# Patient Record
Sex: MALE | Race: BLACK OR AFRICAN AMERICAN | NOT HISPANIC OR LATINO | Employment: UNEMPLOYED | URBAN - METROPOLITAN AREA
[De-identification: names, ages, dates, MRNs, and addresses within clinical notes are randomized per-mention and may not be internally consistent; named-entity substitution may affect disease eponyms.]

---

## 2021-01-01 ENCOUNTER — HOSPITAL ENCOUNTER (EMERGENCY)
Facility: HOSPITAL | Age: 51
Discharge: HOME | End: 2021-01-01
Attending: EMERGENCY MEDICINE
Payer: MEDICAID

## 2021-01-01 VITALS
TEMPERATURE: 98 F | OXYGEN SATURATION: 98 % | BODY MASS INDEX: 31.99 KG/M2 | WEIGHT: 242.5 LBS | HEART RATE: 74 BPM | SYSTOLIC BLOOD PRESSURE: 147 MMHG | RESPIRATION RATE: 18 BRPM | DIASTOLIC BLOOD PRESSURE: 88 MMHG

## 2021-01-01 DIAGNOSIS — S61.215A LACERATION OF LEFT RING FINGER WITHOUT FOREIGN BODY WITHOUT DAMAGE TO NAIL, INITIAL ENCOUNTER: Primary | ICD-10-CM

## 2021-01-01 PROCEDURE — 90715 TDAP VACCINE 7 YRS/> IM: CPT | Performed by: EMERGENCY MEDICINE

## 2021-01-01 PROCEDURE — 25000003 PHARM REV CODE 250: Performed by: EMERGENCY MEDICINE

## 2021-01-01 PROCEDURE — 90471 IMMUNIZATION ADMIN: CPT | Performed by: EMERGENCY MEDICINE

## 2021-01-01 PROCEDURE — 12001 RPR S/N/AX/GEN/TRNK 2.5CM/<: CPT | Mod: F3

## 2021-01-01 PROCEDURE — 99284 EMERGENCY DEPT VISIT MOD MDM: CPT | Mod: 25

## 2021-01-01 PROCEDURE — 63600175 PHARM REV CODE 636 W HCPCS: Performed by: EMERGENCY MEDICINE

## 2021-01-01 RX ORDER — LIDOCAINE HYDROCHLORIDE 10 MG/ML
10 INJECTION INFILTRATION; PERINEURAL
Status: COMPLETED | OUTPATIENT
Start: 2021-01-01 | End: 2021-01-01

## 2021-01-01 RX ORDER — HYDROCODONE BITARTRATE AND ACETAMINOPHEN 5; 325 MG/1; MG/1
1 TABLET ORAL EVERY 6 HOURS PRN
Qty: 6 TABLET | Refills: 0 | Status: SHIPPED | OUTPATIENT
Start: 2021-01-01 | End: 2021-01-11

## 2021-01-01 RX ORDER — CEPHALEXIN 500 MG/1
500 CAPSULE ORAL 4 TIMES DAILY
Qty: 20 CAPSULE | Refills: 0 | Status: SHIPPED | OUTPATIENT
Start: 2021-01-01 | End: 2021-01-06

## 2021-01-01 RX ADMIN — LIDOCAINE HYDROCHLORIDE 10 ML: 10 INJECTION, SOLUTION INFILTRATION; PERINEURAL at 04:01

## 2021-01-01 RX ADMIN — CLOSTRIDIUM TETANI TOXOID ANTIGEN (FORMALDEHYDE INACTIVATED), CORYNEBACTERIUM DIPHTHERIAE TOXOID ANTIGEN (FORMALDEHYDE INACTIVATED), BORDETELLA PERTUSSIS TOXOID ANTIGEN (GLUTARALDEHYDE INACTIVATED), BORDETELLA PERTUSSIS FILAMENTOUS HEMAGGLUTININ ANTIGEN (FORMALDEHYDE INACTIVATED), BORDETELLA PERTUSSIS PERTACTIN ANTIGEN, AND BORDETELLA PERTUSSIS FIMBRIAE 2/3 ANTIGEN 0.5 ML: 5; 2; 2.5; 5; 3; 5 INJECTION, SUSPENSION INTRAMUSCULAR at 04:01

## 2021-02-22 ENCOUNTER — OCCUPATIONAL HEALTH (OUTPATIENT)
Dept: URGENT CARE | Facility: CLINIC | Age: 51
End: 2021-02-22

## 2021-02-22 PROCEDURE — 80305 PR COLLECTION ONLY DRUG SCREEN: ICD-10-PCS | Mod: S$GLB,,, | Performed by: EMERGENCY MEDICINE

## 2021-02-22 PROCEDURE — 80305 DRUG TEST PRSMV DIR OPT OBS: CPT | Mod: S$GLB,,, | Performed by: EMERGENCY MEDICINE

## 2021-12-28 ENCOUNTER — TELEPHONE (OUTPATIENT)
Dept: FAMILY MEDICINE | Facility: CLINIC | Age: 51
End: 2021-12-28

## 2021-12-28 ENCOUNTER — OFFICE VISIT (OUTPATIENT)
Dept: FAMILY MEDICINE | Facility: CLINIC | Age: 51
End: 2021-12-28
Payer: MEDICAID

## 2021-12-28 VITALS
SYSTOLIC BLOOD PRESSURE: 128 MMHG | HEART RATE: 70 BPM | DIASTOLIC BLOOD PRESSURE: 84 MMHG | WEIGHT: 246 LBS | HEIGHT: 73 IN | BODY MASS INDEX: 32.6 KG/M2 | OXYGEN SATURATION: 99 % | TEMPERATURE: 99 F

## 2021-12-28 DIAGNOSIS — Z76.89 ESTABLISHING CARE WITH NEW DOCTOR, ENCOUNTER FOR: Primary | ICD-10-CM

## 2021-12-28 DIAGNOSIS — Z12.5 PROSTATE CANCER SCREENING: ICD-10-CM

## 2021-12-28 DIAGNOSIS — Z79.899 ENCOUNTER FOR LONG-TERM (CURRENT) USE OF OTHER MEDICATIONS: ICD-10-CM

## 2021-12-28 DIAGNOSIS — Z12.11 SCREENING FOR MALIGNANT NEOPLASM OF COLON: ICD-10-CM

## 2021-12-28 DIAGNOSIS — N52.9 ERECTILE DYSFUNCTION, UNSPECIFIED ERECTILE DYSFUNCTION TYPE: ICD-10-CM

## 2021-12-28 DIAGNOSIS — R73.03 PRE-DIABETES: ICD-10-CM

## 2021-12-28 DIAGNOSIS — Z12.11 SCREENING FOR MALIGNANT NEOPLASM OF COLON: Primary | ICD-10-CM

## 2021-12-28 PROCEDURE — 99386 PR PREVENTIVE VISIT,NEW,40-64: ICD-10-PCS | Mod: S$GLB,,, | Performed by: PHYSICIAN ASSISTANT

## 2021-12-28 PROCEDURE — 3079F PR MOST RECENT DIASTOLIC BLOOD PRESSURE 80-89 MM HG: ICD-10-PCS | Mod: S$GLB,,, | Performed by: PHYSICIAN ASSISTANT

## 2021-12-28 PROCEDURE — 99386 PREV VISIT NEW AGE 40-64: CPT | Mod: S$GLB,,, | Performed by: PHYSICIAN ASSISTANT

## 2021-12-28 PROCEDURE — 1160F PR REVIEW ALL MEDS BY PRESCRIBER/CLIN PHARMACIST DOCUMENTED: ICD-10-PCS | Mod: S$GLB,,, | Performed by: PHYSICIAN ASSISTANT

## 2021-12-28 PROCEDURE — 3008F PR BODY MASS INDEX (BMI) DOCUMENTED: ICD-10-PCS | Mod: S$GLB,,, | Performed by: PHYSICIAN ASSISTANT

## 2021-12-28 PROCEDURE — 3074F PR MOST RECENT SYSTOLIC BLOOD PRESSURE < 130 MM HG: ICD-10-PCS | Mod: S$GLB,,, | Performed by: PHYSICIAN ASSISTANT

## 2021-12-28 PROCEDURE — 3074F SYST BP LT 130 MM HG: CPT | Mod: S$GLB,,, | Performed by: PHYSICIAN ASSISTANT

## 2021-12-28 PROCEDURE — 1160F RVW MEDS BY RX/DR IN RCRD: CPT | Mod: S$GLB,,, | Performed by: PHYSICIAN ASSISTANT

## 2021-12-28 PROCEDURE — 3008F BODY MASS INDEX DOCD: CPT | Mod: S$GLB,,, | Performed by: PHYSICIAN ASSISTANT

## 2021-12-28 PROCEDURE — 3079F DIAST BP 80-89 MM HG: CPT | Mod: S$GLB,,, | Performed by: PHYSICIAN ASSISTANT

## 2021-12-28 RX ORDER — TADALAFIL 5 MG/1
5 TABLET ORAL DAILY PRN
Qty: 30 TABLET | Refills: 2 | Status: SHIPPED | OUTPATIENT
Start: 2021-12-28 | End: 2022-01-01

## 2021-12-29 ENCOUNTER — TELEPHONE (OUTPATIENT)
Dept: GASTROENTEROLOGY | Facility: CLINIC | Age: 51
End: 2021-12-29
Payer: MEDICAID

## 2021-12-29 ENCOUNTER — TELEPHONE (OUTPATIENT)
Dept: FAMILY MEDICINE | Facility: CLINIC | Age: 51
End: 2021-12-29
Payer: MEDICAID

## 2021-12-29 DIAGNOSIS — Z12.11 COLON CANCER SCREENING: Primary | ICD-10-CM

## 2021-12-29 DIAGNOSIS — R73.03 PRE-DIABETES: Primary | ICD-10-CM

## 2021-12-29 LAB
ALBUMIN SERPL-MCNC: 4.3 G/DL (ref 3.6–5.1)
ALBUMIN/GLOB SERPL: 1.4 (CALC) (ref 1–2.5)
ALP SERPL-CCNC: 90 U/L (ref 35–144)
ALT SERPL-CCNC: 20 U/L (ref 9–46)
APPEARANCE UR: CLEAR
AST SERPL-CCNC: 17 U/L (ref 10–35)
BACTERIA #/AREA URNS HPF: ABNORMAL /HPF
BACTERIA UR CULT: ABNORMAL
BASOPHILS # BLD AUTO: 57 CELLS/UL (ref 0–200)
BASOPHILS NFR BLD AUTO: 0.7 %
BILIRUB SERPL-MCNC: 0.6 MG/DL (ref 0.2–1.2)
BILIRUB UR QL STRIP: NEGATIVE
BUN SERPL-MCNC: 14 MG/DL (ref 7–25)
BUN/CREAT SERPL: ABNORMAL (CALC) (ref 6–22)
CALCIUM SERPL-MCNC: 9.6 MG/DL (ref 8.6–10.3)
CHLORIDE SERPL-SCNC: 103 MMOL/L (ref 98–110)
CHOLEST SERPL-MCNC: 209 MG/DL
CHOLEST/HDLC SERPL: 4.6 (CALC)
CO2 SERPL-SCNC: 27 MMOL/L (ref 20–32)
COLOR UR: YELLOW
CREAT SERPL-MCNC: 0.91 MG/DL (ref 0.7–1.33)
EOSINOPHIL # BLD AUTO: 139 CELLS/UL (ref 15–500)
EOSINOPHIL NFR BLD AUTO: 1.7 %
ERYTHROCYTE [DISTWIDTH] IN BLOOD BY AUTOMATED COUNT: 14.5 % (ref 11–15)
GLOBULIN SER CALC-MCNC: 3.1 G/DL (CALC) (ref 1.9–3.7)
GLUCOSE SERPL-MCNC: 239 MG/DL (ref 65–99)
GLUCOSE UR QL STRIP: ABNORMAL
HCT VFR BLD AUTO: 45.3 % (ref 38.5–50)
HDLC SERPL-MCNC: 45 MG/DL
HGB BLD-MCNC: 14.6 G/DL (ref 13.2–17.1)
HGB UR QL STRIP: NEGATIVE
HYALINE CASTS #/AREA URNS LPF: ABNORMAL /LPF
KETONES UR QL STRIP: ABNORMAL
LDLC SERPL CALC-MCNC: 139 MG/DL (CALC)
LEUKOCYTE ESTERASE UR QL STRIP: NEGATIVE
LYMPHOCYTES # BLD AUTO: 3149 CELLS/UL (ref 850–3900)
LYMPHOCYTES NFR BLD AUTO: 38.4 %
MCH RBC QN AUTO: 27.5 PG (ref 27–33)
MCHC RBC AUTO-ENTMCNC: 32.2 G/DL (ref 32–36)
MCV RBC AUTO: 85.5 FL (ref 80–100)
MONOCYTES # BLD AUTO: 713 CELLS/UL (ref 200–950)
MONOCYTES NFR BLD AUTO: 8.7 %
NEUTROPHILS # BLD AUTO: 4141 CELLS/UL (ref 1500–7800)
NEUTROPHILS NFR BLD AUTO: 50.5 %
NITRITE UR QL STRIP: NEGATIVE
NONHDLC SERPL-MCNC: 164 MG/DL (CALC)
PH UR STRIP: ABNORMAL [PH] (ref 5–8)
PLATELET # BLD AUTO: 317 THOUSAND/UL (ref 140–400)
PMV BLD REES-ECKER: 9.3 FL (ref 7.5–12.5)
POTASSIUM SERPL-SCNC: 4.7 MMOL/L (ref 3.5–5.3)
PROT SERPL-MCNC: 7.4 G/DL (ref 6.1–8.1)
PROT UR QL STRIP: NEGATIVE
PSA SERPL-MCNC: 1.07 NG/ML
RBC # BLD AUTO: 5.3 MILLION/UL (ref 4.2–5.8)
RBC #/AREA URNS HPF: ABNORMAL /HPF
SODIUM SERPL-SCNC: 140 MMOL/L (ref 135–146)
SP GR UR STRIP: 1.03 (ref 1–1.03)
SQUAMOUS #/AREA URNS HPF: ABNORMAL /HPF
TRIGL SERPL-MCNC: 130 MG/DL
TSH SERPL-ACNC: 0.65 MIU/L (ref 0.4–4.5)
WBC # BLD AUTO: 8.2 THOUSAND/UL (ref 3.8–10.8)
WBC #/AREA URNS HPF: ABNORMAL /HPF

## 2021-12-30 ENCOUNTER — TELEPHONE (OUTPATIENT)
Dept: FAMILY MEDICINE | Facility: CLINIC | Age: 51
End: 2021-12-30
Payer: MEDICAID

## 2021-12-31 LAB — HBA1C MFR BLD: 11 % OF TOTAL HGB

## 2022-01-01 ENCOUNTER — TELEPHONE (OUTPATIENT)
Dept: FAMILY MEDICINE | Facility: CLINIC | Age: 52
End: 2022-01-01
Payer: MEDICAID

## 2022-01-01 ENCOUNTER — TELEPHONE (OUTPATIENT)
Dept: INFECTIOUS DISEASES | Facility: HOSPITAL | Age: 52
End: 2022-01-01
Payer: MEDICAID

## 2022-01-01 ENCOUNTER — OUTSIDE PLACE OF SERVICE (OUTPATIENT)
Dept: INFECTIOUS DISEASES | Facility: CLINIC | Age: 52
End: 2022-01-01
Payer: MEDICAID

## 2022-01-01 ENCOUNTER — INFUSION (OUTPATIENT)
Dept: INFUSION THERAPY | Facility: HOSPITAL | Age: 52
End: 2022-01-01
Attending: INTERNAL MEDICINE
Payer: MEDICAID

## 2022-01-01 ENCOUNTER — OFFICE VISIT (OUTPATIENT)
Dept: INFECTIOUS DISEASES | Facility: CLINIC | Age: 52
End: 2022-01-01
Payer: MEDICAID

## 2022-01-01 ENCOUNTER — TELEPHONE (OUTPATIENT)
Dept: HEMATOLOGY/ONCOLOGY | Facility: CLINIC | Age: 52
End: 2022-01-01
Payer: MEDICAID

## 2022-01-01 ENCOUNTER — PATIENT MESSAGE (OUTPATIENT)
Dept: INFECTIOUS DISEASES | Facility: CLINIC | Age: 52
End: 2022-01-01

## 2022-01-01 ENCOUNTER — DOCUMENT SCAN (OUTPATIENT)
Dept: HOME HEALTH SERVICES | Facility: HOSPITAL | Age: 52
End: 2022-01-01
Payer: MEDICAID

## 2022-01-01 ENCOUNTER — LAB VISIT (OUTPATIENT)
Dept: LAB | Facility: HOSPITAL | Age: 52
End: 2022-01-01
Attending: INTERNAL MEDICINE
Payer: MEDICAID

## 2022-01-01 ENCOUNTER — PATIENT OUTREACH (OUTPATIENT)
Dept: FAMILY MEDICINE | Facility: CLINIC | Age: 52
End: 2022-01-01

## 2022-01-01 ENCOUNTER — ANESTHESIA EVENT (OUTPATIENT)
Dept: SURGERY | Facility: HOSPITAL | Age: 52
DRG: 853 | End: 2022-01-01
Payer: MEDICAID

## 2022-01-01 ENCOUNTER — OFFICE VISIT (OUTPATIENT)
Dept: FAMILY MEDICINE | Facility: CLINIC | Age: 52
End: 2022-01-01
Payer: MEDICAID

## 2022-01-01 ENCOUNTER — TELEPHONE (OUTPATIENT)
Dept: FAMILY MEDICINE | Facility: CLINIC | Age: 52
End: 2022-01-01

## 2022-01-01 ENCOUNTER — OCCUPATIONAL HEALTH (OUTPATIENT)
Dept: URGENT CARE | Facility: CLINIC | Age: 52
End: 2022-01-01

## 2022-01-01 ENCOUNTER — TELEPHONE (OUTPATIENT)
Dept: HEMATOLOGY/ONCOLOGY | Facility: CLINIC | Age: 52
End: 2022-01-01

## 2022-01-01 ENCOUNTER — DOCUMENTATION ONLY (OUTPATIENT)
Dept: HEMATOLOGY/ONCOLOGY | Facility: CLINIC | Age: 52
End: 2022-01-01

## 2022-01-01 ENCOUNTER — PATIENT MESSAGE (OUTPATIENT)
Dept: HEMATOLOGY/ONCOLOGY | Facility: CLINIC | Age: 52
End: 2022-01-01

## 2022-01-01 ENCOUNTER — HOSPITAL ENCOUNTER (INPATIENT)
Facility: HOSPITAL | Age: 52
LOS: 13 days | Discharge: HOME HEALTH | DRG: 853 | End: 2022-09-26
Attending: EMERGENCY MEDICINE | Admitting: STUDENT IN AN ORGANIZED HEALTH CARE EDUCATION/TRAINING PROGRAM
Payer: MEDICAID

## 2022-01-01 ENCOUNTER — ANESTHESIA EVENT (OUTPATIENT)
Dept: ENDOSCOPY | Facility: HOSPITAL | Age: 52
End: 2022-01-01
Payer: MEDICAID

## 2022-01-01 ENCOUNTER — HOSPITAL ENCOUNTER (EMERGENCY)
Facility: HOSPITAL | Age: 52
Discharge: HOME | End: 2022-10-30
Attending: EMERGENCY MEDICINE
Payer: MEDICAID

## 2022-01-01 ENCOUNTER — PATIENT OUTREACH (OUTPATIENT)
Dept: ADMINISTRATIVE | Facility: CLINIC | Age: 52
End: 2022-01-01
Payer: MEDICAID

## 2022-01-01 ENCOUNTER — OFFICE VISIT (OUTPATIENT)
Dept: HEMATOLOGY/ONCOLOGY | Facility: CLINIC | Age: 52
End: 2022-01-01
Payer: MEDICAID

## 2022-01-01 ENCOUNTER — EXTERNAL HOME HEALTH (OUTPATIENT)
Dept: HOME HEALTH SERVICES | Facility: HOSPITAL | Age: 52
End: 2022-01-01
Payer: MEDICAID

## 2022-01-01 ENCOUNTER — ANESTHESIA (OUTPATIENT)
Dept: SURGERY | Facility: HOSPITAL | Age: 52
DRG: 853 | End: 2022-01-01
Payer: MEDICAID

## 2022-01-01 ENCOUNTER — TELEPHONE (OUTPATIENT)
Dept: INFUSION THERAPY | Facility: HOSPITAL | Age: 52
End: 2022-01-01

## 2022-01-01 ENCOUNTER — TELEPHONE (OUTPATIENT)
Dept: SURGERY | Facility: CLINIC | Age: 52
End: 2022-01-01
Payer: MEDICAID

## 2022-01-01 ENCOUNTER — TELEPHONE (OUTPATIENT)
Dept: MEDSURG UNIT | Facility: HOSPITAL | Age: 52
End: 2022-01-01
Payer: MEDICAID

## 2022-01-01 ENCOUNTER — HOSPITAL ENCOUNTER (OUTPATIENT)
Facility: HOSPITAL | Age: 52
Discharge: HOME | End: 2022-10-31
Attending: SURGERY | Admitting: SURGERY
Payer: MEDICAID

## 2022-01-01 ENCOUNTER — OFFICE VISIT (OUTPATIENT)
Dept: SURGERY | Facility: CLINIC | Age: 52
End: 2022-01-01
Payer: MEDICAID

## 2022-01-01 ENCOUNTER — PATIENT MESSAGE (OUTPATIENT)
Dept: HEMATOLOGY/ONCOLOGY | Facility: CLINIC | Age: 52
End: 2022-01-01
Payer: MEDICAID

## 2022-01-01 ENCOUNTER — PATIENT MESSAGE (OUTPATIENT)
Dept: SURGERY | Facility: CLINIC | Age: 52
End: 2022-01-01
Payer: MEDICAID

## 2022-01-01 ENCOUNTER — ANESTHESIA (OUTPATIENT)
Dept: SURGERY | Facility: HOSPITAL | Age: 52
End: 2022-01-01
Payer: MEDICAID

## 2022-01-01 ENCOUNTER — PATIENT MESSAGE (OUTPATIENT)
Dept: FAMILY MEDICINE | Facility: CLINIC | Age: 52
End: 2022-01-01
Payer: MEDICAID

## 2022-01-01 ENCOUNTER — CLINICAL SUPPORT (OUTPATIENT)
Dept: HEMATOLOGY/ONCOLOGY | Facility: CLINIC | Age: 52
End: 2022-01-01
Payer: MEDICAID

## 2022-01-01 ENCOUNTER — PATIENT MESSAGE (OUTPATIENT)
Dept: PSYCHIATRY | Facility: CLINIC | Age: 52
End: 2022-01-01
Payer: MEDICAID

## 2022-01-01 ENCOUNTER — CLINICAL SUPPORT (OUTPATIENT)
Dept: FAMILY MEDICINE | Facility: CLINIC | Age: 52
End: 2022-01-01
Payer: MEDICAID

## 2022-01-01 ENCOUNTER — HOSPITAL ENCOUNTER (OUTPATIENT)
Dept: RADIOLOGY | Facility: HOSPITAL | Age: 52
Discharge: HOME | End: 2022-12-19
Attending: NURSE PRACTITIONER
Payer: MEDICAID

## 2022-01-01 ENCOUNTER — TELEPHONE (OUTPATIENT)
Dept: PSYCHIATRY | Facility: CLINIC | Age: 52
End: 2022-01-01
Payer: MEDICAID

## 2022-01-01 ENCOUNTER — TELEPHONE (OUTPATIENT)
Dept: UROLOGY | Facility: CLINIC | Age: 52
End: 2022-01-01
Payer: MEDICAID

## 2022-01-01 ENCOUNTER — HOSPITAL ENCOUNTER (OUTPATIENT)
Dept: RADIOLOGY | Facility: HOSPITAL | Age: 52
Discharge: HOME | End: 2022-10-14
Attending: INTERNAL MEDICINE
Payer: MEDICAID

## 2022-01-01 ENCOUNTER — OFFICE VISIT (OUTPATIENT)
Dept: PSYCHIATRY | Facility: CLINIC | Age: 52
End: 2022-01-01
Payer: MEDICAID

## 2022-01-01 ENCOUNTER — ANESTHESIA EVENT (OUTPATIENT)
Dept: SURGERY | Facility: HOSPITAL | Age: 52
End: 2022-01-01
Payer: MEDICAID

## 2022-01-01 ENCOUNTER — HOSPITAL ENCOUNTER (OUTPATIENT)
Facility: HOSPITAL | Age: 52
Discharge: HOME | End: 2022-11-07
Attending: EMERGENCY MEDICINE | Admitting: STUDENT IN AN ORGANIZED HEALTH CARE EDUCATION/TRAINING PROGRAM
Payer: MEDICAID

## 2022-01-01 ENCOUNTER — ANESTHESIA (OUTPATIENT)
Dept: ENDOSCOPY | Facility: HOSPITAL | Age: 52
End: 2022-01-01
Payer: MEDICAID

## 2022-01-01 VITALS
RESPIRATION RATE: 16 BRPM | RESPIRATION RATE: 15 BRPM | WEIGHT: 203.63 LBS | SYSTOLIC BLOOD PRESSURE: 121 MMHG | HEIGHT: 73 IN | SYSTOLIC BLOOD PRESSURE: 123 MMHG | OXYGEN SATURATION: 100 % | HEIGHT: 73 IN | SYSTOLIC BLOOD PRESSURE: 123 MMHG | BODY MASS INDEX: 27.62 KG/M2 | DIASTOLIC BLOOD PRESSURE: 67 MMHG | BODY MASS INDEX: 26.86 KG/M2 | HEART RATE: 94 BPM | BODY MASS INDEX: 26.95 KG/M2 | WEIGHT: 208.38 LBS | TEMPERATURE: 98 F | DIASTOLIC BLOOD PRESSURE: 86 MMHG | SYSTOLIC BLOOD PRESSURE: 109 MMHG | HEART RATE: 82 BPM | OXYGEN SATURATION: 99 % | WEIGHT: 203.38 LBS | OXYGEN SATURATION: 100 % | TEMPERATURE: 98 F | DIASTOLIC BLOOD PRESSURE: 76 MMHG | TEMPERATURE: 98 F | DIASTOLIC BLOOD PRESSURE: 73 MMHG | HEART RATE: 100 BPM | TEMPERATURE: 97 F | RESPIRATION RATE: 16 BRPM | OXYGEN SATURATION: 97 % | BODY MASS INDEX: 27.17 KG/M2 | RESPIRATION RATE: 14 BRPM | WEIGHT: 205 LBS | HEIGHT: 73 IN | HEART RATE: 100 BPM

## 2022-01-01 VITALS
SYSTOLIC BLOOD PRESSURE: 120 MMHG | RESPIRATION RATE: 15 BRPM | HEIGHT: 73 IN | WEIGHT: 203.69 LBS | DIASTOLIC BLOOD PRESSURE: 79 MMHG | SYSTOLIC BLOOD PRESSURE: 126 MMHG | WEIGHT: 200.81 LBS | HEIGHT: 73 IN | RESPIRATION RATE: 17 BRPM | OXYGEN SATURATION: 99 % | DIASTOLIC BLOOD PRESSURE: 76 MMHG | TEMPERATURE: 97 F | BODY MASS INDEX: 26.99 KG/M2 | OXYGEN SATURATION: 98 % | HEART RATE: 109 BPM | TEMPERATURE: 97 F | HEART RATE: 80 BPM | OXYGEN SATURATION: 100 % | TEMPERATURE: 98 F | SYSTOLIC BLOOD PRESSURE: 115 MMHG | WEIGHT: 210.38 LBS | RESPIRATION RATE: 12 BRPM | DIASTOLIC BLOOD PRESSURE: 80 MMHG | BODY MASS INDEX: 26.49 KG/M2 | HEART RATE: 114 BPM | BODY MASS INDEX: 27.88 KG/M2

## 2022-01-01 VITALS
OXYGEN SATURATION: 100 % | TEMPERATURE: 97 F | HEART RATE: 109 BPM | BODY MASS INDEX: 26.12 KG/M2 | BODY MASS INDEX: 28.75 KG/M2 | OXYGEN SATURATION: 94 % | WEIGHT: 216.94 LBS | DIASTOLIC BLOOD PRESSURE: 71 MMHG | HEIGHT: 73 IN | DIASTOLIC BLOOD PRESSURE: 81 MMHG | SYSTOLIC BLOOD PRESSURE: 120 MMHG | SYSTOLIC BLOOD PRESSURE: 126 MMHG | TEMPERATURE: 97 F | TEMPERATURE: 98 F | WEIGHT: 197.06 LBS | HEART RATE: 86 BPM | HEIGHT: 73 IN | RESPIRATION RATE: 12 BRPM | BODY MASS INDEX: 28.31 KG/M2 | SYSTOLIC BLOOD PRESSURE: 126 MMHG | HEART RATE: 109 BPM | OXYGEN SATURATION: 99 % | DIASTOLIC BLOOD PRESSURE: 80 MMHG | HEIGHT: 73 IN | WEIGHT: 213.63 LBS | RESPIRATION RATE: 12 BRPM | RESPIRATION RATE: 12 BRPM

## 2022-01-01 VITALS
SYSTOLIC BLOOD PRESSURE: 122 MMHG | DIASTOLIC BLOOD PRESSURE: 88 MMHG | HEART RATE: 95 BPM | TEMPERATURE: 99 F | OXYGEN SATURATION: 97 %

## 2022-01-01 VITALS
TEMPERATURE: 98 F | DIASTOLIC BLOOD PRESSURE: 80 MMHG | OXYGEN SATURATION: 97 % | HEIGHT: 73 IN | SYSTOLIC BLOOD PRESSURE: 118 MMHG | BODY MASS INDEX: 28.49 KG/M2 | WEIGHT: 215 LBS | HEART RATE: 102 BPM

## 2022-01-01 VITALS
DIASTOLIC BLOOD PRESSURE: 71 MMHG | SYSTOLIC BLOOD PRESSURE: 112 MMHG | WEIGHT: 202.31 LBS | HEART RATE: 71 BPM | RESPIRATION RATE: 17 BRPM | TEMPERATURE: 97 F | BODY MASS INDEX: 26.69 KG/M2 | HEIGHT: 73 IN | OXYGEN SATURATION: 98 % | DIASTOLIC BLOOD PRESSURE: 76 MMHG | TEMPERATURE: 99 F | BODY MASS INDEX: 27.04 KG/M2 | SYSTOLIC BLOOD PRESSURE: 132 MMHG | RESPIRATION RATE: 16 BRPM | HEIGHT: 73 IN | OXYGEN SATURATION: 99 % | BODY MASS INDEX: 27.88 KG/M2 | HEART RATE: 101 BPM | WEIGHT: 204 LBS | TEMPERATURE: 98 F | WEIGHT: 210.38 LBS | DIASTOLIC BLOOD PRESSURE: 80 MMHG | OXYGEN SATURATION: 99 % | HEART RATE: 114 BPM | RESPIRATION RATE: 17 BRPM | SYSTOLIC BLOOD PRESSURE: 109 MMHG

## 2022-01-01 VITALS
BODY MASS INDEX: 27.53 KG/M2 | HEIGHT: 73 IN | DIASTOLIC BLOOD PRESSURE: 70 MMHG | TEMPERATURE: 97 F | WEIGHT: 207.69 LBS | OXYGEN SATURATION: 98 % | RESPIRATION RATE: 16 BRPM | DIASTOLIC BLOOD PRESSURE: 66 MMHG | SYSTOLIC BLOOD PRESSURE: 115 MMHG | HEIGHT: 73 IN | SYSTOLIC BLOOD PRESSURE: 118 MMHG | HEART RATE: 78 BPM | BODY MASS INDEX: 26.68 KG/M2 | HEART RATE: 66 BPM | DIASTOLIC BLOOD PRESSURE: 75 MMHG | BODY MASS INDEX: 27.29 KG/M2 | TEMPERATURE: 97 F | TEMPERATURE: 98 F | WEIGHT: 201.31 LBS | HEIGHT: 73 IN | SYSTOLIC BLOOD PRESSURE: 107 MMHG | OXYGEN SATURATION: 99 % | OXYGEN SATURATION: 97 % | HEART RATE: 76 BPM | RESPIRATION RATE: 15 BRPM | WEIGHT: 205.88 LBS | RESPIRATION RATE: 16 BRPM

## 2022-01-01 VITALS
DIASTOLIC BLOOD PRESSURE: 77 MMHG | BODY MASS INDEX: 28.54 KG/M2 | RESPIRATION RATE: 20 BRPM | OXYGEN SATURATION: 98 % | SYSTOLIC BLOOD PRESSURE: 123 MMHG | WEIGHT: 215.38 LBS | TEMPERATURE: 98 F | HEART RATE: 108 BPM | HEIGHT: 73 IN

## 2022-01-01 VITALS
DIASTOLIC BLOOD PRESSURE: 72 MMHG | TEMPERATURE: 97 F | HEIGHT: 73 IN | HEART RATE: 83 BPM | SYSTOLIC BLOOD PRESSURE: 112 MMHG | RESPIRATION RATE: 14 BRPM | OXYGEN SATURATION: 97 % | WEIGHT: 206.56 LBS | BODY MASS INDEX: 27.37 KG/M2

## 2022-01-01 VITALS
BODY MASS INDEX: 28.25 KG/M2 | HEART RATE: 83 BPM | DIASTOLIC BLOOD PRESSURE: 67 MMHG | SYSTOLIC BLOOD PRESSURE: 104 MMHG | RESPIRATION RATE: 17 BRPM | OXYGEN SATURATION: 100 % | TEMPERATURE: 98 F | HEIGHT: 73 IN | WEIGHT: 213.19 LBS

## 2022-01-01 VITALS
HEIGHT: 73 IN | SYSTOLIC BLOOD PRESSURE: 100 MMHG | DIASTOLIC BLOOD PRESSURE: 67 MMHG | RESPIRATION RATE: 17 BRPM | OXYGEN SATURATION: 100 % | BODY MASS INDEX: 26.99 KG/M2 | HEART RATE: 94 BPM | TEMPERATURE: 97 F | WEIGHT: 203.69 LBS

## 2022-01-01 VITALS
DIASTOLIC BLOOD PRESSURE: 66 MMHG | SYSTOLIC BLOOD PRESSURE: 120 MMHG | RESPIRATION RATE: 16 BRPM | HEIGHT: 73 IN | BODY MASS INDEX: 27.3 KG/M2 | WEIGHT: 213.63 LBS | TEMPERATURE: 98 F | WEIGHT: 206 LBS | TEMPERATURE: 98 F | OXYGEN SATURATION: 99 % | HEART RATE: 94 BPM | OXYGEN SATURATION: 97 % | HEIGHT: 73 IN | HEART RATE: 100 BPM | BODY MASS INDEX: 28.31 KG/M2 | DIASTOLIC BLOOD PRESSURE: 78 MMHG | SYSTOLIC BLOOD PRESSURE: 128 MMHG

## 2022-01-01 VITALS
HEART RATE: 89 BPM | HEIGHT: 73 IN | SYSTOLIC BLOOD PRESSURE: 128 MMHG | BODY MASS INDEX: 31.14 KG/M2 | DIASTOLIC BLOOD PRESSURE: 74 MMHG | WEIGHT: 235 LBS | OXYGEN SATURATION: 97 % | TEMPERATURE: 98 F

## 2022-01-01 VITALS
RESPIRATION RATE: 18 BRPM | SYSTOLIC BLOOD PRESSURE: 108 MMHG | WEIGHT: 213.63 LBS | OXYGEN SATURATION: 98 % | TEMPERATURE: 98 F | HEART RATE: 110 BPM | DIASTOLIC BLOOD PRESSURE: 72 MMHG | WEIGHT: 211.63 LBS | HEIGHT: 73 IN | SYSTOLIC BLOOD PRESSURE: 125 MMHG | HEART RATE: 98 BPM | DIASTOLIC BLOOD PRESSURE: 67 MMHG | OXYGEN SATURATION: 99 % | TEMPERATURE: 98 F | HEIGHT: 73 IN | BODY MASS INDEX: 28.31 KG/M2 | BODY MASS INDEX: 28.05 KG/M2 | RESPIRATION RATE: 18 BRPM

## 2022-01-01 VITALS
BODY MASS INDEX: 28.78 KG/M2 | HEART RATE: 96 BPM | WEIGHT: 212.5 LBS | OXYGEN SATURATION: 97 % | TEMPERATURE: 98 F | RESPIRATION RATE: 18 BRPM | DIASTOLIC BLOOD PRESSURE: 73 MMHG | HEIGHT: 72 IN | SYSTOLIC BLOOD PRESSURE: 121 MMHG

## 2022-01-01 VITALS
RESPIRATION RATE: 18 BRPM | HEART RATE: 108 BPM | TEMPERATURE: 99 F | BODY MASS INDEX: 28.65 KG/M2 | OXYGEN SATURATION: 100 % | SYSTOLIC BLOOD PRESSURE: 119 MMHG | DIASTOLIC BLOOD PRESSURE: 81 MMHG | DIASTOLIC BLOOD PRESSURE: 80 MMHG | WEIGHT: 211.63 LBS | HEART RATE: 102 BPM | SYSTOLIC BLOOD PRESSURE: 111 MMHG | SYSTOLIC BLOOD PRESSURE: 119 MMHG | BODY MASS INDEX: 28.05 KG/M2 | TEMPERATURE: 96 F | HEART RATE: 105 BPM | HEIGHT: 73 IN | WEIGHT: 217.13 LBS | TEMPERATURE: 98 F | OXYGEN SATURATION: 98 % | DIASTOLIC BLOOD PRESSURE: 69 MMHG | RESPIRATION RATE: 12 BRPM

## 2022-01-01 VITALS
HEIGHT: 73 IN | SYSTOLIC BLOOD PRESSURE: 108 MMHG | TEMPERATURE: 97 F | HEART RATE: 97 BPM | WEIGHT: 207.69 LBS | BODY MASS INDEX: 27.53 KG/M2 | DIASTOLIC BLOOD PRESSURE: 71 MMHG | RESPIRATION RATE: 18 BRPM

## 2022-01-01 VITALS
RESPIRATION RATE: 18 BRPM | SYSTOLIC BLOOD PRESSURE: 116 MMHG | HEART RATE: 119 BPM | BODY MASS INDEX: 26.24 KG/M2 | HEIGHT: 73 IN | OXYGEN SATURATION: 99 % | DIASTOLIC BLOOD PRESSURE: 77 MMHG | WEIGHT: 198 LBS | TEMPERATURE: 98 F

## 2022-01-01 VITALS
SYSTOLIC BLOOD PRESSURE: 136 MMHG | HEIGHT: 73 IN | HEART RATE: 82 BPM | OXYGEN SATURATION: 99 % | BODY MASS INDEX: 28.43 KG/M2 | DIASTOLIC BLOOD PRESSURE: 84 MMHG | TEMPERATURE: 98 F | WEIGHT: 214.5 LBS

## 2022-01-01 VITALS
RESPIRATION RATE: 16 BRPM | DIASTOLIC BLOOD PRESSURE: 79 MMHG | HEIGHT: 73 IN | HEART RATE: 97 BPM | WEIGHT: 212.31 LBS | SYSTOLIC BLOOD PRESSURE: 126 MMHG | BODY MASS INDEX: 28.14 KG/M2

## 2022-01-01 VITALS
HEART RATE: 72 BPM | TEMPERATURE: 97 F | DIASTOLIC BLOOD PRESSURE: 82 MMHG | SYSTOLIC BLOOD PRESSURE: 123 MMHG | OXYGEN SATURATION: 96 % | BODY MASS INDEX: 26.85 KG/M2 | HEIGHT: 73 IN | WEIGHT: 202.63 LBS | RESPIRATION RATE: 18 BRPM

## 2022-01-01 DIAGNOSIS — E44.0 MALNUTRITION OF MODERATE DEGREE: ICD-10-CM

## 2022-01-01 DIAGNOSIS — C83.33 DIFFUSE LARGE B-CELL LYMPHOMA OF INTRA-ABDOMINAL LYMPH NODES: Primary | ICD-10-CM

## 2022-01-01 DIAGNOSIS — T45.1X5A CHEMOTHERAPY INDUCED NEUTROPENIA: Primary | ICD-10-CM

## 2022-01-01 DIAGNOSIS — E11.9 DIABETES MELLITUS TYPE 2, NONINSULIN DEPENDENT: ICD-10-CM

## 2022-01-01 DIAGNOSIS — C83.30 DIFFUSE LARGE B-CELL LYMPHOMA, UNSPECIFIED BODY REGION: ICD-10-CM

## 2022-01-01 DIAGNOSIS — E78.00 PURE HYPERCHOLESTEROLEMIA: ICD-10-CM

## 2022-01-01 DIAGNOSIS — T45.1X5A CHEMOTHERAPY INDUCED NEUTROPENIA: ICD-10-CM

## 2022-01-01 DIAGNOSIS — C83.30 DIFFUSE LARGE B-CELL LYMPHOMA, UNSPECIFIED BODY REGION: Primary | ICD-10-CM

## 2022-01-01 DIAGNOSIS — Z98.890 S/P EXPLORATORY LAPAROTOMY: ICD-10-CM

## 2022-01-01 DIAGNOSIS — D70.1 CHEMOTHERAPY INDUCED NEUTROPENIA: ICD-10-CM

## 2022-01-01 DIAGNOSIS — K65.1 INTRA-ABDOMINAL ABSCESS: Primary | ICD-10-CM

## 2022-01-01 DIAGNOSIS — Z90.49 S/P SMALL BOWEL RESECTION: ICD-10-CM

## 2022-01-01 DIAGNOSIS — B35.3 TINEA PEDIS, UNSPECIFIED LATERALITY: ICD-10-CM

## 2022-01-01 DIAGNOSIS — Z76.89 ENCOUNTER FOR PREVENTION OF NEUTROPENIA DUE TO CHEMOTHERAPY: Primary | ICD-10-CM

## 2022-01-01 DIAGNOSIS — A49.8 PROTEUS INFECTION: ICD-10-CM

## 2022-01-01 DIAGNOSIS — D61.818 PANCYTOPENIA: ICD-10-CM

## 2022-01-01 DIAGNOSIS — M79.89 SOFT TISSUE MASS: ICD-10-CM

## 2022-01-01 DIAGNOSIS — Z48.815 ENCOUNTER FOR SURGICAL AFTERCARE FOLLOWING SURGERY OF DIGESTIVE SYSTEM: ICD-10-CM

## 2022-01-01 DIAGNOSIS — E44.0 MODERATE MALNUTRITION: ICD-10-CM

## 2022-01-01 DIAGNOSIS — Z51.89 AFTER CARE: ICD-10-CM

## 2022-01-01 DIAGNOSIS — D64.9 ANEMIA, UNSPECIFIED TYPE: Primary | ICD-10-CM

## 2022-01-01 DIAGNOSIS — B35.1 ONYCHOMYCOSIS OF GREAT TOE: ICD-10-CM

## 2022-01-01 DIAGNOSIS — D70.1 CHEMOTHERAPY INDUCED NEUTROPENIA: Primary | ICD-10-CM

## 2022-01-01 DIAGNOSIS — K65.9 PERITONITIS: ICD-10-CM

## 2022-01-01 DIAGNOSIS — Z90.49 STATUS POST SMALL BOWEL RESECTION: Primary | ICD-10-CM

## 2022-01-01 DIAGNOSIS — D63.8 ANEMIA, CHRONIC DISEASE: ICD-10-CM

## 2022-01-01 DIAGNOSIS — C85.90 LYMPHOMA, UNSPECIFIED BODY REGION, UNSPECIFIED LYMPHOMA TYPE: ICD-10-CM

## 2022-01-01 DIAGNOSIS — D50.9 IRON DEFICIENCY ANEMIA, UNSPECIFIED: ICD-10-CM

## 2022-01-01 DIAGNOSIS — Z76.89 ENCOUNTER FOR PREVENTION OF NEUTROPENIA DUE TO CHEMOTHERAPY: ICD-10-CM

## 2022-01-01 DIAGNOSIS — C83.33 DIFFUSE LARGE B-CELL LYMPHOMA OF INTRA-ABDOMINAL LYMPH NODES: ICD-10-CM

## 2022-01-01 DIAGNOSIS — Z09 HOSPITAL DISCHARGE FOLLOW-UP: Primary | ICD-10-CM

## 2022-01-01 DIAGNOSIS — R78.81 GRAM-POSITIVE BACTEREMIA: ICD-10-CM

## 2022-01-01 DIAGNOSIS — R11.0 NAUSEA: ICD-10-CM

## 2022-01-01 DIAGNOSIS — K63.89 SMALL BOWEL MASS: ICD-10-CM

## 2022-01-01 DIAGNOSIS — K63.1 SMALL BOWEL PERFORATION: ICD-10-CM

## 2022-01-01 DIAGNOSIS — R07.9 CHEST PAIN: ICD-10-CM

## 2022-01-01 DIAGNOSIS — Z48.815 ENCOUNTER FOR SURGICAL AFTERCARE FOLLOWING SURGERY OF DIGESTIVE SYSTEM: Primary | ICD-10-CM

## 2022-01-01 DIAGNOSIS — R50.9 FEVER: ICD-10-CM

## 2022-01-01 DIAGNOSIS — E11.9 DIABETES MELLITUS WITHOUT COMPLICATION: ICD-10-CM

## 2022-01-01 DIAGNOSIS — Z51.89 AFTER CARE: Primary | ICD-10-CM

## 2022-01-01 DIAGNOSIS — F43.22 ADJUSTMENT DISORDER WITH ANXIETY: Primary | ICD-10-CM

## 2022-01-01 DIAGNOSIS — K56.7 ILEUS: ICD-10-CM

## 2022-01-01 DIAGNOSIS — E44.0 MODERATE MALNUTRITION: Primary | ICD-10-CM

## 2022-01-01 DIAGNOSIS — E03.9 HYPOTHYROIDISM, UNSPECIFIED TYPE: ICD-10-CM

## 2022-01-01 DIAGNOSIS — Z12.11 SPECIAL SCREENING FOR MALIGNANT NEOPLASMS, COLON: ICD-10-CM

## 2022-01-01 DIAGNOSIS — K63.1 INTESTINAL PERFORATION: ICD-10-CM

## 2022-01-01 DIAGNOSIS — C83.33 RETICULOSARCOMA OF INTRA-ABDOMINAL LYMPH NODES: Primary | ICD-10-CM

## 2022-01-01 DIAGNOSIS — Z79.2 ENCOUNTER FOR LONG-TERM (CURRENT) USE OF ANTIBIOTICS: ICD-10-CM

## 2022-01-01 DIAGNOSIS — C85.90 LYMPHOMA, UNSPECIFIED BODY REGION, UNSPECIFIED LYMPHOMA TYPE: Primary | ICD-10-CM

## 2022-01-01 DIAGNOSIS — C85.90 LYMPHOMA: ICD-10-CM

## 2022-01-01 DIAGNOSIS — A41.9 SEPSIS: Primary | ICD-10-CM

## 2022-01-01 DIAGNOSIS — A41.9 SEPSIS: ICD-10-CM

## 2022-01-01 DIAGNOSIS — C83.30 DLBCL (DIFFUSE LARGE B CELL LYMPHOMA): ICD-10-CM

## 2022-01-01 DIAGNOSIS — D50.0 IRON DEFICIENCY ANEMIA DUE TO CHRONIC BLOOD LOSS: ICD-10-CM

## 2022-01-01 DIAGNOSIS — Z12.5 PROSTATE CANCER SCREENING: ICD-10-CM

## 2022-01-01 DIAGNOSIS — N52.9 ERECTILE DYSFUNCTION, UNSPECIFIED ERECTILE DYSFUNCTION TYPE: ICD-10-CM

## 2022-01-01 DIAGNOSIS — E11.9 DIABETES MELLITUS WITHOUT COMPLICATION: Primary | ICD-10-CM

## 2022-01-01 DIAGNOSIS — K63.2 SMALL BOWEL FISTULA: ICD-10-CM

## 2022-01-01 DIAGNOSIS — K62.5 RECTAL BLEEDING: Primary | ICD-10-CM

## 2022-01-01 DIAGNOSIS — C85.10 B-CELL LYMPHOMA: ICD-10-CM

## 2022-01-01 DIAGNOSIS — E53.8 B12 DEFICIENCY: ICD-10-CM

## 2022-01-01 DIAGNOSIS — K65.1 INTRA-ABDOMINAL ABSCESS: ICD-10-CM

## 2022-01-01 DIAGNOSIS — R10.84 GENERALIZED ABDOMINAL PAIN: Primary | ICD-10-CM

## 2022-01-01 DIAGNOSIS — R00.0 TACHYCARDIA: ICD-10-CM

## 2022-01-01 DIAGNOSIS — K60.2 ANAL FISSURE: ICD-10-CM

## 2022-01-01 DIAGNOSIS — K63.0: ICD-10-CM

## 2022-01-01 DIAGNOSIS — E11.9 DIABETES MELLITUS TYPE 2, NONINSULIN DEPENDENT: Primary | ICD-10-CM

## 2022-01-01 DIAGNOSIS — A41.59 SEPSIS DUE TO ENTEROBACTER: ICD-10-CM

## 2022-01-01 DIAGNOSIS — K59.00 CONSTIPATION, UNSPECIFIED CONSTIPATION TYPE: Primary | ICD-10-CM

## 2022-01-01 LAB
ABO + RH BLD: NORMAL
ABO + RH BLD: NORMAL
AFP SERPL-MCNC: <2 NG/ML (ref 0–8.4)
ALBUMIN SERPL BCP-MCNC: 2 G/DL (ref 3.5–5.2)
ALBUMIN SERPL BCP-MCNC: 2 G/DL (ref 3.5–5.2)
ALBUMIN SERPL BCP-MCNC: 2.1 G/DL (ref 3.5–5.2)
ALBUMIN SERPL BCP-MCNC: 2.2 G/DL (ref 3.5–5.2)
ALBUMIN SERPL BCP-MCNC: 2.3 G/DL (ref 3.5–5.2)
ALBUMIN SERPL BCP-MCNC: 2.3 G/DL (ref 3.5–5.2)
ALBUMIN SERPL BCP-MCNC: 2.4 G/DL (ref 3.5–5.2)
ALBUMIN SERPL BCP-MCNC: 2.4 G/DL (ref 3.5–5.2)
ALBUMIN SERPL BCP-MCNC: 2.5 G/DL (ref 3.5–5.2)
ALBUMIN SERPL BCP-MCNC: 2.6 G/DL (ref 3.5–5.2)
ALBUMIN SERPL BCP-MCNC: 2.6 G/DL (ref 3.5–5.2)
ALBUMIN SERPL BCP-MCNC: 2.7 G/DL (ref 3.5–5.2)
ALBUMIN SERPL BCP-MCNC: 2.7 G/DL (ref 3.5–5.2)
ALBUMIN SERPL BCP-MCNC: 2.9 G/DL (ref 3.5–5.2)
ALBUMIN SERPL BCP-MCNC: 3 G/DL (ref 3.5–5.2)
ALBUMIN SERPL BCP-MCNC: 3.1 G/DL (ref 3.5–5.2)
ALBUMIN SERPL BCP-MCNC: 3.5 G/DL (ref 3.5–5.2)
ALBUMIN SERPL BCP-MCNC: 3.5 G/DL (ref 3.5–5.2)
ALBUMIN SERPL-MCNC: 3.8 G/DL (ref 3.6–5.1)
ALBUMIN/CREAT UR: 3 MCG/MG CREAT
ALBUMIN/GLOB SERPL: 1.1 (CALC) (ref 1–2.5)
ALP SERPL-CCNC: 103 U/L (ref 55–135)
ALP SERPL-CCNC: 105 U/L (ref 35–144)
ALP SERPL-CCNC: 106 U/L (ref 55–135)
ALP SERPL-CCNC: 108 U/L (ref 55–135)
ALP SERPL-CCNC: 110 U/L (ref 55–135)
ALP SERPL-CCNC: 118 U/L (ref 55–135)
ALP SERPL-CCNC: 130 U/L (ref 55–135)
ALP SERPL-CCNC: 135 U/L (ref 55–135)
ALP SERPL-CCNC: 139 U/L (ref 55–135)
ALP SERPL-CCNC: 141 U/L (ref 55–135)
ALP SERPL-CCNC: 144 U/L (ref 55–135)
ALP SERPL-CCNC: 146 U/L (ref 55–135)
ALP SERPL-CCNC: 164 U/L (ref 55–135)
ALP SERPL-CCNC: 171 U/L (ref 55–135)
ALP SERPL-CCNC: 63 U/L (ref 55–135)
ALP SERPL-CCNC: 66 U/L (ref 55–135)
ALP SERPL-CCNC: 68 U/L (ref 55–135)
ALP SERPL-CCNC: 68 U/L (ref 55–135)
ALP SERPL-CCNC: 72 U/L (ref 55–135)
ALP SERPL-CCNC: 78 U/L (ref 55–135)
ALP SERPL-CCNC: 79 U/L (ref 55–135)
ALP SERPL-CCNC: 94 U/L (ref 55–135)
ALP SERPL-CCNC: 99 U/L (ref 55–135)
ALP SERPL-CCNC: 99 U/L (ref 55–135)
ALT SERPL W/O P-5'-P-CCNC: 10 U/L (ref 10–44)
ALT SERPL W/O P-5'-P-CCNC: 12 U/L (ref 10–44)
ALT SERPL W/O P-5'-P-CCNC: 13 U/L (ref 10–44)
ALT SERPL W/O P-5'-P-CCNC: 14 U/L (ref 10–44)
ALT SERPL W/O P-5'-P-CCNC: 15 U/L (ref 10–44)
ALT SERPL W/O P-5'-P-CCNC: 17 U/L (ref 10–44)
ALT SERPL W/O P-5'-P-CCNC: 17 U/L (ref 10–44)
ALT SERPL W/O P-5'-P-CCNC: 19 U/L (ref 10–44)
ALT SERPL W/O P-5'-P-CCNC: 19 U/L (ref 10–44)
ALT SERPL W/O P-5'-P-CCNC: 22 U/L (ref 10–44)
ALT SERPL W/O P-5'-P-CCNC: 23 U/L (ref 10–44)
ALT SERPL W/O P-5'-P-CCNC: 25 U/L (ref 10–44)
ALT SERPL W/O P-5'-P-CCNC: 27 U/L (ref 10–44)
ALT SERPL W/O P-5'-P-CCNC: 28 U/L (ref 10–44)
ALT SERPL W/O P-5'-P-CCNC: 35 U/L (ref 10–44)
ALT SERPL W/O P-5'-P-CCNC: 9 U/L (ref 10–44)
ALT SERPL-CCNC: 9 U/L (ref 9–46)
ANION GAP SERPL CALC-SCNC: 10 MMOL/L (ref 8–16)
ANION GAP SERPL CALC-SCNC: 11 MMOL/L (ref 8–16)
ANION GAP SERPL CALC-SCNC: 12 MMOL/L (ref 8–16)
ANION GAP SERPL CALC-SCNC: 7 MMOL/L (ref 8–16)
ANION GAP SERPL CALC-SCNC: 7 MMOL/L (ref 8–16)
ANION GAP SERPL CALC-SCNC: 8 MMOL/L (ref 8–16)
ANION GAP SERPL CALC-SCNC: 8 MMOL/L (ref 8–16)
ANION GAP SERPL CALC-SCNC: 9 MMOL/L (ref 8–16)
ANISOCYTOSIS BLD QL SMEAR: ABNORMAL
ANISOCYTOSIS BLD QL SMEAR: SLIGHT
ANNOTATION COMMENT IMP: NORMAL
AORTIC ROOT ANNULUS: 3.71 CM
AORTIC VALVE CUSP SEPERATION: 2.24 CM
APPEARANCE UR: CLEAR
APTT BLDCRRT: 26.8 SEC (ref 21–32)
ASCENDING AORTA: 2.99 CM
AST SERPL-CCNC: 10 U/L (ref 10–35)
AST SERPL-CCNC: 15 U/L (ref 10–40)
AST SERPL-CCNC: 16 U/L (ref 10–40)
AST SERPL-CCNC: 17 U/L (ref 10–40)
AST SERPL-CCNC: 18 U/L (ref 10–40)
AST SERPL-CCNC: 19 U/L (ref 10–40)
AST SERPL-CCNC: 20 U/L (ref 10–40)
AST SERPL-CCNC: 23 U/L (ref 10–40)
AST SERPL-CCNC: 24 U/L (ref 10–40)
AST SERPL-CCNC: 27 U/L (ref 10–40)
AST SERPL-CCNC: 28 U/L (ref 10–40)
AST SERPL-CCNC: 29 U/L (ref 10–40)
AST SERPL-CCNC: 29 U/L (ref 10–40)
AST SERPL-CCNC: 36 U/L (ref 10–40)
AST SERPL-CCNC: 37 U/L (ref 10–40)
AST SERPL-CCNC: 46 U/L (ref 10–40)
AV INDEX (PROSTH): 0.81
AV MEAN GRADIENT: 6 MMHG
AV PEAK GRADIENT: 9 MMHG
AV VALVE AREA: 3.44 CM2
AV VELOCITY RATIO: 0.83
BACTERIA #/AREA URNS HPF: ABNORMAL /HPF
BACTERIA #/AREA URNS HPF: ABNORMAL /HPF
BACTERIA #/AREA URNS HPF: NORMAL /HPF
BACTERIA BLD CULT: ABNORMAL
BACTERIA BLD CULT: NORMAL
BACTERIA SPEC AEROBE CULT: ABNORMAL
BACTERIA SPEC AEROBE CULT: ABNORMAL
BACTERIA SPEC AEROBE CULT: NO GROWTH
BACTERIA SPEC ANAEROBE CULT: NORMAL
BACTERIA SPEC ANAEROBE CULT: NORMAL
BACTERIA UR CULT: ABNORMAL
BASOPHILS # BLD AUTO: 0 K/UL (ref 0–0.2)
BASOPHILS # BLD AUTO: 0.01 K/UL (ref 0–0.2)
BASOPHILS # BLD AUTO: 0.02 K/UL (ref 0–0.2)
BASOPHILS # BLD AUTO: 0.02 K/UL (ref 0–0.2)
BASOPHILS # BLD AUTO: 0.04 K/UL (ref 0–0.2)
BASOPHILS # BLD AUTO: 0.05 K/UL (ref 0–0.2)
BASOPHILS # BLD AUTO: 0.06 K/UL (ref 0–0.2)
BASOPHILS # BLD AUTO: 0.1 K/UL (ref 0–0.2)
BASOPHILS # BLD AUTO: 80 CELLS/UL (ref 0–200)
BASOPHILS # BLD AUTO: ABNORMAL K/UL (ref 0–0.2)
BASOPHILS # BLD AUTO: ABNORMAL K/UL (ref 0–0.2)
BASOPHILS NFR BLD AUTO: 0.8 %
BASOPHILS NFR BLD: 0 % (ref 0–1.9)
BASOPHILS NFR BLD: 0.1 % (ref 0–1.9)
BASOPHILS NFR BLD: 0.2 % (ref 0–1.9)
BASOPHILS NFR BLD: 0.3 % (ref 0–1.9)
BASOPHILS NFR BLD: 0.3 % (ref 0–1.9)
BASOPHILS NFR BLD: 0.4 % (ref 0–1.9)
BASOPHILS NFR BLD: 0.4 % (ref 0–1.9)
BASOPHILS NFR BLD: 0.5 % (ref 0–1.9)
BASOPHILS NFR BLD: 0.6 % (ref 0–1.9)
BASOPHILS NFR BLD: 0.6 % (ref 0–1.9)
BASOPHILS NFR BLD: 0.7 % (ref 0–1.9)
BASOPHILS NFR BLD: 1 % (ref 0–1.9)
BASOPHILS NFR BLD: 1 % (ref 0–1.9)
BASOPHILS NFR BLD: 1.2 % (ref 0–1.9)
BILIRUB SERPL-MCNC: 0.6 MG/DL (ref 0.1–1)
BILIRUB SERPL-MCNC: 0.7 MG/DL (ref 0.1–1)
BILIRUB SERPL-MCNC: 0.7 MG/DL (ref 0.2–1.2)
BILIRUB SERPL-MCNC: 0.8 MG/DL (ref 0.1–1)
BILIRUB SERPL-MCNC: 0.9 MG/DL (ref 0.1–1)
BILIRUB SERPL-MCNC: 0.9 MG/DL (ref 0.1–1)
BILIRUB SERPL-MCNC: 1 MG/DL (ref 0.1–1)
BILIRUB SERPL-MCNC: 1.2 MG/DL (ref 0.1–1)
BILIRUB SERPL-MCNC: 1.3 MG/DL (ref 0.1–1)
BILIRUB SERPL-MCNC: 1.3 MG/DL (ref 0.1–1)
BILIRUB UR QL STRIP: ABNORMAL
BILIRUB UR QL STRIP: NEGATIVE
BLD GP AB SCN CELLS X3 SERPL QL: NORMAL
BLD GP AB SCN CELLS X3 SERPL QL: NORMAL
BODY SITE - BONE MARROW: NORMAL
BSA FOR ECHO PROCEDURE: 2.29 M2
BUN SERPL-MCNC: 10 MG/DL (ref 6–20)
BUN SERPL-MCNC: 11 MG/DL (ref 6–20)
BUN SERPL-MCNC: 12 MG/DL (ref 6–20)
BUN SERPL-MCNC: 13 MG/DL (ref 6–20)
BUN SERPL-MCNC: 13 MG/DL (ref 6–20)
BUN SERPL-MCNC: 14 MG/DL (ref 6–20)
BUN SERPL-MCNC: 14 MG/DL (ref 6–20)
BUN SERPL-MCNC: 14 MG/DL (ref 7–25)
BUN SERPL-MCNC: 15 MG/DL (ref 6–20)
BUN SERPL-MCNC: 15 MG/DL (ref 6–20)
BUN SERPL-MCNC: 16 MG/DL (ref 6–20)
BUN SERPL-MCNC: 7 MG/DL (ref 6–20)
BUN SERPL-MCNC: 8 MG/DL (ref 6–20)
BUN SERPL-MCNC: 9 MG/DL (ref 6–20)
BUN/CREAT SERPL: ABNORMAL (CALC) (ref 6–22)
CALCIUM SERPL-MCNC: 8.1 MG/DL (ref 8.7–10.5)
CALCIUM SERPL-MCNC: 8.3 MG/DL (ref 8.7–10.5)
CALCIUM SERPL-MCNC: 8.4 MG/DL (ref 8.7–10.5)
CALCIUM SERPL-MCNC: 8.5 MG/DL (ref 8.7–10.5)
CALCIUM SERPL-MCNC: 8.6 MG/DL (ref 8.7–10.5)
CALCIUM SERPL-MCNC: 8.7 MG/DL (ref 8.7–10.5)
CALCIUM SERPL-MCNC: 8.8 MG/DL (ref 8.6–10.3)
CALCIUM SERPL-MCNC: 8.8 MG/DL (ref 8.7–10.5)
CALCIUM SERPL-MCNC: 8.9 MG/DL (ref 8.7–10.5)
CALCIUM SERPL-MCNC: 9 MG/DL (ref 8.7–10.5)
CALCIUM SERPL-MCNC: 9.1 MG/DL (ref 8.7–10.5)
CALCIUM SERPL-MCNC: 9.4 MG/DL (ref 8.7–10.5)
CANCER AG19-9 SERPL-ACNC: <2.1 U/ML (ref 0–40)
CEA SERPL-MCNC: <1.7 NG/ML (ref 0–5)
CHLORIDE SERPL-SCNC: 100 MMOL/L (ref 95–110)
CHLORIDE SERPL-SCNC: 101 MMOL/L (ref 95–110)
CHLORIDE SERPL-SCNC: 102 MMOL/L (ref 95–110)
CHLORIDE SERPL-SCNC: 103 MMOL/L (ref 95–110)
CHLORIDE SERPL-SCNC: 104 MMOL/L (ref 95–110)
CHLORIDE SERPL-SCNC: 104 MMOL/L (ref 98–110)
CHLORIDE SERPL-SCNC: 105 MMOL/L (ref 95–110)
CHLORIDE SERPL-SCNC: 105 MMOL/L (ref 95–110)
CHLORIDE SERPL-SCNC: 106 MMOL/L (ref 95–110)
CHLORIDE SERPL-SCNC: 98 MMOL/L (ref 95–110)
CHLORIDE SERPL-SCNC: 99 MMOL/L (ref 95–110)
CHOLEST SERPL-MCNC: 137 MG/DL
CHOLEST/HDLC SERPL: 9.8 (CALC)
CHROM BANDING METHOD: NORMAL
CHROMOSOME ANALYSIS BM ADDITIONAL INFORMATION: NORMAL
CHROMOSOME ANALYSIS BM RELEASED BY: NORMAL
CHROMOSOME ANALYSIS BM RESULT SUMMARY: NORMAL
CLARITY UR: CLEAR
CLINICAL CYTOGENETICIST REVIEW: NORMAL
CLINICAL DIAGNOSIS - BONE MARROW: NORMAL
CO2 SERPL-SCNC: 21 MMOL/L (ref 23–29)
CO2 SERPL-SCNC: 22 MMOL/L (ref 23–29)
CO2 SERPL-SCNC: 23 MMOL/L (ref 23–29)
CO2 SERPL-SCNC: 24 MMOL/L (ref 23–29)
CO2 SERPL-SCNC: 25 MMOL/L (ref 23–29)
CO2 SERPL-SCNC: 26 MMOL/L (ref 20–32)
CO2 SERPL-SCNC: 26 MMOL/L (ref 23–29)
CO2 SERPL-SCNC: 27 MMOL/L (ref 23–29)
COLOR UR: ABNORMAL
COLOR UR: YELLOW
COMMENT: NORMAL
COMPLEXED PSA SERPL-MCNC: 0.9 NG/ML (ref 0–4)
CREAT SERPL-MCNC: 0.7 MG/DL (ref 0.5–1.4)
CREAT SERPL-MCNC: 0.8 MG/DL (ref 0.5–1.4)
CREAT SERPL-MCNC: 0.9 MG/DL (ref 0.5–1.4)
CREAT SERPL-MCNC: 1.01 MG/DL (ref 0.7–1.3)
CREAT UR-MCNC: 330 MG/DL (ref 20–320)
CRP SERPL-MCNC: 114.7 MG/L (ref 0–8.2)
CRP SERPL-MCNC: 181.6 MG/L (ref 0–8.2)
CRP SERPL-MCNC: 38.5 MG/L (ref 0–8.2)
CRP SERPL-MCNC: 42.5 MG/L (ref 0–8.2)
CRP SERPL-MCNC: 54.7 MG/L (ref 0–8.2)
CRP SERPL-MCNC: 54.8 MG/L (ref 0–8.2)
CRP SERPL-MCNC: 55.8 MG/L (ref 0–8.2)
CRP SERPL-MCNC: 78 MG/L (ref 0–8.2)
CV ECHO LV RWT: 0.44 CM
DIFFERENTIAL METHOD: ABNORMAL
DOP CALC AO PEAK VEL: 1.48 M/S
DOP CALC AO VTI: 24.67 CM
DOP CALC LVOT AREA: 4.2 CM2
DOP CALC LVOT DIAMETER: 2.32 CM
DOP CALC LVOT PEAK VEL: 1.23 M/S
DOP CALC LVOT STROKE VOLUME: 84.76 CM3
DOP CALC MV VTI: 23.13 CM
DOP CALCLVOT PEAK VEL VTI: 20.06 CM
E WAVE DECELERATION TIME: 257.34 MSEC
E/A RATIO: 0.57
E/E' RATIO: 7.2 M/S
ECHO LV POSTERIOR WALL: 0.98 CM (ref 0.6–1.1)
EGFR: 88 ML/MIN/1.73M2
EJECTION FRACTION: 60 %
EOSINOPHIL # BLD AUTO: 0 K/UL (ref 0–0.5)
EOSINOPHIL # BLD AUTO: 0.1 K/UL (ref 0–0.5)
EOSINOPHIL # BLD AUTO: 0.2 K/UL (ref 0–0.5)
EOSINOPHIL # BLD AUTO: 160 CELLS/UL (ref 15–500)
EOSINOPHIL # BLD AUTO: ABNORMAL K/UL (ref 0–0.5)
EOSINOPHIL # BLD AUTO: ABNORMAL K/UL (ref 0–0.5)
EOSINOPHIL NFR BLD AUTO: 1.6 %
EOSINOPHIL NFR BLD: 0 % (ref 0–8)
EOSINOPHIL NFR BLD: 0.1 % (ref 0–8)
EOSINOPHIL NFR BLD: 0.4 % (ref 0–8)
EOSINOPHIL NFR BLD: 0.6 % (ref 0–8)
EOSINOPHIL NFR BLD: 0.7 % (ref 0–8)
EOSINOPHIL NFR BLD: 0.9 % (ref 0–8)
EOSINOPHIL NFR BLD: 1 % (ref 0–8)
EOSINOPHIL NFR BLD: 1.3 % (ref 0–8)
EOSINOPHIL NFR BLD: 1.5 % (ref 0–8)
EOSINOPHIL NFR BLD: 1.6 % (ref 0–8)
EOSINOPHIL NFR BLD: 1.7 % (ref 0–8)
EOSINOPHIL NFR BLD: 1.8 % (ref 0–8)
EOSINOPHIL NFR BLD: 1.9 % (ref 0–8)
EOSINOPHIL NFR BLD: 2.1 % (ref 0–8)
EOSINOPHIL NFR BLD: 2.8 % (ref 0–8)
EOSINOPHIL NFR BLD: 2.8 % (ref 0–8)
EOSINOPHIL NFR BLD: 3 % (ref 0–8)
EOSINOPHIL NFR BLD: 3 % (ref 0–8)
ERYTHROCYTE [DISTWIDTH] IN BLOOD BY AUTOMATED COUNT: 14.8 % (ref 11–15)
ERYTHROCYTE [DISTWIDTH] IN BLOOD BY AUTOMATED COUNT: 16.4 % (ref 11.5–14.5)
ERYTHROCYTE [DISTWIDTH] IN BLOOD BY AUTOMATED COUNT: 16.5 % (ref 11.5–14.5)
ERYTHROCYTE [DISTWIDTH] IN BLOOD BY AUTOMATED COUNT: 16.5 % (ref 11.5–14.5)
ERYTHROCYTE [DISTWIDTH] IN BLOOD BY AUTOMATED COUNT: 16.6 % (ref 11.5–14.5)
ERYTHROCYTE [DISTWIDTH] IN BLOOD BY AUTOMATED COUNT: 16.8 % (ref 11.5–14.5)
ERYTHROCYTE [DISTWIDTH] IN BLOOD BY AUTOMATED COUNT: 16.9 % (ref 11.5–14.5)
ERYTHROCYTE [DISTWIDTH] IN BLOOD BY AUTOMATED COUNT: 17.2 % (ref 11.5–14.5)
ERYTHROCYTE [DISTWIDTH] IN BLOOD BY AUTOMATED COUNT: 17.2 % (ref 11.5–14.5)
ERYTHROCYTE [DISTWIDTH] IN BLOOD BY AUTOMATED COUNT: 17.3 % (ref 11.5–14.5)
ERYTHROCYTE [DISTWIDTH] IN BLOOD BY AUTOMATED COUNT: 17.3 % (ref 11.5–14.5)
ERYTHROCYTE [DISTWIDTH] IN BLOOD BY AUTOMATED COUNT: 17.4 % (ref 11.5–14.5)
ERYTHROCYTE [DISTWIDTH] IN BLOOD BY AUTOMATED COUNT: 17.7 % (ref 11.5–14.5)
ERYTHROCYTE [DISTWIDTH] IN BLOOD BY AUTOMATED COUNT: 18.9 % (ref 11.5–14.5)
ERYTHROCYTE [DISTWIDTH] IN BLOOD BY AUTOMATED COUNT: 19.7 % (ref 11.5–14.5)
ERYTHROCYTE [DISTWIDTH] IN BLOOD BY AUTOMATED COUNT: 19.8 % (ref 11.5–14.5)
ERYTHROCYTE [DISTWIDTH] IN BLOOD BY AUTOMATED COUNT: 20.4 % (ref 11.5–14.5)
ERYTHROCYTE [DISTWIDTH] IN BLOOD BY AUTOMATED COUNT: 20.6 % (ref 11.5–14.5)
ERYTHROCYTE [DISTWIDTH] IN BLOOD BY AUTOMATED COUNT: 20.7 % (ref 11.5–14.5)
ERYTHROCYTE [DISTWIDTH] IN BLOOD BY AUTOMATED COUNT: 21.1 % (ref 11.5–14.5)
ERYTHROCYTE [DISTWIDTH] IN BLOOD BY AUTOMATED COUNT: 21.4 % (ref 11.5–14.5)
ERYTHROCYTE [DISTWIDTH] IN BLOOD BY AUTOMATED COUNT: 21.4 % (ref 11.5–14.5)
ERYTHROCYTE [DISTWIDTH] IN BLOOD BY AUTOMATED COUNT: 21.6 % (ref 11.5–14.5)
ERYTHROCYTE [DISTWIDTH] IN BLOOD BY AUTOMATED COUNT: 22.3 % (ref 11.5–14.5)
ERYTHROCYTE [SEDIMENTATION RATE] IN BLOOD BY WESTERGREN METHOD: 112 MM/HR (ref 0–10)
ERYTHROCYTE [SEDIMENTATION RATE] IN BLOOD BY WESTERGREN METHOD: 26 MM/HR (ref 0–10)
ERYTHROCYTE [SEDIMENTATION RATE] IN BLOOD BY WESTERGREN METHOD: 33 MM/HR (ref 0–10)
ERYTHROCYTE [SEDIMENTATION RATE] IN BLOOD BY WESTERGREN METHOD: 36 MM/HR (ref 0–10)
ERYTHROCYTE [SEDIMENTATION RATE] IN BLOOD BY WESTERGREN METHOD: 40 MM/HR (ref 0–10)
ERYTHROCYTE [SEDIMENTATION RATE] IN BLOOD BY WESTERGREN METHOD: 40 MM/HR (ref 0–10)
EST. GFR  (NO RACE VARIABLE): >60 ML/MIN/1.73 M^2
FERRITIN SERPL-MCNC: 300 NG/ML (ref 38–380)
FERRITIN SERPL-MCNC: 557 NG/ML (ref 20–300)
FERRITIN SERPL-MCNC: 655 NG/ML (ref 20–300)
FINAL PATHOLOGIC DIAGNOSIS: NORMAL
FINAL PATHOLOGIC DIAGNOSIS: NORMAL
FLOW CYTOMETRY ANTIBODIES ANALYZED - BONE MARROW: NORMAL
FLOW CYTOMETRY COMMENT - BONE MARROW: NORMAL
FLOW CYTOMETRY INTERPRETATION - BONE MARROW: NORMAL
FRACTIONAL SHORTENING: 32 % (ref 28–44)
GLOBULIN SER CALC-MCNC: 3.5 G/DL (CALC) (ref 1.9–3.7)
GLUCOSE SERPL-MCNC: 103 MG/DL (ref 70–110)
GLUCOSE SERPL-MCNC: 105 MG/DL (ref 70–110)
GLUCOSE SERPL-MCNC: 106 MG/DL (ref 70–110)
GLUCOSE SERPL-MCNC: 111 MG/DL (ref 70–110)
GLUCOSE SERPL-MCNC: 116 MG/DL (ref 70–110)
GLUCOSE SERPL-MCNC: 116 MG/DL (ref 70–110)
GLUCOSE SERPL-MCNC: 120 MG/DL (ref 70–110)
GLUCOSE SERPL-MCNC: 121 MG/DL (ref 70–110)
GLUCOSE SERPL-MCNC: 125 MG/DL (ref 65–99)
GLUCOSE SERPL-MCNC: 125 MG/DL (ref 70–110)
GLUCOSE SERPL-MCNC: 127 MG/DL (ref 70–110)
GLUCOSE SERPL-MCNC: 130 MG/DL (ref 70–110)
GLUCOSE SERPL-MCNC: 131 MG/DL (ref 70–110)
GLUCOSE SERPL-MCNC: 132 MG/DL (ref 70–110)
GLUCOSE SERPL-MCNC: 137 MG/DL (ref 70–110)
GLUCOSE SERPL-MCNC: 138 MG/DL (ref 70–110)
GLUCOSE SERPL-MCNC: 147 MG/DL (ref 70–110)
GLUCOSE SERPL-MCNC: 150 MG/DL (ref 70–110)
GLUCOSE SERPL-MCNC: 166 MG/DL (ref 70–110)
GLUCOSE SERPL-MCNC: 175 MG/DL (ref 70–110)
GLUCOSE SERPL-MCNC: 183 MG/DL (ref 70–110)
GLUCOSE SERPL-MCNC: 199 MG/DL (ref 70–110)
GLUCOSE SERPL-MCNC: 207 MG/DL (ref 70–110)
GLUCOSE SERPL-MCNC: 207 MG/DL (ref 70–110)
GLUCOSE SERPL-MCNC: 209 MG/DL (ref 70–110)
GLUCOSE SERPL-MCNC: 224 MG/DL (ref 70–110)
GLUCOSE SERPL-MCNC: 289 MG/DL (ref 70–110)
GLUCOSE SERPL-MCNC: 92 MG/DL (ref 70–110)
GLUCOSE UR QL STRIP: NEGATIVE
GRAM STN SPEC: NORMAL
GROSS: NORMAL
GROSS: NORMAL
HBA1C MFR BLD: 5.5 %
HBV CORE AB SERPL QL IA: NORMAL
HBV SURFACE AG SERPL QL IA: NORMAL
HCT VFR BLD AUTO: 23.7 % (ref 40–54)
HCT VFR BLD AUTO: 23.8 % (ref 40–54)
HCT VFR BLD AUTO: 24.3 % (ref 40–54)
HCT VFR BLD AUTO: 24.7 % (ref 40–54)
HCT VFR BLD AUTO: 24.8 % (ref 40–54)
HCT VFR BLD AUTO: 24.9 % (ref 40–54)
HCT VFR BLD AUTO: 25.2 % (ref 40–54)
HCT VFR BLD AUTO: 25.7 % (ref 40–54)
HCT VFR BLD AUTO: 25.7 % (ref 40–54)
HCT VFR BLD AUTO: 25.9 % (ref 40–54)
HCT VFR BLD AUTO: 26.2 % (ref 40–54)
HCT VFR BLD AUTO: 26.4 % (ref 40–54)
HCT VFR BLD AUTO: 26.5 % (ref 40–54)
HCT VFR BLD AUTO: 26.6 % (ref 40–54)
HCT VFR BLD AUTO: 26.9 % (ref 40–54)
HCT VFR BLD AUTO: 26.9 % (ref 40–54)
HCT VFR BLD AUTO: 28.1 % (ref 40–54)
HCT VFR BLD AUTO: 28.5 % (ref 40–54)
HCT VFR BLD AUTO: 29.2 % (ref 40–54)
HCT VFR BLD AUTO: 29.2 % (ref 40–54)
HCT VFR BLD AUTO: 30 % (ref 40–54)
HCT VFR BLD AUTO: 30.5 % (ref 40–54)
HCT VFR BLD AUTO: 30.8 % (ref 40–54)
HCT VFR BLD AUTO: 31.2 % (ref 40–54)
HCT VFR BLD AUTO: 31.3 % (ref 40–54)
HCT VFR BLD AUTO: 32.1 % (ref 40–54)
HCT VFR BLD AUTO: 32.2 % (ref 40–54)
HCT VFR BLD AUTO: 33.4 % (ref 40–54)
HCT VFR BLD AUTO: 36.6 % (ref 38.5–50)
HCV AB SERPL QL IA: NORMAL
HDLC SERPL-MCNC: 14 MG/DL
HGB BLD-MCNC: 10 G/DL (ref 14–18)
HGB BLD-MCNC: 10.3 G/DL (ref 14–18)
HGB BLD-MCNC: 10.3 G/DL (ref 14–18)
HGB BLD-MCNC: 10.6 G/DL (ref 14–18)
HGB BLD-MCNC: 12 G/DL (ref 13.2–17.1)
HGB BLD-MCNC: 7.6 G/DL (ref 14–18)
HGB BLD-MCNC: 7.8 G/DL (ref 14–18)
HGB BLD-MCNC: 8 G/DL (ref 14–18)
HGB BLD-MCNC: 8 G/DL (ref 14–18)
HGB BLD-MCNC: 8.1 G/DL (ref 14–18)
HGB BLD-MCNC: 8.1 G/DL (ref 14–18)
HGB BLD-MCNC: 8.2 G/DL (ref 14–18)
HGB BLD-MCNC: 8.4 G/DL (ref 14–18)
HGB BLD-MCNC: 8.4 G/DL (ref 14–18)
HGB BLD-MCNC: 8.5 G/DL (ref 14–18)
HGB BLD-MCNC: 8.6 G/DL (ref 14–18)
HGB BLD-MCNC: 8.6 G/DL (ref 14–18)
HGB BLD-MCNC: 8.7 G/DL (ref 14–18)
HGB BLD-MCNC: 8.8 G/DL (ref 14–18)
HGB BLD-MCNC: 8.9 G/DL (ref 14–18)
HGB BLD-MCNC: 9.2 G/DL (ref 14–18)
HGB BLD-MCNC: 9.2 G/DL (ref 14–18)
HGB BLD-MCNC: 9.3 G/DL (ref 14–18)
HGB BLD-MCNC: 9.4 G/DL (ref 14–18)
HGB BLD-MCNC: 9.6 G/DL (ref 14–18)
HGB BLD-MCNC: 9.9 G/DL (ref 14–18)
HGB BLD-MCNC: 9.9 G/DL (ref 14–18)
HGB UR QL STRIP: NEGATIVE
HIV 1+2 AB+HIV1 P24 AG SERPL QL IA: NORMAL
HYALINE CASTS #/AREA URNS LPF: ABNORMAL /LPF
HYPOCHROMIA BLD QL SMEAR: ABNORMAL
IGA SERPL-MCNC: 295 MG/DL (ref 40–350)
IGG SERPL-MCNC: 1751 MG/DL (ref 650–1600)
IGM SERPL-MCNC: 52 MG/DL (ref 50–300)
IMM GRANULOCYTES # BLD AUTO: 0.02 K/UL (ref 0–0.04)
IMM GRANULOCYTES # BLD AUTO: 0.04 K/UL (ref 0–0.04)
IMM GRANULOCYTES # BLD AUTO: 0.04 K/UL (ref 0–0.04)
IMM GRANULOCYTES # BLD AUTO: 0.06 K/UL (ref 0–0.04)
IMM GRANULOCYTES # BLD AUTO: 0.07 K/UL (ref 0–0.04)
IMM GRANULOCYTES # BLD AUTO: 0.08 K/UL (ref 0–0.04)
IMM GRANULOCYTES # BLD AUTO: 0.08 K/UL (ref 0–0.04)
IMM GRANULOCYTES # BLD AUTO: 0.09 K/UL (ref 0–0.04)
IMM GRANULOCYTES # BLD AUTO: 0.11 K/UL (ref 0–0.04)
IMM GRANULOCYTES # BLD AUTO: 0.13 K/UL (ref 0–0.04)
IMM GRANULOCYTES # BLD AUTO: 0.14 K/UL (ref 0–0.04)
IMM GRANULOCYTES # BLD AUTO: 0.14 K/UL (ref 0–0.04)
IMM GRANULOCYTES # BLD AUTO: 0.16 K/UL (ref 0–0.04)
IMM GRANULOCYTES # BLD AUTO: 0.17 K/UL (ref 0–0.04)
IMM GRANULOCYTES # BLD AUTO: 0.18 K/UL (ref 0–0.04)
IMM GRANULOCYTES # BLD AUTO: 0.25 K/UL (ref 0–0.04)
IMM GRANULOCYTES # BLD AUTO: 0.25 K/UL (ref 0–0.04)
IMM GRANULOCYTES # BLD AUTO: ABNORMAL K/UL
IMM GRANULOCYTES # BLD AUTO: ABNORMAL K/UL (ref 0–0.04)
IMM GRANULOCYTES NFR BLD AUTO: 0.5 % (ref 0–0.5)
IMM GRANULOCYTES NFR BLD AUTO: 0.7 % (ref 0–0.5)
IMM GRANULOCYTES NFR BLD AUTO: 0.8 % (ref 0–0.5)
IMM GRANULOCYTES NFR BLD AUTO: 0.9 % (ref 0–0.5)
IMM GRANULOCYTES NFR BLD AUTO: 1 % (ref 0–0.5)
IMM GRANULOCYTES NFR BLD AUTO: 1.3 % (ref 0–0.5)
IMM GRANULOCYTES NFR BLD AUTO: 1.6 % (ref 0–0.5)
IMM GRANULOCYTES NFR BLD AUTO: 1.6 % (ref 0–0.5)
IMM GRANULOCYTES NFR BLD AUTO: 1.7 % (ref 0–0.5)
IMM GRANULOCYTES NFR BLD AUTO: 2 % (ref 0–0.5)
IMM GRANULOCYTES NFR BLD AUTO: 3.3 % (ref 0–0.5)
IMM GRANULOCYTES NFR BLD AUTO: 3.3 % (ref 0–0.5)
IMM GRANULOCYTES NFR BLD AUTO: 3.7 % (ref 0–0.5)
IMM GRANULOCYTES NFR BLD AUTO: 4.6 % (ref 0–0.5)
IMM GRANULOCYTES NFR BLD AUTO: ABNORMAL %
IMM GRANULOCYTES NFR BLD AUTO: ABNORMAL % (ref 0–0.5)
INFLUENZA A, MOLECULAR: NEGATIVE
INFLUENZA B, MOLECULAR: NEGATIVE
INR PPP: 1 (ref 0.8–1.2)
INR PPP: 1 (ref 0.8–1.2)
INTERVENTRICULAR SEPTUM: 1.01 CM (ref 0.6–1.1)
IRON SATN MFR SERPL: 9 % (CALC) (ref 20–48)
IRON SERPL-MCNC: 19 UG/DL (ref 45–160)
IRON SERPL-MCNC: 23 UG/DL (ref 45–160)
IRON SERPL-MCNC: 27 MCG/DL (ref 50–180)
IVRT: 85.63 MSEC
KARYOTYP MAR: NORMAL
KETONES UR QL STRIP: ABNORMAL
KETONES UR QL STRIP: ABNORMAL
KETONES UR QL STRIP: NEGATIVE
LA MAJOR: 5.17 CM
LA WIDTH: 3.9 CM
LACTATE SERPL-SCNC: 1 MMOL/L (ref 0.5–2.2)
LACTATE SERPL-SCNC: 2.3 MMOL/L (ref 0.5–2.2)
LACTATE SERPL-SCNC: 2.4 MMOL/L (ref 0.5–2.2)
LDH SERPL L TO P-CCNC: 275 U/L (ref 110–260)
LDH SERPL L TO P-CCNC: 356 U/L (ref 110–260)
LDLC SERPL CALC-MCNC: 96 MG/DL (CALC)
LEFT ATRIUM SIZE: 3.61 CM
LEFT ATRIUM VOLUME INDEX MOD: 20.6 ML/M2
LEFT ATRIUM VOLUME MOD: 46.56 CM3
LEFT INTERNAL DIMENSION IN SYSTOLE: 3.06 CM (ref 2.1–4)
LEFT VENTRICLE DIASTOLIC VOLUME INDEX: 40.37 ML/M2
LEFT VENTRICLE DIASTOLIC VOLUME: 91.23 ML
LEFT VENTRICLE MASS INDEX: 67 G/M2
LEFT VENTRICLE SYSTOLIC VOLUME INDEX: 16.3 ML/M2
LEFT VENTRICLE SYSTOLIC VOLUME: 36.86 ML
LEFT VENTRICULAR INTERNAL DIMENSION IN DIASTOLE: 4.47 CM (ref 3.5–6)
LEFT VENTRICULAR MASS: 150.59 G
LEUKOCYTE ESTERASE UR QL STRIP: NEGATIVE
LIPASE SERPL-CCNC: 10 U/L (ref 4–60)
LV LATERAL E/E' RATIO: 6 M/S
LV SEPTAL E/E' RATIO: 9 M/S
LYMPHOCYTES # BLD AUTO: 0.4 K/UL (ref 1–4.8)
LYMPHOCYTES # BLD AUTO: 0.7 K/UL (ref 1–4.8)
LYMPHOCYTES # BLD AUTO: 0.8 K/UL (ref 1–4.8)
LYMPHOCYTES # BLD AUTO: 0.8 K/UL (ref 1–4.8)
LYMPHOCYTES # BLD AUTO: 1 K/UL (ref 1–4.8)
LYMPHOCYTES # BLD AUTO: 1 K/UL (ref 1–4.8)
LYMPHOCYTES # BLD AUTO: 1.6 K/UL (ref 1–4.8)
LYMPHOCYTES # BLD AUTO: 1.9 K/UL (ref 1–4.8)
LYMPHOCYTES # BLD AUTO: 2.2 K/UL (ref 1–4.8)
LYMPHOCYTES # BLD AUTO: 2.3 K/UL (ref 1–4.8)
LYMPHOCYTES # BLD AUTO: 2.4 K/UL (ref 1–4.8)
LYMPHOCYTES # BLD AUTO: 2.7 K/UL (ref 1–4.8)
LYMPHOCYTES # BLD AUTO: 2.8 K/UL (ref 1–4.8)
LYMPHOCYTES # BLD AUTO: 2.8 K/UL (ref 1–4.8)
LYMPHOCYTES # BLD AUTO: 2.9 K/UL (ref 1–4.8)
LYMPHOCYTES # BLD AUTO: 2.9 K/UL (ref 1–4.8)
LYMPHOCYTES # BLD AUTO: 3 K/UL (ref 1–4.8)
LYMPHOCYTES # BLD AUTO: 3000 CELLS/UL (ref 850–3900)
LYMPHOCYTES # BLD AUTO: ABNORMAL K/UL (ref 1–4.8)
LYMPHOCYTES # BLD AUTO: ABNORMAL K/UL (ref 1–4.8)
LYMPHOCYTES NFR BLD AUTO: 30 %
LYMPHOCYTES NFR BLD: 10 % (ref 18–48)
LYMPHOCYTES NFR BLD: 14.8 % (ref 18–48)
LYMPHOCYTES NFR BLD: 15.4 % (ref 18–48)
LYMPHOCYTES NFR BLD: 17.4 % (ref 18–48)
LYMPHOCYTES NFR BLD: 17.6 % (ref 18–48)
LYMPHOCYTES NFR BLD: 18 % (ref 18–48)
LYMPHOCYTES NFR BLD: 18.4 % (ref 18–48)
LYMPHOCYTES NFR BLD: 18.6 % (ref 18–48)
LYMPHOCYTES NFR BLD: 20.1 % (ref 18–48)
LYMPHOCYTES NFR BLD: 20.3 % (ref 18–48)
LYMPHOCYTES NFR BLD: 20.8 % (ref 18–48)
LYMPHOCYTES NFR BLD: 23.2 % (ref 18–48)
LYMPHOCYTES NFR BLD: 23.2 % (ref 18–48)
LYMPHOCYTES NFR BLD: 24 % (ref 18–48)
LYMPHOCYTES NFR BLD: 24.2 % (ref 18–48)
LYMPHOCYTES NFR BLD: 24.3 % (ref 18–48)
LYMPHOCYTES NFR BLD: 24.6 % (ref 18–48)
LYMPHOCYTES NFR BLD: 25.7 % (ref 18–48)
LYMPHOCYTES NFR BLD: 26.1 % (ref 18–48)
LYMPHOCYTES NFR BLD: 26.7 % (ref 18–48)
LYMPHOCYTES NFR BLD: 28 % (ref 18–48)
LYMPHOCYTES NFR BLD: 30 % (ref 18–48)
LYMPHOCYTES NFR BLD: 33 % (ref 18–48)
LYMPHOCYTES NFR BLD: 33.5 % (ref 18–48)
LYMPHOCYTES NFR BLD: 34.6 % (ref 18–48)
LYMPHOCYTES NFR BLD: 35 % (ref 18–48)
LYMPHOCYTES NFR BLD: 35.9 % (ref 18–48)
LYMPHOCYTES NFR BLD: 68 % (ref 18–48)
Lab: NORMAL
Lab: NORMAL
MAGNESIUM SERPL-MCNC: 1.7 MG/DL (ref 1.6–2.6)
MAGNESIUM SERPL-MCNC: 1.8 MG/DL (ref 1.6–2.6)
MAGNESIUM SERPL-MCNC: 1.9 MG/DL (ref 1.6–2.6)
MAGNESIUM SERPL-MCNC: 2 MG/DL (ref 1.6–2.6)
MAGNESIUM SERPL-MCNC: 2.1 MG/DL (ref 1.6–2.6)
MAGNESIUM SERPL-MCNC: 2.1 MG/DL (ref 1.6–2.6)
MAGNESIUM SERPL-MCNC: 2.2 MG/DL (ref 1.6–2.6)
MAGNESIUM SERPL-MCNC: 2.2 MG/DL (ref 1.6–2.6)
MCH RBC QN AUTO: 24.2 PG (ref 27–31)
MCH RBC QN AUTO: 24.4 PG (ref 27–31)
MCH RBC QN AUTO: 24.5 PG (ref 27–31)
MCH RBC QN AUTO: 24.6 PG (ref 27–31)
MCH RBC QN AUTO: 24.7 PG (ref 27–31)
MCH RBC QN AUTO: 24.7 PG (ref 27–31)
MCH RBC QN AUTO: 24.8 PG (ref 27–31)
MCH RBC QN AUTO: 24.9 PG (ref 27–31)
MCH RBC QN AUTO: 24.9 PG (ref 27–31)
MCH RBC QN AUTO: 25 PG (ref 27–31)
MCH RBC QN AUTO: 25.1 PG (ref 27–31)
MCH RBC QN AUTO: 25.2 PG (ref 27–31)
MCH RBC QN AUTO: 25.4 PG (ref 27–31)
MCH RBC QN AUTO: 25.5 PG (ref 27–31)
MCH RBC QN AUTO: 25.9 PG (ref 27–31)
MCH RBC QN AUTO: 26.1 PG (ref 27–33)
MCHC RBC AUTO-ENTMCNC: 30.5 G/DL (ref 32–36)
MCHC RBC AUTO-ENTMCNC: 31 G/DL (ref 32–36)
MCHC RBC AUTO-ENTMCNC: 31 G/DL (ref 32–36)
MCHC RBC AUTO-ENTMCNC: 31.2 G/DL (ref 32–36)
MCHC RBC AUTO-ENTMCNC: 31.5 G/DL (ref 32–36)
MCHC RBC AUTO-ENTMCNC: 31.5 G/DL (ref 32–36)
MCHC RBC AUTO-ENTMCNC: 31.6 G/DL (ref 32–36)
MCHC RBC AUTO-ENTMCNC: 31.6 G/DL (ref 32–36)
MCHC RBC AUTO-ENTMCNC: 31.7 G/DL (ref 32–36)
MCHC RBC AUTO-ENTMCNC: 31.7 G/DL (ref 32–36)
MCHC RBC AUTO-ENTMCNC: 31.9 G/DL (ref 32–36)
MCHC RBC AUTO-ENTMCNC: 31.9 G/DL (ref 32–36)
MCHC RBC AUTO-ENTMCNC: 32 G/DL (ref 32–36)
MCHC RBC AUTO-ENTMCNC: 32 G/DL (ref 32–36)
MCHC RBC AUTO-ENTMCNC: 32.1 G/DL (ref 32–36)
MCHC RBC AUTO-ENTMCNC: 32.4 G/DL (ref 32–36)
MCHC RBC AUTO-ENTMCNC: 32.4 G/DL (ref 32–36)
MCHC RBC AUTO-ENTMCNC: 32.5 G/DL (ref 32–36)
MCHC RBC AUTO-ENTMCNC: 32.5 G/DL (ref 32–36)
MCHC RBC AUTO-ENTMCNC: 32.7 G/DL (ref 32–36)
MCHC RBC AUTO-ENTMCNC: 32.8 G/DL (ref 32–36)
MCHC RBC AUTO-ENTMCNC: 32.9 G/DL (ref 32–36)
MCHC RBC AUTO-ENTMCNC: 33.3 G/DL (ref 32–36)
MCHC RBC AUTO-ENTMCNC: 33.7 G/DL (ref 32–36)
MCHC RBC AUTO-ENTMCNC: 34 G/DL (ref 32–36)
MCHC RBC AUTO-ENTMCNC: 34.5 G/DL (ref 32–36)
MCV RBC AUTO: 73 FL (ref 82–98)
MCV RBC AUTO: 74 FL (ref 82–98)
MCV RBC AUTO: 75 FL (ref 82–98)
MCV RBC AUTO: 76 FL (ref 82–98)
MCV RBC AUTO: 77 FL (ref 82–98)
MCV RBC AUTO: 78 FL (ref 82–98)
MCV RBC AUTO: 79 FL (ref 82–98)
MCV RBC AUTO: 79.7 FL (ref 80–100)
MCV RBC AUTO: 80 FL (ref 82–98)
MCV RBC AUTO: 83 FL (ref 82–98)
MCV RBC AUTO: 84 FL (ref 82–98)
MICROALBUMIN UR-MCNC: 1.1 MG/DL
MICROSCOPIC COMMENT: ABNORMAL
MICROSCOPIC COMMENT: NORMAL
MICROSCOPIC EXAM: NORMAL
MICROSCOPIC EXAM: NORMAL
MONOCYTES # BLD AUTO: 0 K/UL (ref 0.3–1)
MONOCYTES # BLD AUTO: 0.1 K/UL (ref 0.3–1)
MONOCYTES # BLD AUTO: 0.6 K/UL (ref 0.3–1)
MONOCYTES # BLD AUTO: 1.1 K/UL (ref 0.3–1)
MONOCYTES # BLD AUTO: 1.1 K/UL (ref 0.3–1)
MONOCYTES # BLD AUTO: 1.2 K/UL (ref 0.3–1)
MONOCYTES # BLD AUTO: 1.3 K/UL (ref 0.3–1)
MONOCYTES # BLD AUTO: 1.4 K/UL (ref 0.3–1)
MONOCYTES # BLD AUTO: 1.5 K/UL (ref 0.3–1)
MONOCYTES # BLD AUTO: 1.5 K/UL (ref 0.3–1)
MONOCYTES # BLD AUTO: 1570 CELLS/UL (ref 200–950)
MONOCYTES # BLD AUTO: 2.3 K/UL (ref 0.3–1)
MONOCYTES # BLD AUTO: 2.4 K/UL (ref 0.3–1)
MONOCYTES # BLD AUTO: ABNORMAL K/UL (ref 0.3–1)
MONOCYTES # BLD AUTO: ABNORMAL K/UL (ref 0.3–1)
MONOCYTES NFR BLD AUTO: 15.7 %
MONOCYTES NFR BLD: 1.2 % (ref 4–15)
MONOCYTES NFR BLD: 1.3 % (ref 4–15)
MONOCYTES NFR BLD: 1.7 % (ref 4–15)
MONOCYTES NFR BLD: 10 % (ref 4–15)
MONOCYTES NFR BLD: 10.9 % (ref 4–15)
MONOCYTES NFR BLD: 11.7 % (ref 4–15)
MONOCYTES NFR BLD: 11.9 % (ref 4–15)
MONOCYTES NFR BLD: 12 % (ref 4–15)
MONOCYTES NFR BLD: 13 % (ref 4–15)
MONOCYTES NFR BLD: 14.7 % (ref 4–15)
MONOCYTES NFR BLD: 14.8 % (ref 4–15)
MONOCYTES NFR BLD: 16.7 % (ref 4–15)
MONOCYTES NFR BLD: 2.1 % (ref 4–15)
MONOCYTES NFR BLD: 2.5 % (ref 4–15)
MONOCYTES NFR BLD: 20 % (ref 4–15)
MONOCYTES NFR BLD: 20 % (ref 4–15)
MONOCYTES NFR BLD: 20.2 % (ref 4–15)
MONOCYTES NFR BLD: 29.2 % (ref 4–15)
MONOCYTES NFR BLD: 7.4 % (ref 4–15)
MONOCYTES NFR BLD: 7.6 % (ref 4–15)
MONOCYTES NFR BLD: 8 % (ref 4–15)
MONOCYTES NFR BLD: 8.9 % (ref 4–15)
MONOCYTES NFR BLD: 9 % (ref 4–15)
MONOCYTES NFR BLD: 9.4 % (ref 4–15)
MRSA ID BY PCR: NEGATIVE
MV A" WAVE DURATION": 8.28 MSEC
MV MEAN GRADIENT: 1 MMHG
MV PEAK A VEL: 0.95 M/S
MV PEAK E VEL: 0.54 M/S
MV PEAK GRADIENT: 6 MMHG
MV STENOSIS PRESSURE HALF TIME: 74.63 MS
MV VALVE AREA BY CONTINUITY EQUATION: 3.66 CM2
MV VALVE AREA P 1/2 METHOD: 2.95 CM2
NEUTROPHILS # BLD AUTO: 1.5 K/UL (ref 1.8–7.7)
NEUTROPHILS # BLD AUTO: 10.3 K/UL (ref 1.8–7.7)
NEUTROPHILS # BLD AUTO: 12.1 K/UL (ref 1.8–7.7)
NEUTROPHILS # BLD AUTO: 2 K/UL (ref 1.8–7.7)
NEUTROPHILS # BLD AUTO: 2.3 K/UL (ref 1.8–7.7)
NEUTROPHILS # BLD AUTO: 3 K/UL (ref 1.8–7.7)
NEUTROPHILS # BLD AUTO: 3 K/UL (ref 1.8–7.7)
NEUTROPHILS # BLD AUTO: 3.5 K/UL (ref 1.8–7.7)
NEUTROPHILS # BLD AUTO: 3.6 K/UL (ref 1.8–7.7)
NEUTROPHILS # BLD AUTO: 3.7 K/UL (ref 1.8–7.7)
NEUTROPHILS # BLD AUTO: 5 K/UL (ref 1.8–7.7)
NEUTROPHILS # BLD AUTO: 5.6 K/UL (ref 1.8–7.7)
NEUTROPHILS # BLD AUTO: 5.7 K/UL (ref 1.8–7.7)
NEUTROPHILS # BLD AUTO: 5.8 K/UL (ref 1.8–7.7)
NEUTROPHILS # BLD AUTO: 5190 CELLS/UL (ref 1500–7800)
NEUTROPHILS # BLD AUTO: 6.4 K/UL (ref 1.8–7.7)
NEUTROPHILS # BLD AUTO: 6.9 K/UL (ref 1.8–7.7)
NEUTROPHILS # BLD AUTO: 7 K/UL (ref 1.8–7.7)
NEUTROPHILS # BLD AUTO: 7.2 K/UL (ref 1.8–7.7)
NEUTROPHILS # BLD AUTO: 8.1 K/UL (ref 1.8–7.7)
NEUTROPHILS # BLD AUTO: 8.9 K/UL (ref 1.8–7.7)
NEUTROPHILS # BLD AUTO: 9.1 K/UL (ref 1.8–7.7)
NEUTROPHILS NFR BLD AUTO: 51.9 %
NEUTROPHILS NFR BLD: 24 % (ref 38–73)
NEUTROPHILS NFR BLD: 42.4 % (ref 38–73)
NEUTROPHILS NFR BLD: 44 % (ref 38–73)
NEUTROPHILS NFR BLD: 45.2 % (ref 38–73)
NEUTROPHILS NFR BLD: 46.8 % (ref 38–73)
NEUTROPHILS NFR BLD: 50 % (ref 38–73)
NEUTROPHILS NFR BLD: 50.1 % (ref 38–73)
NEUTROPHILS NFR BLD: 52 % (ref 38–73)
NEUTROPHILS NFR BLD: 54 % (ref 38–73)
NEUTROPHILS NFR BLD: 57 % (ref 38–73)
NEUTROPHILS NFR BLD: 59 % (ref 38–73)
NEUTROPHILS NFR BLD: 61 % (ref 38–73)
NEUTROPHILS NFR BLD: 61.1 % (ref 38–73)
NEUTROPHILS NFR BLD: 61.9 % (ref 38–73)
NEUTROPHILS NFR BLD: 63.1 % (ref 38–73)
NEUTROPHILS NFR BLD: 63.5 % (ref 38–73)
NEUTROPHILS NFR BLD: 64.1 % (ref 38–73)
NEUTROPHILS NFR BLD: 64.2 % (ref 38–73)
NEUTROPHILS NFR BLD: 64.4 % (ref 38–73)
NEUTROPHILS NFR BLD: 68.5 % (ref 38–73)
NEUTROPHILS NFR BLD: 69.7 % (ref 38–73)
NEUTROPHILS NFR BLD: 70.5 % (ref 38–73)
NEUTROPHILS NFR BLD: 70.7 % (ref 38–73)
NEUTROPHILS NFR BLD: 71.2 % (ref 38–73)
NEUTROPHILS NFR BLD: 71.4 % (ref 38–73)
NEUTROPHILS NFR BLD: 74.4 % (ref 38–73)
NEUTROPHILS NFR BLD: 81.5 % (ref 38–73)
NEUTROPHILS NFR BLD: 82.4 % (ref 38–73)
NEUTS BAND NFR BLD MANUAL: 1 %
NEUTS BAND NFR BLD MANUAL: 2 %
NEUTS BAND NFR BLD MANUAL: 2 %
NEUTS BAND NFR BLD MANUAL: 3 %
NEUTS BAND NFR BLD MANUAL: 4 %
NEUTS BAND NFR BLD MANUAL: 5 %
NGS CLINCIAL TRIALS: NORMAL
NGS INDICATION OF TEST: NORMAL
NGS INTERPRETATION: NORMAL
NGS ONCOHEME PANEL GENE LIST: NORMAL
NGS PATHOGENIC MUTATIONS DETECTED: NORMAL
NGS REVIEWED BY:: NORMAL
NGS VARIANTS OF UNKNOWN SIGNIFICANCE: NORMAL
NGSHM RESULT, BONE MARROW: NORMAL
NITRITE UR QL STRIP: NEGATIVE
NITRITE UR QL STRIP: POSITIVE
NITRITE UR QL STRIP: POSITIVE
NONHDLC SERPL-MCNC: 123 MG/DL (CALC)
NRBC BLD-RTO: 0 /100 WBC
OVALOCYTES BLD QL SMEAR: ABNORMAL
OVALOCYTES BLD QL SMEAR: ABNORMAL
PATH REV BLD -IMP: NORMAL
PH UR STRIP: 6 [PH] (ref 5–8)
PH UR STRIP: ABNORMAL [PH] (ref 5–8)
PHOSPHATE SERPL-MCNC: 2.3 MG/DL (ref 2.7–4.5)
PHOSPHATE SERPL-MCNC: 2.6 MG/DL (ref 2.7–4.5)
PHOSPHATE SERPL-MCNC: 2.6 MG/DL (ref 2.7–4.5)
PHOSPHATE SERPL-MCNC: 3 MG/DL (ref 2.7–4.5)
PHOSPHATE SERPL-MCNC: 3.2 MG/DL (ref 2.7–4.5)
PHOSPHATE SERPL-MCNC: 3.4 MG/DL (ref 2.7–4.5)
PHOSPHATE SERPL-MCNC: 3.7 MG/DL (ref 2.7–4.5)
PHOSPHATE SERPL-MCNC: 3.7 MG/DL (ref 2.7–4.5)
PHOSPHATE SERPL-MCNC: 3.9 MG/DL (ref 2.7–4.5)
PHOSPHATE SERPL-MCNC: 4.3 MG/DL (ref 2.7–4.5)
PISA MRMAX VEL: 0.04 M/S
PISA TR MAX VEL: 3.4 M/S
PLATELET # BLD AUTO: 104 K/UL (ref 150–450)
PLATELET # BLD AUTO: 107 K/UL (ref 150–450)
PLATELET # BLD AUTO: 109 K/UL (ref 150–450)
PLATELET # BLD AUTO: 110 K/UL (ref 150–450)
PLATELET # BLD AUTO: 128 K/UL (ref 150–450)
PLATELET # BLD AUTO: 155 K/UL (ref 150–450)
PLATELET # BLD AUTO: 156 K/UL (ref 150–450)
PLATELET # BLD AUTO: 159 K/UL (ref 150–450)
PLATELET # BLD AUTO: 206 K/UL (ref 150–450)
PLATELET # BLD AUTO: 207 K/UL (ref 150–450)
PLATELET # BLD AUTO: 268 K/UL (ref 150–450)
PLATELET # BLD AUTO: 312 K/UL (ref 150–450)
PLATELET # BLD AUTO: 341 K/UL (ref 150–450)
PLATELET # BLD AUTO: 351 THOUSAND/UL (ref 140–400)
PLATELET # BLD AUTO: 395 K/UL (ref 150–450)
PLATELET # BLD AUTO: 416 K/UL (ref 150–450)
PLATELET # BLD AUTO: 425 K/UL (ref 150–450)
PLATELET # BLD AUTO: 482 K/UL (ref 150–450)
PLATELET # BLD AUTO: 563 K/UL (ref 150–450)
PLATELET # BLD AUTO: 626 K/UL (ref 150–450)
PLATELET # BLD AUTO: 633 K/UL (ref 150–450)
PLATELET # BLD AUTO: 653 K/UL (ref 150–450)
PLATELET # BLD AUTO: 655 K/UL (ref 150–450)
PLATELET # BLD AUTO: 666 K/UL (ref 150–450)
PLATELET # BLD AUTO: 696 K/UL (ref 150–450)
PLATELET # BLD AUTO: 730 K/UL (ref 150–450)
PLATELET # BLD AUTO: 735 K/UL (ref 150–450)
PLATELET # BLD AUTO: 754 K/UL (ref 150–450)
PLATELET # BLD AUTO: 94 K/UL (ref 150–450)
PLATELET BLD QL SMEAR: ABNORMAL
PMV BLD AUTO: 10 FL (ref 9.2–12.9)
PMV BLD AUTO: 10.2 FL (ref 9.2–12.9)
PMV BLD AUTO: 10.2 FL (ref 9.2–12.9)
PMV BLD AUTO: 10.3 FL (ref 9.2–12.9)
PMV BLD AUTO: 10.4 FL (ref 9.2–12.9)
PMV BLD AUTO: 10.4 FL (ref 9.2–12.9)
PMV BLD AUTO: 10.5 FL (ref 9.2–12.9)
PMV BLD AUTO: 10.8 FL (ref 9.2–12.9)
PMV BLD AUTO: 10.9 FL (ref 9.2–12.9)
PMV BLD AUTO: 10.9 FL (ref 9.2–12.9)
PMV BLD AUTO: 8.3 FL (ref 9.2–12.9)
PMV BLD AUTO: 8.5 FL (ref 9.2–12.9)
PMV BLD AUTO: 8.6 FL (ref 9.2–12.9)
PMV BLD AUTO: 8.8 FL (ref 9.2–12.9)
PMV BLD AUTO: 8.9 FL (ref 9.2–12.9)
PMV BLD AUTO: 9 FL (ref 9.2–12.9)
PMV BLD AUTO: 9.1 FL (ref 9.2–12.9)
PMV BLD AUTO: 9.2 FL (ref 9.2–12.9)
PMV BLD AUTO: 9.3 FL (ref 9.2–12.9)
PMV BLD AUTO: 9.3 FL (ref 9.2–12.9)
PMV BLD AUTO: 9.7 FL (ref 9.2–12.9)
PMV BLD AUTO: 9.9 FL (ref 9.2–12.9)
PMV BLD REES-ECKER: 9.1 FL (ref 7.5–12.5)
POCT GLUCOSE: 101 MG/DL (ref 70–110)
POCT GLUCOSE: 103 MG/DL (ref 70–110)
POCT GLUCOSE: 105 MG/DL (ref 70–110)
POCT GLUCOSE: 107 MG/DL (ref 70–110)
POCT GLUCOSE: 108 MG/DL (ref 70–110)
POCT GLUCOSE: 109 MG/DL (ref 70–110)
POCT GLUCOSE: 110 MG/DL (ref 70–110)
POCT GLUCOSE: 113 MG/DL (ref 70–110)
POCT GLUCOSE: 115 MG/DL (ref 70–110)
POCT GLUCOSE: 115 MG/DL (ref 70–110)
POCT GLUCOSE: 116 MG/DL (ref 70–110)
POCT GLUCOSE: 117 MG/DL (ref 70–110)
POCT GLUCOSE: 118 MG/DL (ref 70–110)
POCT GLUCOSE: 118 MG/DL (ref 70–110)
POCT GLUCOSE: 119 MG/DL (ref 70–110)
POCT GLUCOSE: 119 MG/DL (ref 70–110)
POCT GLUCOSE: 122 MG/DL (ref 70–110)
POCT GLUCOSE: 124 MG/DL (ref 70–110)
POCT GLUCOSE: 124 MG/DL (ref 70–110)
POCT GLUCOSE: 125 MG/DL (ref 70–110)
POCT GLUCOSE: 130 MG/DL (ref 70–110)
POCT GLUCOSE: 134 MG/DL (ref 70–110)
POCT GLUCOSE: 135 MG/DL (ref 70–110)
POCT GLUCOSE: 136 MG/DL (ref 70–110)
POCT GLUCOSE: 144 MG/DL (ref 70–110)
POCT GLUCOSE: 144 MG/DL (ref 70–110)
POCT GLUCOSE: 146 MG/DL (ref 70–110)
POCT GLUCOSE: 149 MG/DL (ref 70–110)
POCT GLUCOSE: 151 MG/DL (ref 70–110)
POCT GLUCOSE: 152 MG/DL (ref 70–110)
POCT GLUCOSE: 156 MG/DL (ref 70–110)
POCT GLUCOSE: 158 MG/DL (ref 70–110)
POCT GLUCOSE: 162 MG/DL (ref 70–110)
POCT GLUCOSE: 187 MG/DL (ref 70–110)
POCT GLUCOSE: 196 MG/DL (ref 70–110)
POIKILOCYTOSIS BLD QL SMEAR: SLIGHT
POIKILOCYTOSIS BLD QL SMEAR: SLIGHT
POLYCHROMASIA BLD QL SMEAR: ABNORMAL
POTASSIUM SERPL-SCNC: 3.5 MMOL/L (ref 3.5–5.1)
POTASSIUM SERPL-SCNC: 3.7 MMOL/L (ref 3.5–5.1)
POTASSIUM SERPL-SCNC: 3.8 MMOL/L (ref 3.5–5.1)
POTASSIUM SERPL-SCNC: 3.8 MMOL/L (ref 3.5–5.1)
POTASSIUM SERPL-SCNC: 3.9 MMOL/L (ref 3.5–5.1)
POTASSIUM SERPL-SCNC: 4 MMOL/L (ref 3.5–5.1)
POTASSIUM SERPL-SCNC: 4.1 MMOL/L (ref 3.5–5.1)
POTASSIUM SERPL-SCNC: 4.2 MMOL/L (ref 3.5–5.1)
POTASSIUM SERPL-SCNC: 4.3 MMOL/L (ref 3.5–5.1)
POTASSIUM SERPL-SCNC: 4.4 MMOL/L (ref 3.5–5.1)
POTASSIUM SERPL-SCNC: 4.4 MMOL/L (ref 3.5–5.3)
POTASSIUM SERPL-SCNC: 4.6 MMOL/L (ref 3.5–5.1)
PROCALCITONIN SERPL IA-MCNC: 0.06 NG/ML
PROCALCITONIN SERPL IA-MCNC: 0.06 NG/ML
PROCALCITONIN SERPL IA-MCNC: 0.09 NG/ML
PROCALCITONIN SERPL IA-MCNC: 0.12 NG/ML
PROCALCITONIN SERPL IA-MCNC: 1.04 NG/ML
PROCALCITONIN SERPL IA-MCNC: 1.77 NG/ML
PROT SERPL-MCNC: 5.7 G/DL (ref 6–8.4)
PROT SERPL-MCNC: 5.8 G/DL (ref 6–8.4)
PROT SERPL-MCNC: 6.2 G/DL (ref 6–8.4)
PROT SERPL-MCNC: 6.3 G/DL (ref 6–8.4)
PROT SERPL-MCNC: 6.3 G/DL (ref 6–8.4)
PROT SERPL-MCNC: 6.5 G/DL (ref 6–8.4)
PROT SERPL-MCNC: 6.6 G/DL (ref 6–8.4)
PROT SERPL-MCNC: 6.7 G/DL (ref 6–8.4)
PROT SERPL-MCNC: 6.8 G/DL (ref 6–8.4)
PROT SERPL-MCNC: 6.9 G/DL (ref 6–8.4)
PROT SERPL-MCNC: 6.9 G/DL (ref 6–8.4)
PROT SERPL-MCNC: 7 G/DL (ref 6–8.4)
PROT SERPL-MCNC: 7 G/DL (ref 6–8.4)
PROT SERPL-MCNC: 7.1 G/DL (ref 6–8.4)
PROT SERPL-MCNC: 7.3 G/DL (ref 6.1–8.1)
PROT SERPL-MCNC: 7.3 G/DL (ref 6–8.4)
PROT SERPL-MCNC: 7.4 G/DL (ref 6–8.4)
PROT SERPL-MCNC: 7.6 G/DL (ref 6–8.4)
PROT SERPL-MCNC: 7.6 G/DL (ref 6–8.4)
PROT SERPL-MCNC: 7.7 G/DL (ref 6–8.4)
PROT SERPL-MCNC: 7.9 G/DL (ref 6–8.4)
PROT UR QL STRIP: ABNORMAL
PROT UR QL STRIP: NEGATIVE
PROTHROMBIN TIME: 10.7 SEC (ref 9–12.5)
PROTHROMBIN TIME: 10.9 SEC (ref 9–12.5)
PULM VEIN S/D RATIO: 1.1
PV PEAK D VEL: 0.48 M/S
PV PEAK S VEL: 0.53 M/S
PV PEAK VELOCITY: 0.8 CM/S
RA MAJOR: 4.98 CM
RA PRESSURE: 3 MMHG
RA WIDTH: 3.04 CM
RBC # BLD AUTO: 3.05 M/UL (ref 4.6–6.2)
RBC # BLD AUTO: 3.15 M/UL (ref 4.6–6.2)
RBC # BLD AUTO: 3.21 M/UL (ref 4.6–6.2)
RBC # BLD AUTO: 3.26 M/UL (ref 4.6–6.2)
RBC # BLD AUTO: 3.27 M/UL (ref 4.6–6.2)
RBC # BLD AUTO: 3.27 M/UL (ref 4.6–6.2)
RBC # BLD AUTO: 3.29 M/UL (ref 4.6–6.2)
RBC # BLD AUTO: 3.31 M/UL (ref 4.6–6.2)
RBC # BLD AUTO: 3.35 M/UL (ref 4.6–6.2)
RBC # BLD AUTO: 3.36 M/UL (ref 4.6–6.2)
RBC # BLD AUTO: 3.41 M/UL (ref 4.6–6.2)
RBC # BLD AUTO: 3.44 M/UL (ref 4.6–6.2)
RBC # BLD AUTO: 3.49 M/UL (ref 4.6–6.2)
RBC # BLD AUTO: 3.49 M/UL (ref 4.6–6.2)
RBC # BLD AUTO: 3.5 M/UL (ref 4.6–6.2)
RBC # BLD AUTO: 3.5 M/UL (ref 4.6–6.2)
RBC # BLD AUTO: 3.59 M/UL (ref 4.6–6.2)
RBC # BLD AUTO: 3.69 M/UL (ref 4.6–6.2)
RBC # BLD AUTO: 3.72 M/UL (ref 4.6–6.2)
RBC # BLD AUTO: 3.73 M/UL (ref 4.6–6.2)
RBC # BLD AUTO: 3.75 M/UL (ref 4.6–6.2)
RBC # BLD AUTO: 3.83 M/UL (ref 4.6–6.2)
RBC # BLD AUTO: 3.86 M/UL (ref 4.6–6.2)
RBC # BLD AUTO: 3.95 M/UL (ref 4.6–6.2)
RBC # BLD AUTO: 4.05 M/UL (ref 4.6–6.2)
RBC # BLD AUTO: 4.09 M/UL (ref 4.6–6.2)
RBC # BLD AUTO: 4.16 M/UL (ref 4.6–6.2)
RBC # BLD AUTO: 4.35 M/UL (ref 4.6–6.2)
RBC # BLD AUTO: 4.59 MILLION/UL (ref 4.2–5.8)
RBC #/AREA URNS HPF: 1 /HPF (ref 0–4)
RBC #/AREA URNS HPF: 2 /HPF (ref 0–4)
RBC #/AREA URNS HPF: ABNORMAL /HPF
REASON FOR REFERRAL (NARRATIVE): NORMAL
REF LAB TEST METHOD: NORMAL
REF LAB TEST METHOD: NORMAL
RIGHT VENTRICULAR END-DIASTOLIC DIMENSION: 3.46 CM
RV TISSUE DOPPLER FREE WALL SYSTOLIC VELOCITY 1 (APICAL 4 CHAMBER VIEW): 17.64 CM/S
SARS-COV-2 RDRP RESP QL NAA+PROBE: NEGATIVE
SATURATED IRON: 10 % (ref 20–50)
SATURATED IRON: 8 % (ref 20–50)
SERVICE CMNT-IMP: ABNORMAL
SINUS: 3.17 CM
SODIUM SERPL-SCNC: 134 MMOL/L (ref 136–145)
SODIUM SERPL-SCNC: 135 MMOL/L (ref 136–145)
SODIUM SERPL-SCNC: 136 MMOL/L (ref 136–145)
SODIUM SERPL-SCNC: 137 MMOL/L (ref 136–145)
SODIUM SERPL-SCNC: 138 MMOL/L (ref 136–145)
SODIUM SERPL-SCNC: 139 MMOL/L (ref 135–146)
SODIUM SERPL-SCNC: 139 MMOL/L (ref 136–145)
SODIUM SERPL-SCNC: 139 MMOL/L (ref 136–145)
SODIUM SERPL-SCNC: 140 MMOL/L (ref 136–145)
SODIUM SERPL-SCNC: 140 MMOL/L (ref 136–145)
SP GR UR STRIP: 1.01 (ref 1–1.03)
SP GR UR STRIP: 1.01 (ref 1–1.03)
SP GR UR STRIP: 1.02 (ref 1–1.03)
SP GR UR STRIP: 1.02 (ref 1–1.03)
SP GR UR STRIP: 1.03 (ref 1–1.03)
SPECIMEN SOURCE: NORMAL
SPECIMEN: NORMAL
SQUAMOUS #/AREA URNS HPF: 1 /HPF
SQUAMOUS #/AREA URNS HPF: ABNORMAL /HPF
STAPH AUREUS ID BY PCR: NEGATIVE
STJ: 2.93 CM
SUPPLEMENTAL DIAGNOSIS: NORMAL
SUPPLEMENTAL DIAGNOSIS: NORMAL
TARGETS BLD QL SMEAR: ABNORMAL
TDI LATERAL: 0.09 M/S
TDI SEPTAL: 0.06 M/S
TDI: 0.08 M/S
TEST PERFORMANCE INFO SPEC: NORMAL
TIBC SERPL-MCNC: 288 MCG/DL (CALC) (ref 250–425)
TOTAL IRON BINDING CAPACITY: 234 UG/DL (ref 250–450)
TOTAL IRON BINDING CAPACITY: 241 UG/DL (ref 250–450)
TR MAX PG: 46 MMHG
TRANSFERRIN SERPL-MCNC: 158 MG/DL (ref 200–375)
TRANSFERRIN SERPL-MCNC: 163 MG/DL (ref 200–375)
TRICUSPID ANNULAR PLANE SYSTOLIC EXCURSION: 2.25 CM
TRIGL SERPL-MCNC: 173 MG/DL
TSH SERPL-ACNC: 1.06 MIU/L (ref 0.4–4.5)
TV REST PULMONARY ARTERY PRESSURE: 49 MMHG
URATE SERPL-MCNC: 5.8 MG/DL (ref 3.4–7)
URATE SERPL-MCNC: 7.3 MG/DL (ref 3.4–7)
URN SPEC COLLECT METH UR: ABNORMAL
URN SPEC COLLECT METH UR: NORMAL
UROBILINOGEN UR STRIP-ACNC: 1 EU/DL
UROBILINOGEN UR STRIP-ACNC: ABNORMAL EU/DL
VANCOMYCIN TROUGH SERPL-MCNC: 6.3 UG/ML (ref 10–22)
WBC # BLD AUTO: 1.1 K/UL (ref 3.9–12.7)
WBC # BLD AUTO: 10 THOUSAND/UL (ref 3.8–10.8)
WBC # BLD AUTO: 10.75 K/UL (ref 3.9–12.7)
WBC # BLD AUTO: 11.05 K/UL (ref 3.9–12.7)
WBC # BLD AUTO: 11.09 K/UL (ref 3.9–12.7)
WBC # BLD AUTO: 11.26 K/UL (ref 3.9–12.7)
WBC # BLD AUTO: 11.36 K/UL (ref 3.9–12.7)
WBC # BLD AUTO: 12.48 K/UL (ref 3.9–12.7)
WBC # BLD AUTO: 12.86 K/UL (ref 3.9–12.7)
WBC # BLD AUTO: 12.87 K/UL (ref 3.9–12.7)
WBC # BLD AUTO: 15.03 K/UL (ref 3.9–12.7)
WBC # BLD AUTO: 16.25 K/UL (ref 3.9–12.7)
WBC # BLD AUTO: 2.39 K/UL (ref 3.9–12.7)
WBC # BLD AUTO: 2.83 K/UL (ref 3.9–12.7)
WBC # BLD AUTO: 2.85 K/UL (ref 3.9–12.7)
WBC # BLD AUTO: 4.19 K/UL (ref 3.9–12.7)
WBC # BLD AUTO: 4.3 K/UL (ref 3.9–12.7)
WBC # BLD AUTO: 5.81 K/UL (ref 3.9–12.7)
WBC # BLD AUTO: 6.29 K/UL (ref 3.9–12.7)
WBC # BLD AUTO: 6.6 K/UL (ref 3.9–12.7)
WBC # BLD AUTO: 6.8 K/UL (ref 3.9–12.7)
WBC # BLD AUTO: 6.86 K/UL (ref 3.9–12.7)
WBC # BLD AUTO: 7.77 K/UL (ref 3.9–12.7)
WBC # BLD AUTO: 7.88 K/UL (ref 3.9–12.7)
WBC # BLD AUTO: 7.88 K/UL (ref 3.9–12.7)
WBC # BLD AUTO: 8.36 K/UL (ref 3.9–12.7)
WBC # BLD AUTO: 8.69 K/UL (ref 3.9–12.7)
WBC # BLD AUTO: 8.91 K/UL (ref 3.9–12.7)
WBC # BLD AUTO: 9.36 K/UL (ref 3.9–12.7)
WBC #/AREA URNS HPF: 2 /HPF (ref 0–5)
WBC #/AREA URNS HPF: 6 /HPF (ref 0–5)
WBC #/AREA URNS HPF: ABNORMAL /HPF
WBC OTHER NFR BLD MANUAL: 2 %

## 2022-01-01 PROCEDURE — 3061F NEG MICROALBUMINURIA REV: CPT | Mod: CPTII,S$GLB,, | Performed by: PHYSICIAN ASSISTANT

## 2022-01-01 PROCEDURE — 86901 BLOOD TYPING SEROLOGIC RH(D): CPT | Performed by: EMERGENCY MEDICINE

## 2022-01-01 PROCEDURE — 00532 ANES ACCESS CTR VENOUS CRCJ: CPT | Performed by: SURGERY

## 2022-01-01 PROCEDURE — 71000039 HC RECOVERY, EACH ADD'L HOUR: Performed by: SURGERY

## 2022-01-01 PROCEDURE — 99232 PR SUBSEQUENT HOSPITAL CARE,LEVL II: ICD-10-PCS | Mod: S$GLB,,, | Performed by: INTERNAL MEDICINE

## 2022-01-01 PROCEDURE — 36000709 HC OR TIME LEV III EA ADD 15 MIN: Performed by: SURGERY

## 2022-01-01 PROCEDURE — 82784 ASSAY IGA/IGD/IGG/IGM EACH: CPT | Performed by: INTERNAL MEDICINE

## 2022-01-01 PROCEDURE — 3044F PR MOST RECENT HEMOGLOBIN A1C LEVEL <7.0%: ICD-10-PCS | Mod: CPTII,,, | Performed by: PSYCHOLOGIST

## 2022-01-01 PROCEDURE — 12000002 HC ACUTE/MED SURGE SEMI-PRIVATE ROOM

## 2022-01-01 PROCEDURE — 1159F MED LIST DOCD IN RCRD: CPT | Mod: CPTII,,, | Performed by: INTERNAL MEDICINE

## 2022-01-01 PROCEDURE — 3044F HG A1C LEVEL LT 7.0%: CPT | Mod: CPTII,S$GLB,, | Performed by: INTERNAL MEDICINE

## 2022-01-01 PROCEDURE — 71000033 HC RECOVERY, INTIAL HOUR: Performed by: SURGERY

## 2022-01-01 PROCEDURE — A4216 STERILE WATER/SALINE, 10 ML: HCPCS | Performed by: INTERNAL MEDICINE

## 2022-01-01 PROCEDURE — 83605 ASSAY OF LACTIC ACID: CPT | Performed by: EMERGENCY MEDICINE

## 2022-01-01 PROCEDURE — 44120 REMOVAL OF SMALL INTESTINE: CPT | Mod: ,,, | Performed by: SURGERY

## 2022-01-01 PROCEDURE — S0030 INJECTION, METRONIDAZOLE: HCPCS | Performed by: STUDENT IN AN ORGANIZED HEALTH CARE EDUCATION/TRAINING PROGRAM

## 2022-01-01 PROCEDURE — 88342 CHG IMMUNOCYTOCHEMISTRY: ICD-10-PCS | Mod: 26,,, | Performed by: PATHOLOGY

## 2022-01-01 PROCEDURE — 96375 TX/PRO/DX INJ NEW DRUG ADDON: CPT

## 2022-01-01 PROCEDURE — C1788 PORT, INDWELLING, IMP: HCPCS | Performed by: SURGERY

## 2022-01-01 PROCEDURE — 63600175 PHARM REV CODE 636 W HCPCS: Performed by: NURSE ANESTHETIST, CERTIFIED REGISTERED

## 2022-01-01 PROCEDURE — D9220A PRA ANESTHESIA: Mod: CRNA,,, | Performed by: NURSE ANESTHETIST, CERTIFIED REGISTERED

## 2022-01-01 PROCEDURE — 3044F PR MOST RECENT HEMOGLOBIN A1C LEVEL <7.0%: ICD-10-PCS | Mod: CPTII,,, | Performed by: INTERNAL MEDICINE

## 2022-01-01 PROCEDURE — 83735 ASSAY OF MAGNESIUM: CPT | Performed by: SURGERY

## 2022-01-01 PROCEDURE — 71000015 HC POSTOP RECOV 1ST HR: Performed by: SURGERY

## 2022-01-01 PROCEDURE — 63600175 PHARM REV CODE 636 W HCPCS: Performed by: SURGERY

## 2022-01-01 PROCEDURE — 84100 ASSAY OF PHOSPHORUS: CPT | Performed by: STUDENT IN AN ORGANIZED HEALTH CARE EDUCATION/TRAINING PROGRAM

## 2022-01-01 PROCEDURE — 63600175 PHARM REV CODE 636 W HCPCS: Performed by: INTERNAL MEDICINE

## 2022-01-01 PROCEDURE — 25000003 PHARM REV CODE 250: Performed by: INTERNAL MEDICINE

## 2022-01-01 PROCEDURE — A4216 STERILE WATER/SALINE, 10 ML: HCPCS | Performed by: SURGERY

## 2022-01-01 PROCEDURE — 84100 ASSAY OF PHOSPHORUS: CPT | Performed by: SURGERY

## 2022-01-01 PROCEDURE — 4010F ACE/ARB THERAPY RXD/TAKEN: CPT | Mod: CPTII,,, | Performed by: INTERNAL MEDICINE

## 2022-01-01 PROCEDURE — 86140 C-REACTIVE PROTEIN: CPT | Performed by: INTERNAL MEDICINE

## 2022-01-01 PROCEDURE — 99215 PR OFFICE/OUTPT VISIT, EST, LEVL V, 40-54 MIN: ICD-10-PCS | Mod: S$PBB,,, | Performed by: NURSE PRACTITIONER

## 2022-01-01 PROCEDURE — 97116 GAIT TRAINING THERAPY: CPT

## 2022-01-01 PROCEDURE — 97116 GAIT TRAINING THERAPY: CPT | Mod: CQ

## 2022-01-01 PROCEDURE — 3079F PR MOST RECENT DIASTOLIC BLOOD PRESSURE 80-89 MM HG: ICD-10-PCS | Mod: CPTII,,, | Performed by: INTERNAL MEDICINE

## 2022-01-01 PROCEDURE — 96361 HYDRATE IV INFUSION ADD-ON: CPT

## 2022-01-01 PROCEDURE — 99233 SBSQ HOSP IP/OBS HIGH 50: CPT | Mod: ,,, | Performed by: INTERNAL MEDICINE

## 2022-01-01 PROCEDURE — 25000003 PHARM REV CODE 250: Performed by: SURGERY

## 2022-01-01 PROCEDURE — 88313 SPECIAL STAINS GROUP 2: CPT | Mod: 59 | Performed by: PATHOLOGY

## 2022-01-01 PROCEDURE — 96372 THER/PROPH/DIAG INJ SC/IM: CPT

## 2022-01-01 PROCEDURE — 36561 PR INSERT TUNNELED CV CATH WITH PORT: ICD-10-PCS | Mod: RT,,, | Performed by: SURGERY

## 2022-01-01 PROCEDURE — 88341 PR IHC OR ICC EACH ADD'L SINGLE ANTIBODY  STAINPR: ICD-10-PCS | Mod: 26,,, | Performed by: PATHOLOGY

## 2022-01-01 PROCEDURE — 99999 PR PBB SHADOW E&M-EST. PATIENT-LVL V: CPT | Mod: PBBFAC,,, | Performed by: NURSE PRACTITIONER

## 2022-01-01 PROCEDURE — 3061F NEG MICROALBUMINURIA REV: CPT | Mod: CPTII,,, | Performed by: PSYCHOLOGIST

## 2022-01-01 PROCEDURE — 3078F PR MOST RECENT DIASTOLIC BLOOD PRESSURE < 80 MM HG: ICD-10-PCS | Mod: CPTII,,, | Performed by: SURGERY

## 2022-01-01 PROCEDURE — 36430 TRANSFUSION BLD/BLD COMPNT: CPT

## 2022-01-01 PROCEDURE — 3044F HG A1C LEVEL LT 7.0%: CPT | Mod: CPTII,,, | Performed by: NURSE PRACTITIONER

## 2022-01-01 PROCEDURE — 37000009 HC ANESTHESIA EA ADD 15 MINS: Performed by: SURGERY

## 2022-01-01 PROCEDURE — 1160F RVW MEDS BY RX/DR IN RCRD: CPT | Mod: CPTII,,, | Performed by: NURSE PRACTITIONER

## 2022-01-01 PROCEDURE — 85651 RBC SED RATE NONAUTOMATED: CPT | Performed by: INTERNAL MEDICINE

## 2022-01-01 PROCEDURE — 85025 COMPLETE CBC W/AUTO DIFF WBC: CPT | Mod: 91 | Performed by: NURSE PRACTITIONER

## 2022-01-01 PROCEDURE — 88342 IMHCHEM/IMCYTCHM 1ST ANTB: CPT | Mod: 26,59,, | Performed by: PATHOLOGY

## 2022-01-01 PROCEDURE — 85610 PROTHROMBIN TIME: CPT | Performed by: NURSE PRACTITIONER

## 2022-01-01 PROCEDURE — 85025 COMPLETE CBC W/AUTO DIFF WBC: CPT | Performed by: SURGERY

## 2022-01-01 PROCEDURE — 99223 1ST HOSP IP/OBS HIGH 75: CPT | Mod: ,,, | Performed by: INTERNAL MEDICINE

## 2022-01-01 PROCEDURE — 99212 PR OFFICE/OUTPT VISIT, EST, LEVL II, 10-19 MIN: ICD-10-PCS | Mod: S$PBB,24,, | Performed by: SURGERY

## 2022-01-01 PROCEDURE — 63600175 PHARM REV CODE 636 W HCPCS: Performed by: ANESTHESIOLOGY

## 2022-01-01 PROCEDURE — 80202 ASSAY OF VANCOMYCIN: CPT | Performed by: STUDENT IN AN ORGANIZED HEALTH CARE EDUCATION/TRAINING PROGRAM

## 2022-01-01 PROCEDURE — 3044F PR MOST RECENT HEMOGLOBIN A1C LEVEL <7.0%: ICD-10-PCS | Mod: CPTII,,, | Performed by: NURSE PRACTITIONER

## 2022-01-01 PROCEDURE — 36561 INSERT TUNNELED CV CATH: CPT | Mod: RT,,, | Performed by: SURGERY

## 2022-01-01 PROCEDURE — 82728 ASSAY OF FERRITIN: CPT | Performed by: NURSE PRACTITIONER

## 2022-01-01 PROCEDURE — 94761 N-INVAS EAR/PLS OXIMETRY MLT: CPT

## 2022-01-01 PROCEDURE — 99214 OFFICE O/P EST MOD 30 MIN: CPT | Mod: PBBFAC,PO | Performed by: NURSE PRACTITIONER

## 2022-01-01 PROCEDURE — 3008F BODY MASS INDEX DOCD: CPT | Mod: CPTII,,, | Performed by: INTERNAL MEDICINE

## 2022-01-01 PROCEDURE — 36000706: Performed by: SURGERY

## 2022-01-01 PROCEDURE — A9698 NON-RAD CONTRAST MATERIALNOC: HCPCS

## 2022-01-01 PROCEDURE — 96413 CHEMO IV INFUSION 1 HR: CPT

## 2022-01-01 PROCEDURE — 81000 URINALYSIS NONAUTO W/SCOPE: CPT | Performed by: HOSPITALIST

## 2022-01-01 PROCEDURE — 99900035 HC TECH TIME PER 15 MIN (STAT)

## 2022-01-01 PROCEDURE — 36415 COLL VENOUS BLD VENIPUNCTURE: CPT | Performed by: SURGERY

## 2022-01-01 PROCEDURE — 3079F DIAST BP 80-89 MM HG: CPT | Mod: CPTII,,, | Performed by: NURSE PRACTITIONER

## 2022-01-01 PROCEDURE — 99214 OFFICE O/P EST MOD 30 MIN: CPT | Mod: PBBFAC,PO | Performed by: INTERNAL MEDICINE

## 2022-01-01 PROCEDURE — 3074F PR MOST RECENT SYSTOLIC BLOOD PRESSURE < 130 MM HG: ICD-10-PCS | Mod: CPTII,S$GLB,, | Performed by: INTERNAL MEDICINE

## 2022-01-01 PROCEDURE — 85027 COMPLETE CBC AUTOMATED: CPT | Performed by: INTERNAL MEDICINE

## 2022-01-01 PROCEDURE — 99223 1ST HOSP IP/OBS HIGH 75: CPT | Mod: 57,,, | Performed by: SURGERY

## 2022-01-01 PROCEDURE — 85007 BL SMEAR W/DIFF WBC COUNT: CPT | Performed by: INTERNAL MEDICINE

## 2022-01-01 PROCEDURE — 80305 DRUG TEST PRSMV DIR OPT OBS: CPT | Mod: S$GLB,,, | Performed by: EMERGENCY MEDICINE

## 2022-01-01 PROCEDURE — 3066F NEPHROPATHY DOC TX: CPT | Mod: CPTII,,, | Performed by: PSYCHOLOGIST

## 2022-01-01 PROCEDURE — 93005 ELECTROCARDIOGRAM TRACING: CPT

## 2022-01-01 PROCEDURE — 99233 PR SUBSEQUENT HOSPITAL CARE,LEVL III: ICD-10-PCS | Mod: ,,, | Performed by: INTERNAL MEDICINE

## 2022-01-01 PROCEDURE — 63600175 PHARM REV CODE 636 W HCPCS: Performed by: STUDENT IN AN ORGANIZED HEALTH CARE EDUCATION/TRAINING PROGRAM

## 2022-01-01 PROCEDURE — 1111F DSCHRG MED/CURRENT MED MERGE: CPT | Mod: CPTII,S$GLB,, | Performed by: INTERNAL MEDICINE

## 2022-01-01 PROCEDURE — 3066F NEPHROPATHY DOC TX: CPT | Mod: CPTII,,, | Performed by: INTERNAL MEDICINE

## 2022-01-01 PROCEDURE — 80053 COMPREHEN METABOLIC PANEL: CPT | Performed by: INTERNAL MEDICINE

## 2022-01-01 PROCEDURE — 86140 C-REACTIVE PROTEIN: CPT | Performed by: SURGERY

## 2022-01-01 PROCEDURE — 85097 PR  BONE MARROW,SMEAR INTERPRETATION: ICD-10-PCS | Mod: ,,, | Performed by: PATHOLOGY

## 2022-01-01 PROCEDURE — 4010F PR ACE/ARB THEARPY RXD/TAKEN: ICD-10-PCS | Mod: CPTII,,, | Performed by: INTERNAL MEDICINE

## 2022-01-01 PROCEDURE — 87040 BLOOD CULTURE FOR BACTERIA: CPT | Performed by: NURSE PRACTITIONER

## 2022-01-01 PROCEDURE — 25500020 PHARM REV CODE 255

## 2022-01-01 PROCEDURE — 3008F BODY MASS INDEX DOCD: CPT | Mod: CPTII,,, | Performed by: NURSE PRACTITIONER

## 2022-01-01 PROCEDURE — 77001 CHG FLUOROGUIDE CNTRL VEN ACCESS,PLACE,REPLACE,REMOVE: ICD-10-PCS | Mod: 26,,, | Performed by: SURGERY

## 2022-01-01 PROCEDURE — 3061F NEG MICROALBUMINURIA REV: CPT | Mod: CPTII,,, | Performed by: INTERNAL MEDICINE

## 2022-01-01 PROCEDURE — 96367 TX/PROPH/DG ADDL SEQ IV INF: CPT

## 2022-01-01 PROCEDURE — 63600175 PHARM REV CODE 636 W HCPCS: Mod: JG | Performed by: NURSE PRACTITIONER

## 2022-01-01 PROCEDURE — 3074F SYST BP LT 130 MM HG: CPT | Mod: CPTII,S$GLB,, | Performed by: PHYSICIAN ASSISTANT

## 2022-01-01 PROCEDURE — 96376 TX/PRO/DX INJ SAME DRUG ADON: CPT

## 2022-01-01 PROCEDURE — 63600175 PHARM REV CODE 636 W HCPCS: Mod: JG | Performed by: INTERNAL MEDICINE

## 2022-01-01 PROCEDURE — 96411 CHEMO IV PUSH ADDL DRUG: CPT

## 2022-01-01 PROCEDURE — 99999 PR PBB SHADOW E&M-EST. PATIENT-LVL IV: CPT | Mod: PBBFAC,,, | Performed by: INTERNAL MEDICINE

## 2022-01-01 PROCEDURE — 3074F PR MOST RECENT SYSTOLIC BLOOD PRESSURE < 130 MM HG: ICD-10-PCS | Mod: CPTII,,, | Performed by: INTERNAL MEDICINE

## 2022-01-01 PROCEDURE — 1111F PR DISCHARGE MEDS RECONCILED W/ CURRENT OUTPATIENT MED LIST: ICD-10-PCS | Mod: CPTII,,, | Performed by: INTERNAL MEDICINE

## 2022-01-01 PROCEDURE — 4010F ACE/ARB THERAPY RXD/TAKEN: CPT | Mod: CPTII,,, | Performed by: NURSE PRACTITIONER

## 2022-01-01 PROCEDURE — B4185 PARENTERAL SOL 10 GM LIPIDS: HCPCS | Performed by: HOSPITALIST

## 2022-01-01 PROCEDURE — 86301 IMMUNOASSAY TUMOR CA 19-9: CPT | Performed by: STUDENT IN AN ORGANIZED HEALTH CARE EDUCATION/TRAINING PROGRAM

## 2022-01-01 PROCEDURE — 84466 ASSAY OF TRANSFERRIN: CPT | Performed by: NURSE PRACTITIONER

## 2022-01-01 PROCEDURE — 3066F PR DOCUMENTATION OF TREATMENT FOR NEPHROPATHY: ICD-10-PCS | Mod: CPTII,,, | Performed by: INTERNAL MEDICINE

## 2022-01-01 PROCEDURE — 3061F NEG MICROALBUMINURIA REV: CPT | Mod: CPTII,,, | Performed by: SURGERY

## 2022-01-01 PROCEDURE — 85027 COMPLETE CBC AUTOMATED: CPT | Performed by: EMERGENCY MEDICINE

## 2022-01-01 PROCEDURE — 3066F NEPHROPATHY DOC TX: CPT | Mod: CPTII,S$GLB,, | Performed by: INTERNAL MEDICINE

## 2022-01-01 PROCEDURE — 96415 CHEMO IV INFUSION ADDL HR: CPT

## 2022-01-01 PROCEDURE — 99495 TRANSJ CARE MGMT MOD F2F 14D: CPT | Mod: S$GLB,,, | Performed by: PHYSICIAN ASSISTANT

## 2022-01-01 PROCEDURE — 99233 SBSQ HOSP IP/OBS HIGH 50: CPT | Mod: S$GLB,,, | Performed by: INTERNAL MEDICINE

## 2022-01-01 PROCEDURE — 4010F ACE/ARB THERAPY RXD/TAKEN: CPT | Mod: CPTII,,, | Performed by: PSYCHOLOGIST

## 2022-01-01 PROCEDURE — 80053 COMPREHEN METABOLIC PANEL: CPT | Performed by: SURGERY

## 2022-01-01 PROCEDURE — 88307 TISSUE EXAM BY PATHOLOGIST: CPT | Mod: 26,,, | Performed by: PATHOLOGY

## 2022-01-01 PROCEDURE — 3078F PR MOST RECENT DIASTOLIC BLOOD PRESSURE < 80 MM HG: ICD-10-PCS | Mod: CPTII,,, | Performed by: INTERNAL MEDICINE

## 2022-01-01 PROCEDURE — 96365 THER/PROPH/DIAG IV INF INIT: CPT

## 2022-01-01 PROCEDURE — 99215 PR OFFICE/OUTPT VISIT, EST, LEVL V, 40-54 MIN: ICD-10-PCS | Mod: S$PBB,,, | Performed by: INTERNAL MEDICINE

## 2022-01-01 PROCEDURE — 99495 TCM SERVICES (MODERATE COMPLEXITY): ICD-10-PCS | Mod: S$GLB,,, | Performed by: PHYSICIAN ASSISTANT

## 2022-01-01 PROCEDURE — 88341 IMHCHEM/IMCYTCHM EA ADD ANTB: CPT | Performed by: PATHOLOGY

## 2022-01-01 PROCEDURE — 25000003 PHARM REV CODE 250: Performed by: NURSE PRACTITIONER

## 2022-01-01 PROCEDURE — 3079F PR MOST RECENT DIASTOLIC BLOOD PRESSURE 80-89 MM HG: ICD-10-PCS | Mod: CPTII,,, | Performed by: SURGERY

## 2022-01-01 PROCEDURE — 88184 FLOWCYTOMETRY/ TC 1 MARKER: CPT | Performed by: PATHOLOGY

## 2022-01-01 PROCEDURE — 88341 IMHCHEM/IMCYTCHM EA ADD ANTB: CPT | Mod: 59 | Performed by: PATHOLOGY

## 2022-01-01 PROCEDURE — 36573 INSJ PICC RS&I 5 YR+: CPT

## 2022-01-01 PROCEDURE — 25000003 PHARM REV CODE 250: Performed by: NURSE ANESTHETIST, CERTIFIED REGISTERED

## 2022-01-01 PROCEDURE — 82962 GLUCOSE BLOOD TEST: CPT | Performed by: SURGERY

## 2022-01-01 PROCEDURE — 1159F PR MEDICATION LIST DOCUMENTED IN MEDICAL RECORD: ICD-10-PCS | Mod: CPTII,,, | Performed by: INTERNAL MEDICINE

## 2022-01-01 PROCEDURE — 3074F SYST BP LT 130 MM HG: CPT | Mod: CPTII,,, | Performed by: NURSE PRACTITIONER

## 2022-01-01 PROCEDURE — 3008F BODY MASS INDEX DOCD: CPT | Mod: CPTII,S$GLB,, | Performed by: PHYSICIAN ASSISTANT

## 2022-01-01 PROCEDURE — 81450 HL NEO GSAP 5-50DNA/DNA&RNA: CPT | Performed by: RADIOLOGY

## 2022-01-01 PROCEDURE — 88311 DECALCIFY TISSUE: CPT | Mod: 26,,, | Performed by: PATHOLOGY

## 2022-01-01 PROCEDURE — 99285 EMERGENCY DEPT VISIT HI MDM: CPT | Mod: 25

## 2022-01-01 PROCEDURE — 3066F PR DOCUMENTATION OF TREATMENT FOR NEPHROPATHY: ICD-10-PCS | Mod: CPTII,S$GLB,, | Performed by: INTERNAL MEDICINE

## 2022-01-01 PROCEDURE — 3044F HG A1C LEVEL LT 7.0%: CPT | Mod: CPTII,,, | Performed by: INTERNAL MEDICINE

## 2022-01-01 PROCEDURE — 63600175 PHARM REV CODE 636 W HCPCS: Mod: TB | Performed by: INTERNAL MEDICINE

## 2022-01-01 PROCEDURE — 1111F PR DISCHARGE MEDS RECONCILED W/ CURRENT OUTPATIENT MED LIST: ICD-10-PCS | Mod: CPTII,,, | Performed by: SURGERY

## 2022-01-01 PROCEDURE — 99214 PR OFFICE/OUTPT VISIT, EST, LEVL IV, 30-39 MIN: ICD-10-PCS | Mod: S$PBB,,, | Performed by: INTERNAL MEDICINE

## 2022-01-01 PROCEDURE — 27000675 HC TUBING MICRODRIP: Performed by: ANESTHESIOLOGY

## 2022-01-01 PROCEDURE — 37000008 HC ANESTHESIA 1ST 15 MINUTES: Performed by: SURGERY

## 2022-01-01 PROCEDURE — A4216 STERILE WATER/SALINE, 10 ML: HCPCS | Performed by: HOSPITALIST

## 2022-01-01 PROCEDURE — 3079F PR MOST RECENT DIASTOLIC BLOOD PRESSURE 80-89 MM HG: ICD-10-PCS | Mod: CPTII,,, | Performed by: NURSE PRACTITIONER

## 2022-01-01 PROCEDURE — 3061F NEG MICROALBUMINURIA REV: CPT | Mod: CPTII,S$GLB,, | Performed by: INTERNAL MEDICINE

## 2022-01-01 PROCEDURE — 99232 SBSQ HOSP IP/OBS MODERATE 35: CPT | Mod: ,,, | Performed by: INTERNAL MEDICINE

## 2022-01-01 PROCEDURE — 86704 HEP B CORE ANTIBODY TOTAL: CPT | Performed by: INTERNAL MEDICINE

## 2022-01-01 PROCEDURE — C9113 INJ PANTOPRAZOLE SODIUM, VIA: HCPCS | Performed by: NURSE PRACTITIONER

## 2022-01-01 PROCEDURE — 97165 OT EVAL LOW COMPLEX 30 MIN: CPT

## 2022-01-01 PROCEDURE — 63600175 PHARM REV CODE 636 W HCPCS: Performed by: RADIOLOGY

## 2022-01-01 PROCEDURE — 88311 PR  DECALCIFY TISSUE: ICD-10-PCS | Mod: 26,,, | Performed by: PATHOLOGY

## 2022-01-01 PROCEDURE — 45330 DIAGNOSTIC SIGMOIDOSCOPY: CPT | Mod: ,,, | Performed by: INTERNAL MEDICINE

## 2022-01-01 PROCEDURE — 4010F PR ACE/ARB THEARPY RXD/TAKEN: ICD-10-PCS | Mod: CPTII,,, | Performed by: PSYCHOLOGIST

## 2022-01-01 PROCEDURE — 3079F DIAST BP 80-89 MM HG: CPT | Mod: CPTII,,, | Performed by: INTERNAL MEDICINE

## 2022-01-01 PROCEDURE — 86901 BLOOD TYPING SEROLOGIC RH(D): CPT | Performed by: NURSE PRACTITIONER

## 2022-01-01 PROCEDURE — 99214 OFFICE O/P EST MOD 30 MIN: CPT | Mod: S$PBB,,, | Performed by: INTERNAL MEDICINE

## 2022-01-01 PROCEDURE — 36415 COLL VENOUS BLD VENIPUNCTURE: CPT | Performed by: EMERGENCY MEDICINE

## 2022-01-01 PROCEDURE — 99999 PR PBB SHADOW E&M-EST. PATIENT-LVL IV: ICD-10-PCS | Mod: PBBFAC,,, | Performed by: INTERNAL MEDICINE

## 2022-01-01 PROCEDURE — 36415 COLL VENOUS BLD VENIPUNCTURE: CPT | Performed by: RADIOLOGY

## 2022-01-01 PROCEDURE — 27201423 OPTIME MED/SURG SUP & DEVICES STERILE SUPPLY: Performed by: SURGERY

## 2022-01-01 PROCEDURE — 86803 HEPATITIS C AB TEST: CPT | Performed by: INTERNAL MEDICINE

## 2022-01-01 PROCEDURE — D9220A PRA ANESTHESIA: ICD-10-PCS | Mod: ANES,,, | Performed by: ANESTHESIOLOGY

## 2022-01-01 PROCEDURE — 83615 LACTATE (LD) (LDH) ENZYME: CPT | Performed by: INTERNAL MEDICINE

## 2022-01-01 PROCEDURE — 27000673 HC TUBING BLOOD Y: Performed by: ANESTHESIOLOGY

## 2022-01-01 PROCEDURE — 99999 PR PBB SHADOW E&M-EST. PATIENT-LVL I: CPT | Mod: PBBFAC,,, | Performed by: PSYCHOLOGIST

## 2022-01-01 PROCEDURE — 3061F PR NEG MICROALBUMINURIA RESULT DOCUMENTED/REVIEW: ICD-10-PCS | Mod: CPTII,,, | Performed by: PSYCHOLOGIST

## 2022-01-01 PROCEDURE — 99231 PR SUBSEQUENT HOSPITAL CARE,LEVL I: ICD-10-PCS | Mod: S$GLB,,, | Performed by: INTERNAL MEDICINE

## 2022-01-01 PROCEDURE — 1159F PR MEDICATION LIST DOCUMENTED IN MEDICAL RECORD: ICD-10-PCS | Mod: CPTII,,, | Performed by: SURGERY

## 2022-01-01 PROCEDURE — G0378 HOSPITAL OBSERVATION PER HR: HCPCS

## 2022-01-01 PROCEDURE — 83735 ASSAY OF MAGNESIUM: CPT | Performed by: NURSE PRACTITIONER

## 2022-01-01 PROCEDURE — 4010F PR ACE/ARB THEARPY RXD/TAKEN: ICD-10-PCS | Mod: CPTII,S$GLB,, | Performed by: INTERNAL MEDICINE

## 2022-01-01 PROCEDURE — 3044F PR MOST RECENT HEMOGLOBIN A1C LEVEL <7.0%: ICD-10-PCS | Mod: CPTII,S$GLB,, | Performed by: INTERNAL MEDICINE

## 2022-01-01 PROCEDURE — 99211 OFF/OP EST MAY X REQ PHY/QHP: CPT | Mod: PBBFAC,PN | Performed by: PSYCHOLOGIST

## 2022-01-01 PROCEDURE — 3066F PR DOCUMENTATION OF TREATMENT FOR NEPHROPATHY: ICD-10-PCS | Mod: CPTII,,, | Performed by: PSYCHOLOGIST

## 2022-01-01 PROCEDURE — 25000003 PHARM REV CODE 250: Performed by: STUDENT IN AN ORGANIZED HEALTH CARE EDUCATION/TRAINING PROGRAM

## 2022-01-01 PROCEDURE — 3079F PR MOST RECENT DIASTOLIC BLOOD PRESSURE 80-89 MM HG: ICD-10-PCS | Mod: CPTII,S$GLB,, | Performed by: INTERNAL MEDICINE

## 2022-01-01 PROCEDURE — 36415 COLL VENOUS BLD VENIPUNCTURE: CPT | Performed by: NURSE PRACTITIONER

## 2022-01-01 PROCEDURE — 3074F SYST BP LT 130 MM HG: CPT | Mod: CPTII,,, | Performed by: SURGERY

## 2022-01-01 PROCEDURE — 3074F SYST BP LT 130 MM HG: CPT | Mod: CPTII,S$GLB,, | Performed by: INTERNAL MEDICINE

## 2022-01-01 PROCEDURE — 99213 OFFICE O/P EST LOW 20 MIN: CPT | Mod: PBBFAC,PN | Performed by: SURGERY

## 2022-01-01 PROCEDURE — 4010F ACE/ARB THERAPY RXD/TAKEN: CPT | Mod: CPTII,,, | Performed by: SURGERY

## 2022-01-01 PROCEDURE — 3044F HG A1C LEVEL LT 7.0%: CPT | Mod: CPTII,,, | Performed by: PSYCHOLOGIST

## 2022-01-01 PROCEDURE — 85060 BLOOD SMEAR INTERPRETATION: CPT | Mod: ,,, | Performed by: STUDENT IN AN ORGANIZED HEALTH CARE EDUCATION/TRAINING PROGRAM

## 2022-01-01 PROCEDURE — 78815 PET IMAGE W/CT SKULL-THIGH: CPT | Mod: TC,PO

## 2022-01-01 PROCEDURE — 99215 OFFICE O/P EST HI 40 MIN: CPT | Mod: S$PBB,,, | Performed by: INTERNAL MEDICINE

## 2022-01-01 PROCEDURE — 87340 HEPATITIS B SURFACE AG IA: CPT | Performed by: INTERNAL MEDICINE

## 2022-01-01 PROCEDURE — 3074F SYST BP LT 130 MM HG: CPT | Mod: CPTII,,, | Performed by: INTERNAL MEDICINE

## 2022-01-01 PROCEDURE — 99214 PR OFFICE/OUTPT VISIT, EST, LEVL IV, 30-39 MIN: ICD-10-PCS | Mod: ,,, | Performed by: INTERNAL MEDICINE

## 2022-01-01 PROCEDURE — 63600175 PHARM REV CODE 636 W HCPCS: Performed by: NURSE PRACTITIONER

## 2022-01-01 PROCEDURE — 87150 DNA/RNA AMPLIFIED PROBE: CPT | Performed by: NURSE PRACTITIONER

## 2022-01-01 PROCEDURE — 1160F RVW MEDS BY RX/DR IN RCRD: CPT | Mod: CPTII,S$GLB,, | Performed by: PHYSICIAN ASSISTANT

## 2022-01-01 PROCEDURE — 3066F PR DOCUMENTATION OF TREATMENT FOR NEPHROPATHY: ICD-10-PCS | Mod: CPTII,,, | Performed by: SURGERY

## 2022-01-01 PROCEDURE — 87502 INFLUENZA DNA AMP PROBE: CPT | Performed by: EMERGENCY MEDICINE

## 2022-01-01 PROCEDURE — 3078F DIAST BP <80 MM HG: CPT | Mod: CPTII,,, | Performed by: INTERNAL MEDICINE

## 2022-01-01 PROCEDURE — 93010 ELECTROCARDIOGRAM REPORT: CPT | Mod: ,,, | Performed by: INTERNAL MEDICINE

## 2022-01-01 PROCEDURE — 3074F PR MOST RECENT SYSTOLIC BLOOD PRESSURE < 130 MM HG: ICD-10-PCS | Mod: CPTII,,, | Performed by: NURSE PRACTITIONER

## 2022-01-01 PROCEDURE — 3008F PR BODY MASS INDEX (BMI) DOCUMENTED: ICD-10-PCS | Mod: CPTII,,, | Performed by: SURGERY

## 2022-01-01 PROCEDURE — 99215 OFFICE O/P EST HI 40 MIN: CPT | Mod: S$GLB,,, | Performed by: INTERNAL MEDICINE

## 2022-01-01 PROCEDURE — 36415 COLL VENOUS BLD VENIPUNCTURE: CPT | Performed by: STUDENT IN AN ORGANIZED HEALTH CARE EDUCATION/TRAINING PROGRAM

## 2022-01-01 PROCEDURE — 44120 PR RESECT SMALL INTEST,SINGL RESEC/ANAS: ICD-10-PCS | Mod: ,,, | Performed by: SURGERY

## 2022-01-01 PROCEDURE — 77001 FLUOROGUIDE FOR VEIN DEVICE: CPT | Mod: 26,,, | Performed by: SURGERY

## 2022-01-01 PROCEDURE — 3008F PR BODY MASS INDEX (BMI) DOCUMENTED: ICD-10-PCS | Mod: CPTII,,, | Performed by: INTERNAL MEDICINE

## 2022-01-01 PROCEDURE — 25000003 PHARM REV CODE 250: Performed by: EMERGENCY MEDICINE

## 2022-01-01 PROCEDURE — 99999 PR PBB SHADOW E&M-EST. PATIENT-LVL IV: ICD-10-PCS | Mod: PBBFAC,,, | Performed by: NURSE PRACTITIONER

## 2022-01-01 PROCEDURE — 4010F ACE/ARB THERAPY RXD/TAKEN: CPT | Mod: CPTII,S$GLB,, | Performed by: INTERNAL MEDICINE

## 2022-01-01 PROCEDURE — 3061F PR NEG MICROALBUMINURIA RESULT DOCUMENTED/REVIEW: ICD-10-PCS | Mod: CPTII,,, | Performed by: SURGERY

## 2022-01-01 PROCEDURE — 85025 COMPLETE CBC W/AUTO DIFF WBC: CPT | Performed by: INTERNAL MEDICINE

## 2022-01-01 PROCEDURE — 87070 CULTURE OTHR SPECIMN AEROBIC: CPT | Performed by: SURGERY

## 2022-01-01 PROCEDURE — 88311 DECALCIFY TISSUE: CPT | Performed by: PATHOLOGY

## 2022-01-01 PROCEDURE — 94799 UNLISTED PULMONARY SVC/PX: CPT

## 2022-01-01 PROCEDURE — 76604 US EXAM CHEST: CPT | Mod: TC

## 2022-01-01 PROCEDURE — 4010F PR ACE/ARB THEARPY RXD/TAKEN: ICD-10-PCS | Mod: CPTII,,, | Performed by: SURGERY

## 2022-01-01 PROCEDURE — 83690 ASSAY OF LIPASE: CPT | Performed by: EMERGENCY MEDICINE

## 2022-01-01 PROCEDURE — 83036 POCT HEMOGLOBIN A1C: ICD-10-PCS | Mod: QW,,, | Performed by: PHYSICIAN ASSISTANT

## 2022-01-01 PROCEDURE — 99232 SBSQ HOSP IP/OBS MODERATE 35: CPT | Mod: S$GLB,,, | Performed by: INTERNAL MEDICINE

## 2022-01-01 PROCEDURE — 88365 INSITU HYBRIDIZATION (FISH): CPT | Mod: 26,,, | Performed by: PATHOLOGY

## 2022-01-01 PROCEDURE — 25000003 PHARM REV CODE 250: Performed by: HOSPITALIST

## 2022-01-01 PROCEDURE — 99900103 DSU ONLY-NO CHARGE-INITIAL HR (STAT): Performed by: SURGERY

## 2022-01-01 PROCEDURE — 3078F DIAST BP <80 MM HG: CPT | Mod: CPTII,S$GLB,, | Performed by: PHYSICIAN ASSISTANT

## 2022-01-01 PROCEDURE — D9220A PRA ANESTHESIA: Mod: CRNA,,, | Performed by: STUDENT IN AN ORGANIZED HEALTH CARE EDUCATION/TRAINING PROGRAM

## 2022-01-01 PROCEDURE — U0002 COVID-19 LAB TEST NON-CDC: HCPCS | Performed by: EMERGENCY MEDICINE

## 2022-01-01 PROCEDURE — 3008F PR BODY MASS INDEX (BMI) DOCUMENTED: ICD-10-PCS | Mod: CPTII,S$GLB,, | Performed by: PHYSICIAN ASSISTANT

## 2022-01-01 PROCEDURE — 3008F PR BODY MASS INDEX (BMI) DOCUMENTED: ICD-10-PCS | Mod: CPTII,,, | Performed by: NURSE PRACTITIONER

## 2022-01-01 PROCEDURE — 4010F ACE/ARB THERAPY RXD/TAKEN: CPT | Mod: CPTII,S$GLB,, | Performed by: PHYSICIAN ASSISTANT

## 2022-01-01 PROCEDURE — 87389 HIV-1 AG W/HIV-1&-2 AB AG IA: CPT | Performed by: INTERNAL MEDICINE

## 2022-01-01 PROCEDURE — 99223 PR INITIAL HOSPITAL CARE,LEVL III: ICD-10-PCS | Mod: ,,, | Performed by: INTERNAL MEDICINE

## 2022-01-01 PROCEDURE — 3075F SYST BP GE 130 - 139MM HG: CPT | Mod: CPTII,S$GLB,, | Performed by: INTERNAL MEDICINE

## 2022-01-01 PROCEDURE — 27000221 HC OXYGEN, UP TO 24 HOURS

## 2022-01-01 PROCEDURE — 94760 N-INVAS EAR/PLS OXIMETRY 1: CPT

## 2022-01-01 PROCEDURE — 3061F PR NEG MICROALBUMINURIA RESULT DOCUMENTED/REVIEW: ICD-10-PCS | Mod: CPTII,,, | Performed by: NURSE PRACTITIONER

## 2022-01-01 PROCEDURE — 25000003 PHARM REV CODE 250: Performed by: ANESTHESIOLOGY

## 2022-01-01 PROCEDURE — 1111F PR DISCHARGE MEDS RECONCILED W/ CURRENT OUTPATIENT MED LIST: ICD-10-PCS | Mod: CPTII,S$GLB,, | Performed by: INTERNAL MEDICINE

## 2022-01-01 PROCEDURE — 88305 TISSUE EXAM BY PATHOLOGIST: CPT | Mod: 26,,, | Performed by: PATHOLOGY

## 2022-01-01 PROCEDURE — 83036 HEMOGLOBIN GLYCOSYLATED A1C: CPT | Mod: QW,,, | Performed by: PHYSICIAN ASSISTANT

## 2022-01-01 PROCEDURE — G0180 MD CERTIFICATION HHA PATIENT: HCPCS | Mod: ,,, | Performed by: SURGERY

## 2022-01-01 PROCEDURE — 3075F PR MOST RECENT SYSTOLIC BLOOD PRESS GE 130-139MM HG: ICD-10-PCS | Mod: CPTII,S$GLB,, | Performed by: INTERNAL MEDICINE

## 2022-01-01 PROCEDURE — 3008F PR BODY MASS INDEX (BMI) DOCUMENTED: ICD-10-PCS | Mod: CPTII,S$GLB,, | Performed by: INTERNAL MEDICINE

## 2022-01-01 PROCEDURE — 87075 CULTR BACTERIA EXCEPT BLOOD: CPT | Performed by: SURGERY

## 2022-01-01 PROCEDURE — 83735 ASSAY OF MAGNESIUM: CPT | Performed by: INTERNAL MEDICINE

## 2022-01-01 PROCEDURE — 90791 PR PSYCHIATRIC DIAGNOSTIC EVALUATION: ICD-10-PCS | Mod: ,,, | Performed by: PSYCHOLOGIST

## 2022-01-01 PROCEDURE — 3044F PR MOST RECENT HEMOGLOBIN A1C LEVEL <7.0%: ICD-10-PCS | Mod: CPTII,,, | Performed by: SURGERY

## 2022-01-01 PROCEDURE — 99999 PR PBB SHADOW E&M-EST. PATIENT-LVL IV: CPT | Mod: PBBFAC,,, | Performed by: NURSE PRACTITIONER

## 2022-01-01 PROCEDURE — 99233 PR SUBSEQUENT HOSPITAL CARE,LEVL III: ICD-10-PCS | Mod: S$GLB,,, | Performed by: INTERNAL MEDICINE

## 2022-01-01 PROCEDURE — 90837 PR PSYCHOTHERAPY W/PATIENT, 60 MIN: ICD-10-PCS | Mod: ,,, | Performed by: PSYCHOLOGIST

## 2022-01-01 PROCEDURE — 85007 BL SMEAR W/DIFF WBC COUNT: CPT | Performed by: EMERGENCY MEDICINE

## 2022-01-01 PROCEDURE — G0180 PR HOME HEALTH MD CERTIFICATION: ICD-10-PCS | Mod: ,,, | Performed by: SURGERY

## 2022-01-01 PROCEDURE — 1159F MED LIST DOCD IN RCRD: CPT | Mod: CPTII,S$GLB,, | Performed by: INTERNAL MEDICINE

## 2022-01-01 PROCEDURE — 3066F PR DOCUMENTATION OF TREATMENT FOR NEPHROPATHY: ICD-10-PCS | Mod: CPTII,S$GLB,, | Performed by: PHYSICIAN ASSISTANT

## 2022-01-01 PROCEDURE — 84550 ASSAY OF BLOOD/URIC ACID: CPT | Performed by: INTERNAL MEDICINE

## 2022-01-01 PROCEDURE — 99024 POSTOP FOLLOW-UP VISIT: CPT | Mod: ,,, | Performed by: SURGERY

## 2022-01-01 PROCEDURE — G0179 PR HOME HEALTH MD RECERTIFICATION: ICD-10-PCS | Mod: ,,, | Performed by: SURGERY

## 2022-01-01 PROCEDURE — 1159F PR MEDICATION LIST DOCUMENTED IN MEDICAL RECORD: ICD-10-PCS | Mod: CPTII,S$GLB,, | Performed by: PHYSICIAN ASSISTANT

## 2022-01-01 PROCEDURE — 3066F NEPHROPATHY DOC TX: CPT | Mod: CPTII,,, | Performed by: NURSE PRACTITIONER

## 2022-01-01 PROCEDURE — 3061F PR NEG MICROALBUMINURIA RESULT DOCUMENTED/REVIEW: ICD-10-PCS | Mod: CPTII,S$GLB,, | Performed by: INTERNAL MEDICINE

## 2022-01-01 PROCEDURE — 99999 PR PBB SHADOW E&M-EST. PATIENT-LVL III: ICD-10-PCS | Mod: PBBFAC,,, | Performed by: SURGERY

## 2022-01-01 PROCEDURE — 37000008 HC ANESTHESIA 1ST 15 MINUTES: Performed by: INTERNAL MEDICINE

## 2022-01-01 PROCEDURE — 49320 PR LAP,DIAGNOSTIC ABDOMEN: ICD-10-PCS | Mod: 78,,, | Performed by: SURGERY

## 2022-01-01 PROCEDURE — G0179 MD RECERTIFICATION HHA PT: HCPCS | Mod: ,,, | Performed by: SURGERY

## 2022-01-01 PROCEDURE — 97110 THERAPEUTIC EXERCISES: CPT | Mod: CO

## 2022-01-01 PROCEDURE — 1160F PR REVIEW ALL MEDS BY PRESCRIBER/CLIN PHARMACIST DOCUMENTED: ICD-10-PCS | Mod: CPTII,,, | Performed by: NURSE PRACTITIONER

## 2022-01-01 PROCEDURE — 36415 COLL VENOUS BLD VENIPUNCTURE: CPT | Performed by: INTERNAL MEDICINE

## 2022-01-01 PROCEDURE — D9220A PRA ANESTHESIA: Mod: ANES,,, | Performed by: ANESTHESIOLOGY

## 2022-01-01 PROCEDURE — 3066F PR DOCUMENTATION OF TREATMENT FOR NEPHROPATHY: ICD-10-PCS | Mod: CPTII,,, | Performed by: NURSE PRACTITIONER

## 2022-01-01 PROCEDURE — 88342 IMHCHEM/IMCYTCHM 1ST ANTB: CPT | Performed by: PATHOLOGY

## 2022-01-01 PROCEDURE — 99215 OFFICE O/P EST HI 40 MIN: CPT | Mod: S$PBB,,, | Performed by: NURSE PRACTITIONER

## 2022-01-01 PROCEDURE — 88305 TISSUE EXAM BY PATHOLOGIST: ICD-10-PCS | Mod: 26,,, | Performed by: PATHOLOGY

## 2022-01-01 PROCEDURE — 99999 PR PBB SHADOW E&M-EST. PATIENT-LVL I: ICD-10-PCS | Mod: PBBFAC,,, | Performed by: PSYCHOLOGIST

## 2022-01-01 PROCEDURE — 88365 INSITU HYBRIDIZATION (FISH): CPT | Performed by: PATHOLOGY

## 2022-01-01 PROCEDURE — 00811 ANES LWR INTST NDSC NOS: CPT | Performed by: INTERNAL MEDICINE

## 2022-01-01 PROCEDURE — 88313 PR  SPECIAL STAINS,GROUP II: ICD-10-PCS | Mod: 26,,, | Performed by: PATHOLOGY

## 2022-01-01 PROCEDURE — 3008F BODY MASS INDEX DOCD: CPT | Mod: CPTII,S$GLB,, | Performed by: INTERNAL MEDICINE

## 2022-01-01 PROCEDURE — 88185 FLOWCYTOMETRY/TC ADD-ON: CPT | Mod: 59 | Performed by: PATHOLOGY

## 2022-01-01 PROCEDURE — 99215 PR OFFICE/OUTPT VISIT, EST, LEVL V, 40-54 MIN: ICD-10-PCS | Mod: S$GLB,,, | Performed by: INTERNAL MEDICINE

## 2022-01-01 PROCEDURE — 3061F PR NEG MICROALBUMINURIA RESULT DOCUMENTED/REVIEW: ICD-10-PCS | Mod: CPTII,,, | Performed by: INTERNAL MEDICINE

## 2022-01-01 PROCEDURE — 3061F PR NEG MICROALBUMINURIA RESULT DOCUMENTED/REVIEW: ICD-10-PCS | Mod: CPTII,S$GLB,, | Performed by: PHYSICIAN ASSISTANT

## 2022-01-01 PROCEDURE — 93010 EKG 12-LEAD: ICD-10-PCS | Mod: ,,, | Performed by: SPECIALIST

## 2022-01-01 PROCEDURE — 3078F PR MOST RECENT DIASTOLIC BLOOD PRESSURE < 80 MM HG: ICD-10-PCS | Mod: CPTII,S$GLB,, | Performed by: INTERNAL MEDICINE

## 2022-01-01 PROCEDURE — 84145 PROCALCITONIN (PCT): CPT | Performed by: INTERNAL MEDICINE

## 2022-01-01 PROCEDURE — 88313 SPECIAL STAINS GROUP 2: CPT | Mod: 26,,, | Performed by: PATHOLOGY

## 2022-01-01 PROCEDURE — 1160F PR REVIEW ALL MEDS BY PRESCRIBER/CLIN PHARMACIST DOCUMENTED: ICD-10-PCS | Mod: CPTII,S$GLB,, | Performed by: PHYSICIAN ASSISTANT

## 2022-01-01 PROCEDURE — 3061F NEG MICROALBUMINURIA REV: CPT | Mod: CPTII,,, | Performed by: NURSE PRACTITIONER

## 2022-01-01 PROCEDURE — 82378 CARCINOEMBRYONIC ANTIGEN: CPT | Performed by: STUDENT IN AN ORGANIZED HEALTH CARE EDUCATION/TRAINING PROGRAM

## 2022-01-01 PROCEDURE — 88237 TISSUE CULTURE BONE MARROW: CPT | Performed by: RADIOLOGY

## 2022-01-01 PROCEDURE — 93010 EKG 12-LEAD: ICD-10-PCS | Mod: ,,, | Performed by: INTERNAL MEDICINE

## 2022-01-01 PROCEDURE — 88307 PR  SURG PATH,LEVEL V: ICD-10-PCS | Mod: 26,,, | Performed by: PATHOLOGY

## 2022-01-01 PROCEDURE — 84145 PROCALCITONIN (PCT): CPT | Performed by: NURSE PRACTITIONER

## 2022-01-01 PROCEDURE — 88189 FLOWCYTOMETRY/READ 16 & >: CPT | Mod: ,,, | Performed by: PATHOLOGY

## 2022-01-01 PROCEDURE — 1159F PR MEDICATION LIST DOCUMENTED IN MEDICAL RECORD: ICD-10-PCS | Mod: CPTII,S$GLB,, | Performed by: INTERNAL MEDICINE

## 2022-01-01 PROCEDURE — D9220A PRA ANESTHESIA: ICD-10-PCS | Mod: CRNA,,, | Performed by: STUDENT IN AN ORGANIZED HEALTH CARE EDUCATION/TRAINING PROGRAM

## 2022-01-01 PROCEDURE — 88342 IMHCHEM/IMCYTCHM 1ST ANTB: CPT | Mod: 26,,, | Performed by: PATHOLOGY

## 2022-01-01 PROCEDURE — 97161 PT EVAL LOW COMPLEX 20 MIN: CPT

## 2022-01-01 PROCEDURE — 49320 DIAG LAPARO SEPARATE PROC: CPT | Mod: 78,,, | Performed by: SURGERY

## 2022-01-01 PROCEDURE — 88341 IMHCHEM/IMCYTCHM EA ADD ANTB: CPT | Mod: 26,,, | Performed by: PATHOLOGY

## 2022-01-01 PROCEDURE — 99213 OFFICE O/P EST LOW 20 MIN: CPT | Mod: PBBFAC,PO | Performed by: INTERNAL MEDICINE

## 2022-01-01 PROCEDURE — 80053 COMPREHEN METABOLIC PANEL: CPT | Performed by: EMERGENCY MEDICINE

## 2022-01-01 PROCEDURE — 80305 PR COLLECTION ONLY DRUG SCREEN: ICD-10-PCS | Mod: S$GLB,,, | Performed by: EMERGENCY MEDICINE

## 2022-01-01 PROCEDURE — 85730 THROMBOPLASTIN TIME PARTIAL: CPT | Performed by: NURSE PRACTITIONER

## 2022-01-01 PROCEDURE — 80048 BASIC METABOLIC PNL TOTAL CA: CPT | Performed by: NURSE PRACTITIONER

## 2022-01-01 PROCEDURE — 1159F PR MEDICATION LIST DOCUMENTED IN MEDICAL RECORD: ICD-10-PCS | Mod: CPTII,,, | Performed by: NURSE PRACTITIONER

## 2022-01-01 PROCEDURE — 3074F PR MOST RECENT SYSTOLIC BLOOD PRESSURE < 130 MM HG: ICD-10-PCS | Mod: CPTII,S$GLB,, | Performed by: PHYSICIAN ASSISTANT

## 2022-01-01 PROCEDURE — 36000708 HC OR TIME LEV III 1ST 15 MIN: Performed by: SURGERY

## 2022-01-01 PROCEDURE — 3079F DIAST BP 80-89 MM HG: CPT | Mod: CPTII,,, | Performed by: SURGERY

## 2022-01-01 PROCEDURE — 84100 ASSAY OF PHOSPHORUS: CPT | Performed by: INTERNAL MEDICINE

## 2022-01-01 PROCEDURE — 4010F PR ACE/ARB THEARPY RXD/TAKEN: ICD-10-PCS | Mod: CPTII,S$GLB,, | Performed by: PHYSICIAN ASSISTANT

## 2022-01-01 PROCEDURE — 99999 PR PBB SHADOW E&M-EST. PATIENT-LVL III: CPT | Mod: PBBFAC,,, | Performed by: INTERNAL MEDICINE

## 2022-01-01 PROCEDURE — 27000284 HC CANNULA NASAL: Performed by: ANESTHESIOLOGY

## 2022-01-01 PROCEDURE — 27000653 HC CATH, IV CATHLN: Performed by: ANESTHESIOLOGY

## 2022-01-01 PROCEDURE — D9220A PRA ANESTHESIA: ICD-10-PCS | Mod: CRNA,,, | Performed by: NURSE ANESTHETIST, CERTIFIED REGISTERED

## 2022-01-01 PROCEDURE — 88264 CHROMOSOME ANALYSIS 20-25: CPT | Performed by: RADIOLOGY

## 2022-01-01 PROCEDURE — 45330 DIAGNOSTIC SIGMOIDOSCOPY: CPT | Performed by: INTERNAL MEDICINE

## 2022-01-01 PROCEDURE — 3066F NEPHROPATHY DOC TX: CPT | Mod: CPTII,,, | Performed by: SURGERY

## 2022-01-01 PROCEDURE — 99215 OFFICE O/P EST HI 40 MIN: CPT | Mod: PBBFAC,PO | Performed by: NURSE PRACTITIONER

## 2022-01-01 PROCEDURE — 3078F PR MOST RECENT DIASTOLIC BLOOD PRESSURE < 80 MM HG: ICD-10-PCS | Mod: CPTII,S$GLB,, | Performed by: PHYSICIAN ASSISTANT

## 2022-01-01 PROCEDURE — 1111F DSCHRG MED/CURRENT MED MERGE: CPT | Mod: CPTII,,, | Performed by: INTERNAL MEDICINE

## 2022-01-01 PROCEDURE — 85025 COMPLETE CBC W/AUTO DIFF WBC: CPT | Performed by: NURSE PRACTITIONER

## 2022-01-01 PROCEDURE — 99024 PR POST-OP FOLLOW-UP VISIT: ICD-10-PCS | Mod: ,,, | Performed by: SURGERY

## 2022-01-01 PROCEDURE — 4010F PR ACE/ARB THEARPY RXD/TAKEN: ICD-10-PCS | Mod: CPTII,,, | Performed by: NURSE PRACTITIONER

## 2022-01-01 PROCEDURE — 1159F MED LIST DOCD IN RCRD: CPT | Mod: CPTII,,, | Performed by: NURSE PRACTITIONER

## 2022-01-01 PROCEDURE — 25000003 PHARM REV CODE 250: Performed by: RADIOLOGY

## 2022-01-01 PROCEDURE — 96360 HYDRATION IV INFUSION INIT: CPT

## 2022-01-01 PROCEDURE — C9290 INJ, BUPIVACAINE LIPOSOME: HCPCS | Performed by: SURGERY

## 2022-01-01 PROCEDURE — 81003 URINALYSIS AUTO W/O SCOPE: CPT | Performed by: STUDENT IN AN ORGANIZED HEALTH CARE EDUCATION/TRAINING PROGRAM

## 2022-01-01 PROCEDURE — 87040 BLOOD CULTURE FOR BACTERIA: CPT | Performed by: STUDENT IN AN ORGANIZED HEALTH CARE EDUCATION/TRAINING PROGRAM

## 2022-01-01 PROCEDURE — 99232 PR SUBSEQUENT HOSPITAL CARE,LEVL II: ICD-10-PCS | Mod: ,,, | Performed by: INTERNAL MEDICINE

## 2022-01-01 PROCEDURE — 83615 LACTATE (LD) (LDH) ENZYME: CPT | Performed by: NURSE PRACTITIONER

## 2022-01-01 PROCEDURE — 1111F PR DISCHARGE MEDS RECONCILED W/ CURRENT OUTPATIENT MED LIST: ICD-10-PCS | Mod: CPTII,,, | Performed by: NURSE PRACTITIONER

## 2022-01-01 PROCEDURE — 3008F BODY MASS INDEX DOCD: CPT | Mod: CPTII,,, | Performed by: SURGERY

## 2022-01-01 PROCEDURE — 99999 PR PBB SHADOW E&M-EST. PATIENT-LVL V: ICD-10-PCS | Mod: PBBFAC,,, | Performed by: NURSE PRACTITIONER

## 2022-01-01 PROCEDURE — 99999 PR PBB SHADOW E&M-EST. PATIENT-LVL III: CPT | Mod: PBBFAC,,, | Performed by: SURGERY

## 2022-01-01 PROCEDURE — 85097 BONE MARROW INTERPRETATION: CPT | Mod: ,,, | Performed by: PATHOLOGY

## 2022-01-01 PROCEDURE — 84153 ASSAY OF PSA TOTAL: CPT | Performed by: STUDENT IN AN ORGANIZED HEALTH CARE EDUCATION/TRAINING PROGRAM

## 2022-01-01 PROCEDURE — 27202103: Performed by: ANESTHESIOLOGY

## 2022-01-01 PROCEDURE — 99214 OFFICE O/P EST MOD 30 MIN: CPT | Mod: S$PBB,,, | Performed by: NURSE PRACTITIONER

## 2022-01-01 PROCEDURE — 85025 COMPLETE CBC W/AUTO DIFF WBC: CPT | Performed by: EMERGENCY MEDICINE

## 2022-01-01 PROCEDURE — 88365 PR  TISSUE HYBRIDIZATION: ICD-10-PCS | Mod: 26,,, | Performed by: PATHOLOGY

## 2022-01-01 PROCEDURE — 3079F DIAST BP 80-89 MM HG: CPT | Mod: CPTII,S$GLB,, | Performed by: INTERNAL MEDICINE

## 2022-01-01 PROCEDURE — 90837 PSYTX W PT 60 MINUTES: CPT | Mod: ,,, | Performed by: PSYCHOLOGIST

## 2022-01-01 PROCEDURE — 87040 BLOOD CULTURE FOR BACTERIA: CPT | Performed by: EMERGENCY MEDICINE

## 2022-01-01 PROCEDURE — C1729 CATH, DRAINAGE: HCPCS | Performed by: SURGERY

## 2022-01-01 PROCEDURE — 3078F DIAST BP <80 MM HG: CPT | Mod: CPTII,S$GLB,, | Performed by: INTERNAL MEDICINE

## 2022-01-01 PROCEDURE — 3078F PR MOST RECENT DIASTOLIC BLOOD PRESSURE < 80 MM HG: ICD-10-PCS | Mod: CPTII,,, | Performed by: NURSE PRACTITIONER

## 2022-01-01 PROCEDURE — 99215 OFFICE O/P EST HI 40 MIN: CPT | Mod: S$GLB,,, | Performed by: PHYSICIAN ASSISTANT

## 2022-01-01 PROCEDURE — 3074F PR MOST RECENT SYSTOLIC BLOOD PRESSURE < 130 MM HG: ICD-10-PCS | Mod: CPTII,,, | Performed by: SURGERY

## 2022-01-01 PROCEDURE — C1751 CATH, INF, PER/CENT/MIDLINE: HCPCS

## 2022-01-01 PROCEDURE — 97530 THERAPEUTIC ACTIVITIES: CPT | Mod: CQ

## 2022-01-01 PROCEDURE — 1159F MED LIST DOCD IN RCRD: CPT | Mod: CPTII,,, | Performed by: SURGERY

## 2022-01-01 PROCEDURE — 88305 TISSUE EXAM BY PATHOLOGIST: CPT | Mod: 59 | Performed by: PATHOLOGY

## 2022-01-01 PROCEDURE — 99231 SBSQ HOSP IP/OBS SF/LOW 25: CPT | Mod: S$GLB,,, | Performed by: INTERNAL MEDICINE

## 2022-01-01 PROCEDURE — 1159F MED LIST DOCD IN RCRD: CPT | Mod: CPTII,S$GLB,, | Performed by: PHYSICIAN ASSISTANT

## 2022-01-01 PROCEDURE — 99999 PR PBB SHADOW E&M-EST. PATIENT-LVL III: ICD-10-PCS | Mod: PBBFAC,,, | Performed by: INTERNAL MEDICINE

## 2022-01-01 PROCEDURE — 87186 SC STD MICRODIL/AGAR DIL: CPT | Performed by: SURGERY

## 2022-01-01 PROCEDURE — 88342 CHG IMMUNOCYTOCHEMISTRY: ICD-10-PCS | Mod: 26,59,, | Performed by: PATHOLOGY

## 2022-01-01 PROCEDURE — 85610 PROTHROMBIN TIME: CPT | Performed by: RADIOLOGY

## 2022-01-01 PROCEDURE — 3078F DIAST BP <80 MM HG: CPT | Mod: CPTII,,, | Performed by: NURSE PRACTITIONER

## 2022-01-01 PROCEDURE — 99215 PR OFFICE/OUTPT VISIT, EST, LEVL V, 40-54 MIN: ICD-10-PCS | Mod: S$GLB,,, | Performed by: PHYSICIAN ASSISTANT

## 2022-01-01 PROCEDURE — 96374 THER/PROPH/DIAG INJ IV PUSH: CPT

## 2022-01-01 PROCEDURE — 45330 PR SIGMOIDOSCOPY,DIAG2STIC: ICD-10-PCS | Mod: ,,, | Performed by: INTERNAL MEDICINE

## 2022-01-01 PROCEDURE — 90791 PSYCH DIAGNOSTIC EVALUATION: CPT | Mod: ,,, | Performed by: PSYCHOLOGIST

## 2022-01-01 PROCEDURE — 36000707: Performed by: SURGERY

## 2022-01-01 PROCEDURE — 81000 URINALYSIS NONAUTO W/SCOPE: CPT | Performed by: EMERGENCY MEDICINE

## 2022-01-01 PROCEDURE — 88189 PR  FLOWCYTOMETRY/READ, 16 & > MARKERS: ICD-10-PCS | Mod: ,,, | Performed by: PATHOLOGY

## 2022-01-01 PROCEDURE — 63600175 PHARM REV CODE 636 W HCPCS: Performed by: EMERGENCY MEDICINE

## 2022-01-01 PROCEDURE — 1111F DSCHRG MED/CURRENT MED MERGE: CPT | Mod: CPTII,,, | Performed by: SURGERY

## 2022-01-01 PROCEDURE — 3044F HG A1C LEVEL LT 7.0%: CPT | Mod: CPTII,,, | Performed by: SURGERY

## 2022-01-01 PROCEDURE — 99214 PR OFFICE/OUTPT VISIT, EST, LEVL IV, 30-39 MIN: ICD-10-PCS | Mod: S$PBB,,, | Performed by: NURSE PRACTITIONER

## 2022-01-01 PROCEDURE — 87205 SMEAR GRAM STAIN: CPT | Performed by: SURGERY

## 2022-01-01 PROCEDURE — 88377 M/PHMTRC ALYS ISHQUANT/SEMIQ: CPT | Mod: 91 | Performed by: PATHOLOGY

## 2022-01-01 PROCEDURE — 99900104 DSU ONLY-NO CHARGE-EA ADD'L HR (STAT): Performed by: SURGERY

## 2022-01-01 PROCEDURE — 99212 OFFICE O/P EST SF 10 MIN: CPT | Mod: S$PBB,24,, | Performed by: SURGERY

## 2022-01-01 PROCEDURE — 88307 TISSUE EXAM BY PATHOLOGIST: CPT | Performed by: PATHOLOGY

## 2022-01-01 PROCEDURE — 99223 PR INITIAL HOSPITAL CARE,LEVL III: ICD-10-PCS | Mod: 57,,, | Performed by: SURGERY

## 2022-01-01 PROCEDURE — 85060 PATHOLOGIST REVIEW: ICD-10-PCS | Mod: ,,, | Performed by: STUDENT IN AN ORGANIZED HEALTH CARE EDUCATION/TRAINING PROGRAM

## 2022-01-01 PROCEDURE — 82105 ALPHA-FETOPROTEIN SERUM: CPT | Performed by: STUDENT IN AN ORGANIZED HEALTH CARE EDUCATION/TRAINING PROGRAM

## 2022-01-01 PROCEDURE — 83605 ASSAY OF LACTIC ACID: CPT | Performed by: NURSE PRACTITIONER

## 2022-01-01 PROCEDURE — 81003 URINALYSIS AUTO W/O SCOPE: CPT | Performed by: EMERGENCY MEDICINE

## 2022-01-01 PROCEDURE — 84145 PROCALCITONIN (PCT): CPT | Performed by: STUDENT IN AN ORGANIZED HEALTH CARE EDUCATION/TRAINING PROGRAM

## 2022-01-01 PROCEDURE — 93010 ELECTROCARDIOGRAM REPORT: CPT | Mod: ,,, | Performed by: SPECIALIST

## 2022-01-01 PROCEDURE — 88342 IMHCHEM/IMCYTCHM 1ST ANTB: CPT | Mod: 91 | Performed by: PATHOLOGY

## 2022-01-01 PROCEDURE — 27000671 HC TUBING MICROBORE EXT: Performed by: ANESTHESIOLOGY

## 2022-01-01 PROCEDURE — 3078F DIAST BP <80 MM HG: CPT | Mod: CPTII,,, | Performed by: SURGERY

## 2022-01-01 PROCEDURE — 1111F DSCHRG MED/CURRENT MED MERGE: CPT | Mod: CPTII,,, | Performed by: NURSE PRACTITIONER

## 2022-01-01 PROCEDURE — 82962 GLUCOSE BLOOD TEST: CPT | Mod: 59

## 2022-01-01 PROCEDURE — 3066F NEPHROPATHY DOC TX: CPT | Mod: CPTII,S$GLB,, | Performed by: PHYSICIAN ASSISTANT

## 2022-01-01 PROCEDURE — 87077 CULTURE AEROBIC IDENTIFY: CPT | Mod: 59 | Performed by: SURGERY

## 2022-01-01 PROCEDURE — 99214 OFFICE O/P EST MOD 30 MIN: CPT | Mod: ,,, | Performed by: INTERNAL MEDICINE

## 2022-01-01 DEVICE — PORT POWER MRI 8FR 1808000: Type: IMPLANTABLE DEVICE | Site: CHEST  WALL | Status: FUNCTIONAL

## 2022-01-01 RX ORDER — HEPARIN 100 UNIT/ML
300 SYRINGE INTRAVENOUS
Status: DISCONTINUED | OUTPATIENT
Start: 2022-01-01 | End: 2022-01-01 | Stop reason: HOSPADM

## 2022-01-01 RX ORDER — MORPHINE SULFATE 4 MG/ML
4 INJECTION, SOLUTION INTRAMUSCULAR; INTRAVENOUS EVERY 4 HOURS PRN
Status: DISCONTINUED | OUTPATIENT
Start: 2022-01-01 | End: 2022-01-01

## 2022-01-01 RX ORDER — MIDAZOLAM HYDROCHLORIDE 1 MG/ML
INJECTION INTRAMUSCULAR; INTRAVENOUS
Status: DISCONTINUED | OUTPATIENT
Start: 2022-01-01 | End: 2022-01-01

## 2022-01-01 RX ORDER — PREDNISONE 20 MG/1
100 TABLET ORAL DAILY
Qty: 60 TABLET | Refills: 3 | Status: SHIPPED | OUTPATIENT
Start: 2022-01-01 | End: 2022-01-01

## 2022-01-01 RX ORDER — ACETAMINOPHEN 10 MG/ML
INJECTION, SOLUTION INTRAVENOUS
Status: DISCONTINUED | OUTPATIENT
Start: 2022-01-01 | End: 2022-01-01

## 2022-01-01 RX ORDER — PANTOPRAZOLE SODIUM 40 MG/1
40 TABLET, DELAYED RELEASE ORAL DAILY
Status: DISCONTINUED | OUTPATIENT
Start: 2022-01-01 | End: 2022-01-01

## 2022-01-01 RX ORDER — OXYCODONE HYDROCHLORIDE 5 MG/1
5 TABLET ORAL EVERY 4 HOURS PRN
Status: DISCONTINUED | OUTPATIENT
Start: 2022-01-01 | End: 2022-01-01 | Stop reason: HOSPADM

## 2022-01-01 RX ORDER — LISINOPRIL 2.5 MG/1
2.5 TABLET ORAL DAILY
Status: DISCONTINUED | OUTPATIENT
Start: 2022-01-01 | End: 2022-01-01

## 2022-01-01 RX ORDER — DOXORUBICIN HYDROCHLORIDE 2 MG/ML
50 INJECTION, SOLUTION INTRAVENOUS
Status: CANCELLED | OUTPATIENT
Start: 2022-01-01

## 2022-01-01 RX ORDER — MUPIROCIN 20 MG/G
OINTMENT TOPICAL 2 TIMES DAILY
Status: DISCONTINUED | OUTPATIENT
Start: 2022-01-01 | End: 2022-01-01 | Stop reason: HOSPADM

## 2022-01-01 RX ORDER — ONDANSETRON 2 MG/ML
4 INJECTION INTRAMUSCULAR; INTRAVENOUS EVERY 6 HOURS PRN
Status: DISCONTINUED | OUTPATIENT
Start: 2022-01-01 | End: 2022-01-01 | Stop reason: HOSPADM

## 2022-01-01 RX ORDER — SODIUM CHLORIDE 0.9 % (FLUSH) 0.9 %
10 SYRINGE (ML) INJECTION
Status: DISCONTINUED | OUTPATIENT
Start: 2022-01-01 | End: 2022-01-01 | Stop reason: HOSPADM

## 2022-01-01 RX ORDER — SODIUM CHLORIDE, SODIUM LACTATE, POTASSIUM CHLORIDE, CALCIUM CHLORIDE 600; 310; 30; 20 MG/100ML; MG/100ML; MG/100ML; MG/100ML
INJECTION, SOLUTION INTRAVENOUS CONTINUOUS PRN
Status: DISCONTINUED | OUTPATIENT
Start: 2022-01-01 | End: 2022-01-01

## 2022-01-01 RX ORDER — ONDANSETRON 2 MG/ML
8 INJECTION INTRAMUSCULAR; INTRAVENOUS
Status: CANCELLED | OUTPATIENT
Start: 2022-01-01

## 2022-01-01 RX ORDER — HYDROCODONE BITARTRATE AND ACETAMINOPHEN 5; 325 MG/1; MG/1
1 TABLET ORAL EVERY 6 HOURS PRN
Status: DISCONTINUED | OUTPATIENT
Start: 2022-01-01 | End: 2022-01-01

## 2022-01-01 RX ORDER — PREDNISONE 20 MG/1
100 TABLET ORAL DAILY
COMMUNITY
Start: 2022-01-01 | End: 2022-01-01

## 2022-01-01 RX ORDER — SIMETHICONE 80 MG
1 TABLET,CHEWABLE ORAL 3 TIMES DAILY PRN
Status: DISCONTINUED | OUTPATIENT
Start: 2022-01-01 | End: 2022-01-01 | Stop reason: HOSPADM

## 2022-01-01 RX ORDER — ONDANSETRON 4 MG/1
8 TABLET, ORALLY DISINTEGRATING ORAL EVERY 8 HOURS PRN
Status: CANCELLED | OUTPATIENT
Start: 2022-01-01

## 2022-01-01 RX ORDER — MORPHINE SULFATE 4 MG/ML
4 INJECTION, SOLUTION INTRAMUSCULAR; INTRAVENOUS EVERY 6 HOURS PRN
Status: DISCONTINUED | OUTPATIENT
Start: 2022-01-01 | End: 2022-01-01

## 2022-01-01 RX ORDER — SODIUM,POTASSIUM PHOSPHATES 280-250MG
2 POWDER IN PACKET (EA) ORAL
Status: DISCONTINUED | OUTPATIENT
Start: 2022-01-01 | End: 2022-01-01 | Stop reason: HOSPADM

## 2022-01-01 RX ORDER — ATORVASTATIN CALCIUM 10 MG/1
10 TABLET, FILM COATED ORAL DAILY
Qty: 90 TABLET | Refills: 1 | Status: SHIPPED | OUTPATIENT
Start: 2022-01-01 | End: 2022-01-01

## 2022-01-01 RX ORDER — NEOSTIGMINE METHYLSULFATE 1 MG/ML
INJECTION, SOLUTION INTRAVENOUS
Status: DISCONTINUED | OUTPATIENT
Start: 2022-01-01 | End: 2022-01-01

## 2022-01-01 RX ORDER — METRONIDAZOLE 500 MG/100ML
500 INJECTION, SOLUTION INTRAVENOUS
Status: DISCONTINUED | OUTPATIENT
Start: 2022-01-01 | End: 2022-01-01

## 2022-01-01 RX ORDER — OXYCODONE HYDROCHLORIDE 5 MG/1
5 TABLET ORAL
Status: DISCONTINUED | OUTPATIENT
Start: 2022-01-01 | End: 2022-01-01 | Stop reason: HOSPADM

## 2022-01-01 RX ORDER — HYDROMORPHONE HYDROCHLORIDE 2 MG/ML
0.2 INJECTION, SOLUTION INTRAMUSCULAR; INTRAVENOUS; SUBCUTANEOUS EVERY 5 MIN PRN
Status: DISCONTINUED | OUTPATIENT
Start: 2022-01-01 | End: 2022-01-01 | Stop reason: HOSPADM

## 2022-01-01 RX ORDER — TALC
6 POWDER (GRAM) TOPICAL NIGHTLY PRN
Status: DISCONTINUED | OUTPATIENT
Start: 2022-01-01 | End: 2022-01-01

## 2022-01-01 RX ORDER — IBUPROFEN 200 MG
16 TABLET ORAL
Status: DISCONTINUED | OUTPATIENT
Start: 2022-01-01 | End: 2022-01-01 | Stop reason: HOSPADM

## 2022-01-01 RX ORDER — ONDANSETRON 4 MG/1
8 TABLET, ORALLY DISINTEGRATING ORAL EVERY 8 HOURS PRN
Status: DISCONTINUED | OUTPATIENT
Start: 2022-01-01 | End: 2022-01-01

## 2022-01-01 RX ORDER — HYDROCORTISONE 25 MG/G
CREAM TOPICAL 2 TIMES DAILY
Qty: 28 G | Refills: 0 | Status: SHIPPED | OUTPATIENT
Start: 2022-01-01 | End: 2022-01-01

## 2022-01-01 RX ORDER — HEPARIN 100 UNIT/ML
500 SYRINGE INTRAVENOUS
Status: DISCONTINUED | OUTPATIENT
Start: 2022-01-01 | End: 2022-01-01 | Stop reason: HOSPADM

## 2022-01-01 RX ORDER — PREDNISONE 1 MG/1
100 TABLET ORAL
Status: DISCONTINUED | OUTPATIENT
Start: 2022-01-01 | End: 2022-01-01

## 2022-01-01 RX ORDER — PROMETHAZINE HYDROCHLORIDE 12.5 MG/1
1 TABLET ORAL EVERY 4 HOURS PRN
Status: ON HOLD | COMMUNITY
Start: 2022-01-01 | End: 2023-01-01

## 2022-01-01 RX ORDER — HYDROMORPHONE HYDROCHLORIDE 1 MG/ML
0.2 INJECTION, SOLUTION INTRAMUSCULAR; INTRAVENOUS; SUBCUTANEOUS EVERY 5 MIN PRN
Status: DISCONTINUED | OUTPATIENT
Start: 2022-01-01 | End: 2022-01-01 | Stop reason: HOSPADM

## 2022-01-01 RX ORDER — ENOXAPARIN SODIUM 100 MG/ML
40 INJECTION SUBCUTANEOUS EVERY 24 HOURS
Status: DISCONTINUED | OUTPATIENT
Start: 2022-01-01 | End: 2022-01-01 | Stop reason: HOSPADM

## 2022-01-01 RX ORDER — SODIUM CHLORIDE 0.9 % (FLUSH) 0.9 %
10 SYRINGE (ML) INJECTION
Status: CANCELLED | OUTPATIENT
Start: 2022-01-01

## 2022-01-01 RX ORDER — HEPARIN SODIUM 5000 [USP'U]/ML
INJECTION, SOLUTION INTRAVENOUS; SUBCUTANEOUS
Status: DISCONTINUED | OUTPATIENT
Start: 2022-01-01 | End: 2022-01-01 | Stop reason: HOSPADM

## 2022-01-01 RX ORDER — ALLOPURINOL 100 MG/1
100 TABLET ORAL DAILY
Qty: 90 TABLET | Refills: 3 | Status: SHIPPED | OUTPATIENT
Start: 2022-01-01 | End: 2022-01-01 | Stop reason: SDUPTHER

## 2022-01-01 RX ORDER — DIPHENHYDRAMINE HYDROCHLORIDE 50 MG/ML
12.5 INJECTION INTRAMUSCULAR; INTRAVENOUS ONCE AS NEEDED
Status: DISCONTINUED | OUTPATIENT
Start: 2022-01-01 | End: 2022-01-01 | Stop reason: HOSPADM

## 2022-01-01 RX ORDER — HYDROCODONE BITARTRATE AND ACETAMINOPHEN 5; 325 MG/1; MG/1
TABLET ORAL
COMMUNITY
Start: 2022-01-01 | End: 2022-01-01 | Stop reason: SDUPTHER

## 2022-01-01 RX ORDER — PROPOFOL 10 MG/ML
VIAL (ML) INTRAVENOUS
Status: DISCONTINUED | OUTPATIENT
Start: 2022-01-01 | End: 2022-01-01

## 2022-01-01 RX ORDER — ACETAMINOPHEN 325 MG/1
650 TABLET ORAL
Status: COMPLETED | OUTPATIENT
Start: 2022-01-01 | End: 2022-01-01

## 2022-01-01 RX ORDER — LIDOCAINE HCL/PF 100 MG/5ML
SYRINGE (ML) INTRAVENOUS
Status: DISCONTINUED | OUTPATIENT
Start: 2022-01-01 | End: 2022-01-01

## 2022-01-01 RX ORDER — MEPERIDINE HYDROCHLORIDE 50 MG/ML
25 INJECTION INTRAMUSCULAR; INTRAVENOUS; SUBCUTANEOUS EVERY 30 MIN PRN
Status: CANCELLED | OUTPATIENT
Start: 2022-01-01

## 2022-01-01 RX ORDER — LIDOCAINE HYDROCHLORIDE 10 MG/ML
1 INJECTION, SOLUTION EPIDURAL; INFILTRATION; INTRACAUDAL; PERINEURAL ONCE
Status: CANCELLED | OUTPATIENT
Start: 2022-01-01 | End: 2022-01-01

## 2022-01-01 RX ORDER — HEPARIN 100 UNIT/ML
500 SYRINGE INTRAVENOUS
Status: CANCELLED | OUTPATIENT
Start: 2022-01-01

## 2022-01-01 RX ORDER — ACETAMINOPHEN 325 MG/1
650 TABLET ORAL
Status: CANCELLED | OUTPATIENT
Start: 2022-01-01

## 2022-01-01 RX ORDER — MEPERIDINE HYDROCHLORIDE 25 MG/ML
25 INJECTION INTRAMUSCULAR; INTRAVENOUS; SUBCUTANEOUS EVERY 30 MIN PRN
Status: DISCONTINUED | OUTPATIENT
Start: 2022-01-01 | End: 2022-01-01 | Stop reason: HOSPADM

## 2022-01-01 RX ORDER — BUPIVACAINE HYDROCHLORIDE AND EPINEPHRINE 2.5; 5 MG/ML; UG/ML
INJECTION, SOLUTION EPIDURAL; INFILTRATION; INTRACAUDAL; PERINEURAL
Status: DISCONTINUED | OUTPATIENT
Start: 2022-01-01 | End: 2022-01-01 | Stop reason: HOSPADM

## 2022-01-01 RX ORDER — SODIUM CHLORIDE 0.9 % (FLUSH) 0.9 %
10 SYRINGE (ML) INJECTION EVERY 6 HOURS
Status: DISCONTINUED | OUTPATIENT
Start: 2022-01-01 | End: 2022-01-01 | Stop reason: HOSPADM

## 2022-01-01 RX ORDER — HEPARIN 100 UNIT/ML
300 SYRINGE INTRAVENOUS
Status: CANCELLED | OUTPATIENT
Start: 2022-01-01

## 2022-01-01 RX ORDER — ONDANSETRON 4 MG/1
4 TABLET, ORALLY DISINTEGRATING ORAL ONCE AS NEEDED
Status: DISCONTINUED | OUTPATIENT
Start: 2022-01-01 | End: 2022-01-01 | Stop reason: HOSPADM

## 2022-01-01 RX ORDER — DOXORUBICIN HYDROCHLORIDE 2 MG/ML
50 INJECTION, SOLUTION INTRAVENOUS
Status: COMPLETED | OUTPATIENT
Start: 2022-01-01 | End: 2022-01-01

## 2022-01-01 RX ORDER — SODIUM CHLORIDE, SODIUM LACTATE, POTASSIUM CHLORIDE, CALCIUM CHLORIDE 600; 310; 30; 20 MG/100ML; MG/100ML; MG/100ML; MG/100ML
INJECTION, SOLUTION INTRAVENOUS CONTINUOUS
Status: DISCONTINUED | OUTPATIENT
Start: 2022-01-01 | End: 2022-01-01 | Stop reason: HOSPADM

## 2022-01-01 RX ORDER — KETOROLAC TROMETHAMINE 30 MG/ML
INJECTION, SOLUTION INTRAMUSCULAR; INTRAVENOUS
Status: DISCONTINUED | OUTPATIENT
Start: 2022-01-01 | End: 2022-01-01

## 2022-01-01 RX ORDER — BUPRENORPHINE HYDROCHLORIDE AND NALOXONE HYDROCHLORIDE DIHYDRATE 2; .5 MG/1; MG/1
4 TABLET SUBLINGUAL
Status: CANCELLED | OUTPATIENT
Start: 2022-01-01

## 2022-01-01 RX ORDER — SODIUM CHLORIDE 9 MG/ML
INJECTION, SOLUTION INTRAVENOUS CONTINUOUS
Status: DISCONTINUED | OUTPATIENT
Start: 2022-01-01 | End: 2022-01-01 | Stop reason: HOSPADM

## 2022-01-01 RX ORDER — POLYETHYLENE GLYCOL 3350 17 G/17G
17 POWDER, FOR SOLUTION ORAL DAILY
Status: DISCONTINUED | OUTPATIENT
Start: 2022-01-01 | End: 2022-01-01 | Stop reason: HOSPADM

## 2022-01-01 RX ORDER — ONDANSETRON HCL IN 0.9 % NACL 8 MG/50 ML
8 INTRAVENOUS SOLUTION, PIGGYBACK (ML) INTRAVENOUS
Status: COMPLETED | OUTPATIENT
Start: 2022-01-01 | End: 2022-01-01

## 2022-01-01 RX ORDER — HYDROCODONE BITARTRATE AND ACETAMINOPHEN 5; 325 MG/1; MG/1
1 TABLET ORAL EVERY 6 HOURS PRN
Status: DISCONTINUED | OUTPATIENT
Start: 2022-01-01 | End: 2022-01-01 | Stop reason: HOSPADM

## 2022-01-01 RX ORDER — FENTANYL CITRATE 50 UG/ML
INJECTION, SOLUTION INTRAMUSCULAR; INTRAVENOUS
Status: DISCONTINUED | OUTPATIENT
Start: 2022-01-01 | End: 2022-01-01

## 2022-01-01 RX ORDER — LANOLIN ALCOHOL/MO/W.PET/CERES
800 CREAM (GRAM) TOPICAL
Status: DISCONTINUED | OUTPATIENT
Start: 2022-01-01 | End: 2022-01-01

## 2022-01-01 RX ORDER — PREDNISONE 1 MG/1
100 TABLET ORAL
Status: CANCELLED | OUTPATIENT
Start: 2022-01-01

## 2022-01-01 RX ORDER — HYDROCODONE BITARTRATE AND ACETAMINOPHEN 5; 325 MG/1; MG/1
1 TABLET ORAL EVERY 6 HOURS PRN
Qty: 90 TABLET | Refills: 0 | Status: SHIPPED | OUTPATIENT
Start: 2022-01-01 | End: 2022-01-01 | Stop reason: CLARIF

## 2022-01-01 RX ORDER — ACETAMINOPHEN 325 MG/1
650 TABLET ORAL EVERY 8 HOURS PRN
Status: DISCONTINUED | OUTPATIENT
Start: 2022-01-01 | End: 2022-01-01

## 2022-01-01 RX ORDER — MEROPENEM AND SODIUM CHLORIDE 1 G/50ML
1 INJECTION, SOLUTION INTRAVENOUS
Status: DISCONTINUED | OUTPATIENT
Start: 2022-01-01 | End: 2022-01-01 | Stop reason: HOSPADM

## 2022-01-01 RX ORDER — ESMOLOL HYDROCHLORIDE 10 MG/ML
INJECTION INTRAVENOUS
Status: DISCONTINUED | OUTPATIENT
Start: 2022-01-01 | End: 2022-01-01

## 2022-01-01 RX ORDER — SODIUM CHLORIDE 9 MG/ML
INJECTION, SOLUTION INTRAVENOUS CONTINUOUS
Status: CANCELLED | OUTPATIENT
Start: 2022-01-01

## 2022-01-01 RX ORDER — SODIUM CHLORIDE 0.9 % (FLUSH) 0.9 %
10 SYRINGE (ML) INJECTION EVERY 12 HOURS PRN
Status: DISCONTINUED | OUTPATIENT
Start: 2022-01-01 | End: 2022-01-01 | Stop reason: HOSPADM

## 2022-01-01 RX ORDER — HYDROMORPHONE HYDROCHLORIDE 2 MG/ML
1.5 INJECTION, SOLUTION INTRAMUSCULAR; INTRAVENOUS; SUBCUTANEOUS
Status: DISPENSED | OUTPATIENT
Start: 2022-01-01 | End: 2022-01-01

## 2022-01-01 RX ORDER — FAMOTIDINE 10 MG/ML
20 INJECTION INTRAVENOUS
Status: CANCELLED | OUTPATIENT
Start: 2022-01-01

## 2022-01-01 RX ORDER — SUCCINYLCHOLINE CHLORIDE 20 MG/ML
INJECTION INTRAMUSCULAR; INTRAVENOUS
Status: DISCONTINUED | OUTPATIENT
Start: 2022-01-01 | End: 2022-01-01

## 2022-01-01 RX ORDER — DEXAMETHASONE SODIUM PHOSPHATE 4 MG/ML
INJECTION, SOLUTION INTRA-ARTICULAR; INTRALESIONAL; INTRAMUSCULAR; INTRAVENOUS; SOFT TISSUE
Status: DISCONTINUED | OUTPATIENT
Start: 2022-01-01 | End: 2022-01-01

## 2022-01-01 RX ORDER — CHLORPROMAZINE HYDROCHLORIDE 25 MG/1
25 TABLET, FILM COATED ORAL 3 TIMES DAILY
Qty: 90 TABLET | Refills: 11 | Status: ON HOLD | OUTPATIENT
Start: 2022-01-01 | End: 2023-01-01 | Stop reason: HOSPADM

## 2022-01-01 RX ORDER — ONDANSETRON 4 MG/1
1 TABLET, ORALLY DISINTEGRATING ORAL EVERY 6 HOURS PRN
Status: ON HOLD | COMMUNITY
Start: 2022-01-01 | End: 2023-01-01

## 2022-01-01 RX ORDER — ALLOPURINOL 100 MG/1
100 TABLET ORAL DAILY
Qty: 90 TABLET | Refills: 3 | Status: ON HOLD | OUTPATIENT
Start: 2022-01-01 | End: 2023-01-01

## 2022-01-01 RX ORDER — SODIUM CHLORIDE, SODIUM LACTATE, POTASSIUM CHLORIDE, CALCIUM CHLORIDE 600; 310; 30; 20 MG/100ML; MG/100ML; MG/100ML; MG/100ML
INJECTION, SOLUTION INTRAVENOUS CONTINUOUS
Status: CANCELLED | OUTPATIENT
Start: 2022-01-01

## 2022-01-01 RX ORDER — ONDANSETRON 2 MG/ML
4 INJECTION INTRAMUSCULAR; INTRAVENOUS DAILY PRN
Status: DISCONTINUED | OUTPATIENT
Start: 2022-01-01 | End: 2022-01-01 | Stop reason: HOSPADM

## 2022-01-01 RX ORDER — INSULIN ASPART 100 [IU]/ML
0-5 INJECTION, SOLUTION INTRAVENOUS; SUBCUTANEOUS
Status: DISCONTINUED | OUTPATIENT
Start: 2022-01-01 | End: 2022-01-01 | Stop reason: HOSPADM

## 2022-01-01 RX ORDER — MORPHINE SULFATE 2 MG/ML
6 INJECTION, SOLUTION INTRAMUSCULAR; INTRAVENOUS
Status: DISCONTINUED | OUTPATIENT
Start: 2022-01-01 | End: 2022-01-01 | Stop reason: HOSPADM

## 2022-01-01 RX ORDER — ONDANSETRON 2 MG/ML
INJECTION INTRAMUSCULAR; INTRAVENOUS
Status: DISCONTINUED | OUTPATIENT
Start: 2022-01-01 | End: 2022-01-01

## 2022-01-01 RX ORDER — FAMOTIDINE 10 MG/ML
20 INJECTION INTRAVENOUS
Status: COMPLETED | OUTPATIENT
Start: 2022-01-01 | End: 2022-01-01

## 2022-01-01 RX ORDER — ATORVASTATIN CALCIUM 10 MG/1
10 TABLET, FILM COATED ORAL DAILY
Status: DISCONTINUED | OUTPATIENT
Start: 2022-01-01 | End: 2022-01-01

## 2022-01-01 RX ORDER — CHLORPROMAZINE HYDROCHLORIDE 25 MG/1
25 TABLET, FILM COATED ORAL 3 TIMES DAILY PRN
Status: DISCONTINUED | OUTPATIENT
Start: 2022-01-01 | End: 2022-01-01 | Stop reason: HOSPADM

## 2022-01-01 RX ORDER — ONDANSETRON 4 MG/1
4 TABLET, ORALLY DISINTEGRATING ORAL EVERY 6 HOURS PRN
Qty: 90 TABLET | Refills: 2 | Status: SHIPPED | OUTPATIENT
Start: 2022-01-01 | End: 2023-01-01

## 2022-01-01 RX ORDER — METFORMIN HYDROCHLORIDE 1000 MG/1
1000 TABLET ORAL 2 TIMES DAILY WITH MEALS
Qty: 180 TABLET | Refills: 3 | Status: SHIPPED | OUTPATIENT
Start: 2022-01-01 | End: 2022-01-01

## 2022-01-01 RX ORDER — FAMOTIDINE 10 MG/ML
INJECTION INTRAVENOUS
Status: DISCONTINUED | OUTPATIENT
Start: 2022-01-01 | End: 2022-01-01

## 2022-01-01 RX ORDER — ONDANSETRON 2 MG/ML
4 INJECTION INTRAMUSCULAR; INTRAVENOUS
Status: DISCONTINUED | OUTPATIENT
Start: 2022-01-01 | End: 2022-01-01 | Stop reason: HOSPADM

## 2022-01-01 RX ORDER — MAG HYDROX/ALUMINUM HYD/SIMETH 200-200-20
30 SUSPENSION, ORAL (FINAL DOSE FORM) ORAL 4 TIMES DAILY PRN
Status: DISCONTINUED | OUTPATIENT
Start: 2022-01-01 | End: 2022-01-01

## 2022-01-01 RX ORDER — METHYLPREDNISOLONE SOD SUCC 125 MG
125 VIAL (EA) INJECTION ONCE
Status: COMPLETED | OUTPATIENT
Start: 2022-01-01 | End: 2022-01-01

## 2022-01-01 RX ORDER — HYDROCODONE BITARTRATE AND ACETAMINOPHEN 5; 325 MG/1; MG/1
TABLET ORAL
Qty: 90 TABLET | Refills: 0 | Status: SHIPPED | OUTPATIENT
Start: 2022-01-01 | End: 2023-01-01 | Stop reason: SDUPTHER

## 2022-01-01 RX ORDER — LANOLIN ALCOHOL/MO/W.PET/CERES
800 CREAM (GRAM) TOPICAL
Status: DISCONTINUED | OUTPATIENT
Start: 2022-01-01 | End: 2022-01-01 | Stop reason: HOSPADM

## 2022-01-01 RX ORDER — HYDROCODONE BITARTRATE AND ACETAMINOPHEN 10; 325 MG/1; MG/1
1 TABLET ORAL EVERY 6 HOURS PRN
Status: DISCONTINUED | OUTPATIENT
Start: 2022-01-01 | End: 2022-01-01 | Stop reason: HOSPADM

## 2022-01-01 RX ORDER — PANTOPRAZOLE SODIUM 40 MG/10ML
40 INJECTION, POWDER, LYOPHILIZED, FOR SOLUTION INTRAVENOUS 2 TIMES DAILY
Status: DISCONTINUED | OUTPATIENT
Start: 2022-01-01 | End: 2022-01-01 | Stop reason: HOSPADM

## 2022-01-01 RX ORDER — PHENYLEPHRINE HYDROCHLORIDE 10 MG/ML
INJECTION INTRAVENOUS
Status: DISCONTINUED | OUTPATIENT
Start: 2022-01-01 | End: 2022-01-01

## 2022-01-01 RX ORDER — HYDROCODONE BITARTRATE AND ACETAMINOPHEN 5; 325 MG/1; MG/1
1 TABLET ORAL EVERY 8 HOURS PRN
Qty: 12 TABLET | Refills: 0 | Status: SHIPPED | OUTPATIENT
Start: 2022-01-01 | End: 2022-01-01

## 2022-01-01 RX ORDER — ACETAMINOPHEN 325 MG/1
650 TABLET ORAL EVERY 6 HOURS PRN
Status: DISCONTINUED | OUTPATIENT
Start: 2022-01-01 | End: 2022-01-01 | Stop reason: HOSPADM

## 2022-01-01 RX ORDER — DIPHENHYDRAMINE HCL 25 MG
25 CAPSULE ORAL
Status: CANCELLED | OUTPATIENT
Start: 2022-01-01

## 2022-01-01 RX ORDER — FENTANYL CITRATE 50 UG/ML
25 INJECTION, SOLUTION INTRAMUSCULAR; INTRAVENOUS EVERY 5 MIN PRN
Status: COMPLETED | OUTPATIENT
Start: 2022-01-01 | End: 2022-01-01

## 2022-01-01 RX ORDER — HYDROCODONE BITARTRATE AND ACETAMINOPHEN 500; 5 MG/1; MG/1
TABLET ORAL ONCE
Status: CANCELLED | OUTPATIENT
Start: 2022-01-01 | End: 2022-01-01

## 2022-01-01 RX ORDER — ROCURONIUM BROMIDE 10 MG/ML
INJECTION, SOLUTION INTRAVENOUS
Status: DISCONTINUED | OUTPATIENT
Start: 2022-01-01 | End: 2022-01-01

## 2022-01-01 RX ORDER — CIPROFLOXACIN 500 MG/1
500 TABLET ORAL DAILY
Status: DISCONTINUED | OUTPATIENT
Start: 2022-01-01 | End: 2022-01-01 | Stop reason: HOSPADM

## 2022-01-01 RX ORDER — BUTENAFINE HYDROCHLORIDE 10 MG/G
1 CREAM TOPICAL 2 TIMES DAILY
Status: ON HOLD | COMMUNITY
Start: 2022-01-01 | End: 2023-01-01 | Stop reason: ALTCHOICE

## 2022-01-01 RX ORDER — GLUCAGON 1 MG
1 KIT INJECTION
Status: DISCONTINUED | OUTPATIENT
Start: 2022-01-01 | End: 2022-01-01 | Stop reason: HOSPADM

## 2022-01-01 RX ORDER — PROMETHAZINE HYDROCHLORIDE 12.5 MG/1
12.5 TABLET ORAL EVERY 4 HOURS PRN
Qty: 30 TABLET | Refills: 3 | Status: SHIPPED | OUTPATIENT
Start: 2022-01-01 | End: 2022-01-01

## 2022-01-01 RX ORDER — INSULIN ASPART 100 [IU]/ML
1-10 INJECTION, SOLUTION INTRAVENOUS; SUBCUTANEOUS EVERY 6 HOURS PRN
Status: DISCONTINUED | OUTPATIENT
Start: 2022-01-01 | End: 2022-01-01 | Stop reason: HOSPADM

## 2022-01-01 RX ORDER — CIPROFLOXACIN 500 MG/1
1 TABLET ORAL DAILY
COMMUNITY
Start: 2022-01-01 | End: 2022-01-01 | Stop reason: CLARIF

## 2022-01-01 RX ORDER — OXYCODONE HYDROCHLORIDE 5 MG/1
10 TABLET ORAL ONCE AS NEEDED
Status: CANCELLED | OUTPATIENT
Start: 2022-01-01

## 2022-01-01 RX ORDER — ONDANSETRON 4 MG/1
8 TABLET, ORALLY DISINTEGRATING ORAL EVERY 8 HOURS PRN
Status: DISCONTINUED | OUTPATIENT
Start: 2022-01-01 | End: 2022-01-01 | Stop reason: SDUPTHER

## 2022-01-01 RX ORDER — ALPRAZOLAM 0.25 MG/1
0.25 TABLET ORAL 2 TIMES DAILY PRN
Qty: 60 TABLET | Refills: 0 | Status: SHIPPED | OUTPATIENT
Start: 2022-01-01 | End: 2022-01-01

## 2022-01-01 RX ORDER — ONDANSETRON HYDROCHLORIDE 2 MG/ML
INJECTION, SOLUTION INTRAMUSCULAR; INTRAVENOUS
Status: DISCONTINUED | OUTPATIENT
Start: 2022-01-01 | End: 2022-01-01

## 2022-01-01 RX ORDER — MORPHINE SULFATE 2 MG/ML
2 INJECTION, SOLUTION INTRAMUSCULAR; INTRAVENOUS EVERY 6 HOURS PRN
Status: DISCONTINUED | OUTPATIENT
Start: 2022-01-01 | End: 2022-01-01

## 2022-01-01 RX ORDER — PANTOPRAZOLE SODIUM 40 MG/1
40 TABLET, DELAYED RELEASE ORAL DAILY
Qty: 30 TABLET | Refills: 11 | Status: ON HOLD | OUTPATIENT
Start: 2022-01-01 | End: 2023-01-01 | Stop reason: SDUPTHER

## 2022-01-01 RX ORDER — ALLOPURINOL 100 MG/1
1 TABLET ORAL DAILY
COMMUNITY
Start: 2022-01-01 | End: 2022-01-01 | Stop reason: CLARIF

## 2022-01-01 RX ORDER — LIDOCAINE HYDROCHLORIDE 10 MG/ML
INJECTION INFILTRATION; PERINEURAL
Status: DISCONTINUED | OUTPATIENT
Start: 2022-01-01 | End: 2022-01-01

## 2022-01-01 RX ORDER — ALLOPURINOL 100 MG/1
100 TABLET ORAL DAILY
Status: DISCONTINUED | OUTPATIENT
Start: 2022-01-01 | End: 2022-01-01 | Stop reason: HOSPADM

## 2022-01-01 RX ORDER — LEVOFLOXACIN 750 MG/1
750 TABLET ORAL DAILY
Qty: 14 TABLET | Refills: 0 | Status: CANCELLED | OUTPATIENT
Start: 2022-01-01 | End: 2022-01-01

## 2022-01-01 RX ORDER — SODIUM CHLORIDE, SODIUM LACTATE, POTASSIUM CHLORIDE, CALCIUM CHLORIDE 600; 310; 30; 20 MG/100ML; MG/100ML; MG/100ML; MG/100ML
INJECTION, SOLUTION INTRAVENOUS CONTINUOUS
Status: DISCONTINUED | OUTPATIENT
Start: 2022-01-01 | End: 2022-01-01

## 2022-01-01 RX ORDER — CLOTRIMAZOLE 1 %
CREAM (GRAM) TOPICAL 2 TIMES DAILY
Qty: 28 G | Refills: 0 | Status: SHIPPED | OUTPATIENT
Start: 2022-01-01 | End: 2022-01-01

## 2022-01-01 RX ORDER — LISINOPRIL 2.5 MG/1
2.5 TABLET ORAL DAILY
Qty: 90 TABLET | Refills: 1 | Status: SHIPPED | OUTPATIENT
Start: 2022-01-01 | End: 2022-01-01

## 2022-01-01 RX ORDER — CLOTRIMAZOLE 1 %
CREAM (GRAM) TOPICAL 2 TIMES DAILY
Qty: 28 G | Refills: 0 | Status: ON HOLD | OUTPATIENT
Start: 2022-01-01 | End: 2023-01-01 | Stop reason: ALTCHOICE

## 2022-01-01 RX ORDER — CEFAZOLIN SODIUM 2 G/50ML
2 SOLUTION INTRAVENOUS
Status: COMPLETED | OUTPATIENT
Start: 2022-01-01 | End: 2022-01-01

## 2022-01-01 RX ORDER — MORPHINE SULFATE 2 MG/ML
2 INJECTION, SOLUTION INTRAMUSCULAR; INTRAVENOUS EVERY 4 HOURS PRN
Status: DISCONTINUED | OUTPATIENT
Start: 2022-01-01 | End: 2022-01-01 | Stop reason: SDUPTHER

## 2022-01-01 RX ORDER — IBUPROFEN 200 MG
24 TABLET ORAL
Status: DISCONTINUED | OUTPATIENT
Start: 2022-01-01 | End: 2022-01-01 | Stop reason: HOSPADM

## 2022-01-01 RX ORDER — CEFEPIME HYDROCHLORIDE 1 G/50ML
2 INJECTION, SOLUTION INTRAVENOUS
Status: DISCONTINUED | OUTPATIENT
Start: 2022-01-01 | End: 2022-01-01

## 2022-01-01 RX ORDER — CIPROFLOXACIN 500 MG/1
500 TABLET ORAL DAILY
Qty: 30 TABLET | Refills: 0 | Status: SHIPPED | OUTPATIENT
Start: 2022-01-01 | End: 2023-01-01

## 2022-01-01 RX ORDER — NALOXONE HCL 0.4 MG/ML
0.02 VIAL (ML) INJECTION
Status: DISCONTINUED | OUTPATIENT
Start: 2022-01-01 | End: 2022-01-01 | Stop reason: HOSPADM

## 2022-01-01 RX ADMIN — SODIUM CHLORIDE, PRESERVATIVE FREE 10 ML: 5 INJECTION INTRAVENOUS at 03:09

## 2022-01-01 RX ADMIN — ONDANSETRON 8 MG: 2 INJECTION INTRAMUSCULAR; INTRAVENOUS at 11:11

## 2022-01-01 RX ADMIN — MIDAZOLAM HYDROCHLORIDE 2 MG: 1 INJECTION, SOLUTION INTRAMUSCULAR; INTRAVENOUS at 09:09

## 2022-01-01 RX ADMIN — HEPARIN 300 UNITS: 100 SYRINGE at 04:12

## 2022-01-01 RX ADMIN — PIPERACILLIN SODIUM AND TAZOBACTAM SODIUM 4.5 G: 4; .5 INJECTION, POWDER, LYOPHILIZED, FOR SOLUTION INTRAVENOUS at 02:09

## 2022-01-01 RX ADMIN — CYCLOPHOSPHAMIDE 1660 MG: 200 INJECTION, SOLUTION INTRAVENOUS at 02:12

## 2022-01-01 RX ADMIN — ERTAPENEM 1 G: 1 INJECTION, POWDER, LYOPHILIZED, FOR SOLUTION INTRAMUSCULAR; INTRAVENOUS at 12:09

## 2022-01-01 RX ADMIN — SODIUM CHLORIDE, SODIUM GLUCONATE, SODIUM ACETATE, POTASSIUM CHLORIDE, MAGNESIUM CHLORIDE, SODIUM PHOSPHATE, DIBASIC, AND POTASSIUM PHOSPHATE: .53; .5; .37; .037; .03; .012; .00082 INJECTION, SOLUTION INTRAVENOUS at 12:09

## 2022-01-01 RX ADMIN — SODIUM CHLORIDE, SODIUM LACTATE, POTASSIUM CHLORIDE, AND CALCIUM CHLORIDE 1000 ML: .6; .31; .03; .02 INJECTION, SOLUTION INTRAVENOUS at 04:10

## 2022-01-01 RX ADMIN — PROPOFOL 30 MG: 10 INJECTION, EMULSION INTRAVENOUS at 01:11

## 2022-01-01 RX ADMIN — MUPIROCIN: 20 OINTMENT TOPICAL at 08:09

## 2022-01-01 RX ADMIN — IOHEXOL 700 ML: 12 SOLUTION ORAL at 11:09

## 2022-01-01 RX ADMIN — ROCURONIUM BROMIDE 20 MG: 10 INJECTION, SOLUTION INTRAVENOUS at 12:09

## 2022-01-01 RX ADMIN — MEROPENEM AND SODIUM CHLORIDE 1 G: 1 INJECTION, SOLUTION INTRAVENOUS at 01:09

## 2022-01-01 RX ADMIN — MORPHINE SULFATE 4 MG: 4 INJECTION INTRAVENOUS at 11:09

## 2022-01-01 RX ADMIN — PROPOFOL 30 MG: 10 INJECTION, EMULSION INTRAVENOUS at 08:10

## 2022-01-01 RX ADMIN — MUPIROCIN: 20 OINTMENT TOPICAL at 10:09

## 2022-01-01 RX ADMIN — IOHEXOL 100 ML: 350 INJECTION, SOLUTION INTRAVENOUS at 12:09

## 2022-01-01 RX ADMIN — FAMOTIDINE 20 MG: 10 INJECTION INTRAVENOUS at 08:12

## 2022-01-01 RX ADMIN — FILGRASTIM 480 MCG: 480 INJECTION, SOLUTION INTRAVENOUS; SUBCUTANEOUS at 02:12

## 2022-01-01 RX ADMIN — CEFEPIME HYDROCHLORIDE 2 G: 2 INJECTION, SOLUTION INTRAVENOUS at 01:09

## 2022-01-01 RX ADMIN — ACETAMINOPHEN 650 MG: 325 TABLET ORAL at 07:11

## 2022-01-01 RX ADMIN — FAMOTIDINE 20 MG: 10 INJECTION INTRAVENOUS at 07:11

## 2022-01-01 RX ADMIN — SODIUM CHLORIDE 1000 ML: 0.9 INJECTION, SOLUTION INTRAVENOUS at 07:11

## 2022-01-01 RX ADMIN — MEROPENEM AND SODIUM CHLORIDE 1 G: 1 INJECTION, SOLUTION INTRAVENOUS at 11:09

## 2022-01-01 RX ADMIN — IRON SUCROSE 200 MG: 20 INJECTION, SOLUTION INTRAVENOUS at 01:09

## 2022-01-01 RX ADMIN — SODIUM CHLORIDE, SODIUM LACTATE, POTASSIUM CHLORIDE, AND CALCIUM CHLORIDE: .6; .31; .03; .02 INJECTION, SOLUTION INTRAVENOUS at 01:09

## 2022-01-01 RX ADMIN — MEROPENEM AND SODIUM CHLORIDE 1 G: 1 INJECTION, SOLUTION INTRAVENOUS at 06:09

## 2022-01-01 RX ADMIN — ONDANSETRON 16 MG: 2 INJECTION INTRAMUSCULAR; INTRAVENOUS at 08:12

## 2022-01-01 RX ADMIN — ASCORBIC ACID, VITAMIN A PALMITATE, CHOLECALCIFEROL, THIAMINE HYDROCHLORIDE, RIBOFLAVIN-5 PHOSPHATE SODIUM, PYRIDOXINE HYDROCHLORIDE, NIACINAMIDE, DEXPANTHENOL, ALPHA-TOCOPHEROL ACETATE, VITAMIN K1, FOLIC ACID, BIOTIN, CYANOCOBALAMIN: 200; 3300; 200; 6; 3.6; 6; 40; 15; 10; 150; 600; 60; 5 INJECTION, SOLUTION INTRAVENOUS at 03:09

## 2022-01-01 RX ADMIN — ONDANSETRON 4 MG: 2 INJECTION INTRAMUSCULAR; INTRAVENOUS at 10:09

## 2022-01-01 RX ADMIN — ONDANSETRON 4 MG: 2 INJECTION INTRAMUSCULAR; INTRAVENOUS at 05:09

## 2022-01-01 RX ADMIN — MEROPENEM AND SODIUM CHLORIDE 1 G: 1 INJECTION, SOLUTION INTRAVENOUS at 03:09

## 2022-01-01 RX ADMIN — CEFEPIME HYDROCHLORIDE 2 G: 2 INJECTION, SOLUTION INTRAVENOUS at 05:09

## 2022-01-01 RX ADMIN — GLYCOPYRROLATE 0.1 MG: 0.2 INJECTION, SOLUTION INTRAMUSCULAR; INTRAVITREAL at 02:09

## 2022-01-01 RX ADMIN — ENOXAPARIN SODIUM 40 MG: 40 INJECTION SUBCUTANEOUS at 04:09

## 2022-01-01 RX ADMIN — MORPHINE SULFATE 2 MG: 2 INJECTION, SOLUTION INTRAMUSCULAR; INTRAVENOUS at 08:09

## 2022-01-01 RX ADMIN — SODIUM CHLORIDE, PRESERVATIVE FREE 10 ML: 5 INJECTION INTRAVENOUS at 11:11

## 2022-01-01 RX ADMIN — ONDANSETRON 4 MG: 2 INJECTION INTRAMUSCULAR; INTRAVENOUS at 08:09

## 2022-01-01 RX ADMIN — FENTANYL CITRATE 25 MCG: 50 INJECTION, SOLUTION INTRAMUSCULAR; INTRAVENOUS at 04:09

## 2022-01-01 RX ADMIN — ONDANSETRON 8 MG: 2 INJECTION INTRAMUSCULAR; INTRAVENOUS at 01:12

## 2022-01-01 RX ADMIN — HYDROCODONE BITARTRATE AND ACETAMINOPHEN 1 TABLET: 10; 325 TABLET ORAL at 01:09

## 2022-01-01 RX ADMIN — MIDAZOLAM HYDROCHLORIDE 2 MG: 1 INJECTION, SOLUTION INTRAMUSCULAR; INTRAVENOUS at 12:09

## 2022-01-01 RX ADMIN — MORPHINE SULFATE 4 MG: 4 INJECTION INTRAVENOUS at 07:09

## 2022-01-01 RX ADMIN — SODIUM CHLORIDE: 0.9 INJECTION, SOLUTION INTRAVENOUS at 07:12

## 2022-01-01 RX ADMIN — HEPARIN 300 UNITS: 100 SYRINGE at 02:11

## 2022-01-01 RX ADMIN — MORPHINE SULFATE 4 MG: 4 INJECTION INTRAVENOUS at 05:09

## 2022-01-01 RX ADMIN — ONDANSETRON 4 MG: 2 INJECTION INTRAMUSCULAR; INTRAVENOUS at 11:09

## 2022-01-01 RX ADMIN — METRONIDAZOLE 500 MG: 5 INJECTION, SOLUTION INTRAVENOUS at 06:09

## 2022-01-01 RX ADMIN — FILGRASTIM 480 MCG: 480 INJECTION, SOLUTION INTRAVENOUS; SUBCUTANEOUS at 11:11

## 2022-01-01 RX ADMIN — LIDOCAINE HYDROCHLORIDE 75 MG: 20 INJECTION INTRAVENOUS at 02:09

## 2022-01-01 RX ADMIN — HYDROCODONE BITARTRATE AND ACETAMINOPHEN 1 TABLET: 10; 325 TABLET ORAL at 05:09

## 2022-01-01 RX ADMIN — SODIUM CHLORIDE, PRESERVATIVE FREE 10 ML: 5 INJECTION INTRAVENOUS at 06:09

## 2022-01-01 RX ADMIN — ENOXAPARIN SODIUM 40 MG: 40 INJECTION SUBCUTANEOUS at 05:09

## 2022-01-01 RX ADMIN — SODIUM CHLORIDE, PRESERVATIVE FREE 10 ML: 5 INJECTION INTRAVENOUS at 04:12

## 2022-01-01 RX ADMIN — I.V. FAT EMULSION 250 ML: 20 EMULSION INTRAVENOUS at 09:09

## 2022-01-01 RX ADMIN — DEXAMETHASONE SODIUM PHOSPHATE 4 MG: 4 INJECTION, SOLUTION INTRA-ARTICULAR; INTRALESIONAL; INTRAMUSCULAR; INTRAVENOUS; SOFT TISSUE at 12:09

## 2022-01-01 RX ADMIN — CEFEPIME HYDROCHLORIDE 2 G: 2 INJECTION, SOLUTION INTRAVENOUS at 04:09

## 2022-01-01 RX ADMIN — SODIUM CHLORIDE, SODIUM LACTATE, POTASSIUM CHLORIDE, AND CALCIUM CHLORIDE: .6; .31; .03; .02 INJECTION, SOLUTION INTRAVENOUS at 07:10

## 2022-01-01 RX ADMIN — ACETAMINOPHEN 650 MG: 325 TABLET ORAL at 07:12

## 2022-01-01 RX ADMIN — VINCRISTINE SULFATE 2 MG: 1 INJECTION, SOLUTION INTRAVENOUS at 09:11

## 2022-01-01 RX ADMIN — SODIUM CHLORIDE, PRESERVATIVE FREE 10 ML: 5 INJECTION INTRAVENOUS at 12:12

## 2022-01-01 RX ADMIN — SODIUM CHLORIDE, PRESERVATIVE FREE 10 ML: 5 INJECTION INTRAVENOUS at 02:11

## 2022-01-01 RX ADMIN — MORPHINE SULFATE 4 MG: 4 INJECTION INTRAVENOUS at 02:09

## 2022-01-01 RX ADMIN — SODIUM CHLORIDE: 0.9 INJECTION, SOLUTION INTRAVENOUS at 08:12

## 2022-01-01 RX ADMIN — FLUCONAZOLE 100 MG: 2 INJECTION, SOLUTION INTRAVENOUS at 06:09

## 2022-01-01 RX ADMIN — ESMOLOL HYDROCHLORIDE 10 MG: 10 INJECTION, SOLUTION INTRAVENOUS at 02:09

## 2022-01-01 RX ADMIN — MEROPENEM AND SODIUM CHLORIDE 1 G: 1 INJECTION, SOLUTION INTRAVENOUS at 07:09

## 2022-01-01 RX ADMIN — HYDROMORPHONE HYDROCHLORIDE 0.2 MG: 2 INJECTION INTRAMUSCULAR; INTRAVENOUS; SUBCUTANEOUS at 04:09

## 2022-01-01 RX ADMIN — SODIUM CHLORIDE 1000 ML: 0.9 INJECTION, SOLUTION INTRAVENOUS at 12:11

## 2022-01-01 RX ADMIN — METRONIDAZOLE 500 MG: 5 INJECTION, SOLUTION INTRAVENOUS at 09:09

## 2022-01-01 RX ADMIN — MEROPENEM AND SODIUM CHLORIDE 1 G: 1 INJECTION, SOLUTION INTRAVENOUS at 05:09

## 2022-01-01 RX ADMIN — PIPERACILLIN SODIUM AND TAZOBACTAM SODIUM 4.5 G: 4; .5 INJECTION, POWDER, LYOPHILIZED, FOR SOLUTION INTRAVENOUS at 06:09

## 2022-01-01 RX ADMIN — SODIUM CHLORIDE: 9 INJECTION, SOLUTION INTRAVENOUS at 10:11

## 2022-01-01 RX ADMIN — MORPHINE SULFATE 4 MG: 4 INJECTION INTRAVENOUS at 09:09

## 2022-01-01 RX ADMIN — HYDROMORPHONE HYDROCHLORIDE 0.2 MG: 2 INJECTION INTRAMUSCULAR; INTRAVENOUS; SUBCUTANEOUS at 05:09

## 2022-01-01 RX ADMIN — CYCLOPHOSPHAMIDE 1660 MG: 200 INJECTION, SOLUTION INTRAVENOUS at 09:11

## 2022-01-01 RX ADMIN — SUCCINYLCHOLINE CHLORIDE 120 MG: 20 INJECTION, SOLUTION INTRAMUSCULAR; INTRAVENOUS; PARENTERAL at 02:09

## 2022-01-01 RX ADMIN — FENTANYL CITRATE 50 MCG: 50 INJECTION, SOLUTION INTRAMUSCULAR; INTRAVENOUS at 03:09

## 2022-01-01 RX ADMIN — SODIUM CHLORIDE, SODIUM LACTATE, POTASSIUM CHLORIDE, AND CALCIUM CHLORIDE: .6; .31; .03; .02 INJECTION, SOLUTION INTRAVENOUS at 05:09

## 2022-01-01 RX ADMIN — SODIUM CHLORIDE: 0.9 INJECTION, SOLUTION INTRAVENOUS at 12:11

## 2022-01-01 RX ADMIN — SODIUM CHLORIDE, SODIUM LACTATE, POTASSIUM CHLORIDE, AND CALCIUM CHLORIDE: .6; .31; .03; .02 INJECTION, SOLUTION INTRAVENOUS at 04:09

## 2022-01-01 RX ADMIN — SODIUM CHLORIDE, SODIUM LACTATE, POTASSIUM CHLORIDE, AND CALCIUM CHLORIDE: .6; .31; .03; .02 INJECTION, SOLUTION INTRAVENOUS at 09:09

## 2022-01-01 RX ADMIN — IOHEXOL 100 ML: 350 INJECTION, SOLUTION INTRAVENOUS at 02:11

## 2022-01-01 RX ADMIN — SODIUM CHLORIDE, PRESERVATIVE FREE 10 ML: 5 INJECTION INTRAVENOUS at 12:09

## 2022-01-01 RX ADMIN — MEROPENEM AND SODIUM CHLORIDE 1 G: 1 INJECTION, SOLUTION INTRAVENOUS at 02:09

## 2022-01-01 RX ADMIN — SIMETHICONE 80 MG: 80 TABLET, CHEWABLE ORAL at 09:09

## 2022-01-01 RX ADMIN — FILGRASTIM 480 MCG: 480 INJECTION, SOLUTION INTRAVENOUS; SUBCUTANEOUS at 03:12

## 2022-01-01 RX ADMIN — ALLOPURINOL 100 MG: 100 TABLET ORAL at 08:11

## 2022-01-01 RX ADMIN — CEFEPIME HYDROCHLORIDE 2 G: 2 INJECTION, SOLUTION INTRAVENOUS at 09:09

## 2022-01-01 RX ADMIN — PANTOPRAZOLE SODIUM 40 MG: 40 INJECTION, POWDER, LYOPHILIZED, FOR SOLUTION INTRAVENOUS at 08:11

## 2022-01-01 RX ADMIN — HYDROCODONE BITARTRATE AND ACETAMINOPHEN 1 TABLET: 10; 325 TABLET ORAL at 11:09

## 2022-01-01 RX ADMIN — PROPOFOL 100 MG: 10 INJECTION, EMULSION INTRAVENOUS at 01:11

## 2022-01-01 RX ADMIN — DOXORUBICIN HYDROCHLORIDE 112 MG: 2 INJECTION, SOLUTION INTRAVENOUS at 02:12

## 2022-01-01 RX ADMIN — PHENYLEPHRINE HYDROCHLORIDE 100 MCG: 10 INJECTION INTRAVENOUS at 12:09

## 2022-01-01 RX ADMIN — ONDANSETRON 16 MG: 2 INJECTION INTRAMUSCULAR; INTRAVENOUS at 07:11

## 2022-01-01 RX ADMIN — I.V. FAT EMULSION 250 ML: 20 EMULSION INTRAVENOUS at 10:09

## 2022-01-01 RX ADMIN — HEPARIN 300 UNITS: 100 SYRINGE at 11:11

## 2022-01-01 RX ADMIN — SODIUM CHLORIDE, PRESERVATIVE FREE 10 ML: 5 INJECTION INTRAVENOUS at 11:09

## 2022-01-01 RX ADMIN — SODIUM CHLORIDE 810 MG: 0.9 INJECTION, SOLUTION INTRAVENOUS at 08:12

## 2022-01-01 RX ADMIN — ONDANSETRON 8 MG: 2 INJECTION INTRAMUSCULAR; INTRAVENOUS at 08:11

## 2022-01-01 RX ADMIN — FENTANYL CITRATE 50 MCG: 50 INJECTION INTRAMUSCULAR; INTRAVENOUS at 09:09

## 2022-01-01 RX ADMIN — FAMOTIDINE 20 MG: 10 INJECTION, SOLUTION INTRAVENOUS at 07:10

## 2022-01-01 RX ADMIN — DOXORUBICIN HYDROCHLORIDE 112 MG: 2 INJECTION, SOLUTION INTRAVENOUS at 09:11

## 2022-01-01 RX ADMIN — FILGRASTIM 480 MCG: 480 INJECTION, SOLUTION INTRAVENOUS; SUBCUTANEOUS at 04:11

## 2022-01-01 RX ADMIN — DIPHENHYDRAMINE HYDROCHLORIDE 50 MG: 50 INJECTION INTRAMUSCULAR; INTRAVENOUS at 07:12

## 2022-01-01 RX ADMIN — ROCURONIUM BROMIDE 10 MG: 10 INJECTION, SOLUTION INTRAVENOUS at 02:09

## 2022-01-01 RX ADMIN — ONDANSETRON 4 MG: 2 INJECTION INTRAMUSCULAR; INTRAVENOUS at 07:10

## 2022-01-01 RX ADMIN — MORPHINE SULFATE 4 MG: 4 INJECTION INTRAVENOUS at 08:09

## 2022-01-01 RX ADMIN — DIPHENHYDRAMINE HYDROCHLORIDE 50 MG: 50 INJECTION INTRAMUSCULAR; INTRAVENOUS at 08:11

## 2022-01-01 RX ADMIN — ONDANSETRON 16 MG: 2 INJECTION INTRAMUSCULAR; INTRAVENOUS at 09:12

## 2022-01-01 RX ADMIN — SIMETHICONE 80 MG: 80 TABLET, CHEWABLE ORAL at 06:09

## 2022-01-01 RX ADMIN — SODIUM CHLORIDE, SODIUM LACTATE, POTASSIUM CHLORIDE, AND CALCIUM CHLORIDE: .6; .31; .03; .02 INJECTION, SOLUTION INTRAVENOUS at 07:11

## 2022-01-01 RX ADMIN — METRONIDAZOLE 500 MG: 5 INJECTION, SOLUTION INTRAVENOUS at 02:09

## 2022-01-01 RX ADMIN — MIDAZOLAM HYDROCHLORIDE 1 MG: 1 INJECTION, SOLUTION INTRAMUSCULAR; INTRAVENOUS at 09:09

## 2022-01-01 RX ADMIN — MORPHINE SULFATE 4 MG: 4 INJECTION INTRAVENOUS at 12:09

## 2022-01-01 RX ADMIN — CYCLOPHOSPHAMIDE 1660 MG: 200 INJECTION, SOLUTION INTRAVENOUS at 12:11

## 2022-01-01 RX ADMIN — FENTANYL CITRATE 100 MCG: 50 INJECTION, SOLUTION INTRAMUSCULAR; INTRAVENOUS at 02:09

## 2022-01-01 RX ADMIN — HYDROCODONE BITARTRATE AND ACETAMINOPHEN 1 TABLET: 10; 325 TABLET ORAL at 07:09

## 2022-01-01 RX ADMIN — ONDANSETRON 4 MG: 2 INJECTION INTRAMUSCULAR; INTRAVENOUS at 06:09

## 2022-01-01 RX ADMIN — CIPROFLOXACIN 500 MG: 500 TABLET, FILM COATED ORAL at 08:11

## 2022-01-01 RX ADMIN — VINCRISTINE SULFATE 2 MG: 1 INJECTION, SOLUTION INTRAVENOUS at 02:12

## 2022-01-01 RX ADMIN — HEPARIN 300 UNITS: 100 SYRINGE at 12:12

## 2022-01-01 RX ADMIN — CEFEPIME HYDROCHLORIDE 2 G: 2 INJECTION, SOLUTION INTRAVENOUS at 08:09

## 2022-01-01 RX ADMIN — HEPARIN 500 UNITS: 100 SYRINGE at 09:11

## 2022-01-01 RX ADMIN — POLYETHYLENE GLYCOL 3350 17 G: 17 POWDER, FOR SOLUTION ORAL at 01:09

## 2022-01-01 RX ADMIN — HYDROCODONE BITARTRATE AND ACETAMINOPHEN 1 TABLET: 10; 325 TABLET ORAL at 03:09

## 2022-01-01 RX ADMIN — DEXAMETHASONE SODIUM PHOSPHATE 8 MG: 4 INJECTION, SOLUTION INTRA-ARTICULAR; INTRALESIONAL; INTRAMUSCULAR; INTRAVENOUS; SOFT TISSUE at 02:09

## 2022-01-01 RX ADMIN — LISINOPRIL 2.5 MG: 2.5 TABLET ORAL at 09:09

## 2022-01-01 RX ADMIN — SODIUM CHLORIDE 1000 ML: 0.9 INJECTION, SOLUTION INTRAVENOUS at 12:09

## 2022-01-01 RX ADMIN — DOXORUBICIN HYDROCHLORIDE 112 MG: 2 INJECTION, SOLUTION INTRAVENOUS at 12:11

## 2022-01-01 RX ADMIN — SODIUM CHLORIDE 800 MG: 0.9 INJECTION, SOLUTION INTRAVENOUS at 08:11

## 2022-01-01 RX ADMIN — FILGRASTIM 480 MCG: 480 INJECTION, SOLUTION INTRAVENOUS; SUBCUTANEOUS at 10:12

## 2022-01-01 RX ADMIN — FENTANYL CITRATE 100 MCG: 50 INJECTION INTRAMUSCULAR; INTRAVENOUS at 08:10

## 2022-01-01 RX ADMIN — MEROPENEM AND SODIUM CHLORIDE 1 G: 1 INJECTION, SOLUTION INTRAVENOUS at 09:09

## 2022-01-01 RX ADMIN — ONDANSETRON 4 MG: 2 INJECTION INTRAMUSCULAR; INTRAVENOUS at 09:09

## 2022-01-01 RX ADMIN — ROCURONIUM BROMIDE 40 MG: 10 INJECTION, SOLUTION INTRAVENOUS at 02:09

## 2022-01-01 RX ADMIN — DIPHENHYDRAMINE HYDROCHLORIDE 50 MG: 50 INJECTION INTRAMUSCULAR; INTRAVENOUS at 08:12

## 2022-01-01 RX ADMIN — KETOROLAC TROMETHAMINE 30 MG: 30 INJECTION, SOLUTION INTRAMUSCULAR; INTRAVENOUS at 12:09

## 2022-01-01 RX ADMIN — SODIUM CHLORIDE 1000 ML: 0.9 INJECTION, SOLUTION INTRAVENOUS at 07:10

## 2022-01-01 RX ADMIN — CEFAZOLIN SODIUM 2 G: 2 SOLUTION INTRAVENOUS at 07:10

## 2022-01-01 RX ADMIN — MEPERIDINE HYDROCHLORIDE 25 MG: 25 INJECTION INTRAMUSCULAR; INTRAVENOUS; SUBCUTANEOUS at 10:11

## 2022-01-01 RX ADMIN — MEROPENEM AND SODIUM CHLORIDE 1 G: 1 INJECTION, SOLUTION INTRAVENOUS at 12:09

## 2022-01-01 RX ADMIN — SODIUM CHLORIDE, PRESERVATIVE FREE 10 ML: 5 INJECTION INTRAVENOUS at 05:09

## 2022-01-01 RX ADMIN — MIDAZOLAM HYDROCHLORIDE 2 MG: 1 INJECTION, SOLUTION INTRAMUSCULAR; INTRAVENOUS at 07:10

## 2022-01-01 RX ADMIN — FILGRASTIM 480 MCG: 480 INJECTION, SOLUTION INTRAVENOUS; SUBCUTANEOUS at 01:12

## 2022-01-01 RX ADMIN — ENOXAPARIN SODIUM 40 MG: 40 INJECTION SUBCUTANEOUS at 06:09

## 2022-01-01 RX ADMIN — HYDROMORPHONE HYDROCHLORIDE 0.2 MG: 2 INJECTION INTRAMUSCULAR; INTRAVENOUS; SUBCUTANEOUS at 02:09

## 2022-01-01 RX ADMIN — FENTANYL CITRATE 25 MCG: 50 INJECTION INTRAMUSCULAR; INTRAVENOUS at 09:09

## 2022-01-01 RX ADMIN — SODIUM CHLORIDE, SODIUM LACTATE, POTASSIUM CHLORIDE, AND CALCIUM CHLORIDE: .6; .31; .03; .02 INJECTION, SOLUTION INTRAVENOUS at 08:10

## 2022-01-01 RX ADMIN — SIMETHICONE 80 MG: 80 TABLET, CHEWABLE ORAL at 11:09

## 2022-01-01 RX ADMIN — FENTANYL CITRATE 50 MCG: 50 INJECTION, SOLUTION INTRAMUSCULAR; INTRAVENOUS at 02:09

## 2022-01-01 RX ADMIN — LIDOCAINE HYDROCHLORIDE 50 MG: 20 INJECTION INTRAVENOUS at 01:11

## 2022-01-01 RX ADMIN — SODIUM CHLORIDE 810 MG: 0.9 INJECTION, SOLUTION INTRAVENOUS at 09:12

## 2022-01-01 RX ADMIN — SUCCINYLCHOLINE CHLORIDE 120 MG: 20 INJECTION, SOLUTION INTRAMUSCULAR; INTRAVENOUS; PARENTERAL at 12:09

## 2022-01-01 RX ADMIN — SODIUM CHLORIDE, SODIUM LACTATE, POTASSIUM CHLORIDE, AND CALCIUM CHLORIDE: .6; .31; .03; .02 INJECTION, SOLUTION INTRAVENOUS at 11:09

## 2022-01-01 RX ADMIN — FENTANYL CITRATE 25 MCG: 50 INJECTION, SOLUTION INTRAMUSCULAR; INTRAVENOUS at 01:09

## 2022-01-01 RX ADMIN — VANCOMYCIN HYDROCHLORIDE 2000 MG: 1 INJECTION, POWDER, LYOPHILIZED, FOR SOLUTION INTRAVENOUS at 05:09

## 2022-01-01 RX ADMIN — MORPHINE SULFATE 2 MG: 2 INJECTION, SOLUTION INTRAMUSCULAR; INTRAVENOUS at 06:09

## 2022-01-01 RX ADMIN — PROPOFOL 200 MG: 10 INJECTION, EMULSION INTRAVENOUS at 02:09

## 2022-01-01 RX ADMIN — GLYCOPYRROLATE 0.1 MG: 0.2 INJECTION, SOLUTION INTRAMUSCULAR; INTRAVITREAL at 01:11

## 2022-01-01 RX ADMIN — ACETAMINOPHEN 650 MG: 325 TABLET ORAL at 08:12

## 2022-01-01 RX ADMIN — HYDROCODONE BITARTRATE AND ACETAMINOPHEN 1 TABLET: 5; 325 TABLET ORAL at 01:09

## 2022-01-01 RX ADMIN — MORPHINE SULFATE 4 MG: 4 INJECTION INTRAVENOUS at 01:09

## 2022-01-01 RX ADMIN — PIPERACILLIN SODIUM AND TAZOBACTAM SODIUM 4.5 G: 4; .5 INJECTION, POWDER, LYOPHILIZED, FOR SOLUTION INTRAVENOUS at 10:09

## 2022-01-01 RX ADMIN — ACETAMINOPHEN 1000 MG: 10 INJECTION, SOLUTION INTRAVENOUS at 02:09

## 2022-01-01 RX ADMIN — IOHEXOL 75 ML: 350 INJECTION, SOLUTION INTRAVENOUS at 04:09

## 2022-01-01 RX ADMIN — FENTANYL CITRATE 100 MCG: 50 INJECTION, SOLUTION INTRAMUSCULAR; INTRAVENOUS at 12:09

## 2022-01-01 RX ADMIN — ONDANSETRON 4 MG: 2 INJECTION, SOLUTION INTRAMUSCULAR; INTRAVENOUS at 02:09

## 2022-01-01 RX ADMIN — SODIUM CHLORIDE, SODIUM LACTATE, POTASSIUM CHLORIDE, AND CALCIUM CHLORIDE: .6; .31; .03; .02 INJECTION, SOLUTION INTRAVENOUS at 02:09

## 2022-01-01 RX ADMIN — IOHEXOL 100 ML: 350 INJECTION, SOLUTION INTRAVENOUS at 05:10

## 2022-01-01 RX ADMIN — IRON SUCROSE 200 MG: 20 INJECTION, SOLUTION INTRAVENOUS at 03:09

## 2022-01-01 RX ADMIN — LIDOCAINE HYDROCHLORIDE 100 MG: 20 INJECTION INTRAVENOUS at 12:09

## 2022-01-01 RX ADMIN — NEOSTIGMINE METHYLSULFATE 3 MG: 1 INJECTION INTRAVENOUS at 03:09

## 2022-01-01 RX ADMIN — SODIUM CHLORIDE: 0.9 INJECTION, SOLUTION INTRAVENOUS at 06:11

## 2022-01-01 RX ADMIN — MEROPENEM AND SODIUM CHLORIDE 1 G: 1 INJECTION, SOLUTION INTRAVENOUS at 10:09

## 2022-01-01 RX ADMIN — ACETAMINOPHEN 1000 MG: 10 INJECTION, SOLUTION INTRAVENOUS at 07:10

## 2022-01-01 RX ADMIN — CEFEPIME HYDROCHLORIDE 2 G: 2 INJECTION, SOLUTION INTRAVENOUS at 11:09

## 2022-01-01 RX ADMIN — SODIUM CHLORIDE, PRESERVATIVE FREE 10 ML: 5 INJECTION INTRAVENOUS at 09:11

## 2022-01-01 RX ADMIN — LIDOCAINE HYDROCHLORIDE 10 ML: 10 INJECTION, SOLUTION INFILTRATION; PERINEURAL at 09:09

## 2022-01-01 RX ADMIN — GLYCOPYRROLATE 0.8 MG: 0.2 INJECTION, SOLUTION INTRAMUSCULAR; INTRAVENOUS at 01:09

## 2022-01-01 RX ADMIN — GLYCOPYRROLATE 0.4 MG: 0.2 INJECTION, SOLUTION INTRAMUSCULAR; INTRAVITREAL at 03:09

## 2022-01-01 RX ADMIN — MIDAZOLAM HYDROCHLORIDE 2 MG: 1 INJECTION, SOLUTION INTRAMUSCULAR; INTRAVENOUS at 02:09

## 2022-01-01 RX ADMIN — METHYLPREDNISOLONE SODIUM SUCCINATE 125 MG: 125 INJECTION, POWDER, FOR SOLUTION INTRAMUSCULAR; INTRAVENOUS at 10:11

## 2022-01-01 RX ADMIN — PANTOPRAZOLE SODIUM 40 MG: 40 INJECTION, POWDER, LYOPHILIZED, FOR SOLUTION INTRAVENOUS at 09:11

## 2022-01-01 RX ADMIN — ROCURONIUM BROMIDE 10 MG: 10 INJECTION, SOLUTION INTRAVENOUS at 12:09

## 2022-01-01 RX ADMIN — HYDROCODONE BITARTRATE AND ACETAMINOPHEN 1 TABLET: 5; 325 TABLET ORAL at 09:09

## 2022-01-01 RX ADMIN — IRON SUCROSE 200 MG: 20 INJECTION, SOLUTION INTRAVENOUS at 04:09

## 2022-01-01 RX ADMIN — FLUCONAZOLE 100 MG: 2 INJECTION, SOLUTION INTRAVENOUS at 05:09

## 2022-01-01 RX ADMIN — FAMOTIDINE 20 MG: 10 INJECTION INTRAVENOUS at 07:12

## 2022-01-01 RX ADMIN — SODIUM CHLORIDE 1000 ML: 0.9 INJECTION, SOLUTION INTRAVENOUS at 08:12

## 2022-01-01 RX ADMIN — METRONIDAZOLE 500 MG: 5 INJECTION, SOLUTION INTRAVENOUS at 05:09

## 2022-01-01 RX ADMIN — SODIUM CHLORIDE, SODIUM GLUCONATE, SODIUM ACETATE, POTASSIUM CHLORIDE, MAGNESIUM CHLORIDE, SODIUM PHOSPHATE, DIBASIC, AND POTASSIUM PHOSPHATE: .53; .5; .37; .037; .03; .012; .00082 INJECTION, SOLUTION INTRAVENOUS at 04:09

## 2022-01-01 RX ADMIN — VINCRISTINE SULFATE 2 MG: 1 INJECTION, SOLUTION INTRAVENOUS at 12:11

## 2022-01-01 RX ADMIN — ONDANSETRON 4 MG: 2 INJECTION, SOLUTION INTRAMUSCULAR; INTRAVENOUS at 12:09

## 2022-01-01 RX ADMIN — NEOSTIGMINE METHYLSULFATE 5 MG: 1 INJECTION INTRAVENOUS at 01:09

## 2022-01-01 RX ADMIN — PROPOFOL 200 MG: 10 INJECTION, EMULSION INTRAVENOUS at 12:09

## 2022-01-01 RX ADMIN — MEROPENEM AND SODIUM CHLORIDE 1 G: 1 INJECTION, SOLUTION INTRAVENOUS at 08:09

## 2022-01-03 ENCOUNTER — TELEPHONE (OUTPATIENT)
Dept: FAMILY MEDICINE | Facility: CLINIC | Age: 52
End: 2022-01-03
Payer: MEDICAID

## 2022-01-03 DIAGNOSIS — R73.03 PRE-DIABETES: Primary | ICD-10-CM

## 2022-01-03 RX ORDER — METFORMIN HYDROCHLORIDE 1000 MG/1
1000 TABLET ORAL 2 TIMES DAILY WITH MEALS
Qty: 180 TABLET | Refills: 3 | Status: SHIPPED | OUTPATIENT
Start: 2022-01-03 | End: 2022-01-01 | Stop reason: SDUPTHER

## 2022-01-03 NOTE — TELEPHONE ENCOUNTER
Patient should be scheduled for a sooner followup to discuss starting medication for diabetes. Next week when seb is back would be just fine.

## 2022-01-03 NOTE — TELEPHONE ENCOUNTER
"----- Message from BOSTON Oswald sent at 1/3/2022 10:35 AM CST -----  Please contact patient and inform him that his A1c is significantly elevated at 11.0%. This is indicative of Diabetes.   This elevation in blood sugar placed him at greater risk for Cardiopulmonary disease/events, and strict sugar control is vital.   Start a DASH diet, avoid fast food, and increase daily exercise. Also, I want to start Metformin 1,000mg b.i.d and to recheck this A1c in 3 months.   If patient is amendable, please verify pharmacy of choice and set up Rx to be signed. Also, please set a "remind me" to repeat his A1c in 3 months. Thanks.     "

## 2022-01-03 NOTE — TELEPHONE ENCOUNTER
Patient notified of information below and verbalized understanding.Pt  Would like Rx sent to his pharmacy.

## 2022-01-04 ENCOUNTER — TELEPHONE (OUTPATIENT)
Dept: FAMILY MEDICINE | Facility: CLINIC | Age: 52
End: 2022-01-04
Payer: MEDICAID

## 2022-01-04 NOTE — TELEPHONE ENCOUNTER
----- Message from Tania Londono sent at 1/4/2022 12:29 PM CST -----  Regarding: rash  Pt has rash on right thigh please advise [phone]

## 2022-01-10 ENCOUNTER — OFFICE VISIT (OUTPATIENT)
Dept: FAMILY MEDICINE | Facility: CLINIC | Age: 52
End: 2022-01-10
Payer: MEDICAID

## 2022-01-10 ENCOUNTER — TELEPHONE (OUTPATIENT)
Dept: FAMILY MEDICINE | Facility: CLINIC | Age: 52
End: 2022-01-10

## 2022-01-10 VITALS
WEIGHT: 244.19 LBS | BODY MASS INDEX: 32.36 KG/M2 | HEART RATE: 80 BPM | DIASTOLIC BLOOD PRESSURE: 78 MMHG | TEMPERATURE: 98 F | SYSTOLIC BLOOD PRESSURE: 134 MMHG | HEIGHT: 73 IN

## 2022-01-10 DIAGNOSIS — B35.1 ONYCHOMYCOSIS OF GREAT TOE: Primary | ICD-10-CM

## 2022-01-10 DIAGNOSIS — E11.65 UNCONTROLLED TYPE 2 DIABETES MELLITUS WITH HYPERGLYCEMIA: ICD-10-CM

## 2022-01-10 DIAGNOSIS — B35.3 TINEA PEDIS OF BOTH FEET: ICD-10-CM

## 2022-01-10 PROCEDURE — 3078F DIAST BP <80 MM HG: CPT | Mod: S$GLB,,, | Performed by: PHYSICIAN ASSISTANT

## 2022-01-10 PROCEDURE — 3075F PR MOST RECENT SYSTOLIC BLOOD PRESS GE 130-139MM HG: ICD-10-PCS | Mod: S$GLB,,, | Performed by: PHYSICIAN ASSISTANT

## 2022-01-10 PROCEDURE — 3075F SYST BP GE 130 - 139MM HG: CPT | Mod: S$GLB,,, | Performed by: PHYSICIAN ASSISTANT

## 2022-01-10 PROCEDURE — 3008F PR BODY MASS INDEX (BMI) DOCUMENTED: ICD-10-PCS | Mod: S$GLB,,, | Performed by: PHYSICIAN ASSISTANT

## 2022-01-10 PROCEDURE — 3078F PR MOST RECENT DIASTOLIC BLOOD PRESSURE < 80 MM HG: ICD-10-PCS | Mod: S$GLB,,, | Performed by: PHYSICIAN ASSISTANT

## 2022-01-10 PROCEDURE — 3008F BODY MASS INDEX DOCD: CPT | Mod: S$GLB,,, | Performed by: PHYSICIAN ASSISTANT

## 2022-01-10 PROCEDURE — 1160F RVW MEDS BY RX/DR IN RCRD: CPT | Mod: S$GLB,,, | Performed by: PHYSICIAN ASSISTANT

## 2022-01-10 PROCEDURE — 99214 PR OFFICE/OUTPT VISIT, EST, LEVL IV, 30-39 MIN: ICD-10-PCS | Mod: S$GLB,,, | Performed by: PHYSICIAN ASSISTANT

## 2022-01-10 PROCEDURE — 99214 OFFICE O/P EST MOD 30 MIN: CPT | Mod: S$GLB,,, | Performed by: PHYSICIAN ASSISTANT

## 2022-01-10 PROCEDURE — 1160F PR REVIEW ALL MEDS BY PRESCRIBER/CLIN PHARMACIST DOCUMENTED: ICD-10-PCS | Mod: S$GLB,,, | Performed by: PHYSICIAN ASSISTANT

## 2022-01-10 RX ORDER — LANCETS
EACH MISCELLANEOUS
Qty: 200 EACH | Refills: 1 | Status: ON HOLD | OUTPATIENT
Start: 2022-01-10 | End: 2023-01-01 | Stop reason: HOSPADM

## 2022-01-10 RX ORDER — TERBINAFINE HYDROCHLORIDE 250 MG/1
250 TABLET ORAL DAILY
Qty: 30 TABLET | Refills: 2 | Status: SHIPPED | OUTPATIENT
Start: 2022-01-10 | End: 2022-02-09

## 2022-01-10 RX ORDER — INSULIN PUMP SYRINGE, 3 ML
EACH MISCELLANEOUS
Qty: 1 EACH | Refills: 0 | Status: ON HOLD | OUTPATIENT
Start: 2022-01-10 | End: 2023-01-01 | Stop reason: ALTCHOICE

## 2022-01-10 NOTE — PATIENT INSTRUCTIONS
"Patient Education       Fungal Nail Infections   The Basics   Written by the doctors and editors at [facility]   What is a fungal nail infection? -- A fungal nail infection is an infection of the nail that makes it get thick or turn white, yellow, or brown. It is caused by a type of germ called a "fungus."  Fungal infections happen in the toenails more often than the fingernails. They usually start on the big toe, and can affect 1 or more nails. People who have a toenail infection might also have a condition known as "athlete's foot" (a fungal infection of the skin on the foot). That's because certain types of fungi (plural of fungus) can cause both of these problems.  Fungi like to grow in warm and moist places. People who swim or whose feet sweat a lot might have a higher chance of getting a fungal nail infection.  What are the symptoms of a fungal nail infection? -- A fungal infection can cause a nail to:  · Turn white, yellow, or brown  · Get thick, change shape, or lift up  · Break off easily  · Hurt  Fungal nail infections don't usually lead to serious long-term problems. But in some people they can. In people who have diabetes or whose bodies have trouble fighting infections, the nail infection can make them more likely to get other infections.  Is there a test for a fungal nail infection? -- Yes. Usually, your doctor or nurse can tell if you have a fungal nail infection by talking with you and doing an exam. But to make sure, they might take a small sample of the nail. They she might look at it under a microscope, or send it to a lab for another doctor to look at. Your doctor might also send the sample to a lab for tests that can show which type of fungus is causing the infection.  Can I treat my fungal nail infection on my own? -- You can buy over-the-counter creams or products, but they usually don't work.  How are fungal nail infections treated? -- Treatment depends, in part, on how severe the infection is, " "and how much it bothers you. If your infection is mild or doesn't bother you very much, you might choose not to treat it. An untreated nail infection probably won't go away, but it probably won't cause any long-term problems either.  When people need or choose to have treatment, it usually involves "antifungal" medicines that you get with a prescription from your doctor. These medicines are taken by mouth or put on the nail.  Treatment with pills usually lasts a few months. Some people who take these medicines need to have blood tests. That's because these medicines can affect the liver.  If you don't want to or can't take antifungal pills, your doctor will talk with you about other treatment options. These might include using an antifungal medicine on the nail or having surgery to remove your nail.  Before starting any of these treatments, you should know that:  · It can take many months for your nail to look normal again.  · There is a chance that the treatment won't work. The infection might not get better, or it might come back. If either of these things happen, your doctor can try another treatment or send you to a specialist.  Can fungal nail infections be prevented? -- Sometimes. To reduce your chance of getting one, you can:  · Keep your feet clean and dry.  · Avoid sharing nail tools, such as clippers and scissors.  · Wear flip-flops or other footwear in a gym shower or locker room.  What if I want to get pregnant? -- If you want to get pregnant, let your doctor or nurse know. They might recommend that you not take certain antifungal medicines during pregnancy.  All topics are updated as new evidence becomes available and our peer review process is complete.  This topic retrieved from [employer] on: Sep 21, 2021.  Topic 03475 Version 8.0  Release: 29.4.2 - C29.263  © 2021 UpToDate, Inc. and/or its affiliates. All rights reserved.  picture 1: Fungal nail infections     These are examples of fungal nail " infections.  Graphic 39969 Version 4.0    Consumer Information Use and Disclaimer   This information is not specific medical advice and does not replace information you receive from your health care provider. This is only a brief summary of general information. It does NOT include all information about conditions, illnesses, injuries, tests, procedures, treatments, therapies, discharge instructions or life-style choices that may apply to you. You must talk with your health care provider for complete information about your health and treatment options. This information should not be used to decide whether or not to accept your health care provider's advice, instructions or recommendations. Only your health care provider has the knowledge and training to provide advice that is right for you. The use of this information is governed by the [employer] End User License Agreement, available at https://www.mobiManage.Stratopy/en/solutions/Heatmaps/about/huma.The use of P[employer] content is governed by the Pzoom Terms of Use. ©2021 UpToDate, Inc. All rights reserved.  Copyright   © 2021 UpToDate, Inc. and/or its affiliates. All rights reserved.

## 2022-01-10 NOTE — PROGRESS NOTES
"  SUBJECTIVE:    Patient ID: Dwight Hoffmann is a 51 y.o. male.    Chief Complaint: Rash (No bottles, reviewed from list, vaccine denied, rash on right leg x 3 days//dp)    Pt is a 51-year-old male who presents today for sick visit with a CC of "a rash on right leg that has since went away." Last Tuesday, he experienced a red, bumpy rash on the anterior right thigh.  It was itchy in nature and nothing tended to exacerbate the sensation.  The rash was intermittent, subsided on Thursday, and has not returned.     Today, he also wanted to discuss a toenail infection of the bilateral great toes which has been present for months now.  No pain is associated in the great toes, but it is cosmetically bothersome to him. In conjunction to the nail infection, he is also experiencing "athletes feet" bilaterally.       Last, pt requested a glucose meter script today to monitor his blood sugar daily.  Last A1c was 11% and he was started on Metformin 1,000mg b.i.d    Telephone on 12/29/2021   Component Date Value Ref Range Status    Hemoglobin A1C 12/30/2021 11.0* <5.7 % of total Hgb Final   Office Visit on 12/28/2021   Component Date Value Ref Range Status    WBC 12/28/2021 8.2  3.8 - 10.8 Thousand/uL Final    RBC 12/28/2021 5.30  4.20 - 5.80 Million/uL Final    Hemoglobin 12/28/2021 14.6  13.2 - 17.1 g/dL Final    Hematocrit 12/28/2021 45.3  38.5 - 50.0 % Final    MCV 12/28/2021 85.5  80.0 - 100.0 fL Final    MCH 12/28/2021 27.5  27.0 - 33.0 pg Final    MCHC 12/28/2021 32.2  32.0 - 36.0 g/dL Final    RDW 12/28/2021 14.5  11.0 - 15.0 % Final    Platelets 12/28/2021 317  140 - 400 Thousand/uL Final    MPV 12/28/2021 9.3  7.5 - 12.5 fL Final    Neutrophils, Abs 12/28/2021 4,141  1,500 - 7,800 cells/uL Final    Lymph # 12/28/2021 3,149  850 - 3,900 cells/uL Final    Mono # 12/28/2021 713  200 - 950 cells/uL Final    Eos # 12/28/2021 139  15 - 500 cells/uL Final    Baso # 12/28/2021 57  0 - 200 cells/uL Final    " Neutrophils Relative 12/28/2021 50.5  % Final    Lymph % 12/28/2021 38.4  % Final    Mono % 12/28/2021 8.7  % Final    Eosinophil % 12/28/2021 1.7  % Final    Basophil % 12/28/2021 0.7  % Final    Glucose 12/28/2021 239* 65 - 99 mg/dL Final    BUN 12/28/2021 14  7 - 25 mg/dL Final    Creatinine 12/28/2021 0.91  0.70 - 1.33 mg/dL Final    eGFR if non  12/28/2021 96  > OR = 60 mL/min/1.73m2 Final    eGFR if  12/28/2021 111  > OR = 60 mL/min/1.73m2 Final    BUN/Creatinine Ratio 12/28/2021 NOT APPLICABLE  6 - 22 (calc) Final    Sodium 12/28/2021 140  135 - 146 mmol/L Final    Potassium 12/28/2021 4.7  3.5 - 5.3 mmol/L Final    Chloride 12/28/2021 103  98 - 110 mmol/L Final    CO2 12/28/2021 27  20 - 32 mmol/L Final    Calcium 12/28/2021 9.6  8.6 - 10.3 mg/dL Final    Total Protein 12/28/2021 7.4  6.1 - 8.1 g/dL Final    Albumin 12/28/2021 4.3  3.6 - 5.1 g/dL Final    Globulin, Total 12/28/2021 3.1  1.9 - 3.7 g/dL (calc) Final    Albumin/Globulin Ratio 12/28/2021 1.4  1.0 - 2.5 (calc) Final    Total Bilirubin 12/28/2021 0.6  0.2 - 1.2 mg/dL Final    Alkaline Phosphatase 12/28/2021 90  35 - 144 U/L Final    AST 12/28/2021 17  10 - 35 U/L Final    ALT 12/28/2021 20  9 - 46 U/L Final    Cholesterol 12/28/2021 209* <200 mg/dL Final    HDL 12/28/2021 45  > OR = 40 mg/dL Final    Triglycerides 12/28/2021 130  <150 mg/dL Final    LDL Cholesterol 12/28/2021 139* mg/dL (calc) Final    HDL/Cholesterol Ratio 12/28/2021 4.6  <5.0 (calc) Final    Non HDL Chol. (LDL+VLDL) 12/28/2021 164* <130 mg/dL (calc) Final    TSH w/reflex to FT4 12/28/2021 0.65  0.40 - 4.50 mIU/L Final    PROSTATE SPECIFIC ANTIGEN, SCR - Q* 12/28/2021 1.07  < OR = 4.00 ng/mL Final    Color, UA 12/28/2021 YELLOW  YELLOW Final    Appearance, UA 12/28/2021 CLEAR  CLEAR Final    Specific [place], UA 12/28/2021 1.033  1.001 - 1.035 Final    pH, UA 12/28/2021 < OR = 5.0  5.0 - 8.0 Final    Glucose, UA  12/28/2021 3+* NEGATIVE Final    Bilirubin, UA 12/28/2021 NEGATIVE  NEGATIVE Final    Ketones, UA 12/28/2021 TRACE* NEGATIVE Final    Occult Blood UA 12/28/2021 NEGATIVE  NEGATIVE Final    Protein, UA 12/28/2021 NEGATIVE  NEGATIVE Final    Nitrite, UA 12/28/2021 NEGATIVE  NEGATIVE Final    Leukocytes, UA 12/28/2021 NEGATIVE  NEGATIVE Final    WBC Casts, UA 12/28/2021 NONE SEEN  < OR = 5 /HPF Final    RBC Casts, UA 12/28/2021 NONE SEEN  < OR = 2 /HPF Final    Squam Epithel, UA 12/28/2021 NONE SEEN  < OR = 5 /HPF Final    Bacteria, UA 12/28/2021 NONE SEEN  NONE SEEN /HPF Final    Hyaline Casts, UA 12/28/2021 NONE SEEN  NONE SEEN /LPF Final    Reflexive Urine Culture 12/28/2021    Final       Past Medical History:   Diagnosis Date    Prediabetes      Past Surgical History:   Procedure Laterality Date    APPENDECTOMY  07/15/2014    COLONOSCOPY       Family History   Problem Relation Age of Onset    Cancer Mother         Colon    Diabetes Mother     Hypertension Mother     Seizures Father     Heart murmur Sister     Seizures Sister        Marital Status:   Alcohol History:  reports previous alcohol use.  Tobacco History:  reports that he has never smoked. He has never used smokeless tobacco.  Drug History:  reports no history of drug use.    Health Maintenance Topics with due status: Not Due       Topic Last Completion Date    TETANUS VACCINE 01/01/2021    Lipid Panel 12/28/2021     Immunization History   Administered Date(s) Administered    Tdap 01/01/2021       Review of patient's allergies indicates:  No Known Allergies    Current Outpatient Medications:     metFORMIN (GLUCOPHAGE) 1000 MG tablet, Take 1 tablet (1,000 mg total) by mouth 2 (two) times daily with meals., Disp: 180 tablet, Rfl: 3    tadalafiL (CIALIS) 5 MG tablet, Take 1 tablet (5 mg total) by mouth daily as needed for Erectile Dysfunction., Disp: 30 tablet, Rfl: 2    blood sugar diagnostic Strp, To check BG 2 times daily,  "to use with insurance preferred meter, Disp: 200 each, Rfl: 1    blood-glucose meter kit, To check BG 2 times daily, to use with insurance preferred meter, Disp: 1 each, Rfl: 0    lancets Misc, To check BG 2 times daily, to use with insurance preferred meter, Disp: 200 each, Rfl: 1    terbinafine HCL (LAMISIL) 250 mg tablet, Take 1 tablet (250 mg total) by mouth once daily., Disp: 30 tablet, Rfl: 2    Review of Systems   Constitutional: Negative for activity change, chills, fatigue and fever.   HENT: Negative for congestion, ear pain, postnasal drip, rhinorrhea and sore throat.    Respiratory: Negative for cough, shortness of breath and wheezing.    Cardiovascular: Negative for chest pain, palpitations and leg swelling.   Gastrointestinal: Negative for abdominal pain, constipation, diarrhea, nausea and vomiting.   Genitourinary: Negative for dysuria, frequency, hematuria and urgency.   Musculoskeletal: Negative for arthralgias and myalgias.   Skin: Positive for rash ("Atheletes feet and toe nail infection.').   Neurological: Negative for dizziness, syncope, light-headedness and headaches.   Psychiatric/Behavioral: Negative for behavioral problems.          Objective:      Vitals:    01/10/22 1031   BP: 134/78   Pulse: 80   Temp: 98 °F (36.7 °C)   Weight: 110.8 kg (244 lb 3.2 oz)   Height: 6' 1" (1.854 m)     Physical Exam  Vitals and nursing note reviewed.   Constitutional:       General: He is not in acute distress.     Appearance: Normal appearance. He is obese. He is not ill-appearing, toxic-appearing or diaphoretic.   HENT:      Head: Normocephalic and atraumatic.      Right Ear: External ear normal.      Left Ear: External ear normal.   Eyes:      General: No scleral icterus.        Right eye: No discharge.         Left eye: No discharge.      Extraocular Movements: Extraocular movements intact.      Conjunctiva/sclera: Conjunctivae normal.   Cardiovascular:      Rate and Rhythm: Normal rate and regular " rhythm.      Pulses: Normal pulses.           Posterior tibial pulses are 2+ on the right side and 2+ on the left side.      Heart sounds: Normal heart sounds. No murmur heard.  No friction rub. No gallop.    Pulmonary:      Effort: Pulmonary effort is normal. No respiratory distress.      Breath sounds: Normal breath sounds. No wheezing, rhonchi or rales.   Abdominal:      General: There is no distension.      Palpations: Abdomen is soft.      Tenderness: There is no abdominal tenderness. There is no guarding or rebound.   Musculoskeletal:         General: No swelling or tenderness.      Cervical back: Normal range of motion and neck supple.      Right lower leg: No edema.      Left lower leg: No edema.   Feet:      Right foot:      Skin integrity: Erythema present. No ulcer, blister, skin breakdown, warmth, callus, dry skin or fissure.      Toenail Condition: Fungal disease present.     Left foot:      Skin integrity: Erythema present. No ulcer, blister, skin breakdown, warmth, callus, dry skin or fissure.      Toenail Condition: Fungal disease present.     Comments: Erythema and white curd like substance appreciated between the digits bilaterally.   No cellulitis changes, signs of infections, or abscess noted bilaterally.  Think, dark, discolored nails on the bilateral great toes indicative of onychomycosis appreciated.    Skin:     General: Skin is warm and dry.      Coloration: Skin is not jaundiced.      Findings: No erythema or rash.   Neurological:      General: No focal deficit present.      Mental Status: He is alert. Mental status is at baseline.      Cranial Nerves: No cranial nerve deficit.      Gait: Gait normal.   Psychiatric:         Mood and Affect: Mood normal.         Behavior: Behavior normal. Behavior is cooperative.           Assessment:       1. Onychomycosis of great toe    2. Tinea pedis of both feet    3. Uncontrolled type 2 diabetes mellitus with hyperglycemia           Plan:        Onychomycosis of great toe  Comments:  Start Terbinafine 250mg qd x 12 wks. Most recent LFTs are w/in normal limits. Will repeat hepatic panel q4 wks to monitor LFTs.   Orders:  -     terbinafine HCL (LAMISIL) 250 mg tablet; Take 1 tablet (250 mg total) by mouth once daily.  Dispense: 30 tablet; Refill: 2  -     Hepatic Function Panel; Future; Expected date: 02/10/2022  -     Hepatic Function Panel; Future; Expected date: 03/10/2022  -     Hepatic Function Panel; Future; Expected date: 04/10/2022    Tinea pedis of both feet  Comments:  Terbinafine will provide coverage. If no improvement in 2 weeks after starting Terbinafine, start OTC Lotrim cream b.i.d.     Uncontrolled type 2 diabetes mellitus with hyperglycemia  Comments:  Continue Metformin 1,000mg b.i.d. Repeat A1c in 4 weeks. Consider adding Tresiba or a GLP-1 if A1c remains elevated.  Monitor and log BS twice daily.  Orders:  -     blood-glucose meter kit; To check BG 2 times daily, to use with insurance preferred meter  Dispense: 1 each; Refill: 0  -     lancets Misc; To check BG 2 times daily, to use with insurance preferred meter  Dispense: 200 each; Refill: 1  -     blood sugar diagnostic Strp; To check BG 2 times daily, to use with insurance preferred meter  Dispense: 200 each; Refill: 1      Follow up if symptoms worsen or fail to improve, for Tinea pedis and Onychomycosis .        1/10/2022 Tim Wood PA-C

## 2022-02-05 ENCOUNTER — CLINICAL SUPPORT (OUTPATIENT)
Dept: URGENT CARE | Facility: CLINIC | Age: 52
End: 2022-02-05

## 2022-02-05 PROCEDURE — 99499 UNLISTED E&M SERVICE: CPT | Mod: S$GLB,,, | Performed by: EMERGENCY MEDICINE

## 2022-02-05 PROCEDURE — 99499 PR PHYSICAL - DOT/CDL: ICD-10-PCS | Mod: S$GLB,,, | Performed by: EMERGENCY MEDICINE

## 2022-02-08 ENCOUNTER — TELEPHONE (OUTPATIENT)
Dept: FAMILY MEDICINE | Facility: CLINIC | Age: 52
End: 2022-02-08
Payer: MEDICAID

## 2022-02-08 NOTE — TELEPHONE ENCOUNTER
Left message that lab is due and he does not need to fast. Also, to be sure that 1/3/22 A1C is drawn along with 1/10 Hepatic Function. Asked patient to be sure to let  know to draw both sets of orders. Updated remind me.

## 2022-02-17 ENCOUNTER — TELEPHONE (OUTPATIENT)
Dept: FAMILY MEDICINE | Facility: CLINIC | Age: 52
End: 2022-02-17
Payer: MEDICAID

## 2022-02-17 NOTE — TELEPHONE ENCOUNTER
----- Message from [facility] RT sent at 1/10/2022  4:48 PM CST -----  Regarding: Lab due  Due around 2/10  ----- Message -----  From: BOSTON Oswald  Sent: 1/10/2022   1:53 PM CST  To: Israel Dumont Staff    Also, please ask patient to complete his A1c lab draw in 4 weeks as well. This lab order has already been placed. Thanks.     ----- Message -----  From: BOSTON Oswald  Sent: 1/10/2022  11:06 AM CST  To: BOSTON Oswald    Please note that patient completes his a hepatic function tests in 4 week intervals.  These hepatic function test were ordered today.

## 2022-02-17 NOTE — TELEPHONE ENCOUNTER
Let message that lab is due to check liver function and he does not need to fast. Asked him to have this drawn soon. Updated remind me.

## 2022-03-03 ENCOUNTER — TELEPHONE (OUTPATIENT)
Dept: FAMILY MEDICINE | Facility: CLINIC | Age: 52
End: 2022-03-03
Payer: MEDICAID

## 2022-03-03 NOTE — TELEPHONE ENCOUNTER
Left message that lab is overdue and Tim wanted Hepatic function labs done at 4 week intervals. Asked him to call if he had any issues getting this done. This makes 3 attempts. Can I leo reminder complete?

## 2022-03-03 NOTE — LETTER
[address]  [place] [address]  Phone: [phone]   [phone]                        MD Alex Alegre MD Chequita Williams, MD Matthew Bassett, AALIYAH Mcdaniel, SHAINA Morgan, SHAINA Navarro, SHAINA Wood PA-C      Date: 03/04/2022        Patient: Dwight Hoffmann  YOB: 1970      Dear Mr Quintanilla,    We have tried several times to reach you by telephone. This is a reminder that you are now overdue for blood work to check your liver functions and glucose. Please let the lab know that you have 2 sets of orders to be drawn, A1C and Hepatic Function. Please have these drawn soon. Feel free to call with any questions.        Sincerely,   BOSTON Oswald/bhavana

## 2022-03-03 NOTE — TELEPHONE ENCOUNTER
----- Message from [facility] RT sent at 1/10/2022  4:48 PM CST -----  Regarding: Lab due  Due around 2/10  ----- Message -----  From: BOSTON Oswald  Sent: 1/10/2022   1:53 PM CST  To: Israel Dumont Staff    Also, please ask patient to complete his A1c lab draw in 4 weeks as well. This lab order has already been placed. Thanks.     ----- Message -----  From: BOSTON Oswald  Sent: 1/10/2022  11:06 AM CST  To: BOSTON Oswald    Please note that patient completes his a hepatic function tests in 4 week intervals.  These hepatic function test were ordered today.

## 2022-03-04 NOTE — TELEPHONE ENCOUNTER
Please send a certified letter asking him to complete his A1c and hepatic function panel.  After letter is sent, it is okay to leo as complete.  Thanks for your help.

## 2022-03-31 NOTE — TELEPHONE ENCOUNTER
BRENDA to Tim... The certified letter that we sent him regarding lab working being due was returned undeliverable. I scanned under media.

## 2022-08-10 NOTE — PROGRESS NOTES
"  SUBJECTIVE:    Patient ID: Dwight Hoffmann is a 52 y.o. male.    Chief Complaint: Follow-up (No bottles/ stomach cramping/ irregular bowel movements/ blood starts after straining for so long/ hx of diverticulitis//dp)    Pt is a 52-year-old male who presents today for a 6-month follow-up.  He is a poor medical historian and presents today with his wife, Bree, who contributed to the history.  Today, he reports irregular BMs and brown/red blood noted on the toilet paper after BMs.  He experiences frequent  Constipation. He does not have BMs for approximately 4 days.  On day 4, he reports having around 3-4, firm, hard BMs/day that are narrow in shape.  After these firm stools, he experiences rectal pain and brown/red blood on the toilet paper when wiping.  He has been experiencing a decreased appetite and is averaging 2 meals/day.  He also reports weight loss.  A 9 lb weight loss is noted since last visit.  He does not have a UTD colonoscopy.  He was scheduled to have a colonoscopy completed on 02/09/2022 w/ Dr. Sanders (GI), but missed this appointment and never rescheduled.  He reports having a positive family history of "cancers," but is unable to recall what type.  He believes his mother had colon cancer, but is unsure.    Regarding his history of DM II, last A1c (12/30/21): 11.0%.  He was started on Metformin 1,000 mg b.i.d., and was instructed to repeat A1c 2 months later, but failed to do so.  A1c today has significantly improved and is 6.8%.  He remains compliant with his Metformin regimen, but does not follow any particular diet and does not participate in any routine exercise. He is due for diabetic eye exam and foot exam.    She also reports "athlete feet."    Due for colon screening.  Last PSA (12/28/2021):  1.07      Due for updated lab work.    No visits with results within 6 Month(s) from this visit.   Latest known visit with results is:   Telephone on 12/29/2021   Component Date Value Ref Range Status "    Hemoglobin A1C 12/30/2021 11.0 (A) <5.7 % of total Hgb Final       Past Medical History:   Diagnosis Date    Prediabetes      Past Surgical History:   Procedure Laterality Date    APPENDECTOMY  07/15/2014    COLONOSCOPY      COLONOSCOPY N/A 2/9/2022    Procedure: COLONOSCOPY;  Surgeon: Manuel Sanders MD;  Location: [facility];  Service: Endoscopy;  Laterality: N/A;     Family History   Problem Relation Age of Onset    Cancer Mother         Colon    Diabetes Mother     Hypertension Mother     Seizures Father     Heart murmur Sister     Seizures Sister        Marital Status:   Alcohol History:  reports previous alcohol use.  Tobacco History:  reports that he has never smoked. He has never used smokeless tobacco.  Drug History:  reports no history of drug use.    Health Maintenance Topics with due status: Not Due       Topic Last Completion Date    TETANUS VACCINE 01/01/2021    Lipid Panel 12/28/2021    Low Dose Statin 08/10/2022    Foot Exam 08/10/2022    Influenza Vaccine Not Due     Immunization History   Administered Date(s) Administered    COVID-19, MRNA, LN-S, PF (Pfizer) (Purple Cap) 10/08/2021, 01/10/2022    Tdap 01/01/2021       Review of patient's allergies indicates:  No Known Allergies    Current Outpatient Medications:     blood sugar diagnostic Strp, To check BG 2 times daily, to use with insurance preferred meter, Disp: 200 each, Rfl: 1    blood-glucose meter kit, To check BG 2 times daily, to use with insurance preferred meter, Disp: 1 each, Rfl: 0    lancets Misc, To check BG 2 times daily, to use with insurance preferred meter, Disp: 200 each, Rfl: 1    atorvastatin (LIPITOR) 10 MG tablet, Take 1 tablet (10 mg total) by mouth once daily., Disp: 90 tablet, Rfl: 1    clotrimazole (LOTRIMIN) 1 % cream, Apply topically 2 (two) times daily. for 7 days, Disp: 28 g, Rfl: 0    hydrocortisone 2.5 % cream, Apply topically 2 (two) times daily. for 7 days, Disp: 28 g, Rfl: 0     "lisinopriL (PRINIVIL,ZESTRIL) 2.5 MG tablet, Take 1 tablet (2.5 mg total) by mouth once daily., Disp: 90 tablet, Rfl: 1    metFORMIN (GLUCOPHAGE) 1000 MG tablet, Take 1 tablet (1,000 mg total) by mouth 2 (two) times daily with meals., Disp: 180 tablet, Rfl: 3    tadalafiL (CIALIS) 5 MG tablet, Take 1 tablet (5 mg total) by mouth daily as needed for Erectile Dysfunction., Disp: 30 tablet, Rfl: 2    Review of Systems   Constitutional: Positive for appetite change and unexpected weight change. Negative for activity change, chills, fatigue and fever.   Respiratory: Negative for cough, shortness of breath and wheezing.    Cardiovascular: Negative for chest pain and palpitations.   Gastrointestinal: Positive for blood in stool and constipation. Negative for abdominal distention, abdominal pain, diarrhea, nausea and vomiting.   Endocrine: Positive for polyuria. Negative for polydipsia.   Genitourinary: Negative for difficulty urinating, dysuria, hematuria and urgency.   Musculoskeletal: Negative for arthralgias and myalgias.   Skin: Negative for rash.   Neurological: Negative for dizziness, syncope, weakness, light-headedness and headaches.          Objective:      Vitals:    08/10/22 0815   BP: 128/74   Pulse: 89   Temp: 98 °F (36.7 °C)   SpO2: 97%   Weight: 106.6 kg (235 lb)   Height: 6' 1" (1.854 m)     Physical Exam  Vitals and nursing note reviewed.   Constitutional:       General: He is not in acute distress.     Appearance: Normal appearance. He is obese. He is not ill-appearing, toxic-appearing or diaphoretic.      Comments: Obese AAM sitting erect in an office chair in no acute distress.   HENT:      Head: Normocephalic and atraumatic.      Right Ear: External ear normal.      Left Ear: External ear normal.      Nose: No rhinorrhea.      Mouth/Throat:      Mouth: Mucous membranes are moist.      Pharynx: Oropharynx is clear.   Eyes:      General: No scleral icterus.        Right eye: No discharge.         Left " eye: No discharge.      Extraocular Movements: Extraocular movements intact.      Conjunctiva/sclera: Conjunctivae normal.   Cardiovascular:      Rate and Rhythm: Normal rate and regular rhythm.      Pulses: Normal pulses.           Dorsalis pedis pulses are 2+ on the right side and 2+ on the left side.        Posterior tibial pulses are 2+ on the right side and 2+ on the left side.      Heart sounds: Normal heart sounds. No murmur heard.    No friction rub.   Pulmonary:      Effort: Pulmonary effort is normal. No respiratory distress.      Breath sounds: Normal breath sounds. No wheezing, rhonchi or rales.   Abdominal:      General: There is no distension.      Palpations: Abdomen is soft. There is no mass.      Tenderness: There is no abdominal tenderness. There is no guarding or rebound.   Genitourinary:     Rectum: Anal fissure present. No mass, tenderness, external hemorrhoid or internal hemorrhoid.       Musculoskeletal:         General: Normal range of motion.      Cervical back: Normal range of motion.      Right lower leg: No edema.      Left lower leg: No edema.      Right foot: Normal range of motion. No deformity, bunion, Charcot foot, foot drop or prominent metatarsal heads.      Left foot: Normal range of motion. No deformity, bunion, Charcot foot, foot drop or prominent metatarsal heads.   Feet:      Right foot:      Protective Sensation: 6 sites tested. 6 sites sensed.      Skin integrity: No ulcer, blister, skin breakdown, erythema, warmth, callus, dry skin or fissure.      Toenail Condition: Right toenails are abnormally thick. Fungal disease present.     Left foot:      Protective Sensation: 6 sites tested. 6 sites sensed.      Skin integrity: No ulcer, blister, skin breakdown, erythema, warmth, callus, dry skin or fissure.      Toenail Condition: Left toenails are abnormally thick. Fungal disease present.     Comments: Tinea pedis noted in between the toes of the bilateral feet.  Skin:      General: Skin is warm and dry.      Coloration: Skin is not jaundiced or pale.      Findings: No rash.   Neurological:      General: No focal deficit present.      Mental Status: He is alert. Mental status is at baseline.      Cranial Nerves: No cranial nerve deficit.      Gait: Gait normal.   Psychiatric:         Mood and Affect: Mood normal.         Behavior: Behavior is cooperative.           Assessment:       1. Diabetes mellitus without complication    2. Special screening for malignant neoplasms, colon    3. Anal fissure    4. Tinea pedis, unspecified laterality    5. Prostate cancer screening    6. Onychomycosis of great toe    7. Erectile dysfunction, unspecified erectile dysfunction type           Plan:       Diabetes mellitus without complication  POCT A1c today:  6.8%, significantly improved from 11.0%.  Begin following a diabetic diet and increase daily exercise.  Continue Metformin 1,000 mg b.i.d. - refills sent today.  Start Lipitor 10 mg q.d.  Start Lisinopril 2.5 mg q.d..  Patient is now optimized for diabetic control after starting statin therapy and ACEi.   Foot exam performed in office today.  Ambulatory referral sent to Dr. Howie Chaudhary (Ophthalmology) today for diabetic eye exam.  Patient was provided with his office address and phone number today.  -     Ambulatory referral/consult to Ophthalmology; Future; Expected date: 08/10/2022  -     Foot Exam Performed  -     atorvastatin (LIPITOR) 10 MG tablet; Take 1 tablet (10 mg total) by mouth once daily.  Dispense: 90 tablet; Refill: 1  -     lisinopriL (PRINIVIL,ZESTRIL) 2.5 MG tablet; Take 1 tablet (2.5 mg total) by mouth once daily.  Dispense: 90 tablet; Refill: 1  -     metFORMIN (GLUCOPHAGE) 1000 MG tablet; Take 1 tablet (1,000 mg total) by mouth 2 (two) times daily with meals.  Dispense: 180 tablet; Refill: 3  -     Comprehensive Metabolic Panel; Future; Expected date: 08/10/2022  -     Lipid Panel; Future; Expected date: 08/10/2022  -      Microalbumin/Creatinine Ratio, Urine; Future; Expected date: 08/10/2022  -     TSH w/reflex to FT4; Future; Expected date: 08/10/2022  -     Urinalysis, Reflex to Urine Culture Urine, Clean Catch; Future; Expected date: 08/10/2022  -     CBC Auto Differential; Future; Expected date: 08/10/2022    Special screening for malignant neoplasms, colon  Patient is unsure if he has a family history of colon cancer.  Patient was referred for colon screening on last visit, scheduled an appointment/surgery day, but missed his appointment.  Amb ref sent to Dr. Clemens (GI) today for colon screening.  -     Ambulatory referral/consult to Gastroenterology; Future; Expected date: 08/10/2022    Anal fissure  Begin applying Hydrocortisone 2.5% cream b.i.d. x7 days.  Increase daily fiber and water intake.  -     hydrocortisone 2.5 % cream; Apply topically 2 (two) times daily. for 7 days  Dispense: 28 g; Refill: 0    Tinea pedis, unspecified laterality  Begin applying Lotrimin 1% cream b.i.d. to the affected regions x7 days  -     clotrimazole (LOTRIMIN) 1 % cream; Apply topically 2 (two) times daily. for 7 days  Dispense: 28 g; Refill: 0    Prostate cancer screening  -     PSA, Screening; Future; Expected date: 08/10/2022    Onychomycosis of great toe  Will discuss on follow-up in 4 weeks.    Erectile dysfunction, unspecified erectile dysfunction type  Will discuss on follow-up in 4 weeks.    Follow up in about 4 weeks (around 9/7/2022) for ED and Foot Pain.        8/10/2022 Tim Wood PA-C

## 2022-08-15 NOTE — TELEPHONE ENCOUNTER
Spoke with pt in regards to his recent lab results. Verbalized per Tim that his lab work was reviewed.  UA is negative for any signs of infection.  Liver, kidney, and thyroid function are within normal limits.  Cholesterol levels are well controlled. Blood sugar was slightly elevated at 125. Also, a mild anemia his appreciated that is suggestive of a possible iron deficiency.  Tim recommends completing a Iron study and a FOBT test. Also, continue to follow up with GI as discussed during his office visit. Pt acknowledge understanding.

## 2022-08-15 NOTE — TELEPHONE ENCOUNTER
----- Message from BOSTON Oswald sent at 8/12/2022  2:33 PM CDT -----  Please contact patient and informed him that his lab work was reviewed.  UA is negative for any signs of infection.  Liver, kidney, and thyroid function are within normal limits.  Cholesterol levels are well controlled.  Of note, blood sugar was slightly elevated at 125. Also, a mild anemia his appreciated that is suggestive of a possible iron deficiency.  I recommend completing a Iron study and a FOBT test. Also, continue to follow up with GI as discussed during his office visit. Iron Study and FOBT test has been ordered.

## 2022-08-22 NOTE — PROGRESS NOTES
Pt walked into the office today c/o ongoing rectal bleeding and night sweats/ fatigue.   Vitals stable. Pt states the blood is only when he wipes. Pt states pain or blood amount has not changed from OV on 8/10/22 with Tim. Pt states the only thing new is a feeling of being drained at all times and last night he had night sweats.     Per Tim, labs show he may have iron definecy anemia. Since there is not an increased of bleeding and 10 days ago RBC normal.  Lets do blood work and FOBT. Orders were placed on 8/12/22. Advise pt that symptoms could be from anemia. Once we get the labs we can appropriately treat pt. Keep appt scheduled with Jozef perez, GI and follow up appt with me in 2 weeks.     Informed pt of above info per Tim. Pt voiced understanding. Gave pt stool card and advised pt to do labs before he leaves today. Pt voiced understanding. Pt states he has an appt with Dr. Clemens in September. Pt denies any complaints.

## 2022-08-23 NOTE — TELEPHONE ENCOUNTER
Iron supplements can lead to constipation, but untreated iron deficiency will continue to cause his chronic fatigue. I recommend starting the iron supplements as discuss and regarding his constipation, I recommend starting Linzess 72mcg q.d.

## 2022-08-23 NOTE — TELEPHONE ENCOUNTER
Spoke to pt verbatim per Tim. Pt voiced  understanding.   Pt is agreeable to starting medications. [facility].

## 2022-08-23 NOTE — TELEPHONE ENCOUNTER
Spoke with pt in regards to recent lab results. Verbalized per Tim that his iron study was reviewed. His serum iron level is low. Start OTC Iron Sulfate 325mg and Vit. C 1,000mg b.i.d. Complete the FOBT as recommended. Pt acknowledge understanding.     Pt is worried that if he starts taken the OTC iron supplement that he is going to have trouble with his BM. Pt states that he has problems already  with constipation.

## 2022-08-23 NOTE — TELEPHONE ENCOUNTER
----- Message from BOSTON Oswald sent at 8/23/2022  8:56 AM CDT -----  Please contact patient and inform him that his iron study was reviewed. His serum iron level is low. Start OTC Iron Sulfate 325mg and Vit. C 1,000mg b.i.d  Complete the FOBT as recommended.

## 2022-09-06 NOTE — TELEPHONE ENCOUNTER
----- Message from [facility], RT sent at 8/23/2022 11:30 AM CDT -----  Regarding: Did pt complete FOBT?  Spoke with pt in regards to recent lab results. Verbalized per Tim that his iron study was reviewed. His serum iron level is low. Start OTC Iron Sulfate 325mg and Vit. C 1,000mg b.i.d. Complete the FOBT as recommended. Pt acknowledge understanding.      Pt is worried that if he starts taken the OTC iron supplement that he is going to have trouble with his BM. Pt states that he has problems already  with constipation.

## 2022-09-06 NOTE — TELEPHONE ENCOUNTER
Spoke to patient to see status on stool kit. Said that he had colonoscopy done last week. He had some polyps, but still awaiting on results. FYI to Tim. I marked remind me complete.

## 2022-09-13 NOTE — Clinical Note
A pre-sedation assessment was completed by the physician immediately prior to sedation start.   A verbal ASA of 3 and Mallampati of 2 was given to this nurse by Dr. Call.

## 2022-09-14 PROBLEM — D50.9 MICROCYTIC ANEMIA: Status: ACTIVE | Noted: 2022-01-01

## 2022-09-14 PROBLEM — M25.569 ARTHRALGIA OF KNEE: Status: ACTIVE | Noted: 2022-01-01

## 2022-09-14 PROBLEM — E11.9 DIABETES MELLITUS TYPE 2, NONINSULIN DEPENDENT: Status: ACTIVE | Noted: 2022-01-01

## 2022-09-14 PROBLEM — K63.89 SMALL BOWEL MASS: Status: ACTIVE | Noted: 2022-01-01

## 2022-09-14 NOTE — ANESTHESIA PROCEDURE NOTES
Intubation    Date/Time: 9/14/2022 2:24 PM  Performed by: Zack Swann CRNA  Authorized by: Matthias العراقي MD     Intubation:     Attempted By:  TANIYA    Blade:  Russell 3    Laryngeal View Grade: Grade I - full view of cords      Difficult Airway Encountered?: No      Complications:  None    Airway Device:  Oral endotracheal tube    Airway Device Size:  8.0    Style/Cuff Inflation:  Cuffed    Inflation Amount (mL):  4    Tube secured:  23    Placement Verified By:  Capnometry    Complicating Factors:  None    Findings Post-Intubation:  BS equal bilateral

## 2022-09-14 NOTE — ANESTHESIA PREPROCEDURE EVALUATION
09/14/2022  Dwight Hoffmann is a 52 y.o., male.      Pre-op Assessment    I have reviewed the Patient Summary Reports.     I have reviewed the Nursing Notes. I have reviewed the NPO Status.   I have reviewed the Medications.     Review of Systems  Hematology/Oncology:         -- Anemia: Hematology Comments: H/H 9/26   Cardiovascular:   Hypertension    Pulmonary:  Pulmonary Normal    Renal/:  Renal/ Normal     Hepatic/GI:   Abdominal abscess   Musculoskeletal:  Musculoskeletal Normal    Neurological:  Neurology Normal    Endocrine:   Diabetes                                                                                                                 09/14/2022  Dwight Hoffmann is a 52 y.o., male.      Pre-op Assessment    I have reviewed the Patient Summary Reports.    I have reviewed the NPO Status.   I have reviewed the Medications.     Review of Systems  Anesthesia Hx:  No problems with previous Anesthesia  Denies Family Hx of Anesthesia complications.    Social:  Non-Smoker    Cardiovascular:   Hypertension hyperlipidemia    Hepatic/GI:   Bowel Prep. Rectal bleeding   Lower abdominal pain    Endocrine:   Diabetes, type 2  Obesity / BMI > 30      Physical Exam  General: Well nourished, Cooperative, Alert and Oriented    Chest/Lungs:  Clear to auscultation, Normal Respiratory Rate    Heart:  Rate: Normal  Rhythm: Regular Rhythm  Sounds: Normal        Anesthesia Plan  Type of Anesthesia, risks & benefits discussed:    Anesthesia Type: MAC  Intra-op Monitoring Plan: Standard ASA Monitors  Post Op Pain Control Plan: multimodal analgesia  Induction:  IV  Informed Consent: Informed consent signed with the Patient and all parties understand the risks and agree with anesthesia plan.  All questions answered.   ASA Score: 2  Day of Surgery Review of History & Physical: H&P Update referred to the  surgeon/provider.    Ready For Surgery From Anesthesia Perspective.     .        Physical Exam  General: Well nourished    Airway:  Mallampati: III / II  Neck ROM: Normal ROM    Dental:  Intact    Chest/Lungs:  Clear to auscultation, Normal Respiratory Rate    Heart:  Rate: Normal  Rhythm: Regular Rhythm        Anesthesia Plan  Type of Anesthesia, risks & benefits discussed:    Anesthesia Type: Gen ETT  Intra-op Monitoring Plan: Standard ASA Monitors  Post Op Pain Control Plan: multimodal analgesia and IV/PO Opioids PRN  Induction:  Inhalation, IV and rapid sequence  Informed Consent: Informed consent signed with the Patient and all parties understand the risks and agree with anesthesia plan.  All questions answered.   ASA Score: 2 Emergent    Ready For Surgery From Anesthesia Perspective.     .

## 2022-09-14 NOTE — OP NOTE
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 Exploratory laparotomy  2. Drainage of intra-abdominal abscess  3. Small-bowel resection with anastomosis     SURGEON: Jan Oliveira M.D    ASSISTANT: Abdirahman Beasley MD PGY III    ANESTHESIA: General    ESTIMATED BLOOD LOSS: 100 cc    SPECIMEN:   Small bowel  Omentum  Cultures    CONDITION: Stable    COMPLICATIONS: None    FINDINGS:   Large necrotic section of small bowel with perforation partially contained with omentum and mesentery        INDICATIONS: The patient is a 52-year-old male who presented with month long history of weight loss, decreased appetite, malaise and night sweats.  CT imaging showed a small-bowel abscess and fistula versus necrotic mass.  Counseled on options agreed proceed with surgical intervention.    PROCEDURE IN DETAIL: Patient taken operating room placed supine position where general endotracheal intubation was achieved. He was on scheduled Zosyn.  A Weir catheter was placed to decompress the bladder and removed at the end of the case.  His abdomen was prepped and draped typical sterile fashion.  Time-out performed by members of the operative team. We made a generous midline laparotomy incision.  We dissected through the subcutaneous tissue with electrocautery.  The linea alba was identified and incised. We then entered sharply into the peritoneal cavity.  There was no obvious gross spillage or succus present we entered the abdomen.  The omentum was densely adherent to the small bowel in the mid abdomen.  As we were mobilizing the omentum we entered into a large abscess cavity which was suctioned clean.  Cultures were taken. There was numerous loops of small bowel matted down to the abscess cavity.  These were free with blunt dissection.  Were finally able to mobilize the small bowel and ran it from the  terminal ileum proximally. There was a large necrotic portion of the small bowel which was perforated. Suspect underlying mass.  This was  from the surrounding structures. We then ran more proximally beyond the mass. This was only area of obvious perforation. We elected perform resection with 5 cm margins.  Windows were created through the mesentery on either side and blue load MELLO stapler was used to transect the mass. We then used a LigaSure to transect the mesentery.  There were multiple enlarged lymph nodes associated which were taken with the specimen.  Hemostasis was adequate along the mesentery dissection line. Further examination revealed this occurred in the proximal jejunum. We then further evaluate the abdominal cavity. There were areas of sigmoid colon and small bowel which were associated with abscess cavity but then themselves not perforated or threatened. The overlying omentum which was containing part of the mass and abscess was transected and sent as a separate specimen. We copiously irrigated out the abdomen with sterile saline. We then performed a side-to-side functional end-to-end small bowel anastomosis.  Two enterotomies were made and a common channel was created with a blue load MELLO stapler.  We then closed the common enterotomy with an additional blue load MELLO stapler. The lumen was patent.  Lembert sutures were placed over the staple line.  The mesenteric defect was closed.  Returned the small bowel into the abdominal cavity.  We assured we had adequate counts.  Local anesthetic including Exparel were injected in the bilateral Vonnie fashion.  The fascia was closed with a running PDS suture.  The skin was stapled closed. Patient was aroused from sedation, extubated taken to recovery room in stable condition having suffered no complications.  All counts were correct x2 at the end the case.  I was present scrubbed throughout all operative portions of the case except for final skin  closure.    DISPO: PACU then returned to floor

## 2022-09-14 NOTE — TRANSFER OF CARE
"Anesthesia Transfer of Care Note    Patient: Dwight Hoffmann    Procedure(s) Performed: Procedure(s) (LRB):  LAPAROTOMY, EXPLORATORY (N/A)  EXCISION, SMALL INTESTINE  INCISION AND DRAINAGE, ABDOMEN (N/A)    Patient location: PACU    Anesthesia Type: general    Transport from OR: Transported from OR on 2-3 L/min O2 by NC with adequate spontaneous ventilation    Post pain: adequate analgesia    Post assessment: no apparent anesthetic complications and tolerated procedure well    Post vital signs: stable    Level of consciousness: awake, alert and oriented    Nausea/Vomiting: no nausea/vomiting    Complications: none    Transfer of care protocol was followed      Last vitals:   Visit Vitals  /64 (BP Location: Right arm, Patient Position: Lying)   Pulse 91   Temp 36.9 °C (98.4 °F) (Temporal)   Resp 18   Ht 6' 1" (1.854 m)   Wt 102.4 kg (225 lb 12 oz)   SpO2 98%   BMI 29.78 kg/m²     "

## 2022-09-14 NOTE — CONSULTS
GENERAL SURGERY  INPATIENT CONSULT    REASON FOR CONSULT:  small bowel mass versus abscess    HPI: Dwight Hoffmann is a 52 y.o. male with history of type 2 diabetes presented to the emergency room with a progressive history of weight loss, abdominal / pelvic discomfort, night sweats.  Reports least 25 lb weight loss over the past few months. He is fatigued and has abdominal pain, left groin pain and knee pain. He is tolerating diet but has decreased appetite. He is having bowel movements but did have some blood-streaked stools. He underwent colonoscopy on 09/01/2022 with polypectomies showing tubular adenomas. When he presented the emergency room he was found to be tachycardic but had normal labs other than a microcytic anemia.  He underwent CT scan which showed a thick-walled collection containing possible feculent material layer with a communication with the small bowel in the central pelvis.  This is concerning for an abscess or necrotic mass.  He was admitted to Hospital Medicine and started on antibiotics.  Surgery has been consulted for evaluation. Patient denies severe pain. He has discomfort in the pelvis.  Again endorses night sweats and weight loss. Has had a previous appendectomy years ago.    I have reviewed the patient's chart including prior progress notes, procedures and testing.     ROS:   Review of Systems   Constitutional:  Positive for activity change, appetite change and unexpected weight change. Negative for fever.        Night sweats   Respiratory:  Negative for chest tightness and shortness of breath.    Gastrointestinal:  Positive for abdominal pain, blood in stool and constipation.   Musculoskeletal:  Positive for back pain and neck pain.   Skin:  Negative for color change and wound.   Hematological:  Negative for adenopathy.     PROBLEM LIST:  Patient Active Problem List   Diagnosis    Small bowel mass    Microcytic anemia    Diabetes mellitus type 2, noninsulin dependent    Arthralgia of  bilat knees and neck         HISTORY  Past Medical History:   Diagnosis Date    Diabetes mellitus     Prediabetes        Past Surgical History:   Procedure Laterality Date    APPENDECTOMY  07/15/2014    COLONOSCOPY      COLONOSCOPY N/A 2/9/2022    Procedure: COLONOSCOPY;  Surgeon: Manuel Sanders MD;  Location: [facility] ENDO;  Service: Endoscopy;  Laterality: N/A;    COLONOSCOPY N/A 9/1/2022    Procedure: COLONOSCOPY;  Surgeon: Andrea Clemens MD;  Location: [facility] ENDO;  Service: Endoscopy;  Laterality: N/A;       Social History     Tobacco Use    Smoking status: Never    Smokeless tobacco: Never   Substance Use Topics    Alcohol use: Not Currently     Comment: occasional    Drug use: No       Family History   Problem Relation Age of Onset    Cancer Mother         Colon    Diabetes Mother     Hypertension Mother     Seizures Father     Heart murmur Sister     Seizures Sister          MEDS:  No current facility-administered medications on file prior to encounter.     Current Outpatient Medications on File Prior to Encounter   Medication Sig Dispense Refill    atorvastatin (LIPITOR) 10 MG tablet Take 1 tablet (10 mg total) by mouth once daily. 90 tablet 1    blood sugar diagnostic Strp To check BG 2 times daily, to use with insurance preferred meter 200 each 1    blood-glucose meter kit To check BG 2 times daily, to use with insurance preferred meter 1 each 0    clotrimazole (LOTRIMIN) 1 % cream Apply topically 2 (two) times daily. for 7 days 28 g 0    hydrocortisone 2.5 % cream Apply topically 2 (two) times daily. for 7 days 28 g 0    lancets Misc To check BG 2 times daily, to use with insurance preferred meter 200 each 1    linaCLOtide (LINZESS) 72 mcg Cap capsule Take 1 capsule (72 mcg total) by mouth before breakfast. (Patient not taking: Reported on 8/31/2022) 90 capsule 0    lisinopriL (PRINIVIL,ZESTRIL) 2.5 MG tablet Take 1 tablet (2.5 mg total) by mouth once daily. 90 tablet 1    metFORMIN (GLUCOPHAGE) 1000  MG tablet Take 1 tablet (1,000 mg total) by mouth 2 (two) times daily with meals. 180 tablet 3    tadalafiL (CIALIS) 5 MG tablet Take 1 tablet (5 mg total) by mouth daily as needed for Erectile Dysfunction. 30 tablet 2       ALLERGIES:  Review of patient's allergies indicates:  No Known Allergies      VITALS:  Temp:  [97.4 °F (36.3 °C)-98.7 °F (37.1 °C)] 97.4 °F (36.3 °C)  Pulse:  [] 83  Resp:  [18-20] 18  SpO2:  [97 %-100 %] 99 %  BP: (106-124)/(58-71) 124/70    I/O last 3 completed shifts:  In: 1227.9 [I.V.:127.9; IV Piggyback:1100]  Out: -       PHYSICAL EXAM:  Physical Exam  Vitals reviewed.   Constitutional:       General: He is not in acute distress.     Appearance: Normal appearance. He is well-developed.   HENT:      Head: Normocephalic and atraumatic.   Eyes:      General: No scleral icterus.  Neck:      Trachea: No tracheal deviation.   Cardiovascular:      Rate and Rhythm: Normal rate and regular rhythm.      Pulses: Normal pulses.   Pulmonary:      Effort: Pulmonary effort is normal. No respiratory distress.      Breath sounds: Normal breath sounds.   Abdominal:      General: There is no distension.      Palpations: Abdomen is soft.      Tenderness: There is abdominal tenderness (Mild pelvic abdominal pain).   Musculoskeletal:         General: No swelling or tenderness. Normal range of motion.      Cervical back: Normal range of motion and neck supple. No rigidity.   Lymphadenopathy:      Cervical:      Right cervical: No superficial or deep cervical adenopathy.     Left cervical: No superficial or deep cervical adenopathy.      Upper Body:      Right upper body: No supraclavicular or axillary adenopathy.      Left upper body: No supraclavicular or axillary adenopathy.      Lower Body: No right inguinal adenopathy. No left inguinal adenopathy.   Skin:     General: Skin is warm and dry.      Coloration: Skin is not jaundiced.      Findings: No erythema.   Neurological:      General: No focal deficit  present.      Mental Status: He is alert and oriented to person, place, and time. He is not disoriented.      Motor: No weakness or abnormal muscle tone.   Psychiatric:         Mood and Affect: Mood normal.         Behavior: Behavior normal.         Thought Content: Thought content normal.         Judgment: Judgment normal.         LABS:  Lab Results   Component Value Date    WBC 11.26 09/14/2022    RBC 3.50 (L) 09/14/2022    HGB 8.9 (L) 09/14/2022    HCT 26.2 (L) 09/14/2022     09/14/2022     Lab Results   Component Value Date     (H) 09/14/2022     09/14/2022    K 3.7 09/14/2022     09/14/2022    CO2 22 (L) 09/14/2022    BUN 12 09/14/2022    CREATININE 0.8 09/14/2022    CALCIUM 8.4 (L) 09/14/2022     Lab Results   Component Value Date    ALT 15 09/13/2022    AST 20 09/13/2022    ALKPHOS 94 09/13/2022    BILITOT 0.7 09/13/2022     Lab Results   Component Value Date    MG 1.9 09/14/2022       STUDIES:  Images and reports were personally reviewed.    FINDINGS:  Lung bases clear.  Normal size heart.  Decompressed gallbladder.     Simple cyst right renal midpole.  Remaining solid abdominal organs are unremarkable.     There is no enteric contrast which limits bowel assessment.  No dilated bowel loops.  Appendectomy.  Moderate degree of stool in the right colon with a few scattered colonic diverticula.     Centered in the mid abdomen mesentery just to the right of midline near the level of the iliac bifurcation there is a thick walled mass containing bowel contents centrally.  This demonstrates open communication with at least 2 adjacent bowel loops as can be seen on axial series 2, image 118 and series 2, image 137.  This measures 8 x 10 x 12 cm AP by TV by cc dimension.  There is mild surrounding fat stranding.  Small fat containing umbilical defect.  Prostate 5 cm.  Unremarkable urinary bladder.     No acute osseous abnormality.     Impression:     1. 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  2. Prostatomegaly necessitating correlation with PSA.         ASSESSMENT & PLAN:  52 y.o. male with  weight loss, night sweats, necrotic mass in the pelvis associated with small-bowel  - etiology mass uncertain, possible abscess versus necrotic mass versus necrotic lymph nodes  - needs operative exploration for either resection or biopsying  - risks of surgery including pain, bleeding, scarring, infection, recurrence, inadequate tissue for diagnosis, abscess recurrence, injury to other abdominal structures, etc. Reviewed  - continue NPO status with IV fluids and IV antibiotics  - surgery this afternoon

## 2022-09-14 NOTE — PLAN OF CARE
Problem: Adult Inpatient Plan of Care  Goal: Plan of Care Review  Outcome: Ongoing, Progressing  Goal: Optimal Comfort and Wellbeing  Outcome: Ongoing, Progressing     Problem: Diabetes Comorbidity  Goal: Blood Glucose Level Within Targeted Range  Outcome: Ongoing, Progressing     Problem: Infection  Goal: Absence of Infection Signs and Symptoms  Outcome: Ongoing, Progressing   Pt's POC discussed with pt and spouse.  Pt to have exploratory lap surgery on the abd and pelvis today.  Pain has been well managed.  Blood sugars have remained within range.  Will continue to monitor.

## 2022-09-14 NOTE — ASSESSMENT & PLAN NOTE
H/H decreased from 1 month ago   Likely GI losses given mass vs. Abscess  Check iron studies  PPI  T&S

## 2022-09-14 NOTE — ASSESSMENT & PLAN NOTE
Admit to med/tele  Abscess vs. Necrotic mass with small bowel fistula  Consult Dr. Oliveira  Check procal/lactate  Get blood cultures  Start zosyn   NPO until evaluated by surgery  Pain control

## 2022-09-14 NOTE — HPI
"Mr. Hoffmann is a 52 year old male with a history of NIDDM2 and HLD who presents today with complaints of night sweats for weeks. It is moderate. It is associated with 25 lb wt loss over the last 2 months, urinary hesitancy, urinary frequency, occasional blood streaked stools, weakness, fatigue, neck pain, knee pain, and pelvic pain. He denies measured fever, chills, N/V/D, chest pain, or SOB. He was seen by his primary last month about the blood streaked stools and referred to Dr. Clemens for colonoscopy on 9/1 with multiple polypectomies with path report of tubular adenomas. In the ED, labs revealed microcytic anemia, he was mildly tachycardic on arrival  which improved spontaneously, /60,  and CT abd/pelvis revealing: "Thick-walled collection containing feculent material, widely communicating with small bowel, seen centrally in the pelvis. Findings are suspicious for necrotic mass or abscess arising from the small bowel" Findings were discussed with Dr. Oliveira by ED MD who agreed to see patient in the morning.   "

## 2022-09-14 NOTE — PLAN OF CARE
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 Demographic is current. PCP is BOSTON Oswald. Insurance verified as Medicaid. Pt is a diabetic, denies dialysis and coumadin. Denies dme/hh. Disposition:  Pt will discharge to home. Sister will provide transportation on discharge. No needs at this time. Cm will continue following.      Discharge Barriers Identified: None    Payor: MEDICAID / Plan: [email or web address] / Product Type: Managed Medicaid /     Extended Emergency Contact Information  Primary Emergency Contact: WaleSundar   [place]  Mobile Phone: [phone]  Relation: Sister    Discharge Plan A: Home with family  Discharge Plan B: Home with family      [facility] [place] [place] - [address]  [address] [address]  Phone: [phone] Fax: [phone]      Initial Assessment (most recent)       Adult Discharge Assessment - 09/14/22 0935          Discharge Assessment    Assessment Type Discharge Planning Assessment     Confirmed/corrected address, phone number and insurance Yes     Confirmed Demographics Correct on Facesheet     Source of Information patient;family     Does patient/caregiver understand observation status Yes     Communicated CATRACHO with patient/caregiver Yes     Lives With sibling(s)     Facility Arrived From: home     Do you expect to return to your current living situation? Yes     Do you have help at home or someone to help you manage your care at home? Yes     Who are your caregiver(s) and their phone number(s)? - Sundar - [phone]     Prior to hospitilization cognitive status: Alert/Oriented     Current  cognitive status: Alert/Oriented     Walking or Climbing Stairs Difficulty none     Dressing/Bathing Difficulty none     Equipment Currently Used at Home none     Readmission within 30 days? No     Patient currently being followed by outpatient case management? No     Do you currently have service(s) that help you manage your care at home? No     Do you take prescription medications? Yes     Do you have prescription coverage? Yes     Do you have any problems affording any of your prescribed medications? No     Is the patient taking medications as prescribed? yes     Who is going to help you get home at discharge? sister     How do you get to doctors appointments? car, drives self     Are you on dialysis? No     Do you take coumadin? No     Discharge Plan A Home with family     Discharge Plan B Home with family     DME Needed Upon Discharge  none     Discharge Plan discussed with: Patient;Sibling     Name(s) and Number(s) same as above     Discharge Barriers Identified None        Physical Activity    On average, how many days per week do you engage in moderate to strenuous exercise (like a brisk walk)? 0 days     On average, how many minutes do you engage in exercise at this level? 0 min        Financial Resource Strain    How hard is it for you to pay for the very basics like food, housing, medical care, and heating? Not hard at all        Housing Stability    In the last 12 months, was there a time when you were not able to pay the mortgage or rent on time? No     In the last 12 months, how many places have you lived? 1     In the last 12 months, was there a time when you did not have a steady place to sleep or slept in a shelter (including now)? No        Transportation Needs    In the past 12 months, has lack of transportation kept you from medical appointments or from getting medications? No     In the past 12 months, has lack of transportation kept you from meetings, work, or from getting things needed for  daily living? No        Food Insecurity    Within the past 12 months, you worried that your food would run out before you got the money to buy more. Never true     Within the past 12 months, the food you bought just didn't last and you didn't have money to get more. Never true        Stress    Do you feel stress - tense, restless, nervous, or anxious, or unable to sleep at night because your mind is troubled all the time - these days? Only a little        Social Connections    In a typical week, how many times do you talk on the phone with family, friends, or neighbors? Twice a week     How often do you get together with friends or relatives? Twice a week     How often do you attend Sabianist or Synagogue services? Never     Do you belong to any clubs or organizations such as Sabianist groups, unions, fraternal or athletic groups, or school groups? No     How often do you attend meetings of the clubs or organizations you belong to? Never     Are you , , , , never , or living with a partner?         Alcohol Use    Q2: How many drinks containing alcohol do you have on a typical day when you are drinking? Patient does not drink

## 2022-09-14 NOTE — TRANSFER OF CARE
"Anesthesia Transfer of Care Note    Patient: Dwight Hoffmann    Procedure(s) Performed: Procedure(s) (LRB):  LAPAROTOMY, EXPLORATORY (N/A)  EXCISION, SMALL INTESTINE (N/A)  INCISION AND DRAINAGE, ABDOMEN (N/A)    Patient location: PACU    Anesthesia Type: general    Transport from OR: Transported from OR on 2-3 L/min O2 by NC with adequate spontaneous ventilation    Post pain: adequate analgesia    Post assessment: no apparent anesthetic complications and tolerated procedure well    Post vital signs: stable    Level of consciousness: sedated    Nausea/Vomiting: no nausea/vomiting    Complications: none    Transfer of care protocol was followed      Last vitals:   Visit Vitals  /64 (BP Location: Right arm, Patient Position: Lying)   Pulse 91   Temp 36.9 °C (98.4 °F) (Temporal)   Resp 18   Ht 6' 1" (1.854 m)   Wt 102.4 kg (225 lb 12 oz)   SpO2 98%   BMI 29.78 kg/m²     " Rotation Flap Text: The defect edges were debeveled with a #15 scalpel blade.  Given the location of the defect, shape of the defect and the proximity to free margins a rotation flap was deemed most appropriate.  Using a sterile surgical marker, an appropriate rotation flap was drawn incorporating the defect and placing the expected incisions within the relaxed skin tension lines where possible.    The area thus outlined was incised deep to adipose tissue with a #15 scalpel blade.  The skin margins were undermined to an appropriate distance in all directions utilizing iris scissors.

## 2022-09-14 NOTE — H&P
"[facility] Medicine  History & Physical    Patient Name: Dwight Hoffmann  MRN: [id number]  Patient Class: OP- Observation  Admission Date: 9/13/2022  Attending Physician: Dr. Garrett  Primary Care Provider: BOSTON Oswald         Patient information was obtained from patient, spouse/SO, past medical records and ER records.     Subjective:     Principal Problem:Small bowel mass    Chief Complaint:   Chief Complaint   Patient presents with    Chills     Pt. Reports having "Night sweats" which family believes is associated with hypoglycemia; Also reports hesitancy to void during urination         HPI: Mr. Hoffmann is a 52 year old male with a history of NIDDM2 and HLD who presents today with complaints of night sweats for weeks. It is moderate. It is associated with 25 lb wt loss over the last 2 months, urinary hesitancy, urinary frequency, occasional blood streaked stools, weakness, fatigue, neck pain, knee pain, and pelvic pain. He denies measured fever, chills, N/V/D, chest pain, or SOB. He was seen by his primary last month about the blood streaked stools and referred to Dr. Clemens for colonoscopy on 9/1 with multiple polypectomies with path report of tubular adenomas. In the ED, labs revealed microcytic anemia, he was mildly tachycardic on arrival  which improved spontaneously, /60,  and CT abd/pelvis revealing: "Thick-walled collection containing feculent material, widely communicating with small bowel, seen centrally in the pelvis. Findings are suspicious for necrotic mass or abscess arising from the small bowel" Findings were discussed with Dr. Oliveira by ED MD who agreed to see patient in the morning.         Past Medical History:   Diagnosis Date    Diabetes mellitus     Prediabetes        Past Surgical History:   Procedure Laterality Date    APPENDECTOMY  07/15/2014    COLONOSCOPY      COLONOSCOPY N/A 2/9/2022    Procedure: COLONOSCOPY;  Surgeon: Manuel Sanders MD;  Location: " [facility];  Service: Endoscopy;  Laterality: N/A;    COLONOSCOPY N/A 9/1/2022    Procedure: COLONOSCOPY;  Surgeon: Andrea Clemens MD;  Location: [facility];  Service: Endoscopy;  Laterality: N/A;       Review of patient's allergies indicates:  No Known Allergies    No current facility-administered medications on file prior to encounter.     Current Outpatient Medications on File Prior to Encounter   Medication Sig    atorvastatin (LIPITOR) 10 MG tablet Take 1 tablet (10 mg total) by mouth once daily.    blood sugar diagnostic Strp To check BG 2 times daily, to use with insurance preferred meter    blood-glucose meter kit To check BG 2 times daily, to use with insurance preferred meter    clotrimazole (LOTRIMIN) 1 % cream Apply topically 2 (two) times daily. for 7 days    hydrocortisone 2.5 % cream Apply topically 2 (two) times daily. for 7 days    lancets Misc To check BG 2 times daily, to use with insurance preferred meter    linaCLOtide (LINZESS) 72 mcg Cap capsule Take 1 capsule (72 mcg total) by mouth before breakfast. (Patient not taking: Reported on 8/31/2022)    lisinopriL (PRINIVIL,ZESTRIL) 2.5 MG tablet Take 1 tablet (2.5 mg total) by mouth once daily.    metFORMIN (GLUCOPHAGE) 1000 MG tablet Take 1 tablet (1,000 mg total) by mouth 2 (two) times daily with meals.    tadalafiL (CIALIS) 5 MG tablet Take 1 tablet (5 mg total) by mouth daily as needed for Erectile Dysfunction.     Family History       Problem Relation (Age of Onset)    Cancer Mother    Diabetes Mother    Heart murmur Sister    Hypertension Mother    Seizures Father, Sister          Tobacco Use    Smoking status: Never    Smokeless tobacco: Never   Substance and Sexual Activity    Alcohol use: Not Currently     Comment: occasional    Drug use: No    Sexual activity: Yes     Partners: Female     Review of Systems   Constitutional:  Positive for fatigue. Negative for chills, diaphoresis and fever.   HENT:  Negative for  congestion, ear pain, sore throat and trouble swallowing.    Eyes:  Negative for pain, discharge and visual disturbance.   Respiratory:  Negative for cough, chest tightness, shortness of breath and wheezing.    Cardiovascular:  Negative for chest pain, palpitations and leg swelling.   Gastrointestinal:  Positive for abdominal distention and abdominal pain. Negative for blood in stool, constipation, diarrhea, nausea and vomiting.   Endocrine: Negative for polydipsia, polyphagia and polyuria.   Genitourinary:  Positive for difficulty urinating and frequency. Negative for dysuria, flank pain and urgency.   Musculoskeletal:  Positive for arthralgias, myalgias and neck pain. Negative for back pain, joint swelling and neck stiffness.   Skin:  Negative for rash and wound.   Allergic/Immunologic: Negative for immunocompromised state.   Neurological:  Positive for weakness. Negative for dizziness, syncope, speech difficulty, light-headedness, numbness and headaches.   Hematological:  Negative for adenopathy.   Psychiatric/Behavioral:  Negative for confusion and suicidal ideas. The patient is not nervous/anxious.    All other systems reviewed and are negative.  Objective:     Vital Signs (Most Recent):  Temp: 98.7 °F (37.1 °C) (09/13/22 2149)  Pulse: 83 (09/14/22 0324)  Resp: 20 (09/13/22 2149)  BP: 113/69 (09/14/22 0325)  SpO2: 99 % (09/14/22 0324) Vital Signs (24h Range):  Temp:  [98.7 °F (37.1 °C)] 98.7 °F (37.1 °C)  Pulse:  [] 83  Resp:  [20] 20  SpO2:  [97 %-100 %] 99 %  BP: (106-116)/(58-71) 113/69     Weight: 102.1 kg (225 lb)  Body mass index is 29.69 kg/m².    Physical Exam  Vitals and nursing note reviewed.   Constitutional:       Appearance: He is well-developed.   HENT:      Head: Normocephalic and atraumatic.   Eyes:      Conjunctiva/sclera: Conjunctivae normal.      Pupils: Pupils are equal, round, and reactive to light.      Comments: pale   Cardiovascular:      Rate and Rhythm: Normal rate and regular  rhythm.      Heart sounds: Normal heart sounds.   Pulmonary:      Effort: Pulmonary effort is normal.      Breath sounds: Normal breath sounds.   Abdominal:      General: Bowel sounds are normal.      Palpations: Abdomen is soft.      Tenderness: There is abdominal tenderness.      Comments: Tenderness in bilat lower quadrants with palpation   Musculoskeletal:         General: Normal range of motion.      Cervical back: Normal range of motion and neck supple.   Skin:     General: Skin is warm and dry.      Capillary Refill: Capillary refill takes less than 2 seconds.   Neurological:      Mental Status: He is alert and oriented to person, place, and time.   Psychiatric:         Behavior: Behavior normal.         Thought Content: Thought content normal.         Judgment: Judgment normal.         CRANIAL NERVES     CN III, IV, VI   Pupils are equal, round, and reactive to light.     Significant Labs: All pertinent labs within the past 24 hours have been reviewed.  CBC:   Recent Labs   Lab 09/13/22  2345 09/14/22  0320   WBC 11.36 11.26   HGB 9.6* 8.9*   HCT 28.5* 26.2*    341     CMP:   Recent Labs   Lab 09/13/22  2345   *   K 4.1      CO2 21*   *   BUN 13   CREATININE 0.9   CALCIUM 8.8   PROT 7.1   ALBUMIN 2.6*   BILITOT 0.7   ALKPHOS 94   AST 20   ALT 15   ANIONGAP 12     Urine Studies:   Recent Labs   Lab 09/13/22  2342   COLORU Yellow   APPEARANCEUA Clear   PHUR 6.0   SPECGRAV 1.025   PROTEINUA Negative   GLUCUA Negative   KETONESU Negative   BILIRUBINUA Negative   OCCULTUA Negative   NITRITE Negative   UROBILINOGEN 1.0   LEUKOCYTESUR Negative       Significant Imaging: I have reviewed all pertinent imaging results/findings within the past 24 hours.  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 Oliveira  Check procal/lactate  Get blood cultures  Start zosyn   NPO until evaluated by surgery  Pain control      Arthralgia of bilat knees and neck  Pain control   Further work up as per clinical course      Diabetes mellitus type 2, noninsulin dependent  Patient's FSGs are controlled on current medication regimen.  Last A1c reviewed-   Lab Results   Component Value Date    HGBA1C 11.0 (H) 12/30/2021     Most recent fingerstick glucose reviewed-   Recent Labs   Lab 09/13/22  2149   POCTGLUCOSE 187*     Current correctional scale  Low  Maintain anti-hyperglycemic dose as follows-   Antihyperglycemics (From admission, onward)    Start     Stop Route Frequency Ordered    09/14/22 0344  insulin aspart U-100 pen 0-5 Units         -- SubQ Before meals & nightly PRN 09/14/22 0246        Hold Oral hypoglycemics while patient is in the hospital.    Microcytic anemia  H/H decreased from 1 month ago   Likely GI losses given mass vs. Abscess  Check iron studies  PPI  T&S        VTE Risk Mitigation (From admission, onward)         Ordered     IP VTE HIGH RISK PATIENT  Once         09/14/22 0246     Place sequential compression device  Until discontinued         09/14/22 0246                   Sana Salguero, NP  Department of Hospital Medicine   [facility]

## 2022-09-14 NOTE — PLAN OF CARE
Patient prepared for surgery. Performed incentive spirometer teaching. Belongings sent with wife. Text message alerts set up with wife Sundar.

## 2022-09-14 NOTE — SUBJECTIVE & OBJECTIVE
Past Medical History:   Diagnosis Date    Diabetes mellitus     Prediabetes        Past Surgical History:   Procedure Laterality Date    APPENDECTOMY  07/15/2014    COLONOSCOPY      COLONOSCOPY N/A 2/9/2022    Procedure: COLONOSCOPY;  Surgeon: Manuel Sanders MD;  Location: [facility];  Service: Endoscopy;  Laterality: N/A;    COLONOSCOPY N/A 9/1/2022    Procedure: COLONOSCOPY;  Surgeon: Andrea Clemens MD;  Location: [facility];  Service: Endoscopy;  Laterality: N/A;       Review of patient's allergies indicates:  No Known Allergies    No current facility-administered medications on file prior to encounter.     Current Outpatient Medications on File Prior to Encounter   Medication Sig    atorvastatin (LIPITOR) 10 MG tablet Take 1 tablet (10 mg total) by mouth once daily.    blood sugar diagnostic Strp To check BG 2 times daily, to use with insurance preferred meter    blood-glucose meter kit To check BG 2 times daily, to use with insurance preferred meter    clotrimazole (LOTRIMIN) 1 % cream Apply topically 2 (two) times daily. for 7 days    hydrocortisone 2.5 % cream Apply topically 2 (two) times daily. for 7 days    lancets Misc To check BG 2 times daily, to use with insurance preferred meter    linaCLOtide (LINZESS) 72 mcg Cap capsule Take 1 capsule (72 mcg total) by mouth before breakfast. (Patient not taking: Reported on 8/31/2022)    lisinopriL (PRINIVIL,ZESTRIL) 2.5 MG tablet Take 1 tablet (2.5 mg total) by mouth once daily.    metFORMIN (GLUCOPHAGE) 1000 MG tablet Take 1 tablet (1,000 mg total) by mouth 2 (two) times daily with meals.    tadalafiL (CIALIS) 5 MG tablet Take 1 tablet (5 mg total) by mouth daily as needed for Erectile Dysfunction.     Family History       Problem Relation (Age of Onset)    Cancer Mother    Diabetes Mother    Heart murmur Sister    Hypertension Mother    Seizures Father, Sister          Tobacco Use    Smoking status: Never    Smokeless tobacco: Never   Substance and Sexual  Activity    Alcohol use: Not Currently     Comment: occasional    Drug use: No    Sexual activity: Yes     Partners: Female     Review of Systems   Constitutional:  Positive for fatigue. Negative for chills, diaphoresis and fever.   HENT:  Negative for congestion, ear pain, sore throat and trouble swallowing.    Eyes:  Negative for pain, discharge and visual disturbance.   Respiratory:  Negative for cough, chest tightness, shortness of breath and wheezing.    Cardiovascular:  Negative for chest pain, palpitations and leg swelling.   Gastrointestinal:  Positive for abdominal distention and abdominal pain. Negative for blood in stool, constipation, diarrhea, nausea and vomiting.   Endocrine: Negative for polydipsia, polyphagia and polyuria.   Genitourinary:  Positive for difficulty urinating and frequency. Negative for dysuria, flank pain and urgency.   Musculoskeletal:  Positive for arthralgias, myalgias and neck pain. Negative for back pain, joint swelling and neck stiffness.   Skin:  Negative for rash and wound.   Allergic/Immunologic: Negative for immunocompromised state.   Neurological:  Positive for weakness. Negative for dizziness, syncope, speech difficulty, light-headedness, numbness and headaches.   Hematological:  Negative for adenopathy.   Psychiatric/Behavioral:  Negative for confusion and suicidal ideas. The patient is not nervous/anxious.    All other systems reviewed and are negative.  Objective:     Vital Signs (Most Recent):  Temp: 98.7 °F (37.1 °C) (09/13/22 2149)  Pulse: 83 (09/14/22 0324)  Resp: 20 (09/13/22 2149)  BP: 113/69 (09/14/22 0325)  SpO2: 99 % (09/14/22 0324) Vital Signs (24h Range):  Temp:  [98.7 °F (37.1 °C)] 98.7 °F (37.1 °C)  Pulse:  [] 83  Resp:  [20] 20  SpO2:  [97 %-100 %] 99 %  BP: (106-116)/(58-71) 113/69     Weight: 102.1 kg (225 lb)  Body mass index is 29.69 kg/m².    Physical Exam  Vitals and nursing note reviewed.   Constitutional:       Appearance: He is  well-developed.   HENT:      Head: Normocephalic and atraumatic.   Eyes:      Conjunctiva/sclera: Conjunctivae normal.      Pupils: Pupils are equal, round, and reactive to light.      Comments: pale   Cardiovascular:      Rate and Rhythm: Normal rate and regular rhythm.      Heart sounds: Normal heart sounds.   Pulmonary:      Effort: Pulmonary effort is normal.      Breath sounds: Normal breath sounds.   Abdominal:      General: Bowel sounds are normal.      Palpations: Abdomen is soft.      Tenderness: There is abdominal tenderness.      Comments: Tenderness in bilat lower quadrants with palpation   Musculoskeletal:         General: Normal range of motion.      Cervical back: Normal range of motion and neck supple.   Skin:     General: Skin is warm and dry.      Capillary Refill: Capillary refill takes less than 2 seconds.   Neurological:      Mental Status: He is alert and oriented to person, place, and time.   Psychiatric:         Behavior: Behavior normal.         Thought Content: Thought content normal.         Judgment: Judgment normal.         CRANIAL NERVES     CN III, IV, VI   Pupils are equal, round, and reactive to light.     Significant Labs: All pertinent labs within the past 24 hours have been reviewed.  CBC:   Recent Labs   Lab 09/13/22  2345 09/14/22  0320   WBC 11.36 11.26   HGB 9.6* 8.9*   HCT 28.5* 26.2*    341     CMP:   Recent Labs   Lab 09/13/22  2345   *   K 4.1      CO2 21*   *   BUN 13   CREATININE 0.9   CALCIUM 8.8   PROT 7.1   ALBUMIN 2.6*   BILITOT 0.7   ALKPHOS 94   AST 20   ALT 15   ANIONGAP 12     Urine Studies:   Recent Labs   Lab 09/13/22  2342   COLORU Yellow   APPEARANCEUA Clear   PHUR 6.0   SPECGRAV 1.025   PROTEINUA Negative   GLUCUA Negative   KETONESU Negative   BILIRUBINUA Negative   OCCULTUA Negative   NITRITE Negative   UROBILINOGEN 1.0   LEUKOCYTESUR Negative       Significant Imaging: I have reviewed all pertinent imaging results/findings within  the past 24 hours.  EKG: I have reviewed all pertinent results/findings within the past 24 hours and my personal findings are: NSR with short NH, no acute ischemic changes

## 2022-09-14 NOTE — ASSESSMENT & PLAN NOTE
Patient's FSGs are controlled on current medication regimen.  Last A1c reviewed-   Lab Results   Component Value Date    HGBA1C 11.0 (H) 12/30/2021     Most recent fingerstick glucose reviewed-   Recent Labs   Lab 09/13/22  2149   POCTGLUCOSE 187*     Current correctional scale  Low  Maintain anti-hyperglycemic dose as follows-   Antihyperglycemics (From admission, onward)    Start     Stop Route Frequency Ordered    09/14/22 0344  insulin aspart U-100 pen 0-5 Units         -- SubQ Before meals & nightly PRN 09/14/22 0246        Hold Oral hypoglycemics while patient is in the hospital.

## 2022-09-14 NOTE — PROGRESS NOTES
"Patient is on Zosyn 4.5 g IV Q6h over 30 minutes (intermittent infusion). Due to the patient's current diagnosis, zosyn via extended infusion provides better clinical outcomes regarding decreased mortality and decreased length of stay in comparison to intermittent infusion. Zosyn dose will be adjusted to Zosyn 4.5 g IV Q8h over 4 hours. Per pharmacy protocol, Zosyn can be adjusted automatically. Providers can override adjustment by re-ordering intermittent infusion with "dispense as written" in the administration instructions.     Wellington Chen  Pharm.D    [facility]    [phone]    "

## 2022-09-14 NOTE — NURSING
Pt returned back to floor via bed.  Pt still NPO.  Pt has NG tube to R nare to low intermittent suction.  Pt to continue LR @ 125 mls to 20 g L AC.  Pt complains of no pain at present.  Spouse at bedside.  Continues on Accu -cks.  Midline incision to Abd.  Pt has no schroeder. Awaiting him to void at this time.  Last B/P 116/56.  No other needs at this time.  Will continue to monitor.

## 2022-09-14 NOTE — ANESTHESIA POSTPROCEDURE EVALUATION
Anesthesia Post Evaluation    Patient: Dwight Hoffmann    Procedure(s) Performed: Procedure(s) (LRB):  LAPAROTOMY, EXPLORATORY (N/A)  EXCISION, SMALL INTESTINE (N/A)  INCISION AND DRAINAGE, ABDOMEN (N/A)    Final Anesthesia Type: general      Patient location during evaluation: PACU  Patient participation: Yes- Able to Participate  Level of consciousness: sedated and awake  Post-procedure vital signs: reviewed and stable  Pain management: adequate  Airway patency: patent    PONV status at discharge: No PONV  Anesthetic complications: no      Cardiovascular status: blood pressure returned to baseline  Respiratory status: spontaneous ventilation  Hydration status: euvolemic  Follow-up not needed.          Vitals Value Taken Time   /65 09/14/22 1622   Temp  09/14/22 1624   Pulse 92 09/14/22 1623   Resp 21 09/14/22 1623   SpO2 99 % 09/14/22 1623   Vitals shown include unvalidated device data.      No case tracking events are documented in the log.      Pain/Jt Score: No data recorded

## 2022-09-14 NOTE — ED PROVIDER NOTES
"Encounter Date: 9/13/2022       History     Chief Complaint   Patient presents with    Chills     Pt. Reports having "Night sweats" which family believes is associated with hypoglycemia; Also reports hesitancy to void during urination      52-year-old male presents today with blood sugar issues, abdominal pain, back pain as well as some urinary complaints.  Patient reports feeling like he may have prostate issues because he does not feel like he is emptying his bladder completely.  He also feels like he has increased frequency of urination but denies any dysuria or hematuria.  He feels some suprapubic pressure intermittently with past few weeks that radiates to his groin.  He reports some night sweats but denies any fevers.  Denies nausea vomiting chest pain, shortness of breath.  Denies any other abdominal pain.  He reports some intermittent bilateral testicular pain but denies any any testicular swelling erythema.  Denies any penile discharge, penile pain or swelling.  Denies any history of STDs.  Patient was also recently diagnosed with diabetes a few months ago and started on metformin is not sure he is keeping it under control as sometimes at this high and sometimes it is low.  He admits that he has not been having the best diet and had [facility] just prior to coming to the ER.    Colonoscopy 9/1/22 by Dr. Clemens  - One 5 mm polyp in the ascending colon, removed with a hot biopsy forceps. Resected and retrieved.   - Two 4 to 5 mm polyps in the cecum, removed with a hot biopsy forceps. Resected and retrieved.   - Sigmoid diverticulosis   - The examination was otherwise normal on direct and retroflexion views.     The history is provided by the patient and the spouse. No  was used.   Review of patient's allergies indicates:  No Known Allergies  Past Medical History:   Diagnosis Date    Prediabetes      Past Surgical History:   Procedure Laterality Date    APPENDECTOMY  07/15/2014    " COLONOSCOPY      COLONOSCOPY N/A 2/9/2022    Procedure: COLONOSCOPY;  Surgeon: Manuel Sanders MD;  Location: [facility] ENDO;  Service: Endoscopy;  Laterality: N/A;    COLONOSCOPY N/A 9/1/2022    Procedure: COLONOSCOPY;  Surgeon: Andrea Clemens MD;  Location: [facility] ENDO;  Service: Endoscopy;  Laterality: N/A;     Family History   Problem Relation Age of Onset    Cancer Mother         Colon    Diabetes Mother     Hypertension Mother     Seizures Father     Heart murmur Sister     Seizures Sister      Social History     Tobacco Use    Smoking status: Never    Smokeless tobacco: Never   Substance Use Topics    Alcohol use: Not Currently     Comment: occasional    Drug use: No     Review of Systems   Constitutional:  Negative for activity change, diaphoresis and fever.   HENT:  Negative for congestion, drooling, rhinorrhea, sore throat and trouble swallowing.    Eyes:  Negative for pain and visual disturbance.   Respiratory:  Negative for cough, shortness of breath and stridor.    Cardiovascular:  Negative for chest pain and leg swelling.   Gastrointestinal:  Positive for abdominal pain. Negative for abdominal distention, constipation, diarrhea, nausea and vomiting.   Genitourinary:  Positive for frequency, testicular pain and urgency. Negative for decreased urine volume, dysuria, flank pain, hematuria, penile discharge, penile pain, penile swelling and scrotal swelling.   Musculoskeletal:  Negative for gait problem.   Skin:  Negative for rash.   Neurological:  Negative for seizures, facial asymmetry and headaches.   Psychiatric/Behavioral:  Negative for hallucinations and suicidal ideas.      Physical Exam     Initial Vitals   BP Pulse Resp Temp SpO2   09/13/22 2149 09/13/22 2149 09/13/22 2149 09/13/22 2149 09/13/22 2152   116/71 107 20 98.7 °F (37.1 °C) 99 %      MAP       --                Physical Exam    Nursing note and vitals reviewed.  Constitutional: He appears well-developed. No distress.   HENT:   Head:  Normocephalic and atraumatic.   Nose: Nose normal.   Eyes: EOM are normal. Pupils are equal, round, and reactive to light.   Neck: Neck supple. No tracheal deviation present. No JVD present.   Cardiovascular:  Normal rate, regular rhythm, normal heart sounds and intact distal pulses.     Exam reveals no gallop and no friction rub.       No murmur heard.  Pulmonary/Chest: Breath sounds normal. No respiratory distress. He has no wheezes. He has no rhonchi. He has no rales.   Abdominal: Abdomen is soft. Bowel sounds are normal. He exhibits no distension. There is abdominal tenderness (minimal suprapubic ttp). Hernia confirmed negative in the right inguinal area and confirmed negative in the left inguinal area. There is no rebound and no guarding.   Genitourinary:    Testes and penis normal.   Cremasteric reflex is present. Right testis shows no mass, no swelling and no tenderness. Right testis is descended. Cremasteric reflex is not absent on the right side. Left testis shows no mass, no swelling and no tenderness. Left testis is descended. Cremasteric reflex is not absent on the left side. No phimosis, paraphimosis, hypospadias, penile erythema or penile tenderness. No discharge found.   Musculoskeletal:         General: Normal range of motion.      Cervical back: Neck supple.     Lymphadenopathy: No inguinal adenopathy noted on the right or left side.   Neurological: He is alert and oriented to person, place, and time. He has normal strength. No cranial nerve deficit or sensory deficit.   Skin: Skin is warm and dry. Capillary refill takes less than 2 seconds. No rash noted.   Psychiatric: He has a normal mood and affect.       ED Course   Procedures  Labs Reviewed   CBC W/ AUTO DIFFERENTIAL - Abnormal; Notable for the following components:       Result Value    RBC 3.83 (*)     Hemoglobin 9.6 (*)     Hematocrit 28.5 (*)     MCV 74 (*)     MCH 25.1 (*)     RDW 16.5 (*)     Mono % 20.0 (*)     All other components  within normal limits   COMPREHENSIVE METABOLIC PANEL - Abnormal; Notable for the following components:    Sodium 134 (*)     CO2 21 (*)     Glucose 131 (*)     Albumin 2.6 (*)     All other components within normal limits   POCT GLUCOSE - Abnormal; Notable for the following components:    POCT Glucose 187 (*)     All other components within normal limits   URINALYSIS, REFLEX TO URINE CULTURE    Narrative:     Specimen Source->Urine          Imaging Results              CT Abdomen Pelvis With Contrast (In process)                      Medications   piperacillin-tazobactam 4.5 g in dextrose 5 % 100 mL IVPB (ready to mix system) (has no administration in time range)   sodium chloride 0.9% bolus 1,000 mL (0 mLs Intravenous Stopped 9/14/22 0228)   iohexoL (OMNIPAQUE 350) 350 mg iodine/mL injection (100 mLs  Given 9/14/22 0049)     Medical Decision Making:   ED Management:  Patient's presentation most consistent with hyperglycemic state without evidence of DKA.  Given Exam, History, and Workup I have low suspicion for an emergent precipitating factor of this hyperglycemic state such as atypical MI, acute abdomen, or other serious bacterial illness.  Patient is Type 2 Diabetic with changes in medication regimen/adherence.    Workup: CBC, CMP, UA, Bladder scan, CT abd/pel  Findings: Patient without AGAP or significant ketones in urine to suggest DKA  Interventions: IVF bolus 0.9%NS    Re-evaluation: Patient's serum glucose downtrended significantly with stable electrolytes and no anion gap at this time.    Disposition: Discharge home with appropriate insulin regimen and prompt PCP follow up instructions.                   ED Course as of 09/14/22 0237   Wed Sep 14, 2022   0235 IMPRESSION:  Thick-walled collection containing feculent material, widely communicating with small bowel, seen  centrally in the pelvis. Findings are suspicious for necrotic mass or abscess arising from the small  bowel.  Thank you for allowing us to  participate in the care of your patient.  Dictated and Authenticated by: Carlos Manjarrez MD  09/14/2022 2:04 AM Central Time ([place] & [place]) [BD]   0235 Afebrile.  Mild tachycardia on arrival now improved otherwise reassuring vital signs.  Labs show borderline leukocytosis and some mild anemia otherwise reassuring.  Do not suspect sepsis at this time.  Discussed with General surgery on-call, Dr. Annamarie lobo, who agrees with plan for admission and IV antibiotics.  We will initiate Zosyn.  Patient accepted by Hospital Medicine for further management. 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

## 2022-09-15 NOTE — CONSULTS
Consult Note  Infectious Disease    Reason for Consult:  GPC bacteremia    HPI: Dwight Hoffmann is a 52 y.o. male very pleasant, with past medical history of diabetes, HLD who presented 9/14 complaining of night sweats, unintentional 30 lb weight loss for the last couple of months.  Symptoms worsened by severe abdominal pain, urinary hesitancy, dysuria, increased urinary frequency, occasional bloody stool, with associated weakness, fatigue, and generalized pain.  He denies fever or chills, no nausea or vomiting, no chest pain or shortness of breath.  Patient was seen by primary care last month due to melena, refer to GI, colonoscopy done on 09/01 were 3 polyps were resected, pathology report consistent with tubular adenomas.      In the ER, blood pressure 113/69, T-max a 100.9°   Labs on admission white count 11.3, monocytic predominance 20%, bands 2%, H&H 9.6/28.5, MCV 74, microcytic anemia, platelet count 395   Normal kidney and liver function.    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 Prostatomegaly necessitating correlation with PSA.    Seen by [facility] surgery, status post ex lap for small bowel obstruction in the setting of necrotic large colonic mass with fistula formation, washout, and colon anastomosis.    Empirically started on Zosyn.  Switched to vancomycin, cefepime, Flagyl on 9/15.    ID consult for Gram-positive cocci in 1/2 bottles.      Review of patient's allergies indicates:  No Known Allergies  Past Medical History:   Diagnosis Date    Diabetes mellitus     Prediabetes      Past Surgical History:   Procedure Laterality Date    APPENDECTOMY  07/15/2014    COLONOSCOPY      COLONOSCOPY N/A 2/9/2022    Procedure: COLONOSCOPY;  Surgeon: Manuel Sanders MD;  Location: [facility];  Service: Endoscopy;  Laterality: N/A;     COLONOSCOPY N/A 9/1/2022    Procedure: COLONOSCOPY;  Surgeon: Andrea Clemens MD;  Location: [facility];  Service: Endoscopy;  Laterality: N/A;    INCISION AND DRAINAGE OF ABDOMEN N/A 9/14/2022    Procedure: INCISION AND DRAINAGE, ABDOMEN;  Surgeon: Jan Oliveira Jr., MD;  Location: [facility] OR;  Service: General;  Laterality: N/A;     Social History     Tobacco Use    Smoking status: Never    Smokeless tobacco: Never   Substance Use Topics    Alcohol use: Not Currently     Comment: occasional     Social History     Occupational History    Occupation: [employer]     Family History   Problem Relation Age of Onset    Cancer Mother         Colon    Diabetes Mother     Hypertension Mother     Seizures Father     Heart murmur Sister     Seizures Sister        Pertinent medications noted:     Review of Systems:   Night sweats, weight loss 30 lb in the last 6 months  No change in vision, loss of vision or diplopia  No sinus congestion, purulent nasal discharge, post nasal drip or facial pain  No pain in mouth or throat. No problems with teeth, gums.  No chest pain, palpitations, syncope  No cough, sputum production, shortness of breath, dyspnea on exertion, pleurisy, hemoptysis  No dysphagia, odynophagia  No nausea, vomiting, diarrhea, constipation  + blood in stool, or focal abd pain   + dysuria, hesitancy, increased frequency, no hematuria, retention, incontinence  No swelling of joints, redness of joints, injuries, +generalized muscle aches, weakness   No unusual headaches, dizziness, vertigo, numbness, paresthesias, neuropathy, falls  No anxiety, depression, substance abuse, sleep disturbance  No bleeding, lymphadenopathy  No new rashes, lesions, or wounds    Outdoor activities:  Lives at home with wife.  Family history colon cancer mother.  Travel:  None  Implants:  None   Antibiotic History:  Zosyn 9/13 -14.  Switched to vancomycin/cefepime/Flagyl 9/15.    EXAM & DIAGNOSTICS REVIEWED:   Vitals:     Temp:   [96.2 °F (35.7 °C)-100.9 °F (38.3 °C)]   Temp: 97.7 °F (36.5 °C) (09/15/22 1158)  Pulse: 85 (09/15/22 1300)  Resp: 16 (09/15/22 1300)  BP: 126/76 (09/15/22 1300)  SpO2: 95 % (09/15/22 1300)    Intake/Output Summary (Last 24 hours) at 9/15/2022 1600  Last data filed at 9/15/2022 1242  Gross per 24 hour   Intake 3160.27 ml   Output 1300 ml   Net 1860.27 ml       General:  In NAD. Alert and attentive, cooperative, comfortable  Eyes:  Anicteric, PERRL  ENT:  NG tube in place, no ulcers, exudates, thrush, nares patent, dentition is good  Neck:  Supple, no adenopathy appreciated  Lungs: Clear to auscultation b/l  Heart:  S1/S2+, regular rhythm, no murmurs  Abd:  S/p Ex-lap,. Dressing in place not disturbed, +BS, soft, non tender, non distended, no rebound  :  Voids, urine clear, no flank tenderness  Musc:  Joints without effusion, swelling,  erythema, synovitis, ambulatory  Skin:  Warm, no rash  Wound:   Neuro:  Following commands, no acute focal deficit   Psych:  Calm, cooperative  Lymphatic:     No cervical, supraclavicular nodes  Extrem: No LE edema b/l  VAD:         Isolation:     9/14:        General Labs reviewed:  Recent Labs   Lab 09/13/22  2345 09/14/22  0320 09/15/22  0450   WBC 11.36 11.26 16.25*   HGB 9.6* 8.9* 8.2*   HCT 28.5* 26.2* 23.8*    341 425       Recent Labs   Lab 09/13/22  2345 09/14/22  0320 09/15/22  0450   * 136 135*   K 4.1 3.7 4.3    103 101   CO2 21* 22* 25   BUN 13 12 9   CREATININE 0.9 0.8 0.9   CALCIUM 8.8 8.4* 8.4*   PROT 7.1  --  6.3   BILITOT 0.7  --  0.9   ALKPHOS 94  --  72   ALT 15  --  14   AST 20  --  17     No results for input(s): CRP in the last 168 hours.  No results for input(s): SEDRATE in the last 168 hours.    Estimated Creatinine Clearance: 117.9 mL/min (based on SCr of 0.9 mg/dL).     Micro:  Microbiology Results (last 7 days)       Procedure Component Value Units Date/Time    MRSA/SA Rapid ID by PCR from Blood culture [[id number]] Collected: 09/14/22  0320    Order Status: Completed Updated: 09/15/22 1335     Staph aureus ID by PCR Negative     MRSA ID by PCR Negative    Narrative:      Site # 1, aerobic and anaerobic    Blood culture (site 1) [[id number]] Collected: 09/14/22 0320    Order Status: Completed Specimen: Blood Updated: 09/15/22 1231     Blood Culture, Routine Gram stain aer bottle: Gram positive cocci in clusters resembling Staph      Results called to and read back by: Poncho Brunson RN  09/15/2022      12:31    Narrative:      Site # 1, aerobic and anaerobic    Blood culture (site 2) [[id number]] Collected: 09/14/22 0315    Order Status: Completed Specimen: Blood Updated: 09/15/22 1212     Blood Culture, Routine No Growth to date      No Growth to date    Narrative:      Site # 2, aerobic only    Gram stain [[id number]] Collected: 09/14/22 1525    Order Status: Completed Specimen: Abscess from Abdomen Updated: 09/15/22 0310     Gram Stain Result Rare WBC's      Rare Gram positive cocci      Rare Gram positive rods    Aerobic culture [[id number]] Collected: 09/14/22 1525    Order Status: Sent Specimen: Abscess from Abdomen Updated: 09/14/22 2348    Culture, Anaerobe [[id number]] Collected: 09/14/22 1525    Order Status: Sent Specimen: Abscess from Abdomen Updated: 09/14/22 2348            Pathology:  9/1 colonoscopy:  1. ASCENDING COLON, POLYPECTOMY:   - TUBULAR ADENOMA.     2. CECUM, POLYPECTOMY:   - TUBULAR ADENOMA.     3. CECUM, POLYPECTOMY:   - TUBULAR ADENOMA.     Imaging Reviewed:  CXR clear  CT abdomen/pelvis 9/14:  1. 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  2. Prostatomegaly necessitating correlation with PSA.    Cardiology:       IMPRESSION & PLAN     Severe sepsis secondary to perforated necrotic small bowel with abscess status post ex lap/drainage/small-bowel resection with anastomosis 9/14 in the setting of possible colon neoplasia       Blood cultures 1/4 bottles grew GPC  resembling staph, ID and sensitivities pending    PMHx: diabetes and HLD    Recommendations:  Repeat blood cultures x2  Follow OR cultures from 09/14   Follow TTE  Tumor markers ordered (CEA, , PSA, AFP)  Continue vancomycin IV, keep level 15-20   Cefepime 2 g IV q.8, dose as per crcl  Flagyl 500 mg IV q.8   Aspiration precautions  Incentive spirometry    Will follow     D/w patient, Dr Garrett    Medical Decision Making during this encounter was  [_] Low Complexity  [_] Moderate Complexity  [xx] High Complexity

## 2022-09-15 NOTE — CARE UPDATE
09/14/22 1917   Patient Assessment/Suction   Level of Consciousness (AVPU) alert   Respiratory Effort Unlabored   PRE-TX-O2   O2 Device (Oxygen Therapy) room air   SpO2 95 %   Pulse Oximetry Type Intermittent   $ Pulse Oximetry - Multiple Charge Pulse Oximetry - Multiple   Pulse 91   Resp 18   Incentive Spirometer   Administration (IS) refused

## 2022-09-15 NOTE — PROGRESS NOTES
Pharmacokinetic Initial Assessment: IV Vancomycin    Assessment/Plan:    Initiate intravenous vancomycin with loading dose of 2000 mg once with subsequent doses when random concentrations are less than 20 mcg/mL  Desired empiric serum trough concentration is 15 to 20 mcg/mL  Draw vancomycin random level on 9-16 at 0830.  Pharmacy will continue to follow and monitor vancomycin.      Please contact pharmacy at extension [phone] with any questions regarding this assessment.     Thank you for the consult,   Julius Hart       Patient brief summary:  Dwight Hoffmann is a 52 y.o. male initiated on antimicrobial therapy with IV Vancomycin for treatment of suspected bacteremia    Drug Allergies:   Review of patient's allergies indicates:  No Known Allergies    Actual Body Weight:   102.4 kg    Renal Function:   Estimated Creatinine Clearance: 118 mL/min (based on SCr of 0.9 mg/dL).,     Dialysis Method (if applicable):  N/A    CBC (last 72 hours):  Recent Labs   Lab Result Units 09/13/22  2345 09/14/22  0320 09/15/22  0450   WBC K/uL 11.36 11.26 16.25*   Hemoglobin g/dL 9.6* 8.9* 8.2*   Hematocrit % 28.5* 26.2* 23.8*   Platelets K/uL 395 341 425   Gran % % 59.0 50.1 74.4*   Lymph % % 18.0 26.1 17.6*   Mono % % 20.0* 20.2* 7.4   Eosinophil % % 1.0 2.1 0.0   Basophil % % 0.0 0.5 0.1   Differential Method  Manual Automated Automated       Metabolic Panel (last 72 hours):  Recent Labs   Lab Result Units 09/13/22 2342 09/13/22 2345 09/14/22  0320 09/15/22  0450 09/15/22  1215   Sodium mmol/L  --  134* 136 135*  --    Potassium mmol/L  --  4.1 3.7 4.3  --    Chloride mmol/L  --  101 103 101  --    CO2 mmol/L  --  21* 22* 25  --    Glucose mg/dL  --  131* 116* 175*  --    Glucose, UA  Negative  --   --   --  Negative   BUN mg/dL  --  13 12 9  --    Creatinine mg/dL  --  0.9 0.8 0.9  --    Albumin g/dL  --  2.6*  --  2.3*  --    Total Bilirubin mg/dL  --  0.7  --  0.9  --    Alkaline Phosphatase U/L  --  94  --  72  --    AST U/L  --   20  --  17  --    ALT U/L  --  15  --  14  --    Magnesium mg/dL  --   --  1.9 1.9  --        Drug levels (last 3 results):  No results for input(s): VANCOMYCINRA, VANCORANDOM, VANCOMYCINPE, VANCOPEAK, VANCOMYCINTR, VANCOTROUGH in the last 72 hours.    Microbiologic Results:  Microbiology Results (last 7 days)       Procedure Component Value Units Date/Time    Blood culture (site 1) [[id number]] Collected: 09/14/22 0320    Order Status: Completed Specimen: Blood Updated: 09/15/22 1231     Blood Culture, Routine Gram stain aer bottle: Gram positive cocci in clusters resembling Staph      Results called to and read back by: Poncho Brunson RN  09/15/2022      12:31    Narrative:      Site # 1, aerobic and anaerobic    MRSA/SA Rapid ID by PCR from Blood culture [[id number]] Collected: 09/15/22 1222    Order Status: No result Updated: 09/15/22 1223    Blood culture (site 2) [[id number]] Collected: 09/14/22 0315    Order Status: Completed Specimen: Blood Updated: 09/15/22 1212     Blood Culture, Routine No Growth to date      No Growth to date    Narrative:      Site # 2, aerobic only    Gram stain [[id number]] Collected: 09/14/22 1525    Order Status: Completed Specimen: Abscess from Abdomen Updated: 09/15/22 0310     Gram Stain Result Rare WBC's      Rare Gram positive cocci      Rare Gram positive rods    Aerobic culture [[id number]] Collected: 09/14/22 1525    Order Status: Sent Specimen: Abscess from Abdomen Updated: 09/14/22 2348    Culture, Anaerobe [[id number]] Collected: 09/14/22 1525    Order Status: Sent Specimen: Abscess from Abdomen Updated: 09/14/22 2348

## 2022-09-15 NOTE — HOSPITAL COURSE
Dwight Hoffmann is a 52 year old male with a past medical history of DM, HLD and anemia who presented with lower abdominal pain, lower back pain, and difficulty urinating secondary to an abdominal mass discovered on CT scan. Surgery was consulted and performed exploratory laparotomy 9/14/22 with resection of the mass and anastomosis of small bowel. The mass appeared to be an abscess with surrounding necrosis.  Patient with signs of sepsis including tachycardic and leukocytosis which could be related to surgery as well which complicated his picture however he was covered for this with with IV antibiotics. An NG tube to LIWS suction was started and the patient was put on IV fluids while he was NPO. Briefly on PPN. He did have a positive blood culture which showed coagulase negative Staph suspected to be a contaminant. He was briefly on vancomycin which was discontinued. ID consulted.  Patient was put on meropenem and fluconazole.  Cultures from surgery with Enterobacter cloacae and Proteus mirabilis.  His bowel function slowly returned, and he no longer needed NGT to suction.  The pathologic diagnosis on the mass was diffuse large B cell lymphoma.  Oncology service was asked to consult with us.  Patient underwent bone marrow biopsy 9/22/22 for staging.  CT of chest showed no enlarged lymph nodes. Did have another laparoscopy 9/23/22 due to abdominal fluid collection with cleanout thought to be more necrotic than infective. ID planned for outpatient ertapenem infusions to complete 2 week course. Home health was arranged. To draw CBC, CMP, CRP, and ESR weekly and send to ID. CT abd/pelv ordered to be done prior to ID clinic visit. He will have follow up with PCP, ID, general surgery, and heme/onc. To have abdominal staples removed at surgery clinic 3-4 days after discharge. By time of discharge the patient was tolerating a regular diet with improving admission symptoms.    Physical Exam on day of  discharge:  Constitutional:       General: He is not in acute distress.     Appearance: He is not diaphoretic.   Neck:      Vascular: No JVD.   Cardiovascular:      Rate and Rhythm: Normal rate and regular rhythm.   Pulmonary:      Effort: Pulmonary effort is normal.      Breath sounds: Normal breath sounds.   Abdominal:      General: Bowel sounds are normal. There is distension (mild to moderate).      Palpations: Abdomen is soft.      Tenderness: There is no abdominal tenderness.      Comments: Incisions clean. Dressing c/d/I  JAVAN drain minimal serosanguinous output   Skin:     General: Skin is warm.      Findings: No rash.   Neurological:      General: No focal deficit present.      Mental Status: He is alert and oriented to person, place, and time. Mental status is at baseline.

## 2022-09-15 NOTE — SUBJECTIVE & OBJECTIVE
"Interval History: see "Hospital Course"    Review of Systems   Constitutional:  Positive for fatigue. Negative for chills, diaphoresis and fever.   HENT:  Negative for congestion, ear pain, sore throat and trouble swallowing.    Eyes:  Negative for pain, discharge and visual disturbance.   Respiratory:  Negative for cough, chest tightness, shortness of breath and wheezing.    Cardiovascular:  Negative for chest pain, palpitations and leg swelling.   Gastrointestinal:  Positive for abdominal pain. Negative for abdominal distention, blood in stool, constipation, diarrhea, nausea and vomiting.   Endocrine: Negative for polydipsia, polyphagia and polyuria.   Genitourinary:  Positive for frequency. Negative for difficulty urinating, dysuria, flank pain and urgency.   Musculoskeletal:  Positive for arthralgias, myalgias and neck pain. Negative for back pain, joint swelling and neck stiffness.   Skin:  Negative for rash and wound.   Allergic/Immunologic: Negative for immunocompromised state.   Neurological:  Positive for weakness. Negative for dizziness, syncope, speech difficulty, light-headedness, numbness and headaches.   Hematological:  Negative for adenopathy.   Psychiatric/Behavioral:  Negative for confusion and suicidal ideas. The patient is not nervous/anxious.    All other systems reviewed and are negative.  Objective:     Vital Signs (Most Recent):  Temp: 98.4 °F (36.9 °C) (09/15/22 0756)  Pulse: 82 (09/15/22 0901)  Resp: 16 (09/15/22 0901)  BP: 123/75 (09/15/22 0756)  SpO2: (!) 93 % (09/15/22 0901)   Vital Signs (24h Range):  Temp:  [96.2 °F (35.7 °C)-100.9 °F (38.3 °C)] 98.4 °F (36.9 °C)  Pulse:  [] 82  Resp:  [9-39] 16  SpO2:  [92 %-99 %] 93 %  BP: (118-145)/(57-76) 123/75     Weight: 102.4 kg (225 lb 12 oz)  Body mass index is 29.78 kg/m².    Intake/Output Summary (Last 24 hours) at 9/15/2022 0952  Last data filed at 9/15/2022 0700  Gross per 24 hour   Intake 4400.27 ml   Output 1700 ml   Net 2700.27 ml "      Physical Exam  Vitals and nursing note reviewed.   Constitutional:       General: He is not in acute distress.     Appearance: He is well-developed. He is ill-appearing. He is not toxic-appearing.   HENT:      Head: Normocephalic and atraumatic.      Right Ear: External ear normal.      Left Ear: External ear normal.      Nose: Nose normal.      Comments: NG tube to LIWS.     Mouth/Throat:      Mouth: Mucous membranes are moist.      Pharynx: Oropharynx is clear.   Eyes:      Extraocular Movements: Extraocular movements intact.      Conjunctiva/sclera: Conjunctivae normal.      Comments: pale   Cardiovascular:      Rate and Rhythm: Normal rate and regular rhythm.      Pulses: Normal pulses.      Heart sounds: Normal heart sounds.   Pulmonary:      Effort: Pulmonary effort is normal.      Breath sounds: Normal breath sounds.   Abdominal:      Palpations: Abdomen is soft.      Tenderness: There is abdominal tenderness.      Comments: Dressind c/d/I. Drain in place.   Musculoskeletal:         General: Normal range of motion.      Cervical back: Normal range of motion and neck supple.      Right lower leg: No edema.      Left lower leg: No edema.   Skin:     General: Skin is warm and dry.   Neurological:      General: No focal deficit present.      Mental Status: He is alert and oriented to person, place, and time. Mental status is at baseline.   Psychiatric:         Mood and Affect: Mood normal.         Behavior: Behavior normal.         Thought Content: Thought content normal.         Judgment: Judgment normal.       Significant Labs: All pertinent labs within the past 24 hours have been reviewed.    Significant Imaging: I have reviewed all pertinent imaging results/findings within the past 24 hours.

## 2022-09-15 NOTE — PROGRESS NOTES
"[facility] Medicine  Progress Note    Patient Name: Dwight Hoffmann  MRN: [id number]  Patient Class: IP- Inpatient   Admission Date: 9/13/2022  Length of Stay: 1 days  Attending Physician: Dayron Garrett MD  Primary Care Provider: BOSTON Oswald        Subjective:     Principal Problem:Small bowel mass        HPI:  Mr. Hoffmann is a 52 year old male with a history of NIDDM2 and HLD who presents today with complaints of night sweats for weeks. It is moderate. It is associated with 25 lb wt loss over the last 2 months, urinary hesitancy, urinary frequency, occasional blood streaked stools, weakness, fatigue, neck pain, knee pain, and pelvic pain. He denies measured fever, chills, N/V/D, chest pain, or SOB. He was seen by his primary last month about the blood streaked stools and referred to Dr. Clemens for colonoscopy on 9/1 with multiple polypectomies with path report of tubular adenomas. In the ED, labs revealed microcytic anemia, he was mildly tachycardic on arrival  which improved spontaneously, /60,  and CT abd/pelvis revealing: "Thick-walled collection containing feculent material, widely communicating with small bowel, seen centrally in the pelvis. Findings are suspicious for necrotic mass or abscess arising from the small bowel" Findings were discussed with Dr. Oliveira by ED MD who agreed to see patient in the morning.       Overview/Hospital Course:  Dwight Hoffmann is a 52 year old male with a past medical history of DM, HLD and anemia who presented with lower abdominal pain, lower back pain, and difficulty urinating secondary to an abdominal mass discovered on CT scan. Surgery was consulted and performed exploratory laparotomy with resection of the mass and anastomosis of small bowel. The mass appears to be an abscess with surrounding necrosis. The patient is currently on Zosyn while surgical cultures are pending. An NG tube to LIWS suction is in place and the " "patient is on IV fluids while he is NPO.      Interval History: see "Hospital Course"    Review of Systems   Constitutional:  Positive for fatigue. Negative for chills, diaphoresis and fever.   HENT:  Negative for congestion, ear pain, sore throat and trouble swallowing.    Eyes:  Negative for pain, discharge and visual disturbance.   Respiratory:  Negative for cough, chest tightness, shortness of breath and wheezing.    Cardiovascular:  Negative for chest pain, palpitations and leg swelling.   Gastrointestinal:  Positive for abdominal pain. Negative for abdominal distention, blood in stool, constipation, diarrhea, nausea and vomiting.   Endocrine: Negative for polydipsia, polyphagia and polyuria.   Genitourinary:  Positive for frequency. Negative for difficulty urinating, dysuria, flank pain and urgency.   Musculoskeletal:  Positive for arthralgias, myalgias and neck pain. Negative for back pain, joint swelling and neck stiffness.   Skin:  Negative for rash and wound.   Allergic/Immunologic: Negative for immunocompromised state.   Neurological:  Positive for weakness. Negative for dizziness, syncope, speech difficulty, light-headedness, numbness and headaches.   Hematological:  Negative for adenopathy.   Psychiatric/Behavioral:  Negative for confusion and suicidal ideas. The patient is not nervous/anxious.    All other systems reviewed and are negative.  Objective:     Vital Signs (Most Recent):  Temp: 98.4 °F (36.9 °C) (09/15/22 0756)  Pulse: 82 (09/15/22 0901)  Resp: 16 (09/15/22 0901)  BP: 123/75 (09/15/22 0756)  SpO2: (!) 93 % (09/15/22 0901)   Vital Signs (24h Range):  Temp:  [96.2 °F (35.7 °C)-100.9 °F (38.3 °C)] 98.4 °F (36.9 °C)  Pulse:  [] 82  Resp:  [9-39] 16  SpO2:  [92 %-99 %] 93 %  BP: (118-145)/(57-76) 123/75     Weight: 102.4 kg (225 lb 12 oz)  Body mass index is 29.78 kg/m².    Intake/Output Summary (Last 24 hours) at 9/15/2022 0913  Last data filed at 9/15/2022 0700  Gross per 24 hour "   Intake 4400.27 ml   Output 1700 ml   Net 2700.27 ml      Physical Exam  Vitals and nursing note reviewed.   Constitutional:       General: He is not in acute distress.     Appearance: He is well-developed. He is ill-appearing. He is not toxic-appearing.   HENT:      Head: Normocephalic and atraumatic.      Right Ear: External ear normal.      Left Ear: External ear normal.      Nose: Nose normal.      Comments: NG tube to LI.     Mouth/Throat:      Mouth: Mucous membranes are moist.      Pharynx: Oropharynx is clear.   Eyes:      Extraocular Movements: Extraocular movements intact.      Conjunctiva/sclera: Conjunctivae normal.      Comments: pale   Cardiovascular:      Rate and Rhythm: Normal rate and regular rhythm.      Pulses: Normal pulses.      Heart sounds: Normal heart sounds.   Pulmonary:      Effort: Pulmonary effort is normal.      Breath sounds: Normal breath sounds.   Abdominal:      Palpations: Abdomen is soft.      Tenderness: There is abdominal tenderness.      Comments: Dressind c/d/I. Drain in place.   Musculoskeletal:         General: Normal range of motion.      Cervical back: Normal range of motion and neck supple.      Right lower leg: No edema.      Left lower leg: No edema.   Skin:     General: Skin is warm and dry.   Neurological:      General: No focal deficit present.      Mental Status: He is alert and oriented to person, place, and time. Mental status is at baseline.   Psychiatric:         Mood and Affect: Mood normal.         Behavior: Behavior normal.         Thought Content: Thought content normal.         Judgment: Judgment normal.       Significant Labs: All pertinent labs within the past 24 hours have been reviewed.    Significant Imaging: I have reviewed all pertinent imaging results/findings within the past 24 hours.      Assessment/Plan:      * Small bowel mass  S/p resection 9/14 per Dr. Oliveira.  -Follow up cultures and pathology  -NG tube to [facility]  -NPO  -LR IV  fluids  -Zosyn  -Surgery following  -Incentive spirometry  -PRN IV analgesics and antiemetics    Arthralgia of bilat knees and neck  Chronic.      Diabetes mellitus type 2, noninsulin dependent  -SSI  -NPO  -Hypoglycemic precautions  -Q6H glucose checks    Microcytic anemia  Appears inflammatory.  -Trend Hgb with CBC        VTE Risk Mitigation (From admission, onward)         Ordered     enoxaparin injection 40 mg  Daily         09/14/22 1009     IP VTE HIGH RISK PATIENT  Once         09/14/22 0246     Place sequential compression device  Until discontinued         09/14/22 0246                Discharge Planning   CATRACHO: 9/16/2022     Code Status: Full Code   Is the patient medically ready for discharge?:     Reason for patient still in hospital (select all that apply): Patient trending condition, Treatment and Consult recommendations  Discharge Plan A: Home with family                  Dayron Garrett MD  Department of Hospital Medicine   [facility]

## 2022-09-15 NOTE — ASSESSMENT & PLAN NOTE
S/p resection 9/14 per Dr. Oliveira.  -Follow up cultures and pathology  -NG tube to LIWS  -NPO  -LR IV fluids  -Zosyn  -Surgery following  -Incentive spirometry  -PRN IV analgesics and antiemetics

## 2022-09-15 NOTE — PLAN OF CARE
The patient has an NG to low intermittent suction. Active bowel sounds all quadrants. No nausea or vomiting. Tolerating ice chips and sips of water with medications.

## 2022-09-15 NOTE — PLAN OF CARE
Cm in morning medical rounding. Pt has NGT-placed to low suction.IVAB Piperacillin-Azobactam in progress. Pt not medically cleared. Cm will continue following.

## 2022-09-16 PROBLEM — E44.0 MODERATE MALNUTRITION: Status: ACTIVE | Noted: 2022-01-01

## 2022-09-16 NOTE — PROGRESS NOTES
General Surgery Progress Note    Admit Date: 9/13/2022  S/P: Procedure(s) (LRB):  LAPAROTOMY, EXPLORATORY (N/A)  EXCISION, SMALL INTESTINE (N/A)  INCISION AND DRAINAGE, ABDOMEN (N/A)    Post-operative Day: 2 Days Post-Op    Hospital Day: 4    SUBJECTIVE:   Has abdominal distention and pain.  NG tube is in place but not a significant amount of output.  No passage of flatus or stool.    OBJECTIVE:     Vital Signs (Most Recent)  Temp:  [96.9 °F (36.1 °C)-98.2 °F (36.8 °C)] 97 °F (36.1 °C)  Pulse:  [] 91  Resp:  [15-20] 16  SpO2:  [92 %-99 %] 98 %  BP: (124-131)/(71-82) 131/82    I&Os:  I/O last 3 completed shifts:  In: 4227 [P.O.:60; I.V.:3354.2; IV Piggyback:812.8]  Out: 4355 [Urine:4150; Drains:205]    Physical Exam:  Gen: NAD, AAOx3  HEENT: Anicteric sclera, NG tube in place  Pulm: unlabored, symmetrical   Abd: Distended and tender to palpation, midline incision intact with bandage in place with minimal dry bloody soilage    Laboratory:  CBC:   Recent Labs   Lab 09/16/22  0351   WBC 12.86*   RBC 3.27*   HGB 8.1*   HCT 24.8*   *   MCV 76*   MCH 24.8*   MCHC 32.7     BMP:   Recent Labs   Lab 09/16/22  0351   *      K 4.2      CO2 27   BUN 8   CREATININE 0.8   CALCIUM 8.7     Labs within the past 24 hours have been reviewed.    ASSESSMENT/PLAN:     Patient Active Problem List    Diagnosis Date Noted    Small bowel mass 09/14/2022    Microcytic anemia 09/14/2022    Diabetes mellitus type 2, noninsulin dependent 09/14/2022    Arthralgia of bilat knees and neck 09/14/2022         52 y.o. male status post resection of small bowel mass/perforation  -expected ileus secondary to significant intra-abdominal inflammation  -continue NG tube until patient begins passing flatus at which time it may be clamped and he may be advanced to clear liquids as tolerated  -encourage out of bed to chair and ambulation  -optimize electrolytes  -continue current pain regimen  -antibiotics per Infectious Disease  recommendations  -pathology pending

## 2022-09-16 NOTE — RESPIRATORY THERAPY
02 saturation 99% on nc at 2lpm.  Decreased to 1lpm and will try to wean to standby.  Patient receiving IS now and tid.  Patient olnly 1000ml on IS.  Encourared pt. To use as much as possible.

## 2022-09-16 NOTE — PROGRESS NOTES
Dwight Hoffmann [id number] is a 52 y.o. male who had been consulted for vancomycin dosing.    Vancomycin has been discontinued.  Pharmacy consult for vancomycin dosing in no longer required.      Thank you for allowing us to participate in this patient's care.     Brian Roa

## 2022-09-16 NOTE — PLAN OF CARE
Recommendations  1) advance PO diet to clears when pt passing flatus -> advance as tolerated to goal of Low fiber, DM 2000 kcal   2) Add Boost breeze BID on clear liquids   3) weigh weekly      4) If unable to advance PO diet to full liquids in 72 hr start nutrition support PPN D 5 AA 4.25 @ 90 ml/hr + standard lipids ( 91 g protein, 1234 kcal)     Goals: 1) PO diet advanced to full liquids in < 72 hr  Nutrition Goal Status: new  Communication of RD Recs:  (POC, sticky note)

## 2022-09-16 NOTE — ASSESSMENT & PLAN NOTE
Contributing Nutrition Diagnosis  Moderate acute condition related malnutrition    Related to (etiology):   Altered GI function    Signs and Symptoms (as evidenced by):   1) PO intakes < 75% of needs x > 1 week  2) 8% wt loss x 2 months ( per pt)    Interventions:  Collaboration with care providers    Nutrition Diagnosis Status:   new

## 2022-09-16 NOTE — CARE UPDATE
09/15/22 2216   Patient Assessment/Suction   Level of Consciousness (AVPU) alert   Respiratory Effort Shallow;Unlabored   Expansion/Accessory Muscles/Retractions no retractions;no use of accessory muscles   PRE-TX-O2   O2 Device (Oxygen Therapy) room air   SpO2 (!) 94 %   Pulse Oximetry Type Intermittent   $ Pulse Oximetry - Multiple Charge Pulse Oximetry - Multiple   Pulse 85   Resp 15   Incentive Spirometer   $ Incentive Spirometer Charges done with encouragement;proper technique demonstrated   Administration (IS) proper technique demonstrated   Number of Repetitions (IS) 6   Level Incentive Spirometer (mL) 1000   Patient Tolerance (IS) good;no adverse signs/symptoms present

## 2022-09-16 NOTE — PROGRESS NOTES
[facility]  Adult Nutrition  Progress Note    SUMMARY       Recommendations  1) advance PO diet to clears when pt passing flatus -> advance as tolerated to goal of Low fiber, DM 2000 kcal   2) Add Boost breeze BID on clear liquids   3) weigh weekly     4) If unable to advance PO diet to full liquids in 72 hr start nutrition support PPN D 5 AA 4.25 @ 90 ml/hr + standard lipids ( 91 g protein, 1234 kcal)    Goals: 1) PO diet advanced to full liquids in < 72 hr  Nutrition Goal Status: new  Communication of RD Recs:  (POC, sticky note)    Assessment and Plan    Moderate malnutrition  Contributing Nutrition Diagnosis  Moderate acute condition related malnutrition    Related to (etiology):   Altered GI function    Signs and Symptoms (as evidenced by):   1) PO intakes < 75% of needs x > 1 week  2) 8% wt loss x 2 months ( per pt)    Interventions:  Collaboration with care providers    Nutrition Diagnosis Status:   new           Malnutrition Assessment     Skin (Micronutrient):  (Oj = 19, abd. incision, NG ( 5 ml output))  Teeth (Micronutrient):  (missing some)   Micronutrient Evaluation: suspected deficiency  Micronutrient Evaluation Comments: iron   Weight Loss (Malnutrition): greater than 7.5% in <3 months  Energy Intake (Malnutrition): less than 75% for greater than or equal to 1 week          Edema (Fluid Accumulation): 0-->no edema present             Reason for Assessment    Reason For Assessment: NPO/clear liquids x 5 days  Diagnosis:  (small bowl mass)  Relevant Medical History: DM2 on metformin, HLD  Interdisciplinary Rounds: did not attend    General Information Comments: 53 y/o male admitted with small bowel perforation POD 2 abd. resection. Now with ileus, + NG, no significant output. Not yet passing BM or flatus, per surgery can advance diet to clears at that point. NFPE to be done at f/u. Endorses 25 lb wt loss x 2 months.    Nutrition Discharge Planning: To be determined- DM 2000 kcal,  "cardiac diet    Nutrition Risk Screen    Nutrition Risk Screen: no indicators present    Nutrition/Diet History    Spiritual, Cultural Beliefs, Confucianist Practices, Values that Affect Care: no  Food Allergies: NKFA  Factors Affecting Nutritional Intake: altered gastrointestinal function, NPO    Anthropometrics    Temp: 97 °F (36.1 °C)  Height Method: Measured (21)  Height: 6' 1"  Height (inches): 73 in  Weight Method: Bed Scale  Weight: 102.1 kg (225 lb 1.4 oz)  Weight (lb): 225.09 lb  Ideal Body Weight (IBW), Male: 184 lb  % Ideal Body Weight, Male (lb): 122.33 %  BMI (Calculated): 29.7  BMI Grade: 25 - 29.9 - overweight  Weight Loss: unintentional  Usual Body Weight (UBW), k.6 kg (21- per pt wt loss was in the last 2 months)  % Usual Body Weight: 91.68  % Weight Change From Usual Weight: -8.51 %       Lab/Procedures/Meds    Pertinent Labs Reviewed: reviewed  BMP  Lab Results   Component Value Date     2022    K 4.2 2022     2022    CO2 27 2022    BUN 8 2022    CREATININE 0.8 2022    CALCIUM 8.7 2022    ANIONGAP 10 2022    ESTGFRAFRICA 111 2021    EGFRNONAA 96 2021     Lab Results   Component Value Date    ALBUMIN 2.2 (L) 2022     Lab Results   Component Value Date    IRON 23 (L) 2022    TRANSFERRIN 158 (L) 2022    TIBC 234 (L) 2022    LABIRON 9 (L) 2022    FESATURATED 10 (L) 2022      POCT Glucose   Date Value Ref Range Status   09/15/2022 156 (H) 70 - 110 mg/dL Final   2022 196 (H) 70 - 110 mg/dL Final   2022 105 70 - 110 mg/dL Final   2022 130 (H) 70 - 110 mg/dL Final   2022 187 (H) 70 - 110 mg/dL Final     Lab Results   Component Value Date    HGBA1C 11.0 (H) 2021         Pertinent Medications Reviewed: reviewed  Pertinent Medications Comments: lactated ringers @ 125 ml/hr, zofran, insulin, phenergan    Estimated/Assessed Needs    Weight Used For Calorie " Calculations: 102.1 kg (225 lb 1.4 oz)  Energy Calorie Requirements (kcal): MSJ ( x 1.2) = 2300 kcal  Energy Need Method: [place]  Protein Requirements: 1-1.2 g protein/kg ( 102-122 g)  Weight Used For Protein Calculations: 102.1 kg (225 lb 1.4 oz)  Fluid Requirements (mL): 2300 ml or per MD  Estimated Fluid Requirement Method: RDA Method  CHO Requirement: 258-316 g      Nutrition Prescription Ordered    Current Diet Order: NPO x 3 days    Evaluation of Received Nutrient/Fluid Intake    Energy Calories Required: not meeting needs  Protein Required: not meeting needs  Fluid Required: exceeds needs  Total Fluid Intake (mL/kg): IVF @ 125 ml/hr  Tolerance: other (see comments) (NPO)     Intake/Output Summary (Last 24 hours) at 9/16/2022 1631  Last data filed at 9/16/2022 1400  Gross per 24 hour   Intake 1826.71 ml   Output 3085 ml   Net -1258.29 ml       % Intake of Estimated Energy Needs: 0%  % Meal Intake: NPO    Nutrition Risk    Level of Risk/Frequency of Follow-up: moderate - high 2-3 x weekly    Monitor and Evaluation    Food and Nutrient Intake: energy intake  Food and Nutrient Adminstration: diet order, enteral and parenteral nutrition administration  Anthropometric Measurements: weight  Biochemical Data, Medical Tests and Procedures: electrolyte and renal panel, gastrointestinal profile, glucose/endocrine profile  Nutrition-Focused Physical Findings: overall appearance, skin     Nutrition Follow-Up    RD Follow-up?: Yes

## 2022-09-16 NOTE — SUBJECTIVE & OBJECTIVE
"Interval History: see "Hospital Course"    Review of Systems   Constitutional:  Positive for fatigue. Negative for chills, diaphoresis and fever.   HENT:  Negative for congestion, ear pain, sore throat and trouble swallowing.    Eyes:  Negative for pain, discharge and visual disturbance.   Respiratory:  Negative for cough, chest tightness, shortness of breath and wheezing.    Cardiovascular:  Negative for chest pain, palpitations and leg swelling.   Gastrointestinal:  Positive for abdominal pain. Negative for abdominal distention, blood in stool, constipation, diarrhea, nausea and vomiting.   Endocrine: Negative for polydipsia, polyphagia and polyuria.   Genitourinary:  Positive for frequency. Negative for difficulty urinating, dysuria, flank pain and urgency.   Musculoskeletal:  Positive for arthralgias, myalgias and neck pain. Negative for back pain, joint swelling and neck stiffness.   Skin:  Positive for wound. Negative for rash.   Allergic/Immunologic: Negative for immunocompromised state.   Neurological:  Positive for weakness. Negative for dizziness, syncope, speech difficulty, light-headedness, numbness and headaches.   Hematological:  Negative for adenopathy.   Psychiatric/Behavioral:  Negative for confusion and suicidal ideas. The patient is not nervous/anxious.    All other systems reviewed and are negative.  Objective:     Vital Signs (Most Recent):  Temp: 97 °F (36.1 °C) (09/16/22 0804)  Pulse: 91 (09/16/22 0804)  Resp: 16 (09/16/22 0804)  BP: 131/82 (09/16/22 0804)  SpO2: 98 % (09/16/22 0804) Vital Signs (24h Range):  Temp:  [96.9 °F (36.1 °C)-98.2 °F (36.8 °C)] 97 °F (36.1 °C)  Pulse:  [] 91  Resp:  [15-20] 16  SpO2:  [92 %-99 %] 98 %  BP: (124-131)/(71-82) 131/82     Weight: 102.1 kg (225 lb)  Body mass index is 29.69 kg/m².    Intake/Output Summary (Last 24 hours) at 9/16/2022 1017  Last data filed at 9/16/2022 0544  Gross per 24 hour   Intake 1766.71 ml   Output 3355 ml   Net -1588.29 ml    "   Physical Exam  Vitals and nursing note reviewed.   Constitutional:       General: He is not in acute distress.     Appearance: He is well-developed. He is ill-appearing. He is not toxic-appearing.   HENT:      Head: Normocephalic and atraumatic.      Right Ear: External ear normal.      Left Ear: External ear normal.      Nose: Nose normal.      Comments: NG tube to LIWS.     Mouth/Throat:      Mouth: Mucous membranes are moist.      Pharynx: Oropharynx is clear.   Eyes:      Extraocular Movements: Extraocular movements intact.      Conjunctiva/sclera: Conjunctivae normal.      Comments: pale   Cardiovascular:      Rate and Rhythm: Normal rate and regular rhythm.      Pulses: Normal pulses.      Heart sounds: Normal heart sounds.   Pulmonary:      Effort: Pulmonary effort is normal.      Breath sounds: Normal breath sounds.   Abdominal:      Palpations: Abdomen is soft.      Tenderness: There is abdominal tenderness.      Comments: Dressing c/d/I.   Musculoskeletal:         General: Normal range of motion.      Cervical back: Normal range of motion and neck supple.      Right lower leg: No edema.      Left lower leg: No edema.   Skin:     General: Skin is warm and dry.   Neurological:      General: No focal deficit present.      Mental Status: He is alert and oriented to person, place, and time. Mental status is at baseline.   Psychiatric:         Mood and Affect: Mood normal.         Behavior: Behavior normal.         Thought Content: Thought content normal.         Judgment: Judgment normal.       Significant Labs: All pertinent labs within the past 24 hours have been reviewed.    Significant Imaging: I have reviewed all pertinent imaging results/findings within the past 24 hours.

## 2022-09-16 NOTE — ASSESSMENT & PLAN NOTE
S/p resection 9/14 per Dr. Oliveira.  -Follow up cultures and pathology  -NG tube to LIWS  -NPO  -LR IV fluids  -Cefepime and Flagyl  -Surgery following  -Incentive spirometry  -PRN IV analgesics and antiemetics

## 2022-09-16 NOTE — PROGRESS NOTES
Pharmacokinetic Initial Assessment: IV Vancomycin    Assessment/Plan:    Initiate intravenous vancomycin 1750 mg IV every 12 hours  Desired empiric serum trough concentration is 15 to 20 mcg/mL  Draw vancomycin trough level 60 min prior to third dose on 9/17/22 at approximately 1230  Pharmacy will continue to follow and monitor vancomycin.      Please contact pharmacy at extension [phone] with any questions regarding this assessment.     Thank you for the consult,   Luiza Crowell       Patient brief summary:  Dwight Hoffmann is a 52 y.o. male initiated on antimicrobial therapy with IV Vancomycin for treatment of suspected intra-abdominal infection    Drug Allergies:   Review of patient's allergies indicates:  No Known Allergies    Actual Body Weight:   102.1 kg    Renal Function:   Estimated Creatinine Clearance: 132.6 mL/min (based on SCr of 0.8 mg/dL).,     Dialysis Method (if applicable):  N/A    CBC (last 72 hours):  Recent Labs   Lab Result Units 09/13/22  2345 09/14/22  0320 09/15/22  0450 09/16/22  0351   WBC K/uL 11.36 11.26 16.25* 12.86*   Hemoglobin g/dL 9.6* 8.9* 8.2* 8.1*   Hematocrit % 28.5* 26.2* 23.8* 24.8*   Platelets K/uL 395 341 425 482*   Gran % % 59.0 50.1 74.4* 71.2   Lymph % % 18.0 26.1 17.6* 18.4   Mono % % 20.0* 20.2* 7.4 8.9   Eosinophil % % 1.0 2.1 0.0 0.7   Basophil % % 0.0 0.5 0.1 0.3   Differential Method  Manual Automated Automated Automated       Metabolic Panel (last 72 hours):  Recent Labs   Lab Result Units 09/13/22  2342 09/13/22  2345 09/14/22  0320 09/15/22  0450 09/15/22  1215 09/16/22  0351   Sodium mmol/L  --  134* 136 135*  --  138   Potassium mmol/L  --  4.1 3.7 4.3  --  4.2   Chloride mmol/L  --  101 103 101  --  101   CO2 mmol/L  --  21* 22* 25  --  27   Glucose mg/dL  --  131* 116* 175*  --  127*   Glucose, UA  Negative  --   --   --  Negative  --    BUN mg/dL  --  13 12 9  --  8   Creatinine mg/dL  --  0.9 0.8 0.9  --  0.8   Albumin g/dL  --  2.6*  --  2.3*  --  2.2*   Total  Bilirubin mg/dL  --  0.7  --  0.9  --  0.8   Alkaline Phosphatase U/L  --  94  --  72  --  68   AST U/L  --  20  --  17  --  19   ALT U/L  --  15  --  14  --  9*   Magnesium mg/dL  --   --  1.9 1.9  --  2.0   Phosphorus mg/dL  --   --   --   --   --  3.7       Drug levels (last 3 results):  Recent Labs   Lab Result Units 09/16/22  0847   Vancomycin-Trough ug/mL 6.3*       Microbiologic Results:  Microbiology Results (last 7 days)       Procedure Component Value Units Date/Time    Blood culture (site 2) [[id number]] Collected: 09/14/22 0315    Order Status: Completed Specimen: Blood Updated: 09/16/22 1212     Blood Culture, Routine No Growth to date      No Growth to date      No Growth to date    Narrative:      Site # 2, aerobic only    Aerobic culture [[id number]]  (Abnormal) Collected: 09/14/22 1525    Order Status: Completed Specimen: Abscess from Abdomen Updated: 09/16/22 1131     Aerobic Bacterial Culture GRAM NEGATIVE LUL  Few  Identification and susceptibility pending      Blood culture [[id number]] Collected: 09/15/22 1819    Order Status: Completed Specimen: Blood from Antecubital, Right Updated: 09/16/22 1115     Blood Culture, Routine No Growth to date    Blood culture (site 1) [[id number]]  (Abnormal) Collected: 09/14/22 0320    Order Status: Completed Specimen: Blood Updated: 09/16/22 0733     Blood Culture, Routine Gram stain aer bottle: Gram positive cocci in clusters resembling Staph      Results called to and read back by: Poncho Brunson RN  09/15/2022      12:31      COAGULASE-NEGATIVE STAPHYLOCOCCUS SPECIES  Organism is a probable contaminant      Narrative:      Site # 1, aerobic and anaerobic    Blood culture [[id number]] Collected: 09/15/22 1722    Order Status: Completed Specimen: Blood from Antecubital, Right Updated: 09/16/22 0715     Blood Culture, Routine No Growth to date    MRSA/SA Rapid ID by PCR from Blood culture [[id number]] Collected: 09/14/22 0320    Order Status: Completed Updated:  09/15/22 1335     Staph aureus ID by PCR Negative     MRSA ID by PCR Negative    Narrative:      Site # 1, aerobic and anaerobic    Gram stain [[id number]] Collected: 09/14/22 1525    Order Status: Completed Specimen: Abscess from Abdomen Updated: 09/15/22 0310     Gram Stain Result Rare WBC's      Rare Gram positive cocci      Rare Gram positive rods    Culture, Anaerobe [[id number]] Collected: 09/14/22 1527    Order Status: Sent Specimen: Abscess from Abdomen Updated: 09/14/22 1109

## 2022-09-16 NOTE — PROGRESS NOTES
"[facility]  Progress Note    Patient Name: Dwight Hoffmann  MRN: [id number]  Patient Class: IP- Inpatient   Admission Date: 9/13/2022  Length of Stay: 2 days  Attending Physician: Dayron Garrett MD  Primary Care Provider: BOSTON Oswald        Subjective:     Principal Problem:Small bowel mass        HPI:  Mr. Hoffmann is a 52 year old male with a history of NIDDM2 and HLD who presents today with complaints of night sweats for weeks. It is moderate. It is associated with 25 lb wt loss over the last 2 months, urinary hesitancy, urinary frequency, occasional blood streaked stools, weakness, fatigue, neck pain, knee pain, and pelvic pain. He denies measured fever, chills, N/V/D, chest pain, or SOB. He was seen by his primary last month about the blood streaked stools and referred to Dr. Clemens for colonoscopy on 9/1 with multiple polypectomies with path report of tubular adenomas. In the ED, labs revealed microcytic anemia, he was mildly tachycardic on arrival  which improved spontaneously, /60,  and CT abd/pelvis revealing: "Thick-walled collection containing feculent material, widely communicating with small bowel, seen centrally in the pelvis. Findings are suspicious for necrotic mass or abscess arising from the small bowel" Findings were discussed with Dr. Oliveira by ED MD who agreed to see patient in the morning.       Overview/Hospital Course:  Dwight Hoffmann is a 52 year old male with a past medical history of DM, HLD and anemia who presented with lower abdominal pain, lower back pain, and difficulty urinating secondary to an abdominal mass discovered on CT scan. Surgery was consulted and performed exploratory laparotomy with resection of the mass and anastomosis of small bowel. The mass appears to be an abscess with surrounding necrosis. Pathology is currently pending. The patient is currently on cefepime and Flagyl while surgical cultures are pending. An " "NG tube to LIWS suction is in place and the patient is on IV fluids while he is NPO. He did have a positive blood culture which showed coagulase negative Staph. He was briefly on vancomycin which was discontinued. ID is following along.      Interval History: see "Hospital Course"    Review of Systems   Constitutional:  Positive for fatigue. Negative for chills, diaphoresis and fever.   HENT:  Negative for congestion, ear pain, sore throat and trouble swallowing.    Eyes:  Negative for pain, discharge and visual disturbance.   Respiratory:  Negative for cough, chest tightness, shortness of breath and wheezing.    Cardiovascular:  Negative for chest pain, palpitations and leg swelling.   Gastrointestinal:  Positive for abdominal pain. Negative for abdominal distention, blood in stool, constipation, diarrhea, nausea and vomiting.   Endocrine: Negative for polydipsia, polyphagia and polyuria.   Genitourinary:  Positive for frequency. Negative for difficulty urinating, dysuria, flank pain and urgency.   Musculoskeletal:  Positive for arthralgias, myalgias and neck pain. Negative for back pain, joint swelling and neck stiffness.   Skin:  Positive for wound. Negative for rash.   Allergic/Immunologic: Negative for immunocompromised state.   Neurological:  Positive for weakness. Negative for dizziness, syncope, speech difficulty, light-headedness, numbness and headaches.   Hematological:  Negative for adenopathy.   Psychiatric/Behavioral:  Negative for confusion and suicidal ideas. The patient is not nervous/anxious.    All other systems reviewed and are negative.  Objective:     Vital Signs (Most Recent):  Temp: 97 °F (36.1 °C) (09/16/22 0804)  Pulse: 91 (09/16/22 0804)  Resp: 16 (09/16/22 0804)  BP: 131/82 (09/16/22 0804)  SpO2: 98 % (09/16/22 0804) Vital Signs (24h Range):  Temp:  [96.9 °F (36.1 °C)-98.2 °F (36.8 °C)] 97 °F (36.1 °C)  Pulse:  [] 91  Resp:  [15-20] 16  SpO2:  [92 %-99 %] 98 %  BP: (124-131)/(71-82) " 131/82     Weight: 102.1 kg (225 lb)  Body mass index is 29.69 kg/m².    Intake/Output Summary (Last 24 hours) at 9/16/2022 1017  Last data filed at 9/16/2022 0544  Gross per 24 hour   Intake 1766.71 ml   Output 3355 ml   Net -1588.29 ml      Physical Exam  Vitals and nursing note reviewed.   Constitutional:       General: He is not in acute distress.     Appearance: He is well-developed. He is ill-appearing. He is not toxic-appearing.   HENT:      Head: Normocephalic and atraumatic.      Right Ear: External ear normal.      Left Ear: External ear normal.      Nose: Nose normal.      Comments: NG tube to LIWS.     Mouth/Throat:      Mouth: Mucous membranes are moist.      Pharynx: Oropharynx is clear.   Eyes:      Extraocular Movements: Extraocular movements intact.      Conjunctiva/sclera: Conjunctivae normal.      Comments: pale   Cardiovascular:      Rate and Rhythm: Normal rate and regular rhythm.      Pulses: Normal pulses.      Heart sounds: Normal heart sounds.   Pulmonary:      Effort: Pulmonary effort is normal.      Breath sounds: Normal breath sounds.   Abdominal:      Palpations: Abdomen is soft.      Tenderness: There is abdominal tenderness.      Comments: Dressing c/d/I.   Musculoskeletal:         General: Normal range of motion.      Cervical back: Normal range of motion and neck supple.      Right lower leg: No edema.      Left lower leg: No edema.   Skin:     General: Skin is warm and dry.   Neurological:      General: No focal deficit present.      Mental Status: He is alert and oriented to person, place, and time. Mental status is at baseline.   Psychiatric:         Mood and Affect: Mood normal.         Behavior: Behavior normal.         Thought Content: Thought content normal.         Judgment: Judgment normal.       Significant Labs: All pertinent labs within the past 24 hours have been reviewed.    Significant Imaging: I have reviewed all pertinent imaging results/findings within the past 24  hours.      Assessment/Plan:      * Small bowel mass  S/p resection 9/14 per Dr. Oliveira.  -Follow up cultures and pathology  -NG tube to LIWS  -NPO  -LR IV fluids  -Cefepime and Flagyl  -Surgery following  -Incentive spirometry  -PRN IV analgesics and antiemetics    Arthralgia of bilat knees and neck  Chronic.      Diabetes mellitus type 2, noninsulin dependent  -SSI  -NPO  -Hypoglycemic precautions  -Q6H glucose checks    Microcytic anemia  Appears inflammatory.  -Trend Hgb with CBC        VTE Risk Mitigation (From admission, onward)         Ordered     enoxaparin injection 40 mg  Daily         09/14/22 1009     IP VTE HIGH RISK PATIENT  Once         09/14/22 0246     Place sequential compression device  Until discontinued         09/14/22 0246                Discharge Planning   CATRACHO: 9/19/2022     Code Status: Full Code   Is the patient medically ready for discharge?:     Reason for patient still in hospital (select all that apply): Patient trending condition, Laboratory test, Treatment and Consult recommendations  Discharge Plan A: Home with family                  Dayron Garrett MD  Department of Hospital Medicine   [facility]

## 2022-09-16 NOTE — PROGRESS NOTES
Consult Note  Infectious Disease    Reason for Consult:  GPC bacteremia    HPI: Dwight Hoffmann is a 52 y.o. male very pleasant, with past medical history of diabetes, HLD who presented 9/14 complaining of night sweats, unintentional 30 lb weight loss for the last couple of months.  Symptoms worsened by severe abdominal pain, urinary hesitancy, dysuria, increased urinary frequency, occasional bloody stool, with associated weakness, fatigue, and generalized pain.  He denies fever or chills, no nausea or vomiting, no chest pain or shortness of breath.  Patient was seen by primary care last month due to melena, refer to GI, colonoscopy done on 09/01 were 3 polyps were resected, pathology report consistent with tubular adenomas.      In the ER, blood pressure 113/69, T-max a 100.9°   Labs on admission white count 11.3, monocytic predominance 20%, bands 2%, H&H 9.6/28.5, MCV 74, microcytic anemia, platelet count 395   Normal kidney and liver function.    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 Prostatomegaly necessitating correlation with PSA.    Seen by [facility] surgery, status post ex lap for small bowel obstruction in the setting of necrotic large colonic mass with fistula formation, washout, and colon anastomosis.    Empirically started on Zosyn.  Switched to vancomycin, cefepime, Flagyl on 9/15.    ID consult for Gram-positive cocci in 1/2 bottles.    INTERVAL HISTORY:  9/16: Interim reviewed, patient seen and examined at bedside, anxious regarding clinical condition. Hemodynamically stable, afebrile in the last 24h. States he is burping, not passing gas since yesterday. Dysuria and hesitancy are improved. Labs reviewed, leukocytosis down to 12.8, no left shift, normal monocyte count. H/H 8.1/24.8, MCV: 76, plt: 482. Normal  electrolytes, liver and kidney function. AFP and PSA negative. Micro reviewed, OR cultures GNR, ID and sensitivities pending, blood cultures 1/4 bottles CoNS likely a contaminant. Repeat blood culture x 2 no growth to date, pending final.     Review of patient's allergies indicates:  No Known Allergies  Past Medical History:   Diagnosis Date    Diabetes mellitus     Prediabetes      Past Surgical History:   Procedure Laterality Date    APPENDECTOMY  07/15/2014    COLONOSCOPY      COLONOSCOPY N/A 2/9/2022    Procedure: COLONOSCOPY;  Surgeon: Manuel Sanders MD;  Location: [facility] ENDO;  Service: Endoscopy;  Laterality: N/A;    COLONOSCOPY N/A 9/1/2022    Procedure: COLONOSCOPY;  Surgeon: Andrea Clemens MD;  Location: [facility] ENDO;  Service: Endoscopy;  Laterality: N/A;    INCISION AND DRAINAGE OF ABDOMEN N/A 9/14/2022    Procedure: INCISION AND DRAINAGE, ABDOMEN;  Surgeon: Jan Oliveira Jr., MD;  Location: [facility] OR;  Service: General;  Laterality: N/A;     Social History     Tobacco Use    Smoking status: Never    Smokeless tobacco: Never   Substance Use Topics    Alcohol use: Not Currently     Comment: occasional     Social History     Occupational History    Occupation: [employer]     Family History   Problem Relation Age of Onset    Cancer Mother         Colon    Diabetes Mother     Hypertension Mother     Seizures Father     Heart murmur Sister     Seizures Sister        Pertinent medications noted:     Review of Systems:   Night sweats, weight loss 30 lb in the last 6 months  No change in vision, loss of vision or diplopia  No sinus congestion, purulent nasal discharge, post nasal drip or facial pain  No pain in mouth or throat. No problems with teeth, gums.  No chest pain, palpitations, syncope  No cough, sputum production, shortness of breath, dyspnea on exertion, pleurisy, hemoptysis  No dysphagia, odynophagia  No nausea, vomiting, diarrhea, constipation  + blood in stool, or focal abd pain   +  dysuria, hesitancy, increased frequency improved, no hematuria, retention, incontinence  No swelling of joints, redness of joints, injuries, +generalized muscle aches, weakness   No unusual headaches, dizziness, vertigo, numbness, paresthesias, neuropathy, falls  No anxiety, depression, substance abuse, sleep disturbance  No bleeding, lymphadenopathy  No new rashes, lesions, or wounds    Outdoor activities:  Lives at home with wife, works as a  of [employer]. Family history colon cancer mother.  Travel:  None  Implants:  None   Antibiotic History:  Zosyn 9/13 -14.  Switched to vancomycin/cefepime/Flagyl 9/15.    EXAM & DIAGNOSTICS REVIEWED:   Vitals:     Temp:  [96.9 °F (36.1 °C)-98.2 °F (36.8 °C)]   Temp: 97 °F (36.1 °C) (09/16/22 0804)  Pulse: 91 (09/16/22 0804)  Resp: 16 (09/16/22 0804)  BP: 131/82 (09/16/22 0804)  SpO2: 98 % (09/16/22 0804)    Intake/Output Summary (Last 24 hours) at 9/16/2022 1038  Last data filed at 9/16/2022 0544  Gross per 24 hour   Intake 1766.71 ml   Output 3355 ml   Net -1588.29 ml       General:  In NAD. Alert and attentive, cooperative, comfortable  Eyes:  Anicteric, PERRL  ENT:  NG tube in place, no ulcers, exudates, thrush, nares patent, dentition is good  Neck:  Supple  Lungs: Clear to auscultation b/l  Heart:  S1/S2+, regular rhythm, no murmurs  Abd:  S/p Ex-lap,. Dressing in place, staples in place, no redness noted, slightly distended, +BS, mildly tender to palpation, no rebound  :  Voids, urine clear, no flank tenderness  Musc:  Joints without effusion, swelling,  erythema, synovitis, ambulatory  Skin:  Warm, no rash  Wound: Ex-lap  Neuro:  Following commands, no acute focal deficit   Psych:  Calm, cooperative  Lymphatic:       Extrem: No LE edema b/l  VAD:         Isolation:     9/14:        General Labs reviewed:  Recent Labs   Lab 09/14/22  0320 09/15/22  0450 09/16/22  0351   WBC 11.26 16.25* 12.86*   HGB 8.9* 8.2* 8.1*   HCT 26.2* 23.8* 24.8*    425 482*        Recent Labs   Lab 09/13/22  2345 09/14/22  0320 09/15/22  0450 09/16/22  0351   * 136 135* 138   K 4.1 3.7 4.3 4.2    103 101 101   CO2 21* 22* 25 27   BUN 13 12 9 8   CREATININE 0.9 0.8 0.9 0.8   CALCIUM 8.8 8.4* 8.4* 8.7   PROT 7.1  --  6.3 6.3   BILITOT 0.7  --  0.9 0.8   ALKPHOS 94  --  72 68   ALT 15  --  14 9*   AST 20  --  17 19     No results for input(s): CRP in the last 168 hours.  No results for input(s): SEDRATE in the last 168 hours.    Estimated Creatinine Clearance: 132.6 mL/min (based on SCr of 0.8 mg/dL).     Micro:  Microbiology Results (last 7 days)       Procedure Component Value Units Date/Time    Blood culture (site 1) [[id number]]  (Abnormal) Collected: 09/14/22 0320    Order Status: Completed Specimen: Blood Updated: 09/16/22 0733     Blood Culture, Routine Gram stain aer bottle: Gram positive cocci in clusters resembling Staph      Results called to and read back by: Poncho Brunson RN  09/15/2022      12:31      COAGULASE-NEGATIVE STAPHYLOCOCCUS SPECIES  Organism is a probable contaminant      Narrative:      Site # 1, aerobic and anaerobic    Blood culture [[id number]] Collected: 09/15/22 1722    Order Status: Completed Specimen: Blood from Antecubital, Right Updated: 09/16/22 0715     Blood Culture, Routine No Growth to date    Blood culture [[id number]] Collected: 09/15/22 1819    Order Status: Sent Specimen: Blood from Antecubital, Right Updated: 09/16/22 0038    MRSA/SA Rapid ID by PCR from Blood culture [[id number]] Collected: 09/14/22 0320    Order Status: Completed Updated: 09/15/22 1335     Staph aureus ID by PCR Negative     MRSA ID by PCR Negative    Narrative:      Site # 1, aerobic and anaerobic    Blood culture (site 2) [[id number]] Collected: 09/14/22 0315    Order Status: Completed Specimen: Blood Updated: 09/15/22 1212     Blood Culture, Routine No Growth to date      No Growth to date    Narrative:      Site # 2, aerobic only    Gram stain [[id number]]  Collected: 09/14/22 1525    Order Status: Completed Specimen: Abscess from Abdomen Updated: 09/15/22 0310     Gram Stain Result Rare WBC's      Rare Gram positive cocci      Rare Gram positive rods    Aerobic culture [[id number]] Collected: 09/14/22 1525    Order Status: Sent Specimen: Abscess from Abdomen Updated: 09/14/22 2348    Culture, Anaerobe [[id number]] Collected: 09/14/22 1525    Order Status: Sent Specimen: Abscess from Abdomen Updated: 09/14/22 2348            Pathology:  9/1 colonoscopy:  1. ASCENDING COLON, POLYPECTOMY:   - TUBULAR ADENOMA.     2. CECUM, POLYPECTOMY:   - TUBULAR ADENOMA.     3. CECUM, POLYPECTOMY:   - TUBULAR ADENOMA.     Imaging Reviewed:  CXR clear  CT abdomen/pelvis 9/14:  1. 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  2. Prostatomegaly necessitating correlation with PSA.    Cardiology: ECHO 9/15/22:  The left ventricle is normal in size with concentric remodeling and normal systolic function.  The estimated ejection fraction is 60%.  Grade I left ventricular diastolic dysfunction.  Normal right ventricular size with normal right ventricular systolic function.  Mild left atrial enlargement.  Mild mitral regurgitation.  Mild to moderate tricuspid regurgitation.  Normal central venous pressure (3 mmHg).  The estimated PA systolic pressure is 49 mmHg.  There is pulmonary hypertension.  Mild right atrial enlargement.  No vegetations were seen       IMPRESSION & PLAN     Severe sepsis, resolved secondary to perforated necrotic small bowel with abscess status post ex lap/drainage/small-bowel resection with anastomosis 9/14 in the setting of possible colon neoplasia vs prostatitis       Blood cultures 1/4 bottles grew CoNs, likely a contaminant            Repeat blood cultures x 2 no growth to date            OR cultures GNR, ID and sensitivities pending             AFP negative and PSA 0.9 normal    PMHx: diabetes and  HLD    Recommendations:  Follow OR cultures  Follow CEA and   KUB ordered, expecting post-op ileus   Stop Vancomycin IV  Cefepime 2 g IV q.8, dose as per crcl  Flagyl 500 mg IV q.8 for anaerbes  Aspiration precautions  Incentive spirometry  PT/OT as tolerated  Follow pathology report    Will follow     D/w patient, Dr Garrett    Medical Decision Making during this encounter was  [_] Low Complexity  [_] Moderate Complexity  [xx] High Complexity

## 2022-09-16 NOTE — PROGRESS NOTES
Pharmacokinetic Assessment Follow Up: IV Vancomycin    Vancomycin serum concentration assessment(s):    The random level was drawn correctly and can be used to guide therapy at this time. The measurement is below the desired definitive target range of 15 to 20 mcg/mL.    Vancomycin Regimen Plan:    Change regimen to Vancomycin 1750 mg IV every 12 hours with next serum trough concentration measured at 1000 prior to third dose on 9/17    Drug levels (last 3 results):  Recent Labs   Lab Result Units 09/16/22  0847   Vancomycin-Trough ug/mL 6.3*       Pharmacy will continue to follow and monitor vancomycin.    Please contact pharmacy at extension [phone] for questions regarding this assessment.    Thank you for the consult,   Luiza Crowell       Patient brief summary:  Dwight Hoffmann is a 52 y.o. male initiated on antimicrobial therapy with IV Vancomycin for treatment of bacteremia      Drug Allergies:   Review of patient's allergies indicates:  No Known Allergies    Actual Body Weight:   102.1 kg    Renal Function:   Estimated Creatinine Clearance: 132.6 mL/min (based on SCr of 0.8 mg/dL).,     Dialysis Method (if applicable):  N/A    CBC (last 72 hours):  Recent Labs   Lab Result Units 09/13/22 2345 09/14/22 0320 09/15/22  0450 09/16/22  0351   WBC K/uL 11.36 11.26 16.25* 12.86*   Hemoglobin g/dL 9.6* 8.9* 8.2* 8.1*   Hematocrit % 28.5* 26.2* 23.8* 24.8*   Platelets K/uL 395 341 425 482*   Gran % % 59.0 50.1 74.4* 71.2   Lymph % % 18.0 26.1 17.6* 18.4   Mono % % 20.0* 20.2* 7.4 8.9   Eosinophil % % 1.0 2.1 0.0 0.7   Basophil % % 0.0 0.5 0.1 0.3   Differential Method  Manual Automated Automated Automated       Metabolic Panel (last 72 hours):  Recent Labs   Lab Result Units 09/13/22 2342 09/13/22  2345 09/14/22  0320 09/15/22  0450 09/15/22  1215 09/16/22  0351   Sodium mmol/L  --  134* 136 135*  --  138   Potassium mmol/L  --  4.1 3.7 4.3  --  4.2   Chloride mmol/L  --  101 103 101  --  101   CO2 mmol/L  --  21* 22* 25   --  27   Glucose mg/dL  --  131* 116* 175*  --  127*   Glucose, UA  Negative  --   --   --  Negative  --    BUN mg/dL  --  13 12 9  --  8   Creatinine mg/dL  --  0.9 0.8 0.9  --  0.8   Albumin g/dL  --  2.6*  --  2.3*  --  2.2*   Total Bilirubin mg/dL  --  0.7  --  0.9  --  0.8   Alkaline Phosphatase U/L  --  94  --  72  --  68   AST U/L  --  20  --  17  --  19   ALT U/L  --  15  --  14  --  9*   Magnesium mg/dL  --   --  1.9 1.9  --  2.0   Phosphorus mg/dL  --   --   --   --   --  3.7       Vancomycin Administrations:  vancomycin given in the last 96 hours                     vancomycin (VANCOCIN) 2,000 mg in dextrose 5 % 500 mL IVPB ()  Restarted 09/15/22 1852     2,000 mg New Bag  1738                    Microbiologic Results:  Microbiology Results (last 7 days)       Procedure Component Value Units Date/Time    Blood culture (site 1) [[id number]]  (Abnormal) Collected: 09/14/22 0320    Order Status: Completed Specimen: Blood Updated: 09/16/22 0733     Blood Culture, Routine Gram stain aer bottle: Gram positive cocci in clusters resembling Staph      Results called to and read back by: Poncho Brunson RN  09/15/2022      12:31      COAGULASE-NEGATIVE STAPHYLOCOCCUS SPECIES  Organism is a probable contaminant      Narrative:      Site # 1, aerobic and anaerobic    Blood culture [[id number]] Collected: 09/15/22 1722    Order Status: Completed Specimen: Blood from Antecubital, Right Updated: 09/16/22 0715     Blood Culture, Routine No Growth to date    Blood culture [[id number]] Collected: 09/15/22 1819    Order Status: Sent Specimen: Blood from Antecubital, Right Updated: 09/16/22 0038    MRSA/SA Rapid ID by PCR from Blood culture [[id number]] Collected: 09/14/22 0320    Order Status: Completed Updated: 09/15/22 1335     Staph aureus ID by PCR Negative     MRSA ID by PCR Negative    Narrative:      Site # 1, aerobic and anaerobic    Blood culture (site 2) [[id number]] Collected: 09/14/22 0315    Order Status: Completed  Specimen: Blood Updated: 09/15/22 1212     Blood Culture, Routine No Growth to date      No Growth to date    Narrative:      Site # 2, aerobic only    Gram stain [[id number]] Collected: 09/14/22 1525    Order Status: Completed Specimen: Abscess from Abdomen Updated: 09/15/22 0310     Gram Stain Result Rare WBC's      Rare Gram positive cocci      Rare Gram positive rods    Aerobic culture [[id number]] Collected: 09/14/22 1525    Order Status: Sent Specimen: Abscess from Abdomen Updated: 09/14/22 2348    Culture, Anaerobe [[id number]] Collected: 09/14/22 1525    Order Status: Sent Specimen: Abscess from Abdomen Updated: 09/14/22 2348

## 2022-09-17 NOTE — PLAN OF CARE
Problem: Physical Therapy  Goal: Physical Therapy Goal  Description: Goals to be met by: 2022     Patient will increase functional independence with mobility by performin). Supine to sit with Modified [place]  2). Sit to supine with Modified [place]  3). Sit to stand transfer with Modified [place]  4). Gait  x > 250 feet with Modified [place]     Outcome: Ongoing, Progressing

## 2022-09-17 NOTE — NURSING
Called to pt room d/t a complaint of feeling like NG tube was in his mouth. Upon entering room and assessing, NG tube found to be coaled in pt mouth. Primary nurse, Charge nurse, and NP notified and orders to remove NG tube was given.

## 2022-09-17 NOTE — PROGRESS NOTES
Pt seen and examined.  POD 3 status post ex lap with resection of small bowel mass.  Patient continues to deny any flatus or bowel movement.  He notes his abdominal distention seems to be less.  NG tube has been replaced.  He continues to put out little to nothing.    Wt Readings from Last 3 Encounters:   09/16/22 102.1 kg (225 lb 1.4 oz)   09/01/22 102.1 kg (225 lb 1.4 oz)   08/10/22 106.6 kg (235 lb)     Temp Readings from Last 3 Encounters:   09/17/22 97.6 °F (36.4 °C) (Oral)   09/01/22 98.7 °F (37.1 °C)   08/22/22 98.9 °F (37.2 °C)     BP Readings from Last 3 Encounters:   09/17/22 138/80   09/01/22 128/88   08/22/22 122/88     Pulse Readings from Last 3 Encounters:   09/17/22 104   09/01/22 80   08/22/22 95     AAOx3  Soft/dist/ faint BS noted    Lab Results   Component Value Date    WBC 12.87 (H) 09/17/2022    HGB 8.8 (L) 09/17/2022    HCT 26.4 (L) 09/17/2022    MCV 76 (L) 09/17/2022     (H) 09/17/2022       .l  BMP  Lab Results   Component Value Date     09/17/2022    K 4.1 09/17/2022     09/17/2022    CO2 27 09/17/2022    BUN 10 09/17/2022    CREATININE 0.8 09/17/2022    CALCIUM 8.9 09/17/2022    ANIONGAP 10 09/17/2022    ESTGFRAFRICA 111 12/28/2021    EGFRNONAA 96 12/28/2021     A/P: s/p ex lap with SBR  Continue NG tube decompression.  Await bowel function.  Discussion with patient encouraging and strongly recommending ambulation and aggressive incentive spirometry.  Antibiotics per Infectious Disease

## 2022-09-17 NOTE — SUBJECTIVE & OBJECTIVE
Interval History:  Denies any flat us or bowel movements.  Reports pain is a 6/10.  Abscess culture with Gram-negative rods.    Review of Systems   Constitutional:  Positive for fatigue. Negative for chills, diaphoresis and fever.   Respiratory:  Negative for cough, chest tightness, shortness of breath and wheezing.    Cardiovascular:  Negative for chest pain, palpitations and leg swelling.   Gastrointestinal:  Positive for abdominal pain. Negative for abdominal distention, nausea and vomiting.   Genitourinary:  Positive for frequency. Negative for difficulty urinating, dysuria, flank pain and urgency.   Musculoskeletal:  Positive for arthralgias, myalgias and neck pain. Negative for back pain, joint swelling and neck stiffness.   Skin:  Positive for wound. Negative for rash.   Neurological:  Positive for weakness. Negative for dizziness, light-headedness and headaches.   Psychiatric/Behavioral:  Negative for confusion and suicidal ideas. The patient is not nervous/anxious.    All other systems reviewed and are negative.  Objective:     Vital Signs (Most Recent):  Temp: 97.6 °F (36.4 °C) (09/17/22 0712)  Pulse: 98 (09/17/22 0750)  Resp: 16 (09/17/22 0750)  BP: 138/80 (09/17/22 0712)  SpO2: (!) 92 % (09/17/22 0750) Vital Signs (24h Range):  Temp:  [96.5 °F (35.8 °C)-97.6 °F (36.4 °C)] 97.6 °F (36.4 °C)  Pulse:  [] 98  Resp:  [16-20] 16  SpO2:  [92 %-98 %] 92 %  BP: (124-145)/(75-84) 138/80     Weight: 102.1 kg (225 lb 1.4 oz)  Body mass index is 29.7 kg/m².    Intake/Output Summary (Last 24 hours) at 9/17/2022 1106  Last data filed at 9/17/2022 0610  Gross per 24 hour   Intake 2463.49 ml   Output 1695 ml   Net 768.49 ml        Physical Exam  Vitals and nursing note reviewed.   Constitutional:       General: He is not in acute distress.     Appearance: He is well-developed. He is ill-appearing. He is not toxic-appearing.   HENT:      Head: Normocephalic and atraumatic.      Right Ear: External ear normal.       Left Ear: External ear normal.      Nose: Nose normal.      Comments: NG tube to LIWS.     Mouth/Throat:      Mouth: Mucous membranes are moist.      Pharynx: Oropharynx is clear.   Eyes:      Extraocular Movements: Extraocular movements intact.      Conjunctiva/sclera: Conjunctivae normal.      Comments: pale   Cardiovascular:      Rate and Rhythm: Normal rate and regular rhythm.      Pulses: Normal pulses.      Heart sounds: Normal heart sounds.   Pulmonary:      Effort: Pulmonary effort is normal.      Breath sounds: Normal breath sounds.   Abdominal:      Palpations: Abdomen is soft.      Tenderness: There is abdominal tenderness.      Comments: Dressing c/d/I.  Decreased bowel sounds   Musculoskeletal:         General: Normal range of motion.      Cervical back: Normal range of motion and neck supple.      Right lower leg: No edema.      Left lower leg: No edema.   Skin:     General: Skin is warm and dry.   Neurological:      General: No focal deficit present.      Mental Status: He is alert and oriented to person, place, and time. Mental status is at baseline.   Psychiatric:         Mood and Affect: Mood normal.         Behavior: Behavior normal.         Thought Content: Thought content normal.         Judgment: Judgment normal.       Significant Labs: All pertinent labs within the past 24 hours have been reviewed.    Significant Imaging: I have reviewed all pertinent imaging results/findings within the past 24 hours.

## 2022-09-17 NOTE — PROGRESS NOTES
Consult Note  Infectious Disease    Reason for Consult:  GPC bacteremia    HPI: Dwight Hoffmann is a 52 y.o. male very pleasant, with past medical history of diabetes, HLD who presented 9/14 complaining of night sweats, unintentional 30 lb weight loss for the last couple of months.  Symptoms worsened by severe abdominal pain, urinary hesitancy, dysuria, increased urinary frequency, occasional bloody stool, with associated weakness, fatigue, and generalized pain.  He denies fever or chills, no nausea or vomiting, no chest pain or shortness of breath.  Patient was seen by primary care last month due to melena, refer to GI, colonoscopy done on 09/01 were 3 polyps were resected, pathology report consistent with tubular adenomas.      In the ER, blood pressure 113/69, T-max a 100.9°   Labs on admission white count 11.3, monocytic predominance 20%, bands 2%, H&H 9.6/28.5, MCV 74, microcytic anemia, platelet count 395   Normal kidney and liver function.    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 Prostatomegaly necessitating correlation with PSA.    Seen by [facility] surgery, status post ex lap for small bowel obstruction in the setting of necrotic large colonic mass with fistula formation, washout, and colon anastomosis.    Empirically started on Zosyn.  Switched to vancomycin, cefepime, Flagyl on 9/15.    ID consult for Gram-positive cocci in 1/2 bottles.    INTERVAL HISTORY:  9/16: Interim reviewed, patient seen and examined at bedside, anxious regarding clinical condition. Hemodynamically stable, afebrile in the last 24h. States he is burping, not passing gas since yesterday. Dysuria and hesitancy are improved. Labs reviewed, leukocytosis down to 12.8, no left shift, normal monocyte count. H/H 8.1/24.8, MCV: 76, plt: 482. Normal  electrolytes, liver and kidney function. AFP and PSA negative. Micro reviewed, OR cultures GNR, ID and sensitivities pending, blood cultures 1/4 bottles CoNS likely a contaminant. Repeat blood culture x 2 no growth to date, pending final.     9/17 (Nanette):  Case discussed with Dr. Mooney.  NG tube was found to be coiled in his mouth last night and was removed not replaced secondary to patient refusal.  AMA was signed by patient but subsequently the tube was replaced and is draining bilious fluid.  KUB reviewed. No flatus yet.  He is afebrile, white blood cells 12,800.  Abscess cultures reviewed with Enterobacter, not very sensitive, and a pansensitive Proteus.  CEA, CA 19 9, alpha fetoprotein, PSA all negative.    EXAM & DIAGNOSTICS REVIEWED:   Vitals:     Temp:  [96.5 °F (35.8 °C)-98.1 °F (36.7 °C)]   Temp: 98.1 °F (36.7 °C) (09/17/22 1127)  Pulse: 102 (09/17/22 1352)  Resp: 16 (09/17/22 1352)  BP: 132/77 (09/17/22 1127)  SpO2: (!) 93 % (09/17/22 1352)    Intake/Output Summary (Last 24 hours) at 9/17/2022 1409  Last data filed at 9/17/2022 0610  Gross per 24 hour   Intake 2403.49 ml   Output 1665 ml   Net 738.49 ml       General:  In NAD. Alert and attentive, cooperative, comfortable  Eyes:  Anicteric, EOMI  ENT:  NG tube in place, no ulcers, exudates, thrush, nares patent, dentition is good  Neck:  Supple  Lungs: Clear to auscultation b/l  Heart:  S1/S2+, regular rhythm, no murmurs  Abd:  S/p Ex-lap,. Dressing in place, staples in place, no redness noted, slightly distended,  no BS, mildly tender to palpation, no drains  :  Voids, urine clear, no flank tenderness  Musc:  Joints without effusion, swelling,  erythema, synovitis, ambulatory  Skin:  Warm, no rash  Wound: Ex-lap, minimal strike through  Neuro:  Following commands, no acute focal deficit   Psych:  Calm, cooperative  Lymphatic:       Extrem: No LE edema b/l  VAD:  peripheral       Isolation: none    9/14:        General Labs reviewed:  Recent Labs    Lab 09/15/22  0450 09/16/22  0351 09/17/22  0452   WBC 16.25* 12.86* 12.87*   HGB 8.2* 8.1* 8.8*   HCT 23.8* 24.8* 26.4*    482* 563*       Recent Labs   Lab 09/15/22  0450 09/16/22  0351 09/17/22  0452   * 138 137   K 4.3 4.2 4.1    101 100   CO2 25 27 27   BUN 9 8 10   CREATININE 0.9 0.8 0.8   CALCIUM 8.4* 8.7 8.9   PROT 6.3 6.3 6.2   BILITOT 0.9 0.8 0.8   ALKPHOS 72 68 78   ALT 14 9* 14   AST 17 19 29     No results for input(s): CRP in the last 168 hours.  No results for input(s): SEDRATE in the last 168 hours.    Estimated Creatinine Clearance: 132.6 mL/min (based on SCr of 0.8 mg/dL).     Micro:  Microbiology Results (last 7 days)       Procedure Component Value Units Date/Time    Aerobic culture [[id number]]  (Abnormal)  (Susceptibility) Collected: 09/14/22 1525    Order Status: Completed Specimen: Abscess from Abdomen Updated: 09/17/22 1320     Aerobic Bacterial Culture ENTEROBACTER CLOACAE  Few        PROTEUS MIRABILIS  Few  No other significant isolate      Blood culture (site 2) [[id number]] Collected: 09/14/22 0315    Order Status: Completed Specimen: Blood Updated: 09/17/22 1212     Blood Culture, Routine No Growth to date      No Growth to date      No Growth to date      No Growth to date    Narrative:      Site # 2, aerobic only    Blood culture (site 1) [[id number]]  (Abnormal) Collected: 09/14/22 0320    Order Status: Completed Specimen: Blood Updated: 09/17/22 0946     Blood Culture, Routine Gram stain aer bottle: Gram positive cocci in clusters resembling Staph      Results called to and read back by: Poncho Brunson RN  09/15/2022      12:31      COAGULASE-NEGATIVE STAPHYLOCOCCUS SPECIES  Organism is a probable contaminant      Narrative:      Site # 1, aerobic and anaerobic    Blood culture [[id number]] Collected: 09/15/22 1722    Order Status: Completed Specimen: Blood from Antecubital, Right Updated: 09/17/22 0613     Blood Culture, Routine No Growth to date      No Growth to  date    Blood culture [[id number]] Collected: 09/15/22 1819    Order Status: Completed Specimen: Blood from Antecubital, Right Updated: 09/17/22 0613     Blood Culture, Routine No Growth to date      No Growth to date    MRSA/SA Rapid ID by PCR from Blood culture [[id number]] Collected: 09/14/22 0320    Order Status: Completed Updated: 09/15/22 1335     Staph aureus ID by PCR Negative     MRSA ID by PCR Negative    Narrative:      Site # 1, aerobic and anaerobic    Gram stain [[id number]] Collected: 09/14/22 1525    Order Status: Completed Specimen: Abscess from Abdomen Updated: 09/15/22 0310     Gram Stain Result Rare WBC's      Rare Gram positive cocci      Rare Gram positive rods    Culture, Anaerobe [[id number]] Collected: 09/14/22 1525    Order Status: Sent Specimen: Abscess from Abdomen Updated: 09/14/22 2343            Pathology:  9/1 colonoscopy:  1. ASCENDING COLON, POLYPECTOMY:   - TUBULAR ADENOMA.     2. CECUM, POLYPECTOMY:   - TUBULAR ADENOMA.     3. CECUM, POLYPECTOMY:   - TUBULAR ADENOMA.     Imaging Reviewed:  CXR clear  CT abdomen/pelvis 9/14:  1. 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  2. Prostatomegaly necessitating correlation with PSA.    Cardiology: ECHO 9/15/22:  The left ventricle is normal in size with concentric remodeling and normal systolic function.  The estimated ejection fraction is 60%.  Grade I left ventricular diastolic dysfunction.  Normal right ventricular size with normal right ventricular systolic function.  Mild left atrial enlargement.  Mild mitral regurgitation.  Mild to moderate tricuspid regurgitation.  Normal central venous pressure (3 mmHg).  The estimated PA systolic pressure is 49 mmHg.  There is pulmonary hypertension.  Mild right atrial enlargement.  No vegetations were seen       IMPRESSION & PLAN     Severe sepsis, resolved secondary to perforated necrotic small bowel with abscess status post ex  lap/drainage/small-bowel resection with anastomosis 9/14 in the setting of possible colon neoplasia vs prostatitis       Blood cultures 1/4 bottles grew CoNs, likely a contaminant            Repeat blood cultures x 2 no growth to date            OR cultures Enterobacter (intermediate to cefepime and to Zosyn) and Proteus,             AFP negative and PSA 0.9 normal    PMHx: diabetes and HLD    Recommendations:  Follow OR cultures     Changing cefepime/Flagyl to meropenem  Aspiration precautions  Incentive spirometry  PT/OT as tolerated  Follow pathology report    Will follow     D/w patient, wife    Medical Decision Making during this encounter was  [_] Low Complexity  [_] Moderate Complexity  [xx] High Complexity

## 2022-09-17 NOTE — PLAN OF CARE
Goals to be met by: 10/15/22     Patient will increase functional independence with ADLs by performing:    UE Dressing with [place].  LE Dressing with Modified [place].  Grooming while standing at sink with Modified [place].  Toileting from toilet with Modified [place] for hygiene and clothing management.   Bathing from  shower chair/bench with Modified [place].  Toilet transfer to toilet with Modified [place].  Increased functional strength to WFL for ADL's/IADL's.

## 2022-09-17 NOTE — PROGRESS NOTES
"[facility] [place] [facility]  Progress Note    Patient Name: Dwight Hoffmann  MRN: [id number]  Patient Class: IP- Inpatient   Admission Date: 9/13/2022  Length of Stay: 3 days  Attending Physician: Beth Gatica MD  Primary Care Provider: BOSTON Oswald        Subjective:     Principal Problem:Small bowel mass        HPI:  Mr. Hoffmann is a 52 year old male with a history of NIDDM2 and HLD who presents today with complaints of night sweats for weeks. It is moderate. It is associated with 25 lb wt loss over the last 2 months, urinary hesitancy, urinary frequency, occasional blood streaked stools, weakness, fatigue, neck pain, knee pain, and pelvic pain. He denies measured fever, chills, N/V/D, chest pain, or SOB. He was seen by his primary last month about the blood streaked stools and referred to Dr. Clemens for colonoscopy on 9/1 with multiple polypectomies with path report of tubular adenomas. In the ED, labs revealed microcytic anemia, he was mildly tachycardic on arrival  which improved spontaneously, /60,  and CT abd/pelvis revealing: "Thick-walled collection containing feculent material, widely communicating with small bowel, seen centrally in the pelvis. Findings are suspicious for necrotic mass or abscess arising from the small bowel" Findings were discussed with Dr. Oliveira by ED MD who agreed to see patient in the morning.       Overview/Hospital Course:  Dwight Hoffmann is a 52 year old male with a past medical history of DM, HLD and anemia who presented with lower abdominal pain, lower back pain, and difficulty urinating secondary to an abdominal mass discovered on CT scan. Surgery was consulted and performed exploratory laparotomy with resection of the mass and anastomosis of small bowel. The mass appears to be an abscess with surrounding necrosis. Pathology is currently pending. The patient is currently on cefepime and Flagyl while surgical cultures are pending. An " NG tube to LIWS suction is in place and the patient is on IV fluids while he is NPO. He did have a positive blood culture which showed coagulase negative Staph. He was briefly on vancomycin which was discontinued. ID is following along.      Interval History:  Denies any flat us or bowel movements.  Reports pain is a 6/10.  Abscess culture with Gram-negative rods.    Review of Systems   Constitutional:  Positive for fatigue. Negative for chills, diaphoresis and fever.   Respiratory:  Negative for cough, chest tightness, shortness of breath and wheezing.    Cardiovascular:  Negative for chest pain, palpitations and leg swelling.   Gastrointestinal:  Positive for abdominal pain. Negative for abdominal distention, nausea and vomiting.   Genitourinary:  Positive for frequency. Negative for difficulty urinating, dysuria, flank pain and urgency.   Musculoskeletal:  Positive for arthralgias, myalgias and neck pain. Negative for back pain, joint swelling and neck stiffness.   Skin:  Positive for wound. Negative for rash.   Neurological:  Positive for weakness. Negative for dizziness, light-headedness and headaches.   Psychiatric/Behavioral:  Negative for confusion and suicidal ideas. The patient is not nervous/anxious.    All other systems reviewed and are negative.  Objective:     Vital Signs (Most Recent):  Temp: 97.6 °F (36.4 °C) (09/17/22 0712)  Pulse: 98 (09/17/22 0750)  Resp: 16 (09/17/22 0750)  BP: 138/80 (09/17/22 0712)  SpO2: (!) 92 % (09/17/22 0750) Vital Signs (24h Range):  Temp:  [96.5 °F (35.8 °C)-97.6 °F (36.4 °C)] 97.6 °F (36.4 °C)  Pulse:  [] 98  Resp:  [16-20] 16  SpO2:  [92 %-98 %] 92 %  BP: (124-145)/(75-84) 138/80     Weight: 102.1 kg (225 lb 1.4 oz)  Body mass index is 29.7 kg/m².    Intake/Output Summary (Last 24 hours) at 9/17/2022 1106  Last data filed at 9/17/2022 0610  Gross per 24 hour   Intake 2463.49 ml   Output 1695 ml   Net 768.49 ml        Physical Exam  Vitals and nursing note  reviewed.   Constitutional:       General: He is not in acute distress.     Appearance: He is well-developed. He is ill-appearing. He is not toxic-appearing.   HENT:      Head: Normocephalic and atraumatic.      Right Ear: External ear normal.      Left Ear: External ear normal.      Nose: Nose normal.      Comments: NG tube to LIWS.     Mouth/Throat:      Mouth: Mucous membranes are moist.      Pharynx: Oropharynx is clear.   Eyes:      Extraocular Movements: Extraocular movements intact.      Conjunctiva/sclera: Conjunctivae normal.      Comments: pale   Cardiovascular:      Rate and Rhythm: Normal rate and regular rhythm.      Pulses: Normal pulses.      Heart sounds: Normal heart sounds.   Pulmonary:      Effort: Pulmonary effort is normal.      Breath sounds: Normal breath sounds.   Abdominal:      Palpations: Abdomen is soft.      Tenderness: There is abdominal tenderness.      Comments: Dressing c/d/I.  Decreased bowel sounds   Musculoskeletal:         General: Normal range of motion.      Cervical back: Normal range of motion and neck supple.      Right lower leg: No edema.      Left lower leg: No edema.   Skin:     General: Skin is warm and dry.   Neurological:      General: No focal deficit present.      Mental Status: He is alert and oriented to person, place, and time. Mental status is at baseline.   Psychiatric:         Mood and Affect: Mood normal.         Behavior: Behavior normal.         Thought Content: Thought content normal.         Judgment: Judgment normal.       Significant Labs: All pertinent labs within the past 24 hours have been reviewed.    Significant Imaging: I have reviewed all pertinent imaging results/findings within the past 24 hours.      Assessment/Plan:      * Small bowel mass  S/p resection 9/14 per Dr. Oliveira.  -Follow up cultures and pathology  -NG tube to LIWS  -NPO  -LR IV fluids  -Cefepime and Flagyl  -Surgery following  -Incentive spirometry  -PRN IV analgesics and  antiemetics    Moderate malnutrition  Nutrition consulted. Most recent weight and BMI monitored-          Malnutrition (Moderate to Severe)  Weight Loss (Malnutrition): greater than 7.5% in 3 months  Energy Intake (Malnutrition): less than 75% for greater than or equal to 1 month              Measurements:  Wt Readings from Last 1 Encounters:   09/16/22 102.1 kg (225 lb 1.4 oz)   Body mass index is 29.7 kg/m².    Recommendations: Recommendation/Intervention: 1) advance PO diet to clears when pt passing flatus -> advance as tolerated to goal of Low fiber, DM 2000 kcal 2) Add Boost breeze BID on clear liquids 3) weigh weekly 4) If unable to advance PO diet to full liquids in 72 hr start nutrition support PPN D 5 AA 4.25 @ 90 ml/hr + standard lipids ( 91 g protein, 1234 kcal)  Goals: 1) PO diet advanced to full liquids in < 72 hr    Patient has been screened and assessed by RD. RD will follow patient.      Arthralgia of bilat knees and neck  Chronic.      Diabetes mellitus type 2, noninsulin dependent  -SSI  -NPO  -Hypoglycemic precautions  -Q6H glucose checks    Microcytic anemia  Appears inflammatory.  -Trend Hgb with CBC        VTE Risk Mitigation (From admission, onward)         Ordered     enoxaparin injection 40 mg  Daily         09/14/22 1009     IP VTE HIGH RISK PATIENT  Once         09/14/22 0246     Place sequential compression device  Until discontinued         09/14/22 0246                Discharge Planning   CATRACHO: 9/19/2022     Code Status: Full Code   Is the patient medically ready for discharge?:     Reason for patient still in hospital (select all that apply): Patient trending condition and Treatment  Discharge Plan A: Home with family                  Beth Gatica MD  Department of Hospital Medicine   [facility]

## 2022-09-17 NOTE — PT/OT/SLP EVAL
Occupational Therapy   Evaluation    Name: Dwight Hoffmann  MRN: [id number]  Admitting Diagnosis:  Small bowel mass  Recent Surgery: Procedure(s) (LRB):  LAPAROTOMY, EXPLORATORY (N/A)  EXCISION, SMALL INTESTINE (N/A)  INCISION AND DRAINAGE, ABDOMEN (N/A) 3 Days Post-Op    Recommendations:     Discharge Recommendations: home health OT  Discharge Equipment Recommendations:  other (see comments) (TBD)  Barriers to discharge:       Assessment:     Dwight Hoffmann is a 52 y.o. male with a medical diagnosis of Small bowel mass.  He presents with the following performance deficits affecting function: weakness, impaired endurance, impaired self care skills, impaired functional mobility, pain.  Pt was up in chair and agreeable to OT. Pt demonstrates BUE AROM/strength WFL's. OT provided instruction in home safety with ADL's/IADL's including review of home set up and DME/AE. Pt verbalized understanding. Pt donned/doffed socks by crossing legs with SBA. OT provided instruction in calling for assist. Pt verbalized understanding.     Rehab Prognosis: Good; patient would benefit from acute skilled OT services to address these deficits and reach maximum level of function.       Plan:     Patient to be seen 4 x/week to address the above listed problems via self-care/home management, therapeutic activities, therapeutic exercises  Plan of Care Expires: 10/15/22  Plan of Care Reviewed with: patient    Subjective     Chief Complaint: Pain  Patient/Family Comments/goals: To get better and go home    Occupational Profile:  Living Environment: Pt lives with spouse in 2 story home with bed/bath on first floor. Pt has walk in shower with room for seat. Pt has standard toilet.  Previous level of function: Independent  Equipment Used at Home:  none  Assistance upon Discharge: Spouse    Pain/Comfort:  Pain Rating 1: 6/10 (Nurse Isaac advised)  Location 1: abdomen  Pain Addressed 1: Nurse notified  Pain Rating Post-Intervention 1:  6/10    Patients cultural, spiritual, Voodoo conflicts given the current situation:      Objective:     Communicated with: Nurse Isaac prior to session.  Patient found up in chair with chair check, NG tube, peripheral IV upon OT entry to room.    General Precautions: Standard, fall   Orthopedic Precautions:N/A   Braces: N/A  Respiratory Status: Room air    Occupational Performance:      Activities of Daily Living:  Feeding:  independence /able to self feed  Grooming: stand by assistance set up in sitting  Bathing: minimum assistance    Upper Body Dressing: minimum assistance    Lower Body Dressing: minimum assistance    Toileting: minimum assistance      Cognitive/Visual Perceptual:  Pt alert and oriented    Physical Exam:  Upper Extremity Strength:    -       Right Upper Extremity: WFL  -       Left Upper Extremity: WFL    AMPAC 6 Click ADL:  AMPAC Total Score: 19    Treatment & Education:  OT provided education in role of OT. Pt verbalized understanding and was agreeable to OT.  OT provided instruction in home safety with ADL's/IADL's including review of home set up and DME/AE. Pt verbalized understanding.  OT provided education in calling for assist. Pt verbalized understanding.    Patient left up in chair with all lines intact, call button in reach, chair alarm on, and Nurse Isaac notified    GOALS:   Multidisciplinary Problems       Occupational Therapy Goals          Problem: Occupational Therapy    Goal Priority Disciplines Outcome Interventions   Occupational Therapy Goal     OT, PT/OT                         History:     Past Medical History:   Diagnosis Date    Diabetes mellitus     Prediabetes          Past Surgical History:   Procedure Laterality Date    APPENDECTOMY  07/15/2014    COLONOSCOPY      COLONOSCOPY N/A 2/9/2022    Procedure: COLONOSCOPY;  Surgeon: Manuel Sanders MD;  Location: [facility];  Service: Endoscopy;  Laterality: N/A;    COLONOSCOPY N/A 9/1/2022    Procedure: COLONOSCOPY;   Surgeon: Andrea Clemens MD;  Location: [facility];  Service: Endoscopy;  Laterality: N/A;    INCISION AND DRAINAGE OF ABDOMEN N/A 9/14/2022    Procedure: INCISION AND DRAINAGE, ABDOMEN;  Surgeon: Jan Oliveira Jr., MD;  Location: [facility];  Service: General;  Laterality: N/A;       Time Tracking:     OT Date of Treatment: 09/17/22  OT Start Time: 1026  OT Stop Time: 1035  OT Total Time (min): 9 min    Billable Minutes:Evaluation 9 9/17/2022

## 2022-09-17 NOTE — ASSESSMENT & PLAN NOTE
Nutrition consulted. Most recent weight and BMI monitored-          Malnutrition (Moderate to Severe)  Weight Loss (Malnutrition): greater than 7.5% in 3 months  Energy Intake (Malnutrition): less than 75% for greater than or equal to 1 month              Measurements:  Wt Readings from Last 1 Encounters:   09/16/22 102.1 kg (225 lb 1.4 oz)   Body mass index is 29.7 kg/m².    Recommendations: Recommendation/Intervention: 1) advance PO diet to clears when pt passing flatus -> advance as tolerated to goal of Low fiber, DM 2000 kcal 2) Add Boost breeze BID on clear liquids 3) weigh weekly 4) If unable to advance PO diet to full liquids in 72 hr start nutrition support PPN D 5 AA 4.25 @ 90 ml/hr + standard lipids ( 91 g protein, 1234 kcal)  Goals: 1) PO diet advanced to full liquids in < 72 hr    Patient has been screened and assessed by RD. RD will follow patient.

## 2022-09-17 NOTE — NURSING
Pt refusing NG tube. MD notified. Went over risks of not having NG tube replaced with patient. Pt verbalizes understanding. AMA paper provided and signed by patient, and his nurse Betzy Macias RN. Pt instructed to call nurse's station if symptoms worsen.

## 2022-09-17 NOTE — RESPIRATORY THERAPY
02 saturation 92% on room air.  Patient receiving IS and averaging 1250ml.  Encouraged patient to perform IS every 1-2 hrs.  Spoke with wife and asked her to push  to perform IS.

## 2022-09-17 NOTE — PT/OT/SLP EVAL
Physical Therapy Evaluation    Patient Name:  Dwight Hoffmann   MRN:  [id number]    Recommendations:     Discharge Recommendations:  home   Discharge Equipment Recommendations:  (TBD (may benefit from walker))   Barriers to discharge: None    Assessment:     Dwight Hoffmann is a 52 y.o. male admitted with a medical diagnosis of Small bowel mass.  He presents with the following impairments/functional limitations:  weakness, impaired endurance, pain, impaired balance, gait instability, impaired functional mobility, decreased lower extremity function, impaired skin  .    Rehab Prognosis: Good; patient would benefit from acute skilled PT services to address these deficits and reach maximum level of function.    Recent Surgery: Procedure(s) (LRB):  LAPAROTOMY, EXPLORATORY (N/A)  EXCISION, SMALL INTESTINE (N/A)  INCISION AND DRAINAGE, ABDOMEN (N/A) 3 Days Post-Op    Plan:     During this hospitalization, patient to be seen daily to address the identified rehab impairments via gait training, therapeutic activities, therapeutic exercises and progress toward the following goals:    Plan of Care Expires:  09/30/22    Subjective     Patient/Family Comments/goals: agrees to work with PT to walk in galvez, then sit in chair  Pain/Comfort:  Pain Rating 1: 7/10  Location 1: abdomen  Pain Addressed 1: Reposition, Cessation of Activity  Pain Rating Post-Intervention 1: 7/10    Patients cultural, spiritual, Buddhism conflicts given the current situation:      Living Environment:  House with spouse, 0 steps to enter.  Prior to admission, patients level of function was fully independent without AD.  Equipment used at home: none.  DME owned (not currently used): none.  Upon discharge, patient will have assistance from wife.    Objective:     Communicated with RN (Poncho) prior to session.  Patient found supine with peripheral IV, NG tube  upon PT entry to room.    General Precautions: Standard, fall   Orthopedic Precautions:N/A   Braces:  N/A  Respiratory Status: Room air    Functional Mobility training:  Bed Mobility:     Rolling Left:  minimum assistance  Supine to Sit: minimum assistance  Transfers:     Sit to Stand:  minimum assistance with rolling walker and x 2 reps  Gait: 175' x 1, 325' x 1, with RW with CGA    Therapeutic Activities and Exercises:   Mobility training as above with cues for technique    AM-PAC 6 CLICK MOBILITY  Total Score:16     Patient left up in chair with call button in reach, chair alarm on, and RN (Poncho) notified.    GOALS:   Multidisciplinary Problems       Physical Therapy Goals          Problem: Physical Therapy    Goal Priority Disciplines Outcome Goal Variances Interventions   Physical Therapy Goal     PT, PT/OT Ongoing, Progressing     Description: Goals to be met by: 2022     Patient will increase functional independence with mobility by performin). Supine to sit with Modified Murray  2). Sit to supine with Modified Murray  3). Sit to stand transfer with Modified Murray  4). Gait  x > 250 feet with Modified Murray                          History:     Past Medical History:   Diagnosis Date    Diabetes mellitus     Prediabetes        Past Surgical History:   Procedure Laterality Date    APPENDECTOMY  07/15/2014    COLONOSCOPY      COLONOSCOPY N/A 2022    Procedure: COLONOSCOPY;  Surgeon: Manuel Sanders MD;  Location: [facility] ENDO;  Service: Endoscopy;  Laterality: N/A;    COLONOSCOPY N/A 2022    Procedure: COLONOSCOPY;  Surgeon: Andrea Clemens MD;  [facility] ENDO;  Service: Endoscopy;  Laterality: N/A;    INCISION AND DRAINAGE OF ABDOMEN N/A 2022    Procedure: INCISION AND DRAINAGE, ABDOMEN;  Surgeon: Jan Oliveira Jr., MD;  Location: [facility];  Service: General;  Laterality: N/A;       Time Tracking:     PT Received On: 22  PT Start Time: 959     PT Stop Time: 1020  PT Total Time (min): 21 min     Billable Minutes: Evaluation 11 and Gait  Training 10      09/17/2022

## 2022-09-17 NOTE — CARE UPDATE
09/16/22 1927   Patient Assessment/Suction   Level of Consciousness (AVPU) alert   Respiratory Effort Shallow;Unlabored   PRE-TX-O2   O2 Device (Oxygen Therapy) room air   SpO2 96 %   Pulse Oximetry Type Intermittent   $ Pulse Oximetry - Multiple Charge Pulse Oximetry - Multiple   Pulse 89   Resp 17   Incentive Spirometer   $ Incentive Spirometer Charges done with encouragement;proper technique demonstrated   Administration (IS) proper technique demonstrated   Number of Repetitions (IS) 10   Level Incentive Spirometer (mL) 1500   Patient Tolerance (IS) fair

## 2022-09-18 NOTE — ASSESSMENT & PLAN NOTE
Nutrition consulted. Most recent weight and BMI monitored-          Malnutrition (Moderate to Severe)  Weight Loss (Malnutrition): greater than 7.5% in 3 months  Energy Intake (Malnutrition): less than 75% for greater than or equal to 1 month              Measurements:  Wt Readings from Last 1 Encounters:   09/18/22 104.6 kg (230 lb 9.6 oz)   Body mass index is 30.42 kg/m².    Recommendations: Recommendation/Intervention: 1) advance PO diet to clears when pt passing flatus -> advance as tolerated to goal of Low fiber, DM 2000 kcal 2) Add Boost breeze BID on clear liquids 3) weigh weekly 4) If unable to advance PO diet to full liquids in 72 hr start nutrition support PPN D 5 AA 4.25 @ 90 ml/hr + standard lipids ( 91 g protein, 1234 kcal)  Goals: 1) PO diet advanced to full liquids in < 72 hr    Patient has been screened and assessed by RD. RD will follow patient.

## 2022-09-18 NOTE — ASSESSMENT & PLAN NOTE
S/p resection 9/14 per Dr. Oliveira.  -Follow up cultures and pathology  -NG tube to LIWS  -NPO  -LR IV fluids  -Merrem  -Surgery following  -Incentive spirometry  -PRN IV analgesics and antiemetics

## 2022-09-18 NOTE — SUBJECTIVE & OBJECTIVE
Interval History:  Denies any flatus or bowel movements.  Reports pain is a 5/10.  On merrem per ID. Urine dark, UA ordered. Patient is tearful and discouraged today.    Review of Systems   Constitutional:  Positive for fatigue. Negative for chills, diaphoresis and fever.   Respiratory:  Negative for cough, chest tightness, shortness of breath and wheezing.    Cardiovascular:  Negative for chest pain, palpitations and leg swelling.   Gastrointestinal:  Positive for abdominal pain. Negative for abdominal distention, nausea and vomiting.   Genitourinary:  Positive for frequency. Negative for difficulty urinating, dysuria, flank pain and urgency.   Musculoskeletal:  Positive for arthralgias, myalgias and neck pain. Negative for back pain, joint swelling and neck stiffness.   Skin:  Positive for wound. Negative for rash.   Neurological:  Positive for weakness. Negative for dizziness, light-headedness and headaches.   Psychiatric/Behavioral:  Negative for confusion and suicidal ideas. The patient is not nervous/anxious.    All other systems reviewed and are negative.  Objective:     Vital Signs (Most Recent):  Temp: 97.5 °F (36.4 °C) (09/18/22 0724)  Pulse: 70 (09/18/22 0724)  Resp: 19 (09/18/22 0724)  BP: (!) 156/80 (09/18/22 0724)  SpO2: 98 % (09/18/22 0724) Vital Signs (24h Range):  Temp:  [96.6 °F (35.9 °C)-98.1 °F (36.7 °C)] 97.5 °F (36.4 °C)  Pulse:  [] 70  Resp:  [15-20] 19  SpO2:  [93 %-98 %] 98 %  BP: (129-156)/(74-82) 156/80     Weight: 104.6 kg (230 lb 9.6 oz)  Body mass index is 30.42 kg/m².    Intake/Output Summary (Last 24 hours) at 9/18/2022 1052  Last data filed at 9/18/2022 0924  Gross per 24 hour   Intake 4393.56 ml   Output 1900 ml   Net 2493.56 ml        Physical Exam  Vitals and nursing note reviewed.   Constitutional:       General: He is not in acute distress.     Appearance: He is well-developed. He is ill-appearing. He is not toxic-appearing.   HENT:      Head: Normocephalic and atraumatic.       Right Ear: External ear normal.      Left Ear: External ear normal.      Nose: Nose normal.      Comments: NG tube to LIWS.     Mouth/Throat:      Mouth: Mucous membranes are moist.      Pharynx: Oropharynx is clear.   Eyes:      Extraocular Movements: Extraocular movements intact.      Conjunctiva/sclera: Conjunctivae normal.      Comments: pale   Cardiovascular:      Rate and Rhythm: Normal rate and regular rhythm.      Pulses: Normal pulses.      Heart sounds: Normal heart sounds.   Pulmonary:      Effort: Pulmonary effort is normal.      Breath sounds: Normal breath sounds.   Abdominal:      Palpations: Abdomen is soft.      Tenderness: There is abdominal tenderness.      Comments: Dressing c/d/I.  Decreased bowel sounds   Musculoskeletal:         General: Normal range of motion.      Cervical back: Normal range of motion and neck supple.      Right lower leg: No edema.      Left lower leg: No edema.   Skin:     General: Skin is warm and dry.   Neurological:      General: No focal deficit present.      Mental Status: He is alert and oriented to person, place, and time. Mental status is at baseline.   Psychiatric:         Mood and Affect: Mood normal.         Behavior: Behavior normal.         Thought Content: Thought content normal.         Judgment: Judgment normal.       Significant Labs: All pertinent labs within the past 24 hours have been reviewed.    Significant Imaging: I have reviewed all pertinent imaging results/findings within the past 24 hours.

## 2022-09-18 NOTE — PROGRESS NOTES
"[facility] Medicine  Progress Note    Patient Name: Dwight Hoffmann  MRN: [id number]  Patient Class: IP- Inpatient   Admission Date: 9/13/2022  Length of Stay: 4 days  Attending Physician: Beth Gatica MD  Primary Care Provider: BOSTON Oswald        Subjective:     Principal Problem:Small bowel mass        HPI:  Mr. Hoffmann is a 52 year old male with a history of NIDDM2 and HLD who presents today with complaints of night sweats for weeks. It is moderate. It is associated with 25 lb wt loss over the last 2 months, urinary hesitancy, urinary frequency, occasional blood streaked stools, weakness, fatigue, neck pain, knee pain, and pelvic pain. He denies measured fever, chills, N/V/D, chest pain, or SOB. He was seen by his primary last month about the blood streaked stools and referred to Dr. Clemens for colonoscopy on 9/1 with multiple polypectomies with path report of tubular adenomas. In the ED, labs revealed microcytic anemia, he was mildly tachycardic on arrival  which improved spontaneously, /60,  and CT abd/pelvis revealing: "Thick-walled collection containing feculent material, widely communicating with small bowel, seen centrally in the pelvis. Findings are suspicious for necrotic mass or abscess arising from the small bowel" Findings were discussed with Dr. Oliveira by ED MD who agreed to see patient in the morning.       Overview/Hospital Course:  Dwight Hoffmann is a 52 year old male with a past medical history of DM, HLD and anemia who presented with lower abdominal pain, lower back pain, and difficulty urinating secondary to an abdominal mass discovered on CT scan. Surgery was consulted and performed exploratory laparotomy with resection of the mass and anastomosis of small bowel. The mass appears to be an abscess with surrounding necrosis. Pathology is currently pending. The patient is currently on cefepime and Flagyl while surgical cultures are pending. An " · Continue her home dose of PO levothyroxine  Outpatient follow-up with PCP in regards to this matter NG tube to LIWS suction is in place and the patient is on IV fluids while he is NPO. He did have a positive blood culture which showed coagulase negative Staph. He was briefly on vancomycin which was discontinued. ID is following along.      Interval History:  Denies any flatus or bowel movements.  Reports pain is a 5/10.  On merrem per ID. Urine dark, UA ordered. Patient is tearful and discouraged today.    Review of Systems   Constitutional:  Positive for fatigue. Negative for chills, diaphoresis and fever.   Respiratory:  Negative for cough, chest tightness, shortness of breath and wheezing.    Cardiovascular:  Negative for chest pain, palpitations and leg swelling.   Gastrointestinal:  Positive for abdominal pain. Negative for abdominal distention, nausea and vomiting.   Genitourinary:  Positive for frequency. Negative for difficulty urinating, dysuria, flank pain and urgency.   Musculoskeletal:  Positive for arthralgias, myalgias and neck pain. Negative for back pain, joint swelling and neck stiffness.   Skin:  Positive for wound. Negative for rash.   Neurological:  Positive for weakness. Negative for dizziness, light-headedness and headaches.   Psychiatric/Behavioral:  Negative for confusion and suicidal ideas. The patient is not nervous/anxious.    All other systems reviewed and are negative.  Objective:     Vital Signs (Most Recent):  Temp: 97.5 °F (36.4 °C) (09/18/22 0724)  Pulse: 70 (09/18/22 0724)  Resp: 19 (09/18/22 0724)  BP: (!) 156/80 (09/18/22 0724)  SpO2: 98 % (09/18/22 0724) Vital Signs (24h Range):  Temp:  [96.6 °F (35.9 °C)-98.1 °F (36.7 °C)] 97.5 °F (36.4 °C)  Pulse:  [] 70  Resp:  [15-20] 19  SpO2:  [93 %-98 %] 98 %  BP: (129-156)/(74-82) 156/80     Weight: 104.6 kg (230 lb 9.6 oz)  Body mass index is 30.42 kg/m².    Intake/Output Summary (Last 24 hours) at 9/18/2022 1052  Last data filed at 9/18/2022 0924  Gross per 24 hour   Intake 4393.56 ml   Output 1900 ml   Net 2493.56 ml         Physical Exam  Vitals and nursing note reviewed.   Constitutional:       General: He is not in acute distress.     Appearance: He is well-developed. He is ill-appearing. He is not toxic-appearing.   HENT:      Head: Normocephalic and atraumatic.      Right Ear: External ear normal.      Left Ear: External ear normal.      Nose: Nose normal.      Comments: NG tube to LIWS.     Mouth/Throat:      Mouth: Mucous membranes are moist.      Pharynx: Oropharynx is clear.   Eyes:      Extraocular Movements: Extraocular movements intact.      Conjunctiva/sclera: Conjunctivae normal.      Comments: pale   Cardiovascular:      Rate and Rhythm: Normal rate and regular rhythm.      Pulses: Normal pulses.      Heart sounds: Normal heart sounds.   Pulmonary:      Effort: Pulmonary effort is normal.      Breath sounds: Normal breath sounds.   Abdominal:      Palpations: Abdomen is soft.      Tenderness: There is abdominal tenderness.      Comments: Dressing c/d/I.  Decreased bowel sounds   Musculoskeletal:         General: Normal range of motion.      Cervical back: Normal range of motion and neck supple.      Right lower leg: No edema.      Left lower leg: No edema.   Skin:     General: Skin is warm and dry.   Neurological:      General: No focal deficit present.      Mental Status: He is alert and oriented to person, place, and time. Mental status is at baseline.   Psychiatric:         Mood and Affect: Mood normal.         Behavior: Behavior normal.         Thought Content: Thought content normal.         Judgment: Judgment normal.       Significant Labs: All pertinent labs within the past 24 hours have been reviewed.    Significant Imaging: I have reviewed all pertinent imaging results/findings within the past 24 hours.      Assessment/Plan:      * Small bowel mass  S/p resection 9/14 per Dr. Oliveira.  -Follow up cultures and pathology  -NG tube to LIWS  -NPO  -LR IV fluids  -Merrem  -Surgery following  -Incentive  spirometry  -PRN IV analgesics and antiemetics    Moderate malnutrition  Nutrition consulted. Most recent weight and BMI monitored-          Malnutrition (Moderate to Severe)  Weight Loss (Malnutrition): greater than 7.5% in 3 months  Energy Intake (Malnutrition): less than 75% for greater than or equal to 1 month              Measurements:  Wt Readings from Last 1 Encounters:   09/18/22 104.6 kg (230 lb 9.6 oz)   Body mass index is 30.42 kg/m².    Recommendations: Recommendation/Intervention: 1) advance PO diet to clears when pt passing flatus -> advance as tolerated to goal of Low fiber, DM 2000 kcal 2) Add Boost breeze BID on clear liquids 3) weigh weekly 4) If unable to advance PO diet to full liquids in 72 hr start nutrition support PPN D 5 AA 4.25 @ 90 ml/hr + standard lipids ( 91 g protein, 1234 kcal)  Goals: 1) PO diet advanced to full liquids in < 72 hr    Patient has been screened and assessed by RD. RD will follow patient.      Arthralgia of bilat knees and neck  Chronic.      Diabetes mellitus type 2, noninsulin dependent  -SSI  -NPO  -Hypoglycemic precautions  -Q6H glucose checks    Microcytic anemia  Appears inflammatory.  -Trend Hgb with CBC        VTE Risk Mitigation (From admission, onward)         Ordered     enoxaparin injection 40 mg  Daily         09/14/22 1009     IP VTE HIGH RISK PATIENT  Once         09/14/22 0246     Place sequential compression device  Until discontinued         09/14/22 0246                Discharge Planning   CATRACHO: 9/19/2022     Code Status: Full Code   Is the patient medically ready for discharge?:     Reason for patient still in hospital (select all that apply): Patient trending condition and Treatment  Discharge Plan A: Home with family                  Beth Gatica MD  Department of Hospital Medicine   [facility]

## 2022-09-18 NOTE — PROGRESS NOTES
Patient seen and examined.  He is now 4 days status post resection was small-bowel not spared reports no bowel movement or flatus.  Does feel the urge to have a bowel movement and notes he feels some rumblings in his abdomen.  He was abdomen does feel softer.    Wt Readings from Last 3 Encounters:   09/18/22 104.6 kg (230 lb 9.6 oz)   09/01/22 102.1 kg (225 lb 1.4 oz)   08/10/22 106.6 kg (235 lb)     Temp Readings from Last 3 Encounters:   09/18/22 98.3 °F (36.8 °C) (Oral)   09/01/22 98.7 °F (37.1 °C)   08/22/22 98.9 °F (37.2 °C)     BP Readings from Last 3 Encounters:   09/18/22 (!) 140/80   09/01/22 128/88   08/22/22 122/88     Pulse Readings from Last 3 Encounters:   09/18/22 74   09/01/22 80   08/22/22 95     AAOx3  Soft/nt/dist    Lab Results   Component Value Date    WBC 11.09 09/18/2022    HGB 8.2 (L) 09/18/2022    HCT 25.7 (L) 09/18/2022    MCV 77 (L) 09/18/2022     (H) 09/18/2022       BMP  Lab Results   Component Value Date     09/18/2022    K 3.9 09/18/2022     09/18/2022    CO2 24 09/18/2022    BUN 10 09/18/2022    CREATININE 0.7 09/18/2022    CALCIUM 8.6 (L) 09/18/2022    ANIONGAP 12 09/18/2022    ESTGFRAFRICA 111 12/28/2021    EGFRNONAA 96 12/28/2021     A/P:  Status post ex lap with small-bowel resection    Ambulate  Continue NG tube to low intermittent suction  Await bowel function   Aggressive IS

## 2022-09-18 NOTE — CARE UPDATE
09/17/22 1950   Patient Assessment/Suction   Level of Consciousness (AVPU) alert   Respiratory Effort Unlabored   PRE-TX-O2   O2 Device (Oxygen Therapy) room air   SpO2 97 %   Pulse Oximetry Type Intermittent   $ Pulse Oximetry - Multiple Charge Pulse Oximetry - Multiple   Pulse 86   Resp 16   Incentive Spirometer   $ Incentive Spirometer Charges done with encouragement   Administration (IS) proper technique demonstrated   Number of Repetitions (IS) 10   Level Incentive Spirometer (mL) 1500   Patient Tolerance (IS) good

## 2022-09-18 NOTE — PROGRESS NOTES
Consult Note  Infectious Disease    Reason for Consult:  GPC bacteremia    HPI: Dwight Hoffmann is a 52 y.o. male very pleasant, with past medical history of diabetes, HLD who presented 9/14 complaining of night sweats, unintentional 30 lb weight loss for the last couple of months.  Symptoms worsened by severe abdominal pain, urinary hesitancy, dysuria, increased urinary frequency, occasional bloody stool, with associated weakness, fatigue, and generalized pain.  He denies fever or chills, no nausea or vomiting, no chest pain or shortness of breath.  Patient was seen by primary care last month due to melena, refer to GI, colonoscopy done on 09/01 were 3 polyps were resected, pathology report consistent with tubular adenomas.      In the ER, blood pressure 113/69, T-max a 100.9°   Labs on admission white count 11.3, monocytic predominance 20%, bands 2%, H&H 9.6/28.5, MCV 74, microcytic anemia, platelet count 395   Normal kidney and liver function.    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 Prostatomegaly necessitating correlation with PSA.    Seen by [facility] surgery, status post ex lap for small bowel obstruction in the setting of necrotic large colonic mass with fistula formation, washout, and colon anastomosis.    Empirically started on Zosyn.  Switched to vancomycin, cefepime, Flagyl on 9/15.    ID consult for Gram-positive cocci in 1/2 bottles.    INTERVAL HISTORY:  9/16: Interim reviewed, patient seen and examined at bedside, anxious regarding clinical condition. Hemodynamically stable, afebrile in the last 24h. States he is burping, not passing gas since yesterday. Dysuria and hesitancy are improved. Labs reviewed, leukocytosis down to 12.8, no left shift, normal monocyte count. H/H 8.1/24.8, MCV: 76, plt: 482. Normal  electrolytes, liver and kidney function. AFP and PSA negative. Micro reviewed, OR cultures GNR, ID and sensitivities pending, blood cultures 1/4 bottles CoNS likely a contaminant. Repeat blood culture x 2 no growth to date, pending final.     9/17 (Nanette):  Case discussed with Dr. Mooney.  NG tube was found to be coiled in his mouth last night and was removed not replaced secondary to patient refusal.  AMA was signed by patient but subsequently the tube was replaced and is draining bilious fluid.  KUB reviewed. No flatus yet.  He is afebrile, white blood cells 12,800.  Abscess cultures reviewed with Enterobacter, not very sensitive, and a pansensitive Proteus.  CEA, CA 19 9, alpha fetoprotein, PSA all negative.  9/18: interim reviewed. Afebrile. Continues to require the NGT for slow return of bowel function. No new micro data. He has not had any pain medication since yesterday. Walking in the [place].     EXAM & DIAGNOSTICS REVIEWED:   Vitals:     Temp:  [96.6 °F (35.9 °C)-98.3 °F (36.8 °C)]   Temp: 98.3 °F (36.8 °C) (09/18/22 1133)  Pulse: 74 (09/18/22 1351)  Resp: 18 (09/18/22 1351)  BP: (!) 140/80 (09/18/22 1133)  SpO2: 96 % (09/18/22 1351)    Intake/Output Summary (Last 24 hours) at 9/18/2022 1513  Last data filed at 9/18/2022 0924  Gross per 24 hour   Intake 4393.56 ml   Output 1000 ml   Net 3393.56 ml       General:  In NAD. Alert and attentive, cooperative, comfortable  Eyes:  Anicteric, EOMI  ENT:  NG tube in place, no ulcers, exudates, thrush, nares patent, dentition is good  Neck:  Supple  Lungs: Clear to auscultation b/l  Heart:  S1/S2+, regular rhythm, no murmurs  Abd:  S/p Ex-lap,. Dressing in place, staples in place, no redness noted, slightly distended,  hypoactive BS, mildly tender to palpation, no drains  :  Voids, urine clear, no flank tenderness  Musc:  Joints without effusion, swelling,  erythema, synovitis, ambulatory  Skin:  Warm, no rash  Wound: Ex-lap, minimal strike  through  Neuro:  Following commands, no acute focal deficit   Psych:  Calm, cooperative  Lymphatic:       Extrem: Left arm edema due to IV infiltration  VAD:  peripheral       Isolation: none    9/14:        General Labs reviewed:  Recent Labs   Lab 09/16/22 0351 09/17/22 0452 09/18/22 0325   WBC 12.86* 12.87* 11.09   HGB 8.1* 8.8* 8.2*   HCT 24.8* 26.4* 25.7*   * 563* 626*       Recent Labs   Lab 09/16/22 0351 09/17/22 0452 09/18/22  0325    137 137   K 4.2 4.1 3.9    100 101   CO2 27 27 24   BUN 8 10 10   CREATININE 0.8 0.8 0.7   CALCIUM 8.7 8.9 8.6*   PROT 6.3 6.2 5.8*   BILITOT 0.8 0.8 0.6   ALKPHOS 68 78 68   ALT 9* 14 12   AST 19 29 23     No results for input(s): CRP in the last 168 hours.  No results for input(s): SEDRATE in the last 168 hours.    Estimated Creatinine Clearance: 153.2 mL/min (based on SCr of 0.7 mg/dL).     Micro:  Microbiology Results (last 7 days)       Procedure Component Value Units Date/Time    Blood culture (site 2) [[id number]] Collected: 09/14/22 0315    Order Status: Completed Specimen: Blood Updated: 09/18/22 1212     Blood Culture, Routine No Growth to date      No Growth to date      No Growth to date      No Growth to date      No Growth to date    Narrative:      Site # 2, aerobic only    Blood culture [[id number]] Collected: 09/15/22 1722    Order Status: Completed Specimen: Blood from Antecubital, Right Updated: 09/18/22 0613     Blood Culture, Routine No Growth to date      No Growth to date      No Growth to date    Blood culture [[id number]] Collected: 09/15/22 1819    Order Status: Completed Specimen: Blood from Antecubital, Right Updated: 09/18/22 0613     Blood Culture, Routine No Growth to date      No Growth to date      No Growth to date    Aerobic culture [[id number]]  (Abnormal)  (Susceptibility) Collected: 09/14/22 1525    Order Status: Completed Specimen: Abscess from Abdomen Updated: 09/17/22 1320     Aerobic Bacterial Culture ENTEROBACTER  CLOACAE  Few        PROTEUS MIRABILIS  Few  No other significant isolate      Blood culture (site 1) [[id number]]  (Abnormal) Collected: 09/14/22 0320    Order Status: Completed Specimen: Blood Updated: 09/17/22 0946     Blood Culture, Routine Gram stain aer bottle: Gram positive cocci in clusters resembling Staph      Results called to and read back by: Poncho Brunson RN  09/15/2022      12:31      COAGULASE-NEGATIVE STAPHYLOCOCCUS SPECIES  Organism is a probable contaminant      Narrative:      Site # 1, aerobic and anaerobic    MRSA/SA Rapid ID by PCR from Blood culture [[id number]] Collected: 09/14/22 0320    Order Status: Completed Updated: 09/15/22 1335     Staph aureus ID by PCR Negative     MRSA ID by PCR Negative    Narrative:      Site # 1, aerobic and anaerobic    Gram stain [[id number]] Collected: 09/14/22 1525    Order Status: Completed Specimen: Abscess from Abdomen Updated: 09/15/22 0310     Gram Stain Result Rare WBC's      Rare Gram positive cocci      Rare Gram positive rods    Culture, Anaerobe [[id number]] Collected: 09/14/22 1525    Order Status: Sent Specimen: Abscess from Abdomen Updated: 09/14/22 2348            Pathology:  9/1 colonoscopy:  1. ASCENDING COLON, POLYPECTOMY:   - TUBULAR ADENOMA.     2. CECUM, POLYPECTOMY:   - TUBULAR ADENOMA.     3. CECUM, POLYPECTOMY:   - TUBULAR ADENOMA.     Imaging Reviewed:  CXR clear  CT abdomen/pelvis 9/14:  1. 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  2. Prostatomegaly necessitating correlation with PSA.    Cardiology: ECHO 9/15/22:  The left ventricle is normal in size with concentric remodeling and normal systolic function.  The estimated ejection fraction is 60%.  Grade I left ventricular diastolic dysfunction.  Normal right ventricular size with normal right ventricular systolic function.  Mild left atrial enlargement.  Mild mitral regurgitation.  Mild to moderate tricuspid  regurgitation.  Normal central venous pressure (3 mmHg).  The estimated PA systolic pressure is 49 mmHg.  There is pulmonary hypertension.  Mild right atrial enlargement.  No vegetations were seen       IMPRESSION & PLAN     Severe sepsis, resolved secondary to perforated necrotic small bowel with abscess status post ex lap/drainage/small-bowel resection with anastomosis 9/14 in the setting of possible colon neoplasia vs prostatitis       Blood cultures 1/4 bottles grew CoNs, likely a contaminant            Repeat blood cultures x 2 no growth to date            OR cultures Enterobacter (intermediate to cefepime and to Zosyn) and Proteus,             AFP negative and PSA 0.9 normal    PMHx: diabetes and HLD    Recommendations:  Follow OR cultures  continue meropenem  Aspiration precautions  Incentive spirometry  PT/OT as tolerated  Follow pathology report    Will follow     D/w patient,  nursing    Medical Decision Making during this encounter was  [_] Low Complexity  [_] Moderate Complexity  [xx] High Complexity

## 2022-09-18 NOTE — PT/OT/SLP PROGRESS
Physical Therapy Treatment    Patient Name:  Dwight Hoffmann   MRN:  [id number]    Recommendations:     Discharge Recommendations:  home   Discharge Equipment Recommendations:  (currently requires rolling walker)     Assessment:     Dwight Hoffmann is a 52 y.o. male admitted with a medical diagnosis of Small bowel mass.  He presents with the following impairments/functional limitations:  weakness, impaired endurance, impaired balance, gait instability, impaired functional mobility, decreased lower extremity function  .    Rehab Prognosis: Good; patient would benefit from acute skilled PT services to address these deficits and reach maximum level of function.    Recent Surgery: Procedure(s) (LRB):  LAPAROTOMY, EXPLORATORY (N/A)  EXCISION, SMALL INTESTINE (N/A)  INCISION AND DRAINAGE, ABDOMEN (N/A) 4 Days Post-Op    Plan:     During this hospitalization, patient to be seen daily to address the identified rehab impairments via gait training, therapeutic activities, therapeutic exercises and progress toward the following goals:    Plan of Care Expires:  09/30/22    Subjective     Patient/Family Comments/goals: agrees to work with PT    Objective:     Communicated with RN (Poncho) prior to session.  Patient found supine with peripheral IV, NG tube, bed alarm upon PT entry to room.     General Precautions: Standard, fall   Orthopedic Precautions:N/A   Braces: N/A  Respiratory Status: Room air     Functional Mobility training:  Bed Mobility:     Rolling Left:  stand by assistance  Supine to Sit: stand by assistance  Transfers:     Sit to Stand:  stand by assistance with rolling walker and x 2 reps  Gait: 175' x 1, 250' x 1 with RW with SBA (required assist to regain LOB when took few steps without AD)      AM-PAC 6 CLICK MOBILITY  Turning over in bed (including adjusting bedclothes, sheets and blankets)?: 4  Sitting down on and standing up from a chair with arms (e.g., wheelchair, bedside commode, etc.): 4  Moving from lying  on back to sitting on the side of the bed?: 4  Moving to and from a bed to a chair (including a wheelchair)?: 3  Need to walk in hospital room?: 3  Climbing 3-5 steps with a railing?: 3 (est.)  Basic Mobility Total Score: 21       Therapeutic Activities and Exercises:   Mobility training as above with cues for technique  SEATED: LAQ, ankle DF/PF (rec'd to perform frequently)    Patient left up in chair with  wife and RN present.    GOALS:   Multidisciplinary Problems       Physical Therapy Goals          Problem: Physical Therapy    Goal Priority Disciplines Outcome Goal Variances Interventions   Physical Therapy Goal     PT, PT/OT Ongoing, Progressing     Description: Goals to be met by: 2022     Patient will increase functional independence with mobility by performin). Supine to sit with Modified [place]  2). Sit to supine with Modified [place]  3). Sit to stand transfer with Modified [place]  4). Gait  x > 250 feet with Modified [place]                          Time Tracking:     PT Received On: 22  PT Start Time: 935     PT Stop Time: 957  PT Total Time (min): 22 min     Billable Minutes: Gait Training 15    Treatment Type: Treatment  PT/PTA: PT     PTA Visit Number: 0     2022

## 2022-09-19 NOTE — PLAN OF CARE
Pt is  AAOX4. POC reviewed with pt. Pt verbalized understanding. VSS. Remains afebrile. IVF infusing per order. IV abx infused per order. NGT to LIS and secured to left nare. Pt tolerating well. Pt states he does not want to take any more pain medications and wants his bowel to wake up so he can go home. Remains free of injury. Bed low, breaks locked, call light in reach. Will monitor.

## 2022-09-19 NOTE — NURSING
NG tube pulled back 10 cm per Dr Oliveira Orders tolerated well secured to middle of nose with NG securement device

## 2022-09-19 NOTE — TELEPHONE ENCOUNTER
----- Message from Katerin Gutiérrez sent at 9/19/2022  3:42 PM CDT -----   Ena with [facility] the patient was in the hospital and need to schedule a hospital follow up he will be discharging tomorrow he need to follow up within one week please call the patient and and schedule [phone]

## 2022-09-19 NOTE — PLAN OF CARE
Problem: Physical Therapy  Goal: Physical Therapy Goal  Description: Goals to be met by: 2022     Patient will increase functional independence with mobility by performin). Supine to sit with Modified [place]  2). Sit to supine with Modified [place]  3). Sit to stand transfer with Modified [place]  4). Gait  x > 250 feet with Modified [place]     Outcome: Ongoing, Progressing   Ambulate with rw and assistance for safety.

## 2022-09-19 NOTE — PLAN OF CARE
Recommendations  1) advance PO diet to clears when pt passing flatus -> advance as tolerated to goal of Low fiber, DM 2000 kcal   2) For now stat PPN D5 AA4.25 @ 90 ml/hr + standard lipids ( 91 g protein, 1234 kcal)  3) Add Boost breeze BID on clear liquids   4) weigh weekly      Goals: 1) PO diet advanced to full liquids in < 72 hr 2) PPN meeting 50% of needs at f/u or diet advanced  Nutrition Goal Status: not met/ new  Communication of RD Recs:  (POC, sticky note, reviewed with MD, second sign)

## 2022-09-19 NOTE — SUBJECTIVE & OBJECTIVE
Interval History:  Still now flatus or bowel movements.  Reports pain is a 5/10.  On merrem per ID.  Patient states he feels discouraged.  Will start PPN today.  Review of Systems   Constitutional:  Positive for fatigue. Negative for chills, diaphoresis and fever.   Respiratory:  Negative for cough, chest tightness, shortness of breath and wheezing.    Cardiovascular:  Negative for chest pain, palpitations and leg swelling.   Gastrointestinal:  Positive for abdominal pain. Negative for abdominal distention, nausea and vomiting.   Genitourinary:  Positive for frequency. Negative for difficulty urinating, dysuria, flank pain and urgency.   Musculoskeletal:  Positive for arthralgias, myalgias and neck pain. Negative for back pain, joint swelling and neck stiffness.   Skin:  Positive for wound. Negative for rash.   Neurological:  Positive for weakness. Negative for dizziness, light-headedness and headaches.   Psychiatric/Behavioral:  Negative for confusion and suicidal ideas. The patient is not nervous/anxious.    All other systems reviewed and are negative.  Objective:     Vital Signs (Most Recent):  Temp: 98.1 °F (36.7 °C) (09/19/22 0735)  Pulse: 74 (09/19/22 0735)  Resp: 17 (09/19/22 0735)  BP: 138/73 (09/19/22 0735)  SpO2: 97 % (09/19/22 0735) Vital Signs (24h Range):  Temp:  [97.3 °F (36.3 °C)-98.3 °F (36.8 °C)] 98.1 °F (36.7 °C)  Pulse:  [74-84] 74  Resp:  [17-20] 17  SpO2:  [96 %-99 %] 97 %  BP: (138-146)/(73-80) 138/73     Weight: 104.6 kg (230 lb 9.6 oz)  Body mass index is 30.42 kg/m².    Intake/Output Summary (Last 24 hours) at 9/19/2022 0950  Last data filed at 9/19/2022 0800  Gross per 24 hour   Intake 993.67 ml   Output 2425 ml   Net -1431.33 ml        Physical Exam  Vitals and nursing note reviewed.   Constitutional:       General: He is not in acute distress.     Appearance: He is well-developed. He is ill-appearing. He is not toxic-appearing.   HENT:      Head: Normocephalic and atraumatic.      Right  Ear: External ear normal.      Left Ear: External ear normal.      Nose: Nose normal.      Comments: NG tube to LIWS.     Mouth/Throat:      Mouth: Mucous membranes are moist.      Pharynx: Oropharynx is clear.   Eyes:      Extraocular Movements: Extraocular movements intact.      Conjunctiva/sclera: Conjunctivae normal.      Comments: pale   Cardiovascular:      Rate and Rhythm: Normal rate and regular rhythm.      Pulses: Normal pulses.      Heart sounds: Normal heart sounds.   Pulmonary:      Effort: Pulmonary effort is normal.      Breath sounds: Normal breath sounds.   Abdominal:      Palpations: Abdomen is soft.      Tenderness: There is abdominal tenderness.      Comments: Dressing c/d/I.  Absent bowel sounds   Musculoskeletal:         General: Normal range of motion.      Cervical back: Normal range of motion and neck supple.      Right lower leg: No edema.      Left lower leg: No edema.   Skin:     General: Skin is warm and dry.   Neurological:      General: No focal deficit present.      Mental Status: He is alert and oriented to person, place, and time. Mental status is at baseline.   Psychiatric:         Mood and Affect: Mood normal.         Behavior: Behavior normal.         Thought Content: Thought content normal.         Judgment: Judgment normal.       Significant Labs: All pertinent labs within the past 24 hours have been reviewed.    Significant Imaging: I have reviewed all pertinent imaging results/findings within the past 24 hours.

## 2022-09-19 NOTE — PT/OT/SLP PROGRESS
Physical Therapy Treatment    Patient Name:  Dwight Hoffmann   MRN:  [id number]    Recommendations:     Discharge Recommendations:  home   Discharge Equipment Recommendations:  (currently requires rolling walker)   Barriers to discharge: None    Assessment:     Dwight Hoffmann is a 52 y.o. male admitted with a medical diagnosis of Small bowel mass.  He presents with the following impairments/functional limitations:  weakness, impaired endurance, impaired functional mobility, gait instability, pain . Supine in bed. Agreed to mobilize. Reports feeling aggravated by being in hospital, inability to pass gas as well as wanting to eat. Ambulated 250' x 2 with rw and CGA with extended seated rest between each. Appeared in better spirit following , Dr. Mooney present.     Rehab Prognosis: Good; patient would benefit from acute skilled PT services to address these deficits and reach maximum level of function.    Recent Surgery: Procedure(s) (LRB):  LAPAROTOMY, EXPLORATORY (N/A)  EXCISION, SMALL INTESTINE (N/A)  INCISION AND DRAINAGE, ABDOMEN (N/A) 5 Days Post-Op    Plan:     During this hospitalization, patient to be seen daily to address the identified rehab impairments via gait training, therapeutic activities and progress toward the following goals:    Plan of Care Expires:  09/30/22    Subjective     Chief Complaint: being in hospital ( however he recognizes the importance ).   Patient/Family Comments/goals: to return home soon.   Pain/Comfort:  Pain Rating 1: other (see comments) (did not rate)  Location - Orientation 1: generalized  Location 1: abdomen  Pain Addressed 1: Reposition, Nurse notified      Objective:     Communicated with nurse Andujar prior to session.  Patient found supine with bed alarm, NG tube, peripheral IV, telemetry upon PT entry to room.     General Precautions: Standard, fall   Orthopedic Precautions:N/A   Braces: N/A  Respiratory Status: Room air     Functional Mobility:  Bed Mobility:     Rolling  Left:  contact guard assistance  Supine to Sit: contact guard assistance  Transfers:     Sit to Stand:  minimum assistance with rolling walker  Gait: 250' x2 with rw and CGA , seated rest between each .       AM-PAC 6 CLICK MOBILITY          Therapeutic Activities and Exercises:   Suction suspended temporarily. Gown changed.   Ambulated with rw and assistance with seated rest between each.   Returned to room to chair at bedside.       Patient left up in chair with all lines intact, call button in reach, chair alarm on, nurse Con notified, and Dr. Mooney present..    GOALS:   Multidisciplinary Problems       Physical Therapy Goals          Problem: Physical Therapy    Goal Priority Disciplines Outcome Goal Variances Interventions   Physical Therapy Goal     PT, PT/OT Ongoing, Progressing     Description: Goals to be met by: 2022     Patient will increase functional independence with mobility by performin). Supine to sit with Modified [place]  2). Sit to supine with Modified [place]  3). Sit to stand transfer with Modified [place]  4). Gait  x > 250 feet with Modified [place]                          Time Tracking:     PT Received On: 22  PT Start Time: 920     PT Stop Time: 943  PT Total Time (min): 23 min     Billable Minutes: Gait Training 15min and Therapeutic Activity 8min    Treatment Type: Treatment  PT/PTA: PTA     PTA Visit Number: 1     2022

## 2022-09-19 NOTE — PROGRESS NOTES
ANGEL called PCP, BOSTON Oswald's office, [phone].  Katerin stated she needs to send message to nurse and nurse will call pt with appt.

## 2022-09-19 NOTE — CARE UPDATE
09/18/22 1930   Patient Assessment/Suction   Level of Consciousness (AVPU) alert   Respiratory Effort Unlabored   PRE-TX-O2   O2 Device (Oxygen Therapy) room air   SpO2 97 %   Pulse Oximetry Type Intermittent   $ Pulse Oximetry - Multiple Charge Pulse Oximetry - Multiple   Pulse 81   Resp 20   Incentive Spirometer   $ Incentive Spirometer Charges done with encouragement;proper technique demonstrated   Administration (IS) proper technique demonstrated   Number of Repetitions (IS) 10   Level Incentive Spirometer (mL) 2000   Patient Tolerance (IS) good

## 2022-09-19 NOTE — PROGRESS NOTES
"[facility] Medicine  Progress Note    Patient Name: Dwight Hoffmann  MRN: [id number]  Patient Class: IP- Inpatient   Admission Date: 9/13/2022  Length of Stay: 5 days  Attending Physician: Beth Gatica MD  Primary Care Provider: BOSTON Oswald        Subjective:     Principal Problem:Small bowel mass        HPI:  Mr. Hoffmann is a 52 year old male with a history of NIDDM2 and HLD who presents today with complaints of night sweats for weeks. It is moderate. It is associated with 25 lb wt loss over the last 2 months, urinary hesitancy, urinary frequency, occasional blood streaked stools, weakness, fatigue, neck pain, knee pain, and pelvic pain. He denies measured fever, chills, N/V/D, chest pain, or SOB. He was seen by his primary last month about the blood streaked stools and referred to Dr. Clemens for colonoscopy on 9/1 with multiple polypectomies with path report of tubular adenomas. In the ED, labs revealed microcytic anemia, he was mildly tachycardic on arrival  which improved spontaneously, /60,  and CT abd/pelvis revealing: "Thick-walled collection containing feculent material, widely communicating with small bowel, seen centrally in the pelvis. Findings are suspicious for necrotic mass or abscess arising from the small bowel" Findings were discussed with Dr. Oliveira by ED MD who agreed to see patient in the morning.       Overview/Hospital Course:  Dwight Hoffmann is a 52 year old male with a past medical history of DM, HLD and anemia who presented with lower abdominal pain, lower back pain, and difficulty urinating secondary to an abdominal mass discovered on CT scan. Surgery was consulted and performed exploratory laparotomy with resection of the mass and anastomosis of small bowel. The mass appears to be an abscess with surrounding necrosis. Pathology is currently pending. The patient is currently on cefepime and Flagyl while surgical cultures are pending. An " NG tube to LIWS suction is in place and the patient is on IV fluids while he is NPO. He did have a positive blood culture which showed coagulase negative Staph. He was briefly on vancomycin which was discontinued. ID is following along.      Interval History:  Still now flatus or bowel movements.  Reports pain is a 5/10.  On merrem per ID.  Patient states he feels discouraged.  Will start PPN today.  Review of Systems   Constitutional:  Positive for fatigue. Negative for chills, diaphoresis and fever.   Respiratory:  Negative for cough, chest tightness, shortness of breath and wheezing.    Cardiovascular:  Negative for chest pain, palpitations and leg swelling.   Gastrointestinal:  Positive for abdominal pain. Negative for abdominal distention, nausea and vomiting.   Genitourinary:  Positive for frequency. Negative for difficulty urinating, dysuria, flank pain and urgency.   Musculoskeletal:  Positive for arthralgias, myalgias and neck pain. Negative for back pain, joint swelling and neck stiffness.   Skin:  Positive for wound. Negative for rash.   Neurological:  Positive for weakness. Negative for dizziness, light-headedness and headaches.   Psychiatric/Behavioral:  Negative for confusion and suicidal ideas. The patient is not nervous/anxious.    All other systems reviewed and are negative.  Objective:     Vital Signs (Most Recent):  Temp: 98.1 °F (36.7 °C) (09/19/22 0735)  Pulse: 74 (09/19/22 0735)  Resp: 17 (09/19/22 0735)  BP: 138/73 (09/19/22 0735)  SpO2: 97 % (09/19/22 0735) Vital Signs (24h Range):  Temp:  [97.3 °F (36.3 °C)-98.3 °F (36.8 °C)] 98.1 °F (36.7 °C)  Pulse:  [74-84] 74  Resp:  [17-20] 17  SpO2:  [96 %-99 %] 97 %  BP: (138-146)/(73-80) 138/73     Weight: 104.6 kg (230 lb 9.6 oz)  Body mass index is 30.42 kg/m².    Intake/Output Summary (Last 24 hours) at 9/19/2022 0950  Last data filed at 9/19/2022 0800  Gross per 24 hour   Intake 993.67 ml   Output 2425 ml   Net -1431.33 ml        Physical  Exam  Vitals and nursing note reviewed.   Constitutional:       General: He is not in acute distress.     Appearance: He is well-developed. He is ill-appearing. He is not toxic-appearing.   HENT:      Head: Normocephalic and atraumatic.      Right Ear: External ear normal.      Left Ear: External ear normal.      Nose: Nose normal.      Comments: NG tube to LIWS.     Mouth/Throat:      Mouth: Mucous membranes are moist.      Pharynx: Oropharynx is clear.   Eyes:      Extraocular Movements: Extraocular movements intact.      Conjunctiva/sclera: Conjunctivae normal.      Comments: pale   Cardiovascular:      Rate and Rhythm: Normal rate and regular rhythm.      Pulses: Normal pulses.      Heart sounds: Normal heart sounds.   Pulmonary:      Effort: Pulmonary effort is normal.      Breath sounds: Normal breath sounds.   Abdominal:      Palpations: Abdomen is soft.      Tenderness: There is abdominal tenderness.      Comments: Dressing c/d/I.  Absent bowel sounds   Musculoskeletal:         General: Normal range of motion.      Cervical back: Normal range of motion and neck supple.      Right lower leg: No edema.      Left lower leg: No edema.   Skin:     General: Skin is warm and dry.   Neurological:      General: No focal deficit present.      Mental Status: He is alert and oriented to person, place, and time. Mental status is at baseline.   Psychiatric:         Mood and Affect: Mood normal.         Behavior: Behavior normal.         Thought Content: Thought content normal.         Judgment: Judgment normal.       Significant Labs: All pertinent labs within the past 24 hours have been reviewed.    Significant Imaging: I have reviewed all pertinent imaging results/findings within the past 24 hours.      Assessment/Plan:      * Small bowel mass  S/p resection 9/14 per Dr. Oliveira.  -Follow up cultures and pathology  -NG tube to LIWS  -NPO  -LR IV fluids  -Merrem  -Surgery following  -Incentive spirometry  -PRN IV  analgesics and antiemetics    Moderate malnutrition  Nutrition consulted. Most recent weight and BMI monitored-          Malnutrition (Moderate to Severe)  Weight Loss (Malnutrition): greater than 7.5% in 3 months  Energy Intake (Malnutrition): less than 75% for greater than or equal to 1 month              Measurements:  Wt Readings from Last 1 Encounters:   09/18/22 104.6 kg (230 lb 9.6 oz)   Body mass index is 30.42 kg/m².    Recommendations: Recommendation/Intervention: 1) advance PO diet to clears when pt passing flatus -> advance as tolerated to goal of Low fiber, DM 2000 kcal 2) Add Boost breeze BID on clear liquids 3) weigh weekly 4) If unable to advance PO diet to full liquids in 72 hr start nutrition support PPN D 5 AA 4.25 @ 90 ml/hr + standard lipids ( 91 g protein, 1234 kcal)  Goals: 1) PO diet advanced to full liquids in < 72 hr    Patient has been screened and assessed by RD. RD will follow patient.      Arthralgia of bilat knees and neck  Chronic.      Diabetes mellitus type 2, noninsulin dependent  -SSI  -NPO  -Hypoglycemic precautions  -Q6H glucose checks    Microcytic anemia  Appears inflammatory.  -Trend Hgb with CBC        VTE Risk Mitigation (From admission, onward)         Ordered     enoxaparin injection 40 mg  Daily         09/14/22 1009     IP VTE HIGH RISK PATIENT  Once         09/14/22 0246     Place sequential compression device  Until discontinued         09/14/22 0246                Discharge Planning   CATRACHO: 9/19/2022     Code Status: Full Code   Is the patient medically ready for discharge?:     Reason for patient still in hospital (select all that apply): Patient trending condition and Treatment  Discharge Plan A: Home with family                  Beth Gatica MD  Department of Hospital Medicine   [facility]

## 2022-09-19 NOTE — CHAPLAIN
"Visited pt during general rounding; pt nervous at first that "" was visiting, but I explained my role and that  visits are not just for death and dying; pt relieved. Pt became emotional once I told him I'm here for him for emotional and spiritual support; provided compassionate presence and listening ear as he lamented that he "rides motorcycles, has been strong, was just in [place] last week..." This sudden shock of being in this condition the patient is grieving the loss of independence. He's tired of being in the hospital. He is  and has two children, one son called while I was in the room.    Pt confirmed he's [employer]ist and welcomed me to pray over/with/for him. After the prayer pt thanked me a couple times; reminded him that I'm here for him and can come back any time. He appreciated it. Lord, in your mercy.  "

## 2022-09-19 NOTE — PROGRESS NOTES
[facility]  Adult Nutrition  Progress Note    SUMMARY       Recommendations  1) advance PO diet to clears when pt passing flatus -> advance as tolerated to goal of Low fiber, DM 2000 kcal   2) For now stat PPN D5 AA4.25 @ 90 ml/hr + standard lipids ( 91 g protein, 1234 kcal)  3) Add Boost breeze BID on clear liquids   4) weigh weekly     Goals: 1) PO diet advanced to full liquids in < 72 hr 2) PPN meeting 50% of needs at f/u or diet advanced  Nutrition Goal Status: not met/ new  Communication of RD Recs:  (POC, sticky note, reviewed with MD, second sign)    Assessment and Plan     Moderate malnutrition  Contributing Nutrition Diagnosis  Moderate acute condition related malnutrition     Related to (etiology):   Altered GI function     Signs and Symptoms (as evidenced by):   1) PO intakes < 75% of needs x > 1 week  2) 8% wt loss x 2 months ( per pt)     Interventions:  Collaboration with care providers     Nutrition Diagnosis Status:   new    Malnutrition Assessment     Skin (Micronutrient):  (Oj = 18, abd. incision, NG ( 800 ml output)  Teeth (Micronutrient):  (missing some)   Micronutrient Evaluation: suspected deficiency  Micronutrient Evaluation Comments: iron   Weight Loss (Malnutrition): greater than 7.5% in <3 months  Energy Intake (Malnutrition): less than 75% for greater than or equal to 1 week          Edema (Fluid Accumulation): 0-->no edema present             Reason for Assessment    Reason For Assessment: follow up  Diagnosis:  (small bowl mass)  Relevant Medical History: DM2 on metformin, HLD  Interdisciplinary Rounds: did not attend    General Information Comments: 51 y/o male admitted with small bowel perforation POD 2 abd. resection. Now with ileus, + NG, no significant output. Not yet passing BM or flatus, per surgery can advance diet to clears at that point. NFPE to be done at f/u. Endorses 25 lb wt loss x 2 months.  9/19/22 NPO x 6 days, no flatus, pt having high NG output.  "PPN started today.    Nutrition Discharge Planning: To be determined- DM 2000 kcal, cardiac diet    Nutrition Risk Screen    Nutrition Risk Screen: no indicators present    Nutrition/Diet History    Spiritual, Cultural Beliefs, Worship Practices, Values that Affect Care: no  Food Allergies: NKFA  Factors Affecting Nutritional Intake: altered gastrointestinal function, NPO    Anthropometrics    Temp: 98.3 °F (36.8 °C)  Height Method: Measured (21)  Height: 6' 1"  Height (inches): 73 in  Weight Method: Bed Scale  Weight: 104.6 kg (230 lb 9.6 oz)  Weight (lb): 230.6 lb  Ideal Body Weight (IBW), Male: 184 lb  % Ideal Body Weight, Male (lb): 122.33 %  BMI (Calculated): 30.4  BMI Grade: 25 - 29.9 - overweight  Weight Loss: unintentional  Usual Body Weight (UBW), k.6 kg (21- per pt wt loss was in the last 2 months)  % Usual Body Weight: 91.68  % Weight Change From Usual Weight: -8.51 %       Lab/Procedures/Meds    Pertinent Labs Reviewed: reviewed  BMP  Lab Results   Component Value Date     2022    K 3.7 2022     2022    CO2 23 2022    BUN 10 2022    CREATININE 0.7 2022    CALCIUM 8.3 (L) 2022    ANIONGAP 12 2022    ESTGFRAFRICA 111 2021    EGFRNONAA 96 2021     Lab Results   Component Value Date    ALBUMIN 2.0 (L) 2022     POCT Glucose   Date Value Ref Range Status   2022 103 70 - 110 mg/dL Final   2022 118 (H) 70 - 110 mg/dL Final   2022 116 (H) 70 - 110 mg/dL Final   2022 124 (H) 70 - 110 mg/dL Final   2022 144 (H) 70 - 110 mg/dL Final   2022 158 (H) 70 - 110 mg/dL Final           Pertinent Medications Reviewed: reviewed  Lactated ringers@125 ml/hr, insulin,zofran, phenergan    Estimated/Assessed Needs    Weight Used For Calorie Calculations: 102.1 kg (225 lb 1.4 oz)  Energy Calorie Requirements (kcal): MSJ ( x 1.2) = 2300 kcal  Energy Need Method: Uriel De Oliveira  Protein Requirements: " 1-1.2 g protein/kg ( 102-122 g)  Weight Used For Protein Calculations: 102.1 kg (225 lb 1.4 oz)  Fluid Requirements (mL): 2300 ml or per MD  Estimated Fluid Requirement Method: RDA Method  CHO Requirement: 258-316 g      Nutrition Prescription Ordered    Current Diet Order: NPO x 6 days    Evaluation of Received Nutrient/Fluid Intake    Energy Calories Required: not meeting needs  Protein Required: not meeting needs  Fluid Required: exceeds needs  Total Fluid Intake (mL/kg): IVF @ 125 ml/hr  Tolerance: other (see comments) (NPO)     Intake/Output Summary (Last 24 hours) at 9/19/2022 1705  Last data filed at 9/19/2022 0800  Gross per 24 hour   Intake --   Output 2425 ml   Net -2425 ml     % Intake of Estimated Energy Needs: 0%  % Meal Intake: NPO    Nutrition Risk    Level of Risk/Frequency of Follow-up: moderate - high 2-3 x weekly    Monitor and Evaluation    Food and Nutrient Intake: energy intake  Food and Nutrient Adminstration: diet order, enteral and parenteral nutrition administration  Anthropometric Measurements: weight  Biochemical Data, Medical Tests and Procedures: electrolyte and renal panel, gastrointestinal profile, glucose/endocrine profile  Nutrition-Focused Physical Findings: overall appearance, skin     Nutrition Follow-Up    RD Follow-up?: Yes

## 2022-09-19 NOTE — PROGRESS NOTES
General Surgery Progress Note    Admit Date: 9/13/2022  S/P: Procedure(s) (LRB):  LAPAROTOMY, EXPLORATORY (N/A)  EXCISION, SMALL INTESTINE (N/A)  INCISION AND DRAINAGE, ABDOMEN (N/A)    Post-operative Day: 5 Days Post-Op    Hospital Day: 7    SUBJECTIVE:   No acute events.  NG tube output still high. No passage of flatus or stools.  Patient has been ambulating and sitting upright in chair.    OBJECTIVE:     Vital Signs (Most Recent)  Temp:  [97.3 °F (36.3 °C)-98.2 °F (36.8 °C)] 98 °F (36.7 °C)  Pulse:  [73-81] 73  Resp:  [16-20] 16  SpO2:  [96 %-99 %] 98 %  BP: (136-146)/(71-79) 136/71    I&Os:  I/O last 3 completed shifts:  In: 5147.2 [I.V.:4723.1; IV Piggyback:424.1]  Out: 2900 [Urine:1900; Drains:1000]    Physical Exam:  Gen: NAD, AAOx3  HEENT: Anicteric sclera  Pulm: unlabored, symmetrical   Abd: Soft, mild distention, midline incision intact with staples in place, no signs of infection    Laboratory:  CBC:   Recent Labs   Lab 09/19/22 0318   WBC 11.05   RBC 3.29*   HGB 8.1*   HCT 25.2*   *   MCV 77*   MCH 24.6*   MCHC 32.1     CMP:   Recent Labs   Lab 09/19/22 0318   GLU 92   CALCIUM 8.3*   ALBUMIN 2.0*   PROT 5.7*      K 3.7   CO2 23      BUN 10   CREATININE 0.7   ALKPHOS 63   ALT 15   AST 29   BILITOT 0.7     Labs within the past 24 hours have been reviewed.    ASSESSMENT/PLAN:     Patient Active Problem List    Diagnosis Date Noted    Moderate malnutrition 09/16/2022    Small bowel mass 09/14/2022    Microcytic anemia 09/14/2022    Diabetes mellitus type 2, noninsulin dependent 09/14/2022    Arthralgia of bilat knees and neck 09/14/2022         52 y.o. male status post small-bowel resection for contained perforation with suspected underlying mass  -ileus persists  -NG tube deep, will pull back and reimage  -plan for CT scan tomorrow if no passage of flatus or stool  -continued optimize electrolytes  -patient reassured that he is doing all appropriate actions such as ambulating, sitting  upright, minimizing narcotics, chewing gum, etc.  -awaiting pathology  -antibiotics per Infectious Disease

## 2022-09-19 NOTE — PROGRESS NOTES
Consult Note  Infectious Disease    Reason for Consult:  GPC bacteremia    HPI: Dwight Hoffmann is a 52 y.o. male very pleasant, with past medical history of diabetes, HLD who presented 9/14 complaining of night sweats, unintentional 30 lb weight loss for the last couple of months.  Symptoms worsened by severe abdominal pain, urinary hesitancy, dysuria, increased urinary frequency, occasional bloody stool, with associated weakness, fatigue, and generalized pain.  He denies fever or chills, no nausea or vomiting, no chest pain or shortness of breath.  Patient was seen by primary care last month due to melena, refer to GI, colonoscopy done on 09/01 were 3 polyps were resected, pathology report consistent with tubular adenomas.      In the ER, blood pressure 113/69, T-max a 100.9°   Labs on admission white count 11.3, monocytic predominance 20%, bands 2%, H&H 9.6/28.5, MCV 74, microcytic anemia, platelet count 395   Normal kidney and liver function.    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 Prostatomegaly necessitating correlation with PSA.    Seen by [facility] surgery, status post ex lap for small bowel obstruction in the setting of necrotic large colonic mass with fistula formation, washout, and colon anastomosis.    Empirically started on Zosyn.  Switched to vancomycin, cefepime, Flagyl on 9/15.    ID consult for Gram-positive cocci in 1/2 bottles.    INTERVAL HISTORY:  9/16: Interim reviewed, patient seen and examined at bedside, anxious regarding clinical condition. Hemodynamically stable, afebrile in the last 24h. States he is burping, not passing gas since yesterday. Dysuria and hesitancy are improved. Labs reviewed, leukocytosis down to 12.8, no left shift, normal monocyte count. H/H 8.1/24.8, MCV: 76, plt: 482. Normal  electrolytes, liver and kidney function. AFP and PSA negative. Micro reviewed, OR cultures GNR, ID and sensitivities pending, blood cultures 1/4 bottles CoNS likely a contaminant. Repeat blood culture x 2 no growth to date, pending final.     9/17 (Nanette):  Case discussed with Dr. Mooney.  NG tube was found to be coiled in his mouth last night and was removed not replaced secondary to patient refusal.  AMA was signed by patient but subsequently the tube was replaced and is draining bilious fluid.  KUB reviewed. No flatus yet.  He is afebrile, white blood cells 12,800.  Abscess cultures reviewed with Enterobacter, not very sensitive, and a pansensitive Proteus.  CEA, CA 19 9, alpha fetoprotein, PSA all negative.  9/18: interim reviewed. Afebrile. Continues to require the NGT for slow return of bowel function. No new micro data. He has not had any pain medication since yesterday. Walking in the [place].     9/19 (Vinicio): Interim reviewed, discussed with Dr Fitzpatrick. Patient seen and examined at bedside, after session with PT. States his hungry, and is anxious regarding NG tube and not being able to pass gas. Hemodynamically stable, afebrile. Labs reviewed, stable WBC, no left shift, H/H 8.1/25.5, plt: 633. Stable kidney and liver function tests. No new micro data. Awaiting pathology report.     EXAM & DIAGNOSTICS REVIEWED:   Vitals:     Temp:  [97.3 °F (36.3 °C)-98.3 °F (36.8 °C)]   Temp: 98.1 °F (36.7 °C) (09/19/22 0735)  Pulse: 74 (09/19/22 0735)  Resp: 17 (09/19/22 0735)  BP: 138/73 (09/19/22 0735)  SpO2: 97 % (09/19/22 0735)    Intake/Output Summary (Last 24 hours) at 9/19/2022 0840  Last data filed at 9/19/2022 0800  Gross per 24 hour   Intake 993.67 ml   Output 2750 ml   Net -1756.33 ml       General:  In NAD. Alert and attentive, cooperative, comfortable  Eyes:  Anicteric, EOMI  ENT:  NG tube in place, about 300cc of bilious fluid, no ulcers, exudates, thrush, nares patent, dentition is  good  Neck:  Supple  Lungs: Clear to auscultation b/l  Heart:  S1/S2+, regular rhythm, no murmurs  Abd:  S/p Ex-lap,. Dressing in place, staples in place, no redness noted, slightly distended,  hypoactive BS, mildly tender to palpation, no drains  :  Voids, urine clear, no flank tenderness  Musc:  Joints without effusion, swelling,  erythema, synovitis, ambulatory  Skin:  Warm, no rash  Wound: Ex-lap, minimal strike through  Neuro:  Following commands, no acute focal deficit   Psych:  Calm, cooperative  Lymphatic:       Extrem: Left arm edema due to IV infiltration- improved  VAD:  peripheral       Isolation: none    9/14:        General Labs reviewed:  Recent Labs   Lab 09/17/22 0452 09/18/22 0325 09/19/22 0318   WBC 12.87* 11.09 11.05   HGB 8.8* 8.2* 8.1*   HCT 26.4* 25.7* 25.2*   * 626* 633*       Recent Labs   Lab 09/17/22 0452 09/18/22 0325 09/19/22 0318    137 137   K 4.1 3.9 3.7    101 102   CO2 27 24 23   BUN 10 10 10   CREATININE 0.8 0.7 0.7   CALCIUM 8.9 8.6* 8.3*   PROT 6.2 5.8* 5.7*   BILITOT 0.8 0.6 0.7   ALKPHOS 78 68 63   ALT 14 12 15   AST 29 23 29     No results for input(s): CRP in the last 168 hours.  No results for input(s): SEDRATE in the last 168 hours.    Estimated Creatinine Clearance: 153.2 mL/min (based on SCr of 0.7 mg/dL).     Micro:  Microbiology Results (last 7 days)       Procedure Component Value Units Date/Time    Culture, Anaerobe [[id number]] Collected: 09/14/22 1525    Order Status: Completed Specimen: Abscess from Abdomen Updated: 09/19/22 0717     Anaerobic Culture No anaerobes isolated    Blood culture [[id number]] Collected: 09/15/22 1722    Order Status: Completed Specimen: Blood from Antecubital, Right Updated: 09/19/22 0612     Blood Culture, Routine No Growth to date      No Growth to date      No Growth to date      No Growth to date    Blood culture [[id number]] Collected: 09/15/22 1819    Order Status: Completed Specimen: Blood from Antecubital,  Right Updated: 09/19/22 0612     Blood Culture, Routine No Growth to date      No Growth to date      No Growth to date      No Growth to date    Blood culture (site 2) [[id number]] Collected: 09/14/22 0315    Order Status: Completed Specimen: Blood Updated: 09/18/22 1212     Blood Culture, Routine No Growth to date      No Growth to date      No Growth to date      No Growth to date      No Growth to date    Narrative:      Site # 2, aerobic only    Aerobic culture [[id number]]  (Abnormal)  (Susceptibility) Collected: 09/14/22 1525    Order Status: Completed Specimen: Abscess from Abdomen Updated: 09/17/22 1320     Aerobic Bacterial Culture ENTEROBACTER CLOACAE  Few        PROTEUS MIRABILIS  Few  No other significant isolate      Blood culture (site 1) [[id number]]  (Abnormal) Collected: 09/14/22 0320    Order Status: Completed Specimen: Blood Updated: 09/17/22 0946     Blood Culture, Routine Gram stain aer bottle: Gram positive cocci in clusters resembling Staph      Results called to and read back by: Poncho Brunson RN  09/15/2022      12:31      COAGULASE-NEGATIVE STAPHYLOCOCCUS SPECIES  Organism is a probable contaminant      Narrative:      Site # 1, aerobic and anaerobic    MRSA/SA Rapid ID by PCR from Blood culture [[id number]] Collected: 09/14/22 0320    Order Status: Completed Updated: 09/15/22 1335     Staph aureus ID by PCR Negative     MRSA ID by PCR Negative    Narrative:      Site # 1, aerobic and anaerobic    Gram stain [[id number]] Collected: 09/14/22 1525    Order Status: Completed Specimen: Abscess from Abdomen Updated: 09/15/22 0310     Gram Stain Result Rare WBC's      Rare Gram positive cocci      Rare Gram positive rods            Pathology:  9/1 colonoscopy:  1. ASCENDING COLON, POLYPECTOMY:   - TUBULAR ADENOMA.     2. CECUM, POLYPECTOMY:   - TUBULAR ADENOMA.     3. CECUM, POLYPECTOMY:   - TUBULAR ADENOMA.     Imaging Reviewed:  CXR clear  CT abdomen/pelvis 9/14:  1. 12 cm necrotic mass or  abscess in the abdomen, with fistula formation to several adjacent small bowel loops.  2. Prostatomegaly necessitating correlation with PSA.    Cardiology: ECHO 9/15/22:  The left ventricle is normal in size with concentric remodeling and normal systolic function.  The estimated ejection fraction is 60%.  Grade I left ventricular diastolic dysfunction.  Normal right ventricular size with normal right ventricular systolic function.  Mild left atrial enlargement.  Mild mitral regurgitation.  Mild to moderate tricuspid regurgitation.  Normal central venous pressure (3 mmHg).  The estimated PA systolic pressure is 49 mmHg.  There is pulmonary hypertension.  Mild right atrial enlargement.  No vegetations were seen       IMPRESSION & PLAN     Severe sepsis, resolved secondary to perforated necrotic small bowel with abscess status post ex lap/drainage/small-bowel resection with anastomosis 9/14 in the setting of possible colon neoplasia vs prostatitis       Blood cultures 1/4 bottles grew CoNs, likely a contaminant            Repeat blood cultures x 2 no growth            OR cultures Enterobacter (intermediate to cefepime and to Zosyn) and Proteus mirabilis sensitive to Merrem             AFP, Ca-19, CEA negative and PSA 0.9 normal    PMHx: diabetes and HLD    Recommendations:  Continue Meropenem 1g IV q8h  Follow pathology report  Advance diet as tolerated  Aspiration precautions  Incentive spirometry  PT/OT as tolerated    Will follow     D/w patient, nursing, Dr Gatica    Medical Decision Making during this encounter was  [_] Low Complexity  [_] Moderate Complexity  [xx] High Complexity

## 2022-09-20 PROBLEM — K65.1 INTRA-ABDOMINAL ABSCESS: Status: ACTIVE | Noted: 2022-01-01

## 2022-09-20 PROBLEM — D63.8 ANEMIA, CHRONIC DISEASE: Status: ACTIVE | Noted: 2022-01-01

## 2022-09-20 PROBLEM — K63.1 SMALL BOWEL PERFORATION: Status: ACTIVE | Noted: 2022-01-01

## 2022-09-20 NOTE — PT/OT/SLP PROGRESS
Occupational Therapy      Patient Name:  Dwight Hoffmann   MRN:  [id number]    Attempted OT tx. Patient declined due to fatigue from PT tx prior to arrival. Will follow up when appropriate.    9/20/2022

## 2022-09-20 NOTE — PLAN OF CARE
Patient received medication per order.  Patient denies any distress or discomfort. None observed.

## 2022-09-20 NOTE — PLAN OF CARE
Pt is  AAOX4. POC reviewed with pt. Pt verbalized understanding. VSS. Remains afebrile. IVF infusing per order. IV abx infused per order. NGT to LIS and secured to left nare. Pt tolerating well.  Remains free of injury. Bed low, breaks locked, call light in reach. Will monitor. Ambulated in hallway with PT tolerated well 750 ml green bile colored draiange emptied from Left Nare NG tube.

## 2022-09-20 NOTE — PLAN OF CARE
Problem: Physical Therapy  Goal: Physical Therapy Goal  Description: Goals to be met by: 2022     Patient will increase functional independence with mobility by performin). Supine to sit with Modified [place]  2). Sit to supine with Modified [place]  3). Sit to stand transfer with Modified [place]  4). Gait  x > 250 feet with Modified [place]     Outcome: Ongoing, Progressing   Ambulate with rw and assistance for safety.

## 2022-09-20 NOTE — NURSING
Mr Hoffmann Spouse at bed side voiced concerns about clinimix Infusing peripherally,[facility] charge Nurse Spoke with her and informed her that Certain concentrations can run peripherally, I wnet in to room to restart PPN since it was on hold for meropenum to infuse and the spouse informed me that she would like to speak to a Pharmacist, [facility] pharmacist came to floor and went to room and spoke with Spouse and informed her that certain concentrations were ok to infuse Peripherally,I went back to room and the spouse Informed me that she wanted to speak To the night shift before resuming PPN

## 2022-09-20 NOTE — PROGRESS NOTES
General Surgery Progress Note    Admit Date: 9/13/2022  S/P: Procedure(s) (LRB):  LAPAROTOMY, EXPLORATORY (N/A)  EXCISION, SMALL INTESTINE (N/A)  INCISION AND DRAINAGE, ABDOMEN (N/A)    Post-operative Day: 6 Days Post-Op    Hospital Day: 8    SUBJECTIVE:   CT completed with abscess at area of previous mass/perforation. He is afebrile. He did begin passing flatus.    OBJECTIVE:     Vital Signs (Most Recent)  Temp:  [96.6 °F (35.9 °C)-99 °F (37.2 °C)] 99 °F (37.2 °C)  Pulse:  [69-81] 81  Resp:  [16-20] 16  SpO2:  [95 %-98 %] 98 %  BP: (137-144)/(75-84) 137/84    I&Os:  I/O last 3 completed shifts:  In: 3745.4 [I.V.:2450.7; IV Piggyback:240.8]  Out: 4775 [Urine:3625; Drains:1150]    Physical Exam:  Gen: NAD, AAOx3  HEENT: Anicteric sclera, NG tube in place  Pulm: unlabored, symmetrical   Abd: Soft, mild distention, midline incision intact with staples in place, no signs of infection    Laboratory:  CBC:   Recent Labs   Lab 09/20/22 0433   WBC 12.48   RBC 3.35*   HGB 8.4*   HCT 25.9*   *   MCV 77*   MCH 25.1*   MCHC 32.4       CMP:   Recent Labs   Lab 09/20/22 0433   *   CALCIUM 8.5*   ALBUMIN 2.3*   PROT 6.5      K 3.8   CO2 25      BUN 10   CREATININE 0.7   ALKPHOS 66   ALT 17   AST 27   BILITOT 0.7       Labs within the past 24 hours have been reviewed.    PATHOLOGY -prelim B-cell lymphoma      ASSESSMENT/PLAN:     Patient Active Problem List    Diagnosis Date Noted    Moderate malnutrition 09/16/2022    Small bowel mass 09/14/2022    Microcytic anemia 09/14/2022    Diabetes mellitus type 2, noninsulin dependent 09/14/2022    Arthralgia of bilat knees and neck 09/14/2022         52 y.o. male status post small-bowel resection for contained perforation with suspected underlying mass  -passing flatus, will clamp NG tube and allow for clear liquids  -CT shows residual abscess though no window for IR, surgical drainage may be unnecessary, recommend prolonged course of antibiotics and  reimaging

## 2022-09-20 NOTE — PLAN OF CARE
09/20/22 0723   Patient Assessment/Suction   Level of Consciousness (AVPU) alert   Respiratory Effort Normal;Unlabored   Expansion/Accessory Muscles/Retractions no retractions;no use of accessory muscles   All Lung Fields Breath Sounds clear;Anterior:;Lateral:   Rhythm/Pattern, Respiratory depth regular;pattern regular;unlabored   Cough Frequency no cough   PRE-TX-O2   O2 Device (Oxygen Therapy) room air   SpO2 95 %   Pulse Oximetry Type Intermittent   $ Pulse Oximetry - Multiple Charge Pulse Oximetry - Multiple   Pulse 77   Resp 20   Incentive Spirometer   $ Incentive Spirometer Charges done with encouragement   Incentive Spirometer Predicted Level (mL) 2850   Administration (IS) instruction provided, follow-up   Number of Repetitions (IS) 10   Level Incentive Spirometer (mL) 1500   Patient Tolerance (IS) good

## 2022-09-20 NOTE — PROGRESS NOTES
Consult Note  Infectious Disease    Reason for Consult:  GPC bacteremia    HPI: Dwight Hoffmann is a 52 y.o. male very pleasant, with past medical history of diabetes, HLD who presented 9/14 complaining of night sweats, unintentional 30 lb weight loss for the last couple of months.  Symptoms worsened by severe abdominal pain, urinary hesitancy, dysuria, increased urinary frequency, occasional bloody stool, with associated weakness, fatigue, and generalized pain.  He denies fever or chills, no nausea or vomiting, no chest pain or shortness of breath.  Patient was seen by primary care last month due to melena, refer to GI, colonoscopy done on 09/01 were 3 polyps were resected, pathology report consistent with tubular adenomas.      In the ER, blood pressure 113/69, T-max a 100.9°   Labs on admission white count 11.3, monocytic predominance 20%, bands 2%, H&H 9.6/28.5, MCV 74, microcytic anemia, platelet count 395   Normal kidney and liver function.    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 Prostatomegaly necessitating correlation with PSA.    Seen by [facility] surgery, status post ex lap for small bowel obstruction in the setting of necrotic large colonic mass with fistula formation, washout, and colon anastomosis.    Empirically started on Zosyn.  Switched to vancomycin, cefepime, Flagyl on 9/15.    ID consult for Gram-positive cocci in 1/2 bottles.    INTERVAL HISTORY:  9/16: Interim reviewed, patient seen and examined at bedside, anxious regarding clinical condition. Hemodynamically stable, afebrile in the last 24h. States he is burping, not passing gas since yesterday. Dysuria and hesitancy are improved. Labs reviewed, leukocytosis down to 12.8, no left shift, normal monocyte count. H/H 8.1/24.8, MCV: 76, plt: 482. Normal  electrolytes, liver and kidney function. AFP and PSA negative. Micro reviewed, OR cultures GNR, ID and sensitivities pending, blood cultures 1/4 bottles CoNS likely a contaminant. Repeat blood culture x 2 no growth to date, pending final.     9/17 (Nanette):  Case discussed with Dr. Mooney.  NG tube was found to be coiled in his mouth last night and was removed not replaced secondary to patient refusal.  AMA was signed by patient but subsequently the tube was replaced and is draining bilious fluid.  KUB reviewed. No flatus yet.  He is afebrile, white blood cells 12,800.  Abscess cultures reviewed with Enterobacter, not very sensitive, and a pansensitive Proteus.  CEA, CA 19 9, alpha fetoprotein, PSA all negative.  9/18: interim reviewed. Afebrile. Continues to require the NGT for slow return of bowel function. No new micro data. He has not had any pain medication since yesterday. Walking in the [place].     9/19 (Vinicio): Interim reviewed, discussed with Dr Fitzpatrick. Patient seen and examined at bedside, after session with PT. States his hungry, and is anxious regarding NG tube and not being able to pass gas. Hemodynamically stable, afebrile. Labs reviewed, stable WBC, no left shift, H/H 8.1/25.5, plt: 633. Stable kidney and liver function tests. No new micro data. Awaiting pathology report.     9/20: Interim reviewed, patient seen and examined at bedside. Discussed with Surgery yesterday, NG tube repositioned, patient is finally passing gas, output from NG tube decreased. Hemodynamically stable, afebrile. Labs reviewed, stable WBC, no left shift. H/H 8.4/25.9. plt: 735, likely reactive thrombocytosis. Stable kidney and liver function. Micro reviewed, repeat blood cultures  no growth. Path lab called for report, Pathologist to call with prelim read.     EXAM & DIAGNOSTICS REVIEWED:   Vitals:     Temp:  [96.6 °F (35.9 °C)-98.3 °F (36.8 °C)]   Temp: 96.6 °F (35.9 °C) (09/20/22 0825)  Pulse: 81 (09/20/22 0825)  Resp: 20  (09/20/22 0825)  BP: (!) 140/76 (09/20/22 0825)  SpO2: 96 % (09/20/22 0825)    Intake/Output Summary (Last 24 hours) at 9/20/2022 1030  Last data filed at 9/20/2022 0639  Gross per 24 hour   Intake 3745.36 ml   Output 3700 ml   Net 45.36 ml       General:  In NAD. Alert and attentive, cooperative, comfortable  Eyes:  Anicteric, EOMI  ENT:  NG tube in place, about 200cc of bilious fluid, no ulcers, exudates, thrush, nares patent, dentition is good  Neck:  Supple  Lungs: Clear to auscultation b/l  Heart:  S1/S2+, regular rhythm, no murmurs  Abd:  S/p Ex-lap,. Dressing in place, staples in place, no redness noted, slightly distended but soft, + BS, non tender to palpation, no drains  :  Voids, urine clear  Musc:  Joints without effusion, swelling,  erythema, synovitis, ambulatory  Skin:  Warm, no rash  Wound: Ex-lap, minimal strike through  Neuro:  Following commands, no acute focal deficit   Psych:  Calm, cooperative  Lymphatic:       Extrem: Left arm edema due to IV infiltration- improved  VAD:  peripheral       Isolation: none    9/14:        General Labs reviewed:  Recent Labs   Lab 09/18/22 0325 09/19/22 0318 09/20/22  0433   WBC 11.09 11.05 12.48   HGB 8.2* 8.1* 8.4*   HCT 25.7* 25.2* 25.9*   * 633* 735*       Recent Labs   Lab 09/18/22 0325 09/19/22 0318 09/20/22  0433    137 138   K 3.9 3.7 3.8    102 102   CO2 24 23 25   BUN 10 10 10   CREATININE 0.7 0.7 0.7   CALCIUM 8.6* 8.3* 8.5*   PROT 5.8* 5.7* 6.5   BILITOT 0.6 0.7 0.7   ALKPHOS 68 63 66   ALT 12 15 17   AST 23 29 27     No results for input(s): CRP in the last 168 hours.  No results for input(s): SEDRATE in the last 168 hours.    Estimated Creatinine Clearance: 153.2 mL/min (based on SCr of 0.7 mg/dL).     Micro:  Microbiology Results (last 7 days)       Procedure Component Value Units Date/Time    Blood culture [[id number]] Collected: 09/15/22 9904    Order Status: Completed Specimen: Blood from Antecubital, Right Updated:  09/20/22 0612     Blood Culture, Routine No Growth to date      No Growth to date      No Growth to date      No Growth to date      No Growth to date    Blood culture [[id number]] Collected: 09/15/22 1819    Order Status: Completed Specimen: Blood from Antecubital, Right Updated: 09/20/22 0612     Blood Culture, Routine No Growth to date      No Growth to date      No Growth to date      No Growth to date      No Growth to date    Blood culture (site 2) [[id number]] Collected: 09/14/22 0315    Order Status: Completed Specimen: Blood Updated: 09/19/22 1212     Blood Culture, Routine No growth after 5 days.    Narrative:      Site # 2, aerobic only    Culture, Anaerobe [[id number]] Collected: 09/14/22 1525    Order Status: Completed Specimen: Abscess from Abdomen Updated: 09/19/22 0717     Anaerobic Culture No anaerobes isolated    Aerobic culture [[id number]]  (Abnormal)  (Susceptibility) Collected: 09/14/22 1525    Order Status: Completed Specimen: Abscess from Abdomen Updated: 09/17/22 1320     Aerobic Bacterial Culture ENTEROBACTER CLOACAE  Few        PROTEUS MIRABILIS  Few  No other significant isolate      Blood culture (site 1) [[id number]]  (Abnormal) Collected: 09/14/22 0320    Order Status: Completed Specimen: Blood Updated: 09/17/22 0946     Blood Culture, Routine Gram stain aer bottle: Gram positive cocci in clusters resembling Staph      Results called to and read back by: Poncho Brunson RN  09/15/2022      12:31      COAGULASE-NEGATIVE STAPHYLOCOCCUS SPECIES  Organism is a probable contaminant      Narrative:      Site # 1, aerobic and anaerobic    MRSA/SA Rapid ID by PCR from Blood culture [[id number]] Collected: 09/14/22 0320    Order Status: Completed Updated: 09/15/22 1335     Staph aureus ID by PCR Negative     MRSA ID by PCR Negative    Narrative:      Site # 1, aerobic and anaerobic    Gram stain [[id number]] Collected: 09/14/22 1525    Order Status: Completed Specimen: Abscess from Abdomen Updated:  09/15/22 0310     Gram Stain Result Rare WBC's      Rare Gram positive cocci      Rare Gram positive rods            Pathology:  9/1 colonoscopy:  1. ASCENDING COLON, POLYPECTOMY:   - TUBULAR ADENOMA.     2. CECUM, POLYPECTOMY:   - TUBULAR ADENOMA.     3. CECUM, POLYPECTOMY:   - TUBULAR ADENOMA.     Imaging Reviewed:  CXR clear  CT abdomen/pelvis 9/20: Postoperative abdomen.  Deep abdominal fluid collection, compatible with abscess.  The collection is not excessive Espinoza percutaneously. Mildly dilated small bowel compatible with ileus.  Nasogastric tube in the distal stomach.     CT abdomen/pelvis 9/14:  1. 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  2. Prostatomegaly necessitating correlation with PSA.    Cardiology: ECHO 9/15/22:  The left ventricle is normal in size with concentric remodeling and normal systolic function.  The estimated ejection fraction is 60%.  Grade I left ventricular diastolic dysfunction.  Normal right ventricular size with normal right ventricular systolic function.  Mild left atrial enlargement.  Mild mitral regurgitation.  Mild to moderate tricuspid regurgitation.  Normal central venous pressure (3 mmHg).  The estimated PA systolic pressure is 49 mmHg.  There is pulmonary hypertension.  Mild right atrial enlargement.  No vegetations were seen       IMPRESSION & PLAN     Severe sepsis resolved, secondary to perforated necrotic small bowel mass with abscess status post ex lap/drainage/small-bowel resection with anastomosis 9/14 in the setting of possible neoplasia       Blood cultures 1/4 bottles grew CoNs, likely a contaminant            Repeat blood cultures x 2 no growth            OR cultures Enterobacter (intermediate to cefepime and to Zosyn) and Proteus mirabilis sensitive to Merrem             AFP, Ca-19, CEA negative and PSA 0.9 normal          Ileus - post-op; NG tube repositioned, passing gas since 9/19  PMHx:  diabetes and HLD    Recommendations:  Adequate source control; IR or Surgery to drain intra-abdominal fluid collection   Addendum: Prelim path report Diffuse large B cell lymphoma   Heme/Onc consult  Continue Meropenem 1g IV q8h for now, will likely need IV abx until abscess is resolved  Will need repeat CT scan abdomen/pelvis at the end of therapy to assess status of intra-abdominal fluid collections  Advance diet as tolerated  Aspiration precautions  Incentive spirometry  PT/OT as tolerated    D/w patient, nursing, Dr Lewis, Dr Oliveira    Medical Decision Making during this encounter was  [_] Low Complexity  [_] Moderate Complexity  [xx] High Complexity

## 2022-09-20 NOTE — PLAN OF CARE
Plan of care reviewed with pt. Pt verbalized understanding. Patient is alert and oriented x 4, able to make needs known. Continuous cardiac monitoring in place as ordered. A-febrile throughout the shift. ABX administered as scheduled. Meds given per MAR. IVF infusing as ordered. BG monitored closely, no coverage needed. Ambulated to bathroom with x1 assist. Repositions self independently. No complaints of pain or discomfort. Purposeful hourly/q2hr rounding done during shift to promote patient safety. NAD noted. Safety maintained with side rails up x3, bed wheels locked, bed in lowest positioned, call light in reach. Patient educated to call for assistance with ambulation if needed, verbalized understanding. Pt remains free of falls. Will continue to monitor.

## 2022-09-20 NOTE — PT/OT/SLP PROGRESS
Physical Therapy Treatment    Patient Name:  Dwight Hoffmann   MRN:  [id number]    Recommendations:     Discharge Recommendations:  home   Discharge Equipment Recommendations:  (currently requires rolling walker)   Barriers to discharge: None    Assessment:     Dwight Hoffmann is a 52 y.o. male admitted with a medical diagnosis of Small bowel mass.  He presents with the following impairments/functional limitations:  weakness, impaired endurance, impaired self care skills, impaired functional mobility, gait instability, pain . Supine in bed. Reports lack of sleep due to frequent interruptions as well as feeling uncomfortable since unable to shower. Seems pleased to report having passed gas a few times.  Agreed to mobilize.  Ambulated 250' x 2 with rw and CGA with IV in tow. Returned to bed.     Rehab Prognosis: Good; patient would benefit from acute skilled PT services to address these deficits and reach maximum level of function.    Recent Surgery: Procedure(s) (LRB):  LAPAROTOMY, EXPLORATORY (N/A)  EXCISION, SMALL INTESTINE (N/A)  INCISION AND DRAINAGE, ABDOMEN (N/A) 6 Days Post-Op    Plan:     During this hospitalization, patient to be seen daily to address the identified rehab impairments via gait training, therapeutic activities, therapeutic exercises and progress toward the following goals:    Plan of Care Expires:  09/30/22    Subjective     Chief Complaint: lack of sleep/ no shower   Patient/Family Comments/goals: to return home soon.  Pain/Comfort:  Pain Rating 1: other (see comments) (did not rate)  Location - Orientation 1: generalized  Location 1: abdomen  Pain Addressed 1: Reposition, Nurse notified      Objective:     Communicated with nurse Morales prior to session.  Patient found supine with bed alarm, NG tube, telemetry, peripheral IV upon PT entry to room.     General Precautions: Standard, fall   Orthopedic Precautions:N/A   Braces: N/A  Respiratory Status: Room air     Functional Mobility:  Bed  Mobility:     Rolling Left:  contact guard assistance  Rolling Right: contact guard assistance  Supine to Sit: contact guard assistance  Sit to Supine: contact guard assistance  Transfers:     Sit to Stand:  contact guard assistance with rolling walker  Gait: 250' x 2 with rw and CGA with IV in tow.       AM-PAC 6 CLICK MOBILITY          Therapeutic Activities and Exercises:   Transferred EOB, sat briefly.   Ambulated 250' x 2 with rw and CGA.    Patient left supine with all lines intact, call button in reach, and nurse Miracle notified..    GOALS:   Multidisciplinary Problems       Physical Therapy Goals          Problem: Physical Therapy    Goal Priority Disciplines Outcome Goal Variances Interventions   Physical Therapy Goal     PT, PT/OT Ongoing, Progressing     Description: Goals to be met by: 2022     Patient will increase functional independence with mobility by performin). Supine to sit with Modified [place]  2). Sit to supine with Modified [place]  3). Sit to stand transfer with Modified [place]  4). Gait  x > 250 feet with Modified [place]                          Time Tracking:     PT Received On: 22  PT Start Time: 0900     PT Stop Time: 0915  PT Total Time (min): 15 min     Billable Minutes: Gait Training 15min    Treatment Type: Treatment  PT/PTA: PTA     PTA Visit Number: 2     2022

## 2022-09-21 NOTE — PROGRESS NOTES
General Surgery Progress Note    Admit Date: 9/13/2022  S/P: Procedure(s) (LRB):  LAPAROTOMY, EXPLORATORY (N/A)  EXCISION, SMALL INTESTINE (N/A)  INCISION AND DRAINAGE, ABDOMEN (N/A)    Post-operative Day: 7 Days Post-Op    Hospital Day: 9    SUBJECTIVE:   Had a bowel movement.  NG tube clamped overnight without nausea or vomiting.  Tolerated clears.    OBJECTIVE:     Vital Signs (Most Recent)  Temp:  [97.4 °F (36.3 °C)-99 °F (37.2 °C)] 98.1 °F (36.7 °C)  Pulse:  [77-93] 78  Resp:  [16-20] 18  SpO2:  [96 %-98 %] 98 %  BP: (126-158)/(70-85) 131/83    I&Os:  I/O last 3 completed shifts:  In: 7654.2 [P.O.:1320; I.V.:2450.7; IV Piggyback:374.7]  Out: 7650 [Urine:6900; Drains:750]    Physical Exam:  Gen: NAD, AAOx3  HEENT: Anicteric sclera, NG tube in place  Pulm: unlabored, symmetrical   Abd: Soft, mild distention, midline incision intact with staples in place, no signs of infection    Laboratory:  CBC:   Recent Labs   Lab 09/21/22  0516   WBC 15.03*   RBC 3.44*   HGB 8.6*   HCT 26.5*   *   MCV 77*   MCH 25.0*   MCHC 32.5       CMP:   Recent Labs   Lab 09/21/22  0516   *   CALCIUM 8.8   ALBUMIN 2.4*   PROT 7.0      K 4.0   CO2 24      BUN 12   CREATININE 0.7   ALKPHOS 79   ALT 19   AST 36   BILITOT 0.7       Labs within the past 24 hours have been reviewed.    PATHOLOGY -prelim B-cell lymphoma      ASSESSMENT/PLAN:     Patient Active Problem List    Diagnosis Date Noted    Small bowel perforation 09/20/2022    Intra-abdominal abscess 09/20/2022    Moderate malnutrition 09/16/2022    Small bowel mass 09/14/2022    Anemia, chronic disease 09/14/2022    Diabetes mellitus type 2, noninsulin dependent 09/14/2022    Arthralgia of bilat knees and neck 09/14/2022         52 y.o. male status post small-bowel resection for contained perforation with suspected underlying mass  -NG tube removed, allow for diabetic diet  -follow-up official pathology report  -will likely need prolonged antibiotics due to  residual abscess that is not easily amenable to drainage at this time

## 2022-09-21 NOTE — CONSULTS
"[facility]  Hematology/Oncology  Consult Note    Patient Name: Dwight Hoffmann  MRN: [id number]  Admission Date: 9/13/2022  Hospital Length of Stay: 7 days  Code Status: Full Code   Attending Provider: Palomo Lewis MD  Consulting Provider: Elba Chua NP  Primary Care Physician: BOSTON Oswald  Principal Problem:Small bowel mass    Consults  Subjective:     HPI: Mr. Hoffmann is a 52 year old male with a history of NIDDM2 and HLD who presents today with complaints of night sweats for weeks. It is moderate. It is associated with 25 lb wt loss over the last 2 months, urinary hesitancy, urinary frequency, occasional blood streaked stools, weakness, fatigue, neck pain, knee pain, and pelvic pain. He denies measured fever, chills, N/V/D, chest pain, or SOB. He was seen by his primary last month about the blood streaked stools and referred to Dr. Clemens for colonoscopy on 9/1 with multiple polypectomies with path report of tubular adenomas. In the ED, labs revealed microcytic anemia, he was mildly tachycardic on arrival  which improved spontaneously, /60,  and CT abd/pelvis revealing: "Thick-walled collection containing feculent material, widely communicating with small bowel, seen centrally in the pelvis. Findings are suspicious for necrotic mass or abscess arising from the small bowel.  He is now status post exploratory lap with small intestine incision and drainage/biopsies obtained.  According to the consult preliminary path is concerning for diffuse large B-cell lymphoma    Oncology Consult:   We have been consulted for diffuse large B-cell lymphoma.  He continues to complain of intermittent surgical site pain.  Medications:  Continuous Infusions:   Amino acid 4.25% - dextrose 5% (CLINIMIX-E) solution with additives (1L provides 42.5 gm AA, 50 gm CHO (170 kcal/L dextrose), Na 35, K 30, Mg 5, Ca 4.5, Acetate 70, Cl 39, Phos 15) 90 mL/hr at 09/21/22 0640     Scheduled Meds:   " enoxaparin  40 mg Subcutaneous Daily    meropenem (MERREM) IVPB  1 g Intravenous Q8H     PRN Meds:dextrose 50%, dextrose 50%, glucagon (human recombinant), glucose, glucose, insulin aspart U-100, morphine, naloxone, ondansetron, promethazine (PHENERGAN) IVPB, sodium chloride 0.9%     Review of patient's allergies indicates:  No Known Allergies     Past Medical History:   Diagnosis Date    Diabetes mellitus     Prediabetes      Past Surgical History:   Procedure Laterality Date    APPENDECTOMY  07/15/2014    COLONOSCOPY      COLONOSCOPY N/A 2/9/2022    Procedure: COLONOSCOPY;  Surgeon: Manuel Sanders MD;  Location: [facility] ENDO;  Service: Endoscopy;  Laterality: N/A;    COLONOSCOPY N/A 9/1/2022    Procedure: COLONOSCOPY;  Surgeon: Andrea Clemens MD;  Location: [facility] ENDO;  Service: Endoscopy;  Laterality: N/A;    INCISION AND DRAINAGE OF ABDOMEN N/A 9/14/2022    Procedure: INCISION AND DRAINAGE, ABDOMEN;  Surgeon: Jan Oliveira Jr., MD;  Location: [facility] OR;  Service: General;  Laterality: N/A;     Family History       Problem Relation (Age of Onset)    Cancer Mother    Diabetes Mother    Heart murmur Sister    Hypertension Mother    Seizures Father, Sister          Tobacco Use    Smoking status: Never    Smokeless tobacco: Never   Substance and Sexual Activity    Alcohol use: Not Currently     Comment: occasional    Drug use: No    Sexual activity: Yes     Partners: Female       Review of Systems  As per mentioned in HPI; all other systems reviewed and negative    Objective:     Vital Signs (Most Recent):  Temp: 98.1 °F (36.7 °C) (09/21/22 0723)  Pulse: 85 (09/21/22 1249)  Resp: 18 (09/21/22 1249)  BP: 139/82 (09/21/22 1128)  SpO2: 96 % (09/21/22 1249) Vital Signs (24h Range):  Temp:  [97.4 °F (36.3 °C)-99 °F (37.2 °C)] 98.1 °F (36.7 °C)  Pulse:  [78-93] 85  Resp:  [16-19] 18  SpO2:  [96 %-98 %] 96 %  BP: (126-158)/(70-85) 139/82     Weight: 104.6 kg (230 lb 9.6 oz)  Body mass index is 30.42 kg/m².  Body  surface area is 2.32 meters squared.      Intake/Output Summary (Last 24 hours) at 9/21/2022 1409  Last data filed at 9/21/2022 0640  Gross per 24 hour   Intake 3908.8 ml   Output 4400 ml   Net -491.2 ml       Physical Exam  PHYSICAL EXAM:     Vitals:    09/21/22 1249   BP:    Pulse: 85   Resp: 18   Temp:        GENERAL: Comfortable looking patient. Patient is in no distress.  Awake, alert and oriented to time, person and place.  No anxiety, or agitation.      HEENT: Normal conjunctivae and eyelids. WNL.  PERRLA 3 to 4 mm. No icterus, no pallor, no congestion, and no discharge noted.     NECK:  Supple. Trachea is central.  No crepitus.  No JVD or masses.    RESPIRATORY:  No intercostal retractions.  No dullness to percussion.  Chest is clear to auscultation.  No rales, rhonchi or wheezes.  No crepitus.  Good air entry bilaterally.    CARDIOVASCULAR:  S1 and S2 are normally heard without murmurs or gallops.  All peripheral pulses are present.    ABDOMEN:  Hypoactive bowel sounds.    LYMPHATICS:  No axillary, cervical, supraclavicular, submental, or inguinal lymphadenopathy.    SKIN/MUSCULOSKELETAL:  There is no evidence of excoriation marks or ecchmosis.  No rashes.  No cyanosis.  No clubbing.  No joint or skeletal deformities noted.  Normal range of motion.    NEUROLOGIC:  Higher functions are appropriate.  No cranial nerve deficits.  Normal lucien.  Normal strength.  Motor and sensory functions are normal.  Deep tendon reflexes are normal.    GENITAL/RECTAL:  Exams are deferred.   Significant Labs:   CBC:   Recent Labs   Lab 09/20/22  0433 09/21/22  0516   WBC 12.48 15.03*   HGB 8.4* 8.6*   HCT 25.9* 26.5*   * 754*    and CMP:   Recent Labs   Lab 09/20/22  0433 09/21/22  0516    139   K 3.8 4.0    105   CO2 25 24   * 137*   BUN 10 12   CREATININE 0.7 0.7   CALCIUM 8.5* 8.8   PROT 6.5 7.0   ALBUMIN 2.3* 2.4*   BILITOT 0.7 0.7   ALKPHOS 66 79   AST 27 36   ALT 17 19   ANIONGAP 11 10        Diagnostic Results:  I have reviewed all pertinent imaging results/findings within the past 24 hours.    Assessment/Plan:     Active Diagnoses:    Diagnosis Date Noted POA    PRINCIPAL PROBLEM:  Small bowel mass [K63.89] 09/14/2022 Yes    Small bowel perforation [K63.1] 09/20/2022 Yes    Intra-abdominal abscess [K65.1] 09/20/2022 Yes    Moderate malnutrition [E44.0] 09/16/2022 Yes    Anemia, chronic disease [D63.8] 09/14/2022 Yes    Diabetes mellitus type 2, noninsulin dependent [E11.9] 09/14/2022 Yes    Arthralgia of bilat knees and neck [M25.569] 09/14/2022 Yes      Problems Resolved During this Admission:       Diffuse large B-cell lymphoma:   -check LDH  -follow-up on final pathology  -patient will need bone marrow biopsy, will obtain inpatient if possible  -he would need a sap follow-up as an outpatient  -ct chest     Anemia:  Will check iron stores and B12/folate    Status post small bowel resection:   -managed by General surgery    Thank you for your consult. I will follow-up with patient. Please contact us if you have any additional questions.    Elba Chua NP  Hematology/Oncology  [facility]   I have discussed this pt with NP, and made plans as above

## 2022-09-21 NOTE — PLAN OF CARE
Plan of care reviewed with pt. Pt verbalized understanding. Patient is alert and oriented x 4, able to make needs known. Continuous cardiac monitoring in place as ordered. A-febrile throughout the shift. ABX administered as scheduled. Meds given per MAR. IVF infusing as ordered. NG tube to L nare, clamped. BG monitored closely, no coverage needed. Ambulated to bathroom with x2 assist, shower taken. Repositions self independently. No complaints of pain or discomfort. Purposeful hourly/q2hr rounding done during shift to promote patient safety. NAD noted. Safety maintained with side rails up x3, bed wheels locked, bed in lowest positioned, call light in reach. Patient educated to call for assistance with ambulation if needed, verbalized understanding. Pt remains free of falls. Will continue to monitor.

## 2022-09-21 NOTE — PROGRESS NOTES
"[facility] Hospital Medicine  Progress Note    Patient Name: Dwight Hoffmann  MRN: [id number]  Patient Class: IP- Inpatient   Admission Date: 9/13/2022  Length of Stay: 6 days  Attending Physician: Palomo Lewis MD  Primary Care Provider: BOSTON Oswald        Subjective:     Principal Problem:Small bowel mass        HPI:  Mr. Hoffmann is a 52 year old male with a history of NIDDM2 and HLD who presents today with complaints of night sweats for weeks. It is moderate. It is associated with 25 lb wt loss over the last 2 months, urinary hesitancy, urinary frequency, occasional blood streaked stools, weakness, fatigue, neck pain, knee pain, and pelvic pain. He denies measured fever, chills, N/V/D, chest pain, or SOB. He was seen by his primary last month about the blood streaked stools and referred to Dr. Clemens for colonoscopy on 9/1 with multiple polypectomies with path report of tubular adenomas. In the ED, labs revealed microcytic anemia, he was mildly tachycardic on arrival  which improved spontaneously, /60,  and CT abd/pelvis revealing: "Thick-walled collection containing feculent material, widely communicating with small bowel, seen centrally in the pelvis. Findings are suspicious for necrotic mass or abscess arising from the small bowel" Findings were discussed with Dr. Oliveira by ED MD who agreed to see patient in the morning.       Overview/Hospital Course:  Dwight Hoffmann is a 52 year old male with a past medical history of DM, HLD and anemia who presented with lower abdominal pain, lower back pain, and difficulty urinating secondary to an abdominal mass discovered on CT scan. Surgery was consulted and performed exploratory laparotomy with resection of the mass and anastomosis of small bowel. The mass appears to be an abscess with surrounding necrosis. Pathology is currently pending. The patient was treated with IV antibiotics. An NG tube to LIWS suction is in place " and the patient is on IV fluids while he is NPO. He did have a positive blood culture which showed coagulase negative Staph. He was briefly on vancomycin which was discontinued. ID is following along.  Patient is currently on meropenem.  Cultures from surgery with Enterobacter cloacae and Proteus mirabilis.  Still awaiting return of bowel function.  PPN started 9/19.      Interval History:  no new complaints.  Less output through NGT.  Passing flatus.    Review of Systems   Constitutional:  Negative for chills and fever.   Respiratory:  Negative for cough, shortness of breath and wheezing.    Cardiovascular:  Negative for chest pain and leg swelling.   Gastrointestinal:  Positive for abdominal distention and abdominal pain. Negative for nausea.   Objective:     Vital Signs (Most Recent):  Temp: 97.4 °F (36.3 °C) (09/20/22 1938)  Pulse: 81 (09/20/22 1938)  Resp: 18 (09/20/22 1938)  BP: (!) 158/85 (09/20/22 1938)  SpO2: 97 % (09/20/22 1938)   Vital Signs (24h Range):  Temp:  [96.6 °F (35.9 °C)-99 °F (37.2 °C)] 97.4 °F (36.3 °C)  Pulse:  [69-86] 81  Resp:  [16-20] 18  SpO2:  [95 %-98 %] 97 %  BP: (137-158)/(76-85) 158/85     Weight: 104.6 kg (230 lb 9.6 oz)  Body mass index is 30.42 kg/m².    Intake/Output Summary (Last 24 hours) at 9/20/2022 2107  Last data filed at 9/20/2022 1933  Gross per 24 hour   Intake 5126.05 ml   Output 4500 ml   Net 626.05 ml      Physical Exam  Vitals reviewed.   Constitutional:       General: He is not in acute distress.     Appearance: He is not diaphoretic.   HENT:      Mouth/Throat:      Mouth: Mucous membranes are moist.   Eyes:      General: No scleral icterus.        Right eye: No discharge.         Left eye: No discharge.   Neck:      Vascular: No JVD.   Cardiovascular:      Rate and Rhythm: Normal rate and regular rhythm.   Pulmonary:      Effort: Pulmonary effort is normal.      Breath sounds: Normal breath sounds.   Abdominal:      General: Bowel sounds are normal. There is  distension.      Palpations: Abdomen is soft.      Tenderness: There is no abdominal tenderness.      Comments: Incision looks clean.  Staples in place.   Skin:     General: Skin is warm.      Findings: No rash.   Neurological:      Mental Status: He is alert.       Significant Labs: All pertinent labs within the past 24 hours have been reviewed.    Significant Imaging: I have reviewed all pertinent imaging results/findings within the past 24 hours.      Assessment/Plan:      * Small bowel mass  S/p resection 9/14 per Dr. Oliveira.  Will follow up on pathology.    Intra-abdominal abscess  Due to small bowel perforation, which was present on day 1 of admission.  CT abdomen reviewed.  Await surgeon's assessment and plan.      Small bowel perforation  Was present on hospital day 1.  Due to mass, which was resected shortly after admission.  Imaging shows residual abscess in peritoneal cavity.  Await surgeon's assessment and plan regarding this.      Moderate malnutrition  Nutrition consulted. Most recent weight and BMI monitored-        Malnutrition (Moderate to Severe)  Weight Loss (Malnutrition): greater than 7.5% in 3 months  Energy Intake (Malnutrition): less than 75% for greater than or equal to 1 month              Measurements:  Wt Readings from Last 1 Encounters:   09/18/22 104.6 kg (230 lb 9.6 oz)   Body mass index is 30.42 kg/m².    Recommendations: Recommendation/Intervention: 1) advance PO diet to clears when pt passing flatus -> advance as tolerated to goal of Low fiber, DM 2000 kcal 2) Add Boost breeze BID on clear liquids 3) weigh weekly 4) If unable to advance PO diet to full liquids in 72 hr start nutrition support PPN D 5 AA 4.25 @ 90 ml/hr + standard lipids ( 91 g protein, 1234 kcal)  Goals: 1) PO diet advanced to full liquids in < 72 hr    Patient has been screened and assessed by RD. RD will follow patient.      Arthralgia of bilat knees and neck  Chronic.  Continue analgesics as  needed.    Diabetes mellitus type 2, noninsulin dependent  Patient's FSGs are controlled on current medication regimen.  Last A1c reviewed-   Lab Results   Component Value Date    HGBA1C 11.0 (H) 12/30/2021     Most recent fingerstick glucose reviewed-   Recent Labs   Lab 09/19/22  2339 09/20/22  1203   POCTGLUCOSE 118* 149*     Current correctional scale  Low  Maintain anti-hyperglycemic dose as follows-   Antihyperglycemics (From admission, onward)    Start     Stop Route Frequency Ordered    09/14/22 0344  insulin aspart U-100 pen 0-5 Units         -- SubQ Before meals & nightly PRN 09/14/22 0246        Hold Oral hypoglycemics while patient is in the hospital.      Anemia, chronic disease  Monitor HGB.    Lab Results   Component Value Date    HGB 8.4 (L) 09/20/2022           VTE Risk Mitigation (From admission, onward)         Ordered     enoxaparin injection 40 mg  Daily         09/14/22 1009     IP VTE HIGH RISK PATIENT  Once         09/14/22 0246     Place sequential compression device  Until discontinued         09/14/22 0246                Discharge Planning   CATRACHO: 9/23/2022     Code Status: Full Code   Is the patient medically ready for discharge?:     Reason for patient still in hospital (select all that apply): Patient trending condition, Treatment and Consult recommendations  Discharge Plan A: Home with family                  Palomo Lewis MD  Department of Hospital Medicine   [facility]

## 2022-09-21 NOTE — ASSESSMENT & PLAN NOTE
Patient's FSGs are controlled on current medication regimen.  Last A1c reviewed-   Lab Results   Component Value Date    HGBA1C 11.0 (H) 12/30/2021     Most recent fingerstick glucose reviewed-   Recent Labs   Lab 09/19/22  2339 09/20/22  1203   POCTGLUCOSE 118* 149*     Current correctional scale  Low  Maintain anti-hyperglycemic dose as follows-   Antihyperglycemics (From admission, onward)    Start     Stop Route Frequency Ordered    09/14/22 0344  insulin aspart U-100 pen 0-5 Units         -- SubQ Before meals & nightly PRN 09/14/22 0246        Hold Oral hypoglycemics while patient is in the hospital.

## 2022-09-21 NOTE — PT/OT/SLP PROGRESS
Physical Therapy Treatment    Patient Name:  Dwight Hoffmann   MRN:  [id number]    Recommendations:     Discharge Recommendations:  home   Discharge Equipment Recommendations:  (currently requires rolling walker)   Barriers to discharge: None    Assessment:     Dwight Hoffmann is a 52 y.o. male admitted with a medical diagnosis of Small bowel mass.  He presents with the following impairments/functional limitations:  weakness, impaired endurance, impaired self care skills, impaired functional mobility, gait instability, impaired balance.  Eager to ambulate. Endorses balance deficits with gait, and ambulated with RW support 250' x 2 laps with CGA, appropriately slow pace, and able to maintain conversation.   Performed LE exercises at edge of bed and transferred to chair with RW and CGA. Chair alarm active; pt expresses good safety awareness.    Rehab Prognosis: Good; patient would benefit from acute skilled PT services to address these deficits and reach maximum level of function.    Recent Surgery: Procedure(s) (LRB):  LAPAROTOMY, EXPLORATORY (N/A)  EXCISION, SMALL INTESTINE (N/A)  INCISION AND DRAINAGE, ABDOMEN (N/A) 7 Days Post-Op    Plan:     During this hospitalization, patient to be seen daily to address the identified rehab impairments via gait training, therapeutic activities, therapeutic exercises and progress toward the following goals:    Plan of Care Expires:  09/30/22    Subjective     Chief Complaint: eager to walk  Patient/Family Comments/goals: none expressed  Pain/Comfort:  Pain Rating 1: 0/10      Objective:     Communicated with nurse Monson prior to session.  Patient found up in chair with bed alarm, telemetry, peripheral IV upon PT entry to room.     General Precautions: Standard, fall   Orthopedic Precautions:N/A   Braces: N/A  Respiratory Status: Room air     Functional Mobility:  Bed Mobility:     Supine to Sit: stand by assistance  Transfers:     Sit to Stand:  SBA with rolling walker  Bed to  Chair: contact guard assistance with  rolling walker  using  Stand Pivot  Gait: 250' x 2, RW, CGA, appropriately slow pace, no LOB or SOB      AM-PAC 6 CLICK MOBILITY          Therapeutic Activities and Exercises:   -Seated EOB: LAQs with dorsiflexion x 20    Patient left up in chair with all lines intact, call button in reach, chair alarm on, and Rosser notified..    GOALS:   Multidisciplinary Problems       Physical Therapy Goals          Problem: Physical Therapy    Goal Priority Disciplines Outcome Goal Variances Interventions   Physical Therapy Goal     PT, PT/OT Ongoing, Progressing     Description: Goals to be met by: 2022     Patient will increase functional independence with mobility by performin). Supine to sit with Modified [place]  2). Sit to supine with Modified [place]  3). Sit to stand transfer with Modified [place]  4). Gait  x > 250 feet with Modified [place]                          Time Tracking:     PT Received On: 22  PT Start Time: 1037     PT Stop Time: 1055  PT Total Time (min): 18 min     Billable Minutes: Gait Training 18    Treatment Type: Treatment  PT/PTA: PTA     PTA Visit Number: 3     2022

## 2022-09-21 NOTE — NURSING
Spoke with Dr. Lewis and he said to hold off on restarting pt's PPN at this time.  Pt is tolerating food at the present even though it isn't very much.  No other needs at this time as long as pt continues eating.  Will continue to monitor.

## 2022-09-21 NOTE — NURSING
Spoke with pt's RN, Nella. Informed that we cannot do BMBX this late in the afternoon so we will attempt tomorrow. NPO after midnight & PT/INR with AM labs orders placed. Hold placed on 1700 dose of lovenox on MAR. RN informed of new orders and MAR hold to pass on to night shift RN in report.

## 2022-09-21 NOTE — PLAN OF CARE
Problem: Physical Therapy  Goal: Physical Therapy Goal  Description: Goals to be met by: 2022     Patient will increase functional independence with mobility by performin). Supine to sit with Modified [place]  2). Sit to supine with Modified [place]  3). Sit to stand transfer with Modified [place]  4). Gait  x > 250 feet with Modified [place]     Outcome: Ongoing, Progressing     Pt ambulated 250' x2 laps with RW, CGA, and no c/o SOB or LOB. LE exercises at EOB. Remained up in chair. Will continue to progress patient toward physical therapy goals.

## 2022-09-21 NOTE — ASSESSMENT & PLAN NOTE
Due to small bowel perforation, which was present on day 1 of admission.  CT abdomen reviewed.  Await surgeon's assessment and plan.

## 2022-09-21 NOTE — PROGRESS NOTES
Consult Note  Infectious Disease    Reason for Consult:  GPC bacteremia    HPI: Dwight Hoffmann is a 52 y.o. male very pleasant, with past medical history of diabetes, HLD who presented 9/14 complaining of night sweats, unintentional 30 lb weight loss for the last couple of months.  Symptoms worsened by severe abdominal pain, urinary hesitancy, dysuria, increased urinary frequency, occasional bloody stool, with associated weakness, fatigue, and generalized pain.  He denies fever or chills, no nausea or vomiting, no chest pain or shortness of breath.  Patient was seen by primary care last month due to melena, refer to GI, colonoscopy done on 09/01 were 3 polyps were resected, pathology report consistent with tubular adenomas.      In the ER, blood pressure 113/69, T-max a 100.9°   Labs on admission white count 11.3, monocytic predominance 20%, bands 2%, H&H 9.6/28.5, MCV 74, microcytic anemia, platelet count 395   Normal kidney and liver function.    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 Prostatomegaly necessitating correlation with PSA.    Seen by [facility] surgery, status post ex lap for small bowel obstruction in the setting of necrotic large colonic mass with fistula formation, washout, and colon anastomosis.    Empirically started on Zosyn.  Switched to vancomycin, cefepime, Flagyl on 9/15.    ID consult for Gram-positive cocci in 1/2 bottles.    INTERVAL HISTORY:  9/16: Interim reviewed, patient seen and examined at bedside, anxious regarding clinical condition. Hemodynamically stable, afebrile in the last 24h. States he is burping, not passing gas since yesterday. Dysuria and hesitancy are improved. Labs reviewed, leukocytosis down to 12.8, no left shift, normal monocyte count. H/H 8.1/24.8, MCV: 76, plt: 482. Normal  electrolytes, liver and kidney function. AFP and PSA negative. Micro reviewed, OR cultures GNR, ID and sensitivities pending, blood cultures 1/4 bottles CoNS likely a contaminant. Repeat blood culture x 2 no growth to date, pending final.     9/17 (Nanette):  Case discussed with Dr. Mooney.  NG tube was found to be coiled in his mouth last night and was removed not replaced secondary to patient refusal.  AMA was signed by patient but subsequently the tube was replaced and is draining bilious fluid.  KUB reviewed. No flatus yet.  He is afebrile, white blood cells 12,800.  Abscess cultures reviewed with Enterobacter, not very sensitive, and a pansensitive Proteus.  CEA, CA 19 9, alpha fetoprotein, PSA all negative.  9/18: interim reviewed. Afebrile. Continues to require the NGT for slow return of bowel function. No new micro data. He has not had any pain medication since yesterday. Walking in the [place].     9/19 (Vinicio): Interim reviewed, discussed with Dr Fitzpatrick. Patient seen and examined at bedside, after session with PT. States his hungry, and is anxious regarding NG tube and not being able to pass gas. Hemodynamically stable, afebrile. Labs reviewed, stable WBC, no left shift, H/H 8.1/25.5, plt: 633. Stable kidney and liver function tests. No new micro data. Awaiting pathology report.     9/20: Interim reviewed, patient seen and examined at bedside. Discussed with Surgery yesterday, NG tube repositioned, patient is finally passing gas, output from NG tube decreased. Hemodynamically stable, afebrile. Labs reviewed, stable WBC, no left shift. H/H 8.4/25.9. plt: 735, likely reactive thrombocytosis. Stable kidney and liver function. Micro reviewed, repeat blood cultures  no growth. Path lab called for report, Pathologist to call with prelim read.    10 x 7.5 by 3.5 cm,     09/21/2022 No new complains, abd dyscomfort expected, but no pain  Afebrile. Going for   Bone marrow Bx tomorrow  Night sweats have resolved      EXAM & DIAGNOSTICS REVIEWED:   Vitals:     Temp:  [97.4 °F (36.3 °C)-99 °F (37.2 °C)]   Temp: 98.1 °F (36.7 °C) (09/21/22 0723)  Pulse: 88 (09/21/22 1128)  Resp: 18 (09/21/22 0844)  BP: 139/82 (09/21/22 1128)  SpO2: 96 % (09/21/22 1128)    Intake/Output Summary (Last 24 hours) at 9/21/2022 1205  Last data filed at 9/21/2022 0640  Gross per 24 hour   Intake 3908.8 ml   Output 4400 ml   Net -491.2 ml       General:  In NAD. Alert and attentive, cooperative, comfortable  Eyes:  Anicteric, EOMI  ENT:  NG tube in place, no ulcers, exudates, thrush, nares patent, dentition is good  Neck:  Supple  Lungs: Clear to auscultation b/l  Heart:  S1/S2+, regular rhythm, no murmurs  Abd:  S/p Ex-lap,. Dressing in place, staples in place, no redness noted, slightly distended but soft, + BS, non tender to palpation, no drains  :  Voids, urine clear  Musc:  Joints without effusion, swelling,  erythema, synovitis, ambulatory  Skin:  Warm, no rash  Wound: Ex-lap,  wound covered   Neuro:  Following commands, no acute focal deficit   Psych:  Calm, cooperative  Lymphatic:       Extrem: Left arm edema due to IV infiltration- improved  VAD:  peripheral       Isolation:  none    9/14:        General Labs reviewed:  Recent Labs   Lab 09/19/22 0318 09/20/22  0433 09/21/22  0516   WBC 11.05 12.48 15.03*   HGB 8.1* 8.4* 8.6*   HCT 25.2* 25.9* 26.5*   * 735* 754*       Recent Labs   Lab 09/19/22 0318 09/20/22  0433 09/21/22  0516    138 139   K 3.7 3.8 4.0    102 105   CO2 23 25 24   BUN 10 10 12   CREATININE 0.7 0.7 0.7   CALCIUM 8.3* 8.5* 8.8   PROT 5.7* 6.5 7.0   BILITOT 0.7 0.7 0.7   ALKPHOS 63 66 79   ALT 15 17 19   AST 29 27 36     No results for input(s): CRP in the last 168 hours.  No results for input(s): SEDRATE in the last 168 hours.    Estimated Creatinine Clearance: 153.2 mL/min (based on SCr of 0.7 mg/dL).     Micro:  Microbiology Results (last 7 days)       Procedure Component Value Units Date/Time     Blood culture [[id number]] Collected: 09/15/22 1722    Order Status: Completed Specimen: Blood from Antecubital, Right Updated: 09/21/22 0612     Blood Culture, Routine No growth after 5 days.    Blood culture [[id number]] Collected: 09/15/22 1819    Order Status: Completed Specimen: Blood from Antecubital, Right Updated: 09/21/22 0612     Blood Culture, Routine No growth after 5 days.    Blood culture (site 2) [[id number]] Collected: 09/14/22 0315    Order Status: Completed Specimen: Blood Updated: 09/19/22 1212     Blood Culture, Routine No growth after 5 days.    Narrative:      Site # 2, aerobic only    Culture, Anaerobe [[id number]] Collected: 09/14/22 1525    Order Status: Completed Specimen: Abscess from Abdomen Updated: 09/19/22 0717     Anaerobic Culture No anaerobes isolated    Aerobic culture [[id number]]  (Abnormal)  (Susceptibility) Collected: 09/14/22 1525    Order Status: Completed Specimen: Abscess from Abdomen Updated: 09/17/22 1320     Aerobic Bacterial Culture ENTEROBACTER CLOACAE  Few        PROTEUS MIRABILIS  Few  No other significant isolate      Blood culture (site 1) [[id number]]  (Abnormal) Collected: 09/14/22 0320    Order Status: Completed Specimen: Blood Updated: 09/17/22 0946     Blood Culture, Routine Gram stain aer bottle: Gram positive cocci in clusters resembling Staph      Results called to and read back by: Poncho Brunson RN  09/15/2022      12:31      COAGULASE-NEGATIVE STAPHYLOCOCCUS SPECIES  Organism is a probable contaminant      Narrative:      Site # 1, aerobic and anaerobic    MRSA/SA Rapid ID by PCR from Blood culture [[id number]] Collected: 09/14/22 0320    Order Status: Completed Updated: 09/15/22 1335     Staph aureus ID by PCR Negative     MRSA ID by PCR Negative    Narrative:      Site # 1, aerobic and anaerobic    Gram stain [[id number]] Collected: 09/14/22 1525    Order Status: Completed Specimen: Abscess from Abdomen Updated: 09/15/22 0310     Gram Stain Result Rare WBC's       Rare Gram positive cocci      Rare Gram positive rods            Pathology:  9/1 colonoscopy:  1. ASCENDING COLON, POLYPECTOMY:   - TUBULAR ADENOMA.   2. CECUM, POLYPECTOMY:   - TUBULAR ADENOMA.   3. CECUM, POLYPECTOMY:   - TUBULAR ADENOMA.     Imaging Reviewed:  CXR clear  CT abdomen/pelvis 9/20: Postoperative abdomen.  Deep abdominal fluid collection, compatible with abscess.  The collection is not excessive Espinoza percutaneously. Mildly dilated small bowel compatible with ileus.  Nasogastric tube in the distal stomach.     CT abdomen/pelvis 9/14:  1. 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  2. Prostatomegaly necessitating correlation with PSA.    Cardiology: ECHO 9/15/22:  The left ventricle is normal in size with concentric remodeling and normal systolic function.  The estimated ejection fraction is 60%.  Grade I left ventricular diastolic dysfunction.  Normal right ventricular size with normal right ventricular systolic function.  Mild left atrial enlargement.  Mild mitral regurgitation.  Mild to moderate tricuspid regurgitation.  Normal central venous pressure (3 mmHg).  The estimated PA systolic pressure is 49 mmHg.  There is pulmonary hypertension.  Mild right atrial enlargement.  No vegetations were seen       IMPRESSION & PLAN     Severe sepsis resolved, secondary to perforated necrotic small bowel mass with abscess status post ex lap/drainage/small-bowel resection with anastomosis 9/14 in the setting of DLBCL       Blood cultures 1/4 bottles grew CoNs, likely a contaminant            Repeat blood cultures x 2 no growth            OR cultures Enterobacter (intermediate to cefepime and to Zosyn) and Proteus mirabilis sensitive to Merrem    Remaining 10 x 7.5 by 3.5 cm abscess            AFP, Ca-19, CEA negative and PSA 0.9 bessie  Ileus - post-op; NG tube repositioned, passing gas since 9/19  DLBCL- diagnosed by path 09/14  PMHx:  "diabetes and HLD    Recommendations:  -- Can not offer adequate source control;   As per  Surgery  " residual abscess that is not easily amenable to drainage at this time"   As per IR  "The collection is not excessive  via percutaneously"  I will ask surgeon again  --Continue Meropenem 1g IV q8h for now, will likely need IV abx until abscess is resolved  -- Follow Ct  and CRP trend  Advance diet as tolerated  Aspiration precautions,  Incentive spirometry  PT/OT as tolerated  D/w patient, nursing     Medical Decision Making during this encounter was  [_] Low Complexity  [_] Moderate Complexity  [xx] High Complexity    "

## 2022-09-21 NOTE — SUBJECTIVE & OBJECTIVE
Interval History:  no new complaints.  Less output through NGT.  Passing flatus.    Review of Systems   Constitutional:  Negative for chills and fever.   Respiratory:  Negative for cough, shortness of breath and wheezing.    Cardiovascular:  Negative for chest pain and leg swelling.   Gastrointestinal:  Positive for abdominal distention and abdominal pain. Negative for nausea.   Objective:     Vital Signs (Most Recent):  Temp: 97.4 °F (36.3 °C) (09/20/22 1938)  Pulse: 81 (09/20/22 1938)  Resp: 18 (09/20/22 1938)  BP: (!) 158/85 (09/20/22 1938)  SpO2: 97 % (09/20/22 1938)   Vital Signs (24h Range):  Temp:  [96.6 °F (35.9 °C)-99 °F (37.2 °C)] 97.4 °F (36.3 °C)  Pulse:  [69-86] 81  Resp:  [16-20] 18  SpO2:  [95 %-98 %] 97 %  BP: (137-158)/(76-85) 158/85     Weight: 104.6 kg (230 lb 9.6 oz)  Body mass index is 30.42 kg/m².    Intake/Output Summary (Last 24 hours) at 9/20/2022 2107  Last data filed at 9/20/2022 1933  Gross per 24 hour   Intake 5126.05 ml   Output 4500 ml   Net 626.05 ml      Physical Exam  Vitals reviewed.   Constitutional:       General: He is not in acute distress.     Appearance: He is not diaphoretic.   HENT:      Mouth/Throat:      Mouth: Mucous membranes are moist.   Eyes:      General: No scleral icterus.        Right eye: No discharge.         Left eye: No discharge.   Neck:      Vascular: No JVD.   Cardiovascular:      Rate and Rhythm: Normal rate and regular rhythm.   Pulmonary:      Effort: Pulmonary effort is normal.      Breath sounds: Normal breath sounds.   Abdominal:      General: Bowel sounds are normal. There is distension.      Palpations: Abdomen is soft.      Tenderness: There is no abdominal tenderness.      Comments: Incision looks clean.  Staples in place.   Skin:     General: Skin is warm.      Findings: No rash.   Neurological:      Mental Status: He is alert.       Significant Labs: All pertinent labs within the past 24 hours have been reviewed.    Significant Imaging: I have  reviewed all pertinent imaging results/findings within the past 24 hours.

## 2022-09-21 NOTE — NURSING
Spoke with radiology and pt needs to remain NPO tonight.   D/t have bone marrow biopsy in am.  Pt also needs any and all blood thinners held as well.  No other needs at this time.  Will continue to monitor.

## 2022-09-21 NOTE — ASSESSMENT & PLAN NOTE
Was present on hospital day 1.  Due to mass, which was resected shortly after admission.  Imaging shows residual abscess in peritoneal cavity.  Await surgeon's assessment and plan regarding this.

## 2022-09-21 NOTE — CARE UPDATE
09/20/22 1914   Patient Assessment/Suction   Level of Consciousness (AVPU) alert   Respiratory Effort Unlabored   Expansion/Accessory Muscles/Retractions no use of accessory muscles;no retractions   PRE-TX-O2   O2 Device (Oxygen Therapy) room air   SpO2 97 %   Pulse Oximetry Type Intermittent   $ Pulse Oximetry - Multiple Charge Pulse Oximetry - Multiple   Pulse 86   Resp 19   Incentive Spirometer   $ Incentive Spirometer Charges done with encouragement;proper technique demonstrated   Administration (IS) proper technique demonstrated   Number of Repetitions (IS) 10   Level Incentive Spirometer (mL) 1500   Patient Tolerance (IS) good

## 2022-09-21 NOTE — CHAPLAIN
Attempted to visit pt again re: new dx; referred to me by CM; pt was occupied with doctor in the room, but spoke briefly to pt's uncle, Justice, in the hallway.     I visited pt a couple days ago and he was in pain, a little down, welcomed me to pray with him and was teary.  will continue to follow. Lord, in your mercy.

## 2022-09-21 NOTE — PLAN OF CARE
Problem: Adult Inpatient Plan of Care  Goal: Plan of Care Review  Outcome: Ongoing, Progressing  Goal: Optimal Comfort and Wellbeing  Outcome: Ongoing, Progressing   Pt's POC has been reviewed with pt.  Pt hasn't c/o any pain today,  Pt's diet was advanced today to a regular diabetic diet and he has been tolerating it well with no nausea and vomiting.  Pt needs a new order for PPN.

## 2022-09-21 NOTE — CARE UPDATE
09/21/22 0844   PRE-TX-O2   O2 Device (Oxygen Therapy) room air   SpO2 98 %   Pulse Oximetry Type Intermittent   $ Pulse Oximetry - Multiple Charge Pulse Oximetry - Multiple   Pulse 78   Resp 18   Incentive Spirometer   $ Incentive Spirometer Charges done with encouragement   Incentive Spirometer Predicted Level (mL) 2000   Administration (IS) self-administered   Number of Repetitions (IS) 10   Level Incentive Spirometer (mL) 1500   Patient Tolerance (IS) good

## 2022-09-22 NOTE — PROCEDURES
"Dwight Hoffmann is a 52 y.o. male patient.    Temp: 97.5 °F (36.4 °C) (09/22/22 0739)  Pulse: 97 (09/22/22 0950)  Resp: (!) 21 (09/22/22 0950)  BP: 124/80 (09/22/22 0950)  SpO2: 98 % (09/22/22 0950)  Weight: 104.6 kg (230 lb 9.6 oz) (09/18/22 0600)  Height: 6' 1" (185.4 cm) (09/16/22 1623)    PICC  Date/Time: 9/22/2022 12:31 PM  Location procedure was performed: [facility] MEDICAL SURGICAL UNIT 3RD  Performed by: Penny Blancas RN  Assisting provider: Sanam Oro RN  Consent Done: Yes  Time out: Immediately prior to procedure a time out was called to verify the correct patient, procedure, equipment, support staff and site/side marked as required  Indications: med administration and vascular access  Anesthesia: local infiltration  Local anesthetic: lidocaine 1% without epinephrine  Anesthetic Total (mL): 5  Preparation: skin prepped with ChloraPrep  Skin prep agent dried: skin prep agent completely dried prior to procedure  Sterile barriers: all five maximum sterile barriers used - cap, mask, sterile gown, sterile gloves, and large sterile sheet  Hand hygiene: hand hygiene performed prior to central venous catheter insertion  Location details: right brachial  Catheter type: double lumen  Catheter size: 5 Fr  Catheter Length: 38cm    Ultrasound guidance: yes  Vessel Caliber: medium and patent, compressibility normal  Vascular Doppler: not done  Needle advanced into vessel with real time Ultrasound guidance.  Guidewire confirmed in vessel.  Image recorded and saved.  Sterile sheath used.  no esophageal manometryNumber of attempts: 1  Post-procedure: blood return through all ports, chlorhexidine patch and sterile dressing applied  Technical procedures used: ADOLFO;Magno 3CG  Significant surgical tasks conducted by the assistant(s): n/a  Estimated blood loss (mL): 0  Specimens: No  Implants: No  Assessment: placement verified by x-ray, free fluid flow, no pneumothorax on x-ray, tip termination and successful " placement  Complications: none        Name   9/22/2022

## 2022-09-22 NOTE — ASSESSMENT & PLAN NOTE
Patient's FSGs are controlled on current medication regimen.  Last A1c reviewed-   Lab Results   Component Value Date    HGBA1C 11.0 (H) 12/30/2021     Most recent fingerstick glucose reviewed-   Recent Labs   Lab 09/21/22  0057 09/21/22  0751 09/21/22  1146 09/21/22  1644   POCTGLUCOSE 152* 136* 119* 107     Current correctional scale  Low  Maintain anti-hyperglycemic dose as follows-   Antihyperglycemics (From admission, onward)    Start     Stop Route Frequency Ordered    09/14/22 0344  insulin aspart U-100 pen 0-5 Units         -- SubQ Before meals & nightly PRN 09/14/22 0246        Hold Oral hypoglycemics while patient is in the hospital.

## 2022-09-22 NOTE — SUBJECTIVE & OBJECTIVE
Interval History:   preliminary pathologic diagnosis is diffuse large B cell lymphoma.  Patient has no new complains today.  He actually feels well.    Review of Systems   Constitutional:  Negative for chills and fever.   Respiratory:  Negative for cough, shortness of breath and wheezing.    Cardiovascular:  Negative for chest pain and leg swelling.   Gastrointestinal:  Positive for abdominal pain. Negative for abdominal distention and nausea.   Objective:     Vital Signs (Most Recent):  Temp: 98.2 °F (36.8 °C) (09/21/22 1630)  Pulse: 97 (09/21/22 1630)  Resp: (!) 21 (09/21/22 1630)  BP: 139/77 (09/21/22 1630)  SpO2: 99 % (09/21/22 1630)   Vital Signs (24h Range):  Temp:  [97.4 °F (36.3 °C)-98.3 °F (36.8 °C)] 98.2 °F (36.8 °C)  Pulse:  [78-97] 97  Resp:  [17-21] 21  SpO2:  [96 %-99 %] 99 %  BP: (126-141)/(70-83) 139/77     Weight: 104.6 kg (230 lb 9.6 oz)  Body mass index is 30.42 kg/m².    Intake/Output Summary (Last 24 hours) at 9/21/2022 1939  Last data filed at 9/21/2022 1752  Gross per 24 hour   Intake 2628.11 ml   Output 4100 ml   Net -1471.89 ml      Physical Exam  Vitals reviewed.   Constitutional:       General: He is not in acute distress.     Appearance: He is not diaphoretic.   HENT:      Mouth/Throat:      Mouth: Mucous membranes are moist.   Eyes:      General: No scleral icterus.        Right eye: No discharge.         Left eye: No discharge.   Neck:      Vascular: No JVD.   Cardiovascular:      Rate and Rhythm: Normal rate and regular rhythm.   Pulmonary:      Effort: Pulmonary effort is normal.      Breath sounds: Normal breath sounds.   Abdominal:      General: Bowel sounds are normal. There is distension (mild).      Palpations: Abdomen is soft.      Tenderness: There is no abdominal tenderness.      Comments: Incision looks clean.  Staples in place.   Skin:     General: Skin is warm.      Findings: No rash.   Neurological:      Mental Status: He is alert.       Significant Labs: All pertinent  labs within the past 24 hours have been reviewed.    Significant Imaging: I have reviewed all pertinent imaging results/findings within the past 24 hours.

## 2022-09-22 NOTE — PT/OT/SLP PROGRESS
Physical Therapy      Patient Name:  Dwight Hoffmann   MRN:  [id number]    Patient not seen today secondary to Other (Comment) (2 attempts, out for biopsy/ nurse held following.). Will follow-up 9/23/22.

## 2022-09-22 NOTE — ASSESSMENT & PLAN NOTE
Pt consuming roughly 30% of meals.  Will interrupt PPN today and see how much he consumes orally.  Dietician following.

## 2022-09-22 NOTE — CONSULTS
PICC line in good position; okay for use. Please hang new IV tubing prior to connecting.    PLEASE SEE PROCEDURE NOTE FOR FULL CONSULT.

## 2022-09-22 NOTE — PROGRESS NOTES
"[facility]  Hematology/Oncology  Consult Note    Patient Name: Dwight Hoffmann  MRN: [id number]  Admission Date: 9/13/2022  Hospital Length of Stay: 8 days  Code Status: Full Code   Attending Provider: Palomo Lewis MD  Consulting Provider: Elba Chua NP  Primary Care Physician: BOSTON Oswald  Principal Problem:Small bowel mass    Consults  Subjective:     HPI: Mr. Hoffmann is a 52 year old male with a history of NIDDM2 and HLD who presents today with complaints of night sweats for weeks. It is moderate. It is associated with 25 lb wt loss over the last 2 months, urinary hesitancy, urinary frequency, occasional blood streaked stools, weakness, fatigue, neck pain, knee pain, and pelvic pain. He denies measured fever, chills, N/V/D, chest pain, or SOB. He was seen by his primary last month about the blood streaked stools and referred to Dr. Clemens for colonoscopy on 9/1 with multiple polypectomies with path report of tubular adenomas. In the ED, labs revealed microcytic anemia, he was mildly tachycardic on arrival  which improved spontaneously, /60,  and CT abd/pelvis revealing: "Thick-walled collection containing feculent material, widely communicating with small bowel, seen centrally in the pelvis. Findings are suspicious for necrotic mass or abscess arising from the small bowel.  He is now status post exploratory lap with small intestine incision and drainage/biopsies obtained.  According to the consult preliminary path is concerning for diffuse large B-cell lymphoma    Oncology Consult:   We have been consulted for diffuse large B-cell lymphoma.  He continues to complain of intermittent surgical site pain.    9/22/2022:  At the time of my rounds patient was unavailable due to sterile procedure being performed (PICC line placement)  Medications:  Continuous Infusions:      Scheduled Meds:   enoxaparin  40 mg Subcutaneous Daily    meropenem (MERREM) IVPB  1 g Intravenous Q8H "     PRN Meds:dextrose 50%, dextrose 50%, fentaNYL, glucagon (human recombinant), glucose, glucose, insulin aspart U-100, LIDOcaine HCL 10 mg/ml (1%), midazolam, morphine, naloxone, ondansetron, promethazine (PHENERGAN) IVPB, sodium chloride 0.9%     Review of patient's allergies indicates:  Not on File     Past Medical History:   Diagnosis Date    Diabetes mellitus     Prediabetes      Past Surgical History:   Procedure Laterality Date    APPENDECTOMY  07/15/2014    COLONOSCOPY      COLONOSCOPY N/A 2/9/2022    Procedure: COLONOSCOPY;  Surgeon: Manuel Sanders MD;  Location: [facility] ENDO;  Service: Endoscopy;  Laterality: N/A;    COLONOSCOPY N/A 9/1/2022    Procedure: COLONOSCOPY;  Surgeon: Andrea Clemens MD;  Location: [facility] ENDO;  Service: Endoscopy;  Laterality: N/A;    INCISION AND DRAINAGE OF ABDOMEN N/A 9/14/2022    Procedure: INCISION AND DRAINAGE, ABDOMEN;  Surgeon: Jan Oliveira Jr., MD;  Location: [facility] OR;  Service: General;  Laterality: N/A;     Family History       Problem Relation (Age of Onset)    Cancer Mother    Diabetes Mother    Heart murmur Sister    Hypertension Mother    Seizures Father, Sister          Tobacco Use    Smoking status: Never    Smokeless tobacco: Never   Substance and Sexual Activity    Alcohol use: Not Currently     Comment: occasional    Drug use: No    Sexual activity: Yes     Partners: Female       Review of Systems  Unable to obtain    Objective:     Vital Signs (Most Recent):  Temp: 97.5 °F (36.4 °C) (09/22/22 0739)  Pulse: 97 (09/22/22 0950)  Resp: (!) 21 (09/22/22 0950)  BP: 124/80 (09/22/22 0950)  SpO2: 98 % (09/22/22 0950) Vital Signs (24h Range):  Temp:  [97.2 °F (36.2 °C)-98.6 °F (37 °C)] 97.5 °F (36.4 °C)  Pulse:  [] 97  Resp:  [17-25] 21  SpO2:  [97 %-100 %] 98 %  BP: (120-155)/(60-87) 124/80     Weight: 104.6 kg (230 lb 9.6 oz)  Body mass index is 30.42 kg/m².  Body surface area is 2.32 meters squared.      Intake/Output Summary (Last 24 hours) at  9/22/2022 1253  Last data filed at 9/22/2022 0735  Gross per 24 hour   Intake 1641.81 ml   Output 1625 ml   Net 16.81 ml         Physical Exam  PHYSICAL EXAM:     Vitals:    09/22/22 0950   BP: 124/80   Pulse: 97   Resp: (!) 21   Temp:      Today's labs and vital signs reviewed  Nursing notes reviewed  CT chest reviewed    Significant Labs:   CBC:   Recent Labs   Lab 09/21/22  0516 09/22/22  0452   WBC 15.03* 10.75   HGB 8.6* 9.2*   HCT 26.5* 29.2*   * 730*      and CMP:   Recent Labs   Lab 09/21/22  0516 09/22/22  0452    140   K 4.0 3.7    106   CO2 24 25   * 138*   BUN 12 12   CREATININE 0.7 0.8   CALCIUM 8.8 8.4*   PROT 7.0 6.6   ALBUMIN 2.4* 2.4*   BILITOT 0.7 0.8   ALKPHOS 79 103   AST 36 46*   ALT 19 28   ANIONGAP 10 9         Diagnostic Results:  I have reviewed all pertinent imaging results/findings within the past 24 hours.    Assessment/Plan:     Active Diagnoses:    Diagnosis Date Noted POA    PRINCIPAL PROBLEM:  Small bowel mass [K63.89] 09/14/2022 Yes    Small bowel perforation [K63.1] 09/20/2022 Yes    Intra-abdominal abscess [K65.1] 09/20/2022 Yes    Moderate malnutrition [E44.0] 09/16/2022 Yes    Anemia, chronic disease [D63.8] 09/14/2022 Yes    Diabetes mellitus type 2, noninsulin dependent [E11.9] 09/14/2022 Yes    Arthralgia of bilat knees and neck [M25.569] 09/14/2022 Yes      Problems Resolved During this Admission:       Diffuse large B-cell lymphoma:   -LDH is slightly elevated  -follow-up on final pathology  -status post bone marrow biopsy  -CT of chest showed a band of atelectasis in the left lower lobe, otherwise negative  -will arrange follow-up with Dr. Reid  -we will await results of final pathology.  Patient will need to recover from surgery before starting chemotherapy.  We will complete staging with outpatient PET scan    Iron deficiency Anemia:  -hemoglobin is 9.2  -if hemoglobin trends down we can give Venofer 400 mg IV x1  -otherwise, we can replete  outpatient    Status post small bowel resection:   -managed by General surgery    Thank you for your consult. I will follow-up with patient. Please contact us if you have any additional questions.    Elba Chua NP  Hematology/Oncology  [facility]   I have discussed this pt with NP, and made plans as above

## 2022-09-22 NOTE — ASSESSMENT & PLAN NOTE
Was present on hospital day 1.  Due to mass, which was resected shortly after admission.  Imaging shows residual abscess in peritoneal cavity.  It's not approachable by interventional radiology.  The plan for now is long course of antibiotic and re-imaging.

## 2022-09-22 NOTE — CHAPLAIN
"Visited pt again during general rounding; pt was awake, alert and welcomed me in; his wife Bree was bedside; without prompting pt said he feels a lot better than he did when I visited on Monday-- "your prayer must have worked!" Together, we agreed God gets all the glory for that. Pt was smiling and said his pain is better controlled as well.    Pt and wife thanked me for the visit and prayer. Lord, in your mercy.  "

## 2022-09-22 NOTE — PROGRESS NOTES
"[facility] Medicine  Progress Note    Patient Name: Dwight Hoffmann  MRN: [id number]  Patient Class: IP- Inpatient   Admission Date: 9/13/2022  Length of Stay: 7 days  Attending Physician: Palomo Lewis MD  Primary Care Provider: BOSTON Oswald        Subjective:     Principal Problem:Small bowel mass        HPI:  Mr. Hoffmann is a 52 year old male with a history of NIDDM2 and HLD who presents today with complaints of night sweats for weeks. It is moderate. It is associated with 25 lb wt loss over the last 2 months, urinary hesitancy, urinary frequency, occasional blood streaked stools, weakness, fatigue, neck pain, knee pain, and pelvic pain. He denies measured fever, chills, N/V/D, chest pain, or SOB. He was seen by his primary last month about the blood streaked stools and referred to Dr. Clemens for colonoscopy on 9/1 with multiple polypectomies with path report of tubular adenomas. In the ED, labs revealed microcytic anemia, he was mildly tachycardic on arrival  which improved spontaneously, /60,  and CT abd/pelvis revealing: "Thick-walled collection containing feculent material, widely communicating with small bowel, seen centrally in the pelvis. Findings are suspicious for necrotic mass or abscess arising from the small bowel" Findings were discussed with Dr. Oliveira by ED MD who agreed to see patient in the morning.       Overview/Hospital Course:  Dwight Hoffmann is a 52 year old male with a past medical history of DM, HLD and anemia who presented with lower abdominal pain, lower back pain, and difficulty urinating secondary to an abdominal mass discovered on CT scan. Surgery was consulted and performed exploratory laparotomy with resection of the mass and anastomosis of small bowel. The mass appears to be an abscess with surrounding necrosis. Pathology is currently pending. The patient was treated with IV antibiotics. An NG tube to LIWS suction is in place " and the patient is on IV fluids while he is NPO. He did have a positive blood culture which showed coagulase negative Staph. He was briefly on vancomycin which was discontinued. ID is following along.  Patient is currently on meropenem.  Cultures from surgery with Enterobacter cloacae and Proteus mirabilis.  Still awaiting return of bowel function.  PPN started 9/19.      Interval History:   preliminary pathologic diagnosis is diffuse large B cell lymphoma.  Patient has no new complains today.  He actually feels well.    Review of Systems   Constitutional:  Negative for chills and fever.   Respiratory:  Negative for cough, shortness of breath and wheezing.    Cardiovascular:  Negative for chest pain and leg swelling.   Gastrointestinal:  Positive for abdominal pain. Negative for abdominal distention and nausea.   Objective:     Vital Signs (Most Recent):  Temp: 98.2 °F (36.8 °C) (09/21/22 1630)  Pulse: 97 (09/21/22 1630)  Resp: (!) 21 (09/21/22 1630)  BP: 139/77 (09/21/22 1630)  SpO2: 99 % (09/21/22 1630)   Vital Signs (24h Range):  Temp:  [97.4 °F (36.3 °C)-98.3 °F (36.8 °C)] 98.2 °F (36.8 °C)  Pulse:  [78-97] 97  Resp:  [17-21] 21  SpO2:  [96 %-99 %] 99 %  BP: (126-141)/(70-83) 139/77     Weight: 104.6 kg (230 lb 9.6 oz)  Body mass index is 30.42 kg/m².    Intake/Output Summary (Last 24 hours) at 9/21/2022 1939  Last data filed at 9/21/2022 1752  Gross per 24 hour   Intake 2628.11 ml   Output 4100 ml   Net -1471.89 ml      Physical Exam  Vitals reviewed.   Constitutional:       General: He is not in acute distress.     Appearance: He is not diaphoretic.   HENT:      Mouth/Throat:      Mouth: Mucous membranes are moist.   Eyes:      General: No scleral icterus.        Right eye: No discharge.         Left eye: No discharge.   Neck:      Vascular: No JVD.   Cardiovascular:      Rate and Rhythm: Normal rate and regular rhythm.   Pulmonary:      Effort: Pulmonary effort is normal.      Breath sounds: Normal breath  sounds.   Abdominal:      General: Bowel sounds are normal. There is distension (mild).      Palpations: Abdomen is soft.      Tenderness: There is no abdominal tenderness.      Comments: Incision looks clean.  Staples in place.   Skin:     General: Skin is warm.      Findings: No rash.   Neurological:      Mental Status: He is alert.       Significant Labs: All pertinent labs within the past 24 hours have been reviewed.    Significant Imaging: I have reviewed all pertinent imaging results/findings within the past 24 hours.      Assessment/Plan:      * Small bowel mass  S/p resection 9/14 per Dr. Oliveira.  Preliminary pathological diagnosis is diffuse B cell lymphoma.  Patient has been informed by me and by the surgeon.  Will consult with oncology for treatment recommendations.    Intra-abdominal abscess  Due to small bowel perforation, which was present on day 1 of admission.  CT abdomen reviewed.  It's not approachable by interventional radiology.  The plan for now is long course of antibiotic and re-imaging.      Small bowel perforation  Was present on hospital day 1.  Due to mass, which was resected shortly after admission.  Imaging shows residual abscess in peritoneal cavity.  It's not approachable by interventional radiology.  The plan for now is long course of antibiotic and re-imaging.    Moderate malnutrition  Pt consuming roughly 30% of meals.  Will interrupt PPN today and see how much he consumes orally.  Dietician following.      Arthralgia of bilat knees and neck  Chronic.  Continue analgesics as needed.    Diabetes mellitus type 2, noninsulin dependent  Patient's FSGs are controlled on current medication regimen.  Last A1c reviewed-   Lab Results   Component Value Date    HGBA1C 11.0 (H) 12/30/2021     Most recent fingerstick glucose reviewed-   Recent Labs   Lab 09/21/22  0057 09/21/22  0751 09/21/22  1146 09/21/22  1644   POCTGLUCOSE 152* 136* 119* 107     Current correctional scale   Low  Maintain anti-hyperglycemic dose as follows-   Antihyperglycemics (From admission, onward)    Start     Stop Route Frequency Ordered    09/14/22 0344  insulin aspart U-100 pen 0-5 Units         -- SubQ Before meals & nightly PRN 09/14/22 0246        Hold Oral hypoglycemics while patient is in the hospital.      Anemia, chronic disease  Monitor HGB.    Lab Results   Component Value Date    HGB 8.6 (L) 09/21/2022             VTE Risk Mitigation (From admission, onward)         Ordered     enoxaparin injection 40 mg  Daily         09/14/22 1009     IP VTE HIGH RISK PATIENT  Once         09/14/22 0246     Place sequential compression device  Until discontinued         09/14/22 0246                Discharge Planning   CATRACHO: 9/26/2022     Code Status: Full Code   Is the patient medically ready for discharge?:     Reason for patient still in hospital (select all that apply): Patient trending condition, Treatment and Consult recommendations  Discharge Plan A: Home with family                  Palomo Lewis MD  Department of Hospital Medicine   [facility]

## 2022-09-22 NOTE — PROGRESS NOTES
General Surgery Progress Note    Admit Date: 9/13/2022  S/P: Procedure(s) (LRB):  LAPAROTOMY, EXPLORATORY (N/A)  EXCISION, SMALL INTESTINE (N/A)  INCISION AND DRAINAGE, ABDOMEN (N/A)    Post-operative Day: 8 Days Post-Op    Hospital Day: 10    SUBJECTIVE:   Passing flatus but no further bowel movement. Tolerated some diet.  Overall feeling better.  Is complaining of back pain from the bed. Underwent bone biopsy and CT of the chest.    OBJECTIVE:     Vital Signs (Most Recent)  Temp:  [97.2 °F (36.2 °C)-98.6 °F (37 °C)] 97.5 °F (36.4 °C)  Pulse:  [] 97  Resp:  [17-25] 21  SpO2:  [97 %-100 %] 98 %  BP: (120-155)/(60-87) 124/80    I&Os:  I/O last 3 completed shifts:  In: 5770.6 [P.O.:2140; IV Piggyback:282.8]  Out: 4875 [Urine:4875]    Physical Exam:  Gen: NAD, AAOx3  HEENT: Anicteric sclera, NG tube in place  Pulm: unlabored, symmetrical   Abd: Soft, mild distention, midline incision intact with staples in place, no signs of infection    Laboratory:  CBC:   Recent Labs   Lab 09/22/22 0452   WBC 10.75   RBC 3.73*   HGB 9.2*   HCT 29.2*   *   MCV 78*   MCH 24.7*   MCHC 31.5*       CMP:   Recent Labs   Lab 09/22/22 0452   *   CALCIUM 8.4*   ALBUMIN 2.4*   PROT 6.6      K 3.7   CO2 25      BUN 12   CREATININE 0.8   ALKPHOS 103   ALT 28   AST 46*   BILITOT 0.8       Labs within the past 24 hours have been reviewed.    PATHOLOGY -prelim B-cell lymphoma      ASSESSMENT/PLAN:     Patient Active Problem List    Diagnosis Date Noted    Small bowel perforation 09/20/2022    Intra-abdominal abscess 09/20/2022    Moderate malnutrition 09/16/2022    Small bowel mass 09/14/2022    Anemia, chronic disease 09/14/2022    Diabetes mellitus type 2, noninsulin dependent 09/14/2022    Arthralgia of bilat knees and neck 09/14/2022         52 y.o. male status post small-bowel resection for contained perforation with suspected underlying mass  -concern that abscess and prolonged antibiotics may delay potential  treatment with chemotherapy for lymphoma   -after discussion with treatment team I have tentatively scheduled the patient for laparoscopic washout of abscess tomorrow  -will make NPO at midnight

## 2022-09-22 NOTE — PROGRESS NOTES
[facility]  Adult Nutrition  Progress Note    SUMMARY       Recommendations  1) re-advance as tolerated to goal of Low fiber, DM 2000 kcal + boost glucose control 1 x daily  2) If pt does not tolerate diet advancement restart PPN D5 AA4.25 @ 90 ml/hr + standard lipids ( 91 g protein, 1234 kcal)  3) weigh weekly     Goals: 1) PO diet advanced to full liquids in < 72 hr 2) PPN meeting 50% of needs at f/u or diet advanced  Nutrition Goal Status: not met/ met-continues  Communication of RD Recs:  (POC, sticky note, reviewed with MD)    Assessment and Plan     Moderate malnutrition  Contributing Nutrition Diagnosis  Moderate acute condition related malnutrition     Related to (etiology):   Altered GI function     Signs and Symptoms (as evidenced by):   1) PO intakes < 75% of needs x > 1 week  2) 8% wt loss x 2 months ( per pt)     Interventions:  Collaboration with care providers     Nutrition Diagnosis Status:   continues    Malnutrition Assessment     Skin (Micronutrient):  (Oj = 19, abd. incision, NG ( 800 ml output)  Teeth (Micronutrient):  (missing some)   Micronutrient Evaluation: suspected deficiency  Micronutrient Evaluation Comments: iron   Weight Loss (Malnutrition): greater than 7.5% in <3 months  Energy Intake (Malnutrition): less than 75% for greater than or equal to 1 week          Edema (Fluid Accumulation): 0-->no edema present             Reason for Assessment    Reason For Assessment: follow up  Diagnosis:  (small bowl mass)  Relevant Medical History: DM2 on metformin, HLD  Interdisciplinary Rounds: did not attend-remote    General Information Comments: 53 y/o male admitted with small bowel perforation POD 2 abd. resection. Now with ileus, + NG, no significant output. Not yet passing BM or flatus, per surgery can advance diet to clears at that point. NFPE to be done at f/u. Endorses 25 lb wt loss x 2 months.  9/19/22 NPO x 6 days, no flatus, pt having high NG output. PPN started  "today.  22 Pt was started on PPN, received , . Yesterday diet was advanced to DM 2000 kcal, pt tolerated 25% of meals without N/V and so PPN d/c'd. NPO today for BMBX.    Nutrition Discharge Planning: To be determined- low fiber, DM 2000 kcal, cardiac diet    Nutrition Risk Screen    Nutrition Risk Screen: no indicators present    Nutrition/Diet History    Spiritual, Cultural Beliefs, Anglican Practices, Values that Affect Care: no  Food Allergies: NKFA  Factors Affecting Nutritional Intake: altered gastrointestinal function, NPO    Anthropometrics    Temp: 97.5 °F (36.4 °C)  Height Method: Measured (21)  Height: 6' 1"  Height (inches): 73 in  Weight Method: Bed Scale  Weight: 104.6 kg (230 lb 9.6 oz)  Weight (lb): 230.6 lb  Ideal Body Weight (IBW), Male: 184 lb  % Ideal Body Weight, Male (lb): 122.33 %  BMI (Calculated): 30.4  BMI Grade: 25 - 29.9 - overweight  Weight Loss: unintentional  Usual Body Weight (UBW), k.6 kg (21- per pt wt loss was in the last 2 months)  % Usual Body Weight: 91.68  % Weight Change From Usual Weight: -8.51 %       Lab/Procedures/Meds    Pertinent Labs Reviewed: reviewed  BMP  Lab Results   Component Value Date     2022    K 3.7 2022     2022    CO2 25 2022    BUN 12 2022    CREATININE 0.8 2022    CALCIUM 8.4 (L) 2022    ANIONGAP 9 2022    ESTGFRAFRICA 111 2021    EGFRNONAA 96 2021     Lab Results   Component Value Date    ALBUMIN 2.4 (L) 2022     POCT Glucose   Date Value Ref Range Status   2022 115 (H) 70 - 110 mg/dL Final   2022 107 70 - 110 mg/dL Final   2022 119 (H) 70 - 110 mg/dL Final   2022 136 (H) 70 - 110 mg/dL Final   2022 152 (H) 70 - 110 mg/dL Final   2022 149 (H) 70 - 110 mg/dL Final   2022 118 (H) 70 - 110 mg/dL Final           Pertinent Medications Reviewed: reviewed  insulin,zofran, phenergan    Estimated/Assessed " Needs    Weight Used For Calorie Calculations: 102.1 kg (225 lb 1.4 oz)  Energy Calorie Requirements (kcal): MSJ ( x 1.2) = 2300 kcal  Energy Need Method: [place]-St Jeor  Protein Requirements: 1-1.2 g protein/kg ( 102-122 g)  Weight Used For Protein Calculations: 102.1 kg (225 lb 1.4 oz)  Fluid Requirements (mL): 2300 ml or per MD  Estimated Fluid Requirement Method: RDA Method  CHO Requirement: 258-316 g      Nutrition Prescription Ordered    Current Diet Order: NPO    Evaluation of Received Nutrient/Fluid Intake    Energy Calories Required: not meeting needs  Protein Required: not meeting needs  Fluid Required: exceeds needs  Tolerance: other (see comments) (NPO)     Intake/Output Summary (Last 24 hours) at 9/22/2022 1043  Last data filed at 9/22/2022 0640  Gross per 24 hour   Intake 1881.81 ml   Output 1725 ml   Net 156.81 ml         % Intake of Estimated Energy Needs: 0%  % Meal Intake: NPO  0-25% 9/21 and 9/22/ 53% with PPN 9/19 and 9/20 ( 50% or less of needs x 9 days)    Nutrition Risk    Level of Risk/Frequency of Follow-up: moderate - high 2-3 x weekly    Monitor and Evaluation    Food and Nutrient Intake: energy intake  Food and Nutrient Adminstration: diet order, enteral and parenteral nutrition administration  Anthropometric Measurements: weight  Biochemical Data, Medical Tests and Procedures: electrolyte and renal panel, gastrointestinal profile, glucose/endocrine profile  Nutrition-Focused Physical Findings: overall appearance, skin     Nutrition Follow-Up    RD Follow-up?: Yes

## 2022-09-22 NOTE — NURSING
Patient transferred via stretcher from [facility] to CT 2. Consents obtained by Radiologist in pre op. Bone Marrow biopsy performed by Dr. Call. Versed 3 mg and Fentanyl 75 mcg were administered IV- Pathology Neli boyle present, received specimens and verified acceptable sample Vital signs were monitored and remained stable for duration of procedure- pt tolerated procedure well- Bandaid applied to lower back CDI- Report given to primary nurse. pt transported via bed to room in stable condition.

## 2022-09-22 NOTE — ASSESSMENT & PLAN NOTE
S/p resection 9/14 per Dr. Oliveira.  Preliminary pathological diagnosis is diffuse B cell lymphoma.  Patient has been informed by me and by the surgeon.  Will consult with oncology for treatment recommendations.

## 2022-09-22 NOTE — PT/OT/SLP PROGRESS
Occupational Therapy      Patient Name:  Dwight Hoffmann   MRN:  [id number]    Attempted OT tx twice (@1000 and @11:37). Patient was unavailable on both attempts. Will re-attempt tomorrow.    9/22/2022

## 2022-09-22 NOTE — ASSESSMENT & PLAN NOTE
Due to small bowel perforation, which was present on day 1 of admission.  CT abdomen reviewed.  It's not approachable by interventional radiology.  The plan for now is long course of antibiotic and re-imaging.

## 2022-09-22 NOTE — CARE UPDATE
09/21/22 2004   Patient Assessment/Suction   Level of Consciousness (AVPU) alert   Respiratory Effort Unlabored   PRE-TX-O2   O2 Device (Oxygen Therapy) room air   SpO2 99 %   Pulse Oximetry Type Intermittent   $ Pulse Oximetry - Multiple Charge Pulse Oximetry - Multiple   Pulse 90   Resp 19   Incentive Spirometer   $ Incentive Spirometer Charges done with encouragement   Administration (IS) proper technique demonstrated   Number of Repetitions (IS) 10   Level Incentive Spirometer (mL) 1500   Patient Tolerance (IS) good

## 2022-09-22 NOTE — CARE UPDATE
09/22/22 0740   Patient Assessment/Suction   Level of Consciousness (AVPU) alert   Respiratory Effort Normal;Unlabored   PRE-TX-O2   O2 Device (Oxygen Therapy) room air   SpO2 97 %   Pulse Oximetry Type Intermittent   $ Pulse Oximetry - Multiple Charge Pulse Oximetry - Multiple   Pulse 88   Resp 18   Incentive Spirometer   $ Incentive Spirometer Charges done with encouragement   Incentive Spirometer Predicted Level (mL) 2000   Administration (IS) proper technique demonstrated   Number of Repetitions (IS) 10   Level Incentive Spirometer (mL) 2000   Patient Tolerance (IS) good

## 2022-09-22 NOTE — PLAN OF CARE
Recommendations  1) re-advance as tolerated to goal of Low fiber, DM 2000 kcal + boost glucose control 1 x daily  2) If pt does not tolerate diet advancement restart PPN D5 AA4.25 @ 90 ml/hr + standard lipids ( 91 g protein, 1234 kcal)  3) weigh weekly      Goals: 1) PO diet advanced to full liquids in < 72 hr 2) PPN meeting 50% of needs at f/u or diet advanced  Nutrition Goal Status: not met/ met-continues  Communication of RD Recs:  (POC, sticky note, reviewed with MD)

## 2022-09-23 PROBLEM — K63.1 SMALL BOWEL PERFORATION: Status: RESOLVED | Noted: 2022-01-01 | Resolved: 2022-01-01

## 2022-09-23 NOTE — PLAN OF CARE
Report to [place]. Patient denies pain, no nausea, dressing to abdomen dry intact no drainage, eamon to wound, vs stable, resting comfortably, wife in attendance

## 2022-09-23 NOTE — ANESTHESIA PROCEDURE NOTES
Intubation    Date/Time: 9/23/2022 12:21 PM  Performed by: Jp Mace Jr., CRNA  Authorized by: Luke Starr MD     Intubation:     Induction:  Intravenous    Intubated:  Postinduction    Mask Ventilation:  Easy mask    Attempts:  1    Attempted By:  Staff anesthesiologist    Blade:  Corey 2    Laryngeal View Grade: Grade I - full view of cords      Difficult Airway Encountered?: No      Complications:  None    Airway Device:  Oral endotracheal tube    Airway Device Size:  7.5    Style/Cuff Inflation:  Cuffed    Secured at:  The lips    Placement Verified By:  Capnometry    Findings Post-Intubation:  BS equal bilateral and atraumatic/condition of teeth unchanged

## 2022-09-23 NOTE — ANESTHESIA PREPROCEDURE EVALUATION
09/23/2022  Dwight Hoffmann is a 52 y.o., male.      Pre-op Assessment    I have reviewed the Patient Summary Reports.     I have reviewed the Nursing Notes. I have reviewed the NPO Status.   I have reviewed the Medications.     Review of Systems  Anesthesia Hx:  No problems with previous Anesthesia    Hematology/Oncology:         -- Anemia: Hematology Comments: H/H 9/26   Cardiovascular:   Exercise tolerance: good Hypertension ECG has been reviewed.    Pulmonary:  Pulmonary Normal    Renal/:  Renal/ Normal     Hepatic/GI:   Abdominal abscess   Musculoskeletal:  Musculoskeletal Normal    Neurological:  Neurology Normal    Endocrine:   Diabetes, type 2                                                                                                                 09/23/2022  Dwight Hoffmann is a 52 y.o., male.      Pre-op Assessment    I have reviewed the Patient Summary Reports.    I have reviewed the NPO Status.   I have reviewed the Medications.     Review of Systems  Anesthesia Hx:  No problems with previous Anesthesia  Denies Family Hx of Anesthesia complications.    Social:  Non-Smoker    Cardiovascular:   Hypertension hyperlipidemia    Hepatic/GI:   Bowel Prep. Rectal bleeding   Lower abdominal pain    Endocrine:   Diabetes, type 2  Obesity / BMI > 30      Physical Exam  General: Well nourished, Cooperative, Alert and Oriented    Chest/Lungs:  Clear to auscultation, Normal Respiratory Rate    Heart:  Rate: Normal  Rhythm: Regular Rhythm  Sounds: Normal        Anesthesia Plan  Type of Anesthesia, risks & benefits discussed:    Anesthesia Type: MAC  Intra-op Monitoring Plan: Standard ASA Monitors  Post Op Pain Control Plan: multimodal analgesia  Induction:  IV  Informed Consent: Informed consent signed with the Patient and all parties understand the risks and agree with anesthesia plan.  All questions  answered.   ASA Score: 2  Day of Surgery Review of History & Physical: H&P Update referred to the surgeon/provider.    Ready For Surgery From Anesthesia Perspective.     .        Physical Exam  General: Well nourished    Airway:  Mallampati: III / II  Neck ROM: Normal ROM    Dental:  Intact    Chest/Lungs:  Clear to auscultation, Normal Respiratory Rate    Heart:  Rate: Normal  Rhythm: Regular Rhythm        Anesthesia Plan  Type of Anesthesia, risks & benefits discussed:    Anesthesia Type: Gen ETT  Intra-op Monitoring Plan: Standard ASA Monitors  Post Op Pain Control Plan: multimodal analgesia and IV/PO Opioids PRN  Induction:  Inhalation, IV and rapid sequence  Airway Plan: Direct, Post-Induction  Informed Consent: Informed consent signed with the Patient and all parties understand the risks and agree with anesthesia plan.  All questions answered. Patient consented to blood products? Yes  ASA Score: 2    Ready For Surgery From Anesthesia Perspective.     .

## 2022-09-23 NOTE — SUBJECTIVE & OBJECTIVE
Interval History:   he underwent bone marrow biopsy this morning.  He has no new complaints.  Currently undergoing PICC insertion.    Review of Systems   Constitutional:  Negative for chills and fever.   Respiratory:  Negative for cough, shortness of breath and wheezing.    Cardiovascular:  Negative for chest pain and leg swelling.   Gastrointestinal:  Positive for abdominal pain. Negative for abdominal distention and nausea.   Objective:     Vital Signs (Most Recent):  Temp: 98.5 °F (36.9 °C) (09/22/22 1959)  Pulse: 81 (09/22/22 1959)  Resp: 17 (09/22/22 1959)  BP: 133/71 (09/22/22 1959)  SpO2: 99 % (09/22/22 1959)   Vital Signs (24h Range):  Temp:  [97.5 °F (36.4 °C)-98.6 °F (37 °C)] 98.5 °F (36.9 °C)  Pulse:  [] 81  Resp:  [17-25] 17  SpO2:  [97 %-100 %] 99 %  BP: (123-155)/(71-87) 133/71     Weight: 104.6 kg (230 lb 9.6 oz)  Body mass index is 30.42 kg/m².    Intake/Output Summary (Last 24 hours) at 9/22/2022 2055  Last data filed at 9/22/2022 1815  Gross per 24 hour   Intake 2291.51 ml   Output 1100 ml   Net 1191.51 ml        Physical Exam  Vitals reviewed.   Constitutional:       General: He is not in acute distress.     Appearance: He is not diaphoretic.   HENT:      Mouth/Throat:      Mouth: Mucous membranes are moist.   Eyes:      General: No scleral icterus.        Right eye: No discharge.         Left eye: No discharge.   Neck:      Vascular: No JVD.   Cardiovascular:      Rate and Rhythm: Normal rate and regular rhythm.   Pulmonary:      Effort: Pulmonary effort is normal.      Breath sounds: Normal breath sounds.   Abdominal:      General: Bowel sounds are normal. There is distension (mild).      Palpations: Abdomen is soft.      Tenderness: There is no abdominal tenderness.      Comments: Incision looks clean.  Staples in place.   Skin:     General: Skin is warm.      Findings: No rash.   Neurological:      Mental Status: He is alert.       Significant Labs: All pertinent labs within the past 24  hours have been reviewed.    Significant Imaging: I have reviewed all pertinent imaging results/findings within the past 24 hours.

## 2022-09-23 NOTE — ASSESSMENT & PLAN NOTE
S/p resection 9/14 per Dr. Oliveira.  Preliminary pathological diagnosis is diffuse B cell lymphoma.  Patient has been informed by me and by the surgeon.  We're consulting with oncology for treatment recommendations.  Bone marrow biopsy done today for staging purposes.   RUQ abd pain with N/V Parent

## 2022-09-23 NOTE — ASSESSMENT & PLAN NOTE
Patient's FSGs are controlled on current medication regimen.  Last A1c reviewed-   Lab Results   Component Value Date    HGBA1C 11.0 (H) 12/30/2021     Most recent fingerstick glucose reviewed-   Recent Labs   Lab 09/22/22  0027 09/22/22  1136   POCTGLUCOSE 115* 162*     Current correctional scale  Low  Maintain anti-hyperglycemic dose as follows-   Antihyperglycemics (From admission, onward)    Start     Stop Route Frequency Ordered    09/14/22 0344  insulin aspart U-100 pen 0-5 Units         -- SubQ Before meals & nightly PRN 09/14/22 0246        Hold Oral hypoglycemics while patient is in the hospital.

## 2022-09-23 NOTE — TRANSFER OF CARE
Anesthesia Transfer of Care Note    Patient: Dwight Hoffmann    Procedure(s) Performed: Procedure(s) (LRB):  DRAINAGE, ABDOMEN, LAPAROSCOPIC (N/A)    Patient location: PACU    Anesthesia Type: general    Transport from OR: Transported from OR on 6-10 L/min O2 by face mask with adequate spontaneous ventilation    Post pain: adequate analgesia    Post assessment: no apparent anesthetic complications and tolerated procedure well    Post vital signs: stable    Level of consciousness: awake    Nausea/Vomiting: no nausea/vomiting    Complications: none    Transfer of care protocol was followed      Last vitals:   Visit Vitals  /73 (BP Location: Left arm, Patient Position: Lying)   Pulse 83   Temp 36.9 °C (98.4 °F) (Skin)   Resp 18   Ht 6' (1.829 m)   Wt 96.4 kg (212 lb 8.4 oz)   SpO2 99%   BMI 28.82 kg/m²

## 2022-09-23 NOTE — PROGRESS NOTES
Progress Note  Infectious Disease    Reason for Consult:  GPC bacteremia    HPI: Dwight Hoffmann is a 52 y.o. male very pleasant, with past medical history of diabetes, HLD who presented 9/14 complaining of night sweats, unintentional 30 lb weight loss for the last couple of months.  Symptoms worsened by severe abdominal pain, urinary hesitancy, dysuria, increased urinary frequency, occasional bloody stool, with associated weakness, fatigue, and generalized pain.  He denies fever or chills, no nausea or vomiting, no chest pain or shortness of breath.  Patient was seen by primary care last month due to melena, refer to GI, colonoscopy done on 09/01 were 3 polyps were resected, pathology report consistent with tubular adenomas.      In the ER, blood pressure 113/69, T-max a 100.9°   Labs on admission white count 11.3, monocytic predominance 20%, bands 2%, H&H 9.6/28.5, MCV 74, microcytic anemia, platelet count 395   Normal kidney and liver function.    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 Prostatomegaly necessitating correlation with PSA.    Seen by [facility] surgery, status post ex lap for small bowel obstruction in the setting of necrotic large colonic mass with fistula formation, washout, and colon anastomosis.    Empirically started on Zosyn.  Switched to vancomycin, cefepime, Flagyl on 9/15.    ID consult for Gram-positive cocci in 1/2 bottles.    INTERVAL HISTORY:  9/16: Interim reviewed, patient seen and examined at bedside, anxious regarding clinical condition. Hemodynamically stable, afebrile in the last 24h. States he is burping, not passing gas since yesterday. Dysuria and hesitancy are improved. Labs reviewed, leukocytosis down to 12.8, no left shift, normal monocyte count. H/H 8.1/24.8, MCV: 76, plt: 482. Normal  electrolytes, liver and kidney function. AFP and PSA negative. Micro reviewed, OR cultures GNR, ID and sensitivities pending, blood cultures 1/4 bottles CoNS likely a contaminant. Repeat blood culture x 2 no growth to date, pending final.     9/17 (Nanette):  Case discussed with Dr. Mooney.  NG tube was found to be coiled in his mouth last night and was removed not replaced secondary to patient refusal.  AMA was signed by patient but subsequently the tube was replaced and is draining bilious fluid.  KUB reviewed. No flatus yet.  He is afebrile, white blood cells 12,800.  Abscess cultures reviewed with Enterobacter, not very sensitive, and a pansensitive Proteus.  CEA, CA 19 9, alpha fetoprotein, PSA all negative.  9/18: interim reviewed. Afebrile. Continues to require the NGT for slow return of bowel function. No new micro data. He has not had any pain medication since yesterday. Walking in the [place]vez.     9/19 (Vinicio): Interim reviewed, discussed with Dr Fitzpatrick. Patient seen and examined at bedside, after session with PT. States his hungry, and is anxious regarding NG tube and not being able to pass gas. Hemodynamically stable, afebrile. Labs reviewed, stable WBC, no left shift, H/H 8.1/25.5, plt: 633. Stable kidney and liver function tests. No new micro data. Awaiting pathology report.     9/20: Interim reviewed, patient seen and examined at bedside. Discussed with Surgery yesterday, NG tube repositioned, patient is finally passing gas, output from NG tube decreased. Hemodynamically stable, afebrile. Labs reviewed, stable WBC, no left shift. H/H 8.4/25.9. plt: 735, likely reactive thrombocytosis. Stable kidney and liver function. Micro reviewed, repeat blood cultures  no growth. Path lab called for report, Pathologist to call with prelim read.    10 x 7.5 by 3.5 cm,     09/21/2022 No new complains, abd dyscomfort expected, but no pain  Afebrile. Going for   Bone marrow Bx tomorrow  Night sweats have resolved    09/22/2022  Ct chest done for staging- neg  Bone marrow Bx  done today  Patietn if feeling better in general. Multiple consultants are discussing best option to tx  09/23/2022.  Patient is in OR.  Later on general surgery communicated with myself and other providers that there was no obvious abscess but is still send cultures, which I am grateful.        EXAM & DIAGNOSTICS REVIEWED:   Vitals:     Temp:  [97.4 °F (36.3 °C)-99 °F (37.2 °C)]   Temp: 99 °F (37.2 °C) (09/23/22 1500)  Pulse: 69 (09/23/22 1500)  Resp: 15 (09/23/22 1500)  BP: (!) 113/55 (09/23/22 1500)  SpO2: 95 % (09/23/22 1500)    Intake/Output Summary (Last 24 hours) at 9/23/2022 1531  Last data filed at 9/23/2022 1310  Gross per 24 hour   Intake 1366.79 ml   Output 1750 ml   Net -383.21 ml         09/22/2022   Sx wound healing    9/14:        General Labs reviewed:  Recent Labs   Lab 09/21/22  0516 09/22/22  0452 09/23/22  0438   WBC 15.03* 10.75 8.69   HGB 8.6* 9.2* 8.5*   HCT 26.5* 29.2* 26.9*   * 730* 696*       Recent Labs   Lab 09/21/22  0516 09/22/22  0452 09/23/22  0438    140 137   K 4.0 3.7 4.0    106 106   CO2 24 25 24   BUN 12 12 14   CREATININE 0.7 0.8 0.8   CALCIUM 8.8 8.4* 8.5*   PROT 7.0 6.6 6.9   BILITOT 0.7 0.8 0.7   ALKPHOS 79 103 110   ALT 19 28 35   AST 36 46* 37     No results for input(s): CRP in the last 168 hours.  No results for input(s): SEDRATE in the last 168 hours.    Estimated Creatinine Clearance: 127.1 mL/min (based on SCr of 0.8 mg/dL).     Micro:  Microbiology Results (last 7 days)       Procedure Component Value Units Date/Time    Aerobic culture [[id number]] Collected: 09/23/22 1312    Order Status: Sent Specimen: Abdominal from Abdomen Updated: 09/23/22 1519    Culture, Anaerobe [[id number]] Collected: 09/23/22 1312    Order Status: Sent Specimen: Body Fluid from Abdomen Updated: 09/23/22 1519    Blood culture [[id number]] Collected: 09/15/22 1722    Order Status: Completed Specimen: Blood from  Antecubital, Right Updated: 09/21/22 0612     Blood Culture, Routine No growth after 5 days.    Blood culture [[id number]] Collected: 09/15/22 1819    Order Status: Completed Specimen: Blood from Antecubital, Right Updated: 09/21/22 0612     Blood Culture, Routine No growth after 5 days.    Blood culture (site 2) [[id number]] Collected: 09/14/22 0315    Order Status: Completed Specimen: Blood Updated: 09/19/22 1212     Blood Culture, Routine No growth after 5 days.    Narrative:      Site # 2, aerobic only    Culture, Anaerobe [[id number]] Collected: 09/14/22 1525    Order Status: Completed Specimen: Abscess from Abdomen Updated: 09/19/22 0717     Anaerobic Culture No anaerobes isolated    Aerobic culture [[id number]]  (Abnormal)  (Susceptibility) Collected: 09/14/22 1525    Order Status: Completed Specimen: Abscess from Abdomen Updated: 09/17/22 1320     Aerobic Bacterial Culture ENTEROBACTER CLOACAE  Few        PROTEUS MIRABILIS  Few  No other significant isolate      Blood culture (site 1) [[id number]]  (Abnormal) Collected: 09/14/22 0320    Order Status: Completed Specimen: Blood Updated: 09/17/22 0946     Blood Culture, Routine Gram stain aer bottle: Gram positive cocci in clusters resembling Staph      Results called to and read back by: Poncho Brunson RN  09/15/2022      12:31      COAGULASE-NEGATIVE STAPHYLOCOCCUS SPECIES  Organism is a probable contaminant      Narrative:      Site # 1, aerobic and anaerobic            Pathology:  9/1 colonoscopy:  1. ASCENDING COLON, POLYPECTOMY:   - TUBULAR ADENOMA.   2. CECUM, POLYPECTOMY:   - TUBULAR ADENOMA.   3. CECUM, POLYPECTOMY:   - TUBULAR ADENOMA.     Imaging Reviewed:  CXR clear  CT abdomen/pelvis 9/20: Postoperative abdomen.  Deep abdominal fluid collection, compatible with abscess.  The collection is not excessive Espinoza percutaneously. Mildly dilated small bowel compatible with ileus.  Nasogastric tube in the distal stomach.     CT abdomen/pelvis 9/14:  1. 12 cm  necrotic mass or abscess in the abdomen, with fistula formation to several adjacent small bowel loops.  2. Prostatomegaly necessitating correlation with PSA.    Cardiology: ECHO 9/15/22:  The left ventricle is normal in size with concentric remodeling and normal systolic function.  The estimated ejection fraction is 60%.  Grade I left ventricular diastolic dysfunction.  Normal right ventricular size with normal right ventricular systolic function.  Mild left atrial enlargement.  Mild mitral regurgitation.  Mild to moderate tricuspid regurgitation.  Normal central venous pressure (3 mmHg).  The estimated PA systolic pressure is 49 mmHg.  There is pulmonary hypertension.  Mild right atrial enlargement.  No vegetations were seen       IMPRESSION & PLAN     Severe sepsis resolved, secondary to perforated necrotic small bowel mass with abscess status post ex lap/drainage/small-bowel resection with anastomosis 9/14 in the setting of DLBCL       Blood cultures 1/4 bottles grew CoNs, likely a contaminant            Repeat blood cultures x 2 no growth            OR cultures Enterobacter (intermediate to cefepime and to Zosyn) and Proteus mirabilis sensitive to Merrem    Remaining 10 x 7.5 by 3.5 cm abscess--status post IND  09/23/2022.  It did not look infected, it looked like an abscess to surgeon.  It looked more like friable lymph node/material.            AFP, Ca-19, CEA negative and PSA 0.9 normal  Ileus - post-op; NG tube repositioned, passing gas since 9/19  DLBCL- diagnosed by path 09/14  PMHx: diabetes and HLD    Recommendations:  -- Continue Meropenem 1g IV q8h for now,   -- s/p BMBx on 09/22, oncologist following  --- PICC placed on 09/22/2022  -- follow cultures obtained intraop.  --please improve nutrition    D/w surgeon through secure message    Medical Decision Making during this encounter was  [_] Low Complexity  [_] Moderate Complexity  [xx] High Complexity

## 2022-09-23 NOTE — PLAN OF CARE
Problem: Adult Inpatient Plan of Care  Goal: Plan of Care Review  Outcome: Ongoing, Progressing  Goal: Patient-Specific Goal (Individualized)  Outcome: Ongoing, Progressing  Goal: Absence of Hospital-Acquired Illness or Injury  Outcome: Ongoing, Progressing  Goal: Optimal Comfort and Wellbeing  Outcome: Ongoing, Progressing  Goal: Readiness for Transition of Care  Outcome: Ongoing, Progressing     Problem: Oral Intake Inadequate  Goal: Improved Oral Intake  Outcome: Ongoing, Progressing   Patient alert and oriented. Patient has undergone procedure and returned from surgery with a JAVAN drain. Patient is to have clear liquids today and advance as tolerated tomorrow. Patient denies any pain.  Dressing on abdomen clean, dry, and intact.  Dermabond in place to lap sites.

## 2022-09-23 NOTE — ASSESSMENT & PLAN NOTE
Was present on hospital day 1.  Due to mass, which was resected shortly after admission.  Perforation is now resolved.

## 2022-09-23 NOTE — PT/OT/SLP PROGRESS
Occupational Therapy      Patient Name:  Dwight Hoffmann   MRN:  [id number]    Patient not seen today secondary to Off the floor for procedure/surgery. Will follow-up.    9/23/2022

## 2022-09-23 NOTE — NURSING
Pt tolerated all meals today.  No c/o pain/nausea/vomiting all shift.  Pt received a PICC line today to [facility].  Pt did not work with PT today d/t biopsy site to back.  No other needs at this time.  Will continue to monitor.

## 2022-09-23 NOTE — OP NOTE
DATE OF PROCEDURE: 09/23/2022    PREOPERATIVE DIAGNOSIS: B-cell lymphoma with small bowel perforation status post resection, intra-abdominal abscess    POSTOPERATIVE DIAGNOSIS: Same    PROCEDURE: Diagnostic laparoscopy with washout intra-abdominal fluid collection    SURGEON: Jan Oliveira M.D    ASSISTANT: None    ANESTHESIA: General    ESTIMATED BLOOD LOSS: Minimal    SPECIMEN: Peritoneal fluid for culture    CONDITION: Stable    COMPLICATIONS: None    FINDINGS:   1. No large abscess or fluid collection encountered, instead area of necrotic lymph node at the mesentery was present with some fluid, cultures taken and drain left     INDICATIONS: The patient is a 52-year-old male presented with a small-bowel mass versus perforation.  Underwent open resection which time small bowel was found to be perforated with a contained abscess. The area was resected and reanastomosed. Had prolonged ileus which subsequently resolved.  Pathology came back as B-cell lymphoma.  Suspect that lymphoma cause ischemia to the small bowel which subsequently perforated. Repeat imaging of the abdomen showed concerns for abscess. Drainage was indicated for source control and to prevent delay of chemotherapy.    PROCEDURE IN DETAIL: Patient taken operating room placed in supine position where general endotracheal intubation was achieved.  He was on scheduled antibiotics.  His abdomen was prepped and draped typical sterile fashion.  Time-out performed by members of the operative team. We inserted needle at romero's point attached insufflation. We had inappropriate high pressures in the needle was removed and reinserted.  We again had high pressures therefore was inserted in the right upper quadrant. We had appropriate pressures and pneumoperitoneum was achieved. We inserted a 5 mm Optiview trocar in the right upper lateral abdomen.  Our Veress site was inspected it was entering into omentum but did not cause obvious injury.  There were  dense adhesions under the left upper abdomen were previous Veress was inserted. There were adhesions of small bowel and omentum to the midline from previous laparotomy. We inserted 2 additional 5 mm trocars in the right mid lateral and right lower lateral abdomen after injecting local anesthetic. We then began lysis of adhesions taken down the small bowel and omental adhesions.  This required only blunt dissection.  In the left upper quadrant, where previous Veress was inserted, omentum was present and no bowel. No obvious injury was identified. We then ran the small bowel as best we could.  There were some adhesions.  The location of the previous small-bowel resection and area of concern for abscess there was large necrotic mass at the mesentery consistent with his known diagnosis of B-cell lymphoma.  There was some turbid fluid but no gross abscess or large fluid collection.  We did take cultures. Examined the remainder of the pelvis and lower abdomen for fluid collection or abscess, none which were identified. At this point I irrigated the area of necrotic lymph nodes in the mesentery and placed in 19 [place] round drain.  This was brought out through our left lower lateral port site incision and secured to the skin. We then desufflated the abdomen pneumoperitoneum and removed our trocars.  Skin incisions were closed with 4-0 Monocryl subcuticular stitches and Dermabond.  Patient was aroused from sedation, extubated taken to the recovery room in stable condition having suffered no complications.  All counts were correct x2 at the end of the case.  I was present scrubbed throughout all operative portions of the case.    DISPO: PACU

## 2022-09-23 NOTE — PLAN OF CARE
Patient progressing towards discharge.    Patient had no acute events noted. IV antibiotics continued per MD order.  Patient currently NPO pending surgery later today.  Patient voiding per urinal.  Discussed POC with patient and family with verbalization of understanding.    Will continue to monitor.

## 2022-09-23 NOTE — ASSESSMENT & PLAN NOTE
Due to small bowel perforation, which was present on day 1 of admission.  CT abdomen reviewed.  It's not approachable by interventional radiology.  The plan is for the general surgeon to perform laparoscopy tomorrow to drain the abscess.  Continue IVAB.

## 2022-09-23 NOTE — PT/OT/SLP PROGRESS
"Physical Therapy Treatment    Patient Name:  Dwight Hoffmann   MRN:  [id number]    Recommendations:     Discharge Recommendations:  home   Discharge Equipment Recommendations:  (currently requires rolling walker)   Barriers to discharge: None    Assessment:     Dwight Hoffmann is a 52 y.o. male admitted with a medical diagnosis of Small bowel mass.  He presents with the following impairments/functional limitations:  weakness, impaired endurance, impaired self care skills, impaired functional mobility, gait instability, pain.  Pt reports 3-4/10 pain in back at incision site from biopsy previous day and declines sitting in chair. Ambulated 250' x 2 laps with RW, SBA, and appropriate pace. No LOB or c/o significant fatigue but declined additional lap. Returned to supine with SBA. Anticipates OR today. Wife present for session.     Rehab Prognosis: Good; patient would benefit from acute skilled PT services to address these deficits and reach maximum level of function.    Recent Surgery: Procedure(s) (LRB):  DRAINAGE, ABDOMEN, LAPAROSCOPIC (N/A) Day of Surgery    Plan:     During this hospitalization, patient to be seen daily to address the identified rehab impairments via gait training, therapeutic activities, therapeutic exercises and progress toward the following goals:    Plan of Care Expires:  09/30/22    Subjective     Chief Complaint: "This room is freezing cold"  Patient/Family Comments/goals: eat after surgery  Pain/Comfort:  Pain Rating 1: 4/10  Location - Side 1: Bilateral  Location - Orientation 1: lower  Location 1: back (at incision site)  Pain Addressed 1: Reposition, Distraction, Cessation of Activity      Objective:     Communicated with nurse Morales prior to session.  Patient found HOB elevated with bed alarm, telemetry, peripheral IV upon PT entry to room.     General Precautions: Standard, fall   Orthopedic Precautions:N/A   Braces: N/A  Respiratory Status: Room air     Functional Mobility:  Bed " Mobility:     Supine to Sit: stand by assistance  Sit to Supine: stand by assistance  Transfers:     Sit to Stand:  stand by assistance with rolling walker  Gait: 250' x 2 laps, RW, SBA; no LOB or c/o SOB or fatigue       AM-PAC 6 CLICK MOBILITY          Therapeutic Activities and Exercises:      Patient left HOB elevated with all lines intact, call button in reach, nurse notified, and wife present..    GOALS:   Multidisciplinary Problems       Physical Therapy Goals          Problem: Physical Therapy    Goal Priority Disciplines Outcome Goal Variances Interventions   Physical Therapy Goal     PT, PT/OT Ongoing, Progressing     Description: Goals to be met by: 2022     Patient will increase functional independence with mobility by performin). Supine to sit with Modified [place]  2). Sit to supine with Modified [place]  3). Sit to stand transfer with Modified [place]  4). Gait  x > 250 feet with Modified [place]                          Time Tracking:     PT Received On: 22  PT Start Time: 1004     PT Stop Time: 1018  PT Total Time (min): 14 min     Billable Minutes: Therapeutic Exercise 14    Treatment Type: Treatment  PT/PTA: PTA     PTA Visit Number: 4     2022

## 2022-09-23 NOTE — CARE UPDATE
09/22/22 2005   Patient Assessment/Suction   Level of Consciousness (AVPU) alert   Respiratory Effort Normal;Unlabored   Expansion/Accessory Muscles/Retractions no retractions;no use of accessory muscles   All Lung Fields Breath Sounds clear   Rhythm/Pattern, Respiratory depth regular;pattern regular   Cough Frequency no cough   PRE-TX-O2   O2 Device (Oxygen Therapy) room air   SpO2 100 %   Pulse Oximetry Type Intermittent   Incentive Spirometer   $ Incentive Spirometer Charges done with encouragement   Administration (IS) proper technique demonstrated   Number of Repetitions (IS) 10   Level Incentive Spirometer (mL) 2000   Patient Tolerance (IS) good;no adverse signs/symptoms present

## 2022-09-23 NOTE — PLAN OF CARE
Problem: Adult Inpatient Plan of Care  Goal: Plan of Care Review  Outcome: Ongoing, Progressing  Goal: Absence of Hospital-Acquired Illness or Injury  Outcome: Ongoing, Progressing  Goal: Optimal Comfort and Wellbeing  Outcome: Ongoing, Progressing     Problem: Oral Intake Inadequate  Goal: Improved Oral Intake  Outcome: Ongoing, Progressing    Pt's POC has been reviewed with pt.  Pt had biopsy of bone marrow today at 10am.  Pt had picc to RUE placed today for long term IV ABTs.  Pt to discharge soon, possibly tomorrow.

## 2022-09-23 NOTE — PROGRESS NOTES
"[facility] Medicine  Progress Note    Patient Name: Dwight Hoffmann  MRN: [id number]  Patient Class: IP- Inpatient   Admission Date: 9/13/2022  Length of Stay: 8 days  Attending Physician: Palomo Lewis MD  Primary Care Provider: BOSTON Owsald        Subjective:     Principal Problem:Small bowel mass        HPI:  Mr. Hoffmann is a 52 year old male with a history of NIDDM2 and HLD who presents today with complaints of night sweats for weeks. It is moderate. It is associated with 25 lb wt loss over the last 2 months, urinary hesitancy, urinary frequency, occasional blood streaked stools, weakness, fatigue, neck pain, knee pain, and pelvic pain. He denies measured fever, chills, N/V/D, chest pain, or SOB. He was seen by his primary last month about the blood streaked stools and referred to Dr. Clemens for colonoscopy on 9/1 with multiple polypectomies with path report of tubular adenomas. In the ED, labs revealed microcytic anemia, he was mildly tachycardic on arrival  which improved spontaneously, /60,  and CT abd/pelvis revealing: "Thick-walled collection containing feculent material, widely communicating with small bowel, seen centrally in the pelvis. Findings are suspicious for necrotic mass or abscess arising from the small bowel" Findings were discussed with Dr. Oliveira by ED MD who agreed to see patient in the morning.       Overview/Hospital Course:  Dwight Hoffmann is a 52 year old male with a past medical history of DM, HLD and anemia who presented with lower abdominal pain, lower back pain, and difficulty urinating secondary to an abdominal mass discovered on CT scan. Surgery was consulted and performed exploratory laparotomy with resection of the mass and anastomosis of small bowel. The mass appears to be an abscess with surrounding necrosis. Pathology is currently pending. The patient was treated with IV antibiotics. An NG tube to LIWS suction is in place " and the patient is on IV fluids while he is NPO. He did have a positive blood culture which showed coagulase negative Staph. He was briefly on vancomycin which was discontinued. ID is following along.  Patient is currently on meropenem.  Cultures from surgery with Enterobacter cloacae and Proteus mirabilis.  Still awaiting return of bowel function.  PPN started 9/19.      Interval History:   he underwent bone marrow biopsy this morning.  He has no new complaints.  Currently undergoing PICC insertion.    Review of Systems   Constitutional:  Negative for chills and fever.   Respiratory:  Negative for cough, shortness of breath and wheezing.    Cardiovascular:  Negative for chest pain and leg swelling.   Gastrointestinal:  Positive for abdominal pain. Negative for abdominal distention and nausea.   Objective:     Vital Signs (Most Recent):  Temp: 98.5 °F (36.9 °C) (09/22/22 1959)  Pulse: 81 (09/22/22 1959)  Resp: 17 (09/22/22 1959)  BP: 133/71 (09/22/22 1959)  SpO2: 99 % (09/22/22 1959)   Vital Signs (24h Range):  Temp:  [97.5 °F (36.4 °C)-98.6 °F (37 °C)] 98.5 °F (36.9 °C)  Pulse:  [] 81  Resp:  [17-25] 17  SpO2:  [97 %-100 %] 99 %  BP: (123-155)/(71-87) 133/71     Weight: 104.6 kg (230 lb 9.6 oz)  Body mass index is 30.42 kg/m².    Intake/Output Summary (Last 24 hours) at 9/22/2022 2055  Last data filed at 9/22/2022 1815  Gross per 24 hour   Intake 2291.51 ml   Output 1100 ml   Net 1191.51 ml        Physical Exam  Vitals reviewed.   Constitutional:       General: He is not in acute distress.     Appearance: He is not diaphoretic.   HENT:      Mouth/Throat:      Mouth: Mucous membranes are moist.   Eyes:      General: No scleral icterus.        Right eye: No discharge.         Left eye: No discharge.   Neck:      Vascular: No JVD.   Cardiovascular:      Rate and Rhythm: Normal rate and regular rhythm.   Pulmonary:      Effort: Pulmonary effort is normal.      Breath sounds: Normal breath sounds.   Abdominal:       General: Bowel sounds are normal. There is distension (mild).      Palpations: Abdomen is soft.      Tenderness: There is no abdominal tenderness.      Comments: Incision looks clean.  Staples in place.   Skin:     General: Skin is warm.      Findings: No rash.   Neurological:      Mental Status: He is alert.       Significant Labs: All pertinent labs within the past 24 hours have been reviewed.    Significant Imaging: I have reviewed all pertinent imaging results/findings within the past 24 hours.      Assessment/Plan:      * Small bowel mass  S/p resection 9/14 per Dr. Oliveira.  Preliminary pathological diagnosis is diffuse B cell lymphoma.  Patient has been informed by me and by the surgeon.  We're consulting with oncology for treatment recommendations.  Bone marrow biopsy done today for staging purposes.    Intra-abdominal abscess  Due to small bowel perforation, which was present on day 1 of admission.  CT abdomen reviewed.  It's not approachable by interventional radiology.  The plan is for the general surgeon to perform laparoscopy tomorrow to drain the abscess.  Continue IVAB.    Small bowel perforation  Was present on hospital day 1.  Due to mass, which was resected shortly after admission.  Perforation is now resolved.    Moderate malnutrition  Pt consuming roughly 30% of meals.  Will interrupt PPN today and see how much he consumes orally.  Dietician following.      Arthralgia of bilat knees and neck  Chronic.  Continue analgesics as needed.    Diabetes mellitus type 2, noninsulin dependent  Patient's FSGs are controlled on current medication regimen.  Last A1c reviewed-   Lab Results   Component Value Date    HGBA1C 11.0 (H) 12/30/2021     Most recent fingerstick glucose reviewed-   Recent Labs   Lab 09/22/22  0027 09/22/22  1136   POCTGLUCOSE 115* 162*     Current correctional scale  Low  Maintain anti-hyperglycemic dose as follows-   Antihyperglycemics (From admission, onward)    Start     Stop  Route Frequency Ordered    09/14/22 0344  insulin aspart U-100 pen 0-5 Units         -- SubQ Before meals & nightly PRN 09/14/22 0246        Hold Oral hypoglycemics while patient is in the hospital.      Anemia, chronic disease  Monitor HGB.    Lab Results   Component Value Date    HGB 9.2 (L) 09/22/2022             VTE Risk Mitigation (From admission, onward)         Ordered     enoxaparin injection 40 mg  Daily         09/14/22 1009     IP VTE HIGH RISK PATIENT  Once         09/14/22 0246     Place sequential compression device  Until discontinued         09/14/22 0246                Discharge Planning   CATRACHO: 9/26/2022     Code Status: Full Code   Is the patient medically ready for discharge?:     Reason for patient still in hospital (select all that apply): Patient trending condition and Treatment  Discharge Plan A: Home with family                  Palomo Lewis MD  Department of Hospital Medicine   [facility]

## 2022-09-23 NOTE — PLAN OF CARE
Problem: Physical Therapy  Goal: Physical Therapy Goal  Description: Goals to be met by: 2022     Patient will increase functional independence with mobility by performin). Supine to sit with Modified [place]  2). Sit to supine with Modified [place]  3). Sit to stand transfer with Modified [place]  4). Gait  x > 250 feet with Modified [place]     Outcome: Ongoing, Progressing     Ambulated 250' x 2 laps with RW and SBA. Will continue to progress patient toward physical therapy goals.

## 2022-09-23 NOTE — ANESTHESIA POSTPROCEDURE EVALUATION
Anesthesia Post Evaluation    Patient: Dwight Hoffmann    Procedure(s) Performed: Procedure(s) (LRB):  DRAINAGE, ABDOMEN, LAPAROSCOPIC (N/A)    Final Anesthesia Type: general      Patient location during evaluation: PACU  Patient participation: Yes- Able to Participate  Level of consciousness: awake and alert and oriented  Post-procedure vital signs: reviewed and stable  Pain management: adequate  Airway patency: patent  SAE mitigation strategies: Multimodal analgesia, Extubation while patient is awake, Verification of full reversal of neuromuscular block and Extubation and recovery carried out in lateral, semiupright, or other nonsupine position  PONV status at discharge: No PONV  Anesthetic complications: no      Cardiovascular status: blood pressure returned to baseline  Respiratory status: unassisted, spontaneous ventilation and room air  Hydration status: euvolemic  Follow-up not needed.          Vitals Value Taken Time   /79 09/23/22 1430   Temp 37.2 °C (99 °F) 09/23/22 1345   Pulse 68 09/23/22 1430   Resp 24 09/23/22 1430   SpO2 97 % 09/23/22 1430   Vitals shown include unvalidated device data.      No case tracking events are documented in the log.      Pain/Jt Score: Pain Rating Prior to Med Admin: 0 (9/23/2022  2:00 PM)  Jt Score: 10 (9/23/2022  2:00 PM)

## 2022-09-24 NOTE — ASSESSMENT & PLAN NOTE
Patient's FSGs are controlled on current medication regimen.  Last A1c reviewed-   Lab Results   Component Value Date    HGBA1C 11.0 (H) 12/30/2021     Most recent fingerstick glucose reviewed-   Recent Labs   Lab 09/22/22  2217 09/23/22  1115 09/23/22  1716   POCTGLUCOSE 110 119* 144*     Current correctional scale  Low  Maintain anti-hyperglycemic dose as follows-   Antihyperglycemics (From admission, onward)    Start     Stop Route Frequency Ordered    09/14/22 0344  insulin aspart U-100 pen 0-5 Units         -- SubQ Before meals & nightly PRN 09/14/22 0246        Hold Oral hypoglycemics while patient is in the hospital.

## 2022-09-24 NOTE — PROGRESS NOTES
Progress Note  Infectious Disease    Reason for Consult:  GPC bacteremia    HPI: Dwight Hoffmann is a 52 y.o. male very pleasant, with past medical history of diabetes, HLD who presented 9/14 complaining of night sweats, unintentional 30 lb weight loss for the last couple of months.  Symptoms worsened by severe abdominal pain, urinary hesitancy, dysuria, increased urinary frequency, occasional bloody stool, with associated weakness, fatigue, and generalized pain.  He denies fever or chills, no nausea or vomiting, no chest pain or shortness of breath.  Patient was seen by primary care last month due to melena, refer to GI, colonoscopy done on 09/01 were 3 polyps were resected, pathology report consistent with tubular adenomas.      In the ER, blood pressure 113/69, T-max a 100.9°   Labs on admission white count 11.3, monocytic predominance 20%, bands 2%, H&H 9.6/28.5, MCV 74, microcytic anemia, platelet count 395   Normal kidney and liver function.    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 Prostatomegaly necessitating correlation with PSA.    Seen by [facility] surgery, status post ex lap for small bowel obstruction in the setting of necrotic large colonic mass with fistula formation, washout, and colon anastomosis.    Empirically started on Zosyn.  Switched to vancomycin, cefepime, Flagyl on 9/15.    ID consult for Gram-positive cocci in 1/2 bottles.    INTERVAL HISTORY:  9/16: Interim reviewed, patient seen and examined at bedside, anxious regarding clinical condition. Hemodynamically stable, afebrile in the last 24h. States he is burping, not passing gas since yesterday. Dysuria and hesitancy are improved. Labs reviewed, leukocytosis down to 12.8, no left shift, normal monocyte count. H/H 8.1/24.8, MCV: 76, plt: 482. Normal  electrolytes, liver and kidney function. AFP and PSA negative. Micro reviewed, OR cultures GNR, ID and sensitivities pending, blood cultures 1/4 bottles CoNS likely a contaminant. Repeat blood culture x 2 no growth to date, pending final.     9/17 (Nanette):  Case discussed with Dr. Mooney.  NG tube was found to be coiled in his mouth last night and was removed not replaced secondary to patient refusal.  AMA was signed by patient but subsequently the tube was replaced and is draining bilious fluid.  KUB reviewed. No flatus yet.  He is afebrile, white blood cells 12,800.  Abscess cultures reviewed with Enterobacter, not very sensitive, and a pansensitive Proteus.  CEA, CA 19 9, alpha fetoprotein, PSA all negative.  9/18: interim reviewed. Afebrile. Continues to require the NGT for slow return of bowel function. No new micro data. He has not had any pain medication since yesterday. Walking in the [place].     9/19 (Vinicio): Interim reviewed, discussed with Dr Fitzpatrick. Patient seen and examined at bedside, after session with PT. States his hungry, and is anxious regarding NG tube and not being able to pass gas. Hemodynamically stable, afebrile. Labs reviewed, stable WBC, no left shift, H/H 8.1/25.5, plt: 633. Stable kidney and liver function tests. No new micro data. Awaiting pathology report.     9/20: Interim reviewed, patient seen and examined at bedside. Discussed with Surgery yesterday, NG tube repositioned, patient is finally passing gas, output from NG tube decreased. Hemodynamically stable, afebrile. Labs reviewed, stable WBC, no left shift. H/H 8.4/25.9. plt: 735, likely reactive thrombocytosis. Stable kidney and liver function. Micro reviewed, repeat blood cultures  no growth. Path lab called for report, Pathologist to call with prelim read.    10 x 7.5 by 3.5 cm,     09/21/2022 No new complains, abd dyscomfort expected, but no pain  Afebrile. Going for   Bone marrow Bx tomorrow  Night sweats have resolved    09/22/2022  Ct chest done for staging- neg  Bone marrow Bx  done today  Patietn if feeling better in general. Multiple consultants are discussing best option to tx  09/23/2022.  Patient is in OR.  Later on general surgery communicated with myself and other providers that there was no obvious abscess but is still send cultures, which I am grateful.    09/24/2022.  Intraop findings of yesterday are reassuring.  T-max 99°.  Afebrile now.  He still did not have a bowel movement, he has good bowel sounds.  No nausea vomiting burping.  He has a good appetite today.  The drain has just thin bloody liquid, no pus.  Surgical wound is covered and sealed.      EXAM & DIAGNOSTICS REVIEWED:   Vitals:     Temp:  [97.5 °F (36.4 °C)-99 °F (37.2 °C)]   Temp: 98.1 °F (36.7 °C) (09/24/22 0400)  Pulse: 62 (09/24/22 0400)  Resp: 16 (09/24/22 0400)  BP: 121/70 (09/24/22 0400)  SpO2: 97 % (09/24/22 0400)    Intake/Output Summary (Last 24 hours) at 9/24/2022 0649  Last data filed at 9/24/2022 0600  Gross per 24 hour   Intake 510.3 ml   Output 1085 ml   Net -574.7 ml     General:         In NAD. Alert and attentive, cooperative, comfortable  Eyes:               Anicteric, EOMI  ENT:                no ulcers, exudates, thrush, nares patent, dentition is good  Neck:              Supple  Lungs:            Clear to auscultation b/l  Heart:              S1/S2+, regular rhythm, no murmurs  Abd:                S/p Ex-lap,. Dressing in place, excellent bowel sounds   :                  Voids, urine clear  Musc:              Joints without effusion, swelling,  erythema, synovitis, ambulatory  Skin:               Warm, no rash  Wound:          See pictures  Neuro:             Following commands, no acute focal deficit   Psych:             Calm, cooperative  Lymphatic:       Extrem:           Left arm edema due to IV infiltration- improved  VAD:               Right PICC 09/22/2022 09/24/2022   Ex-lap,  wound covered;   drain in place  09/22/2022    Sx wound healing    9/14:        General Labs reviewed:  Recent Labs   Lab 09/22/22  0452 09/23/22  0438 09/24/22  0507   WBC 10.75 8.69 9.36   HGB 9.2* 8.5* 8.2*   HCT 29.2* 26.9* 25.7*   * 696* 653*       Recent Labs   Lab 09/22/22  0452 09/23/22  0438 09/24/22  0507    137 137   K 3.7 4.0 4.4    106 104   CO2 25 24 25   BUN 12 14 14   CREATININE 0.8 0.8 0.8   CALCIUM 8.4* 8.5* 8.7   PROT 6.6 6.9 6.9   BILITOT 0.8 0.7 0.7   ALKPHOS 103 110 99   ALT 28 35 25   AST 46* 37 19     Recent Labs   Lab 09/24/22  0507   CRP 55.8*     No results for input(s): SEDRATE in the last 168 hours.    Estimated Creatinine Clearance: 127.1 mL/min (based on SCr of 0.8 mg/dL).     Micro:  Microbiology Results (last 7 days)       Procedure Component Value Units Date/Time    Aerobic culture [[id number]] Collected: 09/23/22 1312    Order Status: Sent Specimen: Abdominal from Abdomen Updated: 09/24/22 0007    Culture, Anaerobe [[id number]] Collected: 09/23/22 1312    Order Status: Sent Specimen: Body Fluid from Abdomen Updated: 09/24/22 0007    Blood culture [[id number]] Collected: 09/15/22 1722    Order Status: Completed Specimen: Blood from Antecubital, Right Updated: 09/21/22 0612     Blood Culture, Routine No growth after 5 days.    Blood culture [[id number]] Collected: 09/15/22 1819    Order Status: Completed Specimen: Blood from Antecubital, Right Updated: 09/21/22 0612     Blood Culture, Routine No growth after 5 days.    Blood culture (site 2) [[id number]] Collected: 09/14/22 0315    Order Status: Completed Specimen: Blood Updated: 09/19/22 1212     Blood Culture, Routine No growth after 5 days.    Narrative:      Site # 2, aerobic only    Culture, Anaerobe [[id number]] Collected: 09/14/22 1525    Order Status: Completed Specimen: Abscess from Abdomen Updated: 09/19/22 0717     Anaerobic Culture No anaerobes isolated    Aerobic culture [[id number]]  (Abnormal)  (Susceptibility) Collected: 09/14/22 1525    Order  Status: Completed Specimen: Abscess from Abdomen Updated: 09/17/22 1320     Aerobic Bacterial Culture ENTEROBACTER CLOACAE  Few        PROTEUS MIRABILIS  Few  No other significant isolate      Blood culture (site 1) [[id number]]  (Abnormal) Collected: 09/14/22 0320    Order Status: Completed Specimen: Blood Updated: 09/17/22 0946     Blood Culture, Routine Gram stain aer bottle: Gram positive cocci in clusters resembling Staph      Results called to and read back by: Poncho Brunson RN  09/15/2022      12:31      COAGULASE-NEGATIVE STAPHYLOCOCCUS SPECIES  Organism is a probable contaminant      Narrative:      Site # 1, aerobic and anaerobic           Enterobacter cloacae Proteus mirabilis    CULTURE, AEROBIC  (SPECIFY SOURCE) CULTURE, AEROBIC  (SPECIFY SOURCE)   Amox/K Clav'ate   <=8/4 mcg/mL Sensitive   Amp/Sulbactam   <=8/4 mcg/mL Sensitive   Ampicillin   <=8 mcg/mL Sensitive   Cefazolin   <=2 mcg/mL Sensitive   Cefepime 16 mcg/mL Intermediate <=2 mcg/mL Sensitive   Ceftriaxone >32 mcg/mL Resistant <=1 mcg/mL Sensitive   Ciprofloxacin <=1 mcg/mL Sensitive <=1 mcg/mL Sensitive   Ertapenem <=0.5 mcg/mL Sensitive <=0.5 mcg/mL Sensitive   Gentamicin <=4 mcg/mL Sensitive <=4 mcg/mL Sensitive   Levofloxacin <=2 mcg/mL Sensitive <=2 mcg/mL Sensitive   Meropenem <=1 mcg/mL Sensitive <=1 mcg/mL Sensitive   Piperacillin/Tazo 64 mcg/mL Intermediate <=16 mcg/mL Sensitive   Tetracycline <=4 mcg/mL Sensitive     Tobramycin <=4 mcg/mL Sensitive <=4 mcg/mL Sensitive   Trimeth/Sulfa <=2/38 mcg/mL Sensitive <=2/38 mcg/mL Sensitive     Pathology:  9/1 colonoscopy:  1. ASCENDING COLON, POLYPECTOMY:   - TUBULAR ADENOMA.   2. CECUM, POLYPECTOMY:   - TUBULAR ADENOMA.   3. CECUM, POLYPECTOMY:   - TUBULAR ADENOMA.     Imaging Reviewed:  CXR clear  CT abdomen/pelvis 9/20: Postoperative abdomen.  Deep abdominal fluid collection, compatible with abscess.  The collection is not excessive Espinoza percutaneously. Mildly dilated small bowel  compatible with ileus.  Nasogastric tube in the distal stomach.     CT abdomen/pelvis 9/14:  1. 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  2. Prostatomegaly necessitating correlation with PSA.    Cardiology: ECHO 9/15/22:  The left ventricle is normal in size with concentric remodeling and normal systolic function.  The estimated ejection fraction is 60%.  Grade I left ventricular diastolic dysfunction.  Normal right ventricular size with normal right ventricular systolic function.  Mild left atrial enlargement.  Mild mitral regurgitation.  Mild to moderate tricuspid regurgitation.  Normal central venous pressure (3 mmHg).  The estimated PA systolic pressure is 49 mmHg.  There is pulmonary hypertension.  Mild right atrial enlargement.  No vegetations were seen       IMPRESSION & PLAN     Severe sepsis resolved, secondary to perforated necrotic small bowel mass with abscess status post ex lap/drainage/small-bowel resection with anastomosis 9/14 in the setting of DLBCL (poss)      Blood cultures 1/4 bottles grew CoNs, likely a contaminant            Repeat blood cultures x 2 no growth            OR cultures Enterobacter (intermediate to cefepime and to Zosyn) and Proteus mirabilis sensitive to Merrem    Remaining 10 x 7.5 by 3.5 cm abscess--status post IND  09/23/2022.  It did not look infected, it did not look like an abscess to surgeon.  It looked more like friable lymph node/material.            AFP, Ca-19, CEA negative and PSA 0.9 normal  Ileus - post-op; NG tube repositioned, passing gas since 9/19  DLBCL- diagnosed by path 09/14  PMHx: diabetes and HLD    Recommendations:  --- PICC placed on 09/22/2022  -- Continue Meropenem 1g IV q8h for now,   -- follow cultures obtained intraop on 09/23, to make sure that there are no new organisms and nothing so surprising.  -- plan at home:   Ertapenem 1 g IV q.day times 14 days   Home health for Weekly labs  times for CMP, CBC, ESR, CRP.    CT scan abdomen on October 3, 2022.  Follow-up with me in ID clinic in 7 days  -- he still needs to have a bowel movement before getting discharged.  -- s/p BMBx on 09/22, oncologist following.  I sent a message to oncologist asking if she will need the PICC line after I am done with the IV antibiotics.  --please improve nutrition, aware.  Wife is very attentive and knowledgeable about plan.        Medical Decision Making during this encounter was  [_] Low Complexity  [_] Moderate Complexity  [xx] High Complexity

## 2022-09-24 NOTE — PT/OT/SLP PROGRESS
Physical Therapy Treatment    Patient Name:  Dwight Hoffmann   MRN:  [id number]    Recommendations:     Discharge Recommendations:  home   Discharge Equipment Recommendations: walker, rolling   Barriers to discharge: None    Assessment:     Dwight Hoffmann is a 52 y.o. male admitted with a medical diagnosis of Small bowel mass.  He presents with the following impairments/functional limitations:  weakness, impaired endurance, impaired functional mobility, gait instability, pain. Pt is agreeable to participate in PT treatment. Pt ambulated 250ft x3 using RW with SBA. Pt returned to bed with all needs met, call light in reach, nurse notified.     Rehab Prognosis: Good; patient would benefit from acute skilled PT services to address these deficits and reach maximum level of function.    Recent Surgery: Procedure(s) (LRB):  DRAINAGE, ABDOMEN, LAPAROSCOPIC (N/A) 1 Day Post-Op    Plan:     During this hospitalization, patient to be seen daily to address the identified rehab impairments via gait training, therapeutic activities, therapeutic exercises and progress toward the following goals:    Plan of Care Expires:  09/30/22    Subjective     Chief Complaint: abdominal soreness from procedure  Patient/Family Comments/goals: maximize functional mobility and independence prior to discharge.  Pain/Comfort:  Pain Rating 1: 4/10  Location - Side 1:  (abdomen)  Location - Orientation 1: midline  Pain Addressed 1: Cessation of Activity      Objective:     Communicated with nurse Hdz prior to session.  Patient found HOB elevated with JAVAN drain upon PT entry to room.     General Precautions: Standard, fall   Orthopedic Precautions:N/A   Braces:    Respiratory Status: Room air     Functional Mobility:  Bed Mobility:     Supine to Sit: independence  Gait: 250ft x3 using RW with SBA       AM-PAC 6 CLICK MOBILITY          Therapeutic Activities and Exercises:   Gait: 250ft x3 using RW with SBA     Patient left HOB elevated with all lines  intact, call button in reach, and nurse notified..    GOALS:   Multidisciplinary Problems       Physical Therapy Goals          Problem: Physical Therapy    Goal Priority Disciplines Outcome Goal Variances Interventions   Physical Therapy Goal     PT, PT/OT Ongoing, Progressing     Description: Goals to be met by: 2022     Patient will increase functional independence with mobility by performin). Supine to sit with Modified [place]  2). Sit to supine with Modified [place]  3). Sit to stand transfer with Modified [place]  4). Gait  x > 250 feet with Modified [place]                          Time Tracking:     PT Received On: 22  PT Start Time: 1145     PT Stop Time: 1200  PT Total Time (min): 15 min     Billable Minutes: Gait Training 1    Treatment Type: Treatment  PT/PTA: PT     PTA Visit Number: 4     2022

## 2022-09-24 NOTE — PLAN OF CARE
Called to follow up on home infusion referral. Spoke to on call nurse, Galdino. States he did not receive referral and provided fax number [phone]. CM faxed to requested number. Per Galdino, he will review and get back with me- will plan on seeing pt in AM       09/24/22 2790   Post-Acute Status   Post-Acute Authorization IV Infusion   IV Infusion Status Referral(s) sent

## 2022-09-24 NOTE — ASSESSMENT & PLAN NOTE
Monitor HGB.  Transfuse as needed    Lab Results   Component Value Date    HGB 8.2 (L) 09/24/2022

## 2022-09-24 NOTE — SUBJECTIVE & OBJECTIVE
Interval History: Patient seen and examined. VELMA Went back to OR 9/23 for ex-laparoscopy for concern of abscess but more resembled necrotic lymph tissue per op note, cultures taken as well. Pain controlled.       Review of Systems   Constitutional:  Negative for chills and fever.   Respiratory:  Negative for cough, shortness of breath and wheezing.    Cardiovascular:  Negative for chest pain and leg swelling.   Gastrointestinal:  Positive for abdominal pain and constipation. Negative for abdominal distention and nausea.   Objective:     Vital Signs (Most Recent):  Temp: 97.9 °F (36.6 °C) (09/24/22 1136)  Pulse: 68 (09/24/22 1136)  Resp: 16 (09/24/22 1347)  BP: 117/68 (09/24/22 1136)  SpO2: 97 % (09/24/22 1136) Vital Signs (24h Range):  Temp:  [96.7 °F (35.9 °C)-98.1 °F (36.7 °C)] 97.9 °F (36.6 °C)  Pulse:  [61-73] 68  Resp:  [16-18] 16  SpO2:  [95 %-99 %] 97 %  BP: (117-132)/(65-72) 117/68     Weight: 96.4 kg (212 lb 8.4 oz)  Body mass index is 28.82 kg/m².    Intake/Output Summary (Last 24 hours) at 9/24/2022 1524  Last data filed at 9/24/2022 1300  Gross per 24 hour   Intake 10.3 ml   Output 1685 ml   Net -1674.7 ml      Physical Exam  Vitals reviewed.   Constitutional:       General: He is not in acute distress.     Appearance: He is not diaphoretic.   Neck:      Vascular: No JVD.   Cardiovascular:      Rate and Rhythm: Normal rate and regular rhythm.   Pulmonary:      Effort: Pulmonary effort is normal.      Breath sounds: Normal breath sounds.   Abdominal:      General: Bowel sounds are normal. There is distension (mild to moderate).      Palpations: Abdomen is soft.      Tenderness: There is no abdominal tenderness.      Comments: Incisions clean. Dressing c/d/i   Skin:     General: Skin is warm.      Findings: No rash.   Neurological:      General: No focal deficit present.      Mental Status: He is alert and oriented to person, place, and time. Mental status is at baseline.       Significant Labs: All  pertinent labs within the past 24 hours have been reviewed.    Significant Imaging: I have reviewed all pertinent imaging results/findings within the past 24 hours.

## 2022-09-24 NOTE — SUBJECTIVE & OBJECTIVE
Interval History:   no new complaints.  Walking well during his PT sessions.  Today, he will undergo laparoscopy to investigate the abscess and possibly drain it.    Review of Systems   Constitutional:  Negative for chills and fever.   Respiratory:  Negative for cough, shortness of breath and wheezing.    Cardiovascular:  Negative for chest pain and leg swelling.   Gastrointestinal:  Positive for abdominal pain. Negative for abdominal distention and nausea.   Objective:     Vital Signs (Most Recent):  Temp: 98.1 °F (36.7 °C) (09/23/22 1929)  Pulse: 68 (09/23/22 1929)  Resp: 18 (09/23/22 2012)  BP: 132/72 (09/23/22 1929)  SpO2: 99 % (09/23/22 1929)   Vital Signs (24h Range):  Temp:  [97.4 °F (36.3 °C)-99 °F (37.2 °C)] 98.1 °F (36.7 °C)  Pulse:  [68-93] 68  Resp:  [13-20] 18  SpO2:  [94 %-99 %] 99 %  BP: (113-150)/(55-85) 132/72     Weight: 96.4 kg (212 lb 8.4 oz)  Body mass index is 28.82 kg/m².    Intake/Output Summary (Last 24 hours) at 9/23/2022 2058  Last data filed at 9/23/2022 1822  Gross per 24 hour   Intake 607.39 ml   Output 1430 ml   Net -822.61 ml        Physical Exam  Vitals reviewed.   Constitutional:       General: He is not in acute distress.     Appearance: He is not diaphoretic.   HENT:      Mouth/Throat:      Mouth: Mucous membranes are moist.   Eyes:      General: No scleral icterus.        Right eye: No discharge.         Left eye: No discharge.   Neck:      Vascular: No JVD.   Cardiovascular:      Rate and Rhythm: Normal rate and regular rhythm.   Pulmonary:      Effort: Pulmonary effort is normal.      Breath sounds: Normal breath sounds.   Abdominal:      General: Bowel sounds are normal. There is distension (mild).      Palpations: Abdomen is soft.      Tenderness: There is no abdominal tenderness.      Comments: Incision looks clean.  Staples in place.   Skin:     General: Skin is warm.      Findings: No rash.   Neurological:      Mental Status: He is alert.       Significant Labs: All  pertinent labs within the past 24 hours have been reviewed.    Significant Imaging: I have reviewed all pertinent imaging results/findings within the past 24 hours.

## 2022-09-24 NOTE — ASSESSMENT & PLAN NOTE
S/p resection 9/14 per Dr. Oliveira.  Preliminary pathological diagnosis is diffuse B cell lymphoma (preliminary)  Patient has been informed by me and by the surgeon.  We're consulting with oncology for treatment recommendations.  Bone marrow biopsy done yesterday for staging purposes.

## 2022-09-24 NOTE — CARE UPDATE
09/24/22 0730   PRE-TX-O2   O2 Device (Oxygen Therapy) room air   SpO2 96 %   Pulse Oximetry Type Intermittent   $ Pulse Oximetry - Multiple Charge Pulse Oximetry - Multiple   Incentive Spirometer   $ Incentive Spirometer Charges done with encouragement   Incentive Spirometer Predicted Level (mL) 2500   Administration (IS) proper technique demonstrated   Number of Repetitions (IS) 10   Level Incentive Spirometer (mL) 2500   Patient Tolerance (IS) good

## 2022-09-24 NOTE — PROGRESS NOTES
"[facility] Medicine  Progress Note    Patient Name: Dwight Hoffmann  MRN: [id number]  Patient Class: IP- Inpatient   Admission Date: 9/13/2022  Length of Stay: 10 days  Attending Physician: Sanjiv Eric MD  Primary Care Provider: BOSTON Oswald        Subjective:     Principal Problem:Small bowel mass        HPI:  Mr. Hoffmann is a 52 year old male with a history of NIDDM2 and HLD who presents today with complaints of night sweats for weeks. It is moderate. It is associated with 25 lb wt loss over the last 2 months, urinary hesitancy, urinary frequency, occasional blood streaked stools, weakness, fatigue, neck pain, knee pain, and pelvic pain. He denies measured fever, chills, N/V/D, chest pain, or SOB. He was seen by his primary last month about the blood streaked stools and referred to Dr. Clemens for colonoscopy on 9/1 with multiple polypectomies with path report of tubular adenomas. In the ED, labs revealed microcytic anemia, he was mildly tachycardic on arrival  which improved spontaneously, /60,  and CT abd/pelvis revealing: "Thick-walled collection containing feculent material, widely communicating with small bowel, seen centrally in the pelvis. Findings are suspicious for necrotic mass or abscess arising from the small bowel" Findings were discussed with Dr. Oliveira by ED MD who agreed to see patient in the morning.       Overview/Hospital Course:  Dwight Hoffmann is a 52 year old male with a past medical history of DM, HLD and anemia who presented with lower abdominal pain, lower back pain, and difficulty urinating secondary to an abdominal mass discovered on CT scan. Surgery was consulted and performed exploratory laparotomy with resection of the mass and anastomosis of small bowel. The mass appeared to be an abscess with surrounding necrosis.  The patient was treated with IV antibiotics. An NG tube to LIWS suction was started and the patient was put on IV " fluids while he was NPO. He did have a positive blood culture which showed coagulase negative Staph. He was briefly on vancomycin which was discontinued. ID consulted.  Patient was put on meropenem and fluconazole.  Cultures from surgery with Enterobacter cloacae and Proteus mirabilis.  His bowel function slowly returned, and he no longer needed NGT to suction.  The pathologic diagnosis on the mass was diffuse large B cell lymphoma.  Oncology service was asked to consult with us.  Patient underwent bone marrow biopsy for staging.  CT of chest showed no enlarged lymph nodes. Did have another laparoscopy due to abdominal fluid collection with cleanout thought to be more necrotic than infective.      Interval History: Patient seen and examined. THELMA. Went back to OR 9/23 for ex-laparoscopy for concern of abscess but more resembled necrotic lymph tissue per op note, cultures taken as well. Pain controlled.       Review of Systems   Constitutional:  Negative for chills and fever.   Respiratory:  Negative for cough, shortness of breath and wheezing.    Cardiovascular:  Negative for chest pain and leg swelling.   Gastrointestinal:  Positive for abdominal pain and constipation. Negative for abdominal distention and nausea.   Objective:     Vital Signs (Most Recent):  Temp: 97.9 °F (36.6 °C) (09/24/22 1136)  Pulse: 68 (09/24/22 1136)  Resp: 16 (09/24/22 1347)  BP: 117/68 (09/24/22 1136)  SpO2: 97 % (09/24/22 1136) Vital Signs (24h Range):  Temp:  [96.7 °F (35.9 °C)-98.1 °F (36.7 °C)] 97.9 °F (36.6 °C)  Pulse:  [61-73] 68  Resp:  [16-18] 16  SpO2:  [95 %-99 %] 97 %  BP: (117-132)/(65-72) 117/68     Weight: 96.4 kg (212 lb 8.4 oz)  Body mass index is 28.82 kg/m².    Intake/Output Summary (Last 24 hours) at 9/24/2022 1524  Last data filed at 9/24/2022 1300  Gross per 24 hour   Intake 10.3 ml   Output 1685 ml   Net -1674.7 ml      Physical Exam  Vitals reviewed.   Constitutional:       General: He is not in acute distress.      Appearance: He is not diaphoretic.   Neck:      Vascular: No JVD.   Cardiovascular:      Rate and Rhythm: Normal rate and regular rhythm.   Pulmonary:      Effort: Pulmonary effort is normal.      Breath sounds: Normal breath sounds.   Abdominal:      General: Bowel sounds are normal. There is distension (mild to moderate).      Palpations: Abdomen is soft.      Tenderness: There is no abdominal tenderness.      Comments: Incisions clean. Dressing c/d/i   Skin:     General: Skin is warm.      Findings: No rash.   Neurological:      General: No focal deficit present.      Mental Status: He is alert and oriented to person, place, and time. Mental status is at baseline.       Significant Labs: All pertinent labs within the past 24 hours have been reviewed.    Significant Imaging: I have reviewed all pertinent imaging results/findings within the past 24 hours.      Assessment/Plan:      * Small bowel mass  S/p resection 9/14 per Dr. Oliveira.  Preliminary pathological diagnosis is diffuse B cell lymphoma (preliminary)  Patient has been informed by me and by the surgeon.  We're consulting with oncology for treatment recommendations.  Bone marrow biopsy done 9/22 for staging purposes.    Intra-abdominal abscess  Due to small bowel perforation, which was present on day 1 of admission.  CT abdomen reviewed.  It's not approachable by interventional radiology. Initial cultures with enterobacter and proteus. Laparoscopy 9/23 with cleanout thought to be more necrotic tissue than infective, f/u repeat cultures  Continue IVAB. ID following.    Antibiotics (From admission, onward)    Start     Stop Route Frequency Ordered    09/17/22 1730  meropenem-0.9% sodium chloride 1 g/50 mL IVPB         -- IV Every 8 hours (non-standard times) 09/17/22 1411          Moderate malnutrition  Off PPN, f/u diet  Dietician following.    Arthralgia of bilat knees and neck  Chronic.  Continue analgesics as needed.    Diabetes mellitus type 2,  noninsulin dependent  Patient's FSGs are controlled on current medication regimen.  Last A1c reviewed-   Lab Results   Component Value Date    HGBA1C 11.0 (H) 12/30/2021     Most recent fingerstick glucose reviewed-   Recent Labs   Lab 09/23/22  1716 09/24/22  1134   POCTGLUCOSE 144* 115*     Current correctional scale  Low  Maintain anti-hyperglycemic dose as follows-   Antihyperglycemics (From admission, onward)    Start     Stop Route Frequency Ordered    09/14/22 0344  insulin aspart U-100 pen 0-5 Units         -- SubQ Before meals & nightly PRN 09/14/22 0246      Hold Oral hypoglycemics while patient is in the hospital.    Anemia, chronic disease  Monitor HGB.  Transfuse as needed    Lab Results   Component Value Date    HGB 8.2 (L) 09/24/2022         VTE Risk Mitigation (From admission, onward)         Ordered     enoxaparin injection 40 mg  Daily         09/14/22 1009     IP VTE HIGH RISK PATIENT  Once         09/14/22 0246     Place sequential compression device  Until discontinued         09/14/22 0246                Discharge Planning   CATRACHO:  9/25/22    Code Status: Full Code   Is the patient medically ready for discharge?:     Reason for patient still in hospital (select all that apply): Patient trending condition, Laboratory test, Treatment and Consult recommendations  Discharge Plan A: Home with family        Sanjiv Eric MD  Department of Hospital Medicine   [facility]

## 2022-09-24 NOTE — ASSESSMENT & PLAN NOTE
Patient's FSGs are controlled on current medication regimen.  Last A1c reviewed-   Lab Results   Component Value Date    HGBA1C 11.0 (H) 12/30/2021     Most recent fingerstick glucose reviewed-   Recent Labs   Lab 09/23/22  1716 09/24/22  1134   POCTGLUCOSE 144* 115*     Current correctional scale  Low  Maintain anti-hyperglycemic dose as follows-   Antihyperglycemics (From admission, onward)    Start     Stop Route Frequency Ordered    09/14/22 0344  insulin aspart U-100 pen 0-5 Units         -- SubQ Before meals & nightly PRN 09/14/22 0246      Hold Oral hypoglycemics while patient is in the hospital.

## 2022-09-24 NOTE — PLAN OF CARE
Problem: Physical Therapy  Goal: Physical Therapy Goal  Description: Goals to be met by: 2022     Patient will increase functional independence with mobility by performin). Supine to sit with Modified [place]  2). Sit to supine with Modified [place]  3). Sit to stand transfer with Modified [place]  4). Gait  x > 250 feet with Modified [place]     Outcome: Ongoing, Progressing

## 2022-09-24 NOTE — ASSESSMENT & PLAN NOTE
Due to small bowel perforation, which was present on day 1 of admission.  CT abdomen reviewed.  It's not approachable by interventional radiology. Initial cultures with enterobacter and proteus. Laparoscopy 9/23 with cleanout thought to be more necrotic tissue than infective, f/u repeat cultures  Continue IVAB. ID following.    Antibiotics (From admission, onward)    Start     Stop Route Frequency Ordered    09/17/22 1730  meropenem-0.9% sodium chloride 1 g/50 mL IVPB         -- IV Every 8 hours (non-standard times) 09/17/22 1411

## 2022-09-24 NOTE — PROGRESS NOTES
"[facility] Medicine  Progress Note    Patient Name: Dwight Hoffmann  MRN: [id number]  Patient Class: IP- Inpatient   Admission Date: 9/13/2022  Length of Stay: 9 days  Attending Physician: Palomo Lewis MD  Primary Care Provider: BOSTON Oswald        Subjective:     Principal Problem:Small bowel mass        HPI:  Mr. Hoffmann is a 52 year old male with a history of NIDDM2 and HLD who presents today with complaints of night sweats for weeks. It is moderate. It is associated with 25 lb wt loss over the last 2 months, urinary hesitancy, urinary frequency, occasional blood streaked stools, weakness, fatigue, neck pain, knee pain, and pelvic pain. He denies measured fever, chills, N/V/D, chest pain, or SOB. He was seen by his primary last month about the blood streaked stools and referred to Dr. Clemens for colonoscopy on 9/1 with multiple polypectomies with path report of tubular adenomas. In the ED, labs revealed microcytic anemia, he was mildly tachycardic on arrival  which improved spontaneously, /60,  and CT abd/pelvis revealing: "Thick-walled collection containing feculent material, widely communicating with small bowel, seen centrally in the pelvis. Findings are suspicious for necrotic mass or abscess arising from the small bowel" Findings were discussed with Dr. Oliveira by ED MD who agreed to see patient in the morning.       Overview/Hospital Course:  Dwight Hoffmann is a 52 year old male with a past medical history of DM, HLD and anemia who presented with lower abdominal pain, lower back pain, and difficulty urinating secondary to an abdominal mass discovered on CT scan. Surgery was consulted and performed exploratory laparotomy with resection of the mass and anastomosis of small bowel. The mass appeared to be an abscess with surrounding necrosis.  The patient was treated with IV antibiotics. An NG tube to LIWS suction was started and the patient was put on IV " fluids while he was NPO. He did have a positive blood culture which showed coagulase negative Staph. He was briefly on vancomycin which was discontinued. ID consulted.  Patient was put on meropenem and fluconazole.  Cultures from surgery with Enterobacter cloacae and Proteus mirabilis.  His bowel function slowly returned, and he not longer needed NGT to suction.  The pathologic diagnosis on the mass was diffuse large B cell lymphoma.  Oncology service was asked to consult with us.  Patient underwent bone marrow biopsy for staging.  CT of chest showed no enlarged lymph nodes.      Interval History:   no new complaints.  Walking well during his PT sessions.  Today, he will undergo laparoscopy to investigate the abscess and possibly drain it.    Review of Systems   Constitutional:  Negative for chills and fever.   Respiratory:  Negative for cough, shortness of breath and wheezing.    Cardiovascular:  Negative for chest pain and leg swelling.   Gastrointestinal:  Positive for abdominal pain. Negative for abdominal distention and nausea.   Objective:     Vital Signs (Most Recent):  Temp: 98.1 °F (36.7 °C) (09/23/22 1929)  Pulse: 68 (09/23/22 1929)  Resp: 18 (09/23/22 2012)  BP: 132/72 (09/23/22 1929)  SpO2: 99 % (09/23/22 1929)   Vital Signs (24h Range):  Temp:  [97.4 °F (36.3 °C)-99 °F (37.2 °C)] 98.1 °F (36.7 °C)  Pulse:  [68-93] 68  Resp:  [13-20] 18  SpO2:  [94 %-99 %] 99 %  BP: (113-150)/(55-85) 132/72     Weight: 96.4 kg (212 lb 8.4 oz)  Body mass index is 28.82 kg/m².    Intake/Output Summary (Last 24 hours) at 9/23/2022 2058  Last data filed at 9/23/2022 1822  Gross per 24 hour   Intake 607.39 ml   Output 1430 ml   Net -822.61 ml        Physical Exam  Vitals reviewed.   Constitutional:       General: He is not in acute distress.     Appearance: He is not diaphoretic.   HENT:      Mouth/Throat:      Mouth: Mucous membranes are moist.   Eyes:      General: No scleral icterus.        Right eye: No discharge.          Left eye: No discharge.   Neck:      Vascular: No JVD.   Cardiovascular:      Rate and Rhythm: Normal rate and regular rhythm.   Pulmonary:      Effort: Pulmonary effort is normal.      Breath sounds: Normal breath sounds.   Abdominal:      General: Bowel sounds are normal. There is distension (mild).      Palpations: Abdomen is soft.      Tenderness: There is no abdominal tenderness.      Comments: Incision looks clean.  Staples in place.   Skin:     General: Skin is warm.      Findings: No rash.   Neurological:      Mental Status: He is alert.       Significant Labs: All pertinent labs within the past 24 hours have been reviewed.    Significant Imaging: I have reviewed all pertinent imaging results/findings within the past 24 hours.      Assessment/Plan:      * Small bowel mass  S/p resection 9/14 per Dr. Oliveira.  Preliminary pathological diagnosis is diffuse B cell lymphoma (preliminary)  Patient has been informed by me and by the surgeon.  We're consulting with oncology for treatment recommendations.  Bone marrow biopsy done yesterday for staging purposes.    Intra-abdominal abscess  Due to small bowel perforation, which was present on day 1 of admission.  CT abdomen reviewed.  It's not approachable by interventional radiology.  The plan is for the general surgeon to perform laparoscopy today to drain the abscess.  Continue IVAB.    Antibiotics (From admission, onward)    Start     Stop Route Frequency Ordered    09/17/22 1730  meropenem-0.9% sodium chloride 1 g/50 mL IVPB         -- IV Every 8 hours (non-standard times) 09/17/22 1411            Moderate malnutrition  Pt consuming roughly 30% of meals.  Will interrupt PPN today and see how much he consumes orally.  Dietician following.      Arthralgia of bilat knees and neck  Chronic.  Continue analgesics as needed.    Diabetes mellitus type 2, noninsulin dependent  Patient's FSGs are controlled on current medication regimen.  Last A1c reviewed-   Lab  Results   Component Value Date    HGBA1C 11.0 (H) 12/30/2021     Most recent fingerstick glucose reviewed-   Recent Labs   Lab 09/22/22  2217 09/23/22  1115 09/23/22  1716   POCTGLUCOSE 110 119* 144*     Current correctional scale  Low  Maintain anti-hyperglycemic dose as follows-   Antihyperglycemics (From admission, onward)    Start     Stop Route Frequency Ordered    09/14/22 0344  insulin aspart U-100 pen 0-5 Units         -- SubQ Before meals & nightly PRN 09/14/22 0246        Hold Oral hypoglycemics while patient is in the hospital.      Anemia, chronic disease  Monitor HGB.    Lab Results   Component Value Date    HGB 8.5 (L) 09/23/2022             VTE Risk Mitigation (From admission, onward)         Ordered     enoxaparin injection 40 mg  Daily         09/14/22 1009     IP VTE HIGH RISK PATIENT  Once         09/14/22 0246     Place sequential compression device  Until discontinued         09/14/22 0246                Discharge Planning   CATRACHO:      Code Status: Full Code   Is the patient medically ready for discharge?:     Reason for patient still in hospital (select all that apply): Patient trending condition, Treatment and Consult recommendations  Discharge Plan A: Home with family                  Palomo Lewis MD  Department of Hospital Medicine   [facility]

## 2022-09-24 NOTE — SUBJECTIVE & OBJECTIVE
Interval History: Tolerating diet.    Medications:  Continuous Infusions:  Scheduled Meds:   enoxaparin  40 mg Subcutaneous Daily    fluconazole (DIFLUCAN) IV (PEDS and ADULTS)  100 mg Intravenous Q24H    meropenem (MERREM) IVPB  1 g Intravenous Q8H    mupirocin   Nasal BID    sodium chloride 0.9%  10 mL Intravenous Q6H     PRN Meds:dextrose 50%, dextrose 50%, glucagon (human recombinant), glucose, glucose, insulin aspart U-100, magnesium oxide, magnesium oxide, morphine, naloxone, ondansetron, potassium bicarbonate, potassium bicarbonate, potassium bicarbonate, potassium, sodium phosphates, potassium, sodium phosphates, potassium, sodium phosphates, promethazine (PHENERGAN) IVPB, sodium chloride 0.9%, Flushing PICC Protocol **AND** sodium chloride 0.9% **AND** sodium chloride 0.9%     Review of patient's allergies indicates:  No Known Allergies  Objective:     Vital Signs (Most Recent):  Temp: 97.9 °F (36.6 °C) (09/24/22 1136)  Pulse: 68 (09/24/22 1136)  Resp: 17 (09/24/22 1136)  BP: 117/68 (09/24/22 1136)  SpO2: 97 % (09/24/22 1136) Vital Signs (24h Range):  Temp:  [96.7 °F (35.9 °C)-99 °F (37.2 °C)] 97.9 °F (36.6 °C)  Pulse:  [61-86] 68  Resp:  [13-20] 17  SpO2:  [94 %-99 %] 97 %  BP: (113-150)/(55-85) 117/68     Weight: 96.4 kg (212 lb 8.4 oz)  Body mass index is 28.82 kg/m².    Intake/Output - Last 3 Shifts         09/22 0700 09/23 0659 09/23 0700 09/24 0659 09/24 0700 09/25 0659    P.O. 1320      IV Piggyback 146.8 510.3     TPN       Total Intake(mL/kg) 1466.8 (15.2) 510.3 (5.3)     Urine (mL/kg/hr) 1700 (0.7) 1025 (0.4)     Drains  60     Stool       Total Output 1700 1085     Net -233.2 -574.7                    Physical Exam  Constitutional:       General: He is not in acute distress.  HENT:      Head: Normocephalic and atraumatic.   Cardiovascular:      Rate and Rhythm: Normal rate and regular rhythm.   Pulmonary:      Effort: Pulmonary effort is normal. No respiratory distress.      Breath sounds:  Normal breath sounds. No wheezing or rales.   Abdominal:      General: Bowel sounds are normal. There is no distension.      Palpations: Abdomen is soft.      Tenderness: There is abdominal tenderness (Expected tenderness.).      Comments: Incisions clean.  JAVAN: minimal.   Neurological:      Mental Status: He is alert and oriented to person, place, and time.   Psychiatric:         Behavior: Behavior normal.       Significant Labs:  I have reviewed all pertinent lab results within the past 24 hours.  CBC:   Recent Labs   Lab 09/24/22  0507   WBC 9.36   RBC 3.31*   HGB 8.2*   HCT 25.7*   *   MCV 78*   MCH 24.8*   MCHC 31.9*     BMP:   Recent Labs   Lab 09/24/22  0507         K 4.4      CO2 25   BUN 14   CREATININE 0.8   CALCIUM 8.7   MG 2.2

## 2022-09-24 NOTE — ASSESSMENT & PLAN NOTE
S/p resection 9/14 per Dr. Oliveira.  Preliminary pathological diagnosis is diffuse B cell lymphoma (preliminary)  Patient has been informed by me and by the surgeon.  We're consulting with oncology for treatment recommendations.  Bone marrow biopsy done 9/22 for staging purposes.

## 2022-09-24 NOTE — CARE UPDATE
09/23/22 1952   Patient Assessment/Suction   Level of Consciousness (AVPU) alert   PRE-TX-O2   O2 Device (Oxygen Therapy) room air   SpO2 99 %   Pulse Oximetry Type Intermittent   $ Pulse Oximetry - Multiple Charge Pulse Oximetry - Multiple   Pulse 66   Resp 18   Incentive Spirometer   $ Incentive Spirometer Charges done with encouragement   Incentive Spirometer Predicted Level (mL) 2000   Administration (IS) instruction provided, follow-up   Number of Repetitions (IS) 10   Level Incentive Spirometer (mL) 1500   Patient Tolerance (IS) good;no adverse signs/symptoms present

## 2022-09-24 NOTE — ASSESSMENT & PLAN NOTE
Due to small bowel perforation, which was present on day 1 of admission.  CT abdomen reviewed.  It's not approachable by interventional radiology.  The plan is for the general surgeon to perform laparoscopy today to drain the abscess.  Continue IVAB.    Antibiotics (From admission, onward)    Start     Stop Route Frequency Ordered    09/17/22 1730  meropenem-0.9% sodium chloride 1 g/50 mL IVPB         -- IV Every 8 hours (non-standard times) 09/17/22 1411

## 2022-09-24 NOTE — PLAN OF CARE
Spoke with pt's spouse over the phone. No preference for infusion company- just wants a company with not many complaints. Would like [facility]  for home health company    Infusion referral sent to [facility] via [employer]         09/24/22 1938   Post-Acute Status   Post-Acute Authorization IV Infusion   IV Infusion Status Referral(s) sent

## 2022-09-24 NOTE — PLAN OF CARE
Problem: Infection  Goal: Absence of Infection Signs and Symptoms  Outcome: Ongoing, Progressing  Intervention: Prevent or Manage Infection  Flowsheets (Taken 9/23/2022 2216)  Fever Reduction/Comfort Measures:   fluid intake increased   other (see comments)  Isolation Precautions: other (see comments)     Problem: Infection  Goal: Absence of Infection Signs and Symptoms  Intervention: Prevent or Manage Infection  Flowsheets (Taken 9/23/2022 2216)  Fever Reduction/Comfort Measures:   fluid intake increased   other (see comments)  Isolation Precautions: other (see comments)

## 2022-09-24 NOTE — PROGRESS NOTES
[facility] Surgery  Progress Note    Subjective:     History of Present Illness:  No notes on file    Post-Op Info:  Procedure(s) (LRB):  DRAINAGE, ABDOMEN, LAPAROSCOPIC (N/A)   1 Day Post-Op     Interval History: Tolerating diet.    Medications:  Continuous Infusions:  Scheduled Meds:   enoxaparin  40 mg Subcutaneous Daily    fluconazole (DIFLUCAN) IV (PEDS and ADULTS)  100 mg Intravenous Q24H    meropenem (MERREM) IVPB  1 g Intravenous Q8H    mupirocin   Nasal BID    sodium chloride 0.9%  10 mL Intravenous Q6H     PRN Meds:dextrose 50%, dextrose 50%, glucagon (human recombinant), glucose, glucose, insulin aspart U-100, magnesium oxide, magnesium oxide, morphine, naloxone, ondansetron, potassium bicarbonate, potassium bicarbonate, potassium bicarbonate, potassium, sodium phosphates, potassium, sodium phosphates, potassium, sodium phosphates, promethazine (PHENERGAN) IVPB, sodium chloride 0.9%, Flushing PICC Protocol **AND** sodium chloride 0.9% **AND** sodium chloride 0.9%     Review of patient's allergies indicates:  No Known Allergies  Objective:     Vital Signs (Most Recent):  Temp: 97.9 °F (36.6 °C) (09/24/22 1136)  Pulse: 68 (09/24/22 1136)  Resp: 17 (09/24/22 1136)  BP: 117/68 (09/24/22 1136)  SpO2: 97 % (09/24/22 1136) Vital Signs (24h Range):  Temp:  [96.7 °F (35.9 °C)-99 °F (37.2 °C)] 97.9 °F (36.6 °C)  Pulse:  [61-86] 68  Resp:  [13-20] 17  SpO2:  [94 %-99 %] 97 %  BP: (113-150)/(55-85) 117/68     Weight: 96.4 kg (212 lb 8.4 oz)  Body mass index is 28.82 kg/m².    Intake/Output - Last 3 Shifts         09/22 0700  09/23 0659 09/23 0700 09/24 0659 09/24 0700 09/25 0659    P.O. 1320      IV Piggyback 146.8 510.3     TPN       Total Intake(mL/kg) 1466.8 (15.2) 510.3 (5.3)     Urine (mL/kg/hr) 1700 (0.7) 1025 (0.4)     Drains  60     Stool       Total Output 1700 1085     Net -233.2 -574.7                    Physical Exam  Constitutional:       General: He is not in acute  distress.  HENT:      Head: Normocephalic and atraumatic.   Cardiovascular:      Rate and Rhythm: Normal rate and regular rhythm.   Pulmonary:      Effort: Pulmonary effort is normal. No respiratory distress.      Breath sounds: Normal breath sounds. No wheezing or rales.   Abdominal:      General: Bowel sounds are normal. There is no distension.      Palpations: Abdomen is soft.      Tenderness: There is abdominal tenderness (Expected tenderness.).      Comments: Incisions clean.  JAVAN: minimal.   Neurological:      Mental Status: He is alert and oriented to person, place, and time.   Psychiatric:         Behavior: Behavior normal.       Significant Labs:  I have reviewed all pertinent lab results within the past 24 hours.  CBC:   Recent Labs   Lab 09/24/22  0507   WBC 9.36   RBC 3.31*   HGB 8.2*   HCT 25.7*   *   MCV 78*   MCH 24.8*   MCHC 31.9*     BMP:   Recent Labs   Lab 09/24/22  0507         K 4.4      CO2 25   BUN 14   CREATININE 0.8   CALCIUM 8.7   MG 2.2     Assessment/Plan:     * Small bowel mass  1. S/P laparoscopic drainage procedure.  2. Doing well.        Pablo Beltran MD  General Surgery  [facility]

## 2022-09-24 NOTE — PROGRESS NOTES
Subjective:   Patient is experiencing anticipated postoperative pain.  This is well relieved by morphine.  No other complaints at the present.      Objective:  T-max 99   Blood pressure 117/68   Pulse 68   Respirations 17   Intake/output 510/1085    Microbiology:   Blood cultures are no growth to date.    Abdominal wound cultures are remarkable for Enterobacter cloacae resistant to ceftriaxone and Proteus mirabilis which is pansensitive.    Laboratory:   White count 9.4, hemoglobin 8.2, hematocrit 25.7, MCV 78, platelets 653, absolute neutrophil count is 5800.    Serum iron 19, TIBC 241, saturated iron 9%, ferritin 655.    Sodium 137, potassium 4.4, chloride 104, CO2 25, BUN 14, creatinine 0.8, glucose 105, calcium 8.7, phosphorus 3.7, magnesium 2.2, albumin 2.5, total bilirubin 0.7, AST 19, ALT 25, GFR greater than 60.    CRP 55.8.    Quantitative immunoglobulins reveal an IgG of 1751, IgM of 52, an IgA of 295.    CT of the chest with contrast:   - Band of atelectasis seen in the left lower lobe.  Otherwise negative CT of the chest. Adenopathy is not demonstrated.     CT of the abdomen/pelvis with contrast:   - Postoperative abdomen.  Deep abdominal fluid collection, compatible with abscess.  The collection is not excessive Espinoza percutaneously.   - Mildly dilated small bowel compatible with ileus.  Nasogastric tube in the distal stomach.     Pathology for bowel resection:   Verbally reported to be suspicious for diffuse large B-cell lymphoma.    Pathology from bone marrow aspiration and biopsy:  Pending at the time of dictation.    Physical examination:  Well-developed, thin appearing, black gentleman, in no acute distress, who is alert and oriented x4.  HEENT: Normocephalic, atraumatic. Oral mucosa pink and moist. Lips without lesions. Tongue midline. Oropharynx clear. Nonicteric sclerae.   NECK: Supple, no adenopathy. No carotid bruits, thyromegaly or thyroid nodule.   HEART: Regular rate and rhythm without  murmur, gallop or rub.   LUNGS: Clear to auscultation bilaterally. Normal respiratory effort.   ABDOMEN: Soft, nontender, nondistended with positive normoactive bowel sounds, no hepatosplenomegaly.  Surgical wound is dry/dressed/intact.  Surgical drain present.  EXTREMITIES: No cyanosis, clubbing or edema. Distal pulses are intact.  Right brachial PICC line intact.  AXILLAE AND GROIN: No palpable pathologic lymphadenopathy is appreciated.   SKIN: Intact/turgor normal   NEUROLOGIC:  No focal deficits appreciated.    Impression:   1. 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  2.  Iron deficiency anemia.  3.  Intra-abdominal abscess secondary to Enterobacter/Proteus.    Plan:  1. Continue ongoing postoperative care.    2.  Continue current antibiotics.  3.  Review results of pathology from bowel resection/bone marrow aspiration and biopsy as they become available.    4. Intravenous iron replacement in the interim.    This note was created using voice recognition software and may contain grammatical errors.

## 2022-09-25 NOTE — ASSESSMENT & PLAN NOTE
Off PPN, f/u diet  Dietician following.   Epidermal Autograft Text: The defect edges were debeveled with a #15 scalpel blade.  Given the location of the defect, shape of the defect and the proximity to free margins an epidermal autograft was deemed most appropriate.  Using a sterile surgical marker, the primary defect shape was transferred to the donor site. The epidermal graft was then harvested.  The skin graft was then placed in the primary defect and oriented appropriately.

## 2022-09-25 NOTE — ASSESSMENT & PLAN NOTE
S/p resection 9/14 per Dr. Oliveira.  Preliminary pathological diagnosis is diffuse B cell lymphoma (preliminary)  Patient has been informed by me and by the surgeon.  Oncology following  Bone marrow biopsy done 9/22 for staging purposes.

## 2022-09-25 NOTE — ASSESSMENT & PLAN NOTE
Patient's FSGs are controlled on current medication regimen.  Last A1c reviewed-   Lab Results   Component Value Date    HGBA1C 11.0 (H) 12/30/2021     Most recent fingerstick glucose reviewed-   Recent Labs   Lab 09/24/22  1754 09/24/22  2241 09/25/22  0743 09/25/22  1119   POCTGLUCOSE 146* 108 109 151*     Current correctional scale  Low  Maintain anti-hyperglycemic dose as follows-   Antihyperglycemics (From admission, onward)    Start     Stop Route Frequency Ordered    09/14/22 0344  insulin aspart U-100 pen 0-5 Units         -- SubQ Before meals & nightly PRN 09/14/22 0246      Hold Oral hypoglycemics while patient is in the hospital.

## 2022-09-25 NOTE — PLAN OF CARE
Plan of care discussed with the patient, hospitalist, and wife. Patient has yet to have a bowel movement; however, he does not have any difficulty passing flatus.  Tolerating meals well.  Slight pain near incision sight which decrease after pain medication administration.  Dressing to incision and around JAVAN drain clean, dry, and intact.   Problem: Adult Inpatient Plan of Care  Goal: Plan of Care Review  Outcome: Ongoing, Progressing  Goal: Patient-Specific Goal (Individualized)  Outcome: Ongoing, Progressing  Goal: Absence of Hospital-Acquired Illness or Injury  Outcome: Ongoing, Progressing  Goal: Optimal Comfort and Wellbeing  Outcome: Ongoing, Progressing  Goal: Readiness for Transition of Care  Outcome: Ongoing, Progressing     Problem: Infection  Goal: Absence of Infection Signs and Symptoms  Outcome: Ongoing, Progressing     Problem: Oral Intake Inadequate  Goal: Improved Oral Intake  Outcome: Ongoing, Progressing     Problem: Skin or Soft Tissue Infection  Goal: Absence of Infection Signs and Symptoms  Outcome: Ongoing, Progressing

## 2022-09-25 NOTE — SUBJECTIVE & OBJECTIVE
Interval History: No new complaints    Medications:  Continuous Infusions:  Scheduled Meds:   enoxaparin  40 mg Subcutaneous Daily    fluconazole (DIFLUCAN) IV (PEDS and ADULTS)  100 mg Intravenous Q24H    iron sucrose (VENOFER) IVPB  200 mg Intravenous Daily    meropenem (MERREM) IVPB  1 g Intravenous Q8H    mupirocin   Nasal BID    polyethylene glycol  17 g Oral Daily    sodium chloride 0.9%  10 mL Intravenous Q6H     PRN Meds:acetaminophen, dextrose 50%, dextrose 50%, glucagon (human recombinant), glucose, glucose, HYDROcodone-acetaminophen, HYDROcodone-acetaminophen, insulin aspart U-100, magnesium oxide, magnesium oxide, naloxone, ondansetron, potassium bicarbonate, potassium bicarbonate, potassium bicarbonate, potassium, sodium phosphates, potassium, sodium phosphates, potassium, sodium phosphates, promethazine (PHENERGAN) IVPB, simethicone, sodium chloride 0.9%, Flushing PICC Protocol **AND** sodium chloride 0.9% **AND** sodium chloride 0.9%     Review of patient's allergies indicates:  No Known Allergies  Objective:     Vital Signs (Most Recent):  Temp: 97.7 °F (36.5 °C) (09/25/22 1122)  Pulse: 88 (09/25/22 1122)  Resp: 16 (09/25/22 1122)  BP: 114/69 (09/25/22 1122)  SpO2: 96 % (09/25/22 1122) Vital Signs (24h Range):  Temp:  [97.4 °F (36.3 °C)-98.4 °F (36.9 °C)] 97.7 °F (36.5 °C)  Pulse:  [72-89] 88  Resp:  [14-19] 16  SpO2:  [93 %-98 %] 96 %  BP: (109-134)/(59-72) 114/69     Weight: 96.4 kg (212 lb 8.4 oz)  Body mass index is 28.82 kg/m².    Intake/Output - Last 3 Shifts         09/23 0700 09/24 0659 09/24 0700 09/25 0659 09/25 0700 09/26 0659    P.O.  570 598    IV Piggyback 510.3 300.4     Total Intake(mL/kg) 510.3 (5.3) 870.4 (9) 598 (6.2)    Urine (mL/kg/hr) 1025 (0.4) 1325 (0.6)     Drains 60 5     Stool  0     Total Output 1085 1330     Net -574.7 -459.6 +598           Stool Occurrence  0 x             Physical Exam  Constitutional:       General: He is not in acute distress.  HENT:      Head:  Normocephalic and atraumatic.   Cardiovascular:      Rate and Rhythm: Normal rate and regular rhythm.   Pulmonary:      Effort: Pulmonary effort is normal. No respiratory distress.      Breath sounds: Normal breath sounds. No wheezing or rales.   Abdominal:      General: Bowel sounds are normal. There is no distension.      Palpations: Abdomen is soft.      Tenderness: There is no abdominal tenderness.      Comments: Incision clean and dry.  JAVAN: minimal   Neurological:      Mental Status: He is alert and oriented to person, place, and time.   Psychiatric:         Behavior: Behavior normal.       Significant Labs:  I have reviewed all pertinent lab results within the past 24 hours.  CBC:   Recent Labs   Lab 09/25/22  0405   WBC 7.77   RBC 3.59*   HGB 8.7*   HCT 28.1*   *   MCV 78*   MCH 24.2*   MCHC 31.0*     BMP:   Recent Labs   Lab 09/25/22  0405   *      K 4.2      CO2 25   BUN 15   CREATININE 0.8   CALCIUM 8.7   MG 2.0

## 2022-09-25 NOTE — ASSESSMENT & PLAN NOTE
Due to small bowel perforation, which was present on day 1 of admission.  CT abdomen reviewed.  It was not approachable by interventional radiology. Initial cultures with enterobacter and proteus. Laparoscopy 9/23 with cleanout thought to be more necrotic tissue than infective, f/u repeat cultures NGTD. Continue IVAB. ID following - still determining if PICC needed or to do oral abx. JAVAN drain minimal output, defer to surgeon to remove.    Antibiotics (From admission, onward)    Start     Stop Route Frequency Ordered    09/17/22 1730  meropenem-0.9% sodium chloride 1 g/50 mL IVPB         -- IV Every 8 hours (non-standard times) 09/17/22 1411

## 2022-09-25 NOTE — PLAN OF CARE
POC reviewed with patient and spouse. Verbalized understanding. S/P Lap abdominal drainage 9/23/22. Pain controlled with po pain medication. Tolerating diet well. + BS. - Flatus yet. Encouraged ambulation. Pt ambulated 3x's with P/T yesterday with SBA. BG being monitored. No SSI needed this shift. PICC DL to RUE. JAVAN with small amt bloody drainage. Pt receiving iron infusion. Cardiac monitor NSR. Started Miralax yesterday. LBM 9/20/22. Hemoc consulted. Pt on Merrem per MD orders. VSS. Slowly progressing toward d/c goals.

## 2022-09-25 NOTE — PROGRESS NOTES
"[facility] Medicine  Progress Note    Patient Name: Dwight Hoffmann  MRN: [id number]  Patient Class: IP- Inpatient   Admission Date: 9/13/2022  Length of Stay: 11 days  Attending Physician: Sanjiv Eric MD  Primary Care Provider: BOSTON Oswald        Subjective:     Principal Problem:Small bowel mass        HPI:  Mr. Hoffmann is a 52 year old male with a history of NIDDM2 and HLD who presents today with complaints of night sweats for weeks. It is moderate. It is associated with 25 lb wt loss over the last 2 months, urinary hesitancy, urinary frequency, occasional blood streaked stools, weakness, fatigue, neck pain, knee pain, and pelvic pain. He denies measured fever, chills, N/V/D, chest pain, or SOB. He was seen by his primary last month about the blood streaked stools and referred to Dr. Clemens for colonoscopy on 9/1 with multiple polypectomies with path report of tubular adenomas. In the ED, labs revealed microcytic anemia, he was mildly tachycardic on arrival  which improved spontaneously, /60,  and CT abd/pelvis revealing: "Thick-walled collection containing feculent material, widely communicating with small bowel, seen centrally in the pelvis. Findings are suspicious for necrotic mass or abscess arising from the small bowel" Findings were discussed with Dr. Oliveira by ED MD who agreed to see patient in the morning.       Overview/Hospital Course:  Dwight Hoffmann is a 52 year old male with a past medical history of DM, HLD and anemia who presented with lower abdominal pain, lower back pain, and difficulty urinating secondary to an abdominal mass discovered on CT scan. Surgery was consulted and performed exploratory laparotomy with resection of the mass and anastomosis of small bowel. The mass appeared to be an abscess with surrounding necrosis.  The patient was treated with IV antibiotics. An NG tube to LIWS suction was started and the patient was put on IV " fluids while he was NPO. He did have a positive blood culture which showed coagulase negative Staph. He was briefly on vancomycin which was discontinued. ID consulted.  Patient was put on meropenem and fluconazole.  Cultures from surgery with Enterobacter cloacae and Proteus mirabilis.  His bowel function slowly returned, and he no longer needed NGT to suction.  The pathologic diagnosis on the mass was diffuse large B cell lymphoma.  Oncology service was asked to consult with us.  Patient underwent bone marrow biopsy for staging.  CT of chest showed no enlarged lymph nodes. Did have another laparoscopy due to abdominal fluid collection with cleanout thought to be more necrotic than infective.      Interval History: Patient seen and examined. NAEON. Complained about some gas pains he thinks miralax exacerbated this. Still constipated but +flatus. Has PICC and consulting physicians are deciding on its long-term need. Abdominal drain with minimal output.    Review of Systems   Constitutional:  Negative for chills and fever.   Respiratory:  Negative for cough, shortness of breath and wheezing.    Cardiovascular:  Negative for chest pain and leg swelling.   Gastrointestinal:  Positive for abdominal pain and constipation. Negative for abdominal distention and nausea.   Objective:     Vital Signs (Most Recent):  Temp: 97.7 °F (36.5 °C) (09/25/22 1122)  Pulse: 88 (09/25/22 1122)  Resp: 16 (09/25/22 1122)  BP: 114/69 (09/25/22 1122)  SpO2: 96 % (09/25/22 1122) Vital Signs (24h Range):  Temp:  [97.4 °F (36.3 °C)-98.4 °F (36.9 °C)] 97.7 °F (36.5 °C)  Pulse:  [72-89] 88  Resp:  [14-19] 16  SpO2:  [93 %-99 %] 96 %  BP: (109-134)/(59-72) 114/69     Weight: 96.4 kg (212 lb 8.4 oz)  Body mass index is 28.82 kg/m².    Intake/Output Summary (Last 24 hours) at 9/25/2022 1327  Last data filed at 9/25/2022 0656  Gross per 24 hour   Intake 870.44 ml   Output 430 ml   Net 440.44 ml      Physical Exam  Vitals reviewed.   Constitutional:        General: He is not in acute distress.     Appearance: He is not diaphoretic.   Neck:      Vascular: No JVD.   Cardiovascular:      Rate and Rhythm: Normal rate and regular rhythm.   Pulmonary:      Effort: Pulmonary effort is normal.      Breath sounds: Normal breath sounds.   Abdominal:      General: Bowel sounds are normal. There is distension (mild to moderate).      Palpations: Abdomen is soft.      Tenderness: There is no abdominal tenderness.      Comments: Incisions clean. Dressing c/d/I  JAVAN drain minimal serosanguinous output   Skin:     General: Skin is warm.      Findings: No rash.   Neurological:      General: No focal deficit present.      Mental Status: He is alert and oriented to person, place, and time. Mental status is at baseline.       Significant Labs: All pertinent labs within the past 24 hours have been reviewed.    Significant Imaging: I have reviewed all pertinent imaging results/findings within the past 24 hours.      Assessment/Plan:      * Small bowel mass  S/p resection 9/14 per Dr. Oliveira.  Preliminary pathological diagnosis is diffuse B cell lymphoma (preliminary)  Patient has been informed by me and by the surgeon.  Oncology following  Bone marrow biopsy done 9/22 for staging purposes.    Intra-abdominal abscess  Due to small bowel perforation, which was present on day 1 of admission.  CT abdomen reviewed.  It was not approachable by interventional radiology. Initial cultures with enterobacter and proteus. Laparoscopy 9/23 with cleanout thought to be more necrotic tissue than infective, f/u repeat cultures NGTD. Continue IVAB. ID following - still determining if PICC needed or to do oral abx. JAVAN drain minimal output, defer to surgeon to remove.    Antibiotics (From admission, onward)    Start     Stop Route Frequency Ordered    09/17/22 1730  meropenem-0.9% sodium chloride 1 g/50 mL IVPB         -- IV Every 8 hours (non-standard times) 09/17/22 1411          Moderate  malnutrition  Off PPN, f/u diet  Dietician following.    Arthralgia of bilat knees and neck  Chronic.  Continue analgesics as needed.    Diabetes mellitus type 2, noninsulin dependent  Patient's FSGs are controlled on current medication regimen.  Last A1c reviewed-   Lab Results   Component Value Date    HGBA1C 11.0 (H) 12/30/2021     Most recent fingerstick glucose reviewed-   Recent Labs   Lab 09/24/22  1754 09/24/22  2241 09/25/22  0743 09/25/22  1119   POCTGLUCOSE 146* 108 109 151*     Current correctional scale  Low  Maintain anti-hyperglycemic dose as follows-   Antihyperglycemics (From admission, onward)    Start     Stop Route Frequency Ordered    09/14/22 0344  insulin aspart U-100 pen 0-5 Units         -- SubQ Before meals & nightly PRN 09/14/22 0246      Hold Oral hypoglycemics while patient is in the hospital.    Anemia, chronic disease  Monitor HGB.  Transfuse as needed  Lab Results   Component Value Date    HGB 8.7 (L) 09/25/2022         VTE Risk Mitigation (From admission, onward)         Ordered     enoxaparin injection 40 mg  Daily         09/14/22 1009     IP VTE HIGH RISK PATIENT  Once         09/14/22 0246     Place sequential compression device  Until discontinued         09/14/22 0246                Discharge Planning   CATRACHO:  9/26/22    Code Status: Full Code   Is the patient medically ready for discharge?:     Reason for patient still in hospital (select all that apply): Patient trending condition and Consult recommendations  Discharge Plan A: Home with family        Sanjiv Eric MD  Department of Hospital Medicine   [facility]

## 2022-09-25 NOTE — SUBJECTIVE & OBJECTIVE
Interval History: Patient seen and examined. THELMA. Complained about some gas pains he thinks miralax exacerbated this. Still constipated but +flatus. Has PICC and consulting physicians are deciding on its long-term need. Abdominal drain with minimal output.    Review of Systems   Constitutional:  Negative for chills and fever.   Respiratory:  Negative for cough, shortness of breath and wheezing.    Cardiovascular:  Negative for chest pain and leg swelling.   Gastrointestinal:  Positive for abdominal pain and constipation. Negative for abdominal distention and nausea.   Objective:     Vital Signs (Most Recent):  Temp: 97.7 °F (36.5 °C) (09/25/22 1122)  Pulse: 88 (09/25/22 1122)  Resp: 16 (09/25/22 1122)  BP: 114/69 (09/25/22 1122)  SpO2: 96 % (09/25/22 1122) Vital Signs (24h Range):  Temp:  [97.4 °F (36.3 °C)-98.4 °F (36.9 °C)] 97.7 °F (36.5 °C)  Pulse:  [72-89] 88  Resp:  [14-19] 16  SpO2:  [93 %-99 %] 96 %  BP: (109-134)/(59-72) 114/69     Weight: 96.4 kg (212 lb 8.4 oz)  Body mass index is 28.82 kg/m².    Intake/Output Summary (Last 24 hours) at 9/25/2022 1327  Last data filed at 9/25/2022 0656  Gross per 24 hour   Intake 870.44 ml   Output 430 ml   Net 440.44 ml      Physical Exam  Vitals reviewed.   Constitutional:       General: He is not in acute distress.     Appearance: He is not diaphoretic.   Neck:      Vascular: No JVD.   Cardiovascular:      Rate and Rhythm: Normal rate and regular rhythm.   Pulmonary:      Effort: Pulmonary effort is normal.      Breath sounds: Normal breath sounds.   Abdominal:      General: Bowel sounds are normal. There is distension (mild to moderate).      Palpations: Abdomen is soft.      Tenderness: There is no abdominal tenderness.      Comments: Incisions clean. Dressing c/d/I  JAVAN drain minimal serosanguinous output   Skin:     General: Skin is warm.      Findings: No rash.   Neurological:      General: No focal deficit present.      Mental Status: He is alert and oriented to  person, place, and time. Mental status is at baseline.       Significant Labs: All pertinent labs within the past 24 hours have been reviewed.    Significant Imaging: I have reviewed all pertinent imaging results/findings within the past 24 hours.

## 2022-09-25 NOTE — PROGRESS NOTES
Subjective:   Patient is feeling better.  Abdominal pain is less.  Patient is hungry and up eating lunch at the time of my visit.    Objective:  T-max 98.4   Blood pressure 114/69   Pulse 88   Respirations 16   Intake/output 870/1330    Microbiology:   Blood cultures are no growth to date.    Abdominal wound cultures are remarkable for Enterobacter cloacae resistant to ceftriaxone and Proteus mirabilis which is pansensitive.    Laboratory:  White count 7.8, hemoglobin 8.7, hematocrit 28.1, platelets 666, absolute neutrophil count is 3500.    Sodium 138, potassium 4.2, chloride 104, CO2 25, BUN 15, creatinine 0.8, glucose 120, calcium 8.7, phosphorus 3.4, magnesium 2, liver function test within normal limits, GFR greater than 60.    Pathology from bowel resection and bone marrow aspiration and biopsy remains pending at the time of dictation.      Physical examination:  Well-developed, thin appearing, black gentleman, in no acute distress, who is alert and oriented x4.  HEENT: Normocephalic, atraumatic. Oral mucosa pink and moist. Lips without lesions. Tongue midline. Oropharynx clear. Nonicteric sclerae.   NECK: Supple, no adenopathy. No carotid bruits, thyromegaly or thyroid nodule.   HEART: Regular rate and rhythm without murmur, gallop or rub.   LUNGS: Clear to auscultation bilaterally. Normal respiratory effort.   ABDOMEN: Soft, nontender, nondistended with positive normoactive bowel sounds, no hepatosplenomegaly.  Surgical wound is dry/dressed/intact.  Surgical drain present.  EXTREMITIES: No cyanosis, clubbing or edema. Distal pulses are intact.  Right brachial PICC line intact.     Impression:   1. 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  2.  Iron deficiency anemia.  3.  Intra-abdominal abscess secondary to Enterobacter/Proteus.    Plan:  1. Continue ongoing postoperative care.    2.  Continue current antibiotics.  3.  Review results of  pathology from bowel resection/bone marrow aspiration and biopsy as they become available.    4. Intravenous iron replacement in the interim.    This note was created using voice recognition software and may contain grammatical errors.

## 2022-09-25 NOTE — CARE UPDATE
09/24/22 1943   Patient Assessment/Suction   Level of Consciousness (AVPU) alert   Respiratory Effort Unlabored;Normal   Expansion/Accessory Muscles/Retractions no retractions;expansion symmetric;no use of accessory muscles   Rhythm/Pattern, Respiratory unlabored;pattern regular;depth regular   PRE-TX-O2   O2 Device (Oxygen Therapy) room air   SpO2 98 %   Pulse Oximetry Type Intermittent   $ Pulse Oximetry - Multiple Charge Pulse Oximetry - Multiple   Pulse 89   Resp 18   Incentive Spirometer   $ Incentive Spirometer Charges done with encouragement   Incentive Spirometer Predicted Level (mL) 2500   Administration (IS) proper technique demonstrated   Number of Repetitions (IS) 10   Level Incentive Spirometer (mL) 1500   Patient Tolerance (IS) good

## 2022-09-25 NOTE — ASSESSMENT & PLAN NOTE
Monitor HGB.  Transfuse as needed  Lab Results   Component Value Date    HGB 8.7 (L) 09/25/2022

## 2022-09-25 NOTE — PROGRESS NOTES
Progress Note  Infectious Disease  Reason for Consult:  GPC bacteremia  HPI: Dwight Hoffmann is a 52 y.o. male very pleasant, with past medical history of diabetes, HLD who presented 9/14 complaining of night sweats, unintentional 30 lb weight loss for the last couple of months.  Symptoms worsened by severe abdominal pain, urinary hesitancy, dysuria, increased urinary frequency, occasional bloody stool, with associated weakness, fatigue, and generalized pain.  He denies fever or chills, no nausea or vomiting, no chest pain or shortness of breath.  Patient was seen by primary care last month due to melena, refer to GI, colonoscopy done on 09/01 were 3 polyps were resected, pathology report consistent with tubular adenomas.      In the ER, blood pressure 113/69, T-max a 100.9°   Labs on admission white count 11.3, monocytic predominance 20%, bands 2%, H&H 9.6/28.5, MCV 74, microcytic anemia, platelet count 395   Normal kidney and liver function.    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 Prostatomegaly necessitating correlation with PSA.  Seen by [facility] surgery, status post ex lap for small bowel obstruction in the setting of necrotic large colonic mass with fistula formation, washout, and colon anastomosis.  Empirically started on Zosyn.  Switched to vancomycin, cefepime, Flagyl on 9/15.  ID consult for Gram-positive cocci in 1/2 bottles.  INTERVAL HISTORY:  9/16: Interim reviewed, patient seen and examined at bedside, anxious regarding clinical condition. Hemodynamically stable, afebrile in the last 24h. States he is burping, not passing gas since yesterday. Dysuria and hesitancy are improved. Labs reviewed, leukocytosis down to 12.8, no left shift, normal monocyte count. H/H 8.1/24.8, MCV: 76, plt: 482. Normal electrolytes, liver  and kidney function. AFP and PSA negative. Micro reviewed, OR cultures GNR, ID and sensitivities pending, blood cultures 1/4 bottles CoNS likely a contaminant. Repeat blood culture x 2 no growth to date, pending final.   9/17 (Nanette):  Case discussed with Dr. Mooney.  NG tube was found to be coiled in his mouth last night and was removed not replaced secondary to patient refusal.  AMA was signed by patient but subsequently the tube was replaced and is draining bilious fluid.  KUB reviewed. No flatus yet.  He is afebrile, white blood cells 12,800.  Abscess cultures reviewed with Enterobacter, not very sensitive, and a pansensitive Proteus.  CEA, CA 19 9, alpha fetoprotein, PSA all negative.  9/18: interim reviewed. Afebrile. Continues to require the NGT for slow return of bowel function. No new micro data. He has not had any pain medication since yesterday. Walking in the [place].   9/19 (Vinicio): Interim reviewed, discussed with Dr Fitzpatrick. Patient seen and examined at bedside, after session with PT. States his hungry, and is anxious regarding NG tube and not being able to pass gas. Hemodynamically stable, afebrile. Labs reviewed, stable WBC, no left shift, H/H 8.1/25.5, plt: 633. Stable kidney and liver function tests. No new micro data. Awaiting pathology report.   9/20: Interim reviewed, patient seen and examined at bedside. Discussed with Surgery yesterday, NG tube repositioned, patient is finally passing gas, output from NG tube decreased. Hemodynamically stable, afebrile. Labs reviewed, stable WBC, no left shift. H/H 8.4/25.9. plt: 735, likely reactive thrombocytosis. Stable kidney and liver function. Micro reviewed, repeat blood cultures  no growth. Path lab called for report, Pathologist to call with prelim read.    10 x 7.5 by 3.5 cm,   09/21/2022 No new complains, abd dyscomfort expected, but no pain  Afebrile. Going for   Bone marrow Bx tomorrow  Night sweats have resolved   09/22/2022  Ct chest done for  staging- neg  Bone marrow Bx  done today  Patietn if feeling better in general. Multiple consultants are discussing best option to tx  09/23/2022.  Patient is in OR.  Later on general surgery communicated with myself and other providers that there was no obvious abscess but is still send cultures, which I am grateful.    09/24/2022.  Intraop findings of yesterday are reassuring.  T-max 99°.  Afebrile now.  He still did not have a bowel movement, he has good bowel sounds.  No nausea vomiting burping.  He has a good appetite today.  The drain has just thin bloody liquid, no pus.  Surgical wound is covered and sealed.  09/25/2022.  Patient is walking up and about with PT.  He is in good spirits.  Afebrile.  His wife just left.  I discussed plan of care with patient.  I had asked the oncologist if she needs me to keep the PICC line at the end of IV antibiotics are not but she has not applied yet.  Will discuss with her tomorrow.    EXAM & DIAGNOSTICS REVIEWED:   Vitals:     Temp:  [97.4 °F (36.3 °C)-98.4 °F (36.9 °C)]   Temp: 97.7 °F (36.5 °C) (09/25/22 1122)  Pulse: 88 (09/25/22 1122)  Resp: 16 (09/25/22 1122)  BP: 114/69 (09/25/22 1122)  SpO2: 96 % (09/25/22 1122)    Intake/Output Summary (Last 24 hours) at 9/25/2022 1311  Last data filed at 9/25/2022 0656  Gross per 24 hour   Intake 870.44 ml   Output 430 ml   Net 440.44 ml     General:         In NAD. Alert and attentive, cooperative, comfortable  Eyes:               Anicteric, EOMI  ENT:                  Neck:              Supple  Lungs:           Normal breathing  Heart:             Abd:                S/p Ex-lap,. Dressing in place,  :                 Musc:              Joints without effusion, swelling,  erythema, synovitis, ambulatory  Skin:               Warm, no rash  Wound:          See pictures  Neuro:             Following commands, no acute focal deficit , gait normal  Psych:             Calm, cooperative  Lymphatic:       Extrem:           Left arm  edema due to IV infiltration- improved  VAD:               Right PICC 09/22/2022 09/25/2022.  Same.  09/24/2022 Ex-lap,  wound covered; drain in place  09/22/2022  Sx wound healing    9/14:        General Labs reviewed:  Recent Labs   Lab 09/23/22  0438 09/24/22  0507 09/25/22  0405   WBC 8.69 9.36 7.77   HGB 8.5* 8.2* 8.7*   HCT 26.9* 25.7* 28.1*   * 653* 666*       Recent Labs   Lab 09/23/22  0438 09/24/22  0507 09/25/22  0405    137 138   K 4.0 4.4 4.2    104 104   CO2 24 25 25   BUN 14 14 15   CREATININE 0.8 0.8 0.8   CALCIUM 8.5* 8.7 8.7   PROT 6.9 6.9 7.0   BILITOT 0.7 0.7 0.7   ALKPHOS 110 99 118   ALT 35 25 23   AST 37 19 23     Recent Labs   Lab 09/24/22  0507   CRP 55.8*     No results for input(s): SEDRATE in the last 168 hours.    Estimated Creatinine Clearance: 127.1 mL/min (based on SCr of 0.8 mg/dL).     Micro:  Microbiology Results (last 7 days)       Procedure Component Value Units Date/Time    Aerobic culture [[id number]] Collected: 09/23/22 1312    Order Status: Completed Specimen: Abdominal from Abdomen Updated: 09/25/22 0833     Aerobic Bacterial Culture No growth    Narrative:      Peritoneal fluid    Culture, Anaerobe [[id number]] Collected: 09/23/22 1312    Order Status: Sent Specimen: Body Fluid from Abdomen Updated: 09/24/22 0007    Blood culture [[id number]] Collected: 09/15/22 1722    Order Status: Completed Specimen: Blood from Antecubital, Right Updated: 09/21/22 0612     Blood Culture, Routine No growth after 5 days.    Blood culture [[id number]] Collected: 09/15/22 1819    Order Status: Completed Specimen: Blood from Antecubital, Right Updated: 09/21/22 0612     Blood Culture, Routine No growth after 5 days.    Blood culture (site 2) [[id number]] Collected: 09/14/22 0315    Order Status: Completed Specimen: Blood Updated: 09/19/22 1212     Blood Culture, Routine No growth after 5 days.    Narrative:      Site # 2, aerobic only    Culture, Anaerobe [[id number]]  Collected: 09/14/22 1525    Order Status: Completed Specimen: Abscess from Abdomen Updated: 09/19/22 0717     Anaerobic Culture No anaerobes isolated           Enterobacter cloacae Proteus mirabilis    CULTURE, AEROBIC  (SPECIFY SOURCE) CULTURE, AEROBIC  (SPECIFY SOURCE)   Amox/K Clav'ate   <=8/4 mcg/mL Sensitive   Amp/Sulbactam   <=8/4 mcg/mL Sensitive   Ampicillin   <=8 mcg/mL Sensitive   Cefazolin   <=2 mcg/mL Sensitive   Cefepime 16 mcg/mL Intermediate <=2 mcg/mL Sensitive   Ceftriaxone >32 mcg/mL Resistant <=1 mcg/mL Sensitive   Ciprofloxacin <=1 mcg/mL Sensitive <=1 mcg/mL Sensitive   Ertapenem <=0.5 mcg/mL Sensitive <=0.5 mcg/mL Sensitive   Gentamicin <=4 mcg/mL Sensitive <=4 mcg/mL Sensitive   Levofloxacin <=2 mcg/mL Sensitive <=2 mcg/mL Sensitive   Meropenem <=1 mcg/mL Sensitive <=1 mcg/mL Sensitive   Piperacillin/Tazo 64 mcg/mL Intermediate <=16 mcg/mL Sensitive   Tetracycline <=4 mcg/mL Sensitive     Tobramycin <=4 mcg/mL Sensitive <=4 mcg/mL Sensitive   Trimeth/Sulfa <=2/38 mcg/mL Sensitive <=2/38 mcg/mL Sensitive     Pathology:  9/1 colonoscopy:  1. ASCENDING COLON, POLYPECTOMY:   - TUBULAR ADENOMA.   2. CECUM, POLYPECTOMY:   - TUBULAR ADENOMA.   3. CECUM, POLYPECTOMY:   - TUBULAR ADENOMA.     Imaging Reviewed:  CXR clear  CT abdomen/pelvis 9/20: Postoperative abdomen.  Deep abdominal fluid collection, compatible with abscess.  The collection is not excessive Espinoza percutaneously. Mildly dilated small bowel compatible with ileus.  Nasogastric tube in the distal stomach.     CT abdomen/pelvis 9/14:  1. 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  2. Prostatomegaly necessitating correlation with PSA.    Cardiology: ECHO 9/15/22:  The left ventricle is normal in size with concentric remodeling and normal systolic function.  The estimated ejection fraction is 60%.  Grade I left ventricular diastolic dysfunction.  Normal right ventricular size  with normal right ventricular systolic function.  Mild left atrial enlargement.  Mild mitral regurgitation.  Mild to moderate tricuspid regurgitation.  Normal central venous pressure (3 mmHg).  The estimated PA systolic pressure is 49 mmHg.  There is pulmonary hypertension.  Mild right atrial enlargement.  No vegetations were seen       IMPRESSION & PLAN     Severe sepsis resolved, secondary to perforated necrotic small bowel mass with abscess status post ex lap/drainage/small-bowel resection with anastomosis 9/14 in the setting of DLBCL (poss)      Blood cultures 1/4 bottles grew CoNs, likely a contaminant            Repeat blood cultures x 2 no growth            OR cultures Enterobacter (intermediate to cefepime and to Zosyn) and Proteus mirabilis sensitive to Merrem    Remaining 10 x 7.5 by 3.5 cm abscess--status post IND  09/23/2022.  It did not look infected, it did not look like an abscess to surgeon.  It looked more like friable lymph node/material.  Cultures negative so far.  Platelets, are a  marker of inflammation, and are elevated, monitor.          AFP, Ca-19, CEA negative and PSA 0.9 normal  Ileus - post-op; NG tube repositioned, passing gas since 9/19  DLBCL- ?diagnosed by path 09/14, prelim, bone marrow biopsy 922, oncologist following.  PMHx: diabetes and HLD    Recommendations:  --- PICC placed on 09/22/2022  -- Continue Meropenem 1g IV q8h for now,   -- follow cultures obtained intraop on 09/23, to make sure that there are no new organisms and nothing  surprising.  -- plan at home:   Ertapenem 1 g IV q.day x 14 days   Home health for Weekly labs times for CMP, CBC, ESR, CRP.    CT scan abdomen on October 3, 2022.  Follow-up with me in ID clinic in 7 days  --please improve nutrition, aware.        Medical Decision Making during this encounter was  [_] Low Complexity  [_] Moderate Complexity  [xx] High Complexity

## 2022-09-25 NOTE — PLAN OF CARE
Problem: Physical Therapy  Goal: Physical Therapy Goal  Description: Goals to be met by: 2022     Patient will increase functional independence with mobility by performin). Supine to sit with Modified [place]  2). Sit to supine with Modified [place]  3). Sit to stand transfer with Modified [place]  4). Gait  x > 250 feet with Modified [place]     Outcome: Ongoing, Progressing

## 2022-09-25 NOTE — PROGRESS NOTES
[facility] Surgery  Progress Note    Subjective:     History of Present Illness:  No notes on file    Post-Op Info:  Procedure(s) (LRB):  DRAINAGE, ABDOMEN, LAPAROSCOPIC (N/A)   2 Days Post-Op     Interval History: No new complaints    Medications:  Continuous Infusions:  Scheduled Meds:   enoxaparin  40 mg Subcutaneous Daily    fluconazole (DIFLUCAN) IV (PEDS and ADULTS)  100 mg Intravenous Q24H    iron sucrose (VENOFER) IVPB  200 mg Intravenous Daily    meropenem (MERREM) IVPB  1 g Intravenous Q8H    mupirocin   Nasal BID    polyethylene glycol  17 g Oral Daily    sodium chloride 0.9%  10 mL Intravenous Q6H     PRN Meds:acetaminophen, dextrose 50%, dextrose 50%, glucagon (human recombinant), glucose, glucose, HYDROcodone-acetaminophen, HYDROcodone-acetaminophen, insulin aspart U-100, magnesium oxide, magnesium oxide, naloxone, ondansetron, potassium bicarbonate, potassium bicarbonate, potassium bicarbonate, potassium, sodium phosphates, potassium, sodium phosphates, potassium, sodium phosphates, promethazine (PHENERGAN) IVPB, simethicone, sodium chloride 0.9%, Flushing PICC Protocol **AND** sodium chloride 0.9% **AND** sodium chloride 0.9%     Review of patient's allergies indicates:  No Known Allergies  Objective:     Vital Signs (Most Recent):  Temp: 97.7 °F (36.5 °C) (09/25/22 1122)  Pulse: 88 (09/25/22 1122)  Resp: 16 (09/25/22 1122)  BP: 114/69 (09/25/22 1122)  SpO2: 96 % (09/25/22 1122) Vital Signs (24h Range):  Temp:  [97.4 °F (36.3 °C)-98.4 °F (36.9 °C)] 97.7 °F (36.5 °C)  Pulse:  [72-89] 88  Resp:  [14-19] 16  SpO2:  [93 %-98 %] 96 %  BP: (109-134)/(59-72) 114/69     Weight: 96.4 kg (212 lb 8.4 oz)  Body mass index is 28.82 kg/m².    Intake/Output - Last 3 Shifts         09/23 0700 09/24 0659 09/24 0700 09/25 0659 09/25 0700 09/26 0659    P.O.  570 598    IV Piggyback 510.3 300.4     Total Intake(mL/kg) 510.3 (5.3) 870.4 (9) 598 (6.2)    Urine (mL/kg/hr) 1025 (0.4)  1325 (0.6)     Drains 60 5     Stool  0     Total Output 1085 1330     Net -574.7 -459.6 +598           Stool Occurrence  0 x             Physical Exam  Constitutional:       General: He is not in acute distress.  HENT:      Head: Normocephalic and atraumatic.   Cardiovascular:      Rate and Rhythm: Normal rate and regular rhythm.   Pulmonary:      Effort: Pulmonary effort is normal. No respiratory distress.      Breath sounds: Normal breath sounds. No wheezing or rales.   Abdominal:      General: Bowel sounds are normal. There is no distension.      Palpations: Abdomen is soft.      Tenderness: There is no abdominal tenderness.      Comments: Incision clean and dry.  JAVAN: minimal   Neurological:      Mental Status: He is alert and oriented to person, place, and time.   Psychiatric:         Behavior: Behavior normal.       Significant Labs:  I have reviewed all pertinent lab results within the past 24 hours.  CBC:   Recent Labs   Lab 09/25/22  0405   WBC 7.77   RBC 3.59*   HGB 8.7*   HCT 28.1*   *   MCV 78*   MCH 24.2*   MCHC 31.0*     BMP:   Recent Labs   Lab 09/25/22  0405   *      K 4.2      CO2 25   BUN 15   CREATININE 0.8   CALCIUM 8.7   MG 2.0     Assessment/Plan:     * Small bowel mass  1. S/P laparoscopic drainage procedure.  2. Doing well. DC expected tomorrow.        Pablo Beltran MD  General Surgery  [facility]

## 2022-09-25 NOTE — PLAN OF CARE
Spoke with Galdino from [facility]. States will plan on education for tomorrow once [place] is back on since pt is not discharging today     09/25/22 0900   Post-Acute Status   Post-Acute Authorization [facility]fusion   IV Infusion Status Awaiting delivery

## 2022-09-26 PROBLEM — A41.9 SEPSIS: Status: ACTIVE | Noted: 2022-01-01

## 2022-09-26 PROBLEM — Z90.49 S/P SMALL BOWEL RESECTION: Status: ACTIVE | Noted: 2022-01-01

## 2022-09-26 PROBLEM — Z98.890 S/P EXPLORATORY LAPAROTOMY: Status: ACTIVE | Noted: 2022-01-01

## 2022-09-26 NOTE — DISCHARGE SUMMARY
"[facility] Medicine  Discharge Summary      Patient Name: Dwight Hoffmann  MRN: [id number]  Patient Class: IP- Inpatient  Admission Date: 9/13/2022  Hospital Length of Stay: 12 days  Discharge Date and Time:  09/26/2022 1:10 PM  Attending Physician: Sanjiv Eric MD   Discharging Provider: Sanjiv Eric MD  Primary Care Provider: BOSTON Oswald      HPI:   Mr. Hoffmann is a 52 year old male with a history of NIDDM2 and HLD who presents today with complaints of night sweats for weeks. It is moderate. It is associated with 25 lb wt loss over the last 2 months, urinary hesitancy, urinary frequency, occasional blood streaked stools, weakness, fatigue, neck pain, knee pain, and pelvic pain. He denies measured fever, chills, N/V/D, chest pain, or SOB. He was seen by his primary last month about the blood streaked stools and referred to Dr. Clemens for colonoscopy on 9/1 with multiple polypectomies with path report of tubular adenomas. In the ED, labs revealed microcytic anemia, he was mildly tachycardic on arrival  which improved spontaneously, /60,  and CT abd/pelvis revealing: "Thick-walled collection containing feculent material, widely communicating with small bowel, seen centrally in the pelvis. Findings are suspicious for necrotic mass or abscess arising from the small bowel" Findings were discussed with Dr. Oliveira by ED MD who agreed to see patient in the morning.       Procedure(s) (LRB):  DRAINAGE, ABDOMEN, LAPAROSCOPIC (N/A)      Hospital Course:   Dwight Hoffmann is a 52 year old male with a past medical history of DM, HLD and anemia who presented with lower abdominal pain, lower back pain, and difficulty urinating secondary to an abdominal mass discovered on CT scan. Surgery was consulted and performed exploratory laparotomy 9/14/22 with resection of the mass and anastomosis of small bowel. The mass appeared to be an abscess with surrounding necrosis.  Patient " with signs of sepsis including tachycardic and leukocytosis which could be related to surgery as well which complicated his picture however he was covered for this with with IV antibiotics. An NG tube to LIWS suction was started and the patient was put on IV fluids while he was NPO. Briefly on PPN. He did have a positive blood culture which showed coagulase negative Staph suspected to be a contaminant. He was briefly on vancomycin which was discontinued. ID consulted.  Patient was put on meropenem and fluconazole.  Cultures from surgery with Enterobacter cloacae and Proteus mirabilis.  His bowel function slowly returned, and he no longer needed NGT to suction.  The pathologic diagnosis on the mass was diffuse large B cell lymphoma.  Oncology service was asked to consult with us.  Patient underwent bone marrow biopsy 9/22/22 for staging.  CT of chest showed no enlarged lymph nodes. Did have another laparoscopy 9/23/22 due to abdominal fluid collection with cleanout thought to be more necrotic than infective. ID planned for outpatient ertapenem infusions to complete 2 week course. Home health was arranged. To draw CBC, CMP, CRP, and ESR weekly and send to ID. CT abd/pelv ordered to be done prior to ID clinic visit. He will have follow up with PCP, ID, general surgery, and heme/onc. To have abdominal staples removed at surgery clinic 3-4 days after discharge. By time of discharge the patient was tolerating a regular diet with improving admission symptoms.    Physical Exam on day of discharge:  Constitutional:       General: He is not in acute distress.     Appearance: He is not diaphoretic.   Neck:      Vascular: No JVD.   Cardiovascular:      Rate and Rhythm: Normal rate and regular rhythm.   Pulmonary:      Effort: Pulmonary effort is normal.      Breath sounds: Normal breath sounds.   Abdominal:      General: Bowel sounds are normal. There is distension (mild to moderate).      Palpations: Abdomen is soft.       Tenderness: There is no abdominal tenderness.      Comments: Incisions clean. Dressing c/d/I  JAVAN drain minimal serosanguinous output   Skin:     General: Skin is warm.      Findings: No rash.   Neurological:      General: No focal deficit present.      Mental Status: He is alert and oriented to person, place, and time. Mental status is at baseline.          Goals of Care Treatment Preferences:  Code Status: Full Code      Consults:   Consults (From admission, onward)        Status Ordering Provider     Inpatient consult to Social Work/Case Management  Once        Provider:  (Not yet assigned)    Acknowledged ANGELICA FINNEGAN     Inpatient consult to PICC team (NIAS)  Once        Provider:  (Not yet assigned)    Completed ANGELICA FINNEGAN     Inpatient consult to Hematology/Oncology  Once        Provider:  Josee Reid MD    Acknowledged LATISHA BENZ JR     Inpatient consult to Infectious Diseases  Once        Provider:  Samantha Obando MD    Completed RAJI HOLLEY          No new Assessment & Plan notes have been filed under this hospital service since the last note was generated.  Service: Hospital Medicine    Final Active Diagnoses:    Diagnosis Date Noted POA    PRINCIPAL PROBLEM:  Small bowel mass [K63.89] 09/14/2022 Yes    Intra-abdominal abscess [K65.1] 09/20/2022 Yes    S/P exploratory laparotomy [Z98.890] 09/26/2022 Not Applicable    S/P small bowel resection [Z90.49] 09/26/2022 Not Applicable    Sepsis [A41.9] 09/26/2022 No    Moderate malnutrition [E44.0] 09/16/2022 Yes    Anemia, chronic disease [D63.8] 09/14/2022 Yes    Diabetes mellitus type 2, noninsulin dependent [E11.9] 09/14/2022 Yes    Arthralgia of bilat knees and neck [M25.569] 09/14/2022 Yes      Problems Resolved During this Admission:    Diagnosis Date Noted Date Resolved POA    Small bowel perforation [K63.1] 09/20/2022 09/23/2022 Yes       Discharged Condition: good    Disposition: Home-Health Care c    Follow  Up:   Follow-up Information     Jan Oliveira Jr, MD. Schedule an appointment as soon as possible for a visit on 9/30/2022.    Specialty: General Surgery  Why: staple removal. Follow up  Contact information:  [address]  [address]  [place] [address]  [phone]             BOSTON Oswald. Schedule an appointment as soon as possible for a visit in 1 week(s).    Specialty: Family Medicine  Contact information:  [address]  [address]  [place] [address]  [phone]             Lisa Kendrick MD. Schedule an appointment as soon as possible for a visit in 1 week(s).    Specialty: Infectious Diseases  Contact information:  [address]  [address]  [place] [address]  [phone]             Josee Reid MD Follow up in 2 week(s).    Specialties: Hematology and Oncology, Hematology  Contact information:  [address]  [address]  [place] [address]  [phone]                       Patient Instructions:      CT Abdomen Pelvis  Without Contrast   Standing Status: Future Standing Exp. Date: 09/26/23     Order Specific Question Answer Comments   Oral/Rectal Contrast instructions: NO Oral Contrast    Special CT ABD Protocol Request? Routine    May the Radiologist modify the order per protocol to meet the clinical needs of the patient? Yes      Ambulatory referral/consult to Home Health   Standing Status: Future   Referral Priority: Routine Referral Type: Home Health   Referral Reason: Specialty Services Required   Requested Specialty: Home Health Services   Number of Visits Requested: 1     Diet diabetic     Notify your health care provider if you experience any of the following:  temperature >100.4     Notify your health care provider if you experience any of the following:  persistent nausea and vomiting or diarrhea     Notify your health care provider if you experience any of the following:  severe uncontrolled pain     Notify your health care provider if you experience any of the following:  redness,  tenderness, or signs of infection (pain, swelling, redness, odor or green/yellow discharge around incision site)     Notify your health care provider if you experience any of the following:  difficulty breathing or increased cough     Notify your health care provider if you experience any of the following:  severe persistent headache     Notify your health care provider if you experience any of the following:  worsening rash     Notify your health care provider if you experience any of the following:  persistent dizziness, light-headedness, or visual disturbances     Notify your health care provider if you experience any of the following:  increased confusion or weakness     Activity as tolerated       Significant Diagnostic Studies:    Lab Results   Component Value Date    WBC 7.88 09/26/2022    HGB 9.2 (L) 09/26/2022    HCT 29.2 (L) 09/26/2022    MCV 79 (L) 09/26/2022     (H) 09/26/2022       BMP  Lab Results   Component Value Date     09/26/2022    K 4.2 09/26/2022     09/26/2022    CO2 26 09/26/2022    BUN 12 09/26/2022    CREATININE 0.8 09/26/2022    CALCIUM 8.9 09/26/2022    ANIONGAP 9 09/26/2022    ESTGFRAFRICA 111 12/28/2021    EGFRNONAA 96 12/28/2021     Lab Results   Component Value Date    CRP 54.8 (H) 09/26/2022     Lab Results   Component Value Date     (H) 09/21/2022      Lab Results   Component Value Date    FERRITIN 655 (H) 09/21/2022     Lab Results   Component Value Date    IRON 19 (L) 09/21/2022    TRANSFERRIN 163 (L) 09/21/2022    TIBC 241 (L) 09/21/2022    LABIRON 9 (L) 08/22/2022    FESATURATED 8 (L) 09/21/2022      Procalcitonin 9/15/22  Component Ref Range & Units 11 d ago 12 d ago   Procalcitonin <0.25 ng/mL 1.04 High   0.09 CM         Lactic Acid, Plasma 9/12/22  Component Ref Range & Units 12 d ago    Lactate (Lactic Acid) 0.5 - 2.2 mmol/L 1.0         Blood culture (site 1) 9/14/22     important suggestion  Newer results are available. Click to view them now.      Component 12 d ago    Blood Culture, Routine Gram stain aer bottle: Gram positive cocci in clusters resembling Staph    Blood Culture, Routine Results called to and read back by: Poncho Brunson RN  09/15/2022    Blood Culture, Routine 12:31    Blood Culture, Routine  Abnormal   COAGULASE-NEGATIVE STAPHYLOCOCCUS SPECIES   Organism is a probable contaminant           Blood culture (site 2) 9/15/22     important suggestion  Newer results are available. Click to view them now.     Component 12 d ago    Blood Culture, Routine No growth after 5 days.           Blood culture, repeat, 9/15/22 x2  Component 11 d ago    Blood Culture, Routine No growth after 5 days.                 Aerobic culture 9/14/22    Component 12 d ago    Aerobic Bacterial Culture  Abnormal   ENTEROBACTER CLOACAE   Few     Aerobic Bacterial Culture  Abnormal   PROTEUS MIRABILIS   Few   No other significant isolate     Resulting Agency [facility]        Susceptibility     Enterobacter cloacae Proteus mirabilis     CULTURE, AEROBIC  (SPECIFY SOURCE) CULTURE, AEROBIC  (SPECIFY SOURCE)     Amox/K Clav'ate   <=8/4 mcg/mL Sensitive     Amp/Sulbactam   <=8/4 mcg/mL Sensitive     Ampicillin   <=8 mcg/mL Sensitive     Cefazolin   <=2 mcg/mL Sensitive     Cefepime 16 mcg/mL Intermediate <=2 mcg/mL Sensitive     Ceftriaxone >32 mcg/mL Resistant <=1 mcg/mL Sensitive     Ciprofloxacin <=1 mcg/mL Sensitive <=1 mcg/mL Sensitive     Ertapenem <=0.5 mcg/mL Sensitive <=0.5 mcg/mL Sensitive     Gentamicin <=4 mcg/mL Sensitive <=4 mcg/mL Sensitive     Levofloxacin <=2 mcg/mL Sensitive <=2 mcg/mL Sensitive     Meropenem <=1 mcg/mL Sensitive <=1 mcg/mL Sensitive     Piperacillin/Tazo 64 mcg/mL Intermediate <=16 mcg/mL Sensitive     Tetracycline <=4 mcg/mL Sensitive       Tobramycin <=4 mcg/mL Sensitive <=4 mcg/mL Sensitive     Trimeth/Sulfa <=2/38 mcg/mL Sensitive <=2/38 mcg/mL Sensitive                 Linear View         Specimen Collected: 09/14/22 15:25 Last Resulted:  09/17/22 13:20                 Aerobic culture 9/23/22  Component 3 d ago    Aerobic Bacterial Culture No growth P         Culture, Anaerobe 9/23/22  Component 3 d ago    Anaerobic Culture Culture in progress P               CT ABDOMEN PELVIS WITH CONTRAST 9/14/22     CLINICAL HISTORY:  LLQ abdominal pain;     TECHNIQUE:  Low dose axial images, sagittal and coronal reformations were obtained from the lung bases to the pubic symphysis following the IV administration of 100 mL of Omnipaque 350 .  Oral contrast was not given.     COMPARISON:  12/24/2012     FINDINGS:  Lung bases clear.  Normal size heart.  Decompressed gallbladder.     Simple cyst right renal midpole.  Remaining solid abdominal organs are unremarkable.     There is no enteric contrast which limits bowel assessment.  No dilated bowel loops.  Appendectomy.  Moderate degree of stool in the right colon with a few scattered colonic diverticula.     Centered in the mid abdomen mesentery just to the right of midline near the level of the iliac bifurcation there is a thick walled mass containing bowel contents centrally.  This demonstrates open communication with at least 2 adjacent bowel loops as can be seen on axial series 2, image 118 and series 2, image 137.  This measures 8 x 10 x 12 cm AP by TV by cc dimension.  There is mild surrounding fat stranding.  Small fat containing umbilical defect.  Prostate 5 cm.  Unremarkable urinary bladder.     No acute osseous abnormality.     Impression:     1. 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  2. Prostatomegaly necessitating correlation with PSA.          XR ABDOMEN PORTABLE 9/19/22     CLINICAL HISTORY:  Eval ngt tip; Fistula of intestine     TECHNIQUE:  AP View(s) of the abdomen was performed.     COMPARISON:  09/17/2022     FINDINGS:  A nasogastric tube has its tip likely in distal duodenum.  There is persistent moderate, diffuse small bowel  dilatation with loops measuring up to 4.5 cm, with overall little interval change.  Midline skin staples are again noted.     Impression:     NG tube with tip in the distal duodenum.  Unchanged diffuse small bowel dilatation favoring postoperative ileus.            CT ABDOMEN PELVIS WITH CONTRAST 9/20/22     CLINICAL HISTORY:  Abdominal abscess/infection suspected; Fistula of intestine     TECHNIQUE:  Low dose axial images, sagittal and coronal reformations were obtained from the lung bases to the pubic symphysis following the IV administration of 100 mL of Omnipaque 350 and the oral administration of 700 mL of Omnipaque 12     COMPARISON:  None.     FINDINGS:  The liver, spleen, pancreas, and adrenal glands are unremarkable.  There is a small cyst of the lower pole of the right kidney.  Left kidney is unremarkable.     A nasogastric has its tip in the distal stomach.  There is a ventral midline surgical incision.  There is no fluid collection along the incision.  A peripherally enhancing, gas containing collection is present centrally within the low abdomen/high pelvis, measuring approximately 10 x 7.5 by 3.5 cm, compatible with abscess.     There has been a partial small bowel resection with reanastomosis within the left hemiabdomen.  There are a few mildly dilated loops of small bowel measuring up to 4.1 cm, favoring ileus.  The appendix is not seen and is likely surgically absent.  There is mild diverticulosis coli.     Mild pelvic free fluid is present.  There is no free air.  There are trace bilateral pleural effusions.  Minor atelectasis is present the lung bases.     Impression:     Postoperative abdomen.  Deep abdominal fluid collection, compatible with abscess.  The collection is not excessive Espinoza percutaneously.     Mildly dilated small bowel compatible with ileus.  Nasogastric tube in the distal stomach.          CT CHEST WITH CONTRAST 9/21/22     CLINICAL HISTORY:  DLBCL;Diffuse large b-cell lymphoma,  intra-abdominal lymph nodes     TECHNIQUE:  Low dose axial images, sagittal and coronal reformations were obtained from the thoracic inlet to the lung bases following the IV administration of 75 mL of Omnipaque 350.     COMPARISON:  Chest x-ray of September 14, 2022     FINDINGS:  The heart and great vessels are of normal size and contour.  Enlargement or aneurysm is not seen.  Adenopathy or soft tissue masses in the mediastinum or axilla are not seen.     A band of atelectasis is seen in the left lower lobe.  Significant mass or nodule is not seen within the lung fields.  The interstitial density is within normal limits.  No pneumothorax or pleural effusion is identified.     Impression:     Band of atelectasis seen in the left lower lobe.  Otherwise negative CT of the chest.  Adenopathy is not demonstrated.          Pending Diagnostic Studies:     Procedure Component Value Units Date/Time    Specimen to Pathology, Bone Marrow Aspiration/Biopsy [[id number]] Collected: 09/22/22 0939    Order Status: Sent Lab Status: In process Updated: 09/22/22 1317    Specimen: Bone Marrow     Specimen to Pathology, Surgery General Surgery [[id number]] Collected: 09/14/22 1546    Order Status: Sent Lab Status: In process Updated: 09/14/22 1710    Specimen: Tissue          Medications:  Reconciled Home Medications:      Medication List      START taking these medications    ERTAPENEM (INVANZ) 1 G/100 ML NS (READY TO MIX)  Inject 100 mLs (1 g total) into the vein once daily. for 13 days  Start taking on: September 27, 2022     HYDROcodone-acetaminophen 5-325 mg per tablet  Commonly known as: NORCO  Take 1 tablet by mouth every 8 (eight) hours as needed for Pain.        CONTINUE taking these medications    atorvastatin 10 MG tablet  Commonly known as: LIPITOR  Take 1 tablet (10 mg total) by mouth once daily.     blood sugar diagnostic Strp  To check BG 2 times daily, to use with insurance preferred meter     blood-glucose meter kit  To  check BG 2 times daily, to use with insurance preferred meter     clotrimazole 1 % cream  Commonly known as: LOTRIMIN  Apply topically 2 (two) times daily. for 7 days     hydrocortisone 2.5 % cream  Apply topically 2 (two) times daily. for 7 days     lancets Misc  To check BG 2 times daily, to use with insurance preferred meter     lisinopriL 2.5 MG tablet  Commonly known as: PRINIVIL,ZESTRIL  Take 1 tablet (2.5 mg total) by mouth once daily.     metFORMIN 1000 MG tablet  Commonly known as: GLUCOPHAGE  Take 1 tablet (1,000 mg total) by mouth 2 (two) times daily with meals.     tadalafiL 5 MG tablet  Commonly known as: CIALIS  Take 1 tablet (5 mg total) by mouth daily as needed for Erectile Dysfunction.        STOP taking these medications    linaCLOtide 72 mcg Cap capsule  Commonly known as: LINZESS            Indwelling Lines/Drains at time of discharge:   Lines/Drains/Airways     Peripherally Inserted Central Catheter Line  Duration           PICC Double Lumen 09/22/22 1228 right brachial 4 days          Drain  Duration                Closed/Suction Drain 09/23/22 1256 RLQ Bulb 19 Fr. 3 days                Time spent on the discharge of patient: 38 minutes         Sanjiv Eric MD  Department of Hospital Medicine  [facility]

## 2022-09-26 NOTE — PHYSICIAN QUERY
PT Name: Dwight Hoffmann  MR #: [id number]     DOCUMENTATION CLARIFICATION      CDS/: Cyndee Reid RN, CDS              Contact information: [email or web address]      This form is a permanent document in the medical record.     Query Date: September 26, 2022    Dear Provider,  By submitting this query, we are merely seeking further clarification of documentation.  Please utilize your independent clinical judgment when addressing the question(s) below.     The Medical Record contains the following:    Supporting Clinical Findings Location in Medical Record   Small bowel mass  Admit to med/tele  Abscess vs. Necrotic mass with small bowel fistula  Consult Dr. Oliveira  Check procal/lactate  Get blood cultures  Start zosyn     Empirically started on Zosyn.  Switched to vancomycin, cefepime, Flagyl on 9/15.     ID consult for Gram-positive cocci in 1/2 bottles.    Severe sepsis secondary to perforated necrotic small bowel with abscess status post ex lap/drainage/small-bowel resection with anastomosis 9/14 in the setting of possible colon neoplasia     blood cultures 1/4 bottles CoNS likely a contaminant. Repeat blood culture x 2 no growth to date     Severe sepsis, resolved      WBC: 16.25 --> 12.87 --> 11.09 --> 11.05 --> 12.48 --> 15.03 --> 10.75     Procalcitonin: 0.09 --> 1.04     Lactate: 1.0     Aerobic Bacterial Culture   Abnormal   ENTEROBACTER CLOACAE   Few    Aerobic Bacterial Culture    Abnormal   PROTEUS MIRABILIS   Few   No other significant isolate    Temp:  [98.7 °F (37.1 °C)] 98.7 °F (37.1 °C)  Pulse:  [] 83  Resp:  [20] 20  BP: (106-116)/(58-71) 113/69    Temp:  [96.2 °F (35.7 °C)-100.9 °F (38.3 °C)] 98.4 °F   Pulse:  [] 82  Resp:  [9-39] 16  BP: (118-145)/(57-76) 123/75    Temp:  [96.9 °F (36.1 °C)-98.2 °F (36.8 °C)] 97 °F (36.1 °C)  Pulse:  [] 91  Resp:  [15-20] 16  BP: (124-131)/(71-82) 131/82    Temp:  [96.5 °F (35.8 °C)-97.6 °F (36.4 °C)] 97.6 °F   Pulse:  []  98  Resp:  [16-20] 16  BP: (124-145)/(75-84) 138/80    Temp:  [96.6 °F (35.9 °C)-98.1 °F (36.7 °C)] 97.5 °F   Pulse:  [] 70  Resp:  [15-20] 19  BP: (129-156)/(74-82) 156/80       H&P 9/14                 ID Consult 9/15       ID Consult 9/15     ID Consult 9/15           ID PN 9/16       ID PN 9/16       Labs 9/15- 9/22       Labs 9/14- 9/15     Labs 9/14     Abscess Cultures Collected 9/14                   H&P 9/14           HM PN 9/15           HM PN 9/16           HM PN 9/17           HM PN 9/18      Please clarify if the __sepsis/severe sepsis__ diagnosis has been:    [ x ] Ruled In   [  ] Ruled Out   [  ] Other/Clarification of findings (please specify): _______________    [   ] Clinically undetermined           Please document in your progress notes daily for the duration of treatment, until resolved, and include in your discharge summary.    Form No. [id number]

## 2022-09-26 NOTE — CHAPLAIN
Attempted to visit pt again during general rounding, but he was in process of being educated by infusion person; quick visit to just say anna.  will follow up later. Lord, in your mercy.

## 2022-09-26 NOTE — PROGRESS NOTES
Subjective:   Patient is feeling better.  Abdominal pain is less.  Patient is hungry and up eating lunch at the time of my visit.    Pathology from bowel resection and bone marrow aspiration and biopsy remains pending at the time of dictation.      Physical examination:    Vitals:    09/26/22 0829   BP:    Pulse: 77   Resp: 16   Temp:        Well-developed, thin appearing, black gentleman, in no acute distress, who is alert and oriented x4.  HEENT: Normocephalic, atraumatic. Oral mucosa pink and moist. Lips without lesions. Tongue midline. Oropharynx clear. Nonicteric sclerae.   NECK: Supple, no adenopathy. No carotid bruits, thyromegaly or thyroid nodule.   HEART: Regular rate and rhythm without murmur, gallop or rub.   LUNGS: Clear to auscultation bilaterally. Normal respiratory effort.   ABDOMEN: Soft, nontender, nondistended with positive normoactive bowel sounds, no hepatosplenomegaly.  Surgical wound is dry/dressed/intact.  Surgical drain present.  EXTREMITIES: No cyanosis, clubbing or edema. Distal pulses are intact.  Right brachial PICC line intact.    CMP  Sodium   Date Value Ref Range Status   09/26/2022 138 136 - 145 mmol/L Final     Potassium   Date Value Ref Range Status   09/26/2022 4.2 3.5 - 5.1 mmol/L Final     Chloride   Date Value Ref Range Status   09/26/2022 103 95 - 110 mmol/L Final     CO2   Date Value Ref Range Status   09/26/2022 26 23 - 29 mmol/L Final     Glucose   Date Value Ref Range Status   09/26/2022 116 (H) 70 - 110 mg/dL Final     BUN   Date Value Ref Range Status   09/26/2022 12 6 - 20 mg/dL Final     Creatinine   Date Value Ref Range Status   09/26/2022 0.8 0.5 - 1.4 mg/dL Final     Calcium   Date Value Ref Range Status   09/26/2022 8.9 8.7 - 10.5 mg/dL Final     Total Protein   Date Value Ref Range Status   09/26/2022 7.3 6.0 - 8.4 g/dL Final     Albumin   Date Value Ref Range Status   09/26/2022 2.6 (L) 3.5 - 5.2 g/dL Final     Total Bilirubin   Date Value Ref Range Status    09/26/2022 0.7 0.1 - 1.0 mg/dL Final     Comment:     For infants and newborns, interpretation of results should be based  on gestational age, weight and in agreement with clinical  observations.    Premature Infant recommended reference ranges:  Up to 24 hours.............<8.0 mg/dL  Up to 48 hours............<12.0 mg/dL  3-5 days..................<15.0 mg/dL  6-29 days.................<15.0 mg/dL       Alkaline Phosphatase   Date Value Ref Range Status   09/26/2022 135 55 - 135 U/L Final     AST   Date Value Ref Range Status   09/26/2022 24 10 - 40 U/L Final     ALT   Date Value Ref Range Status   09/26/2022 27 10 - 44 U/L Final     Anion Gap   Date Value Ref Range Status   09/26/2022 9 8 - 16 mmol/L Final     eGFR if    Date Value Ref Range Status   12/28/2021 111 > OR = 60 mL/min/1.73m2 Final     eGFR if non    Date Value Ref Range Status   12/28/2021 96 > OR = 60 mL/min/1.73m2 Final     Lab Results   Component Value Date    WBC 7.88 09/26/2022    HGB 9.2 (L) 09/26/2022    HCT 29.2 (L) 09/26/2022    MCV 79 (L) 09/26/2022     (H) 09/26/2022            Impression:     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    Iron deficiency anemia.    Intra-abdominal abscess secondary to Enterobacter/Proteus.    Reactive thrombocytosis    Plan:    Continue ongoing postoperative care.    Continue current antibiotics.    Review results of pathology from bowel resection/bone marrow aspiration and biopsy as they become available.      Intravenous iron replacement in the interim.

## 2022-09-26 NOTE — PLAN OF CARE
Patient is cleared to go home and receive IV antibiotic therapy for 13 days.  Patient received the first dose of medication on 9/26/2022.  Patient received teaching about the IV administration and infection control from the company providing the antibiotic therapy.  Patient tolerated the first dose well with no distress or discomfort.  Patient denies any pain at current time.  Continues to pass flatus, but fails to have a bowel movement (MD aware). No distress observed.    Problem: Adult Inpatient Plan of Care  Goal: Plan of Care Review  Outcome: Met  Goal: Patient-Specific Goal (Individualized)  Outcome: Met  Goal: Absence of Hospital-Acquired Illness or Injury  Outcome: Met  Goal: Optimal Comfort and Wellbeing  Outcome: Met  Goal: Readiness for Transition of Care  Outcome: Met     Problem: Infection  Goal: Absence of Infection Signs and Symptoms  Outcome: Met     Problem: Fall Injury Risk  Goal: Absence of Fall and Fall-Related Injury  Outcome: Met     Problem: Oral Intake Inadequate  Goal: Improved Oral Intake  Outcome: Met     Problem: Skin or Soft Tissue Infection  Goal: Absence of Infection Signs and Symptoms  Outcome: Met     Problem: Pain Acute  Goal: Acceptable Pain Control and Functional Ability  Outcome: Met     Problem: Adjustment to Illness (Sepsis/Septic Shock)  Goal: Optimal Coping  Outcome: Met     Problem: Bleeding (Sepsis/Septic Shock)  Goal: Absence of Bleeding  Outcome: Met     Problem: Glycemic Control Impaired (Sepsis/Septic Shock)  Goal: Blood Glucose Level Within Desired Range  Outcome: Met     Problem: Infection Progression (Sepsis/Septic Shock)  Goal: Absence of Infection Signs and Symptoms  Outcome: Met     Problem: Nutrition Impaired (Sepsis/Septic Shock)  Goal: Optimal Nutrition Intake  Outcome: Met

## 2022-09-26 NOTE — PROGRESS NOTES
[facility]  Adult Nutrition  Progress Note    SUMMARY       Recommendations  1) Change diet to Low fiber, DM 2000 kcal + boost glucose control 1 x daily  2) If pt does not tolerate diet advancement restart PPN D5 AA4.25 @ 90 ml/hr + standard lipids ( 91 g protein, 1234 kcal)  3) weigh weekly     Goals: 1) PO diet advanced to full liquids in < 72 hr 2) PPN meeting 50% of needs at f/u or diet advanced3) PO intakes continue to be > 50% of meals and supplements at f/u  Nutrition Goal Status: not met/ met/ new  Communication of RD Recs:  (POC, sticky note)    Assessment and Plan     Moderate malnutrition  Contributing Nutrition Diagnosis  Moderate acute condition related malnutrition     Related to (etiology):   Altered GI function     Signs and Symptoms (as evidenced by):   1) PO intakes < 75% of needs x > 1 week  2) 8% wt loss x 2 months ( per pt)     Interventions:  Collaboration with care providers     Nutrition Diagnosis Status:   continues    Malnutrition Assessment     Skin (Micronutrient):  (Oj = 21, abd. incision  Teeth (Micronutrient):  (missing some)   Micronutrient Evaluation: suspected deficiency  Micronutrient Evaluation Comments: iron   Weight Loss (Malnutrition): greater than 7.5% in <3 months  Energy Intake (Malnutrition): less than 75% for greater than or equal to 1 week          Edema (Fluid Accumulation): 0-->no edema present             Reason for Assessment    Reason For Assessment: follow up  Diagnosis:  (small bowl mass)  Relevant Medical History: DM2 on metformin, HLD  Interdisciplinary Rounds: attended    General Information Comments: 53 y/o male admitted with small bowel perforation POD 2 abd. resection. Now with ileus, + NG, no significant output. Not yet passing BM or flatus, per surgery can advance diet to clears at that point. NFPE to be done at f/u. Endorses 25 lb wt loss x 2 months.  9/19/22 NPO x 6 days, no flatus, pt having high NG output. PPN started  today.  22 Pt was started on PPN, received , . Yesterday diet was advanced to DM 2000 kcal, pt tolerated 25% of meals without N/V and so PPN d/c'd. NPO today for BMBX.  22 Pt tolerating diabetic diet with good appetite + flatus but no BM. Plan for d/c today.    Nutrition Discharge Planning: To be determined- low fiber, DM 2000 kcal, cardiac diet ( low fiber at first if needed)    Nutrition Risk Screen    Nutrition Risk Screen: no indicators present    Nutrition/Diet History    Spiritual, Cultural Beliefs, Yazdanism Practices, Values that Affect Care: yes  Food Allergies: NKFA  Factors Affecting Nutritional Intake: none    Anthropometrics    Temp: 98.2 °F (36.8 °C)  Height Method: Measured (21)  Height: 6'  Height (inches): 72 in  Weight Method: Bed Scale  Weight: 96.4 kg (212 lb 8.4 oz)  Weight (lb): 212.53 lb  Ideal Body Weight (IBW), Male: 178 lb  % Ideal Body Weight, Male (lb): 122.33 %  BMI (Calculated): 28  BMI Grade: 25 - 29.9 - overweight  Weight Loss: unintentional  Usual Body Weight (UBW), k.6 kg (21- per pt wt loss was in the last 2 months)  % Usual Body Weight: 91.68  % Weight Change From Usual Weight: -8.51 %       Lab/Procedures/Meds    Pertinent Labs Reviewed: reviewed  BMP  Lab Results   Component Value Date     2022    K 4.2 2022     2022    CO2 26 2022    BUN 12 2022    CREATININE 0.8 2022    CALCIUM 8.9 2022    ANIONGAP 9 2022    ESTGFRAFRICA 111 2021    EGFRNONAA 96 2021     Recent Labs   Lab 22  1150   POCTGLUCOSE 117*     Lab Results   Component Value Date    ALBUMIN 2.6 (L) 2022     Pertinent Medications Reviewed: reviewed  insulin,zofran, phenergan, iron, polyethylene glycol, Kbicarb, KNaPhos    Estimated/Assessed Needs    Weight Used For Calorie Calculations: 102.1 kg (225 lb 1.4 oz)  Energy Calorie Requirements (kcal): MSJ ( x 1.2) = 2300 kcal  Energy Need Method:  [facility]  Protein Requirements: 1-1.2 g protein/kg ( 102-122 g)  Weight Used For Protein Calculations: 102.1 kg (225 lb 1.4 oz)  Fluid Requirements (mL): 2300 ml or per MD  Estimated Fluid Requirement Method: RDA Method  CHO Requirement: 258-316 g      Nutrition Prescription Ordered    Current Diet Order: DM 2000 kcal diet    Evaluation of Received Nutrient/Fluid Intake    Energy Calories Required: meeting needs  Protein Required: meeting needs  Fluid Required: meeting needs  Tolerance: tolerating    Intake/Output Summary (Last 24 hours) at 9/26/2022 1401  Last data filed at 9/26/2022 0800  Gross per 24 hour   Intake --   Output 1750 ml   Net -1750 ml       % Intake of Estimated Energy Needs: 75%  % Meal Intake: 75%    Nutrition Risk    Level of Risk/Frequency of Follow-up: moderate - 2x weekly    Monitor and Evaluation    Food and Nutrient Intake: energy intake, food and beverage intake  Food and Nutrient Adminstration: diet order  Anthropometric Measurements: weight  Biochemical Data, Medical Tests and Procedures: electrolyte and renal panel, gastrointestinal profile, glucose/endocrine profile  Nutrition-Focused Physical Findings: overall appearance, skin     Nutrition Follow-Up    RD Follow-up?: Yes

## 2022-09-26 NOTE — PLAN OF CARE
Plan of care reviewed with pt and spouse at beginning of shift.  Pt and spouse verbalized understanding. Purposeful rounds completed throughout shift.  Pain managed and monitored medicated as needed. Tolerated well), restroom offered, self-repositioning as needed __, safety implemented and maintained.  Patient has remained free from fall/injury, no skin breakdown noted.  Side rails up x2, bed in locked and lowest position, call light kept within reach.  Needs special attention to JAVAN drain and surgical incision     Problem: Adult Inpatient Plan of Care  Goal: Plan of Care Review  Outcome: Ongoing, Not Progressing  Goal: Patient-Specific Goal (Individualized)  Outcome: Ongoing, Not Progressing  Goal: Absence of Hospital-Acquired Illness or Injury  Outcome: Ongoing, Not Progressing  Goal: Optimal Comfort and Wellbeing  Outcome: Ongoing, Not Progressing  Goal: Readiness for Transition of Care  Outcome: Ongoing, Not Progressing     Problem: Oral Intake Inadequate  Goal: Improved Oral Intake  Outcome: Ongoing, Not Progressing     Problem: Infection  Goal: Absence of Infection Signs and Symptoms  Outcome: Ongoing, Not Progressing     Problem: Skin or Soft Tissue Infection  Goal: Absence of Infection Signs and Symptoms  Outcome: Ongoing, Not Progressing     Problem: Pain Acute  Goal: Acceptable Pain Control and Functional Ability  Outcome: Ongoing, Not Progressing     Problem: Fall Injury Risk  Goal: Absence of Fall and Fall-Related Injury  Outcome: Ongoing, Not Progressing

## 2022-09-26 NOTE — PLAN OF CARE
Recommendations  1) Change diet to Low fiber, DM 2000 kcal + boost glucose control 1 x daily  2) If pt does not tolerate diet advancement restart PPN D5 AA4.25 @ 90 ml/hr + standard lipids ( 91 g protein, 1234 kcal)  3) weigh weekly     Goals: 1) PO diet advanced to full liquids in < 72 hr 2) PPN meeting 50% of needs at f/u or diet advanced3) PO intakes continue to be > 50% of meals and supplements at f/u  Nutrition Goal Status: not met/ met/ new  Communication of RD Recs:  (POC, sticky note)

## 2022-09-26 NOTE — PT/OT/SLP PROGRESS
Occupational Therapy   Treatment    Name: Dwight Hoffmann  MRN: [id number]  Admitting Diagnosis:  Small bowel mass  3 Days Post-Op    Recommendations:     Discharge Recommendations: home  Discharge Equipment Recommendations:  other (see comments) (TBD)  Barriers to discharge:  None    Assessment:     Dwight Hoffmann is a 52 y.o. male with a medical diagnosis of Small bowel mass.  He presents with able to perform all self-care tasks independently and agreeable to BUE TherEx with red band to improve strength and functional endurance. Performance deficits affecting function are weakness, impaired endurance, impaired functional mobility.     Rehab Prognosis:  Good; patient would benefit from acute skilled OT services to address these deficits and reach maximum level of function.       Plan:     Patient to be seen 4 x/week to address the above listed problems via self-care/home management, therapeutic activities, therapeutic exercises  Plan of Care Expires: 10/15/22  Plan of Care Reviewed with: patient    Subjective     Pain/Comfort:  Pain Rating 1: 4/10  Location 1: abdomen  Pain Addressed 1: Pre-medicate for activity  Pain Rating Post-Intervention 1: 4/10    Objective:     Communicated with: Carmen NELSON prior to session.  Patient found HOB elevated with peripheral IV upon OT entry to room.    General Precautions: Standard, fall   Orthopedic Precautions:N/A   Braces:    Respiratory Status: Room air     Occupational Performance:     Bed Mobility:    Patient completed Scooting/Bridging with independence  Patient completed Supine to Sit with independence  Patient completed Sit to Supine with independence   Pt at EOB x 10 mins for BUE TE with no LOB.    Functional Mobility/Transfers:  Not tested as it was not needed for session.  Functional Mobility: BUE WFL. BLE refer to PT.    Activities of Daily Living:  Pt stated he needs no assist with ADLs at this time.      [facility] 6 Click ADL: 22    Treatment & Education:  While seated  EOB pt performed BUE resistive exercise with red band x 5 reps each shoulder flexion, shoulder press, chest press, horizontal abduction, tricep extensions and bicep curls with BOSWELL demo and feedback on proper form. Pt requires no rest breaks taken between each exercise and is instructed to perform 3 x 10 at least once daily to improve strength and functional endurance post sx.    Patient left HOB elevated with call button in reach and lunch at hand.    GOALS:   Multidisciplinary Problems       Occupational Therapy Goals          Problem: Occupational Therapy    Goal Priority Disciplines Outcome Interventions   Occupational Therapy Goal     OT, PT/OT                         Time Tracking:     OT Date of Treatment: 09/26/22  OT Start Time: 1137  OT Stop Time: 1147  OT Total Time (min): 10 min    Billable Minutes:Therapeutic Exercise 10 min    OT/KEATON: KEATON PUGH Visit Number: 1    9/26/2022

## 2022-09-26 NOTE — TELEPHONE ENCOUNTER
----- Message from Kaylin Mata sent at 9/26/2022 12:30 PM CDT -----  Type: Needs Medical Advice  Who Called:  Ena with [facility]  Symptoms (please be specific):    How long has patient had these symptoms:    Pharmacy name and phone #:    Best Call Back Number:   Additional Information: Ms. Sutherland is requesting a call back to schedule an appt. for patient that was seen in Hosp.

## 2022-09-26 NOTE — PROGRESS NOTES
Progress Note  Infectious Disease  Reason for Consult:  GPC bacteremia  HPI: Dwight Hoffmann is a 52 y.o. male very pleasant, with past medical history of diabetes, HLD who presented 9/14 complaining of night sweats, unintentional 30 lb weight loss for the last couple of months.  Symptoms worsened by severe abdominal pain, urinary hesitancy, dysuria, increased urinary frequency, occasional bloody stool, with associated weakness, fatigue, and generalized pain.  He denies fever or chills, no nausea or vomiting, no chest pain or shortness of breath.  Patient was seen by primary care last month due to melena, refer to GI, colonoscopy done on 09/01 were 3 polyps were resected, pathology report consistent with tubular adenomas.      In the ER, blood pressure 113/69, T-max a 100.9°   Labs on admission white count 11.3, monocytic predominance 20%, bands 2%, H&H 9.6/28.5, MCV 74, microcytic anemia, platelet count 395   Normal kidney and liver function.    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 Prostatomegaly necessitating correlation with PSA.  Seen by [facility] surgery, status post ex lap for small bowel obstruction in the setting of necrotic large colonic mass with fistula formation, washout, and colon anastomosis.  Empirically started on Zosyn.  Switched to vancomycin, cefepime, Flagyl on 9/15.  ID consult for Gram-positive cocci in 1/2 bottles.  INTERVAL HISTORY:  9/16: Interim reviewed, patient seen and examined at bedside, anxious regarding clinical condition. Hemodynamically stable, afebrile in the last 24h. States he is burping, not passing gas since yesterday. Dysuria and hesitancy are improved. Labs reviewed, leukocytosis down to 12.8, no left shift, normal monocyte count. H/H 8.1/24.8, MCV: 76, plt: 482. Normal electrolytes, liver  and kidney function. AFP and PSA negative. Micro reviewed, OR cultures GNR, ID and sensitivities pending, blood cultures 1/4 bottles CoNS likely a contaminant. Repeat blood culture x 2 no growth to date, pending final.   9/17 (Nanette):  Case discussed with Dr. Mooney.  NG tube was found to be coiled in his mouth last night and was removed not replaced secondary to patient refusal.  AMA was signed by patient but subsequently the tube was replaced and is draining bilious fluid.  KUB reviewed. No flatus yet.  He is afebrile, white blood cells 12,800.  Abscess cultures reviewed with Enterobacter, not very sensitive, and a pansensitive Proteus.  CEA, CA 19 9, alpha fetoprotein, PSA all negative.  9/18: interim reviewed. Afebrile. Continues to require the NGT for slow return of bowel function. No new micro data. He has not had any pain medication since yesterday. Walking in the galvez.   9/19 (Vinicio): Interim reviewed, discussed with Dr Fitzpatrick. Patient seen and examined at bedside, after session with PT. States his hungry, and is anxious regarding NG tube and not being able to pass gas. Hemodynamically stable, afebrile. Labs reviewed, stable WBC, no left shift, H/H 8.1/25.5, plt: 633. Stable kidney and liver function tests. No new micro data. Awaiting pathology report.   9/20: Interim reviewed, patient seen and examined at bedside. Discussed with Surgery yesterday, NG tube repositioned, patient is finally passing gas, output from NG tube decreased. Hemodynamically stable, afebrile. Labs reviewed, stable WBC, no left shift. H/H 8.4/25.9. plt: 735, likely reactive thrombocytosis. Stable kidney and liver function. Micro reviewed, repeat blood cultures  no growth. Path lab called for report, Pathologist to call with prelim read.    10 x 7.5 by 3.5 cm,   09/21/2022 No new complains, abd dyscomfort expected, but no pain  Afebrile. Going for   Bone marrow Bx tomorrow  Night sweats have resolved   09/22/2022  Ct chest done for  staging- neg  Bone marrow Bx  done today  Patietn if feeling better in general. Multiple consultants are discussing best option to tx  09/23/2022.  Patient is in OR.  Later on general surgery communicated with myself and other providers that there was no obvious abscess but is still send cultures, which I am grateful.    09/24/2022.  Intraop findings of yesterday are reassuring.  T-max 99°.  Afebrile now.  He still did not have a bowel movement, he has good bowel sounds.  No nausea vomiting burping.  He has a good appetite today.  The drain has just thin bloody liquid, no pus.  Surgical wound is covered and sealed.  09/25/2022.  Patient is walking up and about with PT.  He is in good spirits.  Afebrile.  His wife just left.  I discussed plan of care with patient.  I had asked the oncologist if she needs me to keep the PICC line at the end of IV antibiotics are not but she has not applied yet.  Will discuss with her tomorrow.  09/26/2022  Afebrile.  T-max 98.6°.  Continues to work with PT OT.  Wound is healing great.  CRP continues to be in the 50s, but that could be due to lymphoma.    EXAM & DIAGNOSTICS REVIEWED:   Vitals:     Temp:  [96 °F (35.6 °C)-98.6 °F (37 °C)]   Temp: 98.2 °F (36.8 °C) (09/26/22 1155)  Pulse: (!) 111 (09/26/22 1155)  Resp: 18 (09/26/22 1155)  BP: 139/75 (09/26/22 1155)  SpO2: 97 % (09/26/22 1155)    Intake/Output Summary (Last 24 hours) at 9/26/2022 1248  Last data filed at 9/26/2022 0800  Gross per 24 hour   Intake 240 ml   Output 2250 ml   Net -2010 ml     General:         In NAD. Alert and attentive, cooperative, comfortable  Eyes:               Anicteric, EOMI  ENT:                  Neck:              Supple  Lungs:           Normal breathing  Heart:             Abd:                S/p Ex-lap,. Dressing in place, it is healing great    :                 Musc:              Joints without effusion, swelling,  erythema, synovitis, ambulatory  Skin:               Warm, no rash  Wound:           See pictures, it is healing great  Neuro:             Following commands, no acute focal deficit , gait normal  Psych:             Calm, cooperative  Lymphatic:       Extrem:           Left arm edema due to IV infiltration- improved  VAD:               Right PICC 09/22/2022 09/25/2022.  Same.  09/24/2022 Ex-lap,  wound covered; drain in place  09/22/2022  Sx wound healing    9/14:        General Labs reviewed:  Recent Labs   Lab 09/24/22  0507 09/25/22  0405 09/26/22  0420   WBC 9.36 7.77 7.88   HGB 8.2* 8.7* 9.2*   HCT 25.7* 28.1* 29.2*   * 666* 655*       Recent Labs   Lab 09/24/22  0507 09/25/22  0405 09/26/22  0420    138 138   K 4.4 4.2 4.2    104 103   CO2 25 25 26   BUN 14 15 12   CREATININE 0.8 0.8 0.8   CALCIUM 8.7 8.7 8.9   PROT 6.9 7.0 7.3   BILITOT 0.7 0.7 0.7   ALKPHOS 99 118 135   ALT 25 23 27   AST 19 23 24     Recent Labs   Lab 09/24/22  0507 09/26/22  0420   CRP 55.8* 54.8*     No results for input(s): SEDRATE in the last 168 hours.    Estimated Creatinine Clearance: 127.1 mL/min (based on SCr of 0.8 mg/dL).     Micro:  Microbiology Results (last 7 days)       Procedure Component Value Units Date/Time    Culture, Anaerobe [[id number]] Collected: 09/23/22 1312    Order Status: Completed Specimen: Body Fluid from Abdomen Updated: 09/25/22 1350     Anaerobic Culture Culture in progress    Narrative:      Peritoneal fluid    Aerobic culture [[id number]] Collected: 09/23/22 1312    Order Status: Completed Specimen: Abdominal from Abdomen Updated: 09/25/22 0833     Aerobic Bacterial Culture No growth    Narrative:      Peritoneal fluid    Blood culture [[id number]] Collected: 09/15/22 1722    Order Status: Completed Specimen: Blood from Antecubital, Right Updated: 09/21/22 0612     Blood Culture, Routine No growth after 5 days.    Blood culture [[id number]] Collected: 09/15/22 1819    Order Status: Completed Specimen: Blood from Antecubital, Right Updated: 09/21/22 0612     Blood  Culture, Routine No growth after 5 days.           Enterobacter cloacae Proteus mirabilis    CULTURE, AEROBIC  (SPECIFY SOURCE) CULTURE, AEROBIC  (SPECIFY SOURCE)   Amox/K Clav'ate   <=8/4 mcg/mL Sensitive   Amp/Sulbactam   <=8/4 mcg/mL Sensitive   Ampicillin   <=8 mcg/mL Sensitive   Cefazolin   <=2 mcg/mL Sensitive   Cefepime 16 mcg/mL Intermediate <=2 mcg/mL Sensitive   Ceftriaxone >32 mcg/mL Resistant <=1 mcg/mL Sensitive   Ciprofloxacin <=1 mcg/mL Sensitive <=1 mcg/mL Sensitive   Ertapenem <=0.5 mcg/mL Sensitive <=0.5 mcg/mL Sensitive   Gentamicin <=4 mcg/mL Sensitive <=4 mcg/mL Sensitive   Levofloxacin <=2 mcg/mL Sensitive <=2 mcg/mL Sensitive   Meropenem <=1 mcg/mL Sensitive <=1 mcg/mL Sensitive   Piperacillin/Tazo 64 mcg/mL Intermediate <=16 mcg/mL Sensitive   Tetracycline <=4 mcg/mL Sensitive     Tobramycin <=4 mcg/mL Sensitive <=4 mcg/mL Sensitive   Trimeth/Sulfa <=2/38 mcg/mL Sensitive <=2/38 mcg/mL Sensitive     Pathology:  9/1 colonoscopy:  1. ASCENDING COLON, POLYPECTOMY:   - TUBULAR ADENOMA.   2. CECUM, POLYPECTOMY:   - TUBULAR ADENOMA.   3. CECUM, POLYPECTOMY:   - TUBULAR ADENOMA.     Imaging Reviewed:  CXR clear  CT abdomen/pelvis 9/20: Postoperative abdomen.  Deep abdominal fluid collection, compatible with abscess.  The collection is not excessive Espinoza percutaneously. Mildly dilated small bowel compatible with ileus.  Nasogastric tube in the distal stomach.     CT abdomen/pelvis 9/14:  1. 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  2. Prostatomegaly necessitating correlation with PSA.    Cardiology: ECHO 9/15/22:  The left ventricle is normal in size with concentric remodeling and normal systolic function.  The estimated ejection fraction is 60%.  Grade I left ventricular diastolic dysfunction.  Normal right ventricular size with normal right ventricular systolic function.  Mild left atrial enlargement.  Mild mitral  regurgitation.  Mild to moderate tricuspid regurgitation.  Normal central venous pressure (3 mmHg).  The estimated PA systolic pressure is 49 mmHg.  There is pulmonary hypertension.  Mild right atrial enlargement.  No vegetations were seen       IMPRESSION & PLAN     Severe sepsis resolved, secondary to perforated necrotic small bowel mass with abscess status post ex lap/drainage/small-bowel resection with anastomosis 9/14 in the setting of DLBCL (poss)      Blood cultures 1/4 bottles grew CoNs, likely a contaminant            Repeat blood cultures x 2 no growth            OR cultures Enterobacter (intermediate to cefepime and to Zosyn) and Proteus mirabilis sensitive to Merrem    Remaining 10 x 7.5 by 3.5 cm abscess--status post IND  09/23/2022.  It did not look infected, it did not look like an abscess to surgeon.  It looked more like friable lymph node/material.  Cultures negative so far.  Platelets, are a  marker of inflammation, and are elevated, monitor.          AFP, Ca-19, CEA negative and PSA 0.9 normal  Ileus - post-op; NG tube repositioned, passing gas since 9/19  DLBCL- ?diagnosed by path 09/14, prelim, bone marrow biopsy 922, oncologist following.  PMHx: diabetes and HLD    Recommendations:  --- PICC placed on 09/22/2022  -- Continue Meropenem 1g IV q8h for now,   -- follow cultures obtained intraop on 09/23, to make sure that there are no new organisms and nothing  surprising.  No growth to date as of 0926  -- plan at home:   Ertapenem 1 g IV q.day x 14 days   Home health for Weekly labs  for CMP, CBC, ESR, CRP.    CT scan abdomen on October 3, 2022.  Follow-up with me in ID clinic in 7 days  --please improve nutrition, aware.        Medical Decision Making during this encounter was  [_] Low Complexity  [_] Moderate Complexity  [xx] High Complexity

## 2022-09-26 NOTE — PLAN OF CARE
Pt cleared for discharge from case management  [facility] will provide home IV antibiotic  [facility] accepted pt.  Appts w/ Dr. Oliveira and Aroldo on AVS.  ANGEL called Dr. Reid's office,  unable to make appt, sent message to MD's office and someone from office will call pt with appt.  ANGEL sent email to [facility] schedulers requesting a hospital dc f/u appt.     09/26/22 0436   Final Note   Assessment Type Final Discharge Note   Anticipated Discharge Disposition Home-Health   What phone number can be called within the next 1-3 days to see how you are doing after discharge?   ([phone])   Hospital Resources/Appts/Education Provided Appointments scheduled and added to AVS   Post-Acute Status   Post-Acute Authorization Home Health;IV Infusion   Home Health Status Referrals Sent   IV Infusion Status Set-up Complete/Auth obtained

## 2022-09-26 NOTE — TELEPHONE ENCOUNTER
----- Message from Katerin Gutiérrez sent at 9/26/2022  2:33 PM CDT -----  Ena with [facility] called and stated that the patient need a HFU in  one week  he is being discharged today please give her a call at [phone]

## 2022-09-26 NOTE — PLAN OF CARE
ANGEL sent medical records to [facility]  via [employer].    ANGEL spoke to Yesenia at [facility], completed teach/education with pt and will deliver meds to home.     09/26/22 1055   Post-Acute Status   Post-Acute Authorization Home Health;IV Infusion   Home Health Status Referrals Sent   IV Infusion Status Awaiting delivery

## 2022-09-27 NOTE — PROGRESS NOTES
C3 nurse spoke with Dwight Hoffmann for a TCC post hospital discharge follow up call. The patient has a scheduled HOSFU appointment with BOSTON Oswald on 9/28/22 @ 1200.          Day of procedure: 10-.  Patient was informed of current policy of only one adult allowed DOS for outpatient procedures due to coronavirus precautions. Patients and caregivers / drivers will be screened upon entering hospital. Also, informed that  service is closed at this time.     Patient was instructed to take the following medications the day of surgery: amlodipine, labetalol and gabapentin.    Testing day of procedure per anesthesia. [O]

## 2022-09-27 NOTE — NURSING
Discharge information given to patient.  Explained the importance of scheduling and attending follow up appointments. Patient verbalized understanding.  Tele monitor removed and returned to appropriate place.  PICC line remained in place.

## 2022-09-27 NOTE — PROGRESS NOTES
Discharge Information     Discharge Date:   9/26/22    Primary Discharge Diagnosis:    small bowel mass    Discharge Summary:  Reviewed      Medication & Order Review     Were medication changes made or new medications added?   Yes    If so, has the patient filled the prescriptions?  Yes     Was Home Health ordered? Yes    If so, has Home Health contacted patient and/or initiated services?  Yes    Name of Home Health Agency?  [facility]    Durable Medical Equipment ordered?  No     If so, has the DME provider contacted patient and delivered equipment?  N/A    Follow Up               Any problems since discharge? No    How is the patient feeling since returning home?  Pt states he is doing as good as he can be.     Have you set up recommended follow up appointments?  Yes    Schedule Hospital Follow-up appointment within 7-14 days (preferably 7).      Notes:              Maida Morgan

## 2022-09-27 NOTE — PROGRESS NOTES
ANGEL called pt, [phone], twice, both times phone busy.  Called pt's spouse, Sundar, informed her of pt's appts on tomorrow with  on 9/28 at 9am at [address] [phone] and marcin/ BOSTON Oswald on 9/28 at 12pm.  Wife would like to cancel appt with BOSTON Wood and wants new PCP.  Wife unable to complete conversation, will call ANGEL back.    ANGEL called [facility], spoke to Mushtaq, cancelled appt with BOSTON Wood.

## 2022-09-28 PROBLEM — C83.30 DLBCL (DIFFUSE LARGE B CELL LYMPHOMA): Status: ACTIVE | Noted: 2022-01-01

## 2022-09-28 NOTE — TELEPHONE ENCOUNTER
Spoke to pt appt has been r/s. Also advised pt he has a chance of being discharged after 3 no shows pt voiced understanding.

## 2022-09-28 NOTE — TELEPHONE ENCOUNTER
Left message on patient's voicemail to call the office back to schedule np appointment from referral.

## 2022-09-28 NOTE — TELEPHONE ENCOUNTER
----- Message from Ena Willard sent at 9/28/2022  3:51 PM CDT -----  The patient missed his HFU appointment today with Tim at 12. Pt's # [phone] GH

## 2022-09-29 NOTE — TELEPHONE ENCOUNTER
Spoke w pt he was informed we do not have any medicaid slots available at this time. He was provided with  to call to get information on other providers  Pt voiced ok

## 2022-09-30 NOTE — PROGRESS NOTES
GENERAL SURGERY  POST-OP PROGRESS NOTE    HPI: Dwight Hoffmann is a 52 y.o. male status post small bowel resection for perforation due to involvement of what turned out to be B-cell lymphoma. And prolonged hospital course requiring diagnostic laparoscopy for washout of potential abscess which turned out to be necrotic lymph node.  Here today for follow-up.  Has followed up with Oncology her discussing treatment options. He is currently on IV antibiotics with plans for 2 weeks total after discharge for possible intra-abdominal infection. He denies fevers or chills. He is tolerating a diet.  He is passing gas but is having severe constipation.  This has led to some bleeding and discomfort at the anus consistent with hemorrhoids.    VITALS:  Vitals:    09/30/22 0841   BP: 126/79   Pulse: 97   Resp: 16       PHYSICAL EXAM:  Midline abdominal incision intact without signs of infection or breakdown, staples in place.  Right-sided laparoscopic port sites well healed without signs of infection or breakdown    Rectal exam revealed large grade 3 internal hemorrhoid      ASSESSMENT & PLAN:  52 y.o. male s/p perforation small bowel secondary to B-cell lymphoma status post small bowel resection and anastomosis, diagnostic laparoscopy with washout  -overall he appears to be doing well  -continue antibiotics as prescribed and follow-up with Infectious Disease as scheduled  -keep follow-up with Oncology as scheduled  -okay to 2 stool softener twice a day and increase water intake, if this fails to improve constipation he may try a laxative  -anal bleeding secondary to hemorrhoids, this will hopefully improve when constipation improves, can do preparation H and try to avoid straining  -return to clinic in 1 month

## 2022-10-03 NOTE — PROGRESS NOTES
Patient ID: Dwight Hoffmann is a 52 y.o. male.    Chief Complaint: Follow-up (No bottles/ Pt is here of a HFU with no complaints/Decline flu and eye exam/BG)    Admit Date: 9/13/22   Discharge Date: 9/26/22  Discharge Facility: Hospital    Medication Reconciliation:  Medications changed/added/deleted. Ertapenem, Norco  New Prescriptions filled after discharge: yes  Discharge summary reviewed:  yes  Pending test results at discharge reviewed:   yes  Follow up appointments scheduled:  yes   With Heme/Onc - Dr. Reid/Dr. Linder   With General Surgery - Dr. Oliveira   With Infectious Disease - Dr. Kendrick  Follow up labs/tests ordered:   yes  Home Health ordered on discharge:   yes  Home Health company name: [facility]  DME ordered at discharge:   no    History of hospital stay:  Hospital Course:   Dwight Hoffmann is a 52 year old male with a past medical history of DM, HLD and anemia who presented with lower abdominal pain, lower back pain, and difficulty urinating secondary to an abdominal mass discovered on CT scan. Surgery was consulted and performed exploratory laparotomy 9/14/22 with resection of the mass and anastomosis of small bowel. The mass appeared to be an abscess with surrounding necrosis.  Patient with signs of sepsis including tachycardic and leukocytosis which could be related to surgery as well which complicated his picture however he was covered for this with with IV antibiotics. An NG tube to LIWS suction was started and the patient was put on IV fluids while he was NPO. Briefly on PPN. He did have a positive blood culture which showed coagulase negative Staph suspected to be a contaminant. He was briefly on vancomycin which was discontinued. ID consulted.  Patient was put on meropenem and fluconazole.  Cultures from surgery with Enterobacter cloacae and Proteus mirabilis.  His bowel function slowly returned, and he no longer needed NGT to suction.  The pathologic diagnosis on the  mass was diffuse large B cell lymphoma.  Oncology service was asked to consult with us.  Patient underwent bone marrow biopsy 9/22/22 for staging.  CT of chest showed no enlarged lymph nodes. Did have another laparoscopy 9/23/22 due to abdominal fluid collection with cleanout thought to be more necrotic than infective. ID planned for outpatient ertapenem infusions to complete 2 week course. Home health was arranged. To draw CBC, CMP, CRP, and ESR weekly and send to ID. CT abd/pelv ordered to be done prior to ID clinic visit. He will have follow up with PCP, ID, general surgery, and heme/onc. To have abdominal staples removed at surgery clinic 3-4 days after discharge. By time of discharge the patient was tolerating a regular diet with improving admission symptoms.    How patient is feeling since discharge from the hospital?    Pt is a 52 y.o. male who presents today for a hospital follow-up.  Since discharge, he reports feeling well. He maintains an adequate appetite and is averaging 3 meals/day with snacks in between.  He is active and walks daily.  He denies any weight loss or fevers since discharge. He states his abdominal pain has subsided and he is no longer taking his Norco p.r.n..  He does not endorse any bleeding or drainage from his incision sites.  He currently has [facility] home health presenting 2-3x/week and is participating in physical therapy.  He has weekly port checks/dressing changes regarding his PICC line to the right upper extremity.  He remains compliant with his Ertapenem treatment daily.  He is following up with Dr. Linder (Heme/Onc) on Thursday to discuss treatment options pending his bone marrow biopsy results.    Patient follow up phone call documented on separate encounter.    No results displayed because visit has over 200 results.      Admission on 09/01/2022, Discharged on 09/01/2022   Component Date Value Ref Range Status    POCT Glucose 09/01/2022 96  70 - 110 mg/dL Final   Orders  Only on 08/12/2022   Component Date Value Ref Range Status    Iron 08/22/2022 27 (L)  50 - 180 mcg/dL Final    TIBC 08/22/2022 288  250 - 425 mcg/dL (calc) Final    Iron Saturation 08/22/2022 9 (L)  20 - 48 % (calc) Final    Ferritin 08/22/2022 300  38 - 380 ng/mL Final   Office Visit on 08/10/2022   Component Date Value Ref Range Status    Glucose 08/10/2022 125 (H)  65 - 99 mg/dL Final    BUN 08/10/2022 14  7 - 25 mg/dL Final    Creatinine 08/10/2022 1.01  0.70 - 1.30 mg/dL Final    EGFR 08/10/2022 88  > OR = 60 mL/min/1.73m2 Final    BUN/Creatinine Ratio 08/10/2022 NOT APPLICABLE  6 - 22 (calc) Final    Sodium 08/10/2022 139  135 - 146 mmol/L Final    Potassium 08/10/2022 4.4  3.5 - 5.3 mmol/L Final    Chloride 08/10/2022 104  98 - 110 mmol/L Final    CO2 08/10/2022 26  20 - 32 mmol/L Final    Calcium 08/10/2022 8.8  8.6 - 10.3 mg/dL Final    Total Protein 08/10/2022 7.3  6.1 - 8.1 g/dL Final    Albumin 08/10/2022 3.8  3.6 - 5.1 g/dL Final    Globulin, Total 08/10/2022 3.5  1.9 - 3.7 g/dL (calc) Final    Albumin/Globulin Ratio 08/10/2022 1.1  1.0 - 2.5 (calc) Final    Total Bilirubin 08/10/2022 0.7  0.2 - 1.2 mg/dL Final    Alkaline Phosphatase 08/10/2022 105  35 - 144 U/L Final    AST 08/10/2022 10  10 - 35 U/L Final    ALT 08/10/2022 9  9 - 46 U/L Final    Cholesterol 08/10/2022 137  <200 mg/dL Final    HDL 08/10/2022 14 (L)  > OR = 40 mg/dL Final    Triglycerides 08/10/2022 173 (H)  <150 mg/dL Final    LDL Cholesterol 08/10/2022 96  mg/dL (calc) Final    HDL/Cholesterol Ratio 08/10/2022 9.8 (H)  <5.0 (calc) Final    Non HDL Chol. (LDL+VLDL) 08/10/2022 123  <130 mg/dL (calc) Final    Creatinine, Urine 08/10/2022 330 (H)  20 - 320 mg/dL Final    Microalb, Ur 08/10/2022 1.1  See Note: mg/dL Final    Microalb/Creat Ratio 08/10/2022 3  <30 mcg/mg creat Final    TSH w/reflex to FT4 08/10/2022 1.06  0.40 - 4.50 mIU/L Final    Color, UA 08/10/2022 DARK YELLOW  YELLOW Final    Appearance, UA 08/10/2022 CLEAR  CLEAR  Final    Specific Gravity, UA 08/10/2022 1.027  1.001 - 1.035 Final    pH, UA 08/10/2022 < OR = 5.0  5.0 - 8.0 Final    Glucose, UA 08/10/2022 NEGATIVE  NEGATIVE Final    Bilirubin, UA 08/10/2022 NEGATIVE  NEGATIVE Final    Ketones, UA 08/10/2022 TRACE (A)  NEGATIVE Final    Occult Blood UA 08/10/2022 NEGATIVE  NEGATIVE Final    Protein, UA 08/10/2022 TRACE (A)  NEGATIVE Final    Nitrite, UA 08/10/2022 NEGATIVE  NEGATIVE Final    Leukocytes, UA 08/10/2022 NEGATIVE  NEGATIVE Final    WBC Casts, UA 08/10/2022 NONE SEEN  < OR = 5 /HPF Final    RBC Casts, UA 08/10/2022 NONE SEEN  < OR = 2 /HPF Final    Squam Epithel, UA 08/10/2022 0-5  < OR = 5 /HPF Final    Bacteria, UA 08/10/2022 NONE SEEN  NONE SEEN /HPF Final    Hyaline Casts, UA 08/10/2022 6-10 (A)  NONE SEEN /LPF Final    Service Cmt: 08/10/2022    Final    Reflexive Urine Culture 08/10/2022    Final    WBC 08/10/2022 10.0  3.8 - 10.8 Thousand/uL Final    RBC 08/10/2022 4.59  4.20 - 5.80 Million/uL Final    Hemoglobin 08/10/2022 12.0 (L)  13.2 - 17.1 g/dL Final    Hematocrit 08/10/2022 36.6 (L)  38.5 - 50.0 % Final    MCV 08/10/2022 79.7 (L)  80.0 - 100.0 fL Final    MCH 08/10/2022 26.1 (L)  27.0 - 33.0 pg Final    MCHC 08/10/2022 32.8  32.0 - 36.0 g/dL Final    RDW 08/10/2022 14.8  11.0 - 15.0 % Final    Platelets 08/10/2022 351  140 - 400 Thousand/uL Final    MPV 08/10/2022 9.1  7.5 - 12.5 fL Final    Neutrophils, Abs 08/10/2022 5,190  1,500 - 7,800 cells/uL Final    Lymph # 08/10/2022 3,000  850 - 3,900 cells/uL Final    Mono # 08/10/2022 1,570 (H)  200 - 950 cells/uL Final    Eos # 08/10/2022 160  15 - 500 cells/uL Final    Baso # 08/10/2022 80  0 - 200 cells/uL Final    Neutrophils Relative 08/10/2022 51.9  % Final    Lymph % 08/10/2022 30.0  % Final    Mono % 08/10/2022 15.7  % Final    Eosinophil % 08/10/2022 1.6  % Final    Basophil % 08/10/2022 0.8  % Final       Past Medical History:   Diagnosis Date    Diabetes mellitus     Prediabetes      Past  Surgical History:   Procedure Laterality Date    APPENDECTOMY  07/15/2014    COLONOSCOPY      COLONOSCOPY N/A 2/9/2022    Procedure: COLONOSCOPY;  Surgeon: Manuel Sanders MD;  Location: [facility] ENDO;  Service: Endoscopy;  Laterality: N/A;    COLONOSCOPY N/A 9/1/2022    Procedure: COLONOSCOPY;  Surgeon: Andrea Clemens MD;  Location: [facility] ENDO;  Service: Endoscopy;  Laterality: N/A;    INCISION AND DRAINAGE OF ABDOMEN N/A 9/14/2022    Procedure: INCISION AND DRAINAGE, ABDOMEN;  Surgeon: Jan Oliveira Jr., MD;  Location: [facility] OR;  Service: General;  Laterality: N/A;    LAPAROSCOPIC DRAINAGE OF ABDOMEN N/A 9/23/2022    Procedure: DRAINAGE, ABDOMEN, LAPAROSCOPIC;  Surgeon: Jan Oliveira Jr., MD;  Location: [facility] OR;  Service: General;  Laterality: N/A;     Family History   Problem Relation Age of Onset    Cancer Mother         Colon    Diabetes Mother     Hypertension Mother     Seizures Father     Heart murmur Sister     Seizures Sister        Marital Status:   Alcohol History:  reports that he does not currently use alcohol.  Tobacco History:  reports that he has never smoked. He has never used smokeless tobacco.  Drug History:  reports no history of drug use.    Review of patient's allergies indicates:  No Known Allergies    Current Outpatient Medications:     atorvastatin (LIPITOR) 10 MG tablet, Take 1 tablet (10 mg total) by mouth once daily., Disp: 90 tablet, Rfl: 1    blood sugar diagnostic Strp, To check BG 2 times daily, to use with insurance preferred meter, Disp: 200 each, Rfl: 1    blood-glucose meter kit, To check BG 2 times daily, to use with insurance preferred meter, Disp: 1 each, Rfl: 0    ertapenem sodium (ERTAPENEM, INVANZ, 1 G/100 ML NS, READY TO MIX,), Inject 100 mLs (1 g total) into the vein once daily. for 13 days, Disp: 1300 mL, Rfl: 0    lisinopriL (PRINIVIL,ZESTRIL) 2.5 MG tablet, Take 1 tablet (2.5 mg total) by mouth once daily., Disp: 90 tablet, Rfl: 1    metFORMIN  "(GLUCOPHAGE) 1000 MG tablet, Take 1 tablet (1,000 mg total) by mouth 2 (two) times daily with meals., Disp: 180 tablet, Rfl: 3    clotrimazole (LOTRIMIN) 1 % cream, Apply topically 2 (two) times daily. for 7 days (Patient not taking: Reported on 9/28/2022), Disp: 28 g, Rfl: 0    hydrocortisone 2.5 % cream, Apply topically 2 (two) times daily. for 7 days (Patient not taking: No sig reported), Disp: 28 g, Rfl: 0    lancets Misc, To check BG 2 times daily, to use with insurance preferred meter (Patient not taking: Reported on 9/28/2022), Disp: 200 each, Rfl: 1    Review of Systems   Constitutional:  Negative for activity change, appetite change, chills, fatigue and fever.   Respiratory:  Negative for cough, shortness of breath and wheezing.    Cardiovascular:  Negative for chest pain and palpitations.   Gastrointestinal:  Negative for abdominal distention, abdominal pain, anal bleeding, blood in stool, constipation, diarrhea, nausea, rectal pain and vomiting.   Genitourinary:  Negative for difficulty urinating, dysuria, flank pain, hematuria and urgency.        "ED."   Musculoskeletal:  Negative for arthralgias and myalgias.   Skin:  Negative for rash.   Neurological:  Negative for dizziness, syncope and light-headedness.   Psychiatric/Behavioral:  Negative for behavioral problems.       Objective:      Vitals:    10/03/22 1149   BP: 118/80   Pulse: 102   Temp: 98.2 °F (36.8 °C)   TempSrc: Oral   SpO2: 97%   Weight: 97.5 kg (215 lb)   Height: 6' 1" (1.854 m)     Physical Exam  Vitals and nursing note reviewed.   Constitutional:       General: He is not in acute distress.     Appearance: Normal appearance. He is not ill-appearing, toxic-appearing or diaphoretic.   HENT:      Head: Normocephalic and atraumatic.      Right Ear: External ear normal.      Left Ear: External ear normal.      Nose: No rhinorrhea.      Mouth/Throat:      Mouth: Mucous membranes are moist.      Pharynx: Oropharynx is clear.   Eyes:      " General: No scleral icterus.     Extraocular Movements: Extraocular movements intact.      Conjunctiva/sclera: Conjunctivae normal.   Cardiovascular:      Rate and Rhythm: Normal rate and regular rhythm.      Pulses: Normal pulses.      Heart sounds: Normal heart sounds. No murmur heard.    No friction rub.   Pulmonary:      Effort: Pulmonary effort is normal.      Breath sounds: Normal breath sounds. No wheezing, rhonchi or rales.   Abdominal:      General: There is no distension.      Palpations: Abdomen is soft. There is no shifting dullness, hepatomegaly or splenomegaly.      Tenderness: There is no abdominal tenderness. There is no guarding or rebound.       Musculoskeletal:         General: Normal range of motion.      Cervical back: Normal range of motion and neck supple.      Right lower leg: No edema.      Left lower leg: No edema.   Skin:     General: Skin is warm and dry.      Coloration: Skin is not jaundiced.      Findings: No bruising, erythema or rash.          Neurological:      General: No focal deficit present.      Mental Status: He is alert. Mental status is at baseline.      Cranial Nerves: No cranial nerve deficit.      Motor: No weakness.      Gait: Gait normal.   Psychiatric:         Mood and Affect: Mood normal.         Behavior: Behavior normal. Behavior is cooperative.       Assessment:       1. Hospital discharge follow-up    2. Diffuse large B-cell lymphoma, unspecified body region    3. Diabetes mellitus type 2, noninsulin dependent    4. Erectile dysfunction, unspecified erectile dysfunction type    5. Intra-abdominal abscess    6. Small bowel mass           Plan:       Hospital discharge follow-up  Hospital discharge summary and pending labs/diagnostics reviewed.    Diffuse large B-cell lymphoma, unspecified body region  Treatment options pending bone marrow biopsy results.   Follow-up with Dr. Reid and Dr. Linder (Heme/Onc) as scheduled to discuss treatment options.     Diabetes  mellitus type 2, noninsulin dependent  POCT A1c today:  5.5%.  Continue current treatment regimen as is.  -     Hemoglobin A1C, POCT    Erectile dysfunction, unspecified erectile dysfunction type  Attempted and failed PDE-5 inhibitors in the past.  Follow-up with Urology - amb ref sent to Dr. Espinoza today.  -     Ambulatory referral/consult to Urology; Future; Expected date: 10/03/2022    Intra-abdominal abscess  Actively receiving IV Entapenem daily via PICC.   Continue to follow up with Dr. Kendrick (ID) as scheduled.     Small bowel mass  S/p small bowel resection and anastomosis, diagnostic laparoscopy with washout.  Incision sites are clean, dry, and intact with no cellulitic signs of infection noted.  Continue following up with Dr. Oliveira (Gen Sug) as scheduled.     Follow up in about 3 months (around 1/3/2023) for Diffuese large B cell lymphoma.

## 2022-10-06 NOTE — PROGRESS NOTES
"Subjective:       Patient ID: Dwight Hoffmann is a 52 y.o. male.    Chief Complaint: DLBCL, follow up    HPI  is a 52 year old male with T2 DM , HLD , recent diagnosis of DLBCL, here for follow up. He has been evaluate dby . He has night sweats, 25 lb wt loss over the last 2 months, urinary hesitancy, urinary frequency, occasional blood streaked stools, weakness, fatigue, neck pain, knee pain, and pelvic pain. He denies measured fever, chills, N/V/D, chest pain, or SOB. He was seen by his primary care physician about the blood streaked stools and referred to Dr. Clemens for colonoscopy on 9/1/22. Colonoscopy showed multiple polyps. He underwent polypectomies , with path report of tubular adenomas. He presented to ER on 9/14/22 with abdominal pain. CT abdomen showed   "Thick-walled collection containing feculent material, widely communicating with small bowel, seen centrally in the pelvis. Findings are suspicious for necrotic mass or abscess arising from the small bowel  Surgery was consulted and he underwent exploratory laparotomy 9/14/22 , with resection of the mass and anastomosis of small bowel.   The mass appeared to be an abscess with surrounding necrosis.  He had signs of sepsis, including tachycardia and leukocytosis. He was treated for this with with IV antibiotics.   An NG tube to LIWS suction was started and the patient was put on IV fluids while he was NPO. Briefly on PPN. He did have a positive blood culture which showed coagulase negative Staph suspected to be a contaminant. He was briefly on vancomycin which was discontinued. ID consulted.  He was on meropenem and fluconazole.    Cultures from surgery showed Enterobacter cloacae and Proteus mirabilis.  His bowel function slowly returned, and he no longer needed NGT to suction.    The pathologic diagnosis on the mass was diffuse large B cell lymphoma.  He underwent bone marrow biopsy 9/22/22 for staging.  CT of chest showed no " enlarged lymph nodes. He had  another laparoscopy 9/23/22 due to abdominal fluid collection with cleanout thought to be more necrotic than infective. ID planned for outpatient ertapenem infusions to complete 2 week course.      Past Medical History:   Diagnosis Date    Diabetes mellitus     Prediabetes      Past Surgical History:   Procedure Laterality Date    APPENDECTOMY  07/15/2014    COLONOSCOPY      COLONOSCOPY N/A 2/9/2022    Procedure: COLONOSCOPY;  Surgeon: Manuel Sanders MD;  Location: [facility] ENDO;  Service: Endoscopy;  Laterality: N/A;    COLONOSCOPY N/A 9/1/2022    Procedure: COLONOSCOPY;  Surgeon: Andrea Clemens MD;  Location: [facility] ENDO;  Service: Endoscopy;  Laterality: N/A;    INCISION AND DRAINAGE OF ABDOMEN N/A 9/14/2022    Procedure: INCISION AND DRAINAGE, ABDOMEN;  Surgeon: Jan Oliveira Jr., MD;  Location: [facility] OR;  Service: General;  Laterality: N/A;    LAPAROSCOPIC DRAINAGE OF ABDOMEN N/A 9/23/2022    Procedure: DRAINAGE, ABDOMEN, LAPAROSCOPIC;  Surgeon: Jan Oliveira Jr., MD;  Location: [facility] OR;  Service: General;  Laterality: N/A;         Family History   Problem Relation Age of Onset    Cancer Mother         Colon    Diabetes Mother     Hypertension Mother     Seizures Father     Heart murmur Sister     Seizures Sister       Social History     Socioeconomic History    Marital status:     Number of children: 2   Occupational History    Occupation: Truck Drive   Tobacco Use    Smoking status: Never    Smokeless tobacco: Never   Substance and Sexual Activity    Alcohol use: Not Currently     Comment: occasional    Drug use: No    Sexual activity: Yes     Partners: Female     Social Determinants of Health     Financial Resource Strain: Low Risk     Difficulty of Paying Living Expenses: Not hard at all   Food Insecurity: No Food Insecurity    Worried About Running Out of Food in the Last Year: Never true    Ran Out of Food in the Last Year: Never true   Transportation  Needs: No Transportation Needs    Lack of Transportation (Medical): No    Lack of Transportation (Non-Medical): No   Physical Activity: Inactive    Days of Exercise per Week: 0 days    Minutes of Exercise per Session: 0 min   Stress: No Stress Concern Present    Feeling of Stress : Only a little   Social Connections: Moderately Isolated    Frequency of Communication with Friends and Family: Twice a week    Frequency of Social Gatherings with Friends and Family: Twice a week    Attends [employer] Services: Never    Active Member of Clubs or Organizations: No    Attends Club or Organization Meetings: Never    Marital Status:    Housing Stability: Low Risk     Unable to Pay for Housing in the Last Year: No    Number of Places Lived in the Last Year: 1    Unstable Housing in the Last Year: No     Review of patient's allergies indicates:  No Known Allergies    Current Outpatient Medications:     atorvastatin (LIPITOR) 10 MG tablet, Take 1 tablet (10 mg total) by mouth once daily., Disp: 90 tablet, Rfl: 1    blood sugar diagnostic Strp, To check BG 2 times daily, to use with insurance preferred meter, Disp: 200 each, Rfl: 1    blood-glucose meter kit, To check BG 2 times daily, to use with insurance preferred meter, Disp: 1 each, Rfl: 0    ertapenem sodium (ERTAPENEM, INVANZ, 1 G/100 ML NS, READY TO MIX,), Inject 100 mLs (1 g total) into the vein once daily. for 13 days, Disp: 1300 mL, Rfl: 0    lancets Misc, To check BG 2 times daily, to use with insurance preferred meter, Disp: 200 each, Rfl: 1    lisinopriL (PRINIVIL,ZESTRIL) 2.5 MG tablet, Take 1 tablet (2.5 mg total) by mouth once daily., Disp: 90 tablet, Rfl: 1    metFORMIN (GLUCOPHAGE) 1000 MG tablet, Take 1 tablet (1,000 mg total) by mouth 2 (two) times daily with meals., Disp: 180 tablet, Rfl: 3    clotrimazole (LOTRIMIN) 1 % cream, Apply topically 2 (two) times daily. for 7 days (Patient not taking: Reported on 9/28/2022), Disp: 28 g, Rfl: 0     hydrocortisone 2.5 % cream, Apply topically 2 (two) times daily. for 7 days (Patient not taking: No sig reported), Disp: 28 g, Rfl: 0    Review of Systems   Constitutional:  Positive for malaise/fatigue and weight loss. Negative for chills and fever.   HENT:  Negative for congestion, ear discharge, ear pain, nosebleeds, sinus pain and tinnitus.    Eyes:  Negative for blurred vision, double vision and pain.   Respiratory:  Negative for cough, hemoptysis and sputum production.    Cardiovascular:  Negative for chest pain, palpitations, orthopnea, claudication and leg swelling.   Gastrointestinal:  Negative for abdominal pain, constipation, diarrhea and nausea.   Genitourinary:  Negative for frequency and hematuria.   Musculoskeletal:  Negative for back pain and neck pain.   Neurological:  Negative for tremors, sensory change and seizures.   Endo/Heme/Allergies:  Negative for environmental allergies and polydipsia. Does not bruise/bleed easily.   Psychiatric/Behavioral:  Negative for depression, hallucinations, substance abuse and suicidal ideas. The patient is not nervous/anxious.           Vitals:    10/06/22 0935   BP: 119/81   Pulse: 108   Resp: 12   Temp: 97.7 °F (36.5 °C)       PS: ECOG 1  Physical Exam         VITAL SIGNS:  as above   GENERAL: appears well-built, well-nourished.  No anxiety, no agitation, and in no distress.  Patient is awake, alert, oriented and cooperative.  HEENT:  Showed no congestion. Trachea is central no obvious icterus or pallor noted no hoarseness. no obvious JVD   NECK:  Supple.  No JVD. No obvious cervical submental or supraclavicular adenopathy.  RS:the visualized portion of  Chest expands well. chest appears symmetric, no audible wheezes.  No dyspnea recognized  ABDOMEN:  abdomen appears  slightly distended, scar healing  EXTREMITIES:  Without edema.  NEUROLOGICAL:  The patient is appropriate, higher functions are normal.  No  obvious neurological deficits.  normal judgement normal  thought content  No confusion, no speech impediment. Cranial nerves are intact and show no deficit. No gross motor deficits noted   SKIN MUSCULOSKELETAL: no joint or skeletal deformity, no clubbing of nails.  No visible rash ecchymosis or petechiae         9/22 2D ECHO    The left ventricle is normal in size with concentric remodeling and normal systolic function.  The estimated ejection fraction is 60%.  Grade I left ventricular diastolic dysfunction.  Normal right ventricular size with normal right ventricular systolic function.  Mild left atrial enlargement.  Mild mitral regurgitation.  Mild to moderate tricuspid regurgitation.  Normal central venous pressure (3 mmHg).  The estimated PA systolic pressure is 49 mmHg.  There is pulmonary hypertension.  Mild right atrial enlargement.  No vegetations were seen     Component      Latest Ref Rng & Units 10/4/2022 9/21/2022   WBC      3.90 - 12.70 K/uL 6.29    RBC      4.60 - 6.20 M/uL 4.05 (L)    Hemoglobin      14.0 - 18.0 g/dL 9.9 (L)    Hematocrit      40.0 - 54.0 % 31.2 (L)    MCV      82 - 98 fL 77 (L)    MCH      27.0 - 31.0 pg 24.4 (L)    MCHC      32.0 - 36.0 g/dL 31.7 (L)    RDW      11.5 - 14.5 % 19.8 (H)    Platelets      150 - 450 K/uL 312    MPV      9.2 - 12.9 fL 10.0    Immature Granulocytes      % CANCELED    Immature Grans (Abs)      K/uL CANCELED    nRBC      0 /100 WBC 0    Gran %      38.0 - 73.0 % 54.0    Lymph %      18.0 - 48.0 % 33.0    Mono %      4.0 - 15.0 % 10.0    Eosinophil %      0.0 - 8.0 % 1.0    Basophil %      0.0 - 1.9 % 0.0    Bands      % 2.0    Platelet Estimate       Appears normal    Differential Method       Manual    Sodium      136 - 145 mmol/L 137    Potassium      3.5 - 5.1 mmol/L 4.6    Chloride      95 - 110 mmol/L 103    CO2      23 - 29 mmol/L 22 (L)    Glucose      70 - 110 mg/dL 209 (H)    BUN      6 - 20 mg/dL 9    Creatinine      0.5 - 1.4 mg/dL 0.9    Calcium      8.7 - 10.5 mg/dL 8.9    PROTEIN TOTAL      6.0 -  8.4 g/dL 7.7    Albumin      3.5 - 5.2 g/dL 3.1 (L)    BILIRUBIN TOTAL      0.1 - 1.0 mg/dL 1.0    Alkaline Phosphatase      55 - 135 U/L 164 (H)    AST      10 - 40 U/L 19    ALT      10 - 44 U/L 22    Anion Gap      8 - 16 mmol/L 12    eGFR      >60 mL/min/1.73 m:2 >60    Iron      45 - 160 ug/dL  19 (L)   Transferrin      200 - 375 mg/dL  163 (L)   TIBC      250 - 450 ug/dL  241 (L)   Saturated Iron      20 - 50 %  8 (L)   Ferritin      20.0 - 300.0 ng/mL  655 (H)   LD      110 - 260 U/L  275 (H)   CRP      0.0 - 8.2 mg/L 38.5 (H)    Sed Rate      0 - 10 mm/Hr 40 (H)        9/14/22 pathology    . Small bowel, resection:  Diffuse large B-cell lymphoma, non-germinal center origin.  Final classification is pending C-MYC rearrangement analysis result.  See comment.   2. Omentum, resection: Diffuse large B-cell lymphoma, non-germinal center origin.  Final classification is pending C-MYC rearrangement analysis result.     See attached [facility] report: ([address] [place] [address])   FISH studies: No rearrangement of MYC and no IGH/MYC fusion was observed. Final classification:  Diffuse large B-cell lymphoma, non-germinal center origin.     Flow cytometry analysis of tissue was not performed.   Immunohistochemical studies were performed on the paraffin embedded tissue block 1 C with adequate positive and negative controls.  The atypical   lymphocytes are positive for CD20,  BCL6, MUM1, MYC, high Ki-67 (99%) and are negative for CD10, CD30, BCL-2, cyclin D1, .  Reactive T-cells are CD3   positive and BCL-2 positive.  In Situ hybridization for EBV is negative. Findings are consistent with diffuse large B-cell lymphoma, non germinal   center origin.  Final classification is pending C-MYC rearrangement analysis by FISH.  A supplemental report will follow.      9/20/22 CT abdomen    Impression:     Postoperative abdomen.  Deep abdominal fluid collection, compatible with abscess.  The collection is not  excessive Espinoza percutaneously.     Mildly dilated small bowel compatible with ileus.  Nasogastric tube in the distal stomach      9/21/22 CT chest      Impression:     Band of atelectasis seen in the left lower lobe.  Otherwise negative CT of the chest.  Adenopathy is not demonstrated.      9/22/22 Bone marrow, right iliac crest, aspirate, clot and core biopsy:     --Normocellular marrow for age ranging from 30-70% with trilineage hematopoiesis, see comment   --No increase in blasts by aspirate count and CD34 immunohistochemical stain, see comment   --Increased megakaryocytes with atypia, see comment --No morphologic evidence of metastatic lymphoma   --Stainable iron is not increased, see comment   --Minimal to mild reticulin fibrosis     Comment:  Concomitantly submitted flow cytometric analysis detects a tight cluster of myeloid blasts.   The blast gate is mildly increased with a tight cluster of blasts present showing coexpression of CD13 and CD34 with   expression of cytoplasmic myeloperoxidase.  The tight cluster represents approximately 6% of total events.   Lymphocytes are composed of a mixture of  polyclonal B lymphocytes and T lymphocytes that are   immunophenotypically unremarkable.   This marrow shows overall normocellular marrow for age with no morphologic evidence of metastatic lymphoma.  There is some atypia of the megakaryocytic lineage as well as a tight cluster of CD34/CD13 positive blasts detected by   flow cytometry, although increased blasts are not appreciated on the aspirate smear or by CD34 immunohistochemical stain.  By CD61 immunohistochemical   stain, megakaryocytes appear mildly increased without significant clustering appreciated.  Overt morphologic dysplasia of the myeloid and erythroid lineages is not appreciated.  These findings are abnormal, and a   myelodysplastic or myeloproliferative process can not be completely ruled out.  Therefore, correlation with any available cytogenetic  and molecular studies is recommended including studies to rule out myeloproliferative   neoplasms.  If chromosomes are normal, next generation sequencing could be considered.   Estimation of stainable iron is limited due to lack of spicules on the iron stained aspirate smear.  Stainable iron does not appear increased on the   accompanying iron stains of the clot and core biopsy sections.          Component      Latest Ref Rng & Units 9/22/2022   Chromosome analysis BM Result Summary       Normal   Interpretation       No clonal abnormality was apparent.   Results       46,XY[20]   Reason for Referral       DLBD   Specimen       Bone Marrow     Assessment and Plan       1. DLBCL, non GC subtype    --positive for CD20,  BCL6, MUM1, MYC, high Ki-67 (99%)   -negative for CD10, CD30, BCL-2, cyclin D1, .  Reactive T-cells are CD3   --FISH negative for rearrangement of MYC ; no IGH/MYC fusion was observed  --he will have uric acid checked today; HBV, HCV, HIV serologies; PET CT   --IPI pending; might require IT chemotherapy based on IPI  --discussed treatment with [facility], as it is still the standard of care for n on GC DLBCL  --plan for 6 cycles, with possible RT to areas of bulky disease       2. T2DM not on long term insulin    --on metformin , follows with his PCP      3. Depression    --he denies suicidal ideation  --he has upcoming appointment with psychiatry        4. Abnormal BM biopsy    --tight cluster of myeloid blasts approximating 6% was found on BM done on 9/22/22  --significance un clear  --no dysplastic changes  --cytogenetics normal  --will require repeat BM biopsy after chemotherapy

## 2022-10-06 NOTE — PROGRESS NOTES
Medical Nutrition Therapy Oncology Progress Note      Patient's PCP:BOSTON Oswald  Referring Provider: Dr. Josee Reid  Subjective:        Patient ID: Dwight Hoffmann is a 52 y.o. male.    Chief Complaint: Lymphoma s/p small bowel resection    Past Medical History:   Diagnosis Date    Diabetes mellitus     Prediabetes        Past Surgical History:   Procedure Laterality Date    APPENDECTOMY  07/15/2014    COLONOSCOPY      COLONOSCOPY N/A 2/9/2022    Procedure: COLONOSCOPY;  Surgeon: Manuel Sanders MD;  Location: [facility] ENDO;  Service: Endoscopy;  Laterality: N/A;    COLONOSCOPY N/A 9/1/2022    Procedure: COLONOSCOPY;  Surgeon: Andrea Clemens MD;  Location: [facility] ENDO;  Service: Endoscopy;  Laterality: N/A;    INCISION AND DRAINAGE OF ABDOMEN N/A 9/14/2022    Procedure: INCISION AND DRAINAGE, ABDOMEN;  Surgeon: Jan Oliveira Jr., MD;  Location: [facility] OR;  Service: General;  Laterality: N/A;    LAPAROSCOPIC DRAINAGE OF ABDOMEN N/A 9/23/2022    Procedure: DRAINAGE, ABDOMEN, LAPAROSCOPIC;  Surgeon: Jan Oliveira Jr., MD;  Location: [facility] OR;  Service: General;  Laterality: N/A;       Social History     Socioeconomic History    Marital status:     Number of children: 2   Occupational History    Occupation: Truck Drive   Tobacco Use    Smoking status: Never    Smokeless tobacco: Never   Substance and Sexual Activity    Alcohol use: Not Currently     Comment: occasional    Drug use: No    Sexual activity: Yes     Partners: Female     Social Determinants of Health     Financial Resource Strain: Low Risk     Difficulty of Paying Living Expenses: Not hard at all   Food Insecurity: No Food Insecurity    Worried About Running Out of Food in the Last Year: Never true    Ran Out of Food in the Last Year: Never true   Transportation Needs: No Transportation Needs    Lack of Transportation (Medical): No    Lack of Transportation (Non-Medical): No   Physical Activity:  Inactive    Days of Exercise per Week: 0 days    Minutes of Exercise per Session: 0 min   Stress: No Stress Concern Present    Feeling of Stress : Only a little   Social Connections: Moderately Isolated    Frequency of Communication with Friends and Family: Twice a week    Frequency of Social Gatherings with Friends and Family: Twice a week    Attends Jew Services: Never    Active Member of Clubs or Organizations: No    Attends Club or Organization Meetings: Never    Marital Status:    Housing Stability: Low Risk     Unable to Pay for Housing in the Last Year: No    Number of Places Lived in the Last Year: 1    Unstable Housing in the Last Year: No       Family History   Problem Relation Age of Onset    Cancer Mother         Colon    Diabetes Mother     Hypertension Mother     Seizures Father     Heart murmur Sister     Seizures Sister        Review of patient's allergies indicates:  No Known Allergies    Current Outpatient Medications:     atorvastatin (LIPITOR) 10 MG tablet, Take 1 tablet (10 mg total) by mouth once daily., Disp: 90 tablet, Rfl: 1    blood sugar diagnostic Strp, To check BG 2 times daily, to use with insurance preferred meter, Disp: 200 each, Rfl: 1    blood-glucose meter kit, To check BG 2 times daily, to use with insurance preferred meter, Disp: 1 each, Rfl: 0    clotrimazole (LOTRIMIN) 1 % cream, Apply topically 2 (two) times daily. for 7 days (Patient not taking: Reported on 9/28/2022), Disp: 28 g, Rfl: 0    ertapenem sodium (ERTAPENEM, INVANZ, 1 G/100 ML NS, READY TO MIX,), Inject 100 mLs (1 g total) into the vein once daily. for 13 days, Disp: 1300 mL, Rfl: 0    hydrocortisone 2.5 % cream, Apply topically 2 (two) times daily. for 7 days (Patient not taking: No sig reported), Disp: 28 g, Rfl: 0    lancets Misc, To check BG 2 times daily, to use with insurance preferred meter, Disp: 200 each, Rfl: 1    lisinopriL (PRINIVIL,ZESTRIL) 2.5 MG tablet, Take 1 tablet (2.5 mg total) by  mouth once daily., Disp: 90 tablet, Rfl: 1    metFORMIN (GLUCOPHAGE) 1000 MG tablet, Take 1 tablet (1,000 mg total) by mouth 2 (two) times daily with meals., Disp: 180 tablet, Rfl: 3    All medications and past history have been reviewed.    [No matching plan found]    Objective:      Wt Readings from Last 20 Encounters:   10/06/22 96 kg (211 lb 10.3 oz)   10/03/22 97.5 kg (215 lb)   09/30/22 96.3 kg (212 lb 4.9 oz)   09/28/22 95.1 kg (209 lb 10.5 oz)   09/23/22 96.4 kg (212 lb 8.4 oz)   09/01/22 102.1 kg (225 lb 1.4 oz)   08/10/22 106.6 kg (235 lb)   01/10/22 110.8 kg (244 lb 3.2 oz)   12/28/21 111.6 kg (246 lb)   01/01/21 110 kg (242 lb 8.1 oz)   02/06/16 102.1 kg (225 lb)   12/24/12 108.9 kg (240 lb)   11/20/12 108.9 kg (240 lb)       Last Labs:  Last Labs:  Glucose   Date Value Ref Range Status   10/04/2022 209 (H) 70 - 110 mg/dL Final   09/26/2022 116 (H) 70 - 110 mg/dL Final     BUN   Date Value Ref Range Status   10/04/2022 9 6 - 20 mg/dL Final   09/26/2022 12 6 - 20 mg/dL Final     Creatinine   Date Value Ref Range Status   10/04/2022 0.9 0.5 - 1.4 mg/dL Final   09/26/2022 0.8 0.5 - 1.4 mg/dL Final     Sodium   Date Value Ref Range Status   10/04/2022 137 136 - 145 mmol/L Final   09/26/2022 138 136 - 145 mmol/L Final     Potassium   Date Value Ref Range Status   10/04/2022 4.6 3.5 - 5.1 mmol/L Final   09/26/2022 4.2 3.5 - 5.1 mmol/L Final     Phosphorus   Date Value Ref Range Status   09/26/2022 3.4 2.7 - 4.5 mg/dL Final   09/25/2022 3.4 2.7 - 4.5 mg/dL Final     Calcium   Date Value Ref Range Status   10/04/2022 8.9 8.7 - 10.5 mg/dL Final   09/26/2022 8.9 8.7 - 10.5 mg/dL Final     No results found for: PREALBUMIN  Total Protein   Date Value Ref Range Status   10/04/2022 7.7 6.0 - 8.4 g/dL Final   09/26/2022 7.3 6.0 - 8.4 g/dL Final     Cholesterol   Date Value Ref Range Status   08/10/2022 137 <200 mg/dL Final   12/28/2021 209 (H) <200 mg/dL Final     Hemoglobin A1C   Date Value Ref Range Status    10/03/2022 5.5 % Final   12/30/2021 11.0 (H) <5.7 % of total Hgb Final     Comment:     For someone without known diabetes, a hemoglobin A1c  value of 6.5% or greater indicates that they may have   diabetes and this should be confirmed with a follow-up   test.     For someone with known diabetes, a value <7% indicates   that their diabetes is well controlled and a value   greater than or equal to 7% indicates suboptimal   control. A1c targets should be individualized based on   duration of diabetes, age, comorbid conditions, and   other considerations.     Currently, no consensus exists regarding use of  hemoglobin A1c for diagnosis of diabetes for children.           Hemoglobin   Date Value Ref Range Status   10/04/2022 9.9 (L) 14.0 - 18.0 g/dL Final   09/26/2022 9.2 (L) 14.0 - 18.0 g/dL Final     Hematocrit   Date Value Ref Range Status   10/04/2022 31.2 (L) 40.0 - 54.0 % Final   09/26/2022 29.2 (L) 40.0 - 54.0 % Final     Iron   Date Value Ref Range Status   09/21/2022 19 (L) 45 - 160 ug/dL Final   09/14/2022 23 (L) 45 - 160 ug/dL Final     No components found for: FROLATE  No results found for: [id number]  WBC   Date Value Ref Range Status   10/04/2022 6.29 3.90 - 12.70 K/uL Final   09/26/2022 7.88 3.90 - 12.70 K/uL Final       Assessment:     Nutrition/Diet History     Patient Reported Diet/Restrictions/Preferences: regular diet  Food Allergies: NKFA  Factors Affecting Nutritional Intake: none noted    Estimated/Assessed Needs     Weight Used For Calorie Calculations: 96 kg (211 lb)  Energy Calorie Requirements (kcal): 6609-8076 kcal/day   Energy Need Method: 20-25 Kcal/kg  Protein Requirements: 115-144 g/day   Protein Need Method: 1.2-1.5 g/kg  Fluid Requirements: 2000 ml/day  Estimated Fluid Requirement Method: 1ml/kcal      Nutrition Support  N/A    Evaluation of Received Nutrient/Fluid Intake     Calorie Intake: meeting needs  Protein Intake: meeting needs  Fluid Intake: not meeting needs  Tolerance:  tolerating  % Intake of Estimated Energy Needs:  %      Nutrition Diagnosis Related to (Etiology) As Evidenced By (Signs/Symptoms)   Predicted suboptimal energy intake Planned medical therapy known to decrease ability to consume sufficient energy Scheduled chemotherapy     RD Notes  Mr. Dwight Hoffmann is a 53y/o male recently diagnosed with lymphoma s/p small bowel resection. Pt will be starting chemotherapy in approx. 6 weeks. Pt reports good appetite and intake since discharge. He has managed to maintain his weight since surgery. He struggles with hydration but has been drinking gatorade frequently. He is tolerant to all foods. Denies N/V/D/C. BMs normal. Denies having any nutrition related questions or concerns at the current time. CW: 211#    Nutrition Intervention:      Nutrition Intervention Energy-modified diet and Protein-modified diet   Goals/Expected Outcomes Initiate high protein/calorie diet to assist in meeting estimated nutritional needs and prevent further weight loss   Progress Initial     Nutrition Intervention Fluid-modified diet   Goals/Expected Outcomes Initiate aggressive hydration via water and electrolyte rich fluids to meet estimated fluid needs   Progress Initial     Plan  Discussed importance of weight and hydration maintenance to prepare for treatment start  Recommended high protein, high calorie diet to assist in weight maintenance before and during treatment  Discussed alternate form of hydration  Provided RD contact info. Encouraged pt to call with questions or concerns    Monitoring/Evaluation:     Monitor: po intake, weight, BMs    Next Visit: f/u at first infusion      I have explained and the patient understands all of  the current recommendation(s). I have answered all of their questions to the best of my ability and to their complete satisfaction.   The patient is to continue with the current management plan.    Electronically signed by: Meghan Valderrama MBA, APRYL,  OMARN

## 2022-10-06 NOTE — Clinical Note
Hiv, hepatitis B surface antigen, hepatitis B core antibody, uric acid today; pet ct in 1-2 weeks; port placement; f/u with  in 3 weeks to discuss and start chemo  (after pet and port)

## 2022-10-11 NOTE — PROGRESS NOTES
Reason for consult:   Subjective:      Patient ID: Dwight Hoffmann is a 52 y.o. male.    Chief Complaint:: hospital follow up visit     HPI Dwight Hoffmann is a 52 y.o. male w PMHx of Dm, HLD , appendectomy,  developed  night sweats, unintentional 30 lb weight loss for few months. He saw GI for new episodes of melena, colonoscopy done on 09/01 were 3 polyps were resected, pathology report consistent with tubular adenomas.      Symptoms worsened by severe abdominal pain, urinary hesitancy, dysuria, increased urinary frequency, occasional bloody stool. He came  to ED.   Hospitalized 09/13//22--09/26/22.    CT abdomen/pelvis revealed a 12 cm necrotic mass or abscess in the abdomen, with fistula formation to several adjacent small bowel loops.   He was taken to OR 09/14 for ex lap, washout, and colon anastomosis.  Cx grew Enterobacter, not very sensitive, and a pansensitive Proteus.  Path showed DLBCL  Post op he had ileus which ultimately resolved,   Repeat Ct on 09/20 showed a fluid collection , Dr Oliveira  took patient to OR 09/23/22, thankfully it was not abscess, it was just a necrotic lymphnode and cultures were neg  CEA, CA 19 9, alpha fetoprotein, PSA all negative  Bone marrow Bx  and  Ct chest done for staging- neg  Patient comes in for follow-up., expected abdominal pain postop in due to diffuse large B-cell lymphoma.  He is trying to eat healthy.  Wife always attentive.  Patient had some sweating tonight before otherwise no profuse sweating.  Review of patient's allergies indicates:  No Known Allergies  Past Medical History:   Diagnosis Date    Diabetes mellitus     Prediabetes      Past Surgical History:   Procedure Laterality Date    APPENDECTOMY  07/15/2014    COLONOSCOPY      COLONOSCOPY N/A 2/9/2022    Procedure: COLONOSCOPY;  Surgeon: Manuel Sanders MD;  Location: [facility];  Service: Endoscopy;  Laterality: N/A;    COLONOSCOPY N/A 9/1/2022    Procedure: COLONOSCOPY;  Surgeon: Andrea MORALES  MD Jayleen;  Location: [facility] ENDO;  Service: Endoscopy;  Laterality: N/A;    INCISION AND DRAINAGE OF ABDOMEN N/A 9/14/2022    Procedure: INCISION AND DRAINAGE, ABDOMEN;  Surgeon: Jan Oliveira Jr., MD;  Location: [facility] OR;  Service: General;  Laterality: N/A;    LAPAROSCOPIC DRAINAGE OF ABDOMEN N/A 9/23/2022    Procedure: DRAINAGE, ABDOMEN, LAPAROSCOPIC;  Surgeon: Jan Oliveira Jr., MD;  Location: [facility] OR;  Service: General;  Laterality: N/A;     Social History     Tobacco Use    Smoking status: Never    Smokeless tobacco: Never   Substance Use Topics    Alcohol use: Not Currently     Comment: occasional     Social History     Occupational History    Occupation: Truck Drive     Hunting:  Fishing:  Pets:  Exposure to sick contacts:  Exposure to TB:  Travel:     Family History   Problem Relation Age of Onset    Cancer Mother         Colon    Diabetes Mother     Hypertension Mother     Seizures Father     Heart murmur Sister     Seizures Sister        Review of Systems   Constitutional:  Positive for fatigue and unexpected weight change. Negative for activity change, appetite change, chills, diaphoresis and fever.        Night sweats last night    HENT:  Negative for congestion, dental problem, ear pain, hearing loss, mouth sores, postnasal drip, rhinorrhea, sinus pain, sore throat and trouble swallowing.    Eyes:  Negative for photophobia, pain and visual disturbance.   Respiratory:  Negative for cough, choking, chest tightness and shortness of breath.    Cardiovascular:  Negative for chest pain, palpitations and leg swelling.   Gastrointestinal:  Negative for abdominal pain, anal bleeding, blood in stool, constipation, diarrhea, nausea, rectal pain and vomiting.   Endocrine: Negative for polydipsia and polyuria.   Genitourinary:  Negative for difficulty urinating, dysuria, flank pain, frequency, genital sores, hematuria, penile discharge, scrotal swelling and urgency.   Musculoskeletal:  Negative for  "arthralgias, back pain, gait problem, joint swelling and myalgias.   Skin:  Negative for rash and wound.   Allergic/Immunologic: Negative for environmental allergies, food allergies and immunocompromised state.   Neurological:  Negative for dizziness, syncope, weakness, numbness and headaches.   Hematological:  Negative for adenopathy. Does not bruise/bleed easily.   Psychiatric/Behavioral:  Negative for dysphoric mood and sleep disturbance. The patient is not nervous/anxious.         Depressed mood as per wife. anxious      Pertinent medications noted:   Vancomycin and Zosyn 09/13--09/14  Cefepime and flagyl 09/15--09/16  Meropenem 09/17-- 09/26   Ertapenem 09/26--present   Objective:      Blood pressure 136/84, pulse 82, temperature 98.3 °F (36.8 °C), height 6' 1" (1.854 m), weight 97.3 kg (214 lb 8 oz), SpO2 99 %.  Body mass index is 28.3 kg/m².  Physical Exam  Constitutional:       General: He is not in acute distress.     Appearance: He is not diaphoretic.   HENT:      Head: Atraumatic.      Right Ear: External ear normal.      Left Ear: External ear normal.      Nose: Nose normal.   Eyes:      General: No scleral icterus.     Conjunctiva/sclera: Conjunctivae normal.      Pupils: Pupils are equal, round, and reactive to light.   Neck:      Vascular: No JVD.   Cardiovascular:      Rate and Rhythm: Normal rate and regular rhythm.      Heart sounds: Normal heart sounds.   Pulmonary:      Effort: Pulmonary effort is normal.      Breath sounds: Normal breath sounds. No wheezing or rales.   Chest:      Chest wall: No tenderness.   Abdominal:      General: Bowel sounds are normal. There is no distension.      Palpations: Abdomen is soft. There is no mass.      Tenderness: There is no abdominal tenderness. There is no guarding or rebound.      Comments: Surgical scar from prior surgery is healed well   Musculoskeletal:         General: Normal range of motion.      Cervical back: Normal range of motion and neck supple. "   Lymphadenopathy:      Cervical: No cervical adenopathy.   Skin:     General: Skin is warm and dry.      Findings: No erythema or rash.   Neurological:      Mental Status: He is alert and oriented to person, place, and time.      Cranial Nerves: No cranial nerve deficit.   Psychiatric:         Behavior: Behavior normal.         Thought Content: Thought content normal.       VAD:     General Labs reviewed:  Lab Results   Component Value Date    WBC 6.29 10/04/2022    RBC 4.05 (L) 10/04/2022    HGB 9.9 (L) 10/04/2022    HCT 31.2 (L) 10/04/2022    MCV 77 (L) 10/04/2022    MCH 24.4 (L) 10/04/2022    MCHC 31.7 (L) 10/04/2022    RDW 19.8 (H) 10/04/2022     10/04/2022    MPV 10.0 10/04/2022    GRAN 54.0 10/04/2022    LYMPH 33.0 10/04/2022    MONO 10.0 10/04/2022    EOS 0.2 09/26/2022    BASO 0.05 09/26/2022    EOSINOPHIL 1.0 10/04/2022    BASOPHIL 0.0 10/04/2022     CMP  Sodium   Date Value Ref Range Status   10/04/2022 137 136 - 145 mmol/L Final     Potassium   Date Value Ref Range Status   10/04/2022 4.6 3.5 - 5.1 mmol/L Final     Chloride   Date Value Ref Range Status   10/04/2022 103 95 - 110 mmol/L Final     CO2   Date Value Ref Range Status   10/04/2022 22 (L) 23 - 29 mmol/L Final     Glucose   Date Value Ref Range Status   10/04/2022 209 (H) 70 - 110 mg/dL Final     BUN   Date Value Ref Range Status   10/04/2022 9 6 - 20 mg/dL Final     Creatinine   Date Value Ref Range Status   10/04/2022 0.9 0.5 - 1.4 mg/dL Final     Calcium   Date Value Ref Range Status   10/04/2022 8.9 8.7 - 10.5 mg/dL Final     Total Protein   Date Value Ref Range Status   10/04/2022 7.7 6.0 - 8.4 g/dL Final     Albumin   Date Value Ref Range Status   10/04/2022 3.1 (L) 3.5 - 5.2 g/dL Final     Total Bilirubin   Date Value Ref Range Status   10/04/2022 1.0 0.1 - 1.0 mg/dL Final     Comment:     For infants and newborns, interpretation of results should be based  on gestational age, weight and in agreement with  clinical  observations.    Premature Infant recommended reference ranges:  Up to 24 hours.............<8.0 mg/dL  Up to 48 hours............<12.0 mg/dL  3-5 days..................<15.0 mg/dL  6-29 days.................<15.0 mg/dL       Alkaline Phosphatase   Date Value Ref Range Status   10/04/2022 164 (H) 55 - 135 U/L Final     AST   Date Value Ref Range Status   10/04/2022 19 10 - 40 U/L Final     ALT   Date Value Ref Range Status   10/04/2022 22 10 - 44 U/L Final     Anion Gap   Date Value Ref Range Status   10/04/2022 12 8 - 16 mmol/L Final     eGFR if    Date Value Ref Range Status   12/28/2021 111 > OR = 60 mL/min/1.73m2 Final     eGFR if non    Date Value Ref Range Status   12/28/2021 96 > OR = 60 mL/min/1.73m2 Final       Micro:  Microbiology Results (last 7 days)       ** No results found for the last 168 hours. **                    Enterobacter cloacae Proteus mirabilis     CULTURE, AEROBIC  (SPECIFY SOURCE) CULTURE, AEROBIC  (SPECIFY SOURCE)   Amox/K Clav'ate     <=8/4 mcg/mL Sensitive   Amp/Sulbactam     <=8/4 mcg/mL Sensitive   Ampicillin     <=8 mcg/mL Sensitive   Cefazolin     <=2 mcg/mL Sensitive   Cefepime 16 mcg/mL Intermediate <=2 mcg/mL Sensitive   Ceftriaxone >32 mcg/mL Resistant <=1 mcg/mL Sensitive   Ciprofloxacin <=1 mcg/mL Sensitive <=1 mcg/mL Sensitive   Ertapenem <=0.5 mcg/mL Sensitive <=0.5 mcg/mL Sensitive   Gentamicin <=4 mcg/mL Sensitive <=4 mcg/mL Sensitive   Levofloxacin <=2 mcg/mL Sensitive <=2 mcg/mL Sensitive   Meropenem <=1 mcg/mL Sensitive <=1 mcg/mL Sensitive   Piperacillin/Tazo 64 mcg/mL Intermediate <=16 mcg/mL Sensitive   Tetracycline <=4 mcg/mL Sensitive       Tobramycin <=4 mcg/mL Sensitive <=4 mcg/mL Sensitive   Trimeth/Sulfa <=2/38 mcg/mL Sensitive <=2/38 mcg/mL Sensitive     Pathology:  09/22 Bone marrow  Bone marrow, right iliac crest, aspirate, clot and core biopsy:   --Normocellular marrow for age ranging from 30-70% with trilineage    hematopoiesis, see comment   --No increase in blasts by aspirate count and CD34 immunohistochemical stain,   see comment   --Increased megakaryocytes with atypia, see comment   --No morphologic evidence of metastatic lymphoma   --Stainable iron is not increased, see comment   --Minimal to mild reticulin fibrosis     09/14/2022  1. Small bowel, resection:  Diffuse large B-cell lymphoma, non-germinal   center origin.  Final classification is pending C-MYC rearrangement analysis   result.  See comment.   2. Omentum, resection: Diffuse large B-cell lymphoma, non-germinal center   origin.  Final classification is pending C-MYC rearrangement analysis result    9/1 colonoscopy:  1. ASCENDING COLON, POLYPECTOMY:   - TUBULAR ADENOMA.   2. CECUM, POLYPECTOMY:   - TUBULAR ADENOMA.   3. CECUM, POLYPECTOMY:   - TUBULAR ADENOMA.      Imaging Reviewed:    CT abdomen/pelvis 9/20: Postoperative abdomen.  Deep abdominal fluid collection, compatible with abscess.  The collection is not excessive Espinoza percutaneously. Mildly dilated small bowel compatible with ileus.  Nasogastric tube in the distal stomach.      CT abdomen/pelvis 9/14:  1. 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  2. Prostatomegaly necessitating correlation with PSA.     Cardiology:   ECHO 9/15/22:  The left ventricle is normal in size with concentric remodeling and normal systolic function.  The estimated ejection fraction is 60%.  Grade I left ventricular diastolic dysfunction.  Normal right ventricular size with normal right ventricular systolic function.  Mild left atrial enlargement.  Mild mitral regurgitation.  Mild to moderate tricuspid regurgitation.  Normal central venous pressure (3 mmHg).  The estimated PA systolic pressure is 49 mmHg.  There is pulmonary hypertension.  Mild right atrial enlargement.  No vegetations were seen    Assessment:       1. Intra-abdominal abscess    2.  Peritonitis    3. Diffuse large B-cell lymphoma of intra-abdominal lymph nodes    4. Anemia, chronic disease    5. Diabetes mellitus type 2, noninsulin dependent           Plan:       Intra-abdominal abscess  Comments:  surgically removed; no abscess left behind  Orders:  -     SUBSEQUENT HOME HEALTH ORDERS    Peritonitis  Comments:  Bc abscess is surgically removed, i am treating as Peritonitis  Orders:  -     SUBSEQUENT HOME HEALTH ORDERS    Diffuse large B-cell lymphoma of intra-abdominal lymph nodes  Comments:  as per hematologist, dr Reid    Anemia, chronic disease    Diabetes mellitus type 2, noninsulin dependent    Follow up in about 10 days (around 10/21/2022).         -- 09/14/2022  ex lap/drainage/small-bowel resection with anastomosis, path  DLBCL;  intra abdominal cultures Enterobacter (intermediate to cefepime and to Zosyn) and Proteus mirabilis   --- 09/23/2022 Ind for Remaining 10 x 7.5 by 3.5 cm  It did not look infected/abscess to surgeon.  It looked more like friable lymph node/material.  Cultures negative  -  Meropenem 09/17-- 09/26 , then Ertapenem 09/26--present   If I count from most recent sx 09/23,  patient has received 2.5 weeks of treatment   I believe he has completed treatment of infection, but  I decided to be extra cautious for fear recurrence of infection may halt treatment of DLBCL  We agreed to wait for results of PET scheduled this Friday and repeat CRP(crp may stay high bc of  ca)    This note was created using  voice recognition software that occasionally misinterpreted phrases or words.

## 2022-10-11 NOTE — Clinical Note
I think the treatment of his infection is completed, but I will continue antibiotics until I see PET (fri) and crp follow-up.  I do not want any recurrence of infection to be an  obstacle for chemotherapy.  He still has a PICC line and is waiting for Dr. Oliveira to schedule him for a port.

## 2022-10-13 NOTE — TELEPHONE ENCOUNTER
This nurse called pt to inquire if he has scheduled port placement. Pt states he has not. This nurse notified him of portal msg from Dr Oliveira. Pt reports he does not know how to log onto portal. Msg read to pt. He will call office tomorrow to schedule.

## 2022-10-18 NOTE — PLAN OF CARE
START ON PATHWAY REGIMEN - Lymphoma and CLL    [id number]        Prednisone       Rituximab-xxxx       Cyclophosphamide       Doxorubicin       Vincristine (Oncovin)           Additional Orders: Per committee, hepatitis B and C screening   recommended prior to initiation of rituximab. Assess LVEF prior to initiation of   doxorubicin and as clinically indicated during and after treatment. Doxorubicin   can cause myocardial damage, including acute left ventricular failure. Risk of   cardiomyopathy is generally proportional to cumulative   anthracycline/anthracenedione exposure. Use of concomitant cardiotoxic agents,   previous radiotherapy to the mediastinum, or presence/history of cardiovascular   disease can additionally increase cardiomyopathy risk. See PI for details.   Infuse vincristine via minibag to reduce the risk of fatal medication errors due   to incorrect route of administration.    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

## 2022-10-19 NOTE — TELEPHONE ENCOUNTER
Called patient to advise that Dr. Oliveira will most likely place port on Monday 10/31/22.  Dr. Humphreys nurse will be calling him back to advise of instructions, etc.      ----- Message from Whitney Santos RN sent at 10/19/2022  3:43 PM CDT -----  Regarding: port  Good afternoon,    Please schedule pt for port ASAP. He will need port to begin chemo. Thanks!    Whitney NELSON

## 2022-10-19 NOTE — TELEPHONE ENCOUNTER
"Incoming call from pt's wife stating that she has not received communication from anyone regarding pt's port sx. Pt scheduled for chemo class tomorrow and auth pending at this time. High priority msg sent to Dr Oliveira staff to schedule pt for port. Pt's wife v/u. She states that pt is experiencing "feelings of fullness" in his stomach and is only able to drink about 1 cup of fluids. She states he feels too full to eat solid food d/t decreased appetite. His last BM was yesterday and then averaged q 5 days prior. Dr Reid notified. She advised that pt should see Dr Oliveira. This nurse notified pt's wife. No answer. LVM.  "

## 2022-10-20 NOTE — PATIENT INSTRUCTIONS
Discontinuing IV antibiotics today.    PICC is still on.  Please discuss with surgeon tomorrow.  He may discontinue the PICC for me and he will place the port.    Please do not keep the PICC for prolonged period of time without flushing.  It has to be addressed in the next 5 days.    I am renewing Zofran and Phenergan for nausea   Take magnesium over the counter it may help with neuropathic pain, leg pain and also help you have good bowel movement   Start taking MiraLax twice daily again to help you with a bowel movements

## 2022-10-20 NOTE — PROGRESS NOTES
Reason for consult:   Subjective:      Patient ID: Dwight Hoffmann is a 52 y.o. male.    Chief Complaint:: Follow-up    HPI Dwight Hoffmann is a 52 y.o. male w PMHx of Dm, HLD , appendectomy,  developed  night sweats, unintentional 30 lb weight loss for few months. He saw GI for new episodes of melena, colonoscopy done on 09/01 were 3 polyps were resected, pathology report consistent with tubular adenomas.      Symptoms worsened by severe abdominal pain, urinary hesitancy, dysuria, increased urinary frequency, occasional bloody stool. He came  to ED.   Hospitalized 09/13//22--09/26/22.    CT abdomen/pelvis revealed a 12 cm necrotic mass or abscess in the abdomen, with fistula formation to several adjacent small bowel loops.   He was taken to OR 09/14 for ex lap, washout, and colon anastomosis.  Cx grew Enterobacter, not very sensitive, and a pansensitive Proteus.  Path showed DLBCL  Post op he had ileus which ultimately resolved,   Repeat Ct on 09/20 showed a fluid collection , Dr Oliveira  took patient to OR 09/23/22, thankfully it was not abscess, it was just a necrotic lymphnode and cultures were neg  CEA, CA 19 9, alpha fetoprotein, PSA all negative  Bone marrow Bx  and  Ct chest done for staging- neg  Patient comes in for follow-up., expected abdominal pain postop in due to diffuse large B-cell lymphoma.  He is trying to eat healthy.  Wife always attentive.  Patient had some sweating tonight before otherwise no profuse sweating.  10/20/2022  Patient continues to lose weight.  He looks discouraged and uncomfortable.  Abdominal pain as persisting.  He has constipation which confused the picture, if the abdominal pain is due to cancer and/or constipation.  No fever.  No issues with PICC line.  Patient has an appointment with general surgeon for port placement tomorrow.  It is okay by oncologist for me to take the PICC off but I decided to wait until he sees surgeon tomorrow.  Either the surgeon can take it  off for me or patient may use it.    Wife is aware that if PICC line is on till Monday to call me then I will remove it.      Review of patient's allergies indicates:  No Known Allergies  Past Medical History:   Diagnosis Date    Diabetes mellitus     Prediabetes      Past Surgical History:   Procedure Laterality Date    APPENDECTOMY  07/15/2014    COLONOSCOPY      COLONOSCOPY N/A 2/9/2022    Procedure: COLONOSCOPY;  Surgeon: Manuel Sanders MD;  Location: [facility] ENDO;  Service: Endoscopy;  Laterality: N/A;    COLONOSCOPY N/A 9/1/2022    Procedure: COLONOSCOPY;  Surgeon: Andrea Clemens MD;  Location: [facility] ENDO;  Service: Endoscopy;  Laterality: N/A;    INCISION AND DRAINAGE OF ABDOMEN N/A 9/14/2022    Procedure: INCISION AND DRAINAGE, ABDOMEN;  Surgeon: Jan Oliveira Jr., MD;  Location: [facility] OR;  Service: General;  Laterality: N/A;    LAPAROSCOPIC DRAINAGE OF ABDOMEN N/A 9/23/2022    Procedure: DRAINAGE, ABDOMEN, LAPAROSCOPIC;  Surgeon: Jan Oliveira Jr., MD;  Location: [facility] OR;  Service: General;  Laterality: N/A;     Social History     Tobacco Use    Smoking status: Never    Smokeless tobacco: Never   Substance Use Topics    Alcohol use: Not Currently     Comment: occasional     Social History     Occupational History    Occupation: [employer]     Hunting:  Fishing:  Pets:  Exposure to sick contacts:  Exposure to TB:  Travel:     Family History   Problem Relation Age of Onset    Cancer Mother         Colon    Diabetes Mother     Hypertension Mother     Seizures Father     Heart murmur Sister     Seizures Sister        Review of Systems   Constitutional:  Positive for fatigue and unexpected weight change. Negative for activity change, appetite change, chills, diaphoresis and fever.        Night sweats minimal if any   HENT:  Negative for congestion, dental problem, ear pain, hearing loss, mouth sores, postnasal drip, rhinorrhea, sinus pain, sore throat and trouble swallowing.    Eyes:  Negative  "for photophobia, pain and visual disturbance.   Respiratory:  Negative for cough, choking, chest tightness and shortness of breath.    Cardiovascular:  Negative for chest pain, palpitations and leg swelling.   Gastrointestinal:  Positive for abdominal pain and constipation. Negative for anal bleeding, blood in stool, diarrhea, nausea, rectal pain and vomiting.   Endocrine: Negative for polydipsia and polyuria.   Genitourinary:  Negative for difficulty urinating, dysuria, flank pain, frequency, genital sores, hematuria, penile discharge, scrotal swelling and urgency.   Musculoskeletal:  Negative for arthralgias, back pain, gait problem, joint swelling and myalgias.   Skin:  Negative for rash and wound.   Allergic/Immunologic: Negative for environmental allergies, food allergies and immunocompromised state.   Neurological:  Negative for dizziness, syncope, weakness, numbness and headaches.   Hematological:  Negative for adenopathy. Does not bruise/bleed easily.   Psychiatric/Behavioral:  Negative for dysphoric mood and sleep disturbance. The patient is not nervous/anxious.         Depressed mood as per wife. anxious      Pertinent medications noted:   Vancomycin and Zosyn 09/13--09/14  Cefepime and flagyl 09/15--09/16  Meropenem 09/17-- 09/26   Ertapenem 09/26--present   Objective:      Blood pressure 128/66, pulse 100, temperature 98.4 °F (36.9 °C), height 6' 1" (1.854 m), weight 96.9 kg (213 lb 9.6 oz), SpO2 99 %.  Body mass index is 28.18 kg/m².  Physical Exam  Constitutional:       General: He is not in acute distress.     Appearance: He is not diaphoretic.   HENT:      Head: Atraumatic.      Right Ear: External ear normal.      Left Ear: External ear normal.      Nose: Nose normal.   Eyes:      General: No scleral icterus.     Conjunctiva/sclera: Conjunctivae normal.      Pupils: Pupils are equal, round, and reactive to light.   Neck:      Vascular: No JVD.   Cardiovascular:      Rate and Rhythm: Normal rate and " regular rhythm.      Heart sounds: Normal heart sounds.   Pulmonary:      Effort: Pulmonary effort is normal.      Breath sounds: Normal breath sounds. No wheezing or rales.   Chest:      Chest wall: No tenderness.   Abdominal:      General: Bowel sounds are normal. There is no distension.      Palpations: Abdomen is soft. There is no mass.      Tenderness: There is no abdominal tenderness. There is no guarding or rebound.      Comments: Surgical scar from prior surgery is healed well   Musculoskeletal:         General: Normal range of motion.      Cervical back: Normal range of motion and neck supple.   Lymphadenopathy:      Cervical: No cervical adenopathy.   Skin:     General: Skin is warm and dry.      Findings: No erythema or rash.   Neurological:      Mental Status: He is alert and oriented to person, place, and time.      Cranial Nerves: No cranial nerve deficit.   Psychiatric:         Behavior: Behavior normal.         Thought Content: Thought content normal.       VAD:     General Labs reviewed:  Lab Results   Component Value Date    WBC 5.81 10/19/2022    RBC 4.09 (L) 10/19/2022    HGB 10.3 (L) 10/19/2022    HCT 31.3 (L) 10/19/2022    MCV 77 (L) 10/19/2022    MCH 25.2 (L) 10/19/2022    MCHC 32.9 10/19/2022    RDW 20.4 (H) 10/19/2022     10/19/2022    MPV 10.4 10/19/2022    GRAN 44.0 10/19/2022    LYMPH 28.0 10/19/2022    MONO 20.0 (H) 10/19/2022    EOS CANCELED 10/12/2022    BASO CANCELED 10/12/2022    EOSINOPHIL 1.0 10/19/2022    BASOPHIL 1.0 10/19/2022     CMP  Sodium   Date Value Ref Range Status   10/19/2022 137 136 - 145 mmol/L Final     Potassium   Date Value Ref Range Status   10/19/2022 4.0 3.5 - 5.1 mmol/L Final     Chloride   Date Value Ref Range Status   10/19/2022 103 95 - 110 mmol/L Final     CO2   Date Value Ref Range Status   10/19/2022 23 23 - 29 mmol/L Final     Glucose   Date Value Ref Range Status   10/19/2022 207 (H) 70 - 110 mg/dL Final     BUN   Date Value Ref Range Status    10/19/2022 7 6 - 20 mg/dL Final     Creatinine   Date Value Ref Range Status   10/19/2022 0.8 0.5 - 1.4 mg/dL Final     Calcium   Date Value Ref Range Status   10/19/2022 8.8 8.7 - 10.5 mg/dL Final     Total Protein   Date Value Ref Range Status   10/19/2022 7.6 6.0 - 8.4 g/dL Final     Albumin   Date Value Ref Range Status   10/19/2022 2.9 (L) 3.5 - 5.2 g/dL Final     Total Bilirubin   Date Value Ref Range Status   10/19/2022 1.0 0.1 - 1.0 mg/dL Final     Comment:     For infants and newborns, interpretation of results should be based  on gestational age, weight and in agreement with clinical  observations.    Premature Infant recommended reference ranges:  Up to 24 hours.............<8.0 mg/dL  Up to 48 hours............<12.0 mg/dL  3-5 days..................<15.0 mg/dL  6-29 days.................<15.0 mg/dL       Alkaline Phosphatase   Date Value Ref Range Status   10/19/2022 146 (H) 55 - 135 U/L Final     AST   Date Value Ref Range Status   10/19/2022 18 10 - 40 U/L Final     ALT   Date Value Ref Range Status   10/19/2022 14 10 - 44 U/L Final     Anion Gap   Date Value Ref Range Status   10/19/2022 11 8 - 16 mmol/L Final     eGFR if    Date Value Ref Range Status   12/28/2021 111 > OR = 60 mL/min/1.73m2 Final     eGFR if non    Date Value Ref Range Status   12/28/2021 96 > OR = 60 mL/min/1.73m2 Final     Micro:  Microbiology Results (last 7 days)       ** No results found for the last 168 hours. **                    Enterobacter cloacae Proteus mirabilis     CULTURE, AEROBIC  (SPECIFY SOURCE) CULTURE, AEROBIC  (SPECIFY SOURCE)   Amox/K Clav'ate     <=8/4 mcg/mL Sensitive   Amp/Sulbactam     <=8/4 mcg/mL Sensitive   Ampicillin     <=8 mcg/mL Sensitive   Cefazolin     <=2 mcg/mL Sensitive   Cefepime 16 mcg/mL Intermediate <=2 mcg/mL Sensitive   Ceftriaxone >32 mcg/mL Resistant <=1 mcg/mL Sensitive   Ciprofloxacin <=1 mcg/mL Sensitive <=1 mcg/mL Sensitive   Ertapenem <=0.5 mcg/mL  Sensitive <=0.5 mcg/mL Sensitive   Gentamicin <=4 mcg/mL Sensitive <=4 mcg/mL Sensitive   Levofloxacin <=2 mcg/mL Sensitive <=2 mcg/mL Sensitive   Meropenem <=1 mcg/mL Sensitive <=1 mcg/mL Sensitive   Piperacillin/Tazo 64 mcg/mL Intermediate <=16 mcg/mL Sensitive   Tetracycline <=4 mcg/mL Sensitive       Tobramycin <=4 mcg/mL Sensitive <=4 mcg/mL Sensitive   Trimeth/Sulfa <=2/38 mcg/mL Sensitive <=2/38 mcg/mL Sensitive     Pathology:  09/22 Bone marrow  Bone marrow, right iliac crest, aspirate, clot and core biopsy:   --Normocellular marrow for age ranging from 30-70% with trilineage   hematopoiesis, see comment   --No increase in blasts by aspirate count and CD34 immunohistochemical stain,   see comment   --Increased megakaryocytes with atypia, see comment   --No morphologic evidence of metastatic lymphoma   --Stainable iron is not increased, see comment   --Minimal to mild reticulin fibrosis     09/14/2022  1. Small bowel, resection:  Diffuse large B-cell lymphoma, non-germinal   center origin.  Final classification is pending C-MYC rearrangement analysis   result.  See comment.   2. Omentum, resection: Diffuse large B-cell lymphoma, non-germinal center   origin.  Final classification is pending C-MYC rearrangement analysis result    9/1 colonoscopy:  1. ASCENDING COLON, POLYPECTOMY:   - TUBULAR ADENOMA.   2. CECUM, POLYPECTOMY:   - TUBULAR ADENOMA.   3. CECUM, POLYPECTOMY:   - TUBULAR ADENOMA.      Imaging Reviewed:    CT abdomen/pelvis 9/20: Postoperative abdomen.  Deep abdominal fluid collection, compatible with abscess.  The collection is not excessive Espinoza percutaneously. Mildly dilated small bowel compatible with ileus.  Nasogastric tube in the distal stomach.      CT abdomen/pelvis 9/14:  1. 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  2. Prostatomegaly necessitating correlation with PSA.     Cardiology:   ECHO 9/15/22:  The left ventricle is  normal in size with concentric remodeling and normal systolic function.  The estimated ejection fraction is 60%.  Grade I left ventricular diastolic dysfunction.  Normal right ventricular size with normal right ventricular systolic function.  Mild left atrial enlargement.  Mild mitral regurgitation.  Mild to moderate tricuspid regurgitation.  Normal central venous pressure (3 mmHg).  The estimated PA systolic pressure is 49 mmHg.  There is pulmonary hypertension.  Mild right atrial enlargement.  No vegetations were seen    Assessment:       1. Intra-abdominal abscess    2. Peritonitis    3. Diffuse large B-cell lymphoma of intra-abdominal lymph nodes    4. Tinea pedis, unspecified laterality    5. Nausea        Plan:       Intra-abdominal abscess/ Peritonitis--he has completed treatment with IV antibiotics.  Will keep on SBP prophylaxis till chemotherapy starts working then may stop.  -     ciprofloxacin HCl (CIPRO) 500 MG tablet; Take 1 tablet (500 mg total) by mouth once daily.  Dispense: 30 tablet; Refill: 0    Diffuse large B-cell lymphoma of intra-abdominal lymph nodes  -    follows with Dr. Reid.    Will see general surgeon for port placement tomorrow.    Tinea pedis, unspecified laterality  -     clotrimazole (LOTRIMIN) 1 % cream; Apply topically 2 (two) times daily.  Dispense: 28 g; Refill: 0    Nausea due to diffuse large B-cell lymphoma  -     ondansetron (ZOFRAN-ODT) 4 MG TbDL; Take 1 tablet (4 mg total) by mouth every 6 (six) hours as needed.  Dispense: 90 tablet; Refill: 2  -     promethazine (PHENERGAN) 12.5 MG Tab; Take 1 tablet (12.5 mg total) by mouth every 4 (four) hours as needed.  Dispense: 30 tablet; Refill: 3      Follow up if symptoms worsen or fail to improve.         -- 09/14/2022  ex lap/drainage/small-bowel resection with anastomosis, path  DLBCL;  intra abdominal cultures Enterobacter (intermediate to cefepime and to Zosyn) and Proteus mirabilis   --- 09/23/2022 Ind for Remaining 10 x  7.5 by 3.5 cm  It did not look infected/abscess to surgeon.  It looked more like friable lymph node/material.  Cultures negative  -  Meropenem 09/17-- 09/26 , then Ertapenem 09/26--10/20  --patient has completed treatment for intra-abdominal infection/peritonitis.  He will be on ciprofloxacin qd for SBP prevention until the beginning of chemotherapy.  Then may stop or whatever his oncologist recommends.    I filled out the forms for FMLA for wife.  This patient was need a lot of attention FTA and care in the next six-month--1 year  --I advised magnesium oxide and MiraLax to help with constipation   He had complained of some knee pain which he blames it on losing so much weight and knees rubbing on each other.  I advised magnesium as above may help for lower extremity pain.      This note was created using  voice recognition software that occasionally misinterpreted phrases or words.

## 2022-10-20 NOTE — PROGRESS NOTES
FOLLOW-UP APPOINTMENT    PATIENT:   Dwight Hoffmann  :    1970  MR#:    [id number]  DATE OF VISIT:  10/20/2022      Chief Complaint:DLBCL    HPI:   Mr. Dwight Hoffmann presents today for chemotherapy education.  He will be starting treatment with R-CHOP for the above diagnosis.       Review of Systems   Constitutional: Negative.    HENT:  Negative.     Eyes: Negative.    Respiratory: Negative.     Cardiovascular: Negative.    Gastrointestinal:  Positive for abdominal pain.   Endocrine: Negative.    Genitourinary: Negative.     Musculoskeletal: Negative.    Skin: Negative.    Neurological: Negative.    Hematological: Negative.    Psychiatric/Behavioral:  The patient is nervous/anxious.      Oncology History   DLBCL (diffuse large B cell lymphoma)   2022 Initial Diagnosis    DLBCL (diffuse large B cell lymphoma)     10/24/2022 -  Chemotherapy    Treatment Summary   Plan Name: IP CHOP + OP RITUXIMAB Q3W  Treatment Goal: Curative  Status: Active  Start Date: 10/24/2022 (Planned)  End Date: 10/26/2022 (Planned)  Provider: Josee Reid MD  Chemotherapy: DOXOrubicin chemo injection 112 mg, 50 mg/m2, Intravenous, Every 24 hours (non-standard times), 0 of 1 cycle  vinCRIStine (ONCOVIN) 3.1 mg in sodium chloride 0.9% 50 mL chemo infusion, 1.4 mg/m2, Intravenous, Every 24 hours (non-standard times), 0 of 1 cycle  cyclophosphamide 750 mg/m2 = 1,660 mg in sodium chloride 0.9% 250 mL chemo infusion, 750 mg/m2, Intravenous, Every 24 hours (non-standard times), 0 of 1 cycle  riTUXimab-pvvr (RUXIENCE) 375 mg/m2 = 833 mg in sodium chloride 0.9% 833 mL infusion (conc: 1 mg/mL), 375 mg/m2, Intravenous, Clinic/HOD 1 time, 0 of 1 cycle           Patient Active Problem List   Diagnosis    Small bowel mass    Anemia, chronic disease    Diabetes mellitus type 2, noninsulin dependent    Arthralgia of bilat knees and neck    Moderate malnutrition    Intra-abdominal abscess    S/P exploratory laparotomy    S/P small bowel  resection    Sepsis    DLBCL (diffuse large B cell lymphoma)       Past Medical History:   Diagnosis Date    Diabetes mellitus     Prediabetes        Past Surgical History:   Procedure Laterality Date    APPENDECTOMY  07/15/2014    COLONOSCOPY      COLONOSCOPY N/A 2/9/2022    Procedure: COLONOSCOPY;  Surgeon: Manuel Sanders MD;  Location: [facility] ENDO;  Service: Endoscopy;  Laterality: N/A;    COLONOSCOPY N/A 9/1/2022    Procedure: COLONOSCOPY;  Surgeon: Andrea Clemens MD;  Location: [facility] [place] ENDO;  Service: Endoscopy;  Laterality: N/A;    INCISION AND DRAINAGE OF ABDOMEN N/A 9/14/2022    Procedure: INCISION AND DRAINAGE, ABDOMEN;  Surgeon: Jan Oliveira Jr., MD;  Location: [facility] OR;  Service: General;  Laterality: N/A;    LAPAROSCOPIC DRAINAGE OF ABDOMEN N/A 9/23/2022    Procedure: DRAINAGE, ABDOMEN, LAPAROSCOPIC;  Surgeon: Jan Oliveira Jr., MD;  Location: [facility] OR;  Service: General;  Laterality: N/A;       Social History     Socioeconomic History    Marital status:     Number of children: 2   Occupational History    Occupation: Truck [employer]   Tobacco Use    Smoking status: Never    Smokeless tobacco: Never   Substance and Sexual Activity    Alcohol use: Not Currently     Comment: occasional    Drug use: No    Sexual activity: Yes     Partners: Female     Social Determinants of Health     Financial Resource Strain: Low Risk     Difficulty of Paying Living Expenses: Not hard at all   Food Insecurity: No Food Insecurity    Worried About Running Out of Food in the Last Year: Never true    Ran Out of Food in the Last Year: Never true   Transportation Needs: No Transportation Needs    Lack of Transportation (Medical): No    Lack of Transportation (Non-Medical): No   Physical Activity: Inactive    Days of Exercise per Week: 0 days    Minutes of Exercise per Session: 0 min   Stress: No Stress Concern Present    Feeling of Stress : Only a little   Social Connections: Moderately Isolated    Frequency of  Communication with Friends and Family: Twice a week    Frequency of Social Gatherings with Friends and Family: Twice a week    Attends [employer] Services: Never    Active Member of Clubs or Organizations: No    Attends Club or Organization Meetings: Never    Marital Status:    Housing Stability: Low Risk     Unable to Pay for Housing in the Last Year: No    Number of Places Lived in the Last Year: 1    Unstable Housing in the Last Year: No       Family History   Problem Relation Age of Onset    Cancer Mother         Colon    Diabetes Mother     Hypertension Mother     Seizures Father     Heart murmur Sister     Seizures Sister          Current Outpatient Medications:     blood sugar diagnostic Strp, To check BG 2 times daily, to use with insurance preferred meter, Disp: 200 each, Rfl: 1    blood-glucose meter kit, To check BG 2 times daily, to use with insurance preferred meter, Disp: 1 each, Rfl: 0    ciprofloxacin HCl (CIPRO) 500 MG tablet, Take 1 tablet (500 mg total) by mouth once daily., Disp: 30 tablet, Rfl: 0    clotrimazole (LOTRIMIN) 1 % cream, Apply topically 2 (two) times daily., Disp: 28 g, Rfl: 0    HYDROcodone-acetaminophen (NORCO) 5-325 mg per tablet, Take 1 tablet by mouth every 6 (six) hours as needed for Pain., Disp: 90 tablet, Rfl: 0    lancets Misc, To check BG 2 times daily, to use with insurance preferred meter, Disp: 200 each, Rfl: 1    ondansetron (ZOFRAN-ODT) 4 MG TbDL, Take 1 tablet (4 mg total) by mouth every 6 (six) hours as needed., Disp: 90 tablet, Rfl: 2    promethazine (PHENERGAN) 12.5 MG Tab, Take 1 tablet (12.5 mg total) by mouth every 4 (four) hours as needed., Disp: 30 tablet, Rfl: 3    Review of patient's allergies indicates:  No Known Allergies    Physcial Examination  VITAL SIGNS:    There is no height or weight on file to calculate BSA.   Pain Assessment: abdominal pain controlled with norco  There were no vitals filed for this visit.       Wt Readings from Last 5  Encounters:   10/20/22 96.9 kg (213 lb 9.6 oz)   10/11/22 97.3 kg (214 lb 8 oz)   10/06/22 96 kg (211 lb 10.3 oz)   10/03/22 97.5 kg (215 lb)   09/30/22 96.3 kg (212 lb 4.9 oz)       GENERAL:  Dwight Hoffmann is healthy-appearing 52 y.o. male, in no distress.   EYES:   Pupils equal, round, reactive.  Conjunctivae, sclera and lids normal.  HEENT: Head normocephalic and atraumatic, without alopecia.  Oropharynx is unremarkable.  No icterus, jaundice, stomatitis, mucositis, or ulceration is noted.  Ears are clear and unremarkable.  Nose, nares, and septum are unremarkable.    NECK:   No masses.  Thyroid and trachea are normal.    BREASTS:  Deferred.  RESPIRATORY: Clear to auscultation bilaterally.  Symmetrically effortless expansion.  No wheezing and no stridor.    CV: Heart reveals regular rate and rhythm without murmur, rub, or gallops.  ABDOMEN: Soft, non-tender.  No masses, no hernias, and no rebound or rigidity are noted.  /RECTAL:  Deferred.  LYMPHATICS: No preauricular, submandibular, cervical, supraclavicular, axillary, lymphadenopathy.  MUSCULOSKELETAL:Fair musculature, no atrophy.  No arthritic changes.  No edema or cyanosis. Back is without gross abnormal curvature.   NEUROLOGICAL: Cranial nerves II-XII grossly intact.  Motor and sensory exam intact.  SKIN:   No lesions, bruises, petechiae or rashes.  Good turgor.    PSYCHIATRIC: Patient is alert and oriented to time, place and person.  Mood and affect are appropriate.         Laboratory and Radiology   Lab Results   Component Value Date    WBC 5.81 10/19/2022    RBC 4.09 (L) 10/19/2022    HGB 10.3 (L) 10/19/2022    HCT 31.3 (L) 10/19/2022    MCV 77 (L) 10/19/2022    MCH 25.2 (L) 10/19/2022    MCHC 32.9 10/19/2022    RDW 20.4 (H) 10/19/2022     10/19/2022    MPV 10.4 10/19/2022    GRAN 44.0 10/19/2022    LYMPH 28.0 10/19/2022    MONO 20.0 (H) 10/19/2022    EOS CANCELED 10/12/2022    BASO CANCELED 10/12/2022    EOSINOPHIL 1.0 10/19/2022    BASOPHIL  1.0 10/19/2022     BMP  Lab Results   Component Value Date     10/19/2022    K 4.0 10/19/2022     10/19/2022    CO2 23 10/19/2022    BUN 7 10/19/2022    CREATININE 0.8 10/19/2022    CALCIUM 8.8 10/19/2022    ANIONGAP 11 10/19/2022    ESTGFRAFRICA 111 12/28/2021    EGFRNONAA 96 12/28/2021     Lab Results   Component Value Date    ALT 14 10/19/2022    AST 18 10/19/2022    ALKPHOS 146 (H) 10/19/2022    BILITOT 1.0 10/19/2022     Results for orders placed or performed during the hospital encounter of 09/13/22 (from the past 2160 hour(s))   CT Dx Bone Marrow Biopsy(ies) w/ Aspiration; Right(XPD)    Impression    Right iliac bone marrow biopsy was performed.      Electronically signed by: Sussy Call MD  Date:    09/22/2022  Time:    10:15   CT Abdomen Pelvis With Contrast    Impression    Postoperative abdomen.  Deep abdominal fluid collection, compatible with abscess.  The collection is not excessive Espinoza percutaneously.    Mildly dilated small bowel compatible with ileus.  Nasogastric tube in the distal stomach.      Electronically signed by: Pablo De Los Santos MD  Date:    09/20/2022  Time:    13:39   CT Chest With Contrast    Impression    Band of atelectasis seen in the left lower lobe.  Otherwise negative CT of the chest.  Adenopathy is not demonstrated.      Electronically signed by: Vinh Lee MD  Date:    09/21/2022  Time:    17:06     No results found for this or any previous visit (from the past 2160 hour(s)).  No results found for this or any previous visit (from the past 2160 hour(s)).    Pathology  Pathology Results  (Last 10 years)                 09/22/22 0939  Specimen to Pathology, Bone Marrow Aspiration/Biopsy Edited Result - FINAL    Narrative:  Bone marrow core, Right iliac crest - one core    Bone marrow clot,Right iliac crest - one clot    Bone marrow aspirate, Right iliac crest - 6 slides    Release to patient->Immediate       09/14/22 1546  Specimen to Pathology, Surgery  General Surgery Edited Result - FINAL    Narrative:  Pre-op Diagnosis: Abscess, intestinal [K63.0]   Procedure(s):   LAPAROTOMY, EXPLORATORY   EXCISION, SMALL INTESTINE   INCISION AND DRAINAGE, ABDOMEN   Number of specimens: 2   Name of specimens: 1) SMALL BOWEL, 2) OMENTUM   Which provider would you like to cc?->LATISHA BENZ JR   Release to patient->Immediate   Specimen total (fresh, frozen, permanent):->2               TITLE: PLAN OF CARE FOR THE CHEMOTHERAPY PATIENT / TEACHING PROTOCOL    PURPOSE: To involve the patient / significant other in the plan of care and to provide teaching to the significant other & patient receiving chemotherapy.    LEVEL: Independent.    CONTENT: The Plan of Care for the chemotherapy patient is individualized and appropriate to the patients needs, strengths, limitations, & goals.  Education includes information regarding chemotherapy side effects, the treatment itself, and self-care  Activities.    GOAL / OUTCOME STANDARDS    PHYSIOLOGIC: The client will remain free or experience minimal side effects or toxicities throughout the chemotherapy treatment period.     PSYCHOLOGIC: The client/significant others will demonstrate positive coping mechanisms in relation to chemotherapy and its side effects.      COGINITIVE: The client/significant others will verbalize understanding of self-care measure to avoid/minimize side effects of the chemotherapy regime.    EVALUATION / COMMENT KEY:    V = Audiovisual/Video  S = Successfully meets outcome  N = Needs further instruction  NA = Not applicable to the patient  P = Previous knowledge  U = Unable to comprehend  * = See progress notes          PLAN OF CARE  INFORMATION TO BE DELIVERED / NURSING INTERVENTIONS DATE EVALUATION   Assessment of client/caregiver,         knowledge of cancer diagnosis,         and chemotherapy as a treatment. 1a. Evaluate patient/caregiver learning ability    b. Plan teaching sessions with patient/caregiver  according to needs and present anxiety level/ability to learn.    c. Provide Chemotherapy Education Packet,        Mouth Care Protocol,         Specific Patient Education Sheets. 10/20/2022 S   Individual chemotherapy treatment         plan. 2a. Review of Chemotherapy Education handout from [employer]            10/20/2022   S   Knowledge Deficit & Self-Management of general side effects common to all chemotherapy:  Nausea/Vomiting  b.   Diarrhea  Mouth Care  Dental care  Constipation  Hair Loss  Potential for infection  Fatigue   3a. Reinforce that the majority of side effects from chemotherapy are reversible and are  controlled both in the hospital and at home        (blood counts recover, hair grows back).   b.  Refer to the following for reinforcement of         information post-treatment:  Mouth Care Protocol.  Bowel Protocol for constipation or diarrhea.  3.  Drug Specific Chemotherapy Information Sheets for each medication patient receiving.    10/20/2022     S     PLAN OF CARE  INFORMATION TO BE DELIVERED / NURSING INTERVENTIONS DATE EVALUATION   h. Potential for bleeding         i. Potential anemia/fatigue         j. Potential sunburn         k. Birth control measures  l. Safety measures post treatment 4.  Chemotherapy Home Care Instruction  and Safety Information Sheet.  A. patient/caregivers to thoroughly cook shellfish (shrimp, crab, etc) to decrease the chance of infection.    B.  Use sunscreen and protective clothing while in the sun.   10/20/2022      Knowledge deficit & Self Management of Drug Specific  Side Effects.    BLADDER EFFECTS        (Hemorrhagic Cystitis)                  Preventable with adequate hydration; occurs 2-3 days or more post treatment.   1.  Instruct patient to:  a.   Void at least every 2 hours; increase intake.  b.   DO NOT hold urine; go when urge is felt.  c.    Empty bladder at bedtime and on         awakening.  d.   Observe for color changes (red to tea            colored), amount and frequency changes.  e.   Notify oncologist of any abnormalities           in urine or voiding or if you cannot               drink adequate fluids.   10/20/2022   S   b.   CHANGES IN URINE        COLOR:      1.   Instruct patient:  a.   Most evident in first 2-3 voidings after           administration.  Lasts less than 24 hours.  If urine is discolored 2 or more days post- treatment, notify oncologist.      10/20/2022 S   c.    KIDNEY EFFECTS           (Nephrotoxicity)   1.  Instruct patient to:  a.   Drink 8-16 glasses of fluid/day the day   pre-treatment and 3-4 days post-treatment to maintain hydration; the best way to minimize kidney problems.  b.   Notify oncologist immediately if unable to drink fluids or if changes are noted in urinary elimination.     10/20/2022   S   PULMONARY TOXICITY    Instruct patient to report symptoms such as shortness of breath, chest pain, shallow breathing, or chest wall discomfort to physician.  Reinforce preventative measures used by the health care team.  Baseline and periodic PFT and chest x-ray.   10/20/2022   S     PLAN OF CARE INFORMATION TO BE DELIVERED / NURSING INTERVENTIONS DATE EVALUATION   NERVE & MUSCLE EFFECTS (neurotoxocity; neuropathy, possible visual/hearing changes)        Instruct patient to:    Report numbness or tingling of the hands/feet, loss of fine motor movement (buttoning shirt, tying shoelaces), or gait changes to your oncologist.  If numbness/tingling are present:  protect feet with shoes at all times.  Use gloves for washing dishes/gardening & potholders in kitchen.       10/20/2022   S   CARDIOTOXICITY  Decreased effectiveness of             cardiac function. Effective are                  cumulative and irreversible.                                    CARDIAC ARRYTHMIAS              4   Instruct:  Heart function may be tested before treatment and perdiocally during treatment.  Notify oncologist of irregular pulse, palpitations,  shortness of breath, or swelling in lower extremities/feet.          Taxol and Taxotere can cause arrhythmias on infusion that resolve once infusion discontinued. Instruct nurse if any irregularity felt.    10/20/2022   S   EXTRAVASTION  Occurs when vesicants leak outside of vein and cause damage to the skin and underlying tissues.   Reinforce preventive measures used to avoid complications.  Fresh IV site or central line monitored continuously with vesicant IVP.  Continuous infusion via central line site and blood return monitored periodically around the clock.  Instruct to:  Notify nurse of any discomfort, burning, stinging, etc. at IV site during chemotherapy administration.  Notify oncologist of any redness, pain, or swelling at IV site after discharge from hospital.   10/20/2022   S   HYPERSENSITIVITY can happen with any medication.   Instruct patient:  Nurse is with them during the initial part of treatment and will be close by to monitor.  Pre-medication ordered by the oncologist must be taken on time. If doses are missed, treatment will need to be re-scheduled.  Skin redness, itching, or hives appearing after discharge should be reported to oncologist. 10/20/2022   S       PLAN OF CARE INFORMATION TO BE DELIVERED / NURSING INTERVENTIONS DATE EVALUATION   FLU-LIKE SYNDROME      Instruct patient symptoms are hard to prevent and may include fever, shaking chills, muscle and body aches.  Taking prescribed medications from physician if needed.  Adequate fluids are important.    Reinforce the need to call if temperature is         elevated to 100.4 or more  10/20/2022   S   HAND-FOOT SYNDROME  causes painful, symmetric swelling and redness of palms and soles                  Instruct patient to report any numbness or tingling in the hands or feet.  Explain prevention techniques, such as     Use heavy moisturizers to lessen skin dryness and itching, but to avoid if skin is cracked or broken  Bathe in tepid water,  use non-perfumed soap, and wash gently. Baths with oatmeal or diluted baking soda may be soothing.  Avoid tight fitting shoes and repetitive actions, such as rubbing hands or applying pressure to hands/feet.  Review measures to take should syndrome occur:  Cold compresses and elevation for          edema  Pain medications and other measures as ordered by oncologist.   4.   Syndrome resolves few weeks after therapy. 10/20/2022   S   5. DISCHARGE PLANNING /        EDUCATION 1.    Explain importance of compliance with follow- up  tests (CBC, CMP).  2.    Verify patient/caregiver know:  a.    Oncologists office phone number.  b.    Dates of follow-up appointments.  c.    Prescriptions given for nausea  3.   Review side effects to monitor and notify          oncologist about.  4.   Reinforce the need for patient and caregivers to:  a.    Review information given.  b.    Call oncologists office with questions          or symptoms  5.   Provide Cancer [facility] [place] Brochure make referrals if needed for financial or .   10/20/2022   S     PROGRESS NOTES: I met with the patient  today for chemotherapy education. he will be starting treatment with R-CHOP . We discussed the mechanism of action, potential side effects of this treatment as well as ways he can manage them at home. Some of these side effects include but or not limited to fever, nausea, vomiting, decreased appetite, fatigue, weakness, cytopenias, myalgia/arthralgia, constipation, diarrhea, bleeding, headache, shortness of breath, nail changes, taste change, hair thinning/loss, mood disturbances, or edema. We also discussed dietary modifications he should make although this will be discussed in more detail with the dietician. he was provided with anti-emetic medication, a copy of all of the information we discussed today as well as our contact information. he will be provided a schedule on his first day of treatment. We will obtain labs on a  weekly basis and the patient will follow-up with the physician for toxicity monitoring throughout treatment. All questions were answered and an informed consent was obtained. he was reminded to certainly contact us sooner if needed.  Attached to the patients folder and discussed with the patient the 24 hour/ 7 days a week after hours telephone number for the physician.  Patient notified to call anytime 24/7 because their is a physician on call for any problems that may arise.  Patient also notified to report to [facility] if they can not get in touch with a physician after hours.  Discussed the five wishes booklet with the patient and their family.           Assessment/Plan   DLBCL:  Scheduled to start R CHOP  Keep consultation with psychiatrist          Medications Ordered:  Zofran 8mg 1 tab PO Q8h prn nausea  Phenergan 25mg PO Q4-6h prn nausea  Xanax 0.25mg po BID PRN for anxiety   Claritin 10mg PO QD day of chemotherapy and for 5 days after Neulasta to help prevent bone pain  OTC NSAIDS  Take 2 PO QD day of and for 5 days after Neulasta to help prevent bone pain      Standing Labs Ordered:  CBC cmp Q 3 weeks     * please notify clinic or report to the emergency department for fever of 100.4 grade  * avoid tobacco and alcohol use  * maintain a healthy diet and good oral hydration  * good blood pressure and blood sugar control is important.  Please keep all followups with your primary care provider  *good hand hygiene  * please call clinic with any questions or concerns  * please take all medications as directed     Patient is in agreement with the proposed treatment plan. All questions were answered to the patient's satisfaction. Patient knows to call clinic for any new or worsening symptoms and if anything is needed before the next clinic visit.    Elba Chua NP-C  Hematology & Medical Oncology     Collaborating physician, Dr. Reid.      Total Face to Face Time: 40 minutes face to face with the patient  and their family discussing the chemotherapy side-effects and when to call our office.   Electronically signed by: Elba GILES

## 2022-10-21 NOTE — PROGRESS NOTES
GENERAL SURGERY  POST-OP PROGRESS NOTE    HPI: Dwight Hoffmann is a 52 y.o. male status post small bowel resection for perforation due to involvement of what turned out to be B-cell lymphoma. And prolonged hospital course requiring diagnostic laparoscopy for washout of potential abscess which turned out to be necrotic lymph node. Completed IV antibiotics.  Is planning to start chemotherapy sometime next week. Still has PICC line in place but does not want to use it chemotherapy. Has deep breast appetite but is tolerating some food and liquids. Is having bowel function.    VITALS:  Vitals:    10/21/22 0953   BP: 119/80   Pulse: 102   Temp: 96.1 °F (35.6 °C)       PHYSICAL EXAM:  Acute distress, alert orient x3  Anicteric sclera  Mild tachycardia but regular  No edema  Right upper quadrant PICC line placed clean dry intact      ASSESSMENT & PLAN:  52 y.o. male s/p perforation small bowel secondary to B-cell lymphoma status post small bowel resection and anastomosis, diagnostic laparoscopy with washout  -has plans to start chemotherapy next week, unfortunately I will not be able to place a port until 10/31/2022  -I discussed with the patient using the PICC line for his scheduled chemotherapy after which it can be removed and we can place a port, the patient does not want to use the PICC line for some reason and requested its removal, PICC line removed  -will message patient's oncologist informed her that he wants to delay chemotherapy into the port is placed, if needed I can leave the port accessed  -case [facility] for [facility]/31/2022 at [facility]

## 2022-10-21 NOTE — H&P (VIEW-ONLY)
GENERAL SURGERY  POST-OP PROGRESS NOTE    HPI: Dwight Hoffmann is a 52 y.o. male status post small bowel resection for perforation due to involvement of what turned out to be B-cell lymphoma. And prolonged hospital course requiring diagnostic laparoscopy for washout of potential abscess which turned out to be necrotic lymph node. Completed IV antibiotics.  Is planning to start chemotherapy sometime next week. Still has PICC line in place but does not want to use it chemotherapy. Has deep breast appetite but is tolerating some food and liquids. Is having bowel function.    VITALS:  Vitals:    10/21/22 0953   BP: 119/80   Pulse: 102   Temp: 96.1 °F (35.6 °C)       PHYSICAL EXAM:  Acute distress, alert orient x3  Anicteric sclera  Mild tachycardia but regular  No edema  Right upper quadrant PICC line placed clean dry intact      ASSESSMENT & PLAN:  52 y.o. male s/p perforation small bowel secondary to B-cell lymphoma status post small bowel resection and anastomosis, diagnostic laparoscopy with washout  -has plans to start chemotherapy next week, unfortunately I will not be able to place a port until 10/31/2022  -I discussed with the patient using the PICC line for his scheduled chemotherapy after which it can be removed and we can place a port, the patient does not want to use the PICC line for some reason and requested its removal, PICC line removed  -will message patient's oncologist informed her that he wants to delay chemotherapy into the port is placed, if needed I can leave the port accessed  -case posted for 10/31/2022 at [facility]

## 2022-10-26 NOTE — PROGRESS NOTES
PSYCHO-ONCOLOGY INTAKE    Diagnostic Interview - CPT 70011    Date: 10/26/2022  Site: [facility]    Evaluation Length (direct face-to-face time):  45 minutes    This includes face to face time and non-face to face time preparing to see the patient, obtaining and/or reviewing separately obtained history, documenting clinical information in the electronic or other health record, independently interpreting results and communicating results to the patient/family/caregiver, or care coordinator.     Referral Source: Josee Reid MD   Oncologist: Josee Reid MD   PCP: BOSTON Oswald    Clinical status of patient: Outpatient    Dwight Hoffmann, a 52 y.o. male, seen for initial evaluation visit.    Dwight Hoffmann reviewed and agreed to informed consent and the limits of confidentiality.    Chief complaint/reason for encounter: adjustment to illness, mood swings and anxiety    Medical/Surgical History:    Patient Active Problem List   Diagnosis    Small bowel mass    Anemia, chronic disease    Diabetes mellitus type 2, noninsulin dependent    Arthralgia of bilat knees and neck    Moderate malnutrition    Intra-abdominal abscess    S/P exploratory laparotomy    S/P small bowel resection    Sepsis    DLBCL (diffuse large B cell lymphoma)       Health Behaviors:       ETOH Use: Yes (rare)       Tobacco Use: No   Illicit Drug Use:  No     Prescription Misuse:No   Caffeine: minimal   Exercise:The patient engaged in regular activity prior to illness The patient is actively working to return to baseline exercise tolerance.   Advanced directives:No     Family History:   Psychiatric illness: None reported     Alcohol/Drug Abuse: None reported     Suicide: None reported      Past Psychiatric History:   Inpatient treatment: denied     Outpatient treatment: No     Prior substance abuse treatment: No     Suicide Attempts: No     Psychotropic Medications:  Current: Xanax (prescribed by oncology NP)       Past:  "none    Current medications as per below, allergies reviewed in chart.    Current Outpatient Medications   Medication    ALPRAZolam (XANAX) 0.25 MG tablet    blood sugar diagnostic Strp    blood-glucose meter kit    ciprofloxacin HCl (CIPRO) 500 MG tablet    clotrimazole (LOTRIMIN) 1 % cream    HYDROcodone-acetaminophen (NORCO) 5-325 mg per tablet    lancets Misc    ondansetron (ZOFRAN-ODT) 4 MG TbDL    promethazine (PHENERGAN) 12.5 MG Tab     No current facility-administered medications for this visit.              Social situation/Stressors: Dwight Hoffmann lives with his wife in [place], [place].  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    Dwight Hoffmann has been  20 years and has 2 children (son 27 yo and daughter 21 yo).  His partner is Sundar.   The patient reports excellent social support, though he notes that he has difficulty engaging them for belief that he is their "rock" and not the other way around.  Dwight Hoffmann is an active Sikh, raised in the Jewish deni and converting to Denominational at time of marriage.  Dwight Hoffmann's hobbies include his motorcycle and college/pro football.  Additional stressors: daughter in college (concerned for communicating illness), loss of mother to bladder cancer (10 years ago), recent passing of uncle, passing of niece and nephew, unresolved grief    Strengths:Able to vocalize needs, Values and traditions, Motivation, readiness for change, Vocational interests, hobbies and/or talents, and Interpersonal relationships and supports available - family, relatives, friends  Liabilities: complicated illness, limited engagement in social support system ( in present)    Current Evaluation:     Mental Status Exam: Dwight Hoffmann arrived late for the assessment session.  The patient was fully cooperative throughout the interview and was an adequate historian   Appearance: age appropriate, casually  dressed, well groomed  Behavior/Cooperation: friendly and " cooperative  Speech: initially tearful/stuttered, WNL later   Mood: anxious  Affect: mood congruent and appropriate  Thought Process: goal-directed, logical  Thought Content: normal, no suicidality, no homicidality, delusions, or paranoia;did not appear to be responding to internal stimuli during the interview.   Orientation: grossly intact  Memory: grossly intact  Attention Span/Concentration: Attends to interview without distraction; reports no difficulty  Fund of Knowledge: average  Estimate of Intelligence: average from verbal skills and history  Cognition: grossly intact  Insight: patient has awareness of illness; good insight into own behavior and behavior of others  Judgment: the patient's behavior is adequate to circumstances      History of present illness:    Oncology History   DLBCL (diffuse large B cell lymphoma)   9/28/2022 Initial Diagnosis    DLBCL (diffuse large B cell lymphoma)     10/24/2022 -  Chemotherapy    Treatment Summary   Plan Name: IP CHOP + OP RITUXIMAB Q3W  Treatment Goal: Curative  Status: Active  Start Date: 10/24/2022 (Planned)  End Date: 10/26/2022 (Planned)  Provider: Josee Reid MD  Chemotherapy: DOXOrubicin chemo injection 112 mg, 50 mg/m2, Intravenous, Every 24 hours (non-standard times), 0 of 1 cycle  vinCRIStine (ONCOVIN) 3.1 mg in sodium chloride 0.9% 50 mL chemo infusion, 1.4 mg/m2, Intravenous, Every 24 hours (non-standard times), 0 of 1 cycle  cyclophosphamide 750 mg/m2 = 1,660 mg in sodium chloride 0.9% 250 mL chemo infusion, 750 mg/m2, Intravenous, Every 24 hours (non-standard times), 0 of 1 cycle  riTUXimab-pvvr (RUXIENCE) 375 mg/m2 = 833 mg in sodium chloride 0.9% 833 mL infusion (conc: 1 mg/mL), 375 mg/m2, Intravenous, Clinic/HOD 1 time, 0 of 1 cycle             Dwight Hoffmann has adjusted to illness with moderate difficulty primarily through passive coping strategies. He has not engaged his social support system to this point but was receptive to  recommendation that he increase engagement. He has engaged in appropriate information gathering.  The patient has excellent family/friend support.  His support system is coping well with the diagnosis/treatment/prognosis. Illness-related psychosocial stressors include financial strain , absence from work, difficulty meeting family responsibilities, and changes in ability to engage in leisure activities.  The patient has a good partnership with his [facility] treatment team. The patient reports the following barriers to cancer care:distance from the hospital.     NCCN Distress thermometer: No flowsheet data found.     Symptoms:   Mood: depressed mood and tearfulness;  no prior; no SI/HI; NCCN Distress Screener=N/A  Anxiety: Feeling nervous, anxious, or on edge, Excessive worry (interfering with ability to engage support system), Restlessness, and Fear of unknown; no prior  Substance abuse: denied  Cognitive functioning: denied  Health behaviors: noncontributory  Sleep: further review upon follow up    Assessment - Diagnosis - Goals:       ICD-10-CM ICD-9-CM   1. Adjustment disorder with anxiety  F43.22 309.24   2. Moderate malnutrition  E44.0 263.0   3. S/P exploratory laparotomy  Z98.890 V45.89   4. Diffuse large B-cell lymphoma, unspecified body region  C83.30 202.80         Plan:individual psychotherapy and medication management by physician    Summary and Recommendations  Dwight Hoffmann is a 52 y.o. male referred by Josee Reid MD for psychological evaluation and treatment.  Mr. Hoffmann appears to be having moderate difficulty adjusting/coping with his diagnosis and proposed treatment course.  Mood protective strategies during cancer treatment were discussed.  He is interested in individual therapy for cancer coping skills and will follow up with me for that purpose. He was encouraged to draw further upon his social support system and consider his identity as a caregiver in the  acceptance of care.    Return to clinic: 2 weeks    GOALS:   Additional social support  Medication management (as prescribed)  Communication strategies (especially w/ informing daughter of dx)                 Amarjit Barahona Psy.D.  Clinical Psychologist  [place] License #[id number]  [place] License #[id number]

## 2022-10-30 NOTE — ED PROVIDER NOTES
"Encounter Date: 10/30/2022    SCRIBE #1 NOTE: I, Etienne Urbano, am scribing for, and in the presence of,  Otis Joiner MD.     History     Chief Complaint   Patient presents with    Abdominal Pain    Fever     S/S since last PM (hx of lymphoma) - scheduled for port-a-cath placement surgery tomorrow; reports periumbilical pain (atraumatic)     Time seen by provider: 3:31 PM on 10/30/2022    Dwight Hoffmann is a 52 y.o. male who presents to the ED with abdominal pain. The patient reports experiencing neck pain, chills, abdominal pain, fever of 101.4 F at home, and sweating. The patient's SO states that the patient has been dealing with these symptoms for a while. She notes that the patient also felt itching to his bilateral lower extremities last night. She mentions that the patient was diagnosed with lymphoma a month ago on September 13. The patient initially describes his abdominal pain as uncomfortable but notes that it has gotten worse and describes it as "cramping" pain. According to the patient's SO, the patient has not had a bowel movement in a while and has not been able to tolerate food. The patient reports that his neck pain makes it difficult for him to only turn his neck, so he has to turn his whole body as well. He notes that the neck pain is exacerbated whenever he coughs. The patient was given hydrocodone for his symptoms this morning and takes 500 mg ciprofloxacin once a day per his SO. The patient denies chest pain, SOB, diarrhea, coughing, sore throat, or any other symptoms at this time.   PMHx of prediabetes, DM, and digestive disorder. PSHx of incision and drainage of abdomen and laparoscopic drainage of abdomen.    The history is provided by the patient and the spouse.   Review of patient's allergies indicates:  No Known Allergies  Past Medical History:   Diagnosis Date    Diabetes mellitus     Digestive disorder     Prediabetes      Past Surgical History:   Procedure Laterality Date    " APPENDECTOMY  07/15/2014    COLONOSCOPY      COLONOSCOPY N/A 2/9/2022    Procedure: COLONOSCOPY;  Surgeon: Manuel Sanders MD;  Location: [facility] ENDO;  Service: Endoscopy;  Laterality: N/A;    COLONOSCOPY N/A 9/1/2022    Procedure: COLONOSCOPY;  Surgeon: Andrea Clemens MD;  Location: [facility] ENDO;  Service: Endoscopy;  Laterality: N/A;    INCISION AND DRAINAGE OF ABDOMEN N/A 9/14/2022    Procedure: INCISION AND DRAINAGE, ABDOMEN;  Surgeon: Jan Oliveira Jr., MD;  Location: [facility] OR;  Service: General;  Laterality: N/A;    INSERTION OF TUNNELED CENTRAL VENOUS CATHETER (CVC) WITH SUBCUTANEOUS PORT N/A 10/31/2022    Procedure: [id number];  Surgeon: Jan Oliveira Jr., MD;  Location: [facility] OR;  Service: General;  Laterality: N/A;    LAPAROSCOPIC DRAINAGE OF ABDOMEN N/A 9/23/2022    Procedure: DRAINAGE, ABDOMEN, LAPAROSCOPIC;  Surgeon: Jan Oliveira Jr., MD;  Location: [facility] OR;  Service: General;  Laterality: N/A;     Family History   Problem Relation Age of Onset    Cancer Mother         Colon    Diabetes Mother     Hypertension Mother     Seizures Father     Heart murmur Sister     Seizures Sister      Social History     Tobacco Use    Smoking status: Never    Smokeless tobacco: Never   Substance Use Topics    Alcohol use: Not Currently     Comment: occasional    Drug use: No     Review of Systems   Constitutional:  Positive for appetite change, chills, diaphoresis and fever.   HENT:  Negative for sore throat.    Respiratory:  Negative for cough and shortness of breath.    Cardiovascular:  Negative for chest pain.   Gastrointestinal:  Positive for abdominal pain. Negative for diarrhea and nausea.   Genitourinary:  Negative for dysuria.   Musculoskeletal:  Positive for neck pain. Negative for back pain.   Skin:  Negative for rash.   Neurological:  Negative for weakness.   Hematological:  Does not bruise/bleed easily.     Physical Exam     Initial Vitals [10/30/22 1517]   BP Pulse Resp Temp  SpO2   129/83 105 20 99 °F (37.2 °C) 98 %      MAP       --         Physical Exam    Nursing note and vitals reviewed.  Constitutional: Vital signs are normal. He appears well-developed and well-nourished.  Non-toxic appearance. No distress.   HENT:   Head: Normocephalic and atraumatic.   No lymphadenopathy noted.   Eyes: EOM are normal. Pupils are equal, round, and reactive to light.   Neck: Neck supple. No Brudzinski's sign and no Kernig's sign noted. No JVD present.   No neck tenderness on exam or swelling noted. No meningismus noted.   Normal range of motion.  Cardiovascular:  Normal rate, regular rhythm, normal heart sounds and intact distal pulses.     Exam reveals no gallop and no friction rub.       No murmur heard.  Pulses:       Dorsalis pedis pulses are 2+ on the right side and 2+ on the left side.        Posterior tibial pulses are 2+ on the right side and 2+ on the left side.   Pulmonary/Chest: Effort normal and breath sounds normal. He has no wheezes. He has no rhonchi. He has no rales.   Abdominal: Abdomen is soft. Bowel sounds are normal. There is no abdominal tenderness. There is no rebound and no guarding.   Musculoskeletal:         General: Normal range of motion.      Cervical back: Normal range of motion and neck supple. No rigidity.      Comments: No erythema, warmth, or swelling noted to right upper extremity. Tenderness over right trapezius noted. Scratch marks noted to bilateral lower extremities.     Neurological: He is alert and oriented to person, place, and time. He has normal strength and normal reflexes. No cranial nerve deficit or sensory deficit. He exhibits normal muscle tone. Coordination normal. GCS eye subscore is 4. GCS verbal subscore is 5. GCS motor subscore is 6.   Skin: Skin is warm and dry.   Psychiatric: He has a normal mood and affect. His speech is normal and behavior is normal. He is not actively hallucinating.       ED Course   Procedures  Labs Reviewed   CBC W/ AUTO  DIFFERENTIAL - Abnormal; Notable for the following components:       Result Value    RBC 4.35 (*)     Hemoglobin 10.6 (*)     Hematocrit 33.4 (*)     MCV 77 (*)     MCH 24.4 (*)     MCHC 31.7 (*)     RDW 21.6 (*)     Platelets 128 (*)     All other components within normal limits   COMPREHENSIVE METABOLIC PANEL - Abnormal; Notable for the following components:    CO2 22 (*)     Glucose 111 (*)     Albumin 3.0 (*)     Total Bilirubin 1.3 (*)     All other components within normal limits   LACTIC ACID, PLASMA - Abnormal; Notable for the following components:    Lactate (Lactic Acid) 2.4 (*)     All other components within normal limits   URINALYSIS, REFLEX TO URINE CULTURE - Abnormal; Notable for the following components:    Nitrite, UA Positive (*)     All other components within normal limits    Narrative:     Specimen Source->Urine   LACTIC ACID, PLASMA - Abnormal; Notable for the following components:    Lactate (Lactic Acid) 2.3 (*)     All other components within normal limits   CULTURE, BLOOD    Narrative:     Aerobic and anaerobic   CULTURE, BLOOD    Narrative:     Aerobic and anaerobic   INFLUENZA A & B BY MOLECULAR   SARS-COV-2 RNA AMPLIFICATION, QUAL   LIPASE   URINALYSIS MICROSCOPIC    Narrative:     Specimen Source->Urine     EKG Readings: (Independently Interpreted)   Rhythm: Sinus Tachycardia. Heart Rate: 106.   Sinus tachycardia with a rate of 106 bpm. Normal axis and intervals.   ECG Results              EKG 12-lead (In process)  Result time 10/31/22 07:49:43      In process by Interface, Lab In [facility] (10/31/22 07:49:43)                   Narrative:    Test Reason : R50.9,    Vent. Rate : 106 BPM     Atrial Rate : 106 BPM     P-R Int : 126 ms          QRS Dur : 084 ms      QT Int : 336 ms       P-R-T Axes : 020 013 033 degrees     QTc Int : 446 ms    Sinus tachycardia  Otherwise normal ECG  When compared with ECG of 13-SEP-2022 23:33,  No significant change was found    Referred By: [email or web address]   SELF            Confirmed By:                                   Imaging Results              CT Abdomen Pelvis With Contrast (Final result)  Result time 10/31/22 08:30:09      Final result by Karlene Macias MD (10/31/22 08:30:09)                   Impression:      Large lobular irregular necrotic mass or coalescent masses involving bowel, indistinguishable from bowel increased in size compared to the prior PET CT consistent with the given history of lymphoproliferative malignancy    Additional findings as detailed above including diverticulosis without CT findings of acute diverticulitis.  Enlarged prostate gland.  Mild diffuse bladder wall thickening versus incomplete distention    Final read    Final read      Electronically signed by: Karlene Macias MD  Date:    10/31/2022  Time:    08:30               Narrative:    EXAMINATION:  CT ABDOMEN PELVIS WITH CONTRAST    CLINICAL HISTORY:  Abdominal pain, acute, nonlocalized;    TECHNIQUE:  Low dose axial images, sagittal and coronal reformations were obtained from the lung bases to the pubic symphysis following the IV administration of 100 mL of Omnipaque 350 and no p.o. contrast    COMPARISON:  09/20/2022.  Also earlier study 09/14/2022    FINDINGS:  Mild dependent hypoventilatory changes in visualized lung bases    Liver;   subcentimeter hypodensity too small to reliably characterize but suggesting most likely representing a cyst with no appreciable suspicious hepatic masses    Gallbladder and bile ducts; no calcified stones the gallbladder or CT findings of acute cholecystitis.  No biliary duct dilatation    Spleen; not enlarged    Adrenal glands; unremarkable    Pancreas; unremarkable    Abdominal aorta;    no aneurysm    Stomach, bowel, mesentery; large lobular heterogeneous mass or coalescent masses indistinguishable from bowel and appearing necrotic.  Small foci of air in the mass which could be due to in case to loops of bowel, necrosis, fistula.  Although  finding could represent findings are thought more likely large mass in this patient with known lymphoma involving and encasing bowel loops.  Craniocaudal length is approximately 18 cm with transverse diameter 13 cm and AP diameter 10 cm.  This corresponds as seen on the PET-CT of 10/14/2022 but is increased in size    There is also diverticulosis without CT findings of acute diverticulitis.  No free intraperitoneal air.  Trace free fluid.  Appendix not identified but no abnormal appendix inflammatory changes appendiceal region is seen.    Kidneys, ureters, bladder; symmetrical renal enhancement and no hydronephrosis.  15 mm right renal cyst.  Urinary bladder mildly distended at time of the exam with mild diffuse bladder wall thickening versus incomplete distention    Prostate gland enlarged measuring approximately 4.8 cm transversely                                       X-Ray Chest AP Portable (Final result)  Result time 10/30/22 16:18:06      Final result by Elba Miller MD (10/30/22 16:18:06)                   Impression:      No acute abnormality.      Electronically signed by: Elba Miller  Date:    10/30/2022  Time:    16:18               Narrative:    EXAMINATION:  XR CHEST AP PORTABLE    CLINICAL HISTORY:  fever;    TECHNIQUE:  Single frontal view of the chest was performed.    COMPARISON:  09/22/2022    FINDINGS:  The lungs are clear, with normal appearance of pulmonary vasculature and no pleural effusion or pneumothorax.    The cardiac silhouette is normal in size. The hilar and mediastinal contours are unremarkable.    Bones are intact.  Lung volumes remain low.  PICC line has been removed.                                       Medications   lactated ringers bolus 1,000 mL (0 mLs Intravenous Stopped 10/30/22 1743)   iohexoL (OMNIPAQUE 350) 350 mg iodine/mL injection (100 mLs Intravenous Given 10/30/22 1722)   sodium chloride 0.9% bolus 1,000 mL (0 mLs Intravenous Stopped 10/30/22 2004)      Medical Decision Making:   History:   Old Medical Records: I decided to obtain old medical records.  Initial Assessment:   52b yo man with known recent diagnosis of Bcell Lymphoma, not yet started chemo, presents with worsening abd pain, fever, neck pain, night sweats. No meningismus on examination, low suspicion for meningitis. Currently no abdominal tenderness to palpation and he is afebrile. Septic valdez was initiated and found to have normal WBC with elevated Lactic acid. IV fluids started and pt given empiric abx. Care turned over to Dr. Flores at [facility] pending CT abd/pelvis and heme/onc consult.  Independently Interpreted Test(s):   I have ordered and independently interpreted EKG Reading(s) - see prior notes  Clinical Tests:   Lab Tests: Ordered and Reviewed  Radiological Study: Ordered and Reviewed  Medical Tests: Ordered and Reviewed        Scribe Attestation:   Scribe #1: I performed the above scribed service and the documentation accurately describes the services I performed. I attest to the accuracy of the note.      ED Course as of 10/31/22 1420   Sun Oct 30, 2022   1919 CT-AP:   Interval increase in the size and number of the lobulated masses within the abdomen and pelvis.  Some of the masses appear to invade the small bowel resulting in small bowel thickening with some areas appearing massive. There are areas of necrosis within the masses. A couple of foci of gas are within the central located mass which may be related to fistulous connection with bowel. Gas can also be seen with an infection related to gas forming organisms.  Small amount of fluid adjacent to the small bowel/colon in the right abdomen.  Colonic diverticulosis. No diverticulitis.  Mild bladder wall thickening which may be related to the bladder not being fully distended. Bladder wall thickening can be seen with cystitis and bladder outlet obstruction.  Enlarged prostate. Recommend correlation with PSA.  Additional findings  within the body of the report.  (Rad read)  The patient was informed of the incidental finding(s) as well as the need for PCP or specialist follow-up for reevaluation and possible further investigation or monitoring.   [MR]   2037 D/w Dr. Medina re: hx, presentation, and diagnostic findings including CT imaging who rec admit to medicine on abx with further eval including surgical eval tomorrow. [MR]   2132 This patient was signed out to me for follow-up with diagnostic imaging studies with known B-cell lymphoma and intra-abdominal masses with progressively worsening abdominal pain.  CT findings reviewed in detail with question of worsening infectious process or fistula.  Diagnostic uncertainty discussed with oncology who recommends admission.  I do agree.  Findings were also discussed with patient in detail as well as relative appearance of worsening and strong recommendation by myself and oncology for admission to the hospital.  This was also discussed with his family member present at the bedside.  We discussed issues extensively but the patient is very resistant to admission to the hospital.  He understands the possibility of worsening disease or death.  He is not altered and has the capacity to decide.  He has the opportunity to ask appropriate questions and repeat key points back to me in an intelligent manner.  He understands he may return at any time if he changes mind or feels worse.  I have review detailed return precautions.  He is already on outpatient oral antibiotics he may continue with follow-up tomorrow with general surgery recommended along with Oncology this week. [MR]      ED Course User Index  [MR] Felipe Tijerina MD               I, Rhys Mcdonough, personally performed the services described in this documentation. All medical record entries made by the scribe were at my direction and in my presence.  I have reviewed the chart and agree that the record reflects my personal performance  and is accurate and complete. Otis Joiner MD.         DISCLAIMER: This note was prepared with urturn Direct voice recognition transcription software. Garbled syntax, mangled pronouns, and other bizarre constructions may be attributed to that software system.    Clinical Impression:   Final diagnoses:  [R50.9] Fever  [R10.84] Generalized abdominal pain (Primary)      ED Disposition Condition    AMA Stable                Otis Joiner MD  10/31/22 2321

## 2022-10-30 NOTE — LETTER
Patient: Dwight Hoffmann  YOB: 1970  Date: 10/30/2022 Time: 9:27 PM  Location: [facility]    Leaving the [facility] Against Medical Advice    Chart #:[id number]    This will certify that I, the undersigned,    ______________________________________________________________________    A patient in the above named medical center, having requested discharge and removal from the medical center against the advice of my attending physician(s), hereby release [facility], its physicians, officers and employees, severally and individually, from any and all liability of any nature whatsoever for any injury or harm or complication of any kind that may result directly or indirectly, by reason of my terminating my stay as a patient at [facility] and my departure from [facility], and hereby waive any and all rights of action I may now have or later acquire as a result of my voluntary departure from [facility] and the termination of my stay as a patient therein.    This release is made with the full knowledge of the danger that may result from the action which I am taking.      Date:_______________________                         ___________________________                                                                                    Patient/Legal Representative    Witness:        ____________________________                          ___________________________  Nurse                                                                        Physician

## 2022-10-30 NOTE — ANESTHESIA PREPROCEDURE EVALUATION
10/30/2022  Dwight Hoffmann is a 52 y.o., male.         Tobacco Use:  The patient  reports that he has never smoked. He has never used smokeless tobacco.     Results for orders placed or performed during the hospital encounter of 09/13/22   EKG 12-lead    Collection Time: 09/13/22 11:33 PM    Narrative    Test Reason : R00.0,    Vent. Rate : 080 BPM     Atrial Rate : 080 BPM     P-R Int : 108 ms          QRS Dur : 088 ms      QT Int : 380 ms       P-R-T Axes : 042 060 024 degrees     QTc Int : 438 ms    Sinus rhythm with short CO  Otherwise normal ECG  No previous ECGs available  Confirmed by Luis Fernando aMck MD ([id number]) on 9/16/2022 10:28:02 AM    Referred By: IGNACIA   SELF           Confirmed By:Luis Fernando Mack MD      NM PET CT Routine FDG       IMPRESSION:     Large multiloculated necrotic soft tissue mass in the central abdomen extending into the pelvis which is markedly hypermetabolic. This is consistent with the provided history of lymphoproliferative malignancy.     Numerous additional hypermetabolic soft tissue masses in abdomen, also suspicious for lymphoproliferative malignancy.     Mild diffuse osseous FDG hypermetabolism which could be on the basis of red marrow reconversion or lymphoproliferative malignancy. Please correlate with bone marrow biopsy results.     Mild asymmetric right palatine tonsillar FDG activity, nonspecific. Attention on follow-up is recommended.       Lab Results   Component Value Date    WBC 6.60 10/27/2022    HGB 10.3 (L) 10/27/2022    HCT 32.2 (L) 10/27/2022    MCV 77 (L) 10/27/2022     10/27/2022     BMP  Lab Results   Component Value Date     10/27/2022    K 3.5 10/27/2022     10/27/2022    CO2 23 10/27/2022    BUN 8 10/27/2022    CREATININE 0.8 10/27/2022    CALCIUM 9.0 10/27/2022    ANIONGAP 11 10/27/2022     (H) 10/27/2022     (H)  10/19/2022     (H) 10/12/2022       Results for orders placed during the hospital encounter of 09/13/22    Echo    Interpretation Summary  · The left ventricle is normal in size with concentric remodeling and normal systolic function.  · The estimated ejection fraction is 60%.  · Grade I left ventricular diastolic dysfunction.  · Normal right ventricular size with normal right ventricular systolic function.  · Mild left atrial enlargement.  · Mild mitral regurgitation.  · Mild to moderate tricuspid regurgitation.  · Normal central venous pressure (3 mmHg).  · The estimated PA systolic pressure is 49 mmHg.  · There is pulmonary hypertension.  · Mild right atrial enlargement.  · No vegetations were seen        Pre-op Assessment    I have reviewed the Patient Summary Reports.     I have reviewed the Nursing Notes. I have reviewed the NPO Status.   I have reviewed the Medications.     Review of Systems  Anesthesia Hx:  No problems with previous Anesthesia Denies Hx of Anesthetic complications  Denies Family Hx of Anesthesia complications.   Denies Personal Hx of Anesthesia complications.   Social:  No Alcohol Use, Non-Smoker    Hematology/Oncology:         -- Anemia: Hematology Comments: Anemia, chronic disease Current/Recent Cancer. Oncology Comments: DLBCL (diffuse large B cell lymphoma)     EENT/Dental:EENT/Dental Normal   Cardiovascular:  Cardiovascular Normal  ECG has been reviewed.    Pulmonary:  Pulmonary Normal    Renal/:  Renal/ Normal     Hepatic/GI:  Hepatic/GI Normal    Musculoskeletal:  Musculoskeletal Normal Arthralgia of bilat knees and neck   Neurological:  Neurology Normal    Endocrine:   Diabetes, poorly controlled, type 2    Dermatological:  Skin Normal    Psych:  Psychiatric Normal           Physical Exam  General: Well nourished, Cooperative, Alert and Oriented    Airway:  Mallampati: I   TM Distance: Normal  Tongue: Normal  Neck ROM: Normal ROM    Dental:  Intact    Chest/Lungs:  Clear  to auscultation, Normal Respiratory Rate    Heart:  Rate: Tachycardia  Rhythm: Regular Rhythm  Sounds: Normal        Anesthesia Plan  Type of Anesthesia, risks & benefits discussed:    Anesthesia Type: MAC  Intra-op Monitoring Plan: Standard ASA Monitors  Post Op Pain Control Plan: multimodal analgesia and IV/PO Opioids PRN  Induction:  IV  Informed Consent: Informed consent signed with the Patient and all parties understand the risks and agree with anesthesia plan.  All questions answered.   ASA Score: 3  Anesthesia Plan Notes:     MAC preferred  GETA if needed  No Decadron  Zofran 4mg iv  Ofirmev 1000 mg iv  Sugammadex if needed    Ready For Surgery From Anesthesia Perspective.     .

## 2022-10-31 NOTE — ANESTHESIA POSTPROCEDURE EVALUATION
Anesthesia Post Evaluation    Patient: Dwight Hoffmann    Procedure(s) Performed: Procedure(s) (LRB):  [id number] (N/A)    Final Anesthesia Type: MAC      Patient location during evaluation: GI PACU  Patient participation: Yes- Able to Participate  Level of consciousness: awake and alert  Post-procedure vital signs: reviewed and stable  Pain management: adequate  Airway patency: patent    PONV status at discharge: No PONV  Anesthetic complications: no      Cardiovascular status: hemodynamically stable  Respiratory status: unassisted, spontaneous ventilation and room air  Hydration status: euvolemic  Follow-up not needed.          Vitals Value Taken Time   /73 10/31/22 0930   Temp 36.1 °C (97 °F) 10/31/22 0843   Pulse 98 10/31/22 0942   Resp 18 10/31/22 0942   SpO2 97 % 10/31/22 0942   Vitals shown include unvalidated device data.      No case tracking events are documented in the log.      Pain/Jt Score: Jt Score: 10 (10/31/2022  9:30 AM)

## 2022-10-31 NOTE — TELEPHONE ENCOUNTER
This nurse called pt's wife. She states that they have not picked up prednisone. Rx called to [facility] pharmacy on [address] per pt's wife's request. Spoke with carlos Laboy

## 2022-10-31 NOTE — OP NOTE
DATE OF PROCEDURE: 10/31/2022    PREOPERATIVE DIAGNOSIS: Abdominal B-cell lymphoma    POSTOPERATIVE DIAGNOSIS: Same     PROCEDURE: Right IJ Port-A-Cath Placement    SURGEON: Jan Oliveira M.D.    ASSISTANT: None    ANESTHESIA: Local MAC    PREP: Chlorhexidine    ESTIMATED BLOOD LOSS: Minimal    INDICATION: Access for chemotherapy    FINDINGS:  1.  IJ accessed using ultrasound with return of dark red nonpulsatile blood.  2.  Wire easily passed and confirmed in the venous system on fluoro  3.  Catheter tip positioned at the atrial caval junction under fluoro  4.  Port aspirated and flushed with ease.    PROCEDURE IN DETAIL:  The patient was identified in the preoperative unit and taken back to the operating room and laid supine on the operating room table. IV antibiotics were administered prior to the start of anesthesia. MAC anesthesia was administered without complication. The patient was then prepped and draped in the standard sterile fashion. The ultrasound was used to identify the right internal jugular vein.  Local anesthetic was injected and then a small skin nick was made.  The patient was placed into the Trendelenburg position and an 18g needle was used to access the vein under ultrasound guidance.We had return of dark red, non-pulsatile blood. The wire was advanced without issue under fluoroscopic guidance. We turned our attention to creation of the port pocket in the upper chest. Local anesthetic was injected into the upper chest in the location of the port pocket and along the path of the planned catheter tunnel. A 4cm incision was made sharpley and the subcutaneous tissue was dissected until an appropriate sized pocket was created to accommodate the port. Once we were satisfied with the pocket, the catheter was flushed and tunneled from the chest to the neck incision. Under fluoroscopic guidance the dilator and sheath were placed over the wire using Seldinger technique. The dilator and wire were  removed and the catheter was introduced and fed into the sheath as the sheath was peeled away. Fluoro was used to confirm the position of the catheter in the atriocaval junction and confirm that the catheter was not kinked. The catheter was then cut to size and attached to the port which was then placed in the chest wall pocket. The port was then access and easily aspirated blood. It was then flushed with heparinized saline. Hemostasis was assured and the dermis of the port pocket was re-approximated with 3-0 vicryl suture in an interrupted fashion. The skin was re-approximated using 4-0 monocryl suture in a running fashion. Dermabond was then applied. The patient was awakened from anesthesia without complication and returned to the postoperative recovery unit in stable condition. At the end of the case, sponge, instrument, and needle counts were correct and hemostasis was achieved. I was present and scrubbed throughout the entirety of the case. A post-operative CXR will be obtained to confirm appropriate catheter location and to rule out pneumothorax.      All images were personally interpreted by me and stored.    COMPLICATIONS: None    CONDITION:  Stable    DISPO: To PACU then home after CXR    Jan Oliveira Jr

## 2022-10-31 NOTE — DISCHARGE SUMMARY
[facility]  Discharge Note  Short Stay    Procedure(s) (LRB):  [id number] (N/A)      OUTCOME: Patient tolerated treatment/procedure well without complication and is now ready for discharge.    DISPOSITION: Home or Self Care    FINAL DIAGNOSIS:  <principal problem not specified>    FOLLOWUP: In clinic    DISCHARGE INSTRUCTIONS:    Discharge Procedure Orders   Diet Adult Regular     Ice to affected area     Lifting restrictions   Order Comments: Please avoid lifting greater than 20 lb, straining, strenuous activity for one week.     Change dressing (specify)   Order Comments: Post-Operative Wound Care    A surgical glue has been placed over your incisions, please leave the glue in place and do not attempt to remove it.  It is ok to shower using mild soap and water over the incisions the day after your procedure. Pat dry your incisions. Do not soak in a bathtub or other body of water for 2 weeks or until cleared by your surgeon.     If you noticed redness, swelling, fever, increasing pain or significant drainage from your wound please call/message the office or the 24 hr nurse hotline after hours.     Notify your health care provider if you experience any of the following:  temperature >100.4     Notify your health care provider if you experience any of the following:  persistent nausea and vomiting or diarrhea     Notify your health care provider if you experience any of the following:  severe uncontrolled pain     Notify your health care provider if you experience any of the following:  redness, tenderness, or signs of infection (pain, swelling, redness, odor or green/yellow discharge around incision site)     Notify your health care provider if you experience any of the following:  worsening rash     Notify your health care provider if you experience any of the following:  increased confusion or weakness     Shower on day dressing removed (No bath)        TIME SPENT ON DISCHARGE: 20  minutes

## 2022-10-31 NOTE — TRANSFER OF CARE
"Anesthesia Transfer of Care Note    Patient: Dwight Hoffmann    Procedure(s) Performed: Procedure(s) (LRB):  [id number] (N/A)    Patient location: PACU    Anesthesia Type: MAC    Transport from OR: Transported from OR on 2-3 L/min O2 by NC with adequate spontaneous ventilation    Post pain: adequate analgesia    Post assessment: no apparent anesthetic complications    Post vital signs: stable    Level of consciousness: awake and alert    Nausea/Vomiting: no nausea/vomiting    Complications: none    Transfer of care protocol was followed      Last vitals:   Visit Vitals  /79 (BP Location: Right arm, Patient Position: Sitting)   Pulse 110   Temp 36.9 °C (98.4 °F) (Oral)   Resp 16   Ht 6' 1" (1.854 m)   Wt 93.4 kg (206 lb)   SpO2 98%   BMI 27.18 kg/m²     "

## 2022-10-31 NOTE — DISCHARGE INSTRUCTIONS
You have enlarging masses within the abdomen that ray may represent areas of infection or abscess versus necrosis as well as areas of connection with the bowel different than prior recent images.    As we discussed, return to the emergency department for new or worsening symptoms including fever, worsening abdominal pain, vomiting.

## 2022-11-01 PROBLEM — Z51.89 AFTER CARE: Status: ACTIVE | Noted: 2022-01-01

## 2022-11-01 NOTE — PLAN OF CARE
Problem: Fatigue  Goal: Improved Activity Tolerance  Intervention: Promote Improved Energy  Flowsheets (Taken 11/1/2022 1042)  Fatigue Management:   fatigue-related activity identified   frequent rest breaks encouraged  Sleep/Rest Enhancement:   noise level reduced   relaxation techniques promoted   regular sleep/rest pattern promoted

## 2022-11-01 NOTE — TELEPHONE ENCOUNTER
I called patient on 11/01/2022.    He is getting chemotherapy.      Patient and wife again asked me about ciprofloxacin which I am giving once a day, for peritonitis prevention    Patient has completed treatment of intra-abdominal infection   The above antibiotic is for prevention until chemotherapy works, shrinks the lymph node, and there is no more distorted anatomy allowing bacteria translocation.

## 2022-11-01 NOTE — PROGRESS NOTES
"Subjective:       Patient ID: Dwight Hoffmann is a 52 y.o. male.    Chief Complaint:   HPI  52 year old male with a history of NIDDM2 and HLD who presents today with complaints of night sweats for weeks. It is moderate. It is associated with 25 lb wt loss over the last 2 months, urinary hesitancy, urinary frequency, occasional blood streaked stools, weakness, fatigue, neck pain, knee pain, and pelvic pain. He denies measured fever, chills, N/V/D, chest pain, or SOB. He was seen by his primary last month about the blood streaked stools and referred to Dr. Clemens for colonoscopy on 9/1 with multiple polypectomies with path report of tubular adenomas. In the ED, labs revealed microcytic anemia, he was mildly tachycardic on arrival  which improved spontaneously, /60,  and CT abd/pelvis revealing: "Thick-walled collection containing feculent material, widely communicating with small bowel, seen centrally in the pelvis. Findings are suspicious for necrotic mass or abscess arising from the small bowel  Surgery was consulted and performed exploratory laparotomy 9/14/22 with resection of the mass and anastomosis of small bowel. The mass appeared to be an abscess with surrounding necrosis.  Patient with signs of sepsis including tachycardic and leukocytosis which could be related to surgery as well which complicated his picture however he was covered for this with with IV antibiotics. An NG tube to LIWS suction was started and the patient was put on IV fluids while he was NPO. Briefly on PPN. He did have a positive blood culture which showed coagulase negative Staph suspected to be a contaminant. He was briefly on vancomycin which was discontinued. ID consulted.  Patient was put on meropenem and fluconazole.  Cultures from surgery with Enterobacter cloacae and Proteus mirabilis.  His bowel function slowly returned, and he no longer needed NGT to suction.  The pathologic diagnosis on the mass was diffuse large B " cell lymphoma.  Oncology service was asked to consult  Patient underwent bone marrow biopsy 9/22/22 for staging.  CT of chest showed no enlarged lymph nodes. Did have another laparoscopy 9/23/22 due to abdominal fluid collection with cleanout thought to be more necrotic than infective. ID planned for outpatient ertapenem infusions to complete 2 week course  Review of Systems    +abdominal pain,post op , still has dressing, bloating and constipated last bm 1 weeks ago   Lack of appetite, pain in abd, gas+   Healed well from surgery  No fever, no sob, feels tired    Denies seizure activity or focal weaknesses or symptoms related to TIA, no head aches or blurred vision reported  Denies issues with skin rash or bruising  Denies issues with swelling of feet, tingling or numbness   +issues with sleep,  Feelig anxious   Good family support reported         Past Medical History:   Diagnosis Date    Diabetes mellitus     Digestive disorder     Prediabetes      Past Surgical History:   Procedure Laterality Date    APPENDECTOMY  07/15/2014    COLONOSCOPY      COLONOSCOPY N/A 2/9/2022    Procedure: COLONOSCOPY;  Surgeon: Manuel Sanders MD;  Location: [facility];  Service: Endoscopy;  Laterality: N/A;    COLONOSCOPY N/A 9/1/2022    Procedure: COLONOSCOPY;  Surgeon: Andrea Clemens MD;  Location: [facility];  Service: Endoscopy;  Laterality: N/A;    INCISION AND DRAINAGE OF ABDOMEN N/A 9/14/2022    Procedure: INCISION AND DRAINAGE, ABDOMEN;  Surgeon: Jan Oliveira Jr., MD;  Location: [facility];  Service: General;  Laterality: N/A;    INSERTION OF TUNNELED CENTRAL VENOUS CATHETER (CVC) WITH SUBCUTANEOUS PORT N/A 10/31/2022    Procedure: [id number];  Surgeon: Jan Oliveira Jr., MD;  Location: [facility] OR;  Service: General;  Laterality: N/A;    LAPAROSCOPIC DRAINAGE OF ABDOMEN N/A 9/23/2022    Procedure: DRAINAGE, ABDOMEN, LAPAROSCOPIC;  Surgeon: Jan Oliveira Jr., MD;  Location: [facility];  Service:  General;  Laterality: N/A;     Family History   Problem Relation Age of Onset    Cancer Mother         Colon    Diabetes Mother     Hypertension Mother     Seizures Father     Heart murmur Sister     Seizures Sister       Social History     Socioeconomic History    Marital status:     Number of children: 2   Occupational History    Occupation: [employer]   Tobacco Use    Smoking status: Never    Smokeless tobacco: Never   Substance and Sexual Activity    Alcohol use: Not Currently     Comment: occasional    Drug use: No    Sexual activity: Yes     Partners: Female     Social Determinants of Health     Financial Resource Strain: Low Risk     Difficulty of Paying Living Expenses: Not hard at all   Food Insecurity: No Food Insecurity    Worried About Running Out of Food in the Last Year: Never true    Ran Out of Food in the Last Year: Never true   Transportation Needs: No Transportation Needs    Lack of Transportation (Medical): No    Lack of Transportation (Non-Medical): No   Physical Activity: Inactive    Days of Exercise per Week: 0 days    Minutes of Exercise per Session: 0 min   Stress: No Stress Concern Present    Feeling of Stress : Only a little   Social Connections: Moderately Isolated    Frequency of Communication with Friends and Family: Twice a week    Frequency of Social Gatherings with Friends and Family: Twice a week    Attends Rastafari Services: Never    Active Member of Clubs or Organizations: No    Attends Club or Organization Meetings: Never    Marital Status:    Housing Stability: Low Risk     Unable to Pay for Housing in the Last Year: No    Number of Places Lived in the Last Year: 1    Unstable Housing in the Last Year: No     Review of patient's allergies indicates:  No Known Allergies    Current Outpatient Medications:     allopurinoL (ZYLOPRIM) 100 MG tablet, Take 1 tablet (100 mg total) by mouth once daily., Disp: 90 tablet, Rfl: 3    blood sugar diagnostic Strp, To check BG 2  times daily, to use with insurance preferred meter, Disp: 200 each, Rfl: 1    blood-glucose meter kit, To check BG 2 times daily, to use with insurance preferred meter, Disp: 1 each, Rfl: 0    ciprofloxacin HCl (CIPRO) 500 MG tablet, Take 1 tablet (500 mg total) by mouth once daily., Disp: 30 tablet, Rfl: 0    clotrimazole (LOTRIMIN) 1 % cream, Apply topically 2 (two) times daily., Disp: 28 g, Rfl: 0    HYDROcodone-acetaminophen (NORCO) 5-325 mg per tablet, Take 1 tablet by mouth every 6 (six) hours as needed for Pain., Disp: 90 tablet, Rfl: 0    lancets Misc, To check BG 2 times daily, to use with insurance preferred meter, Disp: 200 each, Rfl: 1    ondansetron (ZOFRAN-ODT) 4 MG TbDL, Take 1 tablet (4 mg total) by mouth every 6 (six) hours as needed., Disp: 90 tablet, Rfl: 2    predniSONE (DELTASONE) 20 MG tablet, Take 100 mg by mouth once daily., Disp: , Rfl:     promethazine (PHENERGAN) 12.5 MG Tab, Take 1 tablet (12.5 mg total) by mouth every 4 (four) hours as needed., Disp: 30 tablet, Rfl: 3  No current facility-administered medications for this visit.    Facility-Administered Medications Ordered in Other Visits:     0.9%  NaCl infusion, , Intravenous, Continuous, Josee Reid MD    cyclophosphamide 750 mg/m2 = 1,660 mg in sodium chloride 0.9% 258.3 mL chemo infusion, 750 mg/m2 (Treatment Plan Recorded), Intravenous, Q24H, Josee Reid MD, Last Rate: 172.2 mL/hr at 11/01/22 1220, 1,660 mg at 11/01/22 1220    heparin, porcine (PF) 100 unit/mL injection flush 300 Units, 300 Units, Intra-Catheter, PRN, Josee Reid MD    sodium chloride 0.9% bolus 1,000 mL, 1,000 mL, Intravenous, 1 time in Clinic/HOD, Josee Reid MD, Last Rate: 500 mL/hr at 11/01/22 1200, 1,000 mL at 11/01/22 1200    sodium chloride 0.9% flush 10 mL, 10 mL, Intravenous, PRN, Josee Reid MD    Physical Exam    Wt Readings from Last 3 Encounters:   11/01/22 96.9 kg (213 lb 10 oz)   11/01/22 96.9 kg (213 lb 10 oz)    10/31/22 93.4 kg (206 lb)     Temp Readings from Last 3 Encounters:   11/01/22 97.9 °F (36.6 °C)   11/01/22 97.7 °F (36.5 °C) (Temporal)   10/31/22 98.3 °F (36.8 °C) (Oral)     BP Readings from Last 3 Encounters:   11/01/22 127/83   11/01/22 126/81   10/31/22 120/78     Pulse Readings from Last 3 Encounters:   11/01/22 100   11/01/22 109   10/31/22 94      VITAL SIGNS:  as above   GENERAL: appears well-built, well-nourished.  No anxiety, no agitation, and in no distress.  Patient is awake, alert, oriented and cooperative.  HEENT:  Showed no congestion. Trachea is central no obvious icterus or pallor noted no hoarseness. no obvious JVD   NECK:  Supple.  No JVD. No obvious cervical submental or supraclavicular adenopathy.  RS:the visualized portion of  Chest expands well. chest appears symmetric, no audible wheezes.  No dyspnea recognized  ABDOMEN:  abdomen appears  slightly distended, sx scar with dressing  EXTREMITIES:  Without edema.  NEUROLOGICAL:  The patient is appropriate, higher functions are normal.  No  obvious neurological deficits.  normal judgement normal thought content  No confusion, no speech impediment. Cranial nerves are intact and show no deficit. No gross motor deficits noted   SKIN MUSCULOSKELETAL: no joint or skeletal deformity, no clubbing of nails.  No visible rash ecchymosis or petechiae   Lab Results   Component Value Date    WBC 8.55 11/01/2022    HGB 9.7 (L) 11/01/2022    HCT 31.0 (L) 11/01/2022    MCV 79 (L) 11/01/2022     11/01/2022       BMP  Lab Results   Component Value Date     10/30/2022    K 4.0 10/30/2022     10/30/2022    CO2 22 (L) 10/30/2022    BUN 13 10/30/2022    CREATININE 0.9 10/30/2022    CALCIUM 8.8 10/30/2022    ANIONGAP 12 10/30/2022    ESTGFRAFRICA 111 12/28/2021    EGFRNONAA 96 12/28/2021     Lab Results   Component Value Date    IRON 19 (L) 09/21/2022    TIBC 241 (L) 09/21/2022    FERRITIN 655 (H) 09/21/2022 9/26/22  Final Pathologic Diagnosis  1. Small bowel, resection:  Diffuse large B-cell lymphoma, non-germinal   center origin.  Final classification is pending C-MYC rearrangement analysis   result.  See comment.   2. Omentum, resection: Diffuse large B-cell lymphoma, non-germinal center   origin.  Final classification is pending C-MYC rearrangement analysis result.    Flow cytometry analysis of tissue was not performed.   Immunohistochemical studies were performed on the paraffin embedded tissue   block 1 C with adequate positive and negative controls.  The atypical   lymphocytes are positive for CD20,  BCL6, MUM1, MYC, high Ki-67 (99%) and are   negative for CD10, CD30, BCL-2, cyclin D1, .  Reactive T-cells are CD3   positive and BCL-2 positive.  In Situ hybridization for EBV is negative.   Findings are consistent with diffuse large B-cell lymphoma, non germinal   center origin.  Final classification is pending C-MYC rearrangement analysis   by FISH.  A supplemental report will follow  9/22  The left ventricle is normal in size with concentric remodeling and normal systolic function.  The estimated ejection fraction is 60%.  Grade I left ventricular diastolic dysfunction.  Normal right ventricular size with normal right ventricular systolic function.  Mild left atrial enlargement.  Mild mitral regurgitation.  Mild to moderate tricuspid regurgitation.  Normal central venous pressure (3 mmHg).  The estimated PA systolic pressure is 49 mmHg.  There is pulmonary hypertension.  Mild right atrial enlargement.  No vegetations were seen  9/22/22   Bone marrow, right iliac crest, aspirate, clot and core biopsy:   --Normocellular marrow for age ranging from 30-70% with trilineage   hematopoiesis, see comment   --No increase in blasts by aspirate count and CD34 immunohistochemical stain,   see comment   --Increased megakaryocytes with atypia, see comment   --No morphologic evidence of metastatic lymphoma   --Stainable iron is not increased, see comment    --Minimal to mild reticulin fibrosis   Comment:  Concomitantly submitted flow cytometric analysis detects a tight   cluster of myeloid blasts.   The blast gate is mildly increased with a tight   cluster of blasts present showing coexpression of CD13 and CD34 with   expression of cytoplasmic myeloperoxidase.  The tight   cluster represents approximately 6% of total events.   Lymphocytes are   composed of a mixture of  polyclonal B lymphocytes and T lymphocytes that are   immunophenotypically unremarkable.   This marrow shows overall normocellular marrow for age with no morphologic   evidence of metastatic lymphoma.  There is some atypia of the megakaryocytic   lineage as well as a tight cluster of CD34/CD13 positive blasts detected by   flow cytometry, although increased blasts are not appreciated on the aspirate   smear or by CD34 immunohistochemical stain.  By CD61 immunohistochemical   stain, megakaryocytes appear mildly increased without significant clustering   appreciated.  Overt morphologic dysplasia of the myeloid and erythroid   lineages is not appreciated.  These findings are abnormal, and a   myelodysplastic or myeloproliferative process can not be completely ruled   out.  Therefore, correlation with any available cytogenetic and molecular   studies is recommended including studies to rule out myeloproliferative   neoplasms.  If chromosomes are normal, next generation sequencing could be   considered.       Summary echo 10/6/22    The left ventricle is normal in size with concentric remodeling and normal systolic function.  The estimated ejection fraction is 60%.  Grade I left ventricular diastolic dysfunction.  Normal right ventricular size with normal right ventricular systolic function.  Mild left atrial enlargement.  Mild mitral regurgitation.  Mild to moderate tricuspid regurgitation.  Normal central venous pressure (3 mmHg).  The estimated PA systolic pressure is 49 mmHg.  There is pulmonary  hypertension.  Mild right atrial enlargement.  No vegetations were seen     IMPRESSION:pet 10/14/22     Large multiloculated necrotic soft tissue mass in the central abdomen extending into the pelvis which is markedly hypermetabolic. This is consistent with the provided history of lymphoproliferative malignancy.     Numerous additional hypermetabolic soft tissue masses in abdomen, also suspicious for lymphoproliferative malignancy.     Mild diffuse osseous FDG hypermetabolism which could be on the basis of red marrow reconversion or lymphoproliferative malignancy. Please correlate with bone marrow biopsy results.     Mild asymmetric right palatine tonsillar FDG activity, nonspecific. Attention on follow-up is recommended.  Patient Active Problem List   Diagnosis    Small bowel mass    Anemia, chronic disease    Diabetes mellitus type 2, noninsulin dependent    Arthralgia of bilat knees and neck    Moderate malnutrition    Intra-abdominal abscess    S/P exploratory laparotomy    S/P small bowel resection    Sepsis    DLBCL (diffuse large B cell lymphoma)    After care        Assessment and Plan     DLBCL:bone marrow bx  neg for lymphoma with tight cluster of blasts 6%( smal number)  positive for CD20,  BCL6, MUM1, MYC, high Ki-67 (99%)   -negative for CD10, CD30, BCL-2, cyclin D1, .  Reactive T-cells are CD3   --FISH negative for rearrangement of MYC ; no IGH/MYC fusion was observed  uric acid  7.3 sent allopurinol  HBV, HCV, HIV serologies; negative  PET CT  10/22 as above  --IPI pending; might require IT chemotherapy based on IPI  --discussed treatment with [facility], as it is still the standard of care for n on GC DLBCL  --plan for 6 cycles, proceedw ith chemo today  with possible RT to areas of bulky disease   See me on Friday cbc, cmp, uric acid, phosphorus magnesium ldh schedule cycle 2  . T2DM not on long term insulin     --on metformin , follows with his PCP        3. Depression     --he denies suicidal  ideation  --he has upcoming appointment with psychiatry           4. Abnormal BM biopsy     --tight cluster of myeloid blasts approximating 6% was found on BM done on 9/22/22  --significance un clear  --no dysplastic changes  --cytogenetics normal  --will require repeat BM biopsy after chemotherapy

## 2022-11-01 NOTE — PROGRESS NOTES
"    Patient ID: Dwight Hoffmann is a 52 y.o. male.     Chief Complaint:   HPI  52 year old male with a history of NIDDM2 and HLD who presents today with complaints of night sweats for weeks. It is moderate. It is associated with 25 lb wt loss over the last 2 months, urinary hesitancy, urinary frequency, occasional blood streaked stools, weakness, fatigue, neck pain, knee pain, and pelvic pain. He denies measured fever, chills, N/V/D, chest pain, or SOB. He was seen by his primary last month about the blood streaked stools and referred to Dr. Clemens for colonoscopy on 9/1 with multiple polypectomies with path report of tubular adenomas. In the ED, labs revealed microcytic anemia, he was mildly tachycardic on arrival  which improved spontaneously, /60,  and CT abd/pelvis revealing: "Thick-walled collection containing feculent material, widely communicating with small bowel, seen centrally in the pelvis. Findings are suspicious for necrotic mass or abscess arising from the small bowel  Surgery was consulted and performed exploratory laparotomy 9/14/22 with resection of the mass and anastomosis of small bowel. The mass appeared to be an abscess with surrounding necrosis.  Patient with signs of sepsis including tachycardic and leukocytosis which could be related to surgery as well which complicated his picture however he was covered for this with with IV antibiotics. An NG tube to LIWS suction was started and the patient was put on IV fluids while he was NPO. Briefly on PPN. He did have a positive blood culture which showed coagulase negative Staph suspected to be a contaminant. He was briefly on vancomycin which was discontinued. ID consulted.  Patient was put on meropenem and fluconazole.  Cultures from surgery with Enterobacter cloacae and Proteus mirabilis.  His bowel function slowly returned, and he no longer needed NGT to suction.  The pathologic diagnosis on the mass was diffuse large B cell " lymphoma.  Oncology service was asked to consult  Patient underwent bone marrow biopsy 9/22/22 for staging.  CT of chest showed no enlarged lymph nodes. Did have another laparoscopy 9/23/22 due to abdominal fluid collection with cleanout thought to be more necrotic than infective. ID planned for outpatient ertapenem infusions to complete 2 week course  Review of Systems    +abdominal pain,post op , still has dressing, bloating and constipated last bm 1 weeks ago   Lack of appetite, pain in abd, gas+   Healed well from surgery  No fever, no sob, feels tired     Denies seizure activity or focal weaknesses or symptoms related to TIA, no head aches or blurred vision reported  Denies issues with skin rash or bruising  Denies issues with swelling of feet, tingling or numbness   +issues with sleep,  Feelig anxious   Good family support reported                 Past Medical History:   Diagnosis Date    Diabetes mellitus      Digestive disorder      Prediabetes              Past Surgical History:   Procedure Laterality Date    APPENDECTOMY   07/15/2014    COLONOSCOPY        COLONOSCOPY N/A 2/9/2022     Procedure: COLONOSCOPY;  Surgeon: Manuel Sanders MD;  Location: [facility];  Service: Endoscopy;  Laterality: N/A;    COLONOSCOPY N/A 9/1/2022     Procedure: COLONOSCOPY;  Surgeon: Andrea Clemens MD;  Location: [facility];  Service: Endoscopy;  Laterality: N/A;    INCISION AND DRAINAGE OF ABDOMEN N/A 9/14/2022     Procedure: INCISION AND DRAINAGE, ABDOMEN;  Surgeon: Jan Oliveira Jr., MD;  Location: [facility] OR;  Service: General;  Laterality: N/A;    INSERTION OF TUNNELED CENTRAL VENOUS CATHETER (CVC) WITH SUBCUTANEOUS PORT N/A 10/31/2022     Procedure: [id number];  Surgeon: Jan Oliveira Jr., MD;  Location: [facility] OR;  Service: General;  Laterality: N/A;    LAPAROSCOPIC DRAINAGE OF ABDOMEN N/A 9/23/2022     Procedure: DRAINAGE, ABDOMEN, LAPAROSCOPIC;  Surgeon: Jan Oliveira Jr., MD;  Location:  [facility] OR;  Service: General;  Laterality: N/A;            Family History   Problem Relation Age of Onset    Cancer Mother           Colon    Diabetes Mother      Hypertension Mother      Seizures Father      Heart murmur Sister      Seizures Sister        Social History            Socioeconomic History    Marital status:     Number of children: 2   Occupational History    Occupation: [employer]   Tobacco Use    Smoking status: Never    Smokeless tobacco: Never   Substance and Sexual Activity    Alcohol use: Not Currently       Comment: occasional    Drug use: No    Sexual activity: Yes       Partners: Female      Social Determinants of Health          Financial Resource Strain: Low Risk     Difficulty of Paying Living Expenses: Not hard at all   Food Insecurity: No Food Insecurity    Worried About Running Out of Food in the Last Year: Never true    Ran Out of Food in the Last Year: Never true   Transportation Needs: No Transportation Needs    Lack of Transportation (Medical): No    Lack of Transportation (Non-Medical): No   Physical Activity: Inactive    Days of Exercise per Week: 0 days    Minutes of Exercise per Session: 0 min   Stress: No Stress Concern Present    Feeling of Stress : Only a little   Social Connections: Moderately Isolated    Frequency of Communication with Friends and Family: Twice a week    Frequency of Social Gatherings with Friends and Family: Twice a week    Attends Temple Services: Never    Active Member of Clubs or Organizations: No    Attends Club or Organization Meetings: Never    Marital Status:    Housing Stability: Low Risk     Unable to Pay for Housing in the Last Year: No    Number of Places Lived in the Last Year: 1    Unstable Housing in the Last Year: No      Review of patient's allergies indicates:  No Known Allergies     Current Outpatient Medications:     allopurinoL (ZYLOPRIM) 100 MG tablet, Take 1 tablet (100 mg total) by mouth once daily., Disp: 90 tablet,  Rfl: 3    blood sugar diagnostic Strp, To check BG 2 times daily, to use with insurance preferred meter, Disp: 200 each, Rfl: 1    blood-glucose meter kit, To check BG 2 times daily, to use with insurance preferred meter, Disp: 1 each, Rfl: 0    ciprofloxacin HCl (CIPRO) 500 MG tablet, Take 1 tablet (500 mg total) by mouth once daily., Disp: 30 tablet, Rfl: 0    clotrimazole (LOTRIMIN) 1 % cream, Apply topically 2 (two) times daily., Disp: 28 g, Rfl: 0    HYDROcodone-acetaminophen (NORCO) 5-325 mg per tablet, Take 1 tablet by mouth every 6 (six) hours as needed for Pain., Disp: 90 tablet, Rfl: 0    lancets Misc, To check BG 2 times daily, to use with insurance preferred meter, Disp: 200 each, Rfl: 1    ondansetron (ZOFRAN-ODT) 4 MG TbDL, Take 1 tablet (4 mg total) by mouth every 6 (six) hours as needed., Disp: 90 tablet, Rfl: 2    predniSONE (DELTASONE) 20 MG tablet, Take 100 mg by mouth once daily., Disp: , Rfl:     promethazine (PHENERGAN) 12.5 MG Tab, Take 1 tablet (12.5 mg total) by mouth every 4 (four) hours as needed., Disp: 30 tablet, Rfl: 3  No current facility-administered medications for this visit.     Facility-Administered Medications Ordered in Other Visits:     0.9%  NaCl infusion, , Intravenous, Continuous, Josee Reid MD    cyclophosphamide 750 mg/m2 = 1,660 mg in sodium chloride 0.9% 258.3 mL chemo infusion, 750 mg/m2 (Treatment Plan Recorded), Intravenous, Q24H, Josee Reid MD, Last Rate: 172.2 mL/hr at 11/01/22 1220, 1,660 mg at 11/01/22 1220    heparin, porcine (PF) 100 unit/mL injection flush 300 Units, 300 Units, Intra-Catheter, PRN, Josee Reid MD    sodium chloride 0.9% bolus 1,000 mL, 1,000 mL, Intravenous, 1 time in Clinic/HOD, Josee Reid MD, Last Rate: 500 mL/hr at 11/01/22 1200, 1,000 mL at 11/01/22 1200    sodium chloride 0.9% flush 10 mL, 10 mL, Intravenous, PRN, Josee Reid MD     Physical Exam        Wt Readings from Last 3 Encounters:   11/01/22 96.9 kg  (213 lb 10 oz)   11/01/22 96.9 kg (213 lb 10 oz)   10/31/22 93.4 kg (206 lb)          Temp Readings from Last 3 Encounters:   11/01/22 97.9 °F (36.6 °C)   11/01/22 97.7 °F (36.5 °C) (Temporal)   10/31/22 98.3 °F (36.8 °C) (Oral)          BP Readings from Last 3 Encounters:   11/01/22 127/83   11/01/22 126/81   10/31/22 120/78          Pulse Readings from Last 3 Encounters:   11/01/22 100   11/01/22 109   10/31/22 94      VITAL SIGNS:  as above   GENERAL: appears well-built, well-nourished.  No anxiety, no agitation, and in no distress.  Patient is awake, alert, oriented and cooperative.  HEENT:  Showed no congestion. Trachea is central no obvious icterus or pallor noted no hoarseness. no obvious JVD   NECK:  Supple.  No JVD. No obvious cervical submental or supraclavicular adenopathy.  RS:the visualized portion of  Chest expands well. chest appears symmetric, no audible wheezes.  No dyspnea recognized  ABDOMEN:  abdomen appears  slightly distended, sx scar with dressing  EXTREMITIES:  Without edema.  NEUROLOGICAL:  The patient is appropriate, higher functions are normal.  No  obvious neurological deficits.  normal judgement normal thought content  No confusion, no speech impediment. Cranial nerves are intact and show no deficit. No gross motor deficits noted   SKIN MUSCULOSKELETAL: no joint or skeletal deformity, no clubbing of nails.  No visible rash ecchymosis or petechiae         Lab Results   Component Value Date     WBC 8.55 11/01/2022     HGB 9.7 (L) 11/01/2022     HCT 31.0 (L) 11/01/2022     MCV 79 (L) 11/01/2022      11/01/2022         BMP        Lab Results   Component Value Date      10/30/2022     K 4.0 10/30/2022      10/30/2022     CO2 22 (L) 10/30/2022     BUN 13 10/30/2022     CREATININE 0.9 10/30/2022     CALCIUM 8.8 10/30/2022     ANIONGAP 12 10/30/2022     ESTGFRAFRICA 111 12/28/2021     EGFRNONAA 96 12/28/2021            Lab Results   Component Value Date     IRON 19 (L)  09/21/2022     TIBC 241 (L) 09/21/2022     FERRITIN 655 (H) 09/21/2022 9/26/22  Final Pathologic Diagnosis 1. Small bowel, resection:  Diffuse large B-cell lymphoma, non-germinal   center origin.  Final classification is pending C-MYC rearrangement analysis   result.  See comment.   2. Omentum, resection: Diffuse large B-cell lymphoma, non-germinal center   origin.  Final classification is pending C-MYC rearrangement analysis result.    Flow cytometry analysis of tissue was not performed.   Immunohistochemical studies were performed on the paraffin embedded tissue   block 1 C with adequate positive and negative controls.  The atypical   lymphocytes are positive for CD20,  BCL6, MUM1, MYC, high Ki-67 (99%) and are   negative for CD10, CD30, BCL-2, cyclin D1, .  Reactive T-cells are CD3   positive and BCL-2 positive.  In Situ hybridization for EBV is negative.   Findings are consistent with diffuse large B-cell lymphoma, non germinal   center origin.  Final classification is pending C-MYC rearrangement analysis   by FISH.  A supplemental report will follow  9/22  The left ventricle is normal in size with concentric remodeling and normal systolic function.  The estimated ejection fraction is 60%.  Grade I left ventricular diastolic dysfunction.  Normal right ventricular size with normal right ventricular systolic function.  Mild left atrial enlargement.  Mild mitral regurgitation.  Mild to moderate tricuspid regurgitation.  Normal central venous pressure (3 mmHg).  The estimated PA systolic pressure is 49 mmHg.  There is pulmonary hypertension.  Mild right atrial enlargement.  No vegetations were seen  9/22/22   Bone marrow, right iliac crest, aspirate, clot and core biopsy:   --Normocellular marrow for age ranging from 30-70% with trilineage   hematopoiesis, see comment   --No increase in blasts by aspirate count and CD34 immunohistochemical stain,   see comment   --Increased megakaryocytes with atypia, see  comment   --No morphologic evidence of metastatic lymphoma   --Stainable iron is not increased, see comment   --Minimal to mild reticulin fibrosis   Comment:  Concomitantly submitted flow cytometric analysis detects a tight   cluster of myeloid blasts.   The blast gate is mildly increased with a tight   cluster of blasts present showing coexpression of CD13 and CD34 with   expression of cytoplasmic myeloperoxidase.  The tight   cluster represents approximately 6% of total events.   Lymphocytes are   composed of a mixture of  polyclonal B lymphocytes and T lymphocytes that are   immunophenotypically unremarkable.   This marrow shows overall normocellular marrow for age with no morphologic   evidence of metastatic lymphoma.  There is some atypia of the megakaryocytic   lineage as well as a tight cluster of CD34/CD13 positive blasts detected by   flow cytometry, although increased blasts are not appreciated on the aspirate   smear or by CD34 immunohistochemical stain.  By CD61 immunohistochemical   stain, megakaryocytes appear mildly increased without significant clustering   appreciated.  Overt morphologic dysplasia of the myeloid and erythroid   lineages is not appreciated.  These findings are abnormal, and a   myelodysplastic or myeloproliferative process can not be completely ruled   out.  Therefore, correlation with any available cytogenetic and molecular   studies is recommended including studies to rule out myeloproliferative   neoplasms.  If chromosomes are normal, next generation sequencing could be   considered.         Summary echo 10/6/22     The left ventricle is normal in size with concentric remodeling and normal systolic function.  The estimated ejection fraction is 60%.  Grade I left ventricular diastolic dysfunction.  Normal right ventricular size with normal right ventricular systolic function.  Mild left atrial enlargement.  Mild mitral regurgitation.  Mild to moderate tricuspid  regurgitation.  Normal central venous pressure (3 mmHg).  The estimated PA systolic pressure is 49 mmHg.  There is pulmonary hypertension.  Mild right atrial enlargement.  No vegetations were seen      IMPRESSION:pet 10/14/22     Large multiloculated necrotic soft tissue mass in the central abdomen extending into the pelvis which is markedly hypermetabolic. This is consistent with the provided history of lymphoproliferative malignancy.     Numerous additional hypermetabolic soft tissue masses in abdomen, also suspicious for lymphoproliferative malignancy.     Mild diffuse osseous FDG hypermetabolism which could be on the basis of red marrow reconversion or lymphoproliferative malignancy. Please correlate with bone marrow biopsy results.     Mild asymmetric right palatine tonsillar FDG activity, nonspecific. Attention on follow-up is recommended.      Patient Active Problem List   Diagnosis    Small bowel mass    Anemia, chronic disease    Diabetes mellitus type 2, noninsulin dependent    Arthralgia of bilat knees and neck    Moderate malnutrition    Intra-abdominal abscess    S/P exploratory laparotomy    S/P small bowel resection    Sepsis    DLBCL (diffuse large B cell lymphoma)    After care         Assessment and Plan      DLBCL:bone marrow bx  neg for lymphoma with tight cluster of blasts 6%( smal number)  positive for CD20,  BCL6, MUM1, MYC, high Ki-67 (99%)   -negative for CD10, CD30, BCL-2, cyclin D1, .  Reactive T-cells are CD3   --FISH negative for rearrangement of MYC ; no IGH/MYC fusion was observed  uric acid  7.3 sent allopurinol  HBV, HCV, HIV serologies; negative  PET CT  10/22 as above  --IPI pending; might require IT chemotherapy based on IPI  --discussed treatment with [facility], as it is still the standard of care for n on GC DLBCL  --plan for 6 cycles, proceedw [facility] chemo today  with possible RT to areas of bulky disease   See me on Friday cbc, cmp, uric acid, phosphorus magnesium ldh schedule  cycle 2  . T2DM not on long term insulin     --on metformin , follows with his PCP        3. Depression     --he denies suicidal ideation  --he has upcoming appointment with psychiatry           4. Abnormal BM biopsy     --tight cluster of myeloid blasts approximating 6% was found on BM done on 9/22/22  --significance un clear  --no dysplastic changes  --cytogenetics normal  --will require repeat BM biopsy after chemotherapy

## 2022-11-01 NOTE — PROGRESS NOTES
"Subjective:       Patient ID: Dwight Hoffmann is a 52 y.o. male.    Chief Complaint: No chief complaint on file.    HPI  52 year old male with a history of NIDDM2 and HLD who presents today with complaints of night sweats for weeks. It is moderate. It is associated with 25 lb wt loss over the last 2 months, urinary hesitancy, urinary frequency, occasional blood streaked stools, weakness, fatigue, neck pain, knee pain, and pelvic pain. He denies measured fever, chills, N/V/D, chest pain, or SOB. He was seen by his primary last month about the blood streaked stools and referred to Dr. Clemens for colonoscopy on 9/1 with multiple polypectomies with path report of tubular adenomas. In the ED, labs revealed microcytic anemia, he was mildly tachycardic on arrival  which improved spontaneously, /60,  and CT abd/pelvis revealing: "Thick-walled collection containing feculent material, widely communicating with small bowel, seen centrally in the pelvis. Findings are suspicious for necrotic mass or abscess arising from the small bowel  Surgery was consulted and performed exploratory laparotomy 9/14/22 with resection of the mass and anastomosis of small bowel. The mass appeared to be an abscess with surrounding necrosis.  Patient with signs of sepsis including tachycardic and leukocytosis which could be related to surgery as well which complicated his picture however he was covered for this with with IV antibiotics. An NG tube to LIWS suction was started and the patient was put on IV fluids while he was NPO. Briefly on PPN. He did have a positive blood culture which showed coagulase negative Staph suspected to be a contaminant. He was briefly on vancomycin which was discontinued. ID consulted.  Patient was put on meropenem and fluconazole.  Cultures from surgery with Enterobacter cloacae and Proteus mirabilis.  His bowel function slowly returned, and he no longer needed NGT to suction.  The pathologic diagnosis on " the mass was diffuse large B cell lymphoma.  Oncology service was asked to consult  Patient underwent bone marrow biopsy 9/22/22 for staging.  CT of chest showed no enlarged lymph nodes. Did have another laparoscopy 9/23/22 due to abdominal fluid collection with cleanout thought to be more necrotic than infective. ID planned for outpatient ertapenem infusions to complete 2 week course  Review of Systems    +abdominal pain,post op , still has dressing, bloating and constipated last bm 1 weeks ago   Lack of appetite, pain in abd, gas+   Healed well from surgery  No fever, no sob, feels tired    Denies seizure activity or focal weaknesses or symptoms related to TIA, no head aches or blurred vision reported  Denies issues with skin rash or bruising  Denies issues with swelling of feet, tingling or numbness   +issues with sleep,  Feelig anxious   Good family support reported         Past Medical History:   Diagnosis Date    Diabetes mellitus     Digestive disorder     Prediabetes      Past Surgical History:   Procedure Laterality Date    APPENDECTOMY  07/15/2014    COLONOSCOPY      COLONOSCOPY N/A 2/9/2022    Procedure: COLONOSCOPY;  Surgeon: Manuel Sanders MD;  Location: [facility] ENDO;  Service: Endoscopy;  Laterality: N/A;    COLONOSCOPY N/A 9/1/2022    Procedure: COLONOSCOPY;  Surgeon: Andrea Clemens MD;  Location: [facility] ENDO;  Service: Endoscopy;  Laterality: N/A;    INCISION AND DRAINAGE OF ABDOMEN N/A 9/14/2022    Procedure: INCISION AND DRAINAGE, ABDOMEN;  Surgeon: Jan Oliveira Jr., MD;  Location: [facility] OR;  Service: General;  Laterality: N/A;    INSERTION OF TUNNELED CENTRAL VENOUS CATHETER (CVC) WITH SUBCUTANEOUS PORT N/A 10/31/2022    Procedure: [id number];  Surgeon: Jan Oliveira Jr., MD;  Location: [facility] OR;  Service: General;  Laterality: N/A;    LAPAROSCOPIC DRAINAGE OF ABDOMEN N/A 9/23/2022    Procedure: DRAINAGE, ABDOMEN, LAPAROSCOPIC;  Surgeon: Jan Oliveira Jr., MD;   Location: [facility];  Service: General;  Laterality: N/A;     Family History   Problem Relation Age of Onset    Cancer Mother         Colon    Diabetes Mother     Hypertension Mother     Seizures Father     Heart murmur Sister     Seizures Sister       Social History     Socioeconomic History    Marital status:     Number of children: 2   Occupational History    Occupation: [employer]   Tobacco Use    Smoking status: Never    Smokeless tobacco: Never   Substance and Sexual Activity    Alcohol use: Not Currently     Comment: occasional    Drug use: No    Sexual activity: Yes     Partners: Female     Social Determinants of Health     Financial Resource Strain: Low Risk     Difficulty of Paying Living Expenses: Not hard at all   Food Insecurity: No Food Insecurity    Worried About Running Out of Food in the Last Year: Never true    Ran Out of Food in the Last Year: Never true   Transportation Needs: No Transportation Needs    Lack of Transportation (Medical): No    Lack of Transportation (Non-Medical): No   Physical Activity: Inactive    Days of Exercise per Week: 0 days    Minutes of Exercise per Session: 0 min   Stress: No Stress Concern Present    Feeling of Stress : Only a little   Social Connections: Moderately Isolated    Frequency of Communication with Friends and Family: Twice a week    Frequency of Social Gatherings with Friends and Family: Twice a week    Attends Hindu Services: Never    Active Member of Clubs or Organizations: No    Attends Club or Organization Meetings: Never    Marital Status:    Housing Stability: Low Risk     Unable to Pay for Housing in the Last Year: No    Number of Places Lived in the Last Year: 1    Unstable Housing in the Last Year: No     Review of patient's allergies indicates:  No Known Allergies    Current Outpatient Medications:     blood sugar diagnostic Strp, To check BG 2 times daily, to use with insurance preferred meter, Disp: 200 each, Rfl: 1     blood-glucose meter kit, To check BG 2 times daily, to use with insurance preferred meter, Disp: 1 each, Rfl: 0    ciprofloxacin HCl (CIPRO) 500 MG tablet, Take 1 tablet (500 mg total) by mouth once daily., Disp: 30 tablet, Rfl: 0    clotrimazole (LOTRIMIN) 1 % cream, Apply topically 2 (two) times daily., Disp: 28 g, Rfl: 0    HYDROcodone-acetaminophen (NORCO) 5-325 mg per tablet, Take 1 tablet by mouth every 6 (six) hours as needed for Pain., Disp: 90 tablet, Rfl: 0    lancets Misc, To check BG 2 times daily, to use with insurance preferred meter, Disp: 200 each, Rfl: 1    ondansetron (ZOFRAN-ODT) 4 MG TbDL, Take 1 tablet (4 mg total) by mouth every 6 (six) hours as needed., Disp: 90 tablet, Rfl: 2    promethazine (PHENERGAN) 12.5 MG Tab, Take 1 tablet (12.5 mg total) by mouth every 4 (four) hours as needed., Disp: 30 tablet, Rfl: 3  No current facility-administered medications for this visit.    Physical Exam    Wt Readings from Last 3 Encounters:   10/31/22 93.4 kg (206 lb)   10/30/22 98.5 kg (217 lb 2.5 oz)   10/28/22 93.9 kg (207 lb)     Temp Readings from Last 3 Encounters:   10/31/22 98.3 °F (36.8 °C) (Oral)   10/30/22 99 °F (37.2 °C) (Oral)   10/21/22 96.1 °F (35.6 °C)     BP Readings from Last 3 Encounters:   10/31/22 120/78   10/30/22 111/69   10/21/22 119/80     Pulse Readings from Last 3 Encounters:   10/31/22 94   10/30/22 105   10/21/22 102      VITAL SIGNS:  as above   GENERAL: appears well-built, well-nourished.  No anxiety, no agitation, and in no distress.  Patient is awake, alert, oriented and cooperative.  HEENT:  Showed no congestion. Trachea is central no obvious icterus or pallor noted no hoarseness. no obvious JVD   NECK:  Supple.  No JVD. No obvious cervical submental or supraclavicular adenopathy.  RS:the visualized portion of  Chest expands well. chest appears symmetric, no audible wheezes.  No dyspnea recognized  ABDOMEN:  abdomen appears  slightly distended, sx scar with  dressing  EXTREMITIES:  Without edema.  NEUROLOGICAL:  The patient is appropriate, higher functions are normal.  No  obvious neurological deficits.  normal judgement normal thought content  No confusion, no speech impediment. Cranial nerves are intact and show no deficit. No gross motor deficits noted   SKIN MUSCULOSKELETAL: no joint or skeletal deformity, no clubbing of nails.  No visible rash ecchymosis or petechiae   Lab Results   Component Value Date    WBC 6.80 10/30/2022    HGB 10.6 (L) 10/30/2022    HCT 33.4 (L) 10/30/2022    MCV 77 (L) 10/30/2022     (L) 10/30/2022       BMP  Lab Results   Component Value Date     10/30/2022    K 4.0 10/30/2022     10/30/2022    CO2 22 (L) 10/30/2022    BUN 13 10/30/2022    CREATININE 0.9 10/30/2022    CALCIUM 8.8 10/30/2022    ANIONGAP 12 10/30/2022    ESTGFRAFRICA 111 12/28/2021    EGFRNONAA 96 12/28/2021     Lab Results   Component Value Date    IRON 19 (L) 09/21/2022    TIBC 241 (L) 09/21/2022    FERRITIN 655 (H) 09/21/2022 9/26/22  Final Pathologic Diagnosis 1. Small bowel, resection:  Diffuse large B-cell lymphoma, non-germinal   center origin.  Final classification is pending C-MYC rearrangement analysis   result.  See comment.   2. Omentum, resection: Diffuse large B-cell lymphoma, non-germinal center   origin.  Final classification is pending C-MYC rearrangement analysis result.    Flow cytometry analysis of tissue was not performed.   Immunohistochemical studies were performed on the paraffin embedded tissue   block 1 C with adequate positive and negative controls.  The atypical   lymphocytes are positive for CD20,  BCL6, MUM1, MYC, high Ki-67 (99%) and are   negative for CD10, CD30, BCL-2, cyclin D1, .  Reactive T-cells are CD3   positive and BCL-2 positive.  In Situ hybridization for EBV is negative.   Findings are consistent with diffuse large B-cell lymphoma, non germinal   center origin.  Final classification is pending C-MYC  rearrangement analysis   by FISH.  A supplemental report will follow  9/22  The left ventricle is normal in size with concentric remodeling and normal systolic function.  The estimated ejection fraction is 60%.  Grade I left ventricular diastolic dysfunction.  Normal right ventricular size with normal right ventricular systolic function.  Mild left atrial enlargement.  Mild mitral regurgitation.  Mild to moderate tricuspid regurgitation.  Normal central venous pressure (3 mmHg).  The estimated PA systolic pressure is 49 mmHg.  There is pulmonary hypertension.  Mild right atrial enlargement.  No vegetations were seen  9/22/22   Bone marrow, right iliac crest, aspirate, clot and core biopsy:   --Normocellular marrow for age ranging from 30-70% with trilineage   hematopoiesis, see comment   --No increase in blasts by aspirate count and CD34 immunohistochemical stain,   see comment   --Increased megakaryocytes with atypia, see comment   --No morphologic evidence of metastatic lymphoma   --Stainable iron is not increased, see comment   --Minimal to mild reticulin fibrosis   Comment:  Concomitantly submitted flow cytometric analysis detects a tight   cluster of myeloid blasts.   The blast gate is mildly increased with a tight   cluster of blasts present showing coexpression of CD13 and CD34 with   expression of cytoplasmic myeloperoxidase.  The tight   cluster represents approximately 6% of total events.   Lymphocytes are   composed of a mixture of  polyclonal B lymphocytes and T lymphocytes that are   immunophenotypically unremarkable.   This marrow shows overall normocellular marrow for age with no morphologic   evidence of metastatic lymphoma.  There is some atypia of the megakaryocytic   lineage as well as a tight cluster of CD34/CD13 positive blasts detected by   flow cytometry, although increased blasts are not appreciated on the aspirate   smear or by CD34 immunohistochemical stain.  By CD61 immunohistochemical    stain, megakaryocytes appear mildly increased without significant clustering   appreciated.  Overt morphologic dysplasia of the myeloid and erythroid   lineages is not appreciated.  These findings are abnormal, and a   myelodysplastic or myeloproliferative process can not be completely ruled   out.  Therefore, correlation with any available cytogenetic and molecular   studies is recommended including studies to rule out myeloproliferative   neoplasms.  If chromosomes are normal, next generation sequencing could be   considered.       Summary echo 10/6/22    The left ventricle is normal in size with concentric remodeling and normal systolic function.  The estimated ejection fraction is 60%.  Grade I left ventricular diastolic dysfunction.  Normal right ventricular size with normal right ventricular systolic function.  Mild left atrial enlargement.  Mild mitral regurgitation.  Mild to moderate tricuspid regurgitation.  Normal central venous pressure (3 mmHg).  The estimated PA systolic pressure is 49 mmHg.  There is pulmonary hypertension.  Mild right atrial enlargement.  No vegetations were seen     IMPRESSION:pet 10/14/22     Large multiloculated necrotic soft tissue mass in the central abdomen extending into the pelvis which is markedly hypermetabolic. This is consistent with the provided history of lymphoproliferative malignancy.     Numerous additional hypermetabolic soft tissue masses in abdomen, also suspicious for lymphoproliferative malignancy.     Mild diffuse osseous FDG hypermetabolism which could be on the basis of red marrow reconversion or lymphoproliferative malignancy. Please correlate with bone marrow biopsy results.     Mild asymmetric right palatine tonsillar FDG activity, nonspecific. Attention on follow-up is recommended.  Patient Active Problem List   Diagnosis    Small bowel mass    Anemia, chronic disease    Diabetes mellitus type 2, noninsulin dependent    Arthralgia of bilat knees and neck     Moderate malnutrition    Intra-abdominal abscess    S/P exploratory laparotomy    S/P small bowel resection    Sepsis    DLBCL (diffuse large B cell lymphoma)        Assessment and Plan     DLBCL:bone marrow bx  neg for lymphoma with tight cluster of blasts 6%( smal number)  positive for CD20,  BCL6, MUM1, MYC, high Ki-67 (99%)   -negative for CD10, CD30, BCL-2, cyclin D1, .  Reactive T-cells are CD3   --FISH negative for rearrangement of MYC ; no IGH/MYC fusion was observed  uric acid  7.3 sent allopurinol  HBV, HCV, HIV serologies; negative  PET CT  10/22 as above  --IPI pending; might require IT chemotherapy based on IPI  --discussed treatment with [facility], as it is still the standard of care for n on GC DLBCL  --plan for 6 cycles, proceedw ith chemo today  with possible RT to areas of bulky disease   See me on Friday cbc, cmp, uric acid, ldh schedule cycle 2  . T2DM not on long term insulin     --on metformin , follows with his PCP        3. Depression     --he denies suicidal ideation  --he has upcoming appointment with psychiatry           4. Abnormal BM biopsy     --tight cluster of myeloid blasts approximating 6% was found on BM done on 9/22/22  --significance un clear  --no dysplastic changes  --cytogenetics normal  --will require repeat BM biopsy after chemotherapy

## 2022-11-02 NOTE — PLAN OF CARE
Problem: Constipation  Goal: Effective Bowel Elimination  Outcome: Met     Problem: Fatigue  Goal: Improved Activity Tolerance  Outcome: Met

## 2022-11-03 NOTE — PROGRESS NOTES
Medical Nutrition Therapy Oncology Progress Note      Patient's PCP:BOSTON Oswald  Referring Provider: No ref. provider found  Subjective:        Patient ID: Dwight Hoffmann is a 52 y.o. male.    Chief Complaint: Lymphoma s/p small bowel resection    Past Medical History:   Diagnosis Date    Diabetes mellitus     Digestive disorder     Prediabetes        Past Surgical History:   Procedure Laterality Date    APPENDECTOMY  07/15/2014    COLONOSCOPY      COLONOSCOPY N/A 2/9/2022    Procedure: COLONOSCOPY;  Surgeon: Manuel Sanders MD;  Location: [facility] ENDO;  Service: Endoscopy;  Laterality: N/A;    COLONOSCOPY N/A 9/1/2022    Procedure: COLONOSCOPY;  Surgeon: Andrea Clemens MD;  Location: [facility] ENDO;  Service: Endoscopy;  Laterality: N/A;    INCISION AND DRAINAGE OF ABDOMEN N/A 9/14/2022    Procedure: INCISION AND DRAINAGE, ABDOMEN;  Surgeon: Jan Oliveira Jr., MD;  Location: [facility] OR;  Service: General;  Laterality: N/A;    INSERTION OF TUNNELED CENTRAL VENOUS CATHETER (CVC) WITH SUBCUTANEOUS PORT N/A 10/31/2022    Procedure: [id number]-[id number];  Surgeon: Jan Oliveira Jr., MD;  Location: [facility] OR;  Service: General;  Laterality: N/A;    LAPAROSCOPIC DRAINAGE OF ABDOMEN N/A 9/23/2022    Procedure: DRAINAGE, ABDOMEN, LAPAROSCOPIC;  Surgeon: Jan Oliveira Jr., MD;  Location: [facility] OR;  Service: General;  Laterality: N/A;       Social History     Socioeconomic History    Marital status:     Number of children: 2   Occupational History    Occupation: Truck Drive   Tobacco Use    Smoking status: Never    Smokeless tobacco: Never   Substance and Sexual Activity    Alcohol use: Not Currently     Comment: occasional    Drug use: No    Sexual activity: Yes     Partners: Female     Social Determinants of Health     Financial Resource Strain: Low Risk     Difficulty of Paying Living Expenses: Not hard at all   Food Insecurity: No Food Insecurity    Worried  About Running Out of Food in the Last Year: Never true    Ran Out of Food in the Last Year: Never true   Transportation Needs: No Transportation Needs    Lack of Transportation (Medical): No    Lack of Transportation (Non-Medical): No   Physical Activity: Inactive    Days of Exercise per Week: 0 days    Minutes of Exercise per Session: 0 min   Stress: No Stress Concern Present    Feeling of Stress : Only a little   Social Connections: Moderately Isolated    Frequency of Communication with Friends and Family: Twice a week    Frequency of Social Gatherings with Friends and Family: Twice a week    Attends Orthodoxy Services: Never    Active Member of Clubs or Organizations: No    Attends Club or Organization Meetings: Never    Marital Status:    Housing Stability: Low Risk     Unable to Pay for Housing in the Last Year: No    Number of Places Lived in the Last Year: 1    Unstable Housing in the Last Year: No       Family History   Problem Relation Age of Onset    Cancer Mother         Colon    Diabetes Mother     Hypertension Mother     Seizures Father     Heart murmur Sister     Seizures Sister        Review of patient's allergies indicates:  No Known Allergies    Current Outpatient Medications:     allopurinoL (ZYLOPRIM) 100 MG tablet, Take 1 tablet (100 mg total) by mouth once daily., Disp: 90 tablet, Rfl: 3    blood sugar diagnostic Strp, To check BG 2 times daily, to use with insurance preferred meter, Disp: 200 each, Rfl: 1    blood-glucose meter kit, To check BG 2 times daily, to use with insurance preferred meter, Disp: 1 each, Rfl: 0    ciprofloxacin HCl (CIPRO) 500 MG tablet, Take 1 tablet (500 mg total) by mouth once daily., Disp: 30 tablet, Rfl: 0    clotrimazole (LOTRIMIN) 1 % cream, Apply topically 2 (two) times daily., Disp: 28 g, Rfl: 0    HYDROcodone-acetaminophen (NORCO) 5-325 mg per tablet, Take 1 tablet by mouth every 6 (six) hours as needed for Pain., Disp: 90 tablet, Rfl: 0    lancets Misc,  To check BG 2 times daily, to use with insurance preferred meter, Disp: 200 each, Rfl: 1    ondansetron (ZOFRAN-ODT) 4 MG TbDL, Take 1 tablet (4 mg total) by mouth every 6 (six) hours as needed., Disp: 90 tablet, Rfl: 2    predniSONE (DELTASONE) 20 MG tablet, Take 100 mg by mouth once daily., Disp: , Rfl:     promethazine (PHENERGAN) 12.5 MG Tab, Take 1 tablet (12.5 mg total) by mouth every 4 (four) hours as needed., Disp: 30 tablet, Rfl: 3  No current facility-administered medications for this visit.    Facility-Administered Medications Ordered in Other Visits:     0.9%  NaCl infusion, , Intravenous, Continuous, Josee Reid MD, Last Rate: 50 mL/hr at 11/03/22 1023, New Bag at 11/03/22 1023    heparin, porcine (PF) 100 unit/mL injection flush 300 Units, 300 Units, Intra-Catheter, PRN, Josee Reid MD    meperidine (PF) injection 25 mg, 25 mg, Intravenous, Q30 Min PRN, Josee Reid MD, 25 mg at 11/03/22 1022    promethazine (PHENERGAN) 12.5 mg in dextrose 5 % 50 mL IVPB, 12.5 mg, Intravenous, 1 time in Clinic/HOD, Josee Reid MD    sodium chloride 0.9% flush 10 mL, 10 mL, Intravenous, PRN, Josee Reid MD    All medications and past history have been reviewed.    IP CHOP + OP RITUXIMAB Q3W      Treatment Goal:   Curative      Status:   Active      Start Date:   11/1/2022      End Date:   2/16/2023 (Planned)      Provider:   Josee Reid MD      Chemotherapy:   DOXOrubicin chemo injection 112 mg, 50 mg/m2 = 112 mg, Intravenous, Every 24 hours (non-standard times), 1 of 6 cycles    Administration: 112 mg (11/1/2022)        vinCRIStine (ONCOVIN) 2 mg in sodium chloride 0.9% 50 mL chemo infusion, 2 mg (100 % of original dose 2 mg), Intravenous, Every 24 hours (non-standard times), 1 of 6 cycles    Dose modification: 2 mg (original dose 2 mg, Cycle 1, Reason: MD Discretion)    Administration: 2 mg (11/1/2022)        cyclophosphamide 750 mg/m2 = 1,660 mg in sodium chloride 0.9% 258.3 mL  chemo infusion, 750 mg/m2 = 1,660 mg, Intravenous, Every 24 hours (non-standard times), 1 of 6 cycles    Administration: 1,660 mg (11/1/2022)        riTUXimab-pvvr (RUXIENCE) 800 mg in sodium chloride 0.9% 800 mL infusion (conc: 1 mg/mL), 833 mg, Intravenous, Clinic/hospitals 1 time, 1 of 6 cycles    Administration: 800 mg (11/3/2022)    Objective:      Wt Readings from Last 20 Encounters:   11/03/22 96 kg (211 lb 9.6 oz)   11/02/22 96.7 kg (213 lb 3 oz)   11/01/22 96.9 kg (213 lb 10 oz)   11/01/22 96.9 kg (213 lb 10 oz)   10/31/22 93.4 kg (206 lb)   10/30/22 98.5 kg (217 lb 2.5 oz)   10/28/22 93.9 kg (207 lb)   10/20/22 97.7 kg (215 lb 6.2 oz)   10/20/22 96.9 kg (213 lb 9.6 oz)   10/11/22 97.3 kg (214 lb 8 oz)   10/06/22 96 kg (211 lb 10.3 oz)   10/03/22 97.5 kg (215 lb)   09/30/22 96.3 kg (212 lb 4.9 oz)   09/28/22 95.1 kg (209 lb 10.5 oz)   09/23/22 96.4 kg (212 lb 8.4 oz)   09/01/22 102.1 kg (225 lb 1.4 oz)   08/10/22 106.6 kg (235 lb)   01/10/22 110.8 kg (244 lb 3.2 oz)   12/28/21 111.6 kg (246 lb)   01/01/21 110 kg (242 lb 8.1 oz)       Last Labs:  Last Labs:  Glucose   Date Value Ref Range Status   11/03/2022 150 (H) 70 - 110 mg/dL Final   10/30/2022 111 (H) 70 - 110 mg/dL Final     BUN   Date Value Ref Range Status   11/03/2022 16 6 - 20 mg/dL Final   10/30/2022 13 6 - 20 mg/dL Final     Creatinine   Date Value Ref Range Status   11/03/2022 0.7 0.5 - 1.4 mg/dL Final   10/30/2022 0.9 0.5 - 1.4 mg/dL Final     Sodium   Date Value Ref Range Status   11/03/2022 137 136 - 145 mmol/L Final   10/30/2022 136 136 - 145 mmol/L Final     Potassium   Date Value Ref Range Status   11/03/2022 3.8 3.5 - 5.1 mmol/L Final   10/30/2022 4.0 3.5 - 5.1 mmol/L Final     Phosphorus   Date Value Ref Range Status   11/03/2022 3.6 2.7 - 4.5 mg/dL Final   09/26/2022 3.4 2.7 - 4.5 mg/dL Final     Calcium   Date Value Ref Range Status   11/03/2022 8.9 8.7 - 10.5 mg/dL Final   10/30/2022 8.8 8.7 - 10.5 mg/dL Final     No results found for:  PREALBUMIN  Total Protein   Date Value Ref Range Status   11/03/2022 7.6 6.0 - 8.4 g/dL Final   10/30/2022 7.7 6.0 - 8.4 g/dL Final     Cholesterol   Date Value Ref Range Status   08/10/2022 137 <200 mg/dL Final   12/28/2021 209 (H) <200 mg/dL Final     Hemoglobin A1C   Date Value Ref Range Status   10/03/2022 5.5 % Final   12/30/2021 11.0 (H) <5.7 % of total Hgb Final     Comment:     For someone without known diabetes, a hemoglobin A1c  value of 6.5% or greater indicates that they may have   diabetes and this should be confirmed with a follow-up   test.     For someone with known diabetes, a value <7% indicates   that their diabetes is well controlled and a value   greater than or equal to 7% indicates suboptimal   control. A1c targets should be individualized based on   duration of diabetes, age, comorbid conditions, and   other considerations.     Currently, no consensus exists regarding use of  hemoglobin A1c for diagnosis of diabetes for children.           Hemoglobin   Date Value Ref Range Status   11/03/2022 9.5 (L) 14.0 - 18.0 g/dL Final   11/01/2022 9.7 (L) 14.0 - 18.0 g/dL Final     Hematocrit   Date Value Ref Range Status   11/03/2022 29.9 (L) 40.0 - 54.0 % Final   11/01/2022 31.0 (L) 40.0 - 54.0 % Final     Iron   Date Value Ref Range Status   09/21/2022 19 (L) 45 - 160 ug/dL Final   09/14/2022 23 (L) 45 - 160 ug/dL Final     No components found for: FROLATE  No results found for: MXNOQTYY30RP  WBC   Date Value Ref Range Status   11/03/2022 15.17 (H) 3.90 - 12.70 K/uL Final   11/01/2022 8.55 3.90 - 12.70 K/uL Final       Assessment:     Nutrition/Diet History     Patient Reported Diet/Restrictions/Preferences: regular diet  Food Allergies: NKFA  Factors Affecting Nutritional Intake: decreased appetite    Estimated/Assessed Needs     Weight Used For Calorie Calculations: 96 kg (211 lb)  Energy Calorie Requirements (kcal): 3407-6551 kcal/day   Energy Need Method: 20-25 Kcal/kg  Protein Requirements:  115-144 g/day   Protein Need Method: 1.2-1.5 g/kg  Fluid Requirements: 2000 ml/day  Estimated Fluid Requirement Method: 1ml/kcal      Nutrition Support  N/A    Evaluation of Received Nutrient/Fluid Intake     Calorie Intake: meeting needs  Protein Intake: meeting needs  Fluid Intake: not meeting needs  Tolerance: tolerating  % Intake of Estimated Energy Needs: 50- 75%      Nutrition Diagnosis Related to (Etiology) As Evidenced By (Signs/Symptoms)   Inadequate energy intake Decrease ability to consume sufficient energy Decreased appetite; unintentional weight loss of 4# in less 1 month     RD Notes  Mr. Dwight Hoffmann is a 51y/o male recently diagnosed with lymphoma s/p small bowel resection. Pt will be starting chemotherapy with RCHOP. RD emt with pt and his wife for chemo school today. Pt reports decreased appetite and intake. On a good day he eats two small meals per day. Pt's wife reports he drinks approximately 4oz of water per day and sips of gatorade. He is not currently supplementing with Boost or Ensure. He does have Premier Protein shakes at home but doesn't drink them. CW: 211# Noted 4# weight loss in 1 month (LW: 215# on 10/3)      Nutrition Intervention:      Nutrition Intervention Energy-modified diet, Protein-modified diet, and Schedule of food/fluids   Goals/Expected Outcomes Initiate high protein/calorie diet with small meals/snacks q 2-3hrs to meet estimated nutritional needs and prevent further weight loss   Progress Initial     Nutrition Intervention Medical food supplement: Commercial beverage   Goals/Expected Outcomes Initiate high calorie ONS 1-2x/day to assist in meeting estimated nutritional needs   Progress Initial     Plan  Provided chemo school packet and reviewed with patient  Discussed importance of weight and hydration maintenance to prepare for treatment start  Reviewed current food safety recommendations during chemotherapy  Recommended high protein, high calorie diet with small,  frequent meals and snacks  Discussed alternate forms of hydration  Recommended high calorie ONS 1-2x/d. Provided coupons  Provided RD contact info. Encouraged pt to call with questions or concerns    Monitoring/Evaluation:     Monitor: po intake, weight, BMs    Next Visit: f/u as needed      I have explained and the patient understands all of  the current recommendation(s). I have answered all of their questions to the best of my ability and to their complete satisfaction.   The patient is to continue with the current management plan.    Electronically signed by: Meghan Valderrama MBA, AIDAN, LDN

## 2022-11-03 NOTE — PROGRESS NOTES
Rituximab-pvvr dose rounded from 833 mg to 800 mg (within 5% for chemotherapy and 10% for monoclonal antibodies) per rounding protocol; original order 375 mg/m2

## 2022-11-03 NOTE — NURSING
"Patient arrived this morning for C1D3 Ruxience infusion. Patient stated, "I don't feel well" and grabbed the garbage can. Pt began to vomit large amount of bile/food. Wife reports patient ate a donut this morning and took his pain medication and phenergan. RN sent message to MD Reid for additional order. Charge nurse DANE Carcamo, RN notified.  Cold rag given to patient. New orders received for Zofran IVPB.     0836 Ruxience infusion initiated. Patient states he is feeling better and his nausea resolved. His stomach still feels slightly achy. Pt now slowly sipping on sprite and requested crackers to put something in his stomach. Will continue to monitor.     1016 Ruxience rate increased to 200mL/h  1018: Infusion stopped.Rigors noted. Additional blankets applied. IVF infusing. Charge nurse DANE Carcamo RN notified.   1019: 112/70 hr: 119 O2:99%  Dr. Reid notified, PRN demerol released  1021: 107/79  O2: 99%  1022:Demerol given  1034: Dr. Reid ordered 125mg Solumedrol  1036: Solumedrol pushed   1105: Patient vomiting bile. Cold rag applied. /69 . MD Reid notified and new orders received for Phenergan 12.5 mg IVPB. Pharmacy notified.   1115: Patient states symptoms resolved. Will monitor patient for 30 min per MD Reid and then restart Ruxience at 75 ml/hr. /71 .  1140: Ruxience restarted at 75 ml/hr.  1210: Patient asleep and appears comfortable. /68  Temp 100.2 oral. Blankets removed from patient.   1238: Temp improved, now 98.7, pt tolerating infusion. Will continue to monitor.   1500: Infusion complete. Pt tolerated remainder of infusion.   "

## 2022-11-03 NOTE — NURSING
1016 Ruxience rate increased to 200mL/h  1018: Infusion stopped  1019: 112/70 hr: 119 O2:99%  Dr. Reid notified, PRN demerol released  1021: 107/79  O2: 99%  1022:Demerol given  1034: Dr. Reid ordered 125mg Solumedrol  1036: Solumedrol pushed

## 2022-11-03 NOTE — PLAN OF CARE
Problem: Fatigue  Goal: Improved Activity Tolerance  Outcome: Ongoing, Progressing  Intervention: Promote Improved Energy  Flowsheets (Taken 11/3/2022 1833)  Fatigue Management: frequent rest breaks encouraged  Sleep/Rest Enhancement:   regular sleep/rest pattern promoted   relaxation techniques promoted  Activity Management: Ambulated -L4

## 2022-11-04 NOTE — TELEPHONE ENCOUNTER
----- Message from Josee Reid MD sent at 11/3/2022 10:14 PM CDT -----  Regarding: RE: Itching  Sent thorazine to [facility] pharmacy yes to the other meds OTC  ----- Message -----  From: Renetta Hernandez MA  Sent: 11/2/2022   2:00 PM CDT  To: Josee Reid MD  Subject: Itching                                          Pt spouse would like to know if Benadryl is okay to take for itching, if he can have something for the hiccups like Thorazine, and lastly if Chlorhexidine can be used to prevent mouth rash?

## 2022-11-04 NOTE — PROGRESS NOTES
"Subjective:       Patient ID: Dwight Hoffmann is a 52 y.o. male.    Chief Complaint:   HPI  52 year old male with a history of NIDDM2 and HLD who presents today with complaints of night sweats for weeks. It is moderate. It is associated with 25 lb wt loss over the last 2 months, urinary hesitancy, urinary frequency, occasional blood streaked stools, weakness, fatigue, neck pain, knee pain, and pelvic pain. He denies measured fever, chills, N/V/D, chest pain, or SOB. He was seen by his primary last month about the blood streaked stools and referred to Dr. Clemens for colonoscopy on 9/1 with multiple polypectomies with path report of tubular adenomas. In the ED, labs revealed microcytic anemia, he was mildly tachycardic on arrival  which improved spontaneously, /60,  and CT abd/pelvis revealing: "Thick-walled collection containing feculent material, widely communicating with small bowel, seen centrally in the pelvis. Findings are suspicious for necrotic mass or abscess arising from the small bowel  Surgery was consulted and performed exploratory laparotomy 9/14/22 with resection of the mass and anastomosis of small bowel. The mass appeared to be an abscess with surrounding necrosis.  Patient with signs of sepsis including tachycardic and leukocytosis which could be related to surgery as well which complicated his picture however he was covered for this with with IV antibiotics. An NG tube to LIWS suction was started and the patient was put on IV fluids while he was NPO. Briefly on PPN. He did have a positive blood culture which showed coagulase negative Staph suspected to be a contaminant. He was briefly on vancomycin which was discontinued. ID consulted.  Patient was put on meropenem and fluconazole.  Cultures from surgery with Enterobacter cloacae and Proteus mirabilis.  His bowel function slowly returned, and he no longer needed NGT to suction.  The pathologic diagnosis on the mass was diffuse large B " cell lymphoma.  Oncology service was asked to consult  Patient underwent bone marrow biopsy 9/22/22 for staging.  CT of chest showed no enlarged lymph nodes. Did have another laparoscopy 9/23/22 due to abdominal fluid collection with cleanout thought to be more necrotic than infective. ID planned for outpatient ertapenem infusions to complete 2 week course  Review of Systems    +abdominal pain,post op , still has dressing, bloating and constipated last bm 1 weeks ago   Lack of appetite, pain in abd, gas+   Healed well from surgery  No fever, no sob, feels tired    Denies seizure activity or focal weaknesses or symptoms related to TIA, no head aches or blurred vision reported  Denies issues with skin rash or bruising  Denies issues with swelling of feet, tingling or numbness   +issues with sleep,  Feelig anxious   Good family support reported         Past Medical History:   Diagnosis Date    Diabetes mellitus     Digestive disorder     Prediabetes      Past Surgical History:   Procedure Laterality Date    APPENDECTOMY  07/15/2014    COLONOSCOPY      COLONOSCOPY N/A 2/9/2022    Procedure: COLONOSCOPY;  Surgeon: Manuel Sanders MD;  Location: [facility];  Service: Endoscopy;  Laterality: N/A;    COLONOSCOPY N/A 9/1/2022    Procedure: COLONOSCOPY;  Surgeon: Andrea Clemens MD;  Location: [facility];  Service: Endoscopy;  Laterality: N/A;    INCISION AND DRAINAGE OF ABDOMEN N/A 9/14/2022    Procedure: INCISION AND DRAINAGE, ABDOMEN;  Surgeon: Jan Oliveira Jr., MD;  Location: [facility];  Service: General;  Laterality: N/A;    INSERTION OF TUNNELED CENTRAL VENOUS CATHETER (CVC) WITH SUBCUTANEOUS PORT N/A 10/31/2022    Procedure: [id number]-CATH;  Surgeon: Jan Oliveira Jr., MD;  Location: [facility] OR;  Service: General;  Laterality: N/A;    LAPAROSCOPIC DRAINAGE OF ABDOMEN N/A 9/23/2022    Procedure: DRAINAGE, ABDOMEN, LAPAROSCOPIC;  Surgeon: Jan Oliveira Jr., MD;  Location: [facility];  Service:  General;  Laterality: N/A;     Family History   Problem Relation Age of Onset    Cancer Mother         Colon    Diabetes Mother     Hypertension Mother     Seizures Father     Heart murmur Sister     Seizures Sister       Social History     Socioeconomic History    Marital status:     Number of children: 2   Occupational History    Occupation: [employer]   Tobacco Use    Smoking status: Never    Smokeless tobacco: Never   Substance and Sexual Activity    Alcohol use: Not Currently     Comment: occasional    Drug use: No    Sexual activity: Yes     Partners: Female     Social Determinants of Health     Financial Resource Strain: Low Risk     Difficulty of Paying Living Expenses: Not hard at all   Food Insecurity: No Food Insecurity    Worried About Running Out of Food in the Last Year: Never true    Ran Out of Food in the Last Year: Never true   Transportation Needs: No Transportation Needs    Lack of Transportation (Medical): No    Lack of Transportation (Non-Medical): No   Physical Activity: Inactive    Days of Exercise per Week: 0 days    Minutes of Exercise per Session: 0 min   Stress: No Stress Concern Present    Feeling of Stress : Only a little   Social Connections: Moderately Isolated    Frequency of Communication with Friends and Family: Twice a week    Frequency of Social Gatherings with Friends and Family: Twice a week    Attends Temple Services: Never    Active Member of Clubs or Organizations: No    Attends Club or Organization Meetings: Never    Marital Status:    Housing Stability: Low Risk     Unable to Pay for Housing in the Last Year: No    Number of Places Lived in the Last Year: 1    Unstable Housing in the Last Year: No     Review of patient's allergies indicates:  No Known Allergies    Current Outpatient Medications:     allopurinoL (ZYLOPRIM) 100 MG tablet, Take 1 tablet (100 mg total) by mouth once daily., Disp: 90 tablet, Rfl: 3    blood sugar diagnostic Strp, To check BG 2  times daily, to use with insurance preferred meter, Disp: 200 each, Rfl: 1    blood-glucose meter kit, To check BG 2 times daily, to use with insurance preferred meter, Disp: 1 each, Rfl: 0    chlorproMAZINE (THORAZINE) 25 MG tablet, Take 1 tablet (25 mg total) by mouth 3 (three) times daily., Disp: 90 tablet, Rfl: 11    ciprofloxacin HCl (CIPRO) 500 MG tablet, Take 1 tablet (500 mg total) by mouth once daily., Disp: 30 tablet, Rfl: 0    clotrimazole (LOTRIMIN) 1 % cream, Apply topically 2 (two) times daily., Disp: 28 g, Rfl: 0    HYDROcodone-acetaminophen (NORCO) 5-325 mg per tablet, Take 1 tablet by mouth every 6 (six) hours as needed for Pain., Disp: 90 tablet, Rfl: 0    lancets Misc, To check BG 2 times daily, to use with insurance preferred meter, Disp: 200 each, Rfl: 1    ondansetron (ZOFRAN-ODT) 4 MG TbDL, Take 1 tablet (4 mg total) by mouth every 6 (six) hours as needed., Disp: 90 tablet, Rfl: 2    predniSONE (DELTASONE) 20 MG tablet, Take 100 mg by mouth once daily., Disp: , Rfl:     promethazine (PHENERGAN) 12.5 MG Tab, Take 1 tablet (12.5 mg total) by mouth every 4 (four) hours as needed., Disp: 30 tablet, Rfl: 3  No current facility-administered medications for this visit.    Physical Exam    Wt Readings from Last 3 Encounters:   11/04/22 98.4 kg (216 lb 14.9 oz)   11/03/22 96 kg (211 lb 9.6 oz)   11/02/22 96.7 kg (213 lb 3 oz)     Temp Readings from Last 3 Encounters:   11/04/22 96.8 °F (36 °C) (Temporal)   11/03/22 97.9 °F (36.6 °C)   11/02/22 97.5 °F (36.4 °C)     BP Readings from Last 3 Encounters:   11/04/22 126/71   11/03/22 108/67   11/02/22 104/67     Pulse Readings from Last 3 Encounters:   11/04/22 86   11/03/22 110   11/02/22 83      VITAL SIGNS:  as above   GENERAL: appears well-built, well-nourished.  No anxiety, no agitation, and in no distress.  Patient is awake, alert, oriented and cooperative.  HEENT:  Showed no congestion. Trachea is central no obvious icterus or pallor noted no  hoarseness. no obvious JVD   NECK:  Supple.  No JVD. No obvious cervical submental or supraclavicular adenopathy.  RS:the visualized portion of  Chest expands well. chest appears symmetric, no audible wheezes.  No dyspnea recognized  ABDOMEN:  abdomen appears  slightly distended, sx scar with dressing  EXTREMITIES:  Without edema.  NEUROLOGICAL:  The patient is appropriate, higher functions are normal.  No  obvious neurological deficits.  normal judgement normal thought content  No confusion, no speech impediment. Cranial nerves are intact and show no deficit. No gross motor deficits noted   SKIN MUSCULOSKELETAL: no joint or skeletal deformity, no clubbing of nails.  No visible rash ecchymosis or petechiae   Lab Results   Component Value Date    WBC 15.17 (H) 11/03/2022    HGB 9.5 (L) 11/03/2022    HCT 29.9 (L) 11/03/2022    MCV 78 (L) 11/03/2022     11/03/2022       BMP  Lab Results   Component Value Date     11/03/2022    K 3.8 11/03/2022     11/03/2022    CO2 25 11/03/2022    BUN 16 11/03/2022    CREATININE 0.7 11/03/2022    CALCIUM 8.9 11/03/2022    ANIONGAP 7 (L) 11/03/2022    ESTGFRAFRICA 111 12/28/2021    EGFRNONAA 96 12/28/2021     Lab Results   Component Value Date    IRON 19 (L) 09/21/2022    TIBC 241 (L) 09/21/2022    FERRITIN 655 (H) 09/21/2022 9/26/22  Final Pathologic Diagnosis 1. Small bowel, resection:  Diffuse large B-cell lymphoma, non-germinal   center origin.  Final classification is pending C-MYC rearrangement analysis   result.  See comment.   2. Omentum, resection: Diffuse large B-cell lymphoma, non-germinal center   origin.  Final classification is pending C-MYC rearrangement analysis result.    Flow cytometry analysis of tissue was not performed.   Immunohistochemical studies were performed on the paraffin embedded tissue   block 1 C with adequate positive and negative controls.  The atypical   lymphocytes are positive for CD20,  BCL6, MUM1, MYC, high Ki-67 (99%) and are    negative for CD10, CD30, BCL-2, cyclin D1, .  Reactive T-cells are CD3   positive and BCL-2 positive.  In Situ hybridization for EBV is negative.   Findings are consistent with diffuse large B-cell lymphoma, non germinal   center origin.  Final classification is pending C-MYC rearrangement analysis   by FISH.  A supplemental report will follow  9/22  The left ventricle is normal in size with concentric remodeling and normal systolic function.  The estimated ejection fraction is 60%.  Grade I left ventricular diastolic dysfunction.  Normal right ventricular size with normal right ventricular systolic function.  Mild left atrial enlargement.  Mild mitral regurgitation.  Mild to moderate tricuspid regurgitation.  Normal central venous pressure (3 mmHg).  The estimated PA systolic pressure is 49 mmHg.  There is pulmonary hypertension.  Mild right atrial enlargement.  No vegetations were seen  9/22/22   Bone marrow, right iliac crest, aspirate, clot and core biopsy:   --Normocellular marrow for age ranging from 30-70% with trilineage   hematopoiesis, see comment   --No increase in blasts by aspirate count and CD34 immunohistochemical stain,   see comment   --Increased megakaryocytes with atypia, see comment   --No morphologic evidence of metastatic lymphoma   --Stainable iron is not increased, see comment   --Minimal to mild reticulin fibrosis   Comment:  Concomitantly submitted flow cytometric analysis detects a tight   cluster of myeloid blasts.   The blast gate is mildly increased with a tight   cluster of blasts present showing coexpression of CD13 and CD34 with   expression of cytoplasmic myeloperoxidase.  The tight   cluster represents approximately 6% of total events.   Lymphocytes are   composed of a mixture of  polyclonal B lymphocytes and T lymphocytes that are   immunophenotypically unremarkable.   This marrow shows overall normocellular marrow for age with no morphologic   evidence of metastatic  lymphoma.  There is some atypia of the megakaryocytic   lineage as well as a tight cluster of CD34/CD13 positive blasts detected by   flow cytometry, although increased blasts are not appreciated on the aspirate   smear or by CD34 immunohistochemical stain.  By CD61 immunohistochemical   stain, megakaryocytes appear mildly increased without significant clustering   appreciated.  Overt morphologic dysplasia of the myeloid and erythroid   lineages is not appreciated.  These findings are abnormal, and a   myelodysplastic or myeloproliferative process can not be completely ruled   out.  Therefore, correlation with any available cytogenetic and molecular   studies is recommended including studies to rule out myeloproliferative   neoplasms.  If chromosomes are normal, next generation sequencing could be   considered.       Summary echo 10/6/22    The left ventricle is normal in size with concentric remodeling and normal systolic function.  The estimated ejection fraction is 60%.  Grade I left ventricular diastolic dysfunction.  Normal right ventricular size with normal right ventricular systolic function.  Mild left atrial enlargement.  Mild mitral regurgitation.  Mild to moderate tricuspid regurgitation.  Normal central venous pressure (3 mmHg).  The estimated PA systolic pressure is 49 mmHg.  There is pulmonary hypertension.  Mild right atrial enlargement.  No vegetations were seen     IMPRESSION:pet 10/14/22     Large multiloculated necrotic soft tissue mass in the central abdomen extending into the pelvis which is markedly hypermetabolic. This is consistent with the provided history of lymphoproliferative malignancy.     Numerous additional hypermetabolic soft tissue masses in abdomen, also suspicious for lymphoproliferative malignancy.     Mild diffuse osseous FDG hypermetabolism which could be on the basis of red marrow reconversion or lymphoproliferative malignancy. Please correlate with bone marrow biopsy  results.     Mild asymmetric right palatine tonsillar FDG activity, nonspecific. Attention on follow-up is recommended.  Patient Active Problem List   Diagnosis    Small bowel mass    Anemia, chronic disease    Diabetes mellitus type 2, noninsulin dependent    Arthralgia of bilat knees and neck    Moderate malnutrition    Intra-abdominal abscess    S/P exploratory laparotomy    S/P small bowel resection    Sepsis    DLBCL (diffuse large B cell lymphoma)    After care        Assessment and Plan     DLBCL:bone marrow bx  neg for lymphoma with tight cluster of blasts 6%( smal number)  positive for CD20,  BCL6, MUM1, MYC, high Ki-67 (99%)   -negative for CD10, CD30, BCL-2, cyclin D1, .  Reactive T-cells are CD3   --FISH negative for rearrangement of MYC ; no IGH/MYC fusion was observed  uric acid  7.3 sent allopurinol  HBV, HCV, HIV serologies; negative  PET CT  10/22 as above  --IPI pending; might require IT chemotherapy based on IPI  --discussed treatment with [facility], as it is still the standard of care for n on GC DLBCL  --plan for 6 cycles,   Counts checked , see make next week with cbc, cmp, ldh, uric acid with possible RT to areas of bulky disease   See me on Friday cbc, cmp, uric acid, phosphorus magnesium ldh schedule cycle 2  . T2DM not on long term insulin     --on metformin , follows with his PCP        3. Depression     --he denies suicidal ideation  --he has upcoming appointment with psychiatry           4. Abnormal BM biopsy     --tight cluster of myeloid blasts approximating 6% was found on BM done on 9/22/22  --significance un clear  --no dysplastic changes  --cytogenetics normal  --will require repeat BM biopsy after chemotherapy

## 2022-11-04 NOTE — TELEPHONE ENCOUNTER
Crp  and Procal are elevated      I called patient.   He is doing well. No signs and symptoms of infection.   I dw patient and wife to pay attn  and measure temp

## 2022-11-06 PROBLEM — D50.9 IRON DEFICIENCY ANEMIA, UNSPECIFIED: Status: ACTIVE | Noted: 2022-01-01

## 2022-11-06 PROBLEM — K62.5 RECTAL BLEEDING: Status: ACTIVE | Noted: 2022-01-01

## 2022-11-06 PROBLEM — E78.5 HYPERLIPIDEMIA, UNSPECIFIED: Status: ACTIVE | Noted: 2022-01-01

## 2022-11-06 PROBLEM — D61.818 PANCYTOPENIA: Status: ACTIVE | Noted: 2022-01-01

## 2022-11-06 NOTE — ASSESSMENT & PLAN NOTE
Patient is not chronically on statin.will not continue for now. Monitor clinically. Last LDL was   Lab Results   Component Value Date    LDLCALC 96 08/10/2022

## 2022-11-06 NOTE — ASSESSMENT & PLAN NOTE
Keep patient NPO.  Follow H/H closely. Type and screen blood and transfuse as needed.  Protonix BID  Consult Gastronetrologist.     Continue IVF hydration.   Use IV anti-emetics as needed.

## 2022-11-06 NOTE — ED PROVIDER NOTES
Encounter Date: 11/6/2022       History     Chief Complaint   Patient presents with    Rectal Bleeding     Has had 2 episodes of bright red blood     HPI 52-year-old man who presents emergency department for evaluation of worsening abdominal pain and new onset rectal bleeding.  Patient has a history of diffuse B-cell lymphoma with abdominal masses.  He recently underwent his 1st chemotherapy and states his abdominal pain had significantly improved afterwards but recently returned and has worsened.  Tonight he was having a bowel movement when and he noticed a large amount of bright red blood produced in the toilet.  He denies any dizziness or weakness.  Denies any fever.  Review of patient's allergies indicates:  No Known Allergies  Past Medical History:   Diagnosis Date    Cancer     Diabetes mellitus     Digestive disorder     Prediabetes      Past Surgical History:   Procedure Laterality Date    APPENDECTOMY  07/15/2014    COLONOSCOPY      COLONOSCOPY N/A 02/09/2022    ERROR.   He did not have a colonoscopy with Dr. Horta.    COLONOSCOPY N/A 09/01/2022    Procedure: COLONOSCOPY;  Surgeon: Andrea Clemens MD;  Location: [facility];  Service: Endoscopy;  Laterality: N/A;    INCISION AND DRAINAGE OF ABDOMEN N/A 09/14/2022    Procedure: INCISION AND DRAINAGE, ABDOMEN;  Surgeon: Jan Oliveira Jr., MD;  Location: [facility];  Service: General;  Laterality: N/A;    INSERTION OF TUNNELED CENTRAL VENOUS CATHETER (CVC) WITH SUBCUTANEOUS PORT N/A 10/31/2022    Procedure: [id number];  Surgeon: Jan Oliveira Jr., MD;  Location: [facility] OR;  Service: General;  Laterality: N/A;    LAPAROSCOPIC DRAINAGE OF ABDOMEN N/A 09/23/2022    Procedure: DRAINAGE, ABDOMEN, LAPAROSCOPIC;  Surgeon: Jan Oliveira Jr., MD;  Location: [facility] OR;  Service: General;  Laterality: N/A;     Family History   Problem Relation Age of Onset    Cancer Mother         Colon    Diabetes Mother     Hypertension Mother     Seizures  Father     Heart murmur Sister     Seizures Sister      Social History     Tobacco Use    Smoking status: Never    Smokeless tobacco: Never   Substance Use Topics    Alcohol use: Not Currently     Comment: occasional    Drug use: No     Review of Systems   Constitutional:  Positive for fatigue. Negative for fever.   HENT:  Negative for sore throat.    Respiratory:  Negative for shortness of breath.    Cardiovascular:  Negative for chest pain.   Gastrointestinal:  Positive for abdominal pain and blood in stool. Negative for nausea.   Genitourinary:  Negative for dysuria.   Musculoskeletal:  Negative for back pain.   Skin:  Negative for rash.   Neurological:  Negative for weakness.   Hematological:  Does not bruise/bleed easily.     Physical Exam     Initial Vitals [11/06/22 0137]   BP Pulse Resp Temp SpO2   129/79 86 18 98.3 °F (36.8 °C) 99 %      MAP       --         Physical Exam    Constitutional: Vital signs are normal. He appears well-developed and well-nourished.  Non-toxic appearance. No distress.   HENT:   Head: Normocephalic and atraumatic.   Eyes: EOM are normal. Pupils are equal, round, and reactive to light.   Neck: Neck supple. No JVD present.   Normal range of motion.  Cardiovascular:  Normal rate, regular rhythm, normal heart sounds and intact distal pulses.     Exam reveals no gallop and no friction rub.       No murmur heard.  Pulmonary/Chest: Breath sounds normal. He has no wheezes. He has no rhonchi. He has no rales.   Abdominal: Abdomen is soft. Bowel sounds are normal. There is abdominal tenderness in the right upper quadrant and epigastric area. There is no rebound and no guarding.   Musculoskeletal:         General: Normal range of motion.      Cervical back: Normal range of motion and neck supple. No rigidity.     Neurological: He is alert and oriented to person, place, and time. He has normal strength and normal reflexes. No cranial nerve deficit or sensory deficit. He exhibits normal muscle  tone. Coordination normal. GCS eye subscore is 4. GCS verbal subscore is 5. GCS motor subscore is 6.   Skin: Skin is warm and dry.   Psychiatric: He has a normal mood and affect. His speech is normal and behavior is normal. He is not actively hallucinating.       ED Course   Procedures  Labs Reviewed   CBC W/ AUTO DIFFERENTIAL - Abnormal; Notable for the following components:       Result Value    WBC 2.83 (*)     RBC 3.50 (*)     Hemoglobin 8.6 (*)     Hematocrit 26.9 (*)     MCV 77 (*)     MCH 24.6 (*)     RDW 20.6 (*)     Platelets 110 (*)     Immature Granulocytes 0.7 (*)     Lymph # 0.4 (*)     Mono # 0.1 (*)     Gran % 82.4 (*)     Lymph % 14.8 (*)     Mono % 2.1 (*)     All other components within normal limits   COMPREHENSIVE METABOLIC PANEL - Abnormal; Notable for the following components:    Glucose 289 (*)     Calcium 8.5 (*)     Albumin 2.7 (*)     All other components within normal limits   TYPE & SCREEN          Imaging Results              CT Abdomen Pelvis With Contrast (Final result)  Result time 11/06/22 07:37:04      Final result by Sussy Call MD (11/06/22 07:37:04)                   Impression:      In this patient with a known lymphoma the abdominal mass and satellite lesions appears similar to what was seen on CT from 10/30/2022.  There is no evidence acute change since prior exam of 10/30/2022.      Electronically signed by: Sussy Call MD  Date:    11/06/2022  Time:    07:37               Narrative:    EXAMINATION:  CT ABDOMEN PELVIS WITH CONTRAST    CLINICAL HISTORY:  GI bleed;    TECHNIQUE:  Low dose axial images, sagittal and coronal reformations were obtained from the lung bases to the pubic symphysis following the IV administration of 100 mL of Omnipaque 350 and the oral administration of 30 mL of Omnipaque 350.    COMPARISON:  10/30/2022    FINDINGS:  There is a 10 cm enhancing mass seen within the mid abdomen with irregular contour.  On previous exam from 10/30/2022 this  measured 10 cm in similar location.  Exact measurements are difficult given the irregular shape.  Multiple other smaller satellite lesions are visualized similar to what was seen on prior exam.    There is no evidence bowel obstruction.  There is sigmoid diverticulosis.  Patient is status post appendectomy.    There is no evidence of abdominal nor pelvic lymphadenopathy.  The osseous structures are unremarkable.    There is no evidence bowel obstruction.  The patient is status post appendectomy.  The osseous structures are unremarkable.                                       Medications   HYDROmorphone (PF) injection 1.5 mg (1.5 mg Intravenous Not Given 11/6/22 0230)   pantoprazole injection 40 mg (40 mg Intravenous Given 11/6/22 0853)   0.9%  NaCl infusion ( Intravenous Verify Only 11/6/22 1726)   glucagon (human recombinant) injection 1 mg (has no administration in time range)   dextrose 10% bolus 125 mL (has no administration in time range)   dextrose 10% bolus 250 mL (has no administration in time range)   insulin aspart U-100 pen 1-10 Units (has no administration in time range)   allopurinoL tablet 100 mg (100 mg Oral Given 11/6/22 0853)   chlorproMAZINE tablet 25 mg (has no administration in time range)   ciprofloxacin HCl tablet 500 mg (500 mg Oral Given 11/6/22 0853)   sodium phosphates 19-7 gram/118 mL enema 1 enema (has no administration in time range)   iohexoL (OMNIPAQUE 350) 350 mg iodine/mL injection (100 mLs Intravenous Given 11/6/22 0250)     Medical Decision Making:   History:   Old Medical Records: I decided to obtain old medical records.  Initial Assessment:   52-year-old man presents emergency department for recurrent worsening hematochezia.  Known large B-cell lymphoma with abdominal masses.  He has a history of anemia is hemoglobin appears to be stable at this time, he is not hypotensive or tachycardic.  He does have evidence of ongoing GI bleed with about 40 mL of bright red hematochezia  produce in the ED. he is high risk for continued bleeding and is in need of sigmoidoscopy/colonoscopy and trending his H&H.  He refused pain medicine in the ED despite endorsing that he had worsening abdominal pain.  CT scan with worsening tumor burden but no acute surgical issues.  Discussed Hospital Medicine agrees to admit the patient for GI consult.           ED Course as of 11/06/22 1916   Sun Nov 06, 2022   0350 Patient felt the need to have a bowel movement and produce approximately 40 mL bright red blood. [AS]      ED Course User Index  [AS] Otis Joiner MD                 Clinical Impression:   Final diagnoses:  [K62.5] Rectal bleeding (Primary)      ED Disposition Condition    Observation Stable                Otis Joiner MD  11/06/22 1916

## 2022-11-06 NOTE — HPI
Dwight Hoffmann is a 52 year old male with a past medical history of HLD and Diffuse B cell lymphoma with abdominal masses, who presented to the ED with rectal bleeding. He states he recently underwent his first chemotherapy treatment and experienced relief of his abdominal pain. He has been pain free but constipated, and has been taking Miralax. The pain returned a day or so ago, and tonight he experienced a hard bowel movement with bright red rectal bleeding. He reports some upper abdominal pain, mainly to the right upper quadrant and epigastric region. He denies nausea, vomiting, diarrhea or other complaint.     ED work up included a CBC that revealed pancytopenia. CMP shows hyperglycemia, which the patient contributes to recent steroid use while getting CHOP procedure. CT of the abdomen and pelvis with contrast revealed large central abdominal mass correlating with known non-Hodgkin's lymphoma. Overall mild  decrease in mass burden compared with prior. Hospital Medicine consulted for admission and further management.

## 2022-11-06 NOTE — PLAN OF CARE
[facility]  Initial Discharge Assessment       Primary Care Provider: BOSTON Oswald    Admission Diagnosis: Rectal bleeding [K62.5]    Admission Date: 11/6/2022  Expected Discharge Date:     Discharge Barriers Identified: None    Payor: MEDICAID / Plan: [employer] / Product Type: Managed Medicaid /     Extended Emergency Contact Information  Primary Emergency Contact: Sundar Echevarria   [place]  Mobile Phone: [phone]  Relation: Spouse    Discharge Plan A: Home Health  Discharge Plan B: Home with family      [facility] - [place], [place] - [address]  [address]  [place] [address]  Phone: [phone] Fax: [phone]    [facility] - [place] - [address]  [address] [facility] [address]  Phone: [phone] Fax: [phone]    SW met with patient at bedside to complete discharge planning assessment.  Patient alert and oriented xs 4.  Patient verified all demographic information on facesheet is correct.  Patient verified PCP is BOSTON Wood.  Patient [place]  Patient active with [facility] but has no DME.  Patient choice completed to resume home health with [facility].  Patient with NO POA or Living Will.  Patient not on dialysis or medication coumadin.  Patient with no 30 day admission.  Patient with no financial issues at this time.  Patient family will provide transportation upon discharge from facility.  Patient independent with ADLs, live with spouse, drives self.      Initial Assessment (most recent)       Adult Discharge Assessment - 11/06/22 1712          Discharge Assessment    Assessment Type Discharge Planning Assessment     Confirmed/corrected address, phone number and insurance Yes     Confirmed Demographics Correct on Facesheet     Source of Information patient     Does patient/caregiver understand observation status Yes      Communicated CATRACHO with patient/caregiver Yes     Lives With spouse     Facility Arrived From: home     Do you expect to return to your current living situation? Yes     Do you have help at home or someone to help you manage your care at home? Yes     Who are your caregiver(s) and their phone number(s)? spouse     Prior to hospitilization cognitive status: Alert/Oriented     Current cognitive status: Alert/Oriented     Walking or Climbing Stairs Difficulty none     Dressing/Bathing Difficulty none     Equipment Currently Used at Home none     Readmission within 30 days? No     Patient currently being followed by outpatient case management? No     Do you currently have service(s) that help you manage your care at home? Yes     Name and Contact number of agency [facility]     Is the pt/caregiver preference to resume services with current agency Yes     Do you take prescription medications? Yes     Do you have prescription coverage? Yes     Do you have any problems affording any of your prescribed medications? No     Is the patient taking medications as prescribed? yes     Who is going to help you get home at discharge? spouse     How do you get to doctors appointments? car, drives self     Are you on dialysis? No     Do you take coumadin? No     Discharge Plan A Home Health     Discharge Plan B Home with family     DME Needed Upon Discharge  none     Discharge Plan discussed with: Patient     Discharge Barriers Identified None

## 2022-11-06 NOTE — ED NOTES
Pt AAO x 3, no distress noted. Vitals stable. Updated pt and pt family with care, verbalized understanding.

## 2022-11-06 NOTE — H&P
[facility] Medicine  History & Physical    Patient Name: Dwight Hoffmann  MRN: [id number]  Patient Class: OP- Observation  Admission Date: 11/6/2022  Attending Physician: Sanjiv Eric MD   Primary Care Provider: BOSTON Oswald         Patient information was obtained from patient, spouse/SO, past medical records and ER records.     Subjective:     Principal Problem:Rectal bleeding    Chief Complaint:   Chief Complaint   Patient presents with    Rectal Bleeding     Has had 2 episodes of bright red blood        HPI: Dwight Hoffmann is a 52 year old male with a past medical history of HLD and Diffuse B cell lymphoma with abdominal masses, who presented to the ED with rectal bleeding. He states he recently underwent his first chemotherapy treatment and experienced relief of his abdominal pain. He has been pain free but constipated, and has been taking Miralax. The pain returned a day or so ago, and tonight he experienced a hard bowel movement with bright red rectal bleeding. He reports some upper abdominal pain, mainly to the right upper quadrant and epigastric region. He denies nausea, vomiting, diarrhea or other complaint.     ED work up included a CBC that revealed pancytopenia. CMP shows hyperglycemia, which the patient contributes to recent steroid use while getting CHOP procedure. CT of the abdomen and pelvis with contrast revealed large central abdominal mass correlating with known non-Hodgkin's lymphoma. Overall mild  decrease in mass burden compared with prior. Hospital Medicine consulted for admission and further management.           Past Medical History:   Diagnosis Date    Cancer     Diabetes mellitus     Digestive disorder     Prediabetes        Past Surgical History:   Procedure Laterality Date    APPENDECTOMY  07/15/2014    COLONOSCOPY      COLONOSCOPY N/A 2/9/2022    Procedure: COLONOSCOPY;  Surgeon: Manuel Sanders MD;  Location: [facility];  Service: Endoscopy;   Laterality: N/A;    COLONOSCOPY N/A 9/1/2022    Procedure: COLONOSCOPY;  Surgeon: Andrea Clemens MD;  Location: [facility] ENDO;  Service: Endoscopy;  Laterality: N/A;    INCISION AND DRAINAGE OF ABDOMEN N/A 9/14/2022    Procedure: INCISION AND DRAINAGE, ABDOMEN;  Surgeon: Jan Oliveira Jr., MD;  Location: [facility] OR;  Service: General;  Laterality: N/A;    INSERTION OF TUNNELED CENTRAL VENOUS CATHETER (CVC) WITH SUBCUTANEOUS PORT N/A 10/31/2022    Procedure: [id number];  Surgeon: Jan Oliveira Jr., MD;  Location: [facility] OR;  Service: General;  Laterality: N/A;    LAPAROSCOPIC DRAINAGE OF ABDOMEN N/A 9/23/2022    Procedure: DRAINAGE, ABDOMEN, LAPAROSCOPIC;  Surgeon: Jan Oliveira Jr., MD;  Location: [facility] OR;  Service: General;  Laterality: N/A;       Review of patient's allergies indicates:  No Known Allergies    No current facility-administered medications on file prior to encounter.     Current Outpatient Medications on File Prior to Encounter   Medication Sig    butenafine (LOTRIMIN ULTRA) 1 % cream Per instructions 2 TIMES DAILY (route: topical)    HYDROcodone-acetaminophen (NORCO) 5-325 mg per tablet 1 tablet EVERY 4 HOURS (route: oral)    ondansetron (ZOFRAN-ODT) 4 MG TbDL Take 1 tablet by mouth once daily.    promethazine (PHENERGAN) 12.5 MG Tab Take 1 tablet by mouth once daily.    [DISCONTINUED] allopurinoL (ZYLOPRIM) 100 MG tablet Take 1 tablet by mouth once daily.    [DISCONTINUED] ciprofloxacin HCl (CIPRO) 500 MG tablet Take 1 tablet by mouth once daily.    allopurinoL (ZYLOPRIM) 100 MG tablet Take 1 tablet (100 mg total) by mouth once daily.    blood sugar diagnostic Strp To check BG 2 times daily, to use with insurance preferred meter    blood-glucose meter kit To check BG 2 times daily, to use with insurance preferred meter    chlorproMAZINE (THORAZINE) 25 MG tablet Take 1 tablet (25 mg total) by mouth 3 (three) times daily.    ciprofloxacin HCl (CIPRO) 500 MG  tablet Take 1 tablet (500 mg total) by mouth once daily.    clotrimazole (LOTRIMIN) 1 % cream Apply topically 2 (two) times daily.    lancets Misc To check BG 2 times daily, to use with insurance preferred meter    ondansetron (ZOFRAN-ODT) 4 MG TbDL Take 1 tablet (4 mg total) by mouth every 6 (six) hours as needed.    predniSONE (DELTASONE) 20 MG tablet Take 100 mg by mouth once daily.    promethazine (PHENERGAN) 12.5 MG Tab Take 1 tablet (12.5 mg total) by mouth every 4 (four) hours as needed.    [DISCONTINUED] HYDROcodone-acetaminophen (NORCO) 5-325 mg per tablet Take 1 tablet by mouth every 6 (six) hours as needed for Pain.     Family History       Problem Relation (Age of Onset)    Cancer Mother    Diabetes Mother    Heart murmur Sister    Hypertension Mother    Seizures Father, Sister          Tobacco Use    Smoking status: Never    Smokeless tobacco: Never   Substance and Sexual Activity    Alcohol use: Not Currently     Comment: occasional    Drug use: No    Sexual activity: Yes     Partners: Female     Review of Systems   Constitutional:  Negative for chills and fever.   HENT:  Negative for congestion and sore throat.    Eyes:  Negative for visual disturbance.   Respiratory:  Negative for cough and shortness of breath.    Cardiovascular:  Negative for chest pain and palpitations.   Gastrointestinal:  Positive for abdominal pain, anal bleeding and blood in stool. Negative for constipation, diarrhea, nausea and vomiting.   Endocrine: Negative for cold intolerance and heat intolerance.   Genitourinary:  Negative for dysuria and hematuria.   Musculoskeletal:  Negative for arthralgias and myalgias.   Skin:  Negative for rash.   Neurological:  Negative for tremors and seizures.   Hematological:  Negative for adenopathy. Does not bruise/bleed easily.   All other systems reviewed and are negative.  Objective:     Vital Signs (Most Recent):  Temp: 98.3 °F (36.8 °C) (11/06/22 0137)  Pulse: 73 (11/06/22  0407)  Resp: 18 (11/06/22 0137)  BP: 127/82 (11/06/22 0407)  SpO2: 98 % (11/06/22 0407) Vital Signs (24h Range):  Temp:  [98.3 °F (36.8 °C)-98.4 °F (36.9 °C)] 98.3 °F (36.8 °C)  Pulse:  [73-86] 73  Resp:  [18] 18  SpO2:  [95 %-99 %] 98 %  BP: (117-140)/(78-87) 127/82     Weight: 95.7 kg (211 lb)  Body mass index is 27.84 kg/m².    Physical Exam  Vitals and nursing note reviewed.   Constitutional:       General: He is awake. He is not in acute distress.     Appearance: Normal appearance. He is well-developed and overweight.   HENT:      Head: Normocephalic and atraumatic.      Nose: Nose normal. No septal deviation.   Eyes:      Conjunctiva/sclera: Conjunctivae normal.      Pupils: Pupils are equal, round, and reactive to light.   Neck:      Thyroid: No thyroid mass.      Vascular: No JVD.      Trachea: No tracheal tenderness or tracheal deviation.   Cardiovascular:      Rate and Rhythm: Normal rate and regular rhythm.      Heart sounds: Normal heart sounds, S1 normal and S2 normal. No murmur heard.    No friction rub. No gallop.   Pulmonary:      Effort: Pulmonary effort is normal.      Breath sounds: Normal breath sounds. No decreased breath sounds, wheezing, rhonchi or rales.   Abdominal:      General: Bowel sounds are normal. There is no distension.      Palpations: Abdomen is soft. There is no hepatomegaly, splenomegaly or mass.      Tenderness: There is abdominal tenderness in the right upper quadrant and epigastric area.   Skin:     General: Skin is warm.      Findings: No rash.   Neurological:      General: No focal deficit present.      Mental Status: He is alert and oriented to person, place, and time.      Cranial Nerves: No cranial nerve deficit.      Sensory: No sensory deficit.   Psychiatric:         Mood and Affect: Mood normal.         Behavior: Behavior normal. Behavior is cooperative.         CRANIAL NERVES     CN III, IV, VI   Pupils are equal, round, and reactive to light.     Significant Labs:  All pertinent labs within the past 24 hours have been reviewed.  CBC:   Recent Labs   Lab 11/04/22  0830 11/06/22 0224   WBC 7.88 2.83*   HGB 8.4* 8.6*   HCT 26.6* 26.9*    110*     CMP:   Recent Labs   Lab 11/04/22  0830 11/06/22 0224    136   K 3.8 4.2    103   CO2 24 23   * 289*   BUN 15 16   CREATININE 0.7 0.8   CALCIUM 8.9 8.5*   PROT 6.7 6.8   ALBUMIN 2.7* 2.7*   BILITOT 0.7 0.8   ALKPHOS 99 108   AST 16 23   ALT 14 17   ANIONGAP 10 10       Significant Imaging: I have reviewed all pertinent imaging results/findings within the past 24 hours.    CT abdomen and pelvis with contrast:  Large central abdominal mass correlating with known non-Hodgkin's lymphoma. Overall mild  decrease in mass burden compared with prior.    Assessment/Plan:     * Rectal bleeding  Keep patient NPO.  Follow H/H closely. Type and screen blood and transfuse as needed.  Protonix BID  Consult Gastronetrologist.     Continue IVF hydration.   Use IV anti-emetics as needed.           Pancytopenia  Patient's anemia is currently controlled. Has not received any PRBCs to date.. Etiology likely d/t chemo, chronic disease, cancer state  Current CBC reviewed-   Lab Results   Component Value Date    HGB 8.6 (L) 11/06/2022    HCT 26.9 (L) 11/06/2022     Monitor serial CBC and transfuse if patient becomes hemodynamically unstable, symptomatic or H/H drops below 7/21.         Hyperlipidemia, unspecified   Patient is not chronically on statin.will not continue for now. Monitor clinically. Last LDL was   Lab Results   Component Value Date    LDLCALC 96 08/10/2022            DLBCL (diffuse large B cell lymphoma)  Noted, chronic  Has received one dose of chemotherapy since diagnosis, with relief of pain  Continue prophylactic cipro, allopurinol      Moderate malnutrition  Nutrition consulted. Most recent weight and BMI monitored-                         Measurements:  Wt Readings from Last 1 Encounters:   11/06/22 95.7 kg (211  lb)   Body mass index is 27.84 kg/m².    Recommendations:      Patient has been screened and assessed by RD. RD will follow patient.      Diabetes mellitus type 2, noninsulin dependent  Patient's FSGs are uncontrolled due to hyperglycemia on current medication regimen.  Last A1c reviewed-   Lab Results   Component Value Date    HGBA1C 5.5 10/03/2022     Most recent fingerstick glucose reviewed- No results for input(s): POCTGLUCOSE in the last 24 hours.  Current correctional scale  Medium  Maintain anti-hyperglycemic dose as follows-   Antihyperglycemics (From admission, onward)    None        Hold Oral hypoglycemics while patient is in the hospital.    Anemia, chronic disease  .jaso        VTE Risk Mitigation (From admission, onward)    None             SALLY Montero  Department of Hospital Medicine   [facility] - Emergency Dept

## 2022-11-06 NOTE — ASSESSMENT & PLAN NOTE
Patient's anemia is currently controlled. Has not received any PRBCs to date.. Etiology likely d/t chemo, chronic disease, cancer state  Current CBC reviewed-   Lab Results   Component Value Date    HGB 8.6 (L) 11/06/2022    HCT 26.9 (L) 11/06/2022     Monitor serial CBC and transfuse if patient becomes hemodynamically unstable, symptomatic or H/H drops below 7/21.

## 2022-11-06 NOTE — ASSESSMENT & PLAN NOTE
Noted, chronic  Has received one dose of chemotherapy since diagnosis, with relief of pain  Continue prophylactic cipro, allopurinol

## 2022-11-06 NOTE — CARE UPDATE
Patient seen and examined.  No acute events since admission. Denies fatigue, shortness of breath, chest pain, abdominal pain, nausea, vomiting, weakness, dizziness and lightheadedness.  He reports two episodes of bright red blood per rectum one prior to presenting to the ED and one in the ER.  He has had no further BM's.  He reports that he was constipated prior to his first chemo treatment and began taking Miralax.  He reports his last colonoscopy was 9/1/2022.        Review of Systems   Constitutional:  Negative for chills, fever and malaise/fatigue.   Respiratory:  Negative for cough, shortness of breath and wheezing.    Cardiovascular:  Negative for chest pain and palpitations.   Genitourinary:  Negative for dysuria and urgency.   Neurological:  Negative for dizziness, loss of consciousness and weakness.     Physical Exam  Constitutional:       General: He is not in acute distress.     Appearance: Normal appearance. He is not toxic-appearing or diaphoretic.   Cardiovascular:      Rate and Rhythm: Normal rate and regular rhythm.      Pulses: Normal pulses.      Heart sounds: Normal heart sounds.   Pulmonary:      Effort: No respiratory distress.      Breath sounds: Normal breath sounds. No wheezing, rhonchi or rales.   Abdominal:      General: Bowel sounds are normal. There is no distension.      Palpations: Abdomen is soft.      Tenderness: There is no abdominal tenderness. There is no guarding.      Hernia: No hernia is present.   Skin:     General: Skin is warm and dry.      Coloration: Skin is not jaundiced or pale.   Neurological:      Mental Status: He is alert and oriented to person, place, and time. Mental status is at baseline.   Psychiatric:         Mood and Affect: Mood normal.         Behavior: Behavior normal.     Lab Results   Component Value Date    WBC 2.85 (L) 11/06/2022    HGB 8.0 (L) 11/06/2022    HCT 24.7 (L) 11/06/2022    MCV 77 (L) 11/06/2022     (L) 11/06/2022       CMP  Sodium    Date Value Ref Range Status   11/06/2022 136 136 - 145 mmol/L Final     Potassium   Date Value Ref Range Status   11/06/2022 4.2 3.5 - 5.1 mmol/L Final     Chloride   Date Value Ref Range Status   11/06/2022 103 95 - 110 mmol/L Final     CO2   Date Value Ref Range Status   11/06/2022 23 23 - 29 mmol/L Final     Glucose   Date Value Ref Range Status   11/06/2022 289 (H) 70 - 110 mg/dL Final     BUN   Date Value Ref Range Status   11/06/2022 16 6 - 20 mg/dL Final     Creatinine   Date Value Ref Range Status   11/06/2022 0.8 0.5 - 1.4 mg/dL Final     Calcium   Date Value Ref Range Status   11/06/2022 8.5 (L) 8.7 - 10.5 mg/dL Final     Total Protein   Date Value Ref Range Status   11/06/2022 6.8 6.0 - 8.4 g/dL Final     Albumin   Date Value Ref Range Status   11/06/2022 2.7 (L) 3.5 - 5.2 g/dL Final     Total Bilirubin   Date Value Ref Range Status   11/06/2022 0.8 0.1 - 1.0 mg/dL Final     Comment:     For infants and newborns, interpretation of results should be based  on gestational age, weight and in agreement with clinical  observations.    Premature Infant recommended reference ranges:  Up to 24 hours.............<8.0 mg/dL  Up to 48 hours............<12.0 mg/dL  3-5 days..................<15.0 mg/dL  6-29 days.................<15.0 mg/dL       Alkaline Phosphatase   Date Value Ref Range Status   11/06/2022 108 55 - 135 U/L Final     AST   Date Value Ref Range Status   11/06/2022 23 10 - 40 U/L Final     ALT   Date Value Ref Range Status   11/06/2022 17 10 - 44 U/L Final     Anion Gap   Date Value Ref Range Status   11/06/2022 10 8 - 16 mmol/L Final     eGFR if    Date Value Ref Range Status   12/28/2021 111 > OR = 60 mL/min/1.73m2 Final     eGFR if non    Date Value Ref Range Status   12/28/2021 96 > OR = 60 mL/min/1.73m2 Final     Last colonoscopy:  Impression:            - One 5 mm polyp in the ascending colon, removed                          with a hot biopsy forceps. Resected  and retrieved.                          - Two 4 to 5 mm polyps in the cecum, removed with                          a hot biopsy forceps. Resected and retrieved.                          - Sigmoid diverticulosis                          - The examination was otherwise normal on direct                          and retroflexion views.   Recommendation:        - Await pathology results.                          - Repeat colonoscopy date to be determined after                          pending pathology results are reviewed.                          - Resume regular diet.                          - Return to my office in 2 weeks.                          - Discharge patient to home.       Assessment/Plan:      * Rectal bleeding  Placed on GIB pathway:  Keep patient NPO.  Follow H/H closely. Type and screen blood and transfuse as needed.  Protonix BID  Consult Gastronetrologist.   Continue IVF hydration.   Use IV anti-emetics as needed.

## 2022-11-06 NOTE — PLAN OF CARE
Pt AAOx4. VSS. Pt remained free of falls this shift. No complaints of pain or discomfort. Medications administered as ordered. Pt is SR on monitor. PIV intact, IVF infusing. Orders for 2 PIV, pt refusing second IV at this time. Education provided along with charge nurse attempting, pt still refusing at this time. Purposeful hourly rounding completed, spouse at bedside. Pt instructed to call for assistance. POC reviewed. No further needs expressed at this time.       Problem: Bleeding (Gastrointestinal Bleeding)  Goal: Hemostasis  Outcome: Ongoing, Progressing     Problem: Adult Inpatient Plan of Care  Goal: Absence of Hospital-Acquired Illness or Injury  Outcome: Ongoing, Progressing     Problem: Adult Inpatient Plan of Care  Goal: Optimal Comfort and Wellbeing  Outcome: Ongoing, Progressing

## 2022-11-06 NOTE — ASSESSMENT & PLAN NOTE
Nutrition consulted. Most recent weight and BMI monitored-                         Measurements:  Wt Readings from Last 1 Encounters:   11/06/22 95.7 kg (211 lb)   Body mass index is 27.84 kg/m².    Recommendations:      Patient has been screened and assessed by RD. RD will follow patient.

## 2022-11-06 NOTE — ASSESSMENT & PLAN NOTE
Patient's FSGs are uncontrolled due to hyperglycemia on current medication regimen.  Last A1c reviewed-   Lab Results   Component Value Date    HGBA1C 5.5 10/03/2022     Most recent fingerstick glucose reviewed- No results for input(s): POCTGLUCOSE in the last 24 hours.  Current correctional scale  Medium  Maintain anti-hyperglycemic dose as follows-   Antihyperglycemics (From admission, onward)    None        Hold Oral hypoglycemics while patient is in the hospital.

## 2022-11-06 NOTE — SUBJECTIVE & OBJECTIVE
Past Medical History:   Diagnosis Date    Cancer     Diabetes mellitus     Digestive disorder     Prediabetes        Past Surgical History:   Procedure Laterality Date    APPENDECTOMY  07/15/2014    COLONOSCOPY      COLONOSCOPY N/A 2/9/2022    Procedure: COLONOSCOPY;  Surgeon: Manuel Sanders MD;  Location: [facility] ENDO;  Service: Endoscopy;  Laterality: N/A;    COLONOSCOPY N/A 9/1/2022    Procedure: COLONOSCOPY;  Surgeon: Andrea Clemens MD;  Location: [facility] ENDO;  Service: Endoscopy;  Laterality: N/A;    INCISION AND DRAINAGE OF ABDOMEN N/A 9/14/2022    Procedure: INCISION AND DRAINAGE, ABDOMEN;  Surgeon: Jan Oliveira Jr., MD;  Location: [facility] OR;  Service: General;  Laterality: N/A;    INSERTION OF TUNNELED CENTRAL VENOUS CATHETER (CVC) WITH SUBCUTANEOUS PORT N/A 10/31/2022    Procedure: [id number];  Surgeon: Jan Oliveira Jr., MD;  Location: [facility] OR;  Service: General;  Laterality: N/A;    LAPAROSCOPIC DRAINAGE OF ABDOMEN N/A 9/23/2022    Procedure: DRAINAGE, ABDOMEN, LAPAROSCOPIC;  Surgeon: Jan Oliveira Jr., MD;  Location: [facility] OR;  Service: General;  Laterality: N/A;       Review of patient's allergies indicates:  No Known Allergies    No current facility-administered medications on file prior to encounter.     Current Outpatient Medications on File Prior to Encounter   Medication Sig    butenafine (LOTRIMIN ULTRA) 1 % cream Per instructions 2 TIMES DAILY (route: topical)    HYDROcodone-acetaminophen (NORCO) 5-325 mg per tablet 1 tablet EVERY 4 HOURS (route: oral)    ondansetron (ZOFRAN-ODT) 4 MG TbDL Take 1 tablet by mouth once daily.    promethazine (PHENERGAN) 12.5 MG Tab Take 1 tablet by mouth once daily.    [DISCONTINUED] allopurinoL (ZYLOPRIM) 100 MG tablet Take 1 tablet by mouth once daily.    [DISCONTINUED] ciprofloxacin HCl (CIPRO) 500 MG tablet Take 1 tablet by mouth once daily.    allopurinoL (ZYLOPRIM) 100 MG tablet Take 1 tablet (100 mg total) by mouth once  daily.    blood sugar diagnostic Strp To check BG 2 times daily, to use with insurance preferred meter    blood-glucose meter kit To check BG 2 times daily, to use with insurance preferred meter    chlorproMAZINE (THORAZINE) 25 MG tablet Take 1 tablet (25 mg total) by mouth 3 (three) times daily.    ciprofloxacin HCl (CIPRO) 500 MG tablet Take 1 tablet (500 mg total) by mouth once daily.    clotrimazole (LOTRIMIN) 1 % cream Apply topically 2 (two) times daily.    lancets Misc To check BG 2 times daily, to use with insurance preferred meter    ondansetron (ZOFRAN-ODT) 4 MG TbDL Take 1 tablet (4 mg total) by mouth every 6 (six) hours as needed.    predniSONE (DELTASONE) 20 MG tablet Take 100 mg by mouth once daily.    promethazine (PHENERGAN) 12.5 MG Tab Take 1 tablet (12.5 mg total) by mouth every 4 (four) hours as needed.    [DISCONTINUED] HYDROcodone-acetaminophen (NORCO) 5-325 mg per tablet Take 1 tablet by mouth every 6 (six) hours as needed for Pain.     Family History       Problem Relation (Age of Onset)    Cancer Mother    Diabetes Mother    Heart murmur Sister    Hypertension Mother    Seizures Father, Sister          Tobacco Use    Smoking status: Never    Smokeless tobacco: Never   Substance and Sexual Activity    Alcohol use: Not Currently     Comment: occasional    Drug use: No    Sexual activity: Yes     Partners: Female     Review of Systems   Constitutional:  Negative for chills and fever.   HENT:  Negative for congestion and sore throat.    Eyes:  Negative for visual disturbance.   Respiratory:  Negative for cough and shortness of breath.    Cardiovascular:  Negative for chest pain and palpitations.   Gastrointestinal:  Positive for abdominal pain, anal bleeding and blood in stool. Negative for constipation, diarrhea, nausea and vomiting.   Endocrine: Negative for cold intolerance and heat intolerance.   Genitourinary:  Negative for dysuria and hematuria.   Musculoskeletal:  Negative for arthralgias  and myalgias.   Skin:  Negative for rash.   Neurological:  Negative for tremors and seizures.   Hematological:  Negative for adenopathy. Does not bruise/bleed easily.   All other systems reviewed and are negative.  Objective:     Vital Signs (Most Recent):  Temp: 98.3 °F (36.8 °C) (11/06/22 0137)  Pulse: 73 (11/06/22 0407)  Resp: 18 (11/06/22 0137)  BP: 127/82 (11/06/22 0407)  SpO2: 98 % (11/06/22 0407) Vital Signs (24h Range):  Temp:  [98.3 °F (36.8 °C)-98.4 °F (36.9 °C)] 98.3 °F (36.8 °C)  Pulse:  [73-86] 73  Resp:  [18] 18  SpO2:  [95 %-99 %] 98 %  BP: (117-140)/(78-87) 127/82     Weight: 95.7 kg (211 lb)  Body mass index is 27.84 kg/m².    Physical Exam  Vitals and nursing note reviewed.   Constitutional:       General: He is awake. He is not in acute distress.     Appearance: Normal appearance. He is well-developed and overweight.   HENT:      Head: Normocephalic and atraumatic.      Nose: Nose normal. No septal deviation.   Eyes:      Conjunctiva/sclera: Conjunctivae normal.      Pupils: Pupils are equal, round, and reactive to light.   Neck:      Thyroid: No thyroid mass.      Vascular: No JVD.      Trachea: No tracheal tenderness or tracheal deviation.   Cardiovascular:      Rate and Rhythm: Normal rate and regular rhythm.      Heart sounds: Normal heart sounds, S1 normal and S2 normal. No murmur heard.    No friction rub. No gallop.   Pulmonary:      Effort: Pulmonary effort is normal.      Breath sounds: Normal breath sounds. No decreased breath sounds, wheezing, rhonchi or rales.   Abdominal:      General: Bowel sounds are normal. There is no distension.      Palpations: Abdomen is soft. There is no hepatomegaly, splenomegaly or mass.      Tenderness: There is abdominal tenderness in the right upper quadrant and epigastric area.   Skin:     General: Skin is warm.      Findings: No rash.   Neurological:      General: No focal deficit present.      Mental Status: He is alert and oriented to person, place,  and time.      Cranial Nerves: No cranial nerve deficit.      Sensory: No sensory deficit.   Psychiatric:         Mood and Affect: Mood normal.         Behavior: Behavior normal. Behavior is cooperative.         CRANIAL NERVES     CN III, IV, VI   Pupils are equal, round, and reactive to light.     Significant Labs: All pertinent labs within the past 24 hours have been reviewed.  CBC:   Recent Labs   Lab 11/04/22 0830 11/06/22 0224   WBC 7.88 2.83*   HGB 8.4* 8.6*   HCT 26.6* 26.9*    110*     CMP:   Recent Labs   Lab 11/04/22 0830 11/06/22 0224    136   K 3.8 4.2    103   CO2 24 23   * 289*   BUN 15 16   CREATININE 0.7 0.8   CALCIUM 8.9 8.5*   PROT 6.7 6.8   ALBUMIN 2.7* 2.7*   BILITOT 0.7 0.8   ALKPHOS 99 108   AST 16 23   ALT 14 17   ANIONGAP 10 10       Significant Imaging: I have reviewed all pertinent imaging results/findings within the past 24 hours.    CT abdomen and pelvis with contrast:  Large central abdominal mass correlating with known non-Hodgkin's lymphoma. Overall mild  decrease in mass burden compared with prior.

## 2022-11-06 NOTE — CONSULTS
CONSULT  Gastroenterology      SUBJECTIVE:       Chief Complaint/Indication for Consult:   Reason for Consult? GI Bleed     History of Present Illness:  This my first encounter with this pleasant 51 y/o M and his wife, who presented with the acute onset of rectal bleeding with loose stools.  The mvmt was painless.  The first episode was around midnight, at home.  He had a second episode while in the ER.  And none since.  He denies any abdominal pain, no nausea or vomiting.  Denies a prior history of rectal bleeding.  However, he does report a history of hemorrhoids.  His recent history is significant in that he was diagnosed with B-cell lymphoma, and had his 1st chemotherapy treatment last week, on Tuesday and Thursday.  He is on a CHOP protocol, and also just completed the prednisone portion yesterday.    He just recently completed a colonoscopy with Dr. Clemens on 09/01/2022, with several polyps removed (see report below).    He then presented 2 weeks later, on 09/14/2022, with glucose abnormalities and night sweats and fevers, and was found to have a 12 cm mass, possible abscess, communicating with the small bowel, which required surgery.  The tissue diagnosed him with B-cell lymphoma.  He then saw Dr. Reid in follow-up, who is handling his chemo.        See recent Colonoscopy 9/1/2022:  Indications:           Screening for malignant neoplasm in the colon   Providers:             Andrea Clemens MD  Impression:    - One 5 mm polyp in the ascending colon, removed                          with a hot biopsy forceps. Resected and retrieved.                          - Two 4 to 5 mm polyps in the cecum, removed with                          a hot biopsy forceps. Resected and retrieved.                          - Sigmoid diverticulosis                          - The examination was otherwise normal on direct                          and retroflexion views.   Recommendation:        - Await pathology results.                           - Repeat colonoscopy date to be determined after                          pending pathology results are reviewed.                          - Resume regular diet.                          - Return to my office in 2 weeks.                          - Discharge patient to home.   Andrea Clemens MD   9/1/2022     1. ASCENDING COLON, POLYPECTOMY:   - TUBULAR ADENOMA.   2. CECUM, POLYPECTOMY:   - TUBULAR ADENOMA.   3. CECUM, POLYPECTOMY:   - TUBULAR ADENOMA.       See initial surgery report 9/14/2022:  PREOPERATIVE DIAGNOSIS: Small-bowel fistula versus abscess   POSTOPERATIVE DIAGNOSIS: Small bowel perforation with abscess   PROCEDURE:   1. Exploratory laparotomy  2. Drainage of intra-abdominal abscess  3. Small-bowel resection with anastomosis    SURGEON: TORI Bustos...As we were mobilizing the omentum we entered into a large abscess cavity which was suctioned clean.  Cultures were taken. There was numerous loops of small bowel matted down to the abscess cavity.  These were free with blunt dissection.  Were finally able to mobilize the small bowel and ran it from the terminal ileum proximally. There was a large necrotic portion of the small bowel which was perforated. Suspect underlying mass.  This was  from the surrounding structures. We then ran more proximally beyond the mass. This was only area of obvious perforation. We elected perform resection with 5 cm margins.  Windows were created through the mesentery on either side and blue load MELLO stapler was used to transect the mass. We then used a LigaSure to transect the mesentery.  There were multiple enlarged lymph nodes associated which were taken with the specimen.  Hemostasis was adequate along the mesentery dissection line. Further examination revealed this occurred in the proximal jejunum. We then further evaluate the abdominal cavity. There were areas of sigmoid colon and small bowel which were associated with abscess  cavity but then themselves not perforated or threatened. The overlying omentum which was containing part of the mass and abscess was transected and sent as a separate specimen.     1. Small bowel, resection:  Diffuse large B-cell lymphoma, non-germinal   center origin.  Final classification is pending C-MYC rearrangement analysis   result.  See comment.   2. Omentum, resection: Diffuse large B-cell lymphoma, non-germinal center   origin.  Final classification is pending C-MYC rearrangement analysis result.         CT scan today:  FINDINGS:  There is a 10 cm enhancing mass seen within the mid abdomen with irregular contour.  On previous exam from 10/30/2022 this measured 10 cm in similar location.  Exact measurements are difficult given the irregular shape.  Multiple other smaller satellite lesions are visualized similar to what was seen on prior exam.   There is no evidence bowel obstruction.  There is sigmoid diverticulosis.  Patient is status post appendectomy.   There is no evidence of abdominal nor pelvic lymphadenopathy.  The osseous structures are unremarkable.   There is no evidence bowel obstruction.  The patient is status post appendectomy.  The osseous structures are unremarkable.     Impression:   In this patient with a known lymphoma the abdominal mass and satellite lesions appears similar to what was seen on CT from 10/30/2022.  There is no evidence acute change since prior exam of 10/30/2022.   Electronically signed by: Sussy Call MD  Date:                                            11/06/2022        LABS:   Latest Reference Range & Units 10/30/22 16:14 11/01/22 08:44 11/03/22 06:44 11/04/22 08:30 11/06/22 02:24 11/06/22 08:05   WBC 3.90 - 12.70 K/uL 6.80 8.55 15.17 (H) 7.88 2.83 (L) 2.85 (L)   RBC 4.60 - 6.20 M/uL 4.35 (L) 3.93 (L) 3.85 (L) 3.41 (L) 3.50 (L) 3.21 (L)   Hemoglobin 14.0 - 18.0 g/dL 10.6 (L) 9.7 (L) 9.5 (L) 8.4 (L) 8.6 (L) 8.0 (L)   Hematocrit 40.0 - 54.0 % 33.4 (L) 31.0 (L) 29.9  (L) 26.6 (L) 26.9 (L) 24.7 (L)   MCV 82 - 98 fL 77 (L) 79 (L) 78 (L) 78 (L) 77 (L) 77 (L)      Latest Reference Range & Units 10/30/22 16:14 11/01/22 08:44 11/03/22 06:44 11/04/22 08:30 11/06/22 02:24 11/06/22 08:05   Platelets 150 - 450 K/uL 128 (L) 156 167 156 110 (L) 107 (L)         Wt Readings from Last 20 Encounters:   11/06/22 93.9 kg (207 lb 0.2 oz)   11/06/22 96.6 kg (213 lb)   11/04/22 98.4 kg (216 lb 14.9 oz)   11/03/22 96 kg (211 lb 9.6 oz)   11/02/22 96.7 kg (213 lb 3 oz)   11/01/22 96.9 kg (213 lb 10 oz)   11/01/22 96.9 kg (213 lb 10 oz)   10/31/22 93.4 kg (206 lb)   10/30/22 98.5 kg (217 lb 2.5 oz)   10/28/22 93.9 kg (207 lb)   10/20/22 97.7 kg (215 lb 6.2 oz)   10/20/22 96.9 kg (213 lb 9.6 oz)   10/11/22 97.3 kg (214 lb 8 oz)   10/06/22 96 kg (211 lb 10.3 oz)   10/03/22 97.5 kg (215 lb)   09/30/22 96.3 kg (212 lb 4.9 oz)   09/28/22 95.1 kg (209 lb 10.5 oz)   09/23/22 96.4 kg (212 lb 8.4 oz)   09/01/22 102.1 kg (225 lb 1.4 oz)   08/10/22 106.6 kg (235 lb)           PTA Medications   Medication Sig    butenafine (LOTRIMIN ULTRA) 1 % cream Per instructions 2 TIMES DAILY (route: topical)    HYDROcodone-acetaminophen (NORCO) 5-325 mg per tablet 1 tablet EVERY 4 HOURS (route: oral)    ondansetron (ZOFRAN-ODT) 4 MG TbDL Take 1 tablet by mouth once daily.    promethazine (PHENERGAN) 12.5 MG Tab Take 1 tablet by mouth once daily.    allopurinoL (ZYLOPRIM) 100 MG tablet Take 1 tablet (100 mg total) by mouth once daily.    blood sugar diagnostic Strp To check BG 2 times daily, to use with insurance preferred meter    blood-glucose meter kit To check BG 2 times daily, to use with insurance preferred meter    chlorproMAZINE (THORAZINE) 25 MG tablet Take 1 tablet (25 mg total) by mouth 3 (three) times daily.    ciprofloxacin HCl (CIPRO) 500 MG tablet Take 1 tablet (500 mg total) by mouth once daily.    clotrimazole (LOTRIMIN) 1 % cream Apply topically 2 (two) times daily.    lancets Misc To check BG 2 times daily,  to use with insurance preferred meter    ondansetron (ZOFRAN-ODT) 4 MG TbDL Take 1 tablet (4 mg total) by mouth every 6 (six) hours as needed.    predniSONE (DELTASONE) 20 MG tablet Take 100 mg by mouth once daily.    promethazine (PHENERGAN) 12.5 MG Tab Take 1 tablet (12.5 mg total) by mouth every 4 (four) hours as needed.       Review of patient's allergies indicates:  No Known Allergies     Past Medical History:   Diagnosis Date    Cancer     Diabetes mellitus     Digestive disorder     Prediabetes      Past Surgical History:   Procedure Laterality Date    APPENDECTOMY  07/15/2014    COLONOSCOPY      COLONOSCOPY N/A 02/09/2022    ERROR.   He did not have a colonoscopy with Dr. Horta.    COLONOSCOPY N/A 09/01/2022    Procedure: COLONOSCOPY;  Surgeon: Andrea Clemens MD;  [facility]: [facility];  Service: Endoscopy;  Laterality: N/A;    INCISION AND DRAINAGE OF ABDOMEN N/A 09/14/2022    Procedure: INCISION AND DRAINAGE, ABDOMEN;  Surgeon: Jan Oliveira Jr., MD;  Location: [facility] OR;  Service: General;  Laterality: N/A;    INSERTION OF TUNNELED CENTRAL VENOUS CATHETER (CVC) WITH SUBCUTANEOUS PORT N/A 10/31/2022    Procedure: [id number]-H-CATH;  Surgeon: Jan Oliveira Jr., MD;  Location: [facility] OR;  Service: General;  Laterality: N/A;    LAPAROSCOPIC DRAINAGE OF ABDOMEN N/A 09/23/2022    Procedure: DRAINAGE, ABDOMEN, LAPAROSCOPIC;  Surgeon: Jan Oliveira Jr., MD;  Location: [facility];  Service: General;  Laterality: N/A;     Family History   Problem Relation Age of Onset    Cancer Mother         Colon    Diabetes Mother     Hypertension Mother     Seizures Father     Heart murmur Sister     Seizures Sister      Social History     Tobacco Use    Smoking status: Never    Smokeless tobacco: Never   Substance Use Topics    Alcohol use: Not Currently     Comment: occasional    Drug use: No         OBJECTIVE:     Vital Signs (Most Recent)  Temp: 97.7 °F (36.5 °C) (11/06/22 1139)  Pulse: 64 (11/06/22  "1139)  Resp: 16 (11/06/22 1139)  BP: 131/76 (11/06/22 1139)  SpO2: 99 % (11/06/22 1139)    Physical Exam:  : Ht: 6' 1" (185.4 cm)   Wt: 93.9 kg (207 lb 0.2 oz)   BMI: 27.31 kg/m²  .                                                     GENERAL:  Comfortable, in no acute distress.   C/o being hungry.                              HEENT EXAM:  Nonicteric.  No adenopathy.  Oropharynx is clear.               NECK:  Supple.                                                               LUNGS:  Clear.                                                               CARDIAC:  Regular rate and rhythm.  S1, S2.  No murmur.                      ABDOMEN:  Soft, positive bowel sounds.  Well healed (healing) midline scar.  nontender.  No hepatosplenomegaly or masses.  No rebound or guarding.                                             EXTREMITIES:  No edema.     MENTAL STATUS:  Alert and oriented.    ASSESSMENT/PLAN:     Assessment:   Painless rectal bleeding.  Hx of hemorrhoids reported.  Most probably the source.  Diverticulosis seen at colonoscopy, but would expect more massive bleeding if it were that.  Hx of polyps.  B Cell Lymphoma.      Plan:   Since only 2 months since his colonoscopy, will plan for a flex sig in the AM.  Follow H/H.  Clear liquids for now.    "

## 2022-11-07 NOTE — DISCHARGE INSTRUCTIONS
Please follow up with your PCP next week and with your Oncologist 11/11/2022 as planned.  Please start on stool softener and add fiber to your diet.     Discharge Instructions, [facility] Medicine    Thank you for choosing [facility] for your medical care. The primary provider who is taking care of you at the time of your discharge is Kelsy Hackett NP    You were admitted to the hospital with Rectal bleeding.     Please note your discharge instructions, including diet/activity restrictions, follow-up appointments, and medication changes.  If you have any questions about your medical issues, prescriptions, or any other questions, please feel free to contact the [facility] Medicine Dept at [phone] and we will help.    If you are previously with Home health, outpatient PT/OT or under a therapy program, you are cleared to return to those programs.    Please direct all long term medication refills and follow up to your primary care provider, BOSTON Oswald  Thank you again for letting us take care of your health care needs.    Please note the following discharge instructions per your discharging provider -  Kelsy Hackett NP

## 2022-11-07 NOTE — PLAN OF CARE
Pt clear for DC from CM standpoint       11/07/22 7224   Final Note   Assessment Type Final Discharge Note   Anticipated Discharge Disposition Home

## 2022-11-07 NOTE — PLAN OF CARE
Pt awake, alert.  Vital signs stable, denies nausea or pain, abd soft. No adverse effects of anesthesia noted. Transferred to room per wheelchair,  Report given to Jadon

## 2022-11-07 NOTE — PLAN OF CARE
Upon arrival to endoscopy, patient reports that a fleets enema was not done prior to transfer to endoscopy.  Dr. Horta notified and tap water enema ordered and given without difficulty.  Results were bright red blood with some chunks of stool as reported per patient.

## 2022-11-07 NOTE — PROVATION PATIENT INSTRUCTIONS
Discharge Summary/Instructions after an Endoscopic Procedure  Patient Name: Dwight Hoffmann  Patient MRN: [id number]  Patient YOB: 1970 Monday, November 7, 2022  Manuel Horta MD  Dear patient,  As a result of recent federal legislation (The Federal Cures Act), you may   receive lab or pathology results from your procedure in your [facility]   account before your physician is able to contact you. Your physician or   their representative will relay the results to you with their   recommendations at their soonest availability.  Thank you,  RESTRICTIONS:  During your procedure today, you received medications for sedation.  These   medications may affect your judgment, balance and coordination.  Therefore,   for 24 hours, you have the following restrictions:   - DO NOT drive a car, operate machinery, make legal/financial decisions,   sign important papers or drink alcohol.    ACTIVITY:  Today: no heavy lifting, straining or running due to procedural   sedation/anesthesia.  The following day: return to full activity including work.  DIET:  Eat and drink normally unless instructed otherwise.     TREATMENT FOR COMMON SIDE EFFECTS:  - Mild abdominal pain, nausea, belching, bloating or excessive gas:  rest,   eat lightly and use a heating pad.  - Sore Throat: treat with throat lozenges and/or gargle with warm salt   water.  - Because air was used during the procedure, expelling large amounts of air   from your rectum or belching is normal.  - If a bowel prep was taken, you may not have a bowel movement for 1-3 days.    This is normal.  SYMPTOMS TO WATCH FOR AND REPORT TO YOUR PHYSICIAN:  1. Abdominal pain or bloating, other than gas cramps.  2. Chest pain.  3. Back pain.  4. Signs of infection such as: chills or fever occurring within 24 hours   after the procedure.  5. Rectal bleeding, which would show as bright red, maroon, or black stools.   (A tablespoon of blood from the rectum is not serious, especially  if   hemorrhoids are present.)  6. Vomiting.  7. Weakness or dizziness.  GO DIRECTLY TO THE NEAREST EMERGENCY ROOM IF YOU HAVE ANY OF THE FOLLOWING:      Difficulty breathing              Chills and/or fever over 101 F   Persistent vomiting and/or vomiting blood   Severe abdominal pain   Severe chest pain   Black, tarry stools   Bleeding- more than one tablespoon   Any other symptom or condition that you feel may need urgent attention  Your doctor recommends these additional instructions:  If any biopsies were taken, your doctors clinic will contact you in 1 to 2   weeks with any results.  - Return patient to hospital recio for ongoing care.   - High fiber diet.   - Return to GI office at appointment to be scheduled.  For questions, problems or results please call your physician - Manuel Horta MD at Work:  [phone].  [facility], EMERGENCY ROOM PHONE NUMBER: [phone]  IF A COMPLICATION OR EMERGENCY SITUATION ARISES AND YOU ARE UNABLE TO REACH   YOUR PHYSICIAN - GO DIRECTLY TO THE EMERGENCY ROOM.  Manuel Horta MD  11/7/2022 1:19:15 PM  This report has been verified and signed electronically.  Dear patient,  As a result of recent federal legislation (The Federal Cures Act), you may   receive lab or pathology results from your procedure in your [facility]   account before your physician is able to contact you. Your physician or   their representative will relay the results to you with their   recommendations at their soonest availability.  Thank you,  PROVATION

## 2022-11-07 NOTE — PROGRESS NOTES
[facility]  Adult Nutrition  Progress Note    SUMMARY      Recommendations  1) Change diet to high fiber diet, DM 2000 kcal, cardiac diet  2) weigh weekly   3) Boost glucose control BID if PO intakes not > 75% of meals at f/u     Goals: 1) PO intakes > 75% of meals at f/u  Nutrition Goal Status: new  Communication of RD Recs:  (POC, sticky note)     Assessment and Plan   Moderate chronic illness related malnutrition  R/t decreased appetite and altered GI function  As evidenced by  1) PO intakes < 75% of needs x > 1 month  2) 15% wt loss in < 6 months per pt  Intervention: carb modified diet and nutrition supplement therapy  new       Malnutrition Assessment  Skin (Micronutrient):  (Oj = 21)  Teeth (Micronutrient):  (missing some)   Micronutrient Evaluation: suspected deficiency  Micronutrient Evaluation Comments: check iron   Weight Loss (Malnutrition): 15% x 1 year ( x < last 6 months per pt)    Edema (Fluid Accumulation): 0-->no edema present           Reason for Assessment     Reason For Assessment: MST  Diagnosis:  rectal bleeding  Relevant Medical History: DM2 on metformin, HLD, bowel resection and ileus 9/2022, b. Cell lymphoma on chemo-last treatment ~ 1 week ago  Interdisciplinary Rounds: attended     General Information Comments: 51 y/o male admitted with recal bleed, diverticulosis noted. No active bleed per GI today s/p work-up. Plan for d/c on high fiber diet.  NFPE to be done at f/u, pt down for procedure @ time of visit. Pt endorsed 25 lb wt loss 7/2022-9/2022, wt loss continues.      Nutrition Discharge Planning: To be determined- high fiber, DM 2000 kcal, cardiac diet + Boost glucose control as needed     Nutrition Risk Screen     Nutrition Risk Screen: no indicators present     Nutrition/Diet History     Spiritual, Cultural Beliefs, Jehovah's witness Practices, Values that Affect Care: yes  Food Allergies: NKFA  Factors Affecting Nutritional Intake: NPO, altered GI  function     Anthropometrics  Height Method: Measured (21)  Height: 6'  Height (inches): 72 in  Weight Method: Bed Scale  Weight: 93.9 kg 22  Weight (lb): 212.53 lb  Ideal Body Weight (IBW), Male: 178 lb  BMI (Calculated): 27.2 kg/m2  BMI Grade: 25 - 29.9 - overweight  Weight Loss: unintentional  Usual Body Weight (UBW), k.6 kg (21- per pt wt loss was in the last 2 months), 96.4 kg 22     Lab/Procedures/Meds     Pertinent Labs Reviewed: reviewed  BMP  Lab Results   Component Value Date     2022    K 3.5 2022     2022    CO2 27 2022    BUN 12 2022    CREATININE 0.7 2022    CALCIUM 8.1 (L) 2022    ANIONGAP 8 2022    ESTGFRAFRICA 111 2021    EGFRNONAA 96 2021     Recent Labs   Lab 22  0605   POCTGLUCOSE 122*     Lab Results   Component Value Date    ALBUMIN 2.7 (L) 2022     Lab Results   Component Value Date    HGBA1C 5.5 10/03/2022       Pertinent Medications Reviewed: reviewed  NaPhos, insulin, NS @ 75 ml/hr, lactated ringers @ 75 ml/hr     Estimated/Assessed Needs     Weight Used For Calorie Calculations: 93.9 kg  Energy Calorie Requirements (kcal): MSJ ( x 1.2-1.4- as losing wt ) = 0185-5319 kcal  Energy Need Method: [place]  Protein Requirements: 1-1.2 g protein/kg (  g)  Weight Used For Protein Calculations: 93.9 kg  Fluid Requirements (mL): 3242-1301 ml or per MD  Estimated Fluid Requirement Method: RDA Method  CHO Requirement: 245-299 g        Nutrition Prescription Ordered     Current Diet Order: NPO x 1 day     Evaluation of Received Nutrient/Fluid Intake     Energy Calories Required: not meeting needs  Protein Required: not meeting needs  Fluid Required: not meeting needs  Tolerance: NPO       Intake/Output Summary (Last 24 hours) at 2022 1422  Last data filed at 2022 1336  Gross per 24 hour   Intake 2002.76 ml   Output 3875 ml   Net -1872.24 ml       % Intake of Estimated  Energy Needs: 0%  % Meal Intake: NPO     Nutrition Risk     Level of Risk/Frequency of Follow-up: moderate/low -1- 2x weekly     Monitor and Evaluation     Food and Nutrient Intake: energy intake, food and beverage intake  Food and Nutrient Adminstration: diet order  Anthropometric Measurements: weight  Biochemical Data, Medical Tests and Procedures: electrolyte and renal panel, gastrointestinal profile, glucose/endocrine profile  Nutrition-Focused Physical Findings: overall appearance   Nutrition Follow-Up     RD Follow-up?: Yes

## 2022-11-07 NOTE — PROGRESS NOTES
Flex sig done.  No bleeding noted.  Large hemorrhoids seen which are the likely source.  Recommend stool softener and high fiber diet.  GI to sign off.

## 2022-11-07 NOTE — HOSPITAL COURSE
Patient was admitted with GI bleeding.  He was placed on GI bleeding pathway.  GI was consulted.  His H/H was trended closely and he was maintained on telemetry.  He did not require blood transfusion and remained asymptomatic.  After admission he did not experience any further bloody stools.  He underwent flex sigmoidoscopy.  He was cleared from GI perspective for discharge.  Discharge instructions as well as return precautions were discussed with patient with good understanding.    Physical exam:  Awake alert oriented x4, no acute distress  Cardiac:  RRR  Pulmonary:  Clear to auscultation  Abdomen:  Soft, nontender  Extremities: normal range of motion

## 2022-11-07 NOTE — PLAN OF CARE
Home health resumed with [facility] to schedule PCP hospital follow up    Pt clear for DC from CM standpoint       11/07/22 1352   Final Note   Assessment Type Final Discharge Note   Anticipated Discharge Disposition Home-Health

## 2022-11-07 NOTE — TRANSFER OF CARE
"Anesthesia Transfer of Care Note    Patient: Dwight Hoffmann    Procedure(s) Performed: Procedure(s) (LRB):  SIGMOIDOSCOPY, FLEXIBLE (N/A)    Patient location: PACU    Anesthesia Type: general    Transport from OR: Transported from OR on room air with adequate spontaneous ventilation    Post pain: adequate analgesia    Post assessment: no apparent anesthetic complications and tolerated procedure well    Post vital signs: stable    Level of consciousness: sedated    Nausea/Vomiting: no nausea/vomiting    Complications: none    Transfer of care protocol was followed      Last vitals:   Visit Vitals  /76   Pulse 86   Temp 36.6 °C (97.9 °F) (Skin)   Resp 18   Ht 6' 1" (1.854 m)   Wt 93.9 kg (207 lb)   SpO2 99%   BMI 27.31 kg/m²     "

## 2022-11-07 NOTE — DISCHARGE SUMMARY
[facility] Medicine  Discharge Summary      Patient Name: Dwight Hoffmann  MRN: [id number]  SHAYAN: [id number]  Patient Class: OP- Observation  Admission Date: 11/6/2022  Hospital Length of Stay: 0 days  Discharge Date and Time: 11/7/2022  2:58 PM  Attending Physician: Cecy att. providers found   Discharging Provider: Kelsy Hackett NP  Primary Care Provider: BOSTON Oswald    Primary Care Team: Networked reference to record PCT     HPI:   Dwight Hoffmann is a 52 year old male with a past medical history of HLD and Diffuse B cell lymphoma with abdominal masses, who presented to the ED with rectal bleeding. He states he recently underwent his first chemotherapy treatment and experienced relief of his abdominal pain. He has been pain free but constipated, and has been taking Miralax. The pain returned a day or so ago, and tonight he experienced a hard bowel movement with bright red rectal bleeding. He reports some upper abdominal pain, mainly to the right upper quadrant and epigastric region. He denies nausea, vomiting, diarrhea or other complaint.     ED work up included a CBC that revealed pancytopenia. CMP shows hyperglycemia, which the patient contributes to recent steroid use while getting CHOP procedure. CT of the abdomen and pelvis with contrast revealed large central abdominal mass correlating with known non-Hodgkin's lymphoma. Overall mild  decrease in mass burden compared with prior. Hospital Medicine consulted for admission and further management.           Procedure(s) (LRB):  SIGMOIDOSCOPY, FLEXIBLE (N/A)      Hospital Course:   Patient was admitted with GI bleeding.  He was placed on GI bleeding pathway.  GI was consulted.  His H/H was trended closely and he was maintained on telemetry.  He did not require blood transfusion and remained asymptomatic.  After admission he did not experience any further bloody stools.  He underwent flex sigmoidoscopy.  He was cleared from GI  perspective for discharge.  Discharge instructions as well as return precautions were discussed with patient with good understanding.    Physical exam:  Awake alert oriented x4, no acute distress  Cardiac:  RRR  Pulmonary:  Clear to auscultation  Abdomen:  Soft, nontender  Extremities: normal range of motion         Goals of Care Treatment Preferences:  Code Status: Full Code      Consults:   Consults (From admission, onward)        Status Ordering Provider     Inpatient consult to Gastroenterology  Once        Provider:  Michael Mckeon Jr., MD    Completed HUI MAY          No new Assessment & Plan notes have been filed under this hospital service since the last note was generated.  Service: Hospital Medicine    Final Active Diagnoses:    Diagnosis Date Noted POA    PRINCIPAL PROBLEM:  Rectal bleeding [K62.5] 11/06/2022 Yes    Pancytopenia [D61.818] 11/06/2022 Yes    DLBCL (diffuse large B cell lymphoma) [C83.30] 09/28/2022 Yes    Hyperlipidemia, unspecified [E78.5] 09/27/2022 Yes    Moderate malnutrition [E44.0] 09/16/2022 Yes    Diabetes mellitus type 2, noninsulin dependent [E11.9] 09/14/2022 Yes      Problems Resolved During this Admission:       Discharged Condition: good    Disposition: Home or Self Care    Follow Up:   Follow-up Information     BOSTON Oswald Follow up in 1 week(s).    Specialty: Family Medicine  Why: [facility] scheduling hospital follow up  Contact information:  [address]  [address]  [place] [address]  [phone]             Josee Reid MD Follow up in 2 week(s).    Specialties: Hematology and Oncology, Hematology, Oncology  Contact information:  [address]  [address]  [place] [address] [phone]             [facility] Follow up.    Specialties: Home Health Services, Home Therapy Services, Home Living Aide Services  Contact information:  [address] [address]  [phone]                     Patient Instructions:       Diet diabetic     Notify your health care provider if you experience any of the following:   Order Comments: Further rectal bleeding     Notify your health care provider if you experience any of the following:  persistent dizziness, light-headedness, or visual disturbances     Notify your health care provider if you experience any of the following:  increased confusion or weakness     Notify your health care provider if you experience any of the following:  difficulty breathing or increased cough     SUBSEQUENT HOME HEALTH ORDERS     Order Specific Question Answer Comments   What Home Health Agency is the patient currently using? Other/External      Activity as tolerated       Significant Diagnostic Studies: Labs:   CMP   Recent Labs   Lab 11/06/22  0224 11/07/22  0442    139   K 4.2 3.5    104   CO2 23 27   * 121*   BUN 16 12   CREATININE 0.8 0.7   CALCIUM 8.5* 8.1*   PROT 6.8  --    ALBUMIN 2.7*  --    BILITOT 0.8  --    ALKPHOS 108  --    AST 23  --    ALT 17  --    ANIONGAP 10 8   , CBC   Recent Labs   Lab 11/06/22  1255 11/06/22  1746 11/07/22  0442   WBC 4.19 4.30 2.39*   HGB 8.0* 7.6* 7.8*   HCT 24.9* 23.7* 24.3*   * 94* 104*    and All labs within the past 24 hours have been reviewed  Radiology:   EXAMINATION:   CT ABDOMEN PELVIS WITH CONTRAST     CLINICAL HISTORY:   GI bleed;     TECHNIQUE:   Low dose axial images, sagittal and coronal reformations were obtained from the lung bases to the pubic symphysis following the IV administration of 100 mL of Omnipaque 350 and the oral administration of 30 mL of Omnipaque 350.     COMPARISON:   10/30/2022     FINDINGS:   There is a 10 cm enhancing mass seen within the mid abdomen with irregular contour.  On previous exam from 10/30/2022 this measured 10 cm in similar location.  Exact measurements are difficult given the irregular shape.  Multiple other smaller satellite lesions are visualized similar to what was seen on prior exam.      There is no evidence bowel obstruction.  There is sigmoid diverticulosis.  Patient is status post appendectomy.     There is no evidence of abdominal nor pelvic lymphadenopathy.  The osseous structures are unremarkable.     There is no evidence bowel obstruction.  The patient is status post appendectomy.  The osseous structures are unremarkable.    Impression:       In this patient with a known lymphoma the abdominal mass and satellite lesions appears similar to what was seen on CT from 10/30/2022.  There is no evidence acute change since prior exam of 10/30/2022.       Electronically signed by: Sussy Call MD   Date: 11/06/2022   Time: 07:37       Sigmoidoscopy:    Impression:            - Non-bleeding internal hemorrhoids.                          - Diverticulosis in the sigmoid colon and in the                          descending colon.                          - Stool in the proximal descending colon.                          - No specimens collected.   Recommendation:        - Return patient to hospital recio for ongoing care.                          - High fiber diet.                          - Return to GI office at appointment to be                          scheduled.     Pending Diagnostic Studies:     None         Medications:  Reconciled Home Medications:      Medication List      START taking these medications    docusate sodium 50 MG capsule  Commonly known as: COLACE  Take 1 capsule (50 mg total) by mouth 2 (two) times daily.        CONTINUE taking these medications    allopurinoL 100 MG tablet  Commonly known as: ZYLOPRIM  Take 1 tablet (100 mg total) by mouth once daily.     blood sugar diagnostic Strp  To check BG 2 times daily, to use with insurance preferred meter     blood-glucose meter kit  To check BG 2 times daily, to use with insurance preferred meter     chlorproMAZINE 25 MG tablet  Commonly known as: THORAZINE  Take 1 tablet (25 mg total) by mouth 3 (three) times daily.      ciprofloxacin HCl 500 MG tablet  Commonly known as: CIPRO  Take 1 tablet (500 mg total) by mouth once daily.     clotrimazole 1 % cream  Commonly known as: LOTRIMIN  Apply topically 2 (two) times daily.     lancets Misc  To check BG 2 times daily, to use with insurance preferred meter     LOTRIMIN ULTRA 1 % cream  Generic drug: butenafine  Per instructions 2 TIMES DAILY (route: topical)     NORCO 5-325 mg per tablet  Generic drug: HYDROcodone-acetaminophen  1 tablet EVERY 4 HOURS (route: oral)     * ondansetron 4 MG Tbdl  Commonly known as: ZOFRAN-ODT  Take 1 tablet (4 mg total) by mouth every 6 (six) hours as needed.     * ondansetron 4 MG Tbdl  Commonly known as: ZOFRAN-ODT  Take 1 tablet by mouth once daily.     * promethazine 12.5 MG Tab  Commonly known as: PHENERGAN  Take 1 tablet (12.5 mg total) by mouth every 4 (four) hours as needed.     * promethazine 12.5 MG Tab  Commonly known as: PHENERGAN  Take 1 tablet by mouth once daily.         * This list has 4 medication(s) that are the same as other medications prescribed for you. Read the directions carefully, and ask your doctor or other care provider to review them with you.            ASK your doctor about these medications    predniSONE 20 MG tablet  Commonly known as: DELTASONE  Take 100 mg by mouth once daily.  Ask about: Should I take this medication?            Indwelling Lines/Drains at time of discharge:   Lines/Drains/Airways     Central Venous Catheter Line  Duration                PowerPort A Cath Single Lumen 10/31/22 0826 right internal jugular 7 days                Time spent on the discharge of patient: 38 minutes         Kelsy Hackett NP  Department of Hospital Medicine  [facility]

## 2022-11-07 NOTE — ANESTHESIA PREPROCEDURE EVALUATION
11/07/2022  Dwight Hoffmann is a 52 y.o., male.      Pre-op Assessment    I have reviewed the Patient Summary Reports.     I have reviewed the Nursing Notes. I have reviewed the NPO Status.   I have reviewed the Medications.     Review of Systems  Anesthesia Hx:  No problems with previous Anesthesia    Social:  Non-Smoker    Hematology/Oncology:         -- Anemia: Hematology Comments: Diffuse large B cell lymphoma  Current/Recent Cancer. chemotherapy    EENT/Dental:EENT/Dental Normal   Cardiovascular:  Cardiovascular Normal     Pulmonary:  Pulmonary Normal    Hepatic/GI:   Bowel Prep. Rectal bleeding    Musculoskeletal:  Musculoskeletal Normal    Neurological:  Neurology Normal    Endocrine:   Diabetes    Dermatological:  Skin Normal    Psych:  Psychiatric Normal           Physical Exam  General: Well nourished, Alert, Cooperative and Oriented    Airway:  Mallampati: II   Mouth Opening: Normal  TM Distance: Normal      Dental:  Intact        Anesthesia Plan  Type of Anesthesia, risks & benefits discussed:    Anesthesia Type: Gen Natural Airway  Intra-op Monitoring Plan: Standard ASA Monitors  Induction:  IV  Informed Consent: Informed consent signed with the Patient and all parties understand the risks and agree with anesthesia plan.  All questions answered.   ASA Score: 3  Day of Surgery Review of History & Physical: H&P Update referred to the surgeon/provider.    Ready For Surgery From Anesthesia Perspective.     .

## 2022-11-07 NOTE — PLAN OF CARE
Recommendations  1) Change diet to high fiber diet, DM 2000 kcal, cardiac diet  2) weigh weekly   3) Boost glucose control BID if PO intakes not > 75% of meals at f/u     Goals: 1) PO intakes > 75% of meals at f/u  Nutrition Goal Status: new  Communication of RD Recs:  (POC, sticky note)

## 2022-11-07 NOTE — PLAN OF CARE
11/06/22 2004   Patient Assessment/Suction   Level of Consciousness (AVPU) alert   Respiratory Effort Normal;Unlabored   Rhythm/Pattern, Respiratory pattern regular   PRE-TX-O2   O2 Device (Oxygen Therapy) room air   SpO2 99 %   Pulse Oximetry Type Intermittent   $ Pulse Oximetry - Single Charge Pulse Oximetry - Single

## 2022-11-08 NOTE — ANESTHESIA POSTPROCEDURE EVALUATION
Anesthesia Post Evaluation    Patient: Dwight Hoffmann    Procedure(s) Performed: Procedure(s) (LRB):  SIGMOIDOSCOPY, FLEXIBLE (N/A)    Final Anesthesia Type: general      Patient location during evaluation: PACU  Patient participation: Yes- Able to Participate  Level of consciousness: awake and alert  Post-procedure vital signs: reviewed and stable  Pain management: adequate  Airway patency: patent    PONV status at discharge: No PONV  Anesthetic complications: no      Cardiovascular status: hemodynamically stable  Respiratory status: unassisted and room air  Hydration status: euvolemic  Follow-up not needed.          Vitals Value Taken Time   /86 11/07/22 1335   Temp 36.6 °C (97.9 °F) 11/07/22 1335   Pulse 94 11/07/22 1335   Resp 14 11/07/22 1335   SpO2 97 % 11/07/22 1335         Event Time   Out of Recovery 11/07/2022 13:37:46         Pain/Jt Score: Jt Score: 10 (11/7/2022  1:35 PM)

## 2022-11-11 PROBLEM — T45.1X5A CHEMOTHERAPY INDUCED NEUTROPENIA: Status: ACTIVE | Noted: 2022-01-01

## 2022-11-11 PROBLEM — D70.1 CHEMOTHERAPY INDUCED NEUTROPENIA: Status: ACTIVE | Noted: 2022-01-01

## 2022-11-11 NOTE — TELEPHONE ENCOUNTER
Per Mariana, pt will be able to get neupogen today. This nurse called pt's spouse Sundar. No answer. LVM.    Called pt to notify and ask that he come in asap. He v/u.

## 2022-11-11 NOTE — TELEPHONE ENCOUNTER
Dr Reid ordered neulasta for this pt d/t neutropenia. No auth d/t new order. This nurse [facility] both Ed and [facility] if auth can be obtained on the back end. Per Mariana, auth must be obtained prior to admin of drug.     This nurse called Orlin Benítez to begin auth stat. She stated she will begin now.

## 2022-11-11 NOTE — PROGRESS NOTES
"Subjective:       Patient ID: Dwight Hoffmann is a 52 y.o. male.    Chief Complaint:   HPI  52 year old male with a history of NIDDM2 and HLD who presents today with complaints of night sweats for weeks. It is moderate. It is associated with 25 lb wt loss over the last 2 months, urinary hesitancy, urinary frequency, occasional blood streaked stools, weakness, fatigue, neck pain, knee pain, and pelvic pain. He denies measured fever, chills, N/V/D, chest pain, or SOB. He was seen by his primary last month about the blood streaked stools and referred to Dr. Clemens for colonoscopy on 9/1 with multiple polypectomies with path report of tubular adenomas. In the ED, labs revealed microcytic anemia, he was mildly tachycardic on arrival  which improved spontaneously, /60,  and CT abd/pelvis revealing: "Thick-walled collection containing feculent material, widely communicating with small bowel, seen centrally in the pelvis. Findings are suspicious for necrotic mass or abscess arising from the small bowel  Surgery was consulted and performed exploratory laparotomy 9/14/22 with resection of the mass and anastomosis of small bowel. The mass appeared to be an abscess with surrounding necrosis.  Patient with signs of sepsis including tachycardic and leukocytosis which could be related to surgery as well which complicated his picture however he was covered for this with with IV antibiotics. An NG tube to LIWS suction was started and the patient was put on IV fluids while he was NPO. Briefly on PPN. He did have a positive blood culture which showed coagulase negative Staph suspected to be a contaminant. He was briefly on vancomycin which was discontinued. ID consulted.  Patient was put on meropenem and fluconazole.  Cultures from surgery with Enterobacter cloacae and Proteus mirabilis.  His bowel function slowly returned, and he no longer needed NGT to suction.  The pathologic diagnosis on the mass was diffuse large B " cell lymphoma.  Oncology service was asked to consult  Patient underwent bone marrow biopsy 9/22/22 for staging.  CT of chest showed no enlarged lymph nodes. Did have another laparoscopy 9/23/22 due to abdominal fluid collection with cleanout thought to be more necrotic than infective. ID planned for outpatient ertapenem infusions to complete 2 week course  Review of Systems    Rectal bleeding, went to hospital was scoped  non bleeding hemorrhoids   Has abd pain, had as bm on Saturday but started bleeding soon after   No fever, mild sob+    Wife and pt both report anxiety, weakness, afraid to eat because he throws up, takes zofran.   But not alays the way instructed    Denies seizure activity or focal weaknesses or symptoms related to TIA, no head aches or blurred vision reported  Denies issues with skin rash or bruising  Denies issues with swelling of feet, tingling or numbness   +issues with sleep,     Good family support reported         Past Medical History:   Diagnosis Date    Cancer     Diabetes mellitus     Digestive disorder     Prediabetes      Past Surgical History:   Procedure Laterality Date    APPENDECTOMY  07/15/2014    COLONOSCOPY      COLONOSCOPY N/A 02/09/2022    ERROR.   He did not have a colonoscopy with Dr. Sanders.    COLONOSCOPY N/A 09/01/2022    Procedure: COLONOSCOPY;  Surgeon: Andrea Clemens MD;  Location: [facility] ENDO;  Service: Endoscopy;  Laterality: N/A;    FLEXIBLE SIGMOIDOSCOPY N/A 11/7/2022    Procedure: SIGMOIDOSCOPY, FLEXIBLE;  Surgeon: Manuel Sanders MD;  Location: [facility] ENDO;  Service: Endoscopy;  Laterality: N/A;    INCISION AND DRAINAGE OF ABDOMEN N/A 09/14/2022    Procedure: INCISION AND DRAINAGE, ABDOMEN;  Surgeon: Jan Oliveira Jr., MD;  Location: [facility] OR;  Service: General;  Laterality: N/A;    INSERTION OF TUNNELED CENTRAL VENOUS CATHETER (CVC) WITH SUBCUTANEOUS PORT N/A 10/31/2022    Procedure: [id number];  Surgeon: Jan Oliveira Jr., MD;   Location: [facility] OR;  Service: General;  Laterality: N/A;    LAPAROSCOPIC DRAINAGE OF ABDOMEN N/A 09/23/2022    Procedure: DRAINAGE, ABDOMEN, LAPAROSCOPIC;  Surgeon: Jan Oliveira Jr., MD;  Location: [facility] OR;  Service: General;  Laterality: N/A;     Family History   Problem Relation Age of Onset    Cancer Mother         Colon    Diabetes Mother     Hypertension Mother     Seizures Father     Heart murmur Sister     Seizures Sister       Social History     Socioeconomic History    Marital status:     Number of children: 2   Occupational History    Occupation: Truck Drive   Tobacco Use    Smoking status: Never    Smokeless tobacco: Never   Substance and Sexual Activity    Alcohol use: Not Currently     Comment: occasional    Drug use: No    Sexual activity: Yes     Partners: Female     Social Determinants of Health     Financial Resource Strain: Low Risk     Difficulty of Paying Living Expenses: Not hard at all   Food Insecurity: No Food Insecurity    Worried About Running Out of Food in the Last Year: Never true    Ran Out of Food in the Last Year: Never true   Transportation Needs: No Transportation Needs    Lack of Transportation (Medical): No    Lack of Transportation (Non-Medical): No   Physical Activity: Inactive    Days of Exercise per Week: 0 days    Minutes of Exercise per Session: 0 min   Stress: No Stress Concern Present    Feeling of Stress : Only a little   Social Connections: Moderately Isolated    Frequency of Communication with Friends and Family: Twice a week    Frequency of Social Gatherings with Friends and Family: Twice a week    Attends Sikhism Services: Never    Active Member of Clubs or Organizations: No    Attends Club or Organization Meetings: Never    Marital Status:    Housing Stability: Low Risk     Unable to Pay for Housing in the Last Year: No    Number of Places Lived in the Last Year: 1    Unstable Housing in the Last Year: No     Review of patient's allergies  indicates:  No Known Allergies    Current Outpatient Medications:     allopurinoL (ZYLOPRIM) 100 MG tablet, Take 1 tablet (100 mg total) by mouth once daily., Disp: 90 tablet, Rfl: 3    blood sugar diagnostic Strp, To check BG 2 times daily, to use with insurance preferred meter, Disp: 200 each, Rfl: 1    blood-glucose meter kit, To check BG 2 times daily, to use with insurance preferred meter, Disp: 1 each, Rfl: 0    butenafine 1 % cream, Per instructions 2 TIMES DAILY (route: topical), Disp: , Rfl:     chlorproMAZINE (THORAZINE) 25 MG tablet, Take 1 tablet (25 mg total) by mouth 3 (three) times daily., Disp: 90 tablet, Rfl: 11    ciprofloxacin HCl (CIPRO) 500 MG tablet, Take 1 tablet (500 mg total) by mouth once daily., Disp: 30 tablet, Rfl: 0    clotrimazole (LOTRIMIN) 1 % cream, Apply topically 2 (two) times daily., Disp: 28 g, Rfl: 0    docusate sodium (COLACE) 50 MG capsule, Take 1 capsule (50 mg total) by mouth 2 (two) times daily., Disp: 60 capsule, Rfl: 0    HYDROcodone-acetaminophen (NORCO) 5-325 mg per tablet, 1 tablet EVERY 4 HOURS (route: oral), Disp: , Rfl:     lancets Misc, To check BG 2 times daily, to use with insurance preferred meter, Disp: 200 each, Rfl: 1    ondansetron (ZOFRAN-ODT) 4 MG TbDL, Take 1 tablet (4 mg total) by mouth every 6 (six) hours as needed., Disp: 90 tablet, Rfl: 2    ondansetron (ZOFRAN-ODT) 4 MG TbDL, Take 1 tablet by mouth once daily., Disp: , Rfl:     promethazine (PHENERGAN) 12.5 MG Tab, Take 1 tablet (12.5 mg total) by mouth every 4 (four) hours as needed., Disp: 30 tablet, Rfl: 3    promethazine (PHENERGAN) 12.5 MG Tab, Take 1 tablet by mouth once daily., Disp: , Rfl:     Physical Exam    Wt Readings from Last 3 Encounters:   11/11/22 89.4 kg (197 lb 1.5 oz)   11/07/22 93 kg (205 lb)   11/06/22 96.6 kg (213 lb)     Temp Readings from Last 3 Encounters:   11/11/22 97.2 °F (36.2 °C) (Temporal)   11/07/22 97.9 °F (36.6 °C) (Skin)   11/06/22 98.4 °F (36.9 °C)     BP  Readings from Last 3 Encounters:   11/11/22 120/80   11/07/22 123/86   11/06/22 (!) 140/87     Pulse Readings from Last 3 Encounters:   11/11/22 109   11/07/22 94   11/06/22 84      VITAL SIGNS:  as above   GENERAL: appears well-built, well-nourished.  No anxiety, no agitation, and in no distress.  Patient is awake, alert, oriented and cooperative.  HEENT:  Showed no congestion. Trachea is central no obvious icterus or pallor noted no hoarseness. no obvious JVD   NECK:  Supple.  No JVD. No obvious cervical submental or supraclavicular adenopathy.  RS:the visualized portion of  Chest expands well. chest appears symmetric, no audible wheezes.  No dyspnea recognized  ABDOMEN:  abdomen appears  slightly distended, sx scar   EXTREMITIES:  Without edema.  NEUROLOGICAL:  The patient is appropriate, higher functions are normal.  No  obvious neurological deficits.  normal judgement normal thought content  No confusion, no speech impediment. Cranial nerves are intact and show no deficit. No gross motor deficits noted   SKIN MUSCULOSKELETAL: no joint or skeletal deformity, no clubbing of nails.  No visible rash ecchymosis or petechiae   Lab Results   Component Value Date    WBC 1.10 (LL) 11/10/2022    HGB 9.3 (L) 11/10/2022    HCT 30.0 (L) 11/10/2022    MCV 80 (L) 11/10/2022     11/10/2022       BMP  Lab Results   Component Value Date     11/10/2022    K 4.0 11/10/2022    CL 99 11/10/2022    CO2 26 11/10/2022    BUN 11 11/10/2022    CREATININE 0.8 11/10/2022    CALCIUM 9.4 11/10/2022    ANIONGAP 11 11/10/2022    ESTGFRAFRICA 111 12/28/2021    EGFRNONAA 96 12/28/2021     Lab Results   Component Value Date    IRON 19 (L) 09/21/2022    TIBC 241 (L) 09/21/2022    FERRITIN 655 (H) 09/21/2022 9/26/22  Final Pathologic Diagnosis 1. Small bowel, resection:  Diffuse large B-cell lymphoma, non-germinal   center origin.  Final classification is pending C-MYC rearrangement analysis   result.  See comment.   2. Omentum,  resection: Diffuse large B-cell lymphoma, non-germinal center   origin.  Final classification is pending C-MYC rearrangement analysis result.    Flow cytometry analysis of tissue was not performed.   Immunohistochemical studies were performed on the paraffin embedded tissue   block 1 C with adequate positive and negative controls.  The atypical   lymphocytes are positive for CD20,  BCL6, MUM1, MYC, high Ki-67 (99%) and are   negative for CD10, CD30, BCL-2, cyclin D1, .  Reactive T-cells are CD3   positive and BCL-2 positive.  In Situ hybridization for EBV is negative.   Findings are consistent with diffuse large B-cell lymphoma, non germinal   center origin.  Final classification is pending C-MYC rearrangement analysis   by FISH.  A supplemental report will follow  9/22  The left ventricle is normal in size with concentric remodeling and normal systolic function.  The estimated ejection fraction is 60%.  Grade I left ventricular diastolic dysfunction.  Normal right ventricular size with normal right ventricular systolic function.  Mild left atrial enlargement.  Mild mitral regurgitation.  Mild to moderate tricuspid regurgitation.  Normal central venous pressure (3 mmHg).  The estimated PA systolic pressure is 49 mmHg.  There is pulmonary hypertension.  Mild right atrial enlargement.  No vegetations were seen  9/22/22   Bone marrow, right iliac crest, aspirate, clot and core biopsy:   --Normocellular marrow for age ranging from 30-70% with trilineage   hematopoiesis, see comment   --No increase in blasts by aspirate count and CD34 immunohistochemical stain,   see comment   --Increased megakaryocytes with atypia, see comment   --No morphologic evidence of metastatic lymphoma   --Stainable iron is not increased, see comment   --Minimal to mild reticulin fibrosis   Comment:  Concomitantly submitted flow cytometric analysis detects a tight   cluster of myeloid blasts.   The blast gate is mildly increased with a  tight   cluster of blasts present showing coexpression of CD13 and CD34 with   expression of cytoplasmic myeloperoxidase.  The tight   cluster represents approximately 6% of total events.   Lymphocytes are   composed of a mixture of  polyclonal B lymphocytes and T lymphocytes that are   immunophenotypically unremarkable.   This marrow shows overall normocellular marrow for age with no morphologic   evidence of metastatic lymphoma.  There is some atypia of the megakaryocytic   lineage as well as a tight cluster of CD34/CD13 positive blasts detected by   flow cytometry, although increased blasts are not appreciated on the aspirate   smear or by CD34 immunohistochemical stain.  By CD61 immunohistochemical   stain, megakaryocytes appear mildly increased without significant clustering   appreciated.  Overt morphologic dysplasia of the myeloid and erythroid   lineages is not appreciated.  These findings are abnormal, and a   myelodysplastic or myeloproliferative process can not be completely ruled   out.  Therefore, correlation with any available cytogenetic and molecular   studies is recommended including studies to rule out myeloproliferative   neoplasms.  If chromosomes are normal, next generation sequencing could be   considered.       Summary echo 10/6/22    The left ventricle is normal in size with concentric remodeling and normal systolic function.  The estimated ejection fraction is 60%.  Grade I left ventricular diastolic dysfunction.  Normal right ventricular size with normal right ventricular systolic function.  Mild left atrial enlargement.  Mild mitral regurgitation.  Mild to moderate tricuspid regurgitation.  Normal central venous pressure (3 mmHg).  The estimated PA systolic pressure is 49 mmHg.  There is pulmonary hypertension.  Mild right atrial enlargement.  No vegetations were seen     IMPRESSION:pet 10/14/22     Large multiloculated necrotic soft tissue mass in the central abdomen extending into the  pelvis which is markedly hypermetabolic. This is consistent with the provided history of lymphoproliferative malignancy.     Numerous additional hypermetabolic soft tissue masses in abdomen, also suspicious for lymphoproliferative malignancy.     Mild diffuse osseous FDG hypermetabolism which could be on the basis of red marrow reconversion or lymphoproliferative malignancy. Please correlate with bone marrow biopsy results.     Mild asymmetric right palatine tonsillar FDG activity, nonspecific. Attention on follow-up is recommended.  Patient Active Problem List   Diagnosis    Small bowel mass    Anemia, chronic disease    Diabetes mellitus type 2, noninsulin dependent    Arthralgia of bilat knees and neck    Moderate malnutrition    Intra-abdominal abscess    S/P exploratory laparotomy    S/P small bowel resection    Sepsis    DLBCL (diffuse large B cell lymphoma)    After care    Iron deficiency anemia, unspecified    Hyperlipidemia, unspecified    Rectal bleeding    Pancytopenia        Assessment and Plan     DLBCL:bone marrow bx  neg for lymphoma with tight cluster of blasts 6%( smal number)  positive for CD20,  BCL6, MUM1, MYC, high Ki-67 (99%)   -negative for CD10, CD30, BCL-2, cyclin D1, .  Reactive T-cells are CD3   --FISH negative for rearrangement of MYC ; no IGH/MYC fusion was observed  uric acid  7.3 sent allopurinol  HBV, HCV, HIV serologies; negative  PET CT  10/22 as above  --IPI pending; might require IT chemotherapy based on IPI  --discussed treatment with [facility], as it is still the standard of care for n on GC DLBCL  --plan for 6 cycles,    S/p first cycle and is neutropenic, will see if neulasta can be given   see make next week with cbc, cmp, ldh, uric acid with possible RT to areas of bulky disease   See me for next cycle cbc, cmp, uric acid, phosphorus magnesium ldh schedule cycle 2  . T2DM not on long term insulin     --on metformin , follows with his PCP        3. Depression     --he  denies suicidal ideation  --he has upcoming appointment with psychiatry           4. Abnormal BM biopsy     --tight cluster of myeloid blasts approximating 6% was found on BM done on 9/22/22  --significance un clear  --no dysplastic changes  --cytogenetics normal  --will require repeat BM biopsy after chemotherapy

## 2022-11-11 NOTE — Clinical Note
See me for chemo cuycle wioth cbc,c m, phos, mag, uric acid ldh . See if they can give neulatsa today

## 2022-11-11 NOTE — PLAN OF CARE
Problem: Fatigue  Goal: Improved Activity Tolerance  Outcome: Ongoing, Progressing  Intervention: Promote Improved Energy  Flowsheets (Taken 11/11/2022 1600)  Fatigue Management: frequent rest breaks encouraged  Sleep/Rest Enhancement: regular sleep/rest pattern promoted  Activity Management:   Ambulated -L4   Up in chair - L3

## 2022-11-14 NOTE — PLAN OF CARE
Problem: Fatigue  Goal: Improved Activity Tolerance  Outcome: Ongoing, Progressing  Intervention: Promote Improved Energy  Flowsheets (Taken 11/14/2022 1051)  Fatigue Management:   fatigue-related activity identified   frequent rest breaks encouraged   paced activity encouraged  Sleep/Rest Enhancement: relaxation techniques promoted

## 2022-11-15 NOTE — PLAN OF CARE
Problem: Fatigue  Goal: Improved Activity Tolerance  Outcome: Ongoing, Progressing  Intervention: Promote Improved Energy  Flowsheets (Taken 11/15/2022 1122)  Fatigue Management:   fatigue-related activity identified   frequent rest breaks encouraged   paced activity encouraged  Sleep/Rest Enhancement: relaxation techniques promoted

## 2022-11-18 NOTE — PROGRESS NOTES
PSYCHO-ONCOLOGY NOTE/ Individual Psychotherapy     Date: 11/18/2022   Site:  [facility]      Therapeutic Intervention: Met with patient.  Outpatient - Insight oriented psychotherapy 60 min - CPT code 99797, Outpatient - Behavior modifying psychotherapy 60 min - CPT code 57944, and Outpatient - Supportive psychotherapy 60 min - CPT Code 78755    This includes face to face time and non-face to face time preparing to see the patient, obtaining and/or reviewing separately obtained history, documenting clinical information in the electronic or other health record, independently interpreting results and communicating results to the patient/family/caregiver, or care coordinator.      Patient was last seen by me on 10/26/2022    Problem list  Patient Active Problem List   Diagnosis    Small bowel mass    Anemia, chronic disease    Diabetes mellitus type 2, noninsulin dependent    Arthralgia of bilat knees and neck    Moderate malnutrition    Intra-abdominal abscess    S/P exploratory laparotomy    S/P small bowel resection    Sepsis    DLBCL (diffuse large B cell lymphoma)    After care    Iron deficiency anemia, unspecified    Hyperlipidemia, unspecified    Rectal bleeding    Pancytopenia    Chemotherapy induced neutropenia       Chief complaint/reason for encounter: adjustment to illness, anxiety, depression   Met with patient to evaluate psychosocial adaptation to diagnosis/treatment of diffuse large B cell lymphoma    Current Medications  Current Outpatient Medications   Medication    allopurinoL (ZYLOPRIM) 100 MG tablet    blood sugar diagnostic Strp    blood-glucose meter kit    butenafine 1 % cream    chlorproMAZINE (THORAZINE) 25 MG tablet    ciprofloxacin HCl (CIPRO) 500 MG tablet    clotrimazole (LOTRIMIN) 1 % cream    docusate sodium (COLACE) 50 MG capsule    HYDROcodone-acetaminophen (NORCO) 5-325 mg per tablet    lancets Misc    ondansetron (ZOFRAN-ODT) 4 MG TbDL    ondansetron (ZOFRAN-ODT) 4 MG TbDL     pantoprazole (PROTONIX) 40 MG tablet    promethazine (PHENERGAN) 12.5 MG Tab    promethazine (PHENERGAN) 12.5 MG Tab     No current facility-administered medications for this visit.       ONCOLOGY HISTORY  Oncology History   DLBCL (diffuse large B cell lymphoma)   9/28/2022 Initial Diagnosis    DLBCL (diffuse large B cell lymphoma)     11/1/2022 -  Chemotherapy    Treatment Summary   Plan Name: IP CHOP + OP RITUXIMAB Q3W  Treatment Goal: Curative  Status: Active  Start Date: 11/1/2022  End Date: 2/16/2023 (Planned)  Provider: Josee Reid MD  Chemotherapy: DOXOrubicin chemo injection 112 mg, 50 mg/m2 = 112 mg, Intravenous, Every 24 hours (non-standard times), 1 of 6 cycles  Administration: 112 mg (11/1/2022)  vinCRIStine (ONCOVIN) 2 mg in sodium chloride 0.9% 50 mL chemo infusion, 2 mg (100 % of original dose 2 mg), Intravenous, Every 24 hours (non-standard times), 1 of 6 cycles  Dose modification: 2 mg (original dose 2 mg, Cycle 1, Reason: MD Discretion)  Administration: 2 mg (11/1/2022)  cyclophosphamide 750 mg/m2 = 1,660 mg in sodium chloride 0.9% 258.3 mL chemo infusion, 750 mg/m2 = 1,660 mg, Intravenous, Every 24 hours (non-standard times), 1 of 6 cycles  Administration: 1,660 mg (11/1/2022)  riTUXimab-pvvr (RUXIENCE) 800 mg in sodium chloride 0.9% 800 mL infusion (conc: 1 mg/mL), 833 mg, Intravenous, Clinic/HOD 1 time, 1 of 6 cycles  Administration: 800 mg (11/3/2022)           Objective:  Dwight Hoffmann arrived promptly for the session. Mr. Hoffmann was independently ambulatory at the time of session. The patient was fully cooperative throughout the session.  Appearance: age appropriate, casually  dressed, well groomed  Behavior/Cooperation: friendly and cooperative  Speech: normal in rate, volume, and tone and appropriate quality, quantity and organization of sentences  Mood: steady  Affect: euthymic and mood congruent  Thought Process: goal-directed, logical  Thought Content: normal,  No delusions  or paranoia; did not appear to be responding to internal stimuli during the session  Orientation: grossly intact  Memory: grossly intact  Attention Span/Concentration: Attends to session without distraction; reports no difficulty  Fund of Knowledge: average  Estimate of Intelligence: average from verbal skills and history  Cognition: grossly intact  Insight: patient has awareness of illness; good insight into own behavior and behavior of others  Judgment: the patient's behavior is adequate to circumstances    NCCN Distress thermometer: No flowsheet data found.     Interval history and content of current session: Discussed treatment and family's adaptation to disease and treatment status. Reports to be coping in an adequate manner, though notes feeling too close to family secondary to their elevated level of concern for him (sometimes feels as though family act as caregivers and not family). Associated thoughts are in evidence with moderate distress. Provided cognitive behavioral therapy to address negative cognitions, encouraging creation of new family traditions. Processed emotions associated w/ recent ED visit. Identified and evaluated psychosocial and environmental stressors secondary to diagnosis and treatment.  Examined proactive behaviors that may be implemented to minimize or ameliorate psychosocial stressors secondary to diagnosis and treatment.     Risk parameters:   Patient reports no suicidal ideation  Patient reports no homicidal ideation  Patient reports no self-injurious behavior  Patient reports no violent behavior   Safety needs:  None at this time      Verbal deficits: None     Patient's response to intervention:The patient's response to intervention is accepting, motivated.     Progress toward goals and other mental status changes:  The patient's progress toward goals is good.      Progress to date:Progress as Expected      Goals from last visit: Met        Patient Strengths: verbal, intelligent,  successful, good social support, good insight, commitment to wellness, strong deni, strong cultural traditions      Treatment Plan:individual psychotherapy  Target symptoms: depression, anxiety , adjustment  Why chosen therapy is appropriate versus another modality: relevant to diagnosis, patient responds to this modality, evidence based practice  Outcome monitoring methods: self-report, observation, tx team feedback  Therapeutic intervention type: insight oriented psychotherapy, behavior modifying psychotherapy, supportive psychotherapy  Prognosis: Good      Behavioral goals:    Social engagement: continued, adaptive communication   Therapy: CBT, cognitive restructuring    Return to clinic: 1 month     Length of Service (minutes direct face-to-face contact): 60    Diagnosis:     ICD-10-CM ICD-9-CM   1. Adjustment disorder with anxiety  F43.22 309.24   2. Diffuse large B-cell lymphoma, unspecified body region  C83.30 202.80                Luke Zelaya License #[id number]  [place] License #[id number]

## 2022-11-28 NOTE — Clinical Note
Ok fro chemo, see me for nwxt cyel with cbc,c mp  Please let infusion know we nned the groweth factor after each cycle

## 2022-11-28 NOTE — PROGRESS NOTES
"Subjective:       Patient ID: Dwight Hoffmann is a 52 y.o. male.    Chief Complaint:   HPI  52 year old male with a history of NIDDM2 and HLD who presents today with complaints of night sweats for weeks. It is moderate. It is associated with 25 lb wt loss over the last 2 months, urinary hesitancy, urinary frequency, occasional blood streaked stools, weakness, fatigue, neck pain, knee pain, and pelvic pain. He denies measured fever, chills, N/V/D, chest pain, or SOB. He was seen by his primary last month about the blood streaked stools and referred to Dr. Clemens for colonoscopy on 9/1 with multiple polypectomies with path report of tubular adenomas. In the ED, labs revealed microcytic anemia, he was mildly tachycardic on arrival  which improved spontaneously, /60,  and CT abd/pelvis revealing: "Thick-walled collection containing feculent material, widely communicating with small bowel, seen centrally in the pelvis. Findings are suspicious for necrotic mass or abscess arising from the small bowel  Surgery was consulted and performed exploratory laparotomy 9/14/22 with resection of the mass and anastomosis of small bowel. The mass appeared to be an abscess with surrounding necrosis.  Patient with signs of sepsis including tachycardic and leukocytosis which could be related to surgery as well which complicated his picture however he was covered for this with with IV antibiotics. An NG tube to LIWS suction was started and the patient was put on IV fluids while he was NPO. Briefly on PPN. He did have a positive blood culture which showed coagulase negative Staph suspected to be a contaminant. He was briefly on vancomycin which was discontinued. ID consulted.  Patient was put on meropenem and fluconazole.  Cultures from surgery with Enterobacter cloacae and Proteus mirabilis.  His bowel function slowly returned, and he no longer needed NGT to suction.  The pathologic diagnosis on the mass was diffuse large B " cell lymphoma.  Oncology service was asked to consult  Patient underwent bone marrow biopsy 9/22/22 for staging.  CT of chest showed no enlarged lymph nodes. Did have another laparoscopy 9/23/22 due to abdominal fluid collection with cleanout thought to be more necrotic than infective. ID planned for outpatient ertapenem infusions to complete 2 week course  Review of Systems    Rectal bleeding,stopped went to hospital was scoped  non bleeding hemorrhoids   Has abd pain,better now     No fever, mild sob+    Pt doeing much better   Occ sob on activity    Denies seizure activity or focal weaknesses or symptoms related to TIA, no head aches or blurred vision reported  Denies issues with skin rash or bruising  Denies issues with swelling of feet, tingling or numbness   +issues with sleep,     Good family support reported         Past Medical History:   Diagnosis Date    Cancer     Diabetes mellitus     Digestive disorder     Prediabetes      Past Surgical History:   Procedure Laterality Date    APPENDECTOMY  07/15/2014    COLONOSCOPY      COLONOSCOPY N/A 02/09/2022    ERROR.   He did not have a colonoscopy with Dr. Sanders.    COLONOSCOPY N/A 09/01/2022    Procedure: COLONOSCOPY;  Surgeon: Andrea Clemens MD;  Location: [facility];  Service: Endoscopy;  Laterality: N/A;    FLEXIBLE SIGMOIDOSCOPY N/A 11/7/2022    Procedure: SIGMOIDOSCOPY, FLEXIBLE;  Surgeon: Manuel Sanders MD;  Location: [facility];  Service: Endoscopy;  Laterality: N/A;    INCISION AND DRAINAGE OF ABDOMEN N/A 09/14/2022    Procedure: INCISION AND DRAINAGE, ABDOMEN;  Surgeon: Jan Oliveira Jr., MD;  Location: [facility] OR;  Service: General;  Laterality: N/A;    INSERTION OF TUNNELED CENTRAL VENOUS CATHETER (CVC) WITH SUBCUTANEOUS PORT N/A 10/31/2022    Procedure: [id number];  Surgeon: Jan Oliveira Jr., MD;  Location: [facility] OR;  Service: General;  Laterality: N/A;    LAPAROSCOPIC DRAINAGE OF ABDOMEN N/A 09/23/2022    Procedure:  DRAINAGE, ABDOMEN, LAPAROSCOPIC;  Surgeon: Jan Oliveira Jr., MD;  Location: [facility];  Service: General;  Laterality: N/A;     Family History   Problem Relation Age of Onset    Cancer Mother         Colon    Diabetes Mother     Hypertension Mother     Seizures Father     Heart murmur Sister     Seizures Sister       Social History     Socioeconomic History    Marital status:     Number of children: 2   Occupational History    Occupation: Truck [employer]   Tobacco Use    Smoking status: Never    Smokeless tobacco: Never   Substance and Sexual Activity    Alcohol use: Not Currently     Comment: occasional    Drug use: No    Sexual activity: Yes     Partners: Female     Social Determinants of Health     Financial Resource Strain: Low Risk     Difficulty of Paying Living Expenses: Not hard at all   Food Insecurity: No Food Insecurity    Worried About Running Out of Food in the Last Year: Never true    Ran Out of Food in the Last Year: Never true   Transportation Needs: No Transportation Needs    Lack of Transportation (Medical): No    Lack of Transportation (Non-Medical): No   Physical Activity: Inactive    Days of Exercise per Week: 0 days    Minutes of Exercise per Session: 0 min   Stress: No Stress Concern Present    Feeling of Stress : Only a little   Social Connections: Moderately Isolated    Frequency of Communication with Friends and Family: Twice a week    Frequency of Social Gatherings with Friends and Family: Twice a week    Attends Sabianist Services: Never    Active Member of Clubs or Organizations: No    Attends Club or Organization Meetings: Never    Marital Status:    Housing Stability: Low Risk     Unable to Pay for Housing in the Last Year: No    Number of Places Lived in the Last Year: 1    Unstable Housing in the Last Year: No     Review of patient's allergies indicates:  No Known Allergies    Current Outpatient Medications:     allopurinoL (ZYLOPRIM) 100 MG tablet, Take 1 tablet (100  mg total) by mouth once daily., Disp: 90 tablet, Rfl: 3    blood sugar diagnostic Strp, To check BG 2 times daily, to use with insurance preferred meter, Disp: 200 each, Rfl: 1    blood-glucose meter kit, To check BG 2 times daily, to use with insurance preferred meter, Disp: 1 each, Rfl: 0    butenafine 1 % cream, Per instructions 2 TIMES DAILY (route: topical), Disp: , Rfl:     chlorproMAZINE (THORAZINE) 25 MG tablet, Take 1 tablet (25 mg total) by mouth 3 (three) times daily., Disp: 90 tablet, Rfl: 11    ciprofloxacin HCl (CIPRO) 500 MG tablet, Take 1 tablet (500 mg total) by mouth once daily., Disp: 30 tablet, Rfl: 0    clotrimazole (LOTRIMIN) 1 % cream, Apply topically 2 (two) times daily., Disp: 28 g, Rfl: 0    docusate sodium (COLACE) 50 MG capsule, Take 1 capsule (50 mg total) by mouth 2 (two) times daily., Disp: 60 capsule, Rfl: 0    HYDROcodone-acetaminophen (NORCO) 5-325 mg per tablet, 1 tablet EVERY 4 HOURS (route: oral), Disp: , Rfl:     lancets Misc, To check BG 2 times daily, to use with insurance preferred meter, Disp: 200 each, Rfl: 1    ondansetron (ZOFRAN-ODT) 4 MG TbDL, Take 1 tablet (4 mg total) by mouth every 6 (six) hours as needed., Disp: 90 tablet, Rfl: 2    ondansetron (ZOFRAN-ODT) 4 MG TbDL, Take 1 tablet by mouth once daily., Disp: , Rfl:     pantoprazole (PROTONIX) 40 MG tablet, Take 1 tablet (40 mg total) by mouth once daily., Disp: 30 tablet, Rfl: 11    promethazine (PHENERGAN) 12.5 MG Tab, Take 1 tablet by mouth once daily., Disp: , Rfl:     Physical Exam    Wt Readings from Last 3 Encounters:   11/15/22 92.4 kg (203 lb 9.6 oz)   11/14/22 92.3 kg (203 lb 6.4 oz)   11/11/22 89.8 kg (198 lb)     Temp Readings from Last 3 Encounters:   11/15/22 97.6 °F (36.4 °C)   11/14/22 97.3 °F (36.3 °C)   11/11/22 97.7 °F (36.5 °C)     BP Readings from Last 3 Encounters:   11/15/22 123/76   11/14/22 121/73   11/11/22 116/77     Pulse Readings from Last 3 Encounters:   11/15/22 100   11/14/22 100    11/11/22 (!) 119      VITAL SIGNS:  as above   GENERAL: appears well-built, well-nourished.  No anxiety, no agitation, and in no distress.  Patient is awake, alert, oriented and cooperative.  HEENT:  Showed no congestion. Trachea is central no obvious icterus or pallor noted no hoarseness. no obvious JVD   NECK:  Supple.  No JVD. No obvious cervical submental or supraclavicular adenopathy.  RS:the visualized portion of  Chest expands well. chest appears symmetric, no audible wheezes.  No dyspnea recognized  ABDOMEN:  abdomen appears  slightly distended, sx scar   EXTREMITIES:  Without edema.  NEUROLOGICAL:  The patient is appropriate, higher functions are normal.  No  obvious neurological deficits.  normal judgement normal thought content  No confusion, no speech impediment. Cranial nerves are intact and show no deficit. No gross motor deficits noted   SKIN MUSCULOSKELETAL: no joint or skeletal deformity, no clubbing of nails.  No visible rash ecchymosis or petechiae   Lab Results   Component Value Date    WBC 8.36 11/28/2022    HGB 9.4 (L) 11/28/2022    HCT 30.8 (L) 11/28/2022    MCV 84 11/28/2022     11/28/2022       BMP  Lab Results   Component Value Date     11/28/2022    K 3.5 11/28/2022     11/28/2022    CO2 26 11/28/2022    BUN 8 11/28/2022    CREATININE 0.7 11/28/2022    CALCIUM 8.8 11/28/2022    ANIONGAP 7 (L) 11/28/2022    ESTGFRAFRICA 111 12/28/2021    EGFRNONAA 96 12/28/2021     Lab Results   Component Value Date    IRON 19 (L) 09/21/2022    TIBC 241 (L) 09/21/2022    FERRITIN 655 (H) 09/21/2022 9/26/22  Final Pathologic Diagnosis 1. Small bowel, resection:  Diffuse large B-cell lymphoma, non-germinal   center origin.  Final classification is pending C-MYC rearrangement analysis   result.  See comment.   2. Omentum, resection: Diffuse large B-cell lymphoma, non-germinal center   origin.  Final classification is pending C-MYC rearrangement analysis result.    Flow cytometry analysis  of tissue was not performed.   Immunohistochemical studies were performed on the paraffin embedded tissue   block 1 C with adequate positive and negative controls.  The atypical   lymphocytes are positive for CD20,  BCL6, MUM1, MYC, high Ki-67 (99%) and are   negative for CD10, CD30, BCL-2, cyclin D1, .  Reactive T-cells are CD3   positive and BCL-2 positive.  In Situ hybridization for EBV is negative.   Findings are consistent with diffuse large B-cell lymphoma, non germinal   center origin.  Final classification is pending C-MYC rearrangement analysis   by FISH.  A supplemental report will follow  9/22  The left ventricle is normal in size with concentric remodeling and normal systolic function.  The estimated ejection fraction is 60%.  Grade I left ventricular diastolic dysfunction.  Normal right ventricular size with normal right ventricular systolic function.  Mild left atrial enlargement.  Mild mitral regurgitation.  Mild to moderate tricuspid regurgitation.  Normal central venous pressure (3 mmHg).  The estimated PA systolic pressure is 49 mmHg.  There is pulmonary hypertension.  Mild right atrial enlargement.  No vegetations were seen  9/22/22   Bone marrow, right iliac crest, aspirate, clot and core biopsy:   --Normocellular marrow for age ranging from 30-70% with trilineage   hematopoiesis, see comment   --No increase in blasts by aspirate count and CD34 immunohistochemical stain,   see comment   --Increased megakaryocytes with atypia, see comment   --No morphologic evidence of metastatic lymphoma   --Stainable iron is not increased, see comment   --Minimal to mild reticulin fibrosis   Comment:  Concomitantly submitted flow cytometric analysis detects a tight   cluster of myeloid blasts.   The blast gate is mildly increased with a tight   cluster of blasts present showing coexpression of CD13 and CD34 with   expression of cytoplasmic myeloperoxidase.  The tight   cluster represents approximately 6% of  total events.   Lymphocytes are   composed of a mixture of  polyclonal B lymphocytes and T lymphocytes that are   immunophenotypically unremarkable.   This marrow shows overall normocellular marrow for age with no morphologic   evidence of metastatic lymphoma.  There is some atypia of the megakaryocytic   lineage as well as a tight cluster of CD34/CD13 positive blasts detected by   flow cytometry, although increased blasts are not appreciated on the aspirate   smear or by CD34 immunohistochemical stain.  By CD61 immunohistochemical   stain, megakaryocytes appear mildly increased without significant clustering   appreciated.  Overt morphologic dysplasia of the myeloid and erythroid   lineages is not appreciated.  These findings are abnormal, and a   myelodysplastic or myeloproliferative process can not be completely ruled   out.  Therefore, correlation with any available cytogenetic and molecular   studies is recommended including studies to rule out myeloproliferative   neoplasms.  If chromosomes are normal, next generation sequencing could be   considered.       Summary echo 10/6/22    The left ventricle is normal in size with concentric remodeling and normal systolic function.  The estimated ejection fraction is 60%.  Grade I left ventricular diastolic dysfunction.  Normal right ventricular size with normal right ventricular systolic function.  Mild left atrial enlargement.  Mild mitral regurgitation.  Mild to moderate tricuspid regurgitation.  Normal central venous pressure (3 mmHg).  The estimated PA systolic pressure is 49 mmHg.  There is pulmonary hypertension.  Mild right atrial enlargement.  No vegetations were seen     IMPRESSION:pet 10/14/22     Large multiloculated necrotic soft tissue mass in the central abdomen extending into the pelvis which is markedly hypermetabolic. This is consistent with the provided history of lymphoproliferative malignancy.     Numerous additional hypermetabolic soft tissue  masses in abdomen, also suspicious for lymphoproliferative malignancy.     Mild diffuse osseous FDG hypermetabolism which could be on the basis of red marrow reconversion or lymphoproliferative malignancy. Please correlate with bone marrow biopsy results.     Mild asymmetric right palatine tonsillar FDG activity, nonspecific. Attention on follow-up is recommended.  Patient Active Problem List   Diagnosis    Small bowel mass    Anemia, chronic disease    Diabetes mellitus type 2, noninsulin dependent    Arthralgia of bilat knees and neck    Moderate malnutrition    Intra-abdominal abscess    S/P exploratory laparotomy    S/P small bowel resection    Sepsis    DLBCL (diffuse large B cell lymphoma)    After care    Iron deficiency anemia, unspecified    Hyperlipidemia, unspecified    Rectal bleeding    Pancytopenia    Chemotherapy induced neutropenia        Assessment and Plan     DLBCL:bone marrow bx  neg for lymphoma with tight cluster of blasts 6%( smal number)  positive for CD20,  BCL6, MUM1, MYC, high Ki-67 (99%)   -negative for CD10, CD30, BCL-2, cyclin D1, .  Reactive T-cells are CD3   --FISH negative for rearrangement of MYC ; no IGH/MYC fusion was observed  uric acid  7.3 sent allopurinol  HBV, HCV, HIV serologies; negative  PET CT  10/22 as above  --IPI pending; might require IT chemotherapy based on IPI  --discussed treatment with [facility], as it is still the standard of care for n on GC DLBCL  --plan for 6 cycles,    S/p first cycle  doing better. Add nelasta to kristelimen   see make next week with cbc, cmp, ldh, uric acid with possible RT to areas of bulky disease   See me for next cycle cbc, cmp, uric acid, phosphorus magnesium ldh schedule cycle 2  . T2DM not on long term insulin     --on metformin , follows with his PCP        3. Depression     --he denies suicidal ideation  --he has upcoming appointment with psychiatry           4. Abnormal BM biopsy     --tight cluster of myeloid blasts approximating  6% was found on BM done on 9/22/22  --significance un clear  --no dysplastic changes  --cytogenetics normal  --will require repeat BM biopsy after chemotherapy

## 2022-11-29 NOTE — NURSING
0900 Patient here for C2 and does not have his oral Prednisone at home but is ordered in our treatment plan. Notified charge nurse DANE Carcamo RN who notified MD Reid.     0907 Per Md Reid, Prednisone was ordered and prescription was sent to [facility]'s pharmacy and patient to start taking 100 mg today and then daily for the next 4 days. Informed patient and verbalized understanding.

## 2022-11-30 PROBLEM — Z76.89 ENCOUNTER FOR PREVENTION OF NEUTROPENIA DUE TO CHEMOTHERAPY: Status: ACTIVE | Noted: 2022-01-01

## 2022-12-01 NOTE — PROGRESS NOTES
Ruxience dose rounded to the nearest vial with overfill from 833 mg to 810 mg (within 5% for chemotherapy and 10% for monoclonal antibodies) per rounding protocol; original order 375 mg/m2

## 2022-12-01 NOTE — PLAN OF CARE
Problem: Fatigue  Goal: Improved Activity Tolerance  12/1/2022 0746 by Mariana Grant, RN  Outcome: Met  12/1/2022 0745 by Mariana Grant RN  Outcome: Ongoing, Progressing

## 2022-12-02 NOTE — PLAN OF CARE
Problem: Fatigue  Goal: Improved Activity Tolerance  Outcome: Ongoing, Progressing  Intervention: Promote Improved Energy  Flowsheets (Taken 12/2/2022 1016)  Fatigue Management: frequent rest breaks encouraged  Sleep/Rest Enhancement:   regular sleep/rest pattern promoted   relaxation techniques promoted  Activity Management: Ambulated -L4

## 2022-12-05 NOTE — PLAN OF CARE
Problem: Fatigue  Goal: Improved Activity Tolerance  Intervention: Promote Improved Energy  Flowsheets (Taken 12/5/2022 1440)  Fatigue Management: fatigue-related activity identified  Activity Management: Ambulated -L4

## 2022-12-06 NOTE — PLAN OF CARE
Problem: Fatigue  Goal: Improved Activity Tolerance  Outcome: Ongoing, Progressing  Intervention: Promote Improved Energy  Flowsheets (Taken 12/6/2022 1518)  Fatigue Management: frequent rest breaks encouraged  Sleep/Rest Enhancement:   regular sleep/rest pattern promoted   relaxation techniques promoted  Activity Management: Ambulated -L4

## 2022-12-15 NOTE — PROGRESS NOTES
"Subjective:       Patient ID: Dwight Hoffmann is a 52 y.o. male.    Chief Complaint:   HPI  52 year old male with a history of NIDDM2 and HLD who presents today with complaints of night sweats for weeks. It is moderate. It is associated with 25 lb wt loss over the last 2 months, urinary hesitancy, urinary frequency, occasional blood streaked stools, weakness, fatigue, neck pain, knee pain, and pelvic pain. He denies measured fever, chills, N/V/D, chest pain, or SOB. He was seen by his primary last month about the blood streaked stools and referred to Dr. Clemens for colonoscopy on 9/1 with multiple polypectomies with path report of tubular adenomas. In the ED, labs revealed microcytic anemia, he was mildly tachycardic on arrival  which improved spontaneously, /60,  and CT abd/pelvis revealing: "Thick-walled collection containing feculent material, widely communicating with small bowel, seen centrally in the pelvis. Findings are suspicious for necrotic mass or abscess arising from the small bowel  Surgery was consulted and performed exploratory laparotomy 9/14/22 with resection of the mass and anastomosis of small bowel. The mass appeared to be an abscess with surrounding necrosis.  Patient with signs of sepsis including tachycardic and leukocytosis which could be related to surgery as well which complicated his picture however he was covered for this with with IV antibiotics. An NG tube to LIWS suction was started and the patient was put on IV fluids while he was NPO. Briefly on PPN. He did have a positive blood culture which showed coagulase negative Staph suspected to be a contaminant. He was briefly on vancomycin which was discontinued. ID consulted.  Patient was put on meropenem and fluconazole.  Cultures from surgery with Enterobacter cloacae and Proteus mirabilis.  His bowel function slowly returned, and he no longer needed NGT to suction.  The pathologic diagnosis on the mass was diffuse large B " cell lymphoma.  Oncology service was asked to consult  Patient underwent bone marrow biopsy 9/22/22 for staging.  CT of chest showed no enlarged lymph nodes. Did have another laparoscopy 9/23/22 due to abdominal fluid collection with cleanout thought to be more necrotic than infective. ID planned for outpatient ertapenem infusions to complete 2 week course   Good family support reported   12/15/2022:  He returns today sooner than scheduled with his daughter with complaints of nails discoloration and palpable mass on left side.  He states the mass has been present for quite some time but it has recently become tender.  Exam shows a marble size soft tissue mass    Review of Systems   Constitutional:  Positive for weight loss.   HENT: Negative.     Eyes: Negative.    Respiratory: Negative.     Cardiovascular: Negative.    Gastrointestinal: Negative.    Genitourinary: Negative.    Musculoskeletal: Negative.    Skin:         Palpable mass on left side   Neurological: Negative.    Endo/Heme/Allergies: Negative.    Psychiatric/Behavioral: Negative.      Past Medical History:   Diagnosis Date    Cancer     Diabetes mellitus     Digestive disorder     Prediabetes      Past Surgical History:   Procedure Laterality Date    APPENDECTOMY  07/15/2014    COLONOSCOPY      COLONOSCOPY N/A 02/09/2022    ERROR.   He did not have a colonoscopy with Dr. Sanders.    COLONOSCOPY N/A 09/01/2022    Procedure: COLONOSCOPY;  Surgeon: Andrea Clemens MD;  Location: [facility];  Service: Endoscopy;  Laterality: N/A;    FLEXIBLE SIGMOIDOSCOPY N/A 11/7/2022    Procedure: SIGMOIDOSCOPY, FLEXIBLE;  Surgeon: Manuel Sanders MD;  Location: [facility] ENDO;  Service: Endoscopy;  Laterality: N/A;    INCISION AND DRAINAGE OF ABDOMEN N/A 09/14/2022    Procedure: INCISION AND DRAINAGE, ABDOMEN;  Surgeon: Jan Oliveira Jr., MD;  Location: [facility] OR;  Service: General;  Laterality: N/A;    INSERTION OF TUNNELED CENTRAL VENOUS CATHETER (CVC) WITH  SUBCUTANEOUS PORT N/A 10/31/2022    Procedure: [id number]-CATH;  Surgeon: Jan Oliveira Jr., MD;  Location: [facility] OR;  Service: General;  Laterality: N/A;    LAPAROSCOPIC DRAINAGE OF ABDOMEN N/A 09/23/2022    Procedure: DRAINAGE, ABDOMEN, LAPAROSCOPIC;  Surgeon: Jan Oliveira Jr., MD;  Location: [facility] OR;  Service: General;  Laterality: N/A;     Family History   Problem Relation Age of Onset    Cancer Mother         Colon    Diabetes Mother     Hypertension Mother     Seizures Father     Heart murmur Sister     Seizures Sister       Social History     Socioeconomic History    Marital status:     Number of children: 2   Occupational History    Occupation: Truck [employer]   Tobacco Use    Smoking status: Never    Smokeless tobacco: Never   Substance and Sexual Activity    Alcohol use: Not Currently     Comment: occasional    Drug use: No    Sexual activity: Yes     Partners: Female     Social Determinants of Health     Financial Resource Strain: Low Risk     Difficulty of Paying Living Expenses: Not hard at all   Food Insecurity: No Food Insecurity    Worried About Running Out of Food in the Last Year: Never true    Ran Out of Food in the Last Year: Never true   Transportation Needs: No Transportation Needs    Lack of Transportation (Medical): No    Lack of Transportation (Non-Medical): No   Physical Activity: Inactive    Days of Exercise per Week: 0 days    Minutes of Exercise per Session: 0 min   Stress: No Stress Concern Present    Feeling of Stress : Only a little   Social Connections: Moderately Isolated    Frequency of Communication with Friends and Family: Twice a week    Frequency of Social Gatherings with Friends and Family: Twice a week    Attends [employer] Services: Never    Active Member of Clubs or Organizations: No    Attends Club or Organization Meetings: Never    Marital Status:    Housing Stability: Low Risk     Unable to Pay for Housing in the Last Year: No    Number of  Places Lived in the Last Year: 1    Unstable Housing in the Last Year: No     Review of patient's allergies indicates:  No Known Allergies    Current Outpatient Medications:     allopurinoL (ZYLOPRIM) 100 MG tablet, Take 1 tablet (100 mg total) by mouth once daily., Disp: 90 tablet, Rfl: 3    blood sugar diagnostic Strp, To check BG 2 times daily, to use with insurance preferred meter, Disp: 200 each, Rfl: 1    blood-glucose meter kit, To check BG 2 times daily, to use with insurance preferred meter, Disp: 1 each, Rfl: 0    butenafine 1 % cream, Per instructions 2 TIMES DAILY (route: topical), Disp: , Rfl:     chlorproMAZINE (THORAZINE) 25 MG tablet, Take 1 tablet (25 mg total) by mouth 3 (three) times daily., Disp: 90 tablet, Rfl: 11    ciprofloxacin HCl (CIPRO) 500 MG tablet, Take 1 tablet (500 mg total) by mouth once daily., Disp: 30 tablet, Rfl: 0    docusate sodium (COLACE) 50 MG capsule, Take 1 capsule (50 mg total) by mouth 2 (two) times daily., Disp: 60 capsule, Rfl: 0    HYDROcodone-acetaminophen (NORCO) 5-325 mg per tablet, 1 tablet EVERY 4 HOURS (route: oral), Disp: , Rfl:     lancets Misc, To check BG 2 times daily, to use with insurance preferred meter, Disp: 200 each, Rfl: 1    ondansetron (ZOFRAN-ODT) 4 MG TbDL, Take 1 tablet (4 mg total) by mouth every 6 (six) hours as needed., Disp: 90 tablet, Rfl: 2    ondansetron (ZOFRAN-ODT) 4 MG TbDL, Take 1 tablet by mouth once daily., Disp: , Rfl:     pantoprazole (PROTONIX) 40 MG tablet, Take 1 tablet (40 mg total) by mouth once daily., Disp: 30 tablet, Rfl: 11    promethazine (PHENERGAN) 12.5 MG Tab, Take 1 tablet by mouth once daily., Disp: , Rfl:     clotrimazole (LOTRIMIN) 1 % cream, Apply topically 2 (two) times daily., Disp: 28 g, Rfl: 0    Physical Exam    Wt Readings from Last 3 Encounters:   12/15/22 92.4 kg (203 lb 11.3 oz)   12/06/22 92.5 kg (204 lb)   12/05/22 92.4 kg (203 lb 11.2 oz)     Temp Readings from Last 3 Encounters:   12/15/22 97.5 °F  (36.4 °C) (Temporal)   12/06/22 97.2 °F (36.2 °C)   12/05/22 97.3 °F (36.3 °C)     BP Readings from Last 3 Encounters:   12/15/22 120/80   12/06/22 112/76   12/05/22 100/67     Pulse Readings from Last 3 Encounters:   12/15/22 109   12/06/22 101   12/05/22 94      VITAL SIGNS:  as above   GENERAL: appears well-built, well-nourished.  No anxiety, no agitation, and in no distress.  Patient is awake, alert, oriented and cooperative.  HEENT:  Showed no congestion. Trachea is central no obvious icterus or pallor noted no hoarseness. no obvious JVD   NECK:  Supple.  No JVD. No obvious cervical submental or supraclavicular adenopathy.  RS:the visualized portion of  Chest expands well. chest appears symmetric, no audible wheezes.  No dyspnea recognized  ABDOMEN:  abdomen appears  slightly distended, sx scar   EXTREMITIES:  Without edema.  NEUROLOGICAL:  The patient is appropriate, higher functions are normal.  No  obvious neurological deficits.  normal judgement normal thought content  No confusion, no speech impediment. Cranial nerves are intact and show no deficit. No gross motor deficits noted   SKIN MUSCULOSKELETAL:  Palpable mass palpated on left side near the base of ribs  Lab Results   Component Value Date    WBC 8.91 12/15/2022    HGB 10.0 (L) 12/15/2022    HCT 32.1 (L) 12/15/2022    MCV 83 12/15/2022     12/15/2022       BMP  Lab Results   Component Value Date     12/15/2022    K 4.0 12/15/2022    CL 98 12/15/2022    CO2 27 12/15/2022    BUN 9 12/15/2022    CREATININE 0.8 12/15/2022    CALCIUM 9.1 12/15/2022    ANIONGAP 11 12/15/2022    ESTGFRAFRICA 111 12/28/2021    EGFRNONAA 96 12/28/2021     Lab Results   Component Value Date    IRON 19 (L) 09/21/2022    TIBC 241 (L) 09/21/2022    FERRITIN 655 (H) 09/21/2022 9/26/22  Final Pathologic Diagnosis 1. Small bowel, resection:  Diffuse large B-cell lymphoma, non-germinal   center origin.  Final classification is pending C-MYC rearrangement analysis    result.  See comment.   2. Omentum, resection: Diffuse large B-cell lymphoma, non-germinal center   origin.  Final classification is pending C-MYC rearrangement analysis result.    Flow cytometry analysis of tissue was not performed.   Immunohistochemical studies were performed on the paraffin embedded tissue   block 1 C with adequate positive and negative controls.  The atypical   lymphocytes are positive for CD20,  BCL6, MUM1, MYC, high Ki-67 (99%) and are   negative for CD10, CD30, BCL-2, cyclin D1, .  Reactive T-cells are CD3   positive and BCL-2 positive.  In Situ hybridization for EBV is negative.   Findings are consistent with diffuse large B-cell lymphoma, non germinal   center origin.  Final classification is pending C-MYC rearrangement analysis   by FISH.  A supplemental report will follow  9/22  The left ventricle is normal in size with concentric remodeling and normal systolic function.  The estimated ejection fraction is 60%.  Grade I left ventricular diastolic dysfunction.  Normal right ventricular size with normal right ventricular systolic function.  Mild left atrial enlargement.  Mild mitral regurgitation.  Mild to moderate tricuspid regurgitation.  Normal central venous pressure (3 mmHg).  The estimated PA systolic pressure is 49 mmHg.  There is pulmonary hypertension.  Mild right atrial enlargement.  No vegetations were seen  9/22/22   Bone marrow, right iliac crest, aspirate, clot and core biopsy:   --Normocellular marrow for age ranging from 30-70% with trilineage   hematopoiesis, see comment   --No increase in blasts by aspirate count and CD34 immunohistochemical stain,   see comment   --Increased megakaryocytes with atypia, see comment   --No morphologic evidence of metastatic lymphoma   --Stainable iron is not increased, see comment   --Minimal to mild reticulin fibrosis   Comment:  Concomitantly submitted flow cytometric analysis detects a tight   cluster of myeloid blasts.   The  blast gate is mildly increased with a tight   cluster of blasts present showing coexpression of CD13 and CD34 with   expression of cytoplasmic myeloperoxidase.  The tight   cluster represents approximately 6% of total events.   Lymphocytes are   composed of a mixture of  polyclonal B lymphocytes and T lymphocytes that are   immunophenotypically unremarkable.   This marrow shows overall normocellular marrow for age with no morphologic   evidence of metastatic lymphoma.  There is some atypia of the megakaryocytic   lineage as well as a tight cluster of CD34/CD13 positive blasts detected by   flow cytometry, although increased blasts are not appreciated on the aspirate   smear or by CD34 immunohistochemical stain.  By CD61 immunohistochemical   stain, megakaryocytes appear mildly increased without significant clustering   appreciated.  Overt morphologic dysplasia of the myeloid and erythroid   lineages is not appreciated.  These findings are abnormal, and a   myelodysplastic or myeloproliferative process can not be completely ruled   out.  Therefore, correlation with any available cytogenetic and molecular   studies is recommended including studies to rule out myeloproliferative   neoplasms.  If chromosomes are normal, next generation sequencing could be   considered.       Summary echo 10/6/22    The left ventricle is normal in size with concentric remodeling and normal systolic function.  The estimated ejection fraction is 60%.  Grade I left ventricular diastolic dysfunction.  Normal right ventricular size with normal right ventricular systolic function.  Mild left atrial enlargement.  Mild mitral regurgitation.  Mild to moderate tricuspid regurgitation.  Normal central venous pressure (3 mmHg).  The estimated PA systolic pressure is 49 mmHg.  There is pulmonary hypertension.  Mild right atrial enlargement.  No vegetations were seen     IMPRESSION:pet 10/14/22     Large multiloculated necrotic soft tissue mass in the  central abdomen extending into the pelvis which is markedly hypermetabolic. This is consistent with the provided history of lymphoproliferative malignancy.     Numerous additional hypermetabolic soft tissue masses in abdomen, also suspicious for lymphoproliferative malignancy.     Mild diffuse osseous FDG hypermetabolism which could be on the basis of red marrow reconversion or lymphoproliferative malignancy. Please correlate with bone marrow biopsy results.     Mild asymmetric right palatine tonsillar FDG activity, nonspecific. Attention on follow-up is recommended.  Patient Active Problem List   Diagnosis    Small bowel mass    Anemia, chronic disease    Diabetes mellitus type 2, noninsulin dependent    Arthralgia of bilat knees and neck    Moderate malnutrition    Intra-abdominal abscess    S/P exploratory laparotomy    S/P small bowel resection    Sepsis    DLBCL (diffuse large B cell lymphoma)    After care    Iron deficiency anemia, unspecified    Hyperlipidemia, unspecified    Rectal bleeding    Pancytopenia    Chemotherapy induced neutropenia    Encounter for prevention of neutropenia due to chemotherapy        Assessment and Plan     DLBCL:bone marrow bx  neg for lymphoma with tight cluster of blasts 6%( smal number)  positive for CD20,  BCL6, MUM1, MYC, high Ki-67 (99%)   -negative for CD10, CD30, BCL-2, cyclin D1, .  Reactive T-cells are CD3   --FISH negative for rearrangement of MYC ; no IGH/MYC fusion was observed  uric acid  7.3 sent allopurinol  HBV, HCV, HIV serologies; negative  PET CT  10/22 as above  --IPI pending; might require IT chemotherapy based on IPI  --discussed treatment with [facility], as it is still the standard of care for n on GC DLBCL  --plan for 6 cycles,    S/p first cycle  doing better. Add nelasta to kristelimen   see make next week with cbc, cmp, ldh, uric acid with possible RT to areas of bulky disease   See me for next cycle cbc, cmp, uric acid, phosphorus magnesium ldh schedule  cycle 2    . T2DM not on long term insulin  --on metformin , follows with his PCP        3. Depression  --he denies suicidal ideation  --he has upcoming appointment with psychiatry           4. Abnormal BM biopsy  --tight cluster of myeloid blasts approximating 6% was found on BM done on 9/22/22  --significance un clear  --no dysplastic changes  --cytogenetics normal  --will require repeat BM biopsy after chemotherapy         5. Palpable mass  -arrange soft tissue ultrasound    Patient is scheduled for cycle 3 of chemotherapy on Tuesday.  If ultrasound is nondiagnostic we will plan for restaging CT scan after cycle 3.  I explained that nail discoloration is likely a side effect chemotherapy.  Is having a lot of fatigue at this time.  Therefore, I will check labs including iron stores B12 and TSH.  He will see MD for cycle 3 clearance and to review lab

## 2022-12-15 NOTE — Clinical Note
Soft tissue ultrasound of right side See MD prior to next cycle of chemo with CBC CMP iron stores B12 and TSH

## 2022-12-15 NOTE — TELEPHONE ENCOUNTER
Incoming call from pt's wife. She reports that pt's fingers are purple but he is not having any trouble breathing. She states that he refused to come to appt yesterday. She asked this nurse to call pt to discuss.    Spoke with pt. Offered lab appt and same day appt with NP today. Pt confirmed. Pt states that he has a lump on his rib as well.

## 2022-12-19 NOTE — PROGRESS NOTES
"Subjective:       Patient ID: Dwight Hoffmann is a 52 y.o. male.    Chief Complaint:   HPI  52 year old male with a history of NIDDM2 and HLD who presents today with complaints of night sweats for weeks. It is moderate. It is associated with 25 lb wt loss over the last 2 months, urinary hesitancy, urinary frequency, occasional blood streaked stools, weakness, fatigue, neck pain, knee pain, and pelvic pain. He denies measured fever, chills, N/V/D, chest pain, or SOB. He was seen by his primary last month about the blood streaked stools and referred to Dr. Clemens for colonoscopy on 9/1 with multiple polypectomies with path report of tubular adenomas. In the ED, labs revealed microcytic anemia, he was mildly tachycardic on arrival  which improved spontaneously, /60,  and CT abd/pelvis revealing: "Thick-walled collection containing feculent material, widely communicating with small bowel, seen centrally in the pelvis. Findings are suspicious for necrotic mass or abscess arising from the small bowel  Surgery was consulted and performed exploratory laparotomy 9/14/22 with resection of the mass and anastomosis of small bowel. The mass appeared to be an abscess with surrounding necrosis.  Patient with signs of sepsis including tachycardic and leukocytosis which could be related to surgery as well which complicated his picture however he was covered for this with with IV antibiotics. An NG tube to LIWS suction was started and the patient was put on IV fluids while he was NPO. Briefly on PPN. He did have a positive blood culture which showed coagulase negative Staph suspected to be a contaminant. He was briefly on vancomycin which was discontinued. ID consulted.  Patient was put on meropenem and fluconazole.  Cultures from surgery with Enterobacter cloacae and Proteus mirabilis.  His bowel function slowly returned, and he no longer needed NGT to suction.  The pathologic diagnosis on the mass was diffuse large B " cell lymphoma.  Oncology service was asked to consult  Patient underwent bone marrow biopsy 9/22/22 for staging.  CT of chest showed no enlarged lymph nodes. Did have another laparoscopy 9/23/22 due to abdominal fluid collection with cleanout thought to be more necrotic than infective. ID planned for outpatient ertapenem infusions to complete 2 week course  Review of Systems    Rectal bleeding,stopped went to hospital was scoped  non bleeding hemorrhoids   Has abd pain,better now     No fever, mild sob+    Pt doeing much better   Occ sob on activity    Denies seizure activity or focal weaknesses or symptoms related to TIA, no head aches or blurred vision reported  Denies issues with skin rash or bruising  Denies issues with swelling of feet, tingling or numbness   +issues with sleep,     Good family support reported         Past Medical History:   Diagnosis Date    Cancer     Diabetes mellitus     Digestive disorder     Prediabetes      Past Surgical History:   Procedure Laterality Date    APPENDECTOMY  07/15/2014    COLONOSCOPY      COLONOSCOPY N/A 02/09/2022    ERROR.   He did not have a colonoscopy with Dr. Sanders.    COLONOSCOPY N/A 09/01/2022    Procedure: COLONOSCOPY;  Surgeon: Andrea Clemens MD;  Location: [facility];  Service: Endoscopy;  Laterality: N/A;    FLEXIBLE SIGMOIDOSCOPY N/A 11/7/2022    Procedure: SIGMOIDOSCOPY, FLEXIBLE;  Surgeon: Manuel Sanders MD;  Location: [facility];  Service: Endoscopy;  Laterality: N/A;    INCISION AND DRAINAGE OF ABDOMEN N/A 09/14/2022    Procedure: INCISION AND DRAINAGE, ABDOMEN;  Surgeon: Jan Oliveira Jr., MD;  Location: [facility] OR;  Service: General;  Laterality: N/A;    INSERTION OF TUNNELED CENTRAL VENOUS CATHETER (CVC) WITH SUBCUTANEOUS PORT N/A 10/31/2022    Procedure: [id number];  Surgeon: Jan Oliveira Jr., MD;  Location: [facility] OR;  Service: General;  Laterality: N/A;    LAPAROSCOPIC DRAINAGE OF ABDOMEN N/A 09/23/2022    Procedure:  DRAINAGE, ABDOMEN, LAPAROSCOPIC;  Surgeon: Jan Oliveira Jr., MD;  Location: [facility];  Service: General;  Laterality: N/A;     Family History   Problem Relation Age of Onset    Cancer Mother         Colon    Diabetes Mother     Hypertension Mother     Seizures Father     Heart murmur Sister     Seizures Sister       Social History     Socioeconomic History    Marital status:     Number of children: 2   Occupational History    Occupation: Truck [employer]   Tobacco Use    Smoking status: Never    Smokeless tobacco: Never   Substance and Sexual Activity    Alcohol use: Not Currently     Comment: occasional    Drug use: No    Sexual activity: Yes     Partners: Female     Social Determinants of Health     Financial Resource Strain: Low Risk     Difficulty of Paying Living Expenses: Not hard at all   Food Insecurity: No Food Insecurity    Worried About Running Out of Food in the Last Year: Never true    Ran Out of Food in the Last Year: Never true   Transportation Needs: No Transportation Needs    Lack of Transportation (Medical): No    Lack of Transportation (Non-Medical): No   Physical Activity: Inactive    Days of Exercise per Week: 0 days    Minutes of Exercise per Session: 0 min   Stress: No Stress Concern Present    Feeling of Stress : Only a little   Social Connections: Moderately Isolated    Frequency of Communication with Friends and Family: Twice a week    Frequency of Social Gatherings with Friends and Family: Twice a week    Attends [employer] Services: Never    Active Member of Clubs or Organizations: No    Attends Club or Organization Meetings: Never    Marital Status:    Housing Stability: Low Risk     Unable to Pay for Housing in the Last Year: No    Number of Places Lived in the Last Year: 1    Unstable Housing in the Last Year: No     Review of patient's allergies indicates:  No Known Allergies    Current Outpatient Medications:     allopurinoL (ZYLOPRIM) 100 MG tablet, Take 1 tablet (100  mg total) by mouth once daily., Disp: 90 tablet, Rfl: 3    blood sugar diagnostic Strp, To check BG 2 times daily, to use with insurance preferred meter, Disp: 200 each, Rfl: 1    blood-glucose meter kit, To check BG 2 times daily, to use with insurance preferred meter, Disp: 1 each, Rfl: 0    butenafine 1 % cream, Per instructions 2 TIMES DAILY (route: topical), Disp: , Rfl:     chlorproMAZINE (THORAZINE) 25 MG tablet, Take 1 tablet (25 mg total) by mouth 3 (three) times daily., Disp: 90 tablet, Rfl: 11    ciprofloxacin HCl (CIPRO) 500 MG tablet, Take 1 tablet (500 mg total) by mouth once daily., Disp: 30 tablet, Rfl: 0    docusate sodium (COLACE) 50 MG capsule, Take 1 capsule (50 mg total) by mouth 2 (two) times daily., Disp: 60 capsule, Rfl: 0    HYDROcodone-acetaminophen (NORCO) 5-325 mg per tablet, 1 tablet EVERY 4 HOURS (route: oral), Disp: , Rfl:     lancets Misc, To check BG 2 times daily, to use with insurance preferred meter, Disp: 200 each, Rfl: 1    ondansetron (ZOFRAN-ODT) 4 MG TbDL, Take 1 tablet (4 mg total) by mouth every 6 (six) hours as needed., Disp: 90 tablet, Rfl: 2    ondansetron (ZOFRAN-ODT) 4 MG TbDL, Take 1 tablet by mouth once daily., Disp: , Rfl:     pantoprazole (PROTONIX) 40 MG tablet, Take 1 tablet (40 mg total) by mouth once daily., Disp: 30 tablet, Rfl: 11    promethazine (PHENERGAN) 12.5 MG Tab, Take 1 tablet by mouth once daily., Disp: , Rfl:     clotrimazole (LOTRIMIN) 1 % cream, Apply topically 2 (two) times daily., Disp: 28 g, Rfl: 0    Physical Exam    Wt Readings from Last 3 Encounters:   12/19/22 94.2 kg (207 lb 10.8 oz)   12/15/22 92.4 kg (203 lb 11.3 oz)   12/06/22 92.5 kg (204 lb)     Temp Readings from Last 3 Encounters:   12/19/22 97.1 °F (36.2 °C) (Temporal)   12/15/22 97.5 °F (36.4 °C) (Temporal)   12/06/22 97.2 °F (36.2 °C)     BP Readings from Last 3 Encounters:   12/19/22 118/75   12/15/22 120/80   12/06/22 112/76     Pulse Readings from Last 3 Encounters:   12/19/22  66   12/15/22 109   12/06/22 101      VITAL SIGNS:  as above   GENERAL: appears well-built, well-nourished.  No anxiety, no agitation, and in no distress.  Patient is awake, alert, oriented and cooperative.  HEENT:  Showed no congestion. Trachea is central no obvious icterus or pallor noted no hoarseness. no obvious JVD   NECK:  Supple.  No JVD. No obvious cervical submental or supraclavicular adenopathy.  RS:the visualized portion of  Chest expands well. chest appears symmetric, no audible wheezes.  No dyspnea recognized  ABDOMEN:  abdomen appears  slightly distended, sx scar   EXTREMITIES:  Without edema.  NEUROLOGICAL:  The patient is appropriate, higher functions are normal.  No  obvious neurological deficits.  normal judgement normal thought content  No confusion, no speech impediment. Cranial nerves are intact and show no deficit. No gross motor deficits noted   SKIN MUSCULOSKELETAL: no joint or skeletal deformity, no clubbing of nails.  No visible rash ecchymosis or petechiae   Lab Results   Component Value Date    WBC 8.55 12/19/2022    HGB 9.4 (L) 12/19/2022    HCT 29.5 (L) 12/19/2022    MCV 82 12/19/2022     12/19/2022       BMP  Lab Results   Component Value Date     (L) 12/19/2022    K 4.0 12/19/2022     12/19/2022    CO2 26 12/19/2022    BUN 10 12/19/2022    CREATININE 0.7 12/19/2022    CALCIUM 9.0 12/19/2022    ANIONGAP 8 12/19/2022    ESTGFRAFRICA 111 12/28/2021    EGFRNONAA 96 12/28/2021     Lab Results   Component Value Date    IRON 46 12/19/2022    TIBC 281 12/19/2022    FERRITIN 996 (H) 12/19/2022 9/26/22  Final Pathologic Diagnosis 1. Small bowel, resection:  Diffuse large B-cell lymphoma, non-germinal   center origin.  Final classification is pending C-MYC rearrangement analysis   result.  See comment.   2. Omentum, resection: Diffuse large B-cell lymphoma, non-germinal center   origin.  Final classification is pending C-MYC rearrangement analysis result.    Flow cytometry  analysis of tissue was not performed.   Immunohistochemical studies were performed on the paraffin embedded tissue   block 1 C with adequate positive and negative controls.  The atypical   lymphocytes are positive for CD20,  BCL6, MUM1, MYC, high Ki-67 (99%) and are   negative for CD10, CD30, BCL-2, cyclin D1, .  Reactive T-cells are CD3   positive and BCL-2 positive.  In Situ hybridization for EBV is negative.   Findings are consistent with diffuse large B-cell lymphoma, non germinal   center origin.  Final classification is pending C-MYC rearrangement analysis   by FISH.  A supplemental report will follow  9/22  The left ventricle is normal in size with concentric remodeling and normal systolic function.  The estimated ejection fraction is 60%.  Grade I left ventricular diastolic dysfunction.  Normal right ventricular size with normal right ventricular systolic function.  Mild left atrial enlargement.  Mild mitral regurgitation.  Mild to moderate tricuspid regurgitation.  Normal central venous pressure (3 mmHg).  The estimated PA systolic pressure is 49 mmHg.  There is pulmonary hypertension.  Mild right atrial enlargement.  No vegetations were seen  9/22/22   Bone marrow, right iliac crest, aspirate, clot and core biopsy:   --Normocellular marrow for age ranging from 30-70% with trilineage   hematopoiesis, see comment   --No increase in blasts by aspirate count and CD34 immunohistochemical stain,   see comment   --Increased megakaryocytes with atypia, see comment   --No morphologic evidence of metastatic lymphoma   --Stainable iron is not increased, see comment   --Minimal to mild reticulin fibrosis   Comment:  Concomitantly submitted flow cytometric analysis detects a tight   cluster of myeloid blasts.   The blast gate is mildly increased with a tight   cluster of blasts present showing coexpression of CD13 and CD34 with   expression of cytoplasmic myeloperoxidase.  The tight   cluster represents  approximately 6% of total events.   Lymphocytes are   composed of a mixture of  polyclonal B lymphocytes and T lymphocytes that are   immunophenotypically unremarkable.   This marrow shows overall normocellular marrow for age with no morphologic   evidence of metastatic lymphoma.  There is some atypia of the megakaryocytic   lineage as well as a tight cluster of CD34/CD13 positive blasts detected by   flow cytometry, although increased blasts are not appreciated on the aspirate   smear or by CD34 immunohistochemical stain.  By CD61 immunohistochemical   stain, megakaryocytes appear mildly increased without significant clustering   appreciated.  Overt morphologic dysplasia of the myeloid and erythroid   lineages is not appreciated.  These findings are abnormal, and a   myelodysplastic or myeloproliferative process can not be completely ruled   out.  Therefore, correlation with any available cytogenetic and molecular   studies is recommended including studies to rule out myeloproliferative   neoplasms.  If chromosomes are normal, next generation sequencing could be   considered.       Summary echo 10/6/22    The left ventricle is normal in size with concentric remodeling and normal systolic function.  The estimated ejection fraction is 60%.  Grade I left ventricular diastolic dysfunction.  Normal right ventricular size with normal right ventricular systolic function.  Mild left atrial enlargement.  Mild mitral regurgitation.  Mild to moderate tricuspid regurgitation.  Normal central venous pressure (3 mmHg).  The estimated PA systolic pressure is 49 mmHg.  There is pulmonary hypertension.  Mild right atrial enlargement.  No vegetations were seen     IMPRESSION:pet 10/14/22     Large multiloculated necrotic soft tissue mass in the central abdomen extending into the pelvis which is markedly hypermetabolic. This is consistent with the provided history of lymphoproliferative malignancy.     Numerous additional  hypermetabolic soft tissue masses in abdomen, also suspicious for lymphoproliferative malignancy.     Mild diffuse osseous FDG hypermetabolism which could be on the basis of red marrow reconversion or lymphoproliferative malignancy. Please correlate with bone marrow biopsy results.     Mild asymmetric right palatine tonsillar FDG activity, nonspecific. Attention on follow-up is recommended.  Patient Active Problem List   Diagnosis    Small bowel mass    Anemia, chronic disease    Diabetes mellitus type 2, noninsulin dependent    Arthralgia of bilat knees and neck    Moderate malnutrition    Intra-abdominal abscess    S/P exploratory laparotomy    S/P small bowel resection    Sepsis    DLBCL (diffuse large B cell lymphoma)    After care    Iron deficiency anemia, unspecified    Hyperlipidemia, unspecified    Rectal bleeding    Pancytopenia    Chemotherapy induced neutropenia    Encounter for prevention of neutropenia due to chemotherapy        Assessment and Plan     DLBCL:bone marrow bx  neg for lymphoma with tight cluster of blasts 6%( smal number)  positive for CD20,  BCL6, MUM1, MYC, high Ki-67 (99%)   -negative for CD10, CD30, BCL-2, cyclin D1, .  Reactive T-cells are CD3   --FISH negative for rearrangement of MYC ; no IGH/MYC fusion was observed  uric acid  7.3 sent allopurinol  HBV, HCV, HIV serologies; negative  PET CT  10/22 as above  --IPI pending; might require IT chemotherapy based on IPI  --discussed treatment with [facility], as it is still the standard of care for n on GC DLBCL  --plan for 6 cycles,    S/p first cycle  doing better. Add nelasta to gregimen   see make next week with cbc, cmp, ldh, uric acid with possible RT to areas of bulky disease   See me for next cycle cbc, cmp, uric acid, phosphorus magnesium ldh schedule cycle 2  . T2DM not on long term insulin     --on metformin , follows with his PCP        3. Depression     --he denies suicidal ideation  --he has upcoming appointment with  psychiatry           4. Abnormal BM biopsy     --tight cluster of myeloid blasts approximating 6% was found on BM done on 9/22/22  --significance un clear  --no dysplastic changes  --cytogenetics normal  --will require repeat BM biopsy after chemotherapy         anemia and symptomatic, 1 unit prbc ordered  Palpable mass to left rib cage, usg ordered

## 2022-12-21 NOTE — PLAN OF CARE
Problem: Fatigue  Goal: Improved Activity Tolerance  Outcome: Ongoing, Progressing  Intervention: Promote Improved Energy  Flowsheets (Taken 12/21/2022 0820)  Fatigue Management:   fatigue-related activity identified   frequent rest breaks encouraged   paced activity encouraged  Sleep/Rest Enhancement: relaxation techniques promoted  Activity Management: Ambulated -L4

## 2022-12-22 NOTE — PLAN OF CARE
Problem: Fatigue  Goal: Improved Activity Tolerance  Outcome: Met     Problem: Constipation  Goal: Effective Bowel Elimination  Outcome: Met

## 2022-12-23 NOTE — PLAN OF CARE
Problem: Neutropenia  Goal: Absence of Infection  Outcome: Ongoing, Progressing  Intervention: Prevent Infection and Maximize Resistance  Flowsheets (Taken 12/23/2022 1507)  Infection Prevention:   cohorting utilized   environmental surveillance performed   equipment surfaces disinfected

## 2022-12-26 PROBLEM — A41.9 SEPSIS: Status: RESOLVED | Noted: 2022-01-01 | Resolved: 2022-01-01

## 2022-12-27 NOTE — TELEPHONE ENCOUNTER
Notified patient regarding appt today at 1500 for neulasta, patient statesh e cannot make it today and would like to receive his 3rd neupogen inj on Thursday. Pt will come tomorrow for 2nd inj. Scheduling notified. Charge nurse DANE Carcamo RN notified.

## 2022-12-28 NOTE — PLAN OF CARE
Problem: Neutropenia  Goal: Absence of Infection  Outcome: Ongoing, Progressing  Intervention: Prevent Infection and Maximize Resistance  Flowsheets (Taken 12/28/2022 1518)  Infection Prevention:   cohorting utilized   rest/sleep promoted   environmental surveillance performed   equipment surfaces disinfected

## 2022-12-29 NOTE — PLAN OF CARE
Problem: Neutropenia  Goal: Absence of Infection  Outcome: Ongoing, Progressing  Intervention: Prevent Infection and Maximize Resistance  Flowsheets (Taken 12/29/2022 2033)  Infection Prevention:   cohorting utilized   environmental surveillance performed   equipment surfaces disinfected

## 2023-01-01 ENCOUNTER — HOSPITAL ENCOUNTER (INPATIENT)
Facility: HOSPITAL | Age: 53
LOS: 3 days | Discharge: HOME | DRG: 178 | End: 2023-02-09
Attending: EMERGENCY MEDICINE | Admitting: EMERGENCY MEDICINE
Payer: MEDICAID

## 2023-01-01 ENCOUNTER — TELEPHONE (OUTPATIENT)
Dept: HEMATOLOGY/ONCOLOGY | Facility: CLINIC | Age: 53
End: 2023-01-01
Payer: MEDICAID

## 2023-01-01 ENCOUNTER — TELEPHONE (OUTPATIENT)
Dept: SURGERY | Facility: CLINIC | Age: 53
End: 2023-01-01
Payer: MEDICAID

## 2023-01-01 ENCOUNTER — PATIENT OUTREACH (OUTPATIENT)
Dept: ADMINISTRATIVE | Facility: CLINIC | Age: 53
End: 2023-01-01
Payer: MEDICAID

## 2023-01-01 ENCOUNTER — PATIENT MESSAGE (OUTPATIENT)
Dept: HEMATOLOGY/ONCOLOGY | Facility: CLINIC | Age: 53
End: 2023-01-01
Payer: MEDICAID

## 2023-01-01 ENCOUNTER — HOSPITAL ENCOUNTER (INPATIENT)
Facility: HOSPITAL | Age: 53
LOS: 16 days | DRG: 823 | End: 2023-03-13
Attending: INTERNAL MEDICINE | Admitting: INTERNAL MEDICINE
Payer: MEDICAID

## 2023-01-01 ENCOUNTER — HOSPITAL ENCOUNTER (INPATIENT)
Facility: HOSPITAL | Age: 53
LOS: 3 days | Discharge: ANOTHER HOSPITAL | DRG: 872 | End: 2023-02-25
Attending: STUDENT IN AN ORGANIZED HEALTH CARE EDUCATION/TRAINING PROGRAM | Admitting: STUDENT IN AN ORGANIZED HEALTH CARE EDUCATION/TRAINING PROGRAM
Payer: MEDICAID

## 2023-01-01 ENCOUNTER — TELEPHONE (OUTPATIENT)
Dept: INFUSION THERAPY | Facility: HOSPITAL | Age: 53
End: 2023-01-01

## 2023-01-01 ENCOUNTER — TELEPHONE (OUTPATIENT)
Dept: HEMATOLOGY/ONCOLOGY | Facility: CLINIC | Age: 53
End: 2023-01-01

## 2023-01-01 ENCOUNTER — PATIENT MESSAGE (OUTPATIENT)
Dept: INFECTIOUS DISEASES | Facility: CLINIC | Age: 53
End: 2023-01-01

## 2023-01-01 ENCOUNTER — OFFICE VISIT (OUTPATIENT)
Dept: HEMATOLOGY/ONCOLOGY | Facility: CLINIC | Age: 53
End: 2023-01-01
Payer: MEDICAID

## 2023-01-01 ENCOUNTER — PATIENT MESSAGE (OUTPATIENT)
Dept: HEMATOLOGY/ONCOLOGY | Facility: CLINIC | Age: 53
End: 2023-01-01

## 2023-01-01 ENCOUNTER — LAB VISIT (OUTPATIENT)
Dept: LAB | Facility: HOSPITAL | Age: 53
End: 2023-01-01
Attending: INTERNAL MEDICINE
Payer: MEDICAID

## 2023-01-01 ENCOUNTER — INFUSION (OUTPATIENT)
Dept: INFUSION THERAPY | Facility: HOSPITAL | Age: 53
End: 2023-01-01
Attending: INTERNAL MEDICINE
Payer: MEDICAID

## 2023-01-01 ENCOUNTER — DOCUMENTATION ONLY (OUTPATIENT)
Dept: HEMATOLOGY/ONCOLOGY | Facility: CLINIC | Age: 53
End: 2023-01-01
Payer: MEDICAID

## 2023-01-01 ENCOUNTER — HOSPITAL ENCOUNTER (INPATIENT)
Facility: HOSPITAL | Age: 53
LOS: 2 days | Discharge: HOME | DRG: 308 | End: 2023-02-15
Attending: EMERGENCY MEDICINE | Admitting: INTERNAL MEDICINE
Payer: MEDICAID

## 2023-01-01 ENCOUNTER — TELEPHONE (OUTPATIENT)
Dept: FAMILY MEDICINE | Facility: CLINIC | Age: 53
End: 2023-01-01

## 2023-01-01 ENCOUNTER — PATIENT MESSAGE (OUTPATIENT)
Dept: ADMINISTRATIVE | Facility: CLINIC | Age: 53
End: 2023-01-01
Payer: MEDICAID

## 2023-01-01 ENCOUNTER — HOSPITAL ENCOUNTER (OUTPATIENT)
Dept: RADIOLOGY | Facility: HOSPITAL | Age: 53
Discharge: HOME | End: 2023-01-11
Attending: NURSE PRACTITIONER
Payer: MEDICAID

## 2023-01-01 ENCOUNTER — HOSPITAL ENCOUNTER (EMERGENCY)
Facility: HOSPITAL | Age: 53
Discharge: HOME | End: 2023-01-28
Attending: EMERGENCY MEDICINE
Payer: MEDICAID

## 2023-01-01 ENCOUNTER — OFFICE VISIT (OUTPATIENT)
Dept: HEMATOLOGY/ONCOLOGY | Facility: CLINIC | Age: 53
DRG: 178 | End: 2023-01-01
Payer: MEDICAID

## 2023-01-01 ENCOUNTER — DOCUMENTATION ONLY (OUTPATIENT)
Dept: RADIATION ONCOLOGY | Facility: CLINIC | Age: 53
End: 2023-01-01
Payer: MEDICAID

## 2023-01-01 VITALS
DIASTOLIC BLOOD PRESSURE: 75 MMHG | OXYGEN SATURATION: 100 % | RESPIRATION RATE: 18 BRPM | HEIGHT: 73 IN | WEIGHT: 203.25 LBS | HEART RATE: 81 BPM | HEART RATE: 89 BPM | HEIGHT: 73 IN | DIASTOLIC BLOOD PRESSURE: 76 MMHG | TEMPERATURE: 98 F | BODY MASS INDEX: 26.79 KG/M2 | SYSTOLIC BLOOD PRESSURE: 118 MMHG | TEMPERATURE: 98 F | RESPIRATION RATE: 12 BRPM | WEIGHT: 202.13 LBS | OXYGEN SATURATION: 98 % | SYSTOLIC BLOOD PRESSURE: 111 MMHG | BODY MASS INDEX: 26.94 KG/M2

## 2023-01-01 VITALS
HEART RATE: 80 BPM | TEMPERATURE: 98 F | RESPIRATION RATE: 18 BRPM | WEIGHT: 196.63 LBS | SYSTOLIC BLOOD PRESSURE: 113 MMHG | HEIGHT: 73 IN | RESPIRATION RATE: 18 BRPM | WEIGHT: 196 LBS | TEMPERATURE: 98 F | BODY MASS INDEX: 25.86 KG/M2 | BODY MASS INDEX: 26.68 KG/M2 | DIASTOLIC BLOOD PRESSURE: 73 MMHG | SYSTOLIC BLOOD PRESSURE: 125 MMHG | DIASTOLIC BLOOD PRESSURE: 68 MMHG | OXYGEN SATURATION: 100 % | DIASTOLIC BLOOD PRESSURE: 77 MMHG | BODY MASS INDEX: 26.06 KG/M2 | HEIGHT: 73 IN | HEART RATE: 94 BPM | SYSTOLIC BLOOD PRESSURE: 106 MMHG | HEART RATE: 92 BPM | OXYGEN SATURATION: 100 % | WEIGHT: 201.31 LBS | OXYGEN SATURATION: 99 % | RESPIRATION RATE: 18 BRPM | TEMPERATURE: 98 F

## 2023-01-01 VITALS
BODY MASS INDEX: 25.62 KG/M2 | WEIGHT: 193.31 LBS | SYSTOLIC BLOOD PRESSURE: 120 MMHG | HEART RATE: 84 BPM | RESPIRATION RATE: 17 BRPM | DIASTOLIC BLOOD PRESSURE: 61 MMHG | HEIGHT: 73 IN | TEMPERATURE: 98 F | OXYGEN SATURATION: 100 %

## 2023-01-01 VITALS
SYSTOLIC BLOOD PRESSURE: 112 MMHG | HEIGHT: 73 IN | BODY MASS INDEX: 25.05 KG/M2 | RESPIRATION RATE: 19 BRPM | DIASTOLIC BLOOD PRESSURE: 68 MMHG | TEMPERATURE: 98 F | HEART RATE: 112 BPM | WEIGHT: 189 LBS | OXYGEN SATURATION: 98 %

## 2023-01-01 VITALS
BODY MASS INDEX: 26.77 KG/M2 | HEIGHT: 73 IN | OXYGEN SATURATION: 100 % | WEIGHT: 202 LBS | BODY MASS INDEX: 26.89 KG/M2 | DIASTOLIC BLOOD PRESSURE: 68 MMHG | WEIGHT: 202.88 LBS | RESPIRATION RATE: 16 BRPM | HEART RATE: 79 BPM | HEIGHT: 73 IN | TEMPERATURE: 97 F | SYSTOLIC BLOOD PRESSURE: 106 MMHG

## 2023-01-01 VITALS
DIASTOLIC BLOOD PRESSURE: 66 MMHG | OXYGEN SATURATION: 99 % | TEMPERATURE: 98 F | SYSTOLIC BLOOD PRESSURE: 139 MMHG | WEIGHT: 205.94 LBS | HEIGHT: 73 IN | BODY MASS INDEX: 27.29 KG/M2 | RESPIRATION RATE: 28 BRPM | HEART RATE: 157 BPM

## 2023-01-01 VITALS — SYSTOLIC BLOOD PRESSURE: 86 MMHG | DIASTOLIC BLOOD PRESSURE: 56 MMHG | HEART RATE: 157 BPM

## 2023-01-01 VITALS
TEMPERATURE: 97 F | BODY MASS INDEX: 27.26 KG/M2 | OXYGEN SATURATION: 98 % | WEIGHT: 195.56 LBS | DIASTOLIC BLOOD PRESSURE: 75 MMHG | HEART RATE: 116 BPM | SYSTOLIC BLOOD PRESSURE: 113 MMHG | BODY MASS INDEX: 25.92 KG/M2 | HEART RATE: 109 BPM | HEIGHT: 73 IN | RESPIRATION RATE: 14 BRPM | DIASTOLIC BLOOD PRESSURE: 78 MMHG | TEMPERATURE: 98 F | WEIGHT: 205.69 LBS | HEIGHT: 73 IN | SYSTOLIC BLOOD PRESSURE: 120 MMHG

## 2023-01-01 VITALS
DIASTOLIC BLOOD PRESSURE: 59 MMHG | BODY MASS INDEX: 25.56 KG/M2 | WEIGHT: 192.88 LBS | OXYGEN SATURATION: 98 % | HEIGHT: 73 IN | HEART RATE: 140 BPM | SYSTOLIC BLOOD PRESSURE: 98 MMHG | TEMPERATURE: 97 F

## 2023-01-01 VITALS
HEART RATE: 83 BPM | BODY MASS INDEX: 17.89 KG/M2 | RESPIRATION RATE: 18 BRPM | SYSTOLIC BLOOD PRESSURE: 119 MMHG | TEMPERATURE: 98 F | DIASTOLIC BLOOD PRESSURE: 82 MMHG | HEIGHT: 73 IN | WEIGHT: 135 LBS | OXYGEN SATURATION: 99 %

## 2023-01-01 VITALS
HEART RATE: 144 BPM | TEMPERATURE: 97 F | DIASTOLIC BLOOD PRESSURE: 75 MMHG | SYSTOLIC BLOOD PRESSURE: 103 MMHG | BODY MASS INDEX: 25.16 KG/M2 | HEIGHT: 73 IN | OXYGEN SATURATION: 100 % | WEIGHT: 189.81 LBS

## 2023-01-01 DIAGNOSIS — R00.0 TACHYCARDIA: ICD-10-CM

## 2023-01-01 DIAGNOSIS — C83.30 DIFFUSE LARGE B-CELL LYMPHOMA, UNSPECIFIED BODY REGION: ICD-10-CM

## 2023-01-01 DIAGNOSIS — E86.0 DEHYDRATION: Primary | ICD-10-CM

## 2023-01-01 DIAGNOSIS — E11.9 DIABETES MELLITUS TYPE 2, NONINSULIN DEPENDENT: ICD-10-CM

## 2023-01-01 DIAGNOSIS — C83.30 DIFFUSE LARGE B-CELL LYMPHOMA, UNSPECIFIED BODY REGION: Primary | ICD-10-CM

## 2023-01-01 DIAGNOSIS — E78.00 PURE HYPERCHOLESTEROLEMIA: ICD-10-CM

## 2023-01-01 DIAGNOSIS — K59.09 OTHER CONSTIPATION: Primary | ICD-10-CM

## 2023-01-01 DIAGNOSIS — D70.1 CHEMOTHERAPY INDUCED NEUTROPENIA: Primary | ICD-10-CM

## 2023-01-01 DIAGNOSIS — R07.9 CHEST PAIN: ICD-10-CM

## 2023-01-01 DIAGNOSIS — A41.9 SEVERE SEPSIS: ICD-10-CM

## 2023-01-01 DIAGNOSIS — D50.0 IRON DEFICIENCY ANEMIA DUE TO CHRONIC BLOOD LOSS: ICD-10-CM

## 2023-01-01 DIAGNOSIS — R65.20 SEVERE SEPSIS: ICD-10-CM

## 2023-01-01 DIAGNOSIS — R78.89 ELEVATED DIGOXIN LEVEL: ICD-10-CM

## 2023-01-01 DIAGNOSIS — E87.1 HYPONATREMIA: ICD-10-CM

## 2023-01-01 DIAGNOSIS — E11.65 UNCONTROLLED TYPE 2 DIABETES MELLITUS WITH HYPERGLYCEMIA: ICD-10-CM

## 2023-01-01 DIAGNOSIS — U07.1 COVID-19 VIRUS DETECTED: ICD-10-CM

## 2023-01-01 DIAGNOSIS — R10.30 LOWER ABDOMINAL PAIN: ICD-10-CM

## 2023-01-01 DIAGNOSIS — T45.1X5A CHEMOTHERAPY INDUCED NEUTROPENIA: Primary | ICD-10-CM

## 2023-01-01 DIAGNOSIS — C83.33 DIFFUSE LARGE B-CELL LYMPHOMA OF INTRA-ABDOMINAL LYMPH NODES: Primary | ICD-10-CM

## 2023-01-01 DIAGNOSIS — C85.80 LARGE CELL LYMPHOMA: ICD-10-CM

## 2023-01-01 DIAGNOSIS — C83.30 DLBCL (DIFFUSE LARGE B CELL LYMPHOMA): Primary | ICD-10-CM

## 2023-01-01 DIAGNOSIS — A41.9 SEPSIS, DUE TO UNSPECIFIED ORGANISM, UNSPECIFIED WHETHER ACUTE ORGAN DYSFUNCTION PRESENT: Primary | ICD-10-CM

## 2023-01-01 DIAGNOSIS — D61.818 PANCYTOPENIA: ICD-10-CM

## 2023-01-01 DIAGNOSIS — Z98.890 S/P EXPLORATORY LAPAROTOMY: ICD-10-CM

## 2023-01-01 DIAGNOSIS — Z51.89 AFTER CARE: ICD-10-CM

## 2023-01-01 DIAGNOSIS — D69.6 THROMBOCYTOPENIA: ICD-10-CM

## 2023-01-01 DIAGNOSIS — D63.8 ANEMIA, CHRONIC DISEASE: ICD-10-CM

## 2023-01-01 DIAGNOSIS — E11.9 TYPE 2 DIABETES MELLITUS WITHOUT COMPLICATION, WITHOUT LONG-TERM CURRENT USE OF INSULIN: ICD-10-CM

## 2023-01-01 DIAGNOSIS — E86.0 DEHYDRATION: ICD-10-CM

## 2023-01-01 DIAGNOSIS — A41.9 SEPSIS: ICD-10-CM

## 2023-01-01 DIAGNOSIS — U07.1 COVID: ICD-10-CM

## 2023-01-01 DIAGNOSIS — J96.01 ACUTE HYPOXEMIC RESPIRATORY FAILURE: ICD-10-CM

## 2023-01-01 DIAGNOSIS — I48.91 ATRIAL FIBRILLATION WITH RVR: ICD-10-CM

## 2023-01-01 DIAGNOSIS — R53.1 WEAKNESS: ICD-10-CM

## 2023-01-01 DIAGNOSIS — C83.33 DIFFUSE LARGE B-CELL LYMPHOMA OF INTRA-ABDOMINAL LYMPH NODES: ICD-10-CM

## 2023-01-01 DIAGNOSIS — R19.00 ABDOMINAL MASS, UNSPECIFIED ABDOMINAL LOCATION: ICD-10-CM

## 2023-01-01 DIAGNOSIS — K65.1 INTRA-ABDOMINAL ABSCESS: ICD-10-CM

## 2023-01-01 LAB
ABO + RH BLD: NORMAL
ABO GROUP BLD: NORMAL
ADENOVIRUS: NOT DETECTED
ADEQUACY: NORMAL
ALBUMIN SERPL BCP-MCNC: 1.3 G/DL (ref 3.5–5.2)
ALBUMIN SERPL BCP-MCNC: 1.4 G/DL (ref 3.5–5.2)
ALBUMIN SERPL BCP-MCNC: 1.6 G/DL (ref 3.5–5.2)
ALBUMIN SERPL BCP-MCNC: 1.6 G/DL (ref 3.5–5.2)
ALBUMIN SERPL BCP-MCNC: 1.7 G/DL (ref 3.5–5.2)
ALBUMIN SERPL BCP-MCNC: 1.8 G/DL (ref 3.5–5.2)
ALBUMIN SERPL BCP-MCNC: 1.9 G/DL (ref 3.5–5.2)
ALBUMIN SERPL BCP-MCNC: 1.9 G/DL (ref 3.5–5.2)
ALBUMIN SERPL BCP-MCNC: 2 G/DL (ref 3.5–5.2)
ALBUMIN SERPL BCP-MCNC: 2.1 G/DL (ref 3.5–5.2)
ALBUMIN SERPL BCP-MCNC: 2.1 G/DL (ref 3.5–5.2)
ALBUMIN SERPL BCP-MCNC: 2.2 G/DL (ref 3.5–5.2)
ALBUMIN SERPL BCP-MCNC: 2.2 G/DL (ref 3.5–5.2)
ALBUMIN SERPL BCP-MCNC: 2.3 G/DL (ref 3.5–5.2)
ALBUMIN SERPL BCP-MCNC: 2.3 G/DL (ref 3.5–5.2)
ALBUMIN SERPL BCP-MCNC: 2.4 G/DL (ref 3.5–5.2)
ALBUMIN SERPL BCP-MCNC: 2.4 G/DL (ref 3.5–5.2)
ALBUMIN SERPL BCP-MCNC: 2.5 G/DL (ref 3.5–5.2)
ALBUMIN SERPL BCP-MCNC: 2.7 G/DL (ref 3.5–5.2)
ALBUMIN SERPL BCP-MCNC: 2.7 G/DL (ref 3.5–5.2)
ALBUMIN SERPL BCP-MCNC: 2.9 G/DL (ref 3.5–5.2)
ALBUMIN SERPL BCP-MCNC: 3.2 G/DL (ref 3.5–5.2)
ALBUMIN SERPL BCP-MCNC: 3.4 G/DL (ref 3.5–5.2)
ALBUMIN SERPL BCP-MCNC: 3.5 G/DL (ref 3.5–5.2)
ALBUMIN SERPL BCP-MCNC: 3.6 G/DL (ref 3.5–5.2)
ALLENS TEST: ABNORMAL
ALP SERPL-CCNC: 102 U/L (ref 55–135)
ALP SERPL-CCNC: 102 U/L (ref 55–135)
ALP SERPL-CCNC: 106 U/L (ref 55–135)
ALP SERPL-CCNC: 106 U/L (ref 55–135)
ALP SERPL-CCNC: 110 U/L (ref 55–135)
ALP SERPL-CCNC: 116 U/L (ref 55–135)
ALP SERPL-CCNC: 118 U/L (ref 55–135)
ALP SERPL-CCNC: 125 U/L (ref 55–135)
ALP SERPL-CCNC: 55 U/L (ref 55–135)
ALP SERPL-CCNC: 57 U/L (ref 55–135)
ALP SERPL-CCNC: 61 U/L (ref 55–135)
ALP SERPL-CCNC: 62 U/L (ref 55–135)
ALP SERPL-CCNC: 67 U/L (ref 55–135)
ALP SERPL-CCNC: 67 U/L (ref 55–135)
ALP SERPL-CCNC: 69 U/L (ref 55–135)
ALP SERPL-CCNC: 70 U/L (ref 55–135)
ALP SERPL-CCNC: 71 U/L (ref 55–135)
ALP SERPL-CCNC: 72 U/L (ref 55–135)
ALP SERPL-CCNC: 73 U/L (ref 55–135)
ALP SERPL-CCNC: 73 U/L (ref 55–135)
ALP SERPL-CCNC: 78 U/L (ref 55–135)
ALP SERPL-CCNC: 79 U/L (ref 55–135)
ALP SERPL-CCNC: 80 U/L (ref 55–135)
ALP SERPL-CCNC: 80 U/L (ref 55–135)
ALP SERPL-CCNC: 83 U/L (ref 55–135)
ALP SERPL-CCNC: 83 U/L (ref 55–135)
ALP SERPL-CCNC: 84 U/L (ref 55–135)
ALP SERPL-CCNC: 89 U/L (ref 55–135)
ALP SERPL-CCNC: 90 U/L (ref 55–135)
ALP SERPL-CCNC: 91 U/L (ref 55–135)
ALT SERPL W/O P-5'-P-CCNC: 12 U/L (ref 10–44)
ALT SERPL W/O P-5'-P-CCNC: 13 U/L (ref 10–44)
ALT SERPL W/O P-5'-P-CCNC: 13 U/L (ref 10–44)
ALT SERPL W/O P-5'-P-CCNC: 14 U/L (ref 10–44)
ALT SERPL W/O P-5'-P-CCNC: 14 U/L (ref 10–44)
ALT SERPL W/O P-5'-P-CCNC: 15 U/L (ref 10–44)
ALT SERPL W/O P-5'-P-CCNC: 16 U/L (ref 10–44)
ALT SERPL W/O P-5'-P-CCNC: 17 U/L (ref 10–44)
ALT SERPL W/O P-5'-P-CCNC: 18 U/L (ref 10–44)
ALT SERPL W/O P-5'-P-CCNC: 19 U/L (ref 10–44)
ALT SERPL W/O P-5'-P-CCNC: 20 U/L (ref 10–44)
ALT SERPL W/O P-5'-P-CCNC: 21 U/L (ref 10–44)
ALT SERPL W/O P-5'-P-CCNC: 23 U/L (ref 10–44)
ALT SERPL W/O P-5'-P-CCNC: 23 U/L (ref 10–44)
ALT SERPL W/O P-5'-P-CCNC: 25 U/L (ref 10–44)
ALT SERPL W/O P-5'-P-CCNC: 6 U/L (ref 10–44)
ALT SERPL W/O P-5'-P-CCNC: 6 U/L (ref 10–44)
ALT SERPL W/O P-5'-P-CCNC: 7 U/L (ref 10–44)
ALT SERPL W/O P-5'-P-CCNC: 8 U/L (ref 10–44)
ALT SERPL W/O P-5'-P-CCNC: 9 U/L (ref 10–44)
ALT SERPL W/O P-5'-P-CCNC: <5 U/L (ref 10–44)
AMORPH CRY UR QL COMP ASSIST: ABNORMAL
ANION GAP SERPL CALC-SCNC: 10 MMOL/L (ref 8–16)
ANION GAP SERPL CALC-SCNC: 11 MMOL/L (ref 8–16)
ANION GAP SERPL CALC-SCNC: 12 MMOL/L (ref 8–16)
ANION GAP SERPL CALC-SCNC: 12 MMOL/L (ref 8–16)
ANION GAP SERPL CALC-SCNC: 13 MMOL/L (ref 8–16)
ANION GAP SERPL CALC-SCNC: 13 MMOL/L (ref 8–16)
ANION GAP SERPL CALC-SCNC: 14 MMOL/L (ref 8–16)
ANION GAP SERPL CALC-SCNC: 15 MMOL/L (ref 8–16)
ANION GAP SERPL CALC-SCNC: 15 MMOL/L (ref 8–16)
ANION GAP SERPL CALC-SCNC: 19 MMOL/L (ref 8–16)
ANION GAP SERPL CALC-SCNC: 4 MMOL/L (ref 8–16)
ANION GAP SERPL CALC-SCNC: 5 MMOL/L (ref 8–16)
ANION GAP SERPL CALC-SCNC: 5 MMOL/L (ref 8–16)
ANION GAP SERPL CALC-SCNC: 6 MMOL/L (ref 8–16)
ANION GAP SERPL CALC-SCNC: 7 MMOL/L (ref 8–16)
ANION GAP SERPL CALC-SCNC: 8 MMOL/L (ref 8–16)
ANION GAP SERPL CALC-SCNC: 9 MMOL/L (ref 8–16)
ANISOCYTOSIS BLD QL SMEAR: ABNORMAL
ANISOCYTOSIS BLD QL SMEAR: SLIGHT
APTT BLDCRRT: 21.4 SEC (ref 21–32)
APTT BLDCRRT: 55.4 SEC (ref 21–32)
ASCENDING AORTA: 3.48 CM
ASCENDING AORTA: 3.63 CM
AST SERPL-CCNC: 10 U/L (ref 10–40)
AST SERPL-CCNC: 11 U/L (ref 10–40)
AST SERPL-CCNC: 13 U/L (ref 10–40)
AST SERPL-CCNC: 13 U/L (ref 10–40)
AST SERPL-CCNC: 14 U/L (ref 10–40)
AST SERPL-CCNC: 15 U/L (ref 10–40)
AST SERPL-CCNC: 16 U/L (ref 10–40)
AST SERPL-CCNC: 20 U/L (ref 10–40)
AST SERPL-CCNC: 20 U/L (ref 10–40)
AST SERPL-CCNC: 23 U/L (ref 10–40)
AST SERPL-CCNC: 25 U/L (ref 10–40)
AST SERPL-CCNC: 25 U/L (ref 10–40)
AST SERPL-CCNC: 27 U/L (ref 10–40)
AST SERPL-CCNC: 27 U/L (ref 10–40)
AST SERPL-CCNC: 28 U/L (ref 10–40)
AST SERPL-CCNC: 30 U/L (ref 10–40)
AST SERPL-CCNC: 34 U/L (ref 10–40)
AST SERPL-CCNC: 38 U/L (ref 10–40)
AST SERPL-CCNC: 41 U/L (ref 10–40)
AST SERPL-CCNC: 44 U/L (ref 10–40)
AST SERPL-CCNC: 44 U/L (ref 10–40)
AST SERPL-CCNC: 50 U/L (ref 10–40)
AST SERPL-CCNC: 51 U/L (ref 10–40)
AST SERPL-CCNC: 58 U/L (ref 10–40)
AST SERPL-CCNC: 58 U/L (ref 10–40)
AST SERPL-CCNC: 63 U/L (ref 10–40)
AST SERPL-CCNC: 67 U/L (ref 10–40)
AST SERPL-CCNC: 68 U/L (ref 10–40)
AST SERPL-CCNC: 69 U/L (ref 10–40)
AST SERPL-CCNC: 8 U/L (ref 10–40)
AV INDEX (PROSTH): 0.63
AV INDEX (PROSTH): 1.05
AV MEAN GRADIENT: 3 MMHG
AV MEAN GRADIENT: 4 MMHG
AV PEAK GRADIENT: 6 MMHG
AV PEAK GRADIENT: 6 MMHG
AV VALVE AREA: 2.61 CM2
AV VALVE AREA: 3.94 CM2
AV VELOCITY RATIO: 0.7
AV VELOCITY RATIO: 1.01
B-OH-BUTYR BLD STRIP-SCNC: 0.3 MMOL/L (ref 0–0.5)
BACTERIA #/AREA URNS AUTO: ABNORMAL /HPF
BACTERIA #/AREA URNS AUTO: NORMAL /HPF
BACTERIA #/AREA URNS AUTO: NORMAL /HPF
BACTERIA #/AREA URNS HPF: NEGATIVE /HPF
BACTERIA BLD CULT: NORMAL
BASO STIPL BLD QL SMEAR: ABNORMAL
BASOPHILS # BLD AUTO: 0 K/UL (ref 0–0.2)
BASOPHILS # BLD AUTO: 0.01 K/UL (ref 0–0.2)
BASOPHILS # BLD AUTO: 0.02 K/UL (ref 0–0.2)
BASOPHILS # BLD AUTO: 0.03 K/UL (ref 0–0.2)
BASOPHILS # BLD AUTO: 0.04 K/UL (ref 0–0.2)
BASOPHILS # BLD AUTO: ABNORMAL K/UL (ref 0–0.2)
BASOPHILS NFR BLD: 0 % (ref 0–1.9)
BASOPHILS NFR BLD: 0.1 % (ref 0–1.9)
BASOPHILS NFR BLD: 0.2 % (ref 0–1.9)
BASOPHILS NFR BLD: 0.3 % (ref 0–1.9)
BASOPHILS NFR BLD: 1 % (ref 0–1.9)
BILIRUB DIRECT SERPL-MCNC: 0.9 MG/DL (ref 0.1–0.3)
BILIRUB DIRECT SERPL-MCNC: 1 MG/DL (ref 0.1–0.3)
BILIRUB SERPL-MCNC: 0.8 MG/DL (ref 0.1–1)
BILIRUB SERPL-MCNC: 0.9 MG/DL (ref 0.1–1)
BILIRUB SERPL-MCNC: 1 MG/DL (ref 0.1–1)
BILIRUB SERPL-MCNC: 1.1 MG/DL (ref 0.1–1)
BILIRUB SERPL-MCNC: 1.2 MG/DL (ref 0.1–1)
BILIRUB SERPL-MCNC: 1.2 MG/DL (ref 0.1–1)
BILIRUB SERPL-MCNC: 1.3 MG/DL (ref 0.1–1)
BILIRUB SERPL-MCNC: 1.4 MG/DL (ref 0.1–1)
BILIRUB SERPL-MCNC: 1.5 MG/DL (ref 0.1–1)
BILIRUB SERPL-MCNC: 1.6 MG/DL (ref 0.1–1)
BILIRUB SERPL-MCNC: 1.6 MG/DL (ref 0.1–1)
BILIRUB SERPL-MCNC: 1.7 MG/DL (ref 0.1–1)
BILIRUB SERPL-MCNC: 1.7 MG/DL (ref 0.1–1)
BILIRUB SERPL-MCNC: 1.9 MG/DL (ref 0.1–1)
BILIRUB SERPL-MCNC: 1.9 MG/DL (ref 0.1–1)
BILIRUB SERPL-MCNC: 2 MG/DL (ref 0.1–1)
BILIRUB SERPL-MCNC: 2 MG/DL (ref 0.1–1)
BILIRUB SERPL-MCNC: 2.1 MG/DL (ref 0.1–1)
BILIRUB SERPL-MCNC: 2.2 MG/DL (ref 0.1–1)
BILIRUB SERPL-MCNC: 2.3 MG/DL (ref 0.1–1)
BILIRUB SERPL-MCNC: 2.4 MG/DL (ref 0.1–1)
BILIRUB SERPL-MCNC: 2.5 MG/DL (ref 0.1–1)
BILIRUB SERPL-MCNC: 2.6 MG/DL (ref 0.1–1)
BILIRUB SERPL-MCNC: 3.1 MG/DL (ref 0.1–1)
BILIRUB SERPL-MCNC: 3.3 MG/DL (ref 0.1–1)
BILIRUB SERPL-MCNC: 3.6 MG/DL (ref 0.1–1)
BILIRUB SERPL-MCNC: 4 MG/DL (ref 0.1–1)
BILIRUB UR QL STRIP: ABNORMAL
BILIRUB UR QL STRIP: NEGATIVE
BLD GP AB SCN CELLS X3 SERPL QL: NORMAL
BLD PROD TYP BPU: NORMAL
BLOOD UNIT EXPIRATION DATE: NORMAL
BLOOD UNIT TYPE CODE: 1700
BLOOD UNIT TYPE CODE: 5100
BLOOD UNIT TYPE CODE: 6200
BLOOD UNIT TYPE CODE: 6200
BLOOD UNIT TYPE: NORMAL
BNP SERPL-MCNC: 24 PG/ML (ref 0–99)
BNP SERPL-MCNC: 32 PG/ML (ref 0–99)
BNP SERPL-MCNC: 42 PG/ML (ref 0–99)
BORDETELLA PARAPERTUSSIS (IS1001): NOT DETECTED
BORDETELLA PERTUSSIS (PTXP): NOT DETECTED
BSA FOR ECHO PROCEDURE: 2.1 M2
BSA FOR ECHO PROCEDURE: 2.18 M2
BUN SERPL-MCNC: 11 MG/DL (ref 6–20)
BUN SERPL-MCNC: 12 MG/DL (ref 6–20)
BUN SERPL-MCNC: 12 MG/DL (ref 6–20)
BUN SERPL-MCNC: 13 MG/DL (ref 6–20)
BUN SERPL-MCNC: 13 MG/DL (ref 6–20)
BUN SERPL-MCNC: 14 MG/DL (ref 6–20)
BUN SERPL-MCNC: 15 MG/DL (ref 6–20)
BUN SERPL-MCNC: 17 MG/DL (ref 6–20)
BUN SERPL-MCNC: 18 MG/DL (ref 6–20)
BUN SERPL-MCNC: 19 MG/DL (ref 6–20)
BUN SERPL-MCNC: 20 MG/DL (ref 6–20)
BUN SERPL-MCNC: 21 MG/DL (ref 6–20)
BUN SERPL-MCNC: 22 MG/DL (ref 6–20)
BUN SERPL-MCNC: 22 MG/DL (ref 6–20)
BUN SERPL-MCNC: 23 MG/DL (ref 6–20)
BUN SERPL-MCNC: 24 MG/DL (ref 6–20)
BUN SERPL-MCNC: 24 MG/DL (ref 6–20)
BUN SERPL-MCNC: 25 MG/DL (ref 6–20)
BUN SERPL-MCNC: 26 MG/DL (ref 6–30)
BUN SERPL-MCNC: 27 MG/DL (ref 6–20)
BUN SERPL-MCNC: 27 MG/DL (ref 6–20)
BUN SERPL-MCNC: 32 MG/DL (ref 6–20)
BUN SERPL-MCNC: 33 MG/DL (ref 6–20)
BUN SERPL-MCNC: 37 MG/DL (ref 6–20)
BUN SERPL-MCNC: 41 MG/DL (ref 6–20)
BURR CELLS BLD QL SMEAR: ABNORMAL
CALCIUM SERPL-MCNC: 7.1 MG/DL (ref 8.7–10.5)
CALCIUM SERPL-MCNC: 7.4 MG/DL (ref 8.7–10.5)
CALCIUM SERPL-MCNC: 7.5 MG/DL (ref 8.7–10.5)
CALCIUM SERPL-MCNC: 7.6 MG/DL (ref 8.7–10.5)
CALCIUM SERPL-MCNC: 7.6 MG/DL (ref 8.7–10.5)
CALCIUM SERPL-MCNC: 7.8 MG/DL (ref 8.7–10.5)
CALCIUM SERPL-MCNC: 7.8 MG/DL (ref 8.7–10.5)
CALCIUM SERPL-MCNC: 7.9 MG/DL (ref 8.7–10.5)
CALCIUM SERPL-MCNC: 8 MG/DL (ref 8.7–10.5)
CALCIUM SERPL-MCNC: 8.1 MG/DL (ref 8.7–10.5)
CALCIUM SERPL-MCNC: 8.2 MG/DL (ref 8.7–10.5)
CALCIUM SERPL-MCNC: 8.3 MG/DL (ref 8.7–10.5)
CALCIUM SERPL-MCNC: 8.4 MG/DL (ref 8.7–10.5)
CALCIUM SERPL-MCNC: 8.4 MG/DL (ref 8.7–10.5)
CALCIUM SERPL-MCNC: 8.5 MG/DL (ref 8.7–10.5)
CALCIUM SERPL-MCNC: 8.6 MG/DL (ref 8.7–10.5)
CALCIUM SERPL-MCNC: 8.8 MG/DL (ref 8.7–10.5)
CALCIUM SERPL-MCNC: 8.9 MG/DL (ref 8.7–10.5)
CALCIUM SERPL-MCNC: 9.1 MG/DL (ref 8.7–10.5)
CALCIUM SERPL-MCNC: 9.3 MG/DL (ref 8.7–10.5)
CALCIUM SERPL-MCNC: 9.6 MG/DL (ref 8.7–10.5)
CALCIUM SERPL-MCNC: 9.7 MG/DL (ref 8.7–10.5)
CAOX CRY UR QL COMP ASSIST: ABNORMAL
CAOX CRY UR QL COMP ASSIST: NORMAL
CHLAMYDIA PNEUMONIAE: NOT DETECTED
CHLORIDE SERPL-SCNC: 100 MMOL/L (ref 95–110)
CHLORIDE SERPL-SCNC: 101 MMOL/L (ref 95–110)
CHLORIDE SERPL-SCNC: 102 MMOL/L (ref 95–110)
CHLORIDE SERPL-SCNC: 103 MMOL/L (ref 95–110)
CHLORIDE SERPL-SCNC: 103 MMOL/L (ref 95–110)
CHLORIDE SERPL-SCNC: 104 MMOL/L (ref 95–110)
CHLORIDE SERPL-SCNC: 104 MMOL/L (ref 95–110)
CHLORIDE SERPL-SCNC: 105 MMOL/L (ref 95–110)
CHLORIDE SERPL-SCNC: 91 MMOL/L (ref 95–110)
CHLORIDE SERPL-SCNC: 92 MMOL/L (ref 95–110)
CHLORIDE SERPL-SCNC: 92 MMOL/L (ref 95–110)
CHLORIDE SERPL-SCNC: 93 MMOL/L (ref 95–110)
CHLORIDE SERPL-SCNC: 94 MMOL/L (ref 95–110)
CHLORIDE SERPL-SCNC: 96 MMOL/L (ref 95–110)
CHLORIDE SERPL-SCNC: 97 MMOL/L (ref 95–110)
CHLORIDE SERPL-SCNC: 98 MMOL/L (ref 95–110)
CHLORIDE SERPL-SCNC: 98 MMOL/L (ref 95–110)
CHLORIDE SERPL-SCNC: 99 MMOL/L (ref 95–110)
CK SERPL-CCNC: 7 U/L (ref 20–200)
CLARITY UR REFRACT.AUTO: ABNORMAL
CLARITY UR REFRACT.AUTO: CLEAR
CLARITY UR: ABNORMAL
CLARITY UR: CLEAR
CO2 SERPL-SCNC: 10 MMOL/L (ref 23–29)
CO2 SERPL-SCNC: 11 MMOL/L (ref 23–29)
CO2 SERPL-SCNC: 15 MMOL/L (ref 23–29)
CO2 SERPL-SCNC: 15 MMOL/L (ref 23–29)
CO2 SERPL-SCNC: 16 MMOL/L (ref 23–29)
CO2 SERPL-SCNC: 18 MMOL/L (ref 23–29)
CO2 SERPL-SCNC: 19 MMOL/L (ref 23–29)
CO2 SERPL-SCNC: 20 MMOL/L (ref 23–29)
CO2 SERPL-SCNC: 20 MMOL/L (ref 23–29)
CO2 SERPL-SCNC: 21 MMOL/L (ref 23–29)
CO2 SERPL-SCNC: 22 MMOL/L (ref 23–29)
CO2 SERPL-SCNC: 23 MMOL/L (ref 23–29)
CO2 SERPL-SCNC: 24 MMOL/L (ref 23–29)
CO2 SERPL-SCNC: 26 MMOL/L (ref 23–29)
CO2 SERPL-SCNC: 27 MMOL/L (ref 23–29)
CO2 SERPL-SCNC: 28 MMOL/L (ref 23–29)
CODING SYSTEM: NORMAL
COLOR UR AUTO: YELLOW
COLOR UR: YELLOW
COLOR UR: YELLOW
COMMENT: NORMAL
CORONAVIRUS 229E, COMMON COLD VIRUS: NOT DETECTED
CORONAVIRUS HKU1, COMMON COLD VIRUS: NOT DETECTED
CORONAVIRUS NL63, COMMON COLD VIRUS: NOT DETECTED
CORONAVIRUS OC43, COMMON COLD VIRUS: NOT DETECTED
CREAT SERPL-MCNC: 0.6 MG/DL (ref 0.5–1.4)
CREAT SERPL-MCNC: 0.7 MG/DL (ref 0.5–1.4)
CREAT SERPL-MCNC: 0.8 MG/DL (ref 0.5–1.4)
CREAT SERPL-MCNC: 0.9 MG/DL (ref 0.5–1.4)
CREAT SERPL-MCNC: 1 MG/DL (ref 0.5–1.4)
CREAT SERPL-MCNC: 1 MG/DL (ref 0.5–1.4)
CREAT SERPL-MCNC: 1.1 MG/DL (ref 0.5–1.4)
CREAT SERPL-MCNC: 1.2 MG/DL (ref 0.5–1.4)
CROSSMATCH INTERPRETATION: NORMAL
CRP SERPL-MCNC: 3.66 MG/DL
CV ECHO LV RWT: 0.33 CM
CV ECHO LV RWT: 0.38 CM
D DIMER PPP IA.FEU-MCNC: 1.39 MG/L FEU
D DIMER PPP IA.FEU-MCNC: 1.41 MG/L FEU
DACRYOCYTES BLD QL SMEAR: ABNORMAL
DACRYOCYTES BLD QL SMEAR: ABNORMAL
DELSYS: ABNORMAL
DIFFERENTIAL METHOD: ABNORMAL
DIGOXIN SERPL-MCNC: 3.4 NG/ML (ref 0.8–2)
DISPENSE STATUS: NORMAL
DOHLE BOD BLD QL SMEAR: PRESENT
DOP CALC AO PEAK VEL: 1.19 M/S
DOP CALC AO PEAK VEL: 1.27 M/S
DOP CALC AO VTI: 20.29 CM
DOP CALC AO VTI: 24.39 CM
DOP CALC LVOT AREA: 3.8 CM2
DOP CALC LVOT AREA: 4.1 CM2
DOP CALC LVOT DIAMETER: 2.19 CM
DOP CALC LVOT DIAMETER: 2.29 CM
DOP CALC LVOT PEAK VEL: 0.83 M/S
DOP CALC LVOT PEAK VEL: 1.28 M/S
DOP CALC LVOT STROKE VOLUME: 63.73 CM3
DOP CALC LVOT STROKE VOLUME: 79.93 CM3
DOP CALCLVOT PEAK VEL VTI: 15.48 CM
DOP CALCLVOT PEAK VEL VTI: 21.23 CM
E WAVE DECELERATION TIME: 174.68 MSEC
E WAVE DECELERATION TIME: 239.86 MSEC
E/A RATIO: 0.74
E/A RATIO: 0.83
E/E' RATIO: 5.63 M/S
E/E' RATIO: 7.06 M/S
ECHO LV POSTERIOR WALL: 0.86 CM (ref 0.6–1.1)
ECHO LV POSTERIOR WALL: 0.92 CM (ref 0.6–1.1)
EJECTION FRACTION: 58 %
EJECTION FRACTION: 65 %
EOSINOPHIL # BLD AUTO: 0 K/UL (ref 0–0.5)
EOSINOPHIL # BLD AUTO: 0.1 K/UL (ref 0–0.5)
EOSINOPHIL # BLD AUTO: ABNORMAL K/UL (ref 0–0.5)
EOSINOPHIL NFR BLD: 0 % (ref 0–8)
EOSINOPHIL NFR BLD: 0.1 % (ref 0–8)
EOSINOPHIL NFR BLD: 0.2 % (ref 0–8)
EOSINOPHIL NFR BLD: 0.3 % (ref 0–8)
EOSINOPHIL NFR BLD: 0.3 % (ref 0–8)
EOSINOPHIL NFR BLD: 0.4 % (ref 0–8)
EOSINOPHIL NFR BLD: 0.4 % (ref 0–8)
EOSINOPHIL NFR BLD: 0.5 % (ref 0–8)
EOSINOPHIL NFR BLD: 0.6 % (ref 0–8)
EOSINOPHIL NFR BLD: 1 % (ref 0–8)
EOSINOPHIL NFR BLD: 1.2 % (ref 0–8)
EOSINOPHIL NFR BLD: 1.8 % (ref 0–8)
EOSINOPHIL NFR BLD: 2.2 % (ref 0–8)
ERYTHROCYTE [DISTWIDTH] IN BLOOD BY AUTOMATED COUNT: 18.6 % (ref 11.5–14.5)
ERYTHROCYTE [DISTWIDTH] IN BLOOD BY AUTOMATED COUNT: 18.7 % (ref 11.5–14.5)
ERYTHROCYTE [DISTWIDTH] IN BLOOD BY AUTOMATED COUNT: 18.8 % (ref 11.5–14.5)
ERYTHROCYTE [DISTWIDTH] IN BLOOD BY AUTOMATED COUNT: 18.9 % (ref 11.5–14.5)
ERYTHROCYTE [DISTWIDTH] IN BLOOD BY AUTOMATED COUNT: 18.9 % (ref 11.5–14.5)
ERYTHROCYTE [DISTWIDTH] IN BLOOD BY AUTOMATED COUNT: 19 % (ref 11.5–14.5)
ERYTHROCYTE [DISTWIDTH] IN BLOOD BY AUTOMATED COUNT: 19.1 % (ref 11.5–14.5)
ERYTHROCYTE [DISTWIDTH] IN BLOOD BY AUTOMATED COUNT: 19.2 % (ref 11.5–14.5)
ERYTHROCYTE [DISTWIDTH] IN BLOOD BY AUTOMATED COUNT: 19.3 % (ref 11.5–14.5)
ERYTHROCYTE [DISTWIDTH] IN BLOOD BY AUTOMATED COUNT: 19.4 % (ref 11.5–14.5)
ERYTHROCYTE [DISTWIDTH] IN BLOOD BY AUTOMATED COUNT: 19.4 % (ref 11.5–14.5)
ERYTHROCYTE [DISTWIDTH] IN BLOOD BY AUTOMATED COUNT: 19.6 % (ref 11.5–14.5)
ERYTHROCYTE [DISTWIDTH] IN BLOOD BY AUTOMATED COUNT: 19.7 % (ref 11.5–14.5)
ERYTHROCYTE [DISTWIDTH] IN BLOOD BY AUTOMATED COUNT: 19.9 % (ref 11.5–14.5)
ERYTHROCYTE [DISTWIDTH] IN BLOOD BY AUTOMATED COUNT: 20 % (ref 11.5–14.5)
ERYTHROCYTE [DISTWIDTH] IN BLOOD BY AUTOMATED COUNT: 20.1 % (ref 11.5–14.5)
ERYTHROCYTE [DISTWIDTH] IN BLOOD BY AUTOMATED COUNT: 20.3 % (ref 11.5–14.5)
ERYTHROCYTE [DISTWIDTH] IN BLOOD BY AUTOMATED COUNT: 20.4 % (ref 11.5–14.5)
ERYTHROCYTE [DISTWIDTH] IN BLOOD BY AUTOMATED COUNT: 20.4 % (ref 11.5–14.5)
ERYTHROCYTE [DISTWIDTH] IN BLOOD BY AUTOMATED COUNT: 20.5 % (ref 11.5–14.5)
ERYTHROCYTE [DISTWIDTH] IN BLOOD BY AUTOMATED COUNT: 20.6 % (ref 11.5–14.5)
ERYTHROCYTE [DISTWIDTH] IN BLOOD BY AUTOMATED COUNT: 20.6 % (ref 11.5–14.5)
ERYTHROCYTE [DISTWIDTH] IN BLOOD BY AUTOMATED COUNT: 20.8 % (ref 11.5–14.5)
ERYTHROCYTE [DISTWIDTH] IN BLOOD BY AUTOMATED COUNT: 21 % (ref 11.5–14.5)
ERYTHROCYTE [DISTWIDTH] IN BLOOD BY AUTOMATED COUNT: 21.1 % (ref 11.5–14.5)
ERYTHROCYTE [DISTWIDTH] IN BLOOD BY AUTOMATED COUNT: 21.2 % (ref 11.5–14.5)
ERYTHROCYTE [DISTWIDTH] IN BLOOD BY AUTOMATED COUNT: 21.2 % (ref 11.5–14.5)
ERYTHROCYTE [DISTWIDTH] IN BLOOD BY AUTOMATED COUNT: 21.8 % (ref 11.5–14.5)
ERYTHROCYTE [DISTWIDTH] IN BLOOD BY AUTOMATED COUNT: 21.8 % (ref 11.5–14.5)
ERYTHROCYTE [DISTWIDTH] IN BLOOD BY AUTOMATED COUNT: 22.7 % (ref 11.5–14.5)
ERYTHROCYTE [DISTWIDTH] IN BLOOD BY AUTOMATED COUNT: 22.8 % (ref 11.5–14.5)
ERYTHROCYTE [DISTWIDTH] IN BLOOD BY AUTOMATED COUNT: 22.8 % (ref 11.5–14.5)
ERYTHROCYTE [DISTWIDTH] IN BLOOD BY AUTOMATED COUNT: 23 % (ref 11.5–14.5)
ERYTHROCYTE [DISTWIDTH] IN BLOOD BY AUTOMATED COUNT: 23.1 % (ref 11.5–14.5)
ERYTHROCYTE [SEDIMENTATION RATE] IN BLOOD BY PHOTOMETRIC METHOD: 18 MM/HR (ref 0–23)
ERYTHROCYTE [SEDIMENTATION RATE] IN BLOOD BY WESTERGREN METHOD: 20 MM/H
ERYTHROCYTE [SEDIMENTATION RATE] IN BLOOD BY WESTERGREN METHOD: 20 MM/H
EST. GFR  (NO RACE VARIABLE): >60 ML/MIN/1.73 M^2
ESTIMATED AVG GLUCOSE: 174 MG/DL (ref 68–131)
FERRITIN SERPL-MCNC: 1129 NG/ML (ref 20–300)
FERRITIN SERPL-MCNC: 2019 NG/ML (ref 20–300)
FERRITIN SERPL-MCNC: 2286 NG/ML (ref 20–300)
FIBRINOGEN PPP-MCNC: 394 MG/DL (ref 182–400)
FIBRINOGEN PPP-MCNC: 394 MG/DL (ref 182–400)
FINAL PATHOLOGIC DIAGNOSIS: NORMAL
FIO2: 40
FIO2: 40
FLOW CYTOMETRY ANTIBODIES ANALYZED - LYMPH NODE: NORMAL
FLOW CYTOMETRY COMMENT - LYMPH NODE: NORMAL
FLOW CYTOMETRY INTERPRETATION - LYMPH NODE: NORMAL
FLOW: 2.5
FLUBV RNA NPH QL NAA+NON-PROBE: NOT DETECTED
FOLATE SERPL-MCNC: 8.8 NG/ML (ref 4–24)
FRACTIONAL SHORTENING: 30 % (ref 28–44)
FRACTIONAL SHORTENING: 49 % (ref 28–44)
GIANT PLATELETS BLD QL SMEAR: PRESENT
GLUCOSE SERPL-MCNC: 107 MG/DL (ref 70–110)
GLUCOSE SERPL-MCNC: 108 MG/DL (ref 70–110)
GLUCOSE SERPL-MCNC: 114 MG/DL (ref 70–110)
GLUCOSE SERPL-MCNC: 114 MG/DL (ref 70–110)
GLUCOSE SERPL-MCNC: 124 MG/DL (ref 70–110)
GLUCOSE SERPL-MCNC: 130 MG/DL (ref 70–110)
GLUCOSE SERPL-MCNC: 132 MG/DL (ref 70–110)
GLUCOSE SERPL-MCNC: 137 MG/DL (ref 70–110)
GLUCOSE SERPL-MCNC: 138 MG/DL (ref 70–110)
GLUCOSE SERPL-MCNC: 157 MG/DL (ref 70–110)
GLUCOSE SERPL-MCNC: 162 MG/DL (ref 70–110)
GLUCOSE SERPL-MCNC: 163 MG/DL (ref 70–110)
GLUCOSE SERPL-MCNC: 163 MG/DL (ref 70–110)
GLUCOSE SERPL-MCNC: 175 MG/DL (ref 70–110)
GLUCOSE SERPL-MCNC: 175 MG/DL (ref 70–110)
GLUCOSE SERPL-MCNC: 183 MG/DL (ref 70–110)
GLUCOSE SERPL-MCNC: 185 MG/DL (ref 70–110)
GLUCOSE SERPL-MCNC: 186 MG/DL (ref 70–110)
GLUCOSE SERPL-MCNC: 186 MG/DL (ref 70–110)
GLUCOSE SERPL-MCNC: 196 MG/DL (ref 70–110)
GLUCOSE SERPL-MCNC: 200 MG/DL (ref 70–110)
GLUCOSE SERPL-MCNC: 200 MG/DL (ref 70–110)
GLUCOSE SERPL-MCNC: 204 MG/DL (ref 70–110)
GLUCOSE SERPL-MCNC: 210 MG/DL (ref 70–110)
GLUCOSE SERPL-MCNC: 212 MG/DL (ref 70–110)
GLUCOSE SERPL-MCNC: 213 MG/DL (ref 70–110)
GLUCOSE SERPL-MCNC: 213 MG/DL (ref 70–110)
GLUCOSE SERPL-MCNC: 224 MG/DL (ref 70–110)
GLUCOSE SERPL-MCNC: 230 MG/DL (ref 70–110)
GLUCOSE SERPL-MCNC: 251 MG/DL (ref 70–110)
GLUCOSE SERPL-MCNC: 266 MG/DL (ref 70–110)
GLUCOSE SERPL-MCNC: 267 MG/DL (ref 70–110)
GLUCOSE SERPL-MCNC: 268 MG/DL (ref 70–110)
GLUCOSE SERPL-MCNC: 270 MG/DL (ref 70–110)
GLUCOSE SERPL-MCNC: 277 MG/DL (ref 70–110)
GLUCOSE SERPL-MCNC: 280 MG/DL (ref 70–110)
GLUCOSE SERPL-MCNC: 284 MG/DL (ref 70–110)
GLUCOSE SERPL-MCNC: 285 MG/DL (ref 70–110)
GLUCOSE SERPL-MCNC: 306 MG/DL (ref 70–110)
GLUCOSE SERPL-MCNC: 307 MG/DL (ref 70–110)
GLUCOSE SERPL-MCNC: 309 MG/DL (ref 70–110)
GLUCOSE SERPL-MCNC: 312 MG/DL (ref 70–110)
GLUCOSE SERPL-MCNC: 324 MG/DL (ref 70–110)
GLUCOSE SERPL-MCNC: 370 MG/DL (ref 70–110)
GLUCOSE SERPL-MCNC: 373 MG/DL (ref 70–110)
GLUCOSE SERPL-MCNC: 420 MG/DL (ref 70–110)
GLUCOSE SERPL-MCNC: 430 MG/DL (ref 70–110)
GLUCOSE SERPL-MCNC: 439 MG/DL (ref 70–110)
GLUCOSE SERPL-MCNC: 82 MG/DL (ref 70–110)
GLUCOSE SERPL-MCNC: 88 MG/DL (ref 70–110)
GLUCOSE SERPL-MCNC: 98 MG/DL (ref 70–110)
GLUCOSE SERPL-MCNC: 99 MG/DL (ref 70–110)
GLUCOSE UR QL STRIP: ABNORMAL
GLUCOSE UR QL STRIP: NEGATIVE
GROSS: NORMAL
HBA1C MFR BLD: 7.7 % (ref 4.5–6.2)
HBA1C MFR BLD: 8.1 % (ref 4.8–5.6)
HCO3 UR-SCNC: 15.2 MMOL/L (ref 24–28)
HCO3 UR-SCNC: 15.7 MMOL/L (ref 24–28)
HCO3 UR-SCNC: 16.2 MMOL/L (ref 24–28)
HCT VFR BLD AUTO: 20.3 % (ref 40–54)
HCT VFR BLD AUTO: 20.8 % (ref 40–54)
HCT VFR BLD AUTO: 20.9 % (ref 40–54)
HCT VFR BLD AUTO: 20.9 % (ref 40–54)
HCT VFR BLD AUTO: 21.2 % (ref 40–54)
HCT VFR BLD AUTO: 21.4 % (ref 40–54)
HCT VFR BLD AUTO: 21.9 % (ref 40–54)
HCT VFR BLD AUTO: 22.3 % (ref 40–54)
HCT VFR BLD AUTO: 22.5 % (ref 40–54)
HCT VFR BLD AUTO: 22.5 % (ref 40–54)
HCT VFR BLD AUTO: 22.7 % (ref 40–54)
HCT VFR BLD AUTO: 22.8 % (ref 40–54)
HCT VFR BLD AUTO: 23 % (ref 40–54)
HCT VFR BLD AUTO: 23.2 % (ref 40–54)
HCT VFR BLD AUTO: 23.6 % (ref 40–54)
HCT VFR BLD AUTO: 23.6 % (ref 40–54)
HCT VFR BLD AUTO: 23.8 % (ref 40–54)
HCT VFR BLD AUTO: 23.9 % (ref 40–54)
HCT VFR BLD AUTO: 23.9 % (ref 40–54)
HCT VFR BLD AUTO: 24.1 % (ref 40–54)
HCT VFR BLD AUTO: 24.3 % (ref 40–54)
HCT VFR BLD AUTO: 24.3 % (ref 40–54)
HCT VFR BLD AUTO: 24.4 % (ref 40–54)
HCT VFR BLD AUTO: 24.5 % (ref 40–54)
HCT VFR BLD AUTO: 24.6 % (ref 40–54)
HCT VFR BLD AUTO: 24.6 % (ref 40–54)
HCT VFR BLD AUTO: 24.9 % (ref 40–54)
HCT VFR BLD AUTO: 25 % (ref 40–54)
HCT VFR BLD AUTO: 25.6 % (ref 40–54)
HCT VFR BLD AUTO: 25.6 % (ref 40–54)
HCT VFR BLD AUTO: 25.7 % (ref 40–54)
HCT VFR BLD AUTO: 25.9 % (ref 40–54)
HCT VFR BLD AUTO: 26.1 % (ref 40–54)
HCT VFR BLD AUTO: 26.1 % (ref 40–54)
HCT VFR BLD AUTO: 26.5 % (ref 40–54)
HCT VFR BLD AUTO: 28 % (ref 40–54)
HCT VFR BLD AUTO: 28.8 % (ref 40–54)
HCT VFR BLD AUTO: 29.4 % (ref 40–54)
HCT VFR BLD AUTO: 30.1 % (ref 40–54)
HCT VFR BLD AUTO: 30.9 % (ref 40–54)
HCT VFR BLD AUTO: 32 % (ref 40–54)
HCT VFR BLD AUTO: 32.5 % (ref 40–54)
HCT VFR BLD AUTO: 33.5 % (ref 40–54)
HCT VFR BLD CALC: 21 %PCV (ref 36–54)
HCT VFR BLD CALC: 21 %PCV (ref 36–54)
HCT VFR BLD CALC: 33 %PCV (ref 36–54)
HGB BLD-MCNC: 10.1 G/DL (ref 14–18)
HGB BLD-MCNC: 10.2 G/DL (ref 14–18)
HGB BLD-MCNC: 10.5 G/DL (ref 14–18)
HGB BLD-MCNC: 10.6 G/DL (ref 14–18)
HGB BLD-MCNC: 6.4 G/DL (ref 14–18)
HGB BLD-MCNC: 6.6 G/DL (ref 14–18)
HGB BLD-MCNC: 6.7 G/DL (ref 14–18)
HGB BLD-MCNC: 6.9 G/DL (ref 14–18)
HGB BLD-MCNC: 7 G/DL (ref 14–18)
HGB BLD-MCNC: 7.2 G/DL (ref 14–18)
HGB BLD-MCNC: 7.4 G/DL (ref 14–18)
HGB BLD-MCNC: 7.5 G/DL (ref 14–18)
HGB BLD-MCNC: 7.5 G/DL (ref 14–18)
HGB BLD-MCNC: 7.6 G/DL (ref 14–18)
HGB BLD-MCNC: 7.7 G/DL (ref 14–18)
HGB BLD-MCNC: 7.7 G/DL (ref 14–18)
HGB BLD-MCNC: 7.8 G/DL (ref 14–18)
HGB BLD-MCNC: 7.8 G/DL (ref 14–18)
HGB BLD-MCNC: 7.9 G/DL (ref 14–18)
HGB BLD-MCNC: 8 G/DL (ref 14–18)
HGB BLD-MCNC: 8 G/DL (ref 14–18)
HGB BLD-MCNC: 8.2 G/DL (ref 14–18)
HGB BLD-MCNC: 8.2 G/DL (ref 14–18)
HGB BLD-MCNC: 8.3 G/DL (ref 14–18)
HGB BLD-MCNC: 8.6 G/DL (ref 14–18)
HGB BLD-MCNC: 8.8 G/DL (ref 14–18)
HGB BLD-MCNC: 8.8 G/DL (ref 14–18)
HGB BLD-MCNC: 8.9 G/DL (ref 14–18)
HGB BLD-MCNC: 9.6 G/DL (ref 14–18)
HGB BLD-MCNC: 9.9 G/DL (ref 14–18)
HGB UR QL STRIP: ABNORMAL
HGB UR QL STRIP: ABNORMAL
HGB UR QL STRIP: NEGATIVE
HPIV1 RNA NPH QL NAA+NON-PROBE: NOT DETECTED
HPIV2 RNA NPH QL NAA+NON-PROBE: NOT DETECTED
HPIV3 RNA NPH QL NAA+NON-PROBE: NOT DETECTED
HPIV4 RNA NPH QL NAA+NON-PROBE: NOT DETECTED
HUMAN METAPNEUMOVIRUS: DETECTED
HYALINE CASTS #/AREA URNS LPF: 15 /LPF
HYALINE CASTS UR QL AUTO: 0 /LPF
HYALINE CASTS UR QL AUTO: 131 /LPF
HYALINE CASTS UR QL AUTO: 70 /LPF
HYPOCHROMIA BLD QL SMEAR: ABNORMAL
IMM GRANULOCYTES # BLD AUTO: 0 K/UL (ref 0–0.04)
IMM GRANULOCYTES # BLD AUTO: 0.01 K/UL (ref 0–0.04)
IMM GRANULOCYTES # BLD AUTO: 0.01 K/UL (ref 0–0.04)
IMM GRANULOCYTES # BLD AUTO: 0.02 K/UL (ref 0–0.04)
IMM GRANULOCYTES # BLD AUTO: 0.03 K/UL (ref 0–0.04)
IMM GRANULOCYTES # BLD AUTO: 0.04 K/UL (ref 0–0.04)
IMM GRANULOCYTES # BLD AUTO: 0.05 K/UL (ref 0–0.04)
IMM GRANULOCYTES # BLD AUTO: 0.06 K/UL (ref 0–0.04)
IMM GRANULOCYTES # BLD AUTO: 0.06 K/UL (ref 0–0.04)
IMM GRANULOCYTES # BLD AUTO: 0.08 K/UL (ref 0–0.04)
IMM GRANULOCYTES # BLD AUTO: 0.09 K/UL (ref 0–0.04)
IMM GRANULOCYTES # BLD AUTO: 0.1 K/UL (ref 0–0.04)
IMM GRANULOCYTES # BLD AUTO: 0.11 K/UL (ref 0–0.04)
IMM GRANULOCYTES # BLD AUTO: 0.12 K/UL (ref 0–0.04)
IMM GRANULOCYTES # BLD AUTO: 0.12 K/UL (ref 0–0.04)
IMM GRANULOCYTES # BLD AUTO: 0.13 K/UL (ref 0–0.04)
IMM GRANULOCYTES # BLD AUTO: 0.15 K/UL (ref 0–0.04)
IMM GRANULOCYTES # BLD AUTO: 0.16 K/UL (ref 0–0.04)
IMM GRANULOCYTES # BLD AUTO: 0.16 K/UL (ref 0–0.04)
IMM GRANULOCYTES # BLD AUTO: 0.18 K/UL (ref 0–0.04)
IMM GRANULOCYTES # BLD AUTO: 0.2 K/UL (ref 0–0.04)
IMM GRANULOCYTES # BLD AUTO: 0.2 K/UL (ref 0–0.04)
IMM GRANULOCYTES # BLD AUTO: 0.21 K/UL (ref 0–0.04)
IMM GRANULOCYTES # BLD AUTO: 0.25 K/UL (ref 0–0.04)
IMM GRANULOCYTES # BLD AUTO: 0.32 K/UL (ref 0–0.04)
IMM GRANULOCYTES # BLD AUTO: 0.48 K/UL (ref 0–0.04)
IMM GRANULOCYTES # BLD AUTO: ABNORMAL K/UL (ref 0–0.04)
IMM GRANULOCYTES NFR BLD AUTO: 0 % (ref 0–0.5)
IMM GRANULOCYTES NFR BLD AUTO: 0.5 % (ref 0–0.5)
IMM GRANULOCYTES NFR BLD AUTO: 0.7 % (ref 0–0.5)
IMM GRANULOCYTES NFR BLD AUTO: 0.8 % (ref 0–0.5)
IMM GRANULOCYTES NFR BLD AUTO: 0.9 % (ref 0–0.5)
IMM GRANULOCYTES NFR BLD AUTO: 0.9 % (ref 0–0.5)
IMM GRANULOCYTES NFR BLD AUTO: 1 % (ref 0–0.5)
IMM GRANULOCYTES NFR BLD AUTO: 1.1 % (ref 0–0.5)
IMM GRANULOCYTES NFR BLD AUTO: 1.1 % (ref 0–0.5)
IMM GRANULOCYTES NFR BLD AUTO: 1.2 % (ref 0–0.5)
IMM GRANULOCYTES NFR BLD AUTO: 1.2 % (ref 0–0.5)
IMM GRANULOCYTES NFR BLD AUTO: 1.3 % (ref 0–0.5)
IMM GRANULOCYTES NFR BLD AUTO: 1.4 % (ref 0–0.5)
IMM GRANULOCYTES NFR BLD AUTO: 2 % (ref 0–0.5)
IMM GRANULOCYTES NFR BLD AUTO: 2.1 % (ref 0–0.5)
IMM GRANULOCYTES NFR BLD AUTO: 2.8 % (ref 0–0.5)
IMM GRANULOCYTES NFR BLD AUTO: 4.3 % (ref 0–0.5)
IMM GRANULOCYTES NFR BLD AUTO: ABNORMAL % (ref 0–0.5)
INFLUENZA A (SUBTYPES H1,H1-2009,H3): NOT DETECTED
INFLUENZA A, MOLECULAR: NEGATIVE
INFLUENZA B, MOLECULAR: NEGATIVE
INR PPP: 1.1 (ref 0.8–1.2)
INTERVENTRICULAR SEPTUM: 0.9 CM (ref 0.6–1.1)
INTERVENTRICULAR SEPTUM: 0.91 CM (ref 0.6–1.1)
IRON SERPL-MCNC: 42 UG/DL (ref 45–160)
IVRT: 85.63 MSEC
KETONES UR QL STRIP: ABNORMAL
KETONES UR QL STRIP: NEGATIVE
LA MAJOR: 4.39 CM
LA MAJOR: 4.4 CM
LA MINOR: 4.08 CM
LA MINOR: 4.98 CM
LA WIDTH: 2.88 CM
LA WIDTH: 3.96 CM
LACTATE SERPL-SCNC: 1.6 MMOL/L (ref 0.5–2.2)
LACTATE SERPL-SCNC: 1.9 MMOL/L (ref 0.5–1.9)
LACTATE SERPL-SCNC: 1.9 MMOL/L (ref 0.5–2.2)
LACTATE SERPL-SCNC: 2 MMOL/L (ref 0.5–2.2)
LACTATE SERPL-SCNC: 2.4 MMOL/L (ref 0.5–2.2)
LACTATE SERPL-SCNC: 2.5 MMOL/L (ref 0.5–1.9)
LACTATE SERPL-SCNC: 2.5 MMOL/L (ref 0.5–2.2)
LACTATE SERPL-SCNC: 2.7 MMOL/L (ref 0.5–2.2)
LACTATE SERPL-SCNC: 2.8 MMOL/L (ref 0.5–1.9)
LACTATE SERPL-SCNC: 2.8 MMOL/L (ref 0.5–2.2)
LACTATE SERPL-SCNC: 3 MMOL/L (ref 0.5–1.9)
LACTATE SERPL-SCNC: 4.1 MMOL/L (ref 0.5–2.2)
LACTATE SERPL-SCNC: 4.4 MMOL/L (ref 0.5–2.2)
LACTATE SERPL-SCNC: 4.5 MMOL/L (ref 0.5–2.2)
LACTATE SERPL-SCNC: 5.3 MMOL/L (ref 0.5–2.2)
LACTATE SERPL-SCNC: 6.5 MMOL/L (ref 0.5–2.2)
LDH SERPL L TO P-CCNC: 1010 U/L (ref 110–260)
LDH SERPL L TO P-CCNC: 1079 U/L (ref 110–260)
LDH SERPL L TO P-CCNC: 1243 U/L (ref 110–260)
LDH SERPL L TO P-CCNC: 1324 U/L (ref 110–260)
LDH SERPL L TO P-CCNC: 1377 U/L (ref 110–260)
LDH SERPL L TO P-CCNC: 671 U/L (ref 110–260)
LDH SERPL L TO P-CCNC: 679 U/L (ref 110–260)
LDH SERPL L TO P-CCNC: 695 U/L (ref 110–260)
LDH SERPL L TO P-CCNC: 703 U/L (ref 110–260)
LDH SERPL L TO P-CCNC: 712 U/L (ref 110–260)
LDH SERPL L TO P-CCNC: 729 U/L (ref 110–260)
LDH SERPL L TO P-CCNC: 735 U/L (ref 110–260)
LDH SERPL L TO P-CCNC: 840 U/L (ref 110–260)
LDH SERPL L TO P-CCNC: 919 U/L (ref 110–260)
LDH SERPL L TO P-CCNC: 927 U/L (ref 110–260)
LDH SERPL L TO P-CCNC: 943 U/L (ref 110–260)
LDH SERPL L TO P-CCNC: 946 U/L (ref 110–260)
LEFT ATRIUM SIZE: 2.62 CM
LEFT ATRIUM SIZE: 3.14 CM
LEFT ATRIUM VOLUME INDEX MOD: 12.7 ML/M2
LEFT ATRIUM VOLUME INDEX MOD: 19.1 ML/M2
LEFT ATRIUM VOLUME INDEX: 17.1 ML/M2
LEFT ATRIUM VOLUME INDEX: 17.2 ML/M2
LEFT ATRIUM VOLUME MOD: 26.76 CM3
LEFT ATRIUM VOLUME MOD: 41.55 CM3
LEFT ATRIUM VOLUME: 35.91 CM3
LEFT ATRIUM VOLUME: 37.3 CM3
LEFT INTERNAL DIMENSION IN SYSTOLE: 2.47 CM (ref 2.1–4)
LEFT INTERNAL DIMENSION IN SYSTOLE: 3.62 CM (ref 2.1–4)
LEFT VENTRICLE DIASTOLIC VOLUME INDEX: 52.45 ML/M2
LEFT VENTRICLE DIASTOLIC VOLUME INDEX: 58.36 ML/M2
LEFT VENTRICLE DIASTOLIC VOLUME: 110.15 ML
LEFT VENTRICLE DIASTOLIC VOLUME: 126.64 ML
LEFT VENTRICLE MASS INDEX: 73 G/M2
LEFT VENTRICLE MASS INDEX: 75 G/M2
LEFT VENTRICLE SYSTOLIC VOLUME INDEX: 10.3 ML/M2
LEFT VENTRICLE SYSTOLIC VOLUME INDEX: 25.3 ML/M2
LEFT VENTRICLE SYSTOLIC VOLUME: 21.73 ML
LEFT VENTRICLE SYSTOLIC VOLUME: 54.98 ML
LEFT VENTRICULAR INTERNAL DIMENSION IN DIASTOLE: 4.85 CM (ref 3.5–6)
LEFT VENTRICULAR INTERNAL DIMENSION IN DIASTOLE: 5.15 CM (ref 3.5–6)
LEFT VENTRICULAR MASS: 152.57 G
LEFT VENTRICULAR MASS: 162.66 G
LEUKOCYTE ESTERASE UR QL STRIP: NEGATIVE
LIPASE SERPL-CCNC: 13 U/L (ref 4–60)
LIPASE SERPL-CCNC: 7 U/L (ref 4–60)
LV LATERAL E/E' RATIO: 5 M/S
LV LATERAL E/E' RATIO: 6 M/S
LV SEPTAL E/E' RATIO: 6.43 M/S
LV SEPTAL E/E' RATIO: 8.57 M/S
LYMPHOCYTES # BLD AUTO: 0 K/UL (ref 1–4.8)
LYMPHOCYTES # BLD AUTO: 0 K/UL (ref 1–4.8)
LYMPHOCYTES # BLD AUTO: 0.1 K/UL (ref 1–4.8)
LYMPHOCYTES # BLD AUTO: 0.3 K/UL (ref 1–4.8)
LYMPHOCYTES # BLD AUTO: 0.4 K/UL (ref 1–4.8)
LYMPHOCYTES # BLD AUTO: 0.4 K/UL (ref 1–4.8)
LYMPHOCYTES # BLD AUTO: 0.5 K/UL (ref 1–4.8)
LYMPHOCYTES # BLD AUTO: 0.6 K/UL (ref 1–4.8)
LYMPHOCYTES # BLD AUTO: 0.7 K/UL (ref 1–4.8)
LYMPHOCYTES # BLD AUTO: 0.7 K/UL (ref 1–4.8)
LYMPHOCYTES # BLD AUTO: 0.8 K/UL (ref 1–4.8)
LYMPHOCYTES # BLD AUTO: 0.9 K/UL (ref 1–4.8)
LYMPHOCYTES # BLD AUTO: 1 K/UL (ref 1–4.8)
LYMPHOCYTES # BLD AUTO: 1 K/UL (ref 1–4.8)
LYMPHOCYTES # BLD AUTO: 1.2 K/UL (ref 1–4.8)
LYMPHOCYTES # BLD AUTO: 1.2 K/UL (ref 1–4.8)
LYMPHOCYTES # BLD AUTO: 1.3 K/UL (ref 1–4.8)
LYMPHOCYTES # BLD AUTO: ABNORMAL K/UL (ref 1–4.8)
LYMPHOCYTES NFR BLD: 1 % (ref 18–48)
LYMPHOCYTES NFR BLD: 10.8 % (ref 18–48)
LYMPHOCYTES NFR BLD: 11 % (ref 18–48)
LYMPHOCYTES NFR BLD: 12 % (ref 18–48)
LYMPHOCYTES NFR BLD: 12 % (ref 18–48)
LYMPHOCYTES NFR BLD: 12.5 % (ref 18–48)
LYMPHOCYTES NFR BLD: 12.9 % (ref 18–48)
LYMPHOCYTES NFR BLD: 13.3 % (ref 18–48)
LYMPHOCYTES NFR BLD: 13.3 % (ref 18–48)
LYMPHOCYTES NFR BLD: 13.6 % (ref 18–48)
LYMPHOCYTES NFR BLD: 13.7 % (ref 18–48)
LYMPHOCYTES NFR BLD: 14 % (ref 18–48)
LYMPHOCYTES NFR BLD: 14.1 % (ref 18–48)
LYMPHOCYTES NFR BLD: 15.8 % (ref 18–48)
LYMPHOCYTES NFR BLD: 16.7 % (ref 18–48)
LYMPHOCYTES NFR BLD: 24 % (ref 18–48)
LYMPHOCYTES NFR BLD: 3 % (ref 18–48)
LYMPHOCYTES NFR BLD: 4.2 % (ref 18–48)
LYMPHOCYTES NFR BLD: 4.6 % (ref 18–48)
LYMPHOCYTES NFR BLD: 4.7 % (ref 18–48)
LYMPHOCYTES NFR BLD: 4.8 % (ref 18–48)
LYMPHOCYTES NFR BLD: 4.9 % (ref 18–48)
LYMPHOCYTES NFR BLD: 5 % (ref 18–48)
LYMPHOCYTES NFR BLD: 5.1 % (ref 18–48)
LYMPHOCYTES NFR BLD: 5.2 % (ref 18–48)
LYMPHOCYTES NFR BLD: 5.5 % (ref 18–48)
LYMPHOCYTES NFR BLD: 6.4 % (ref 18–48)
LYMPHOCYTES NFR BLD: 6.5 % (ref 18–48)
LYMPHOCYTES NFR BLD: 7.1 % (ref 18–48)
LYMPHOCYTES NFR BLD: 7.8 % (ref 18–48)
LYMPHOCYTES NFR BLD: 8 % (ref 18–48)
LYMPHOCYTES NFR BLD: 8.3 % (ref 18–48)
LYMPHOCYTES NFR BLD: 8.4 % (ref 18–48)
LYMPHOCYTES NFR BLD: 8.5 % (ref 18–48)
LYMPHOCYTES NFR BLD: 8.7 % (ref 18–48)
LYMPHOCYTES NFR BLD: 9 % (ref 18–48)
LYMPHOCYTES NFR BLD: 9.5 % (ref 18–48)
LYMPHOCYTES NFR BLD: 9.7 % (ref 18–48)
LYMPHOCYTES NFR BLD: 9.9 % (ref 18–48)
Lab: NORMAL
MAGNESIUM SERPL-MCNC: 1.6 MG/DL (ref 1.6–2.6)
MAGNESIUM SERPL-MCNC: 1.7 MG/DL (ref 1.6–2.6)
MAGNESIUM SERPL-MCNC: 1.8 MG/DL (ref 1.6–2.6)
MAGNESIUM SERPL-MCNC: 1.9 MG/DL (ref 1.6–2.6)
MAGNESIUM SERPL-MCNC: 2 MG/DL (ref 1.6–2.6)
MAGNESIUM SERPL-MCNC: 2 MG/DL (ref 1.6–2.6)
MCH RBC QN AUTO: 25.6 PG (ref 27–31)
MCH RBC QN AUTO: 25.6 PG (ref 27–31)
MCH RBC QN AUTO: 25.7 PG (ref 27–31)
MCH RBC QN AUTO: 25.9 PG (ref 27–31)
MCH RBC QN AUTO: 25.9 PG (ref 27–31)
MCH RBC QN AUTO: 26.1 PG (ref 27–31)
MCH RBC QN AUTO: 26.3 PG (ref 27–31)
MCH RBC QN AUTO: 26.4 PG (ref 27–31)
MCH RBC QN AUTO: 28.2 PG (ref 27–31)
MCH RBC QN AUTO: 28.3 PG (ref 27–31)
MCH RBC QN AUTO: 28.5 PG (ref 27–31)
MCH RBC QN AUTO: 28.6 PG (ref 27–31)
MCH RBC QN AUTO: 28.6 PG (ref 27–31)
MCH RBC QN AUTO: 28.7 PG (ref 27–31)
MCH RBC QN AUTO: 28.7 PG (ref 27–31)
MCH RBC QN AUTO: 28.8 PG (ref 27–31)
MCH RBC QN AUTO: 28.9 PG (ref 27–31)
MCH RBC QN AUTO: 29 PG (ref 27–31)
MCH RBC QN AUTO: 29 PG (ref 27–31)
MCH RBC QN AUTO: 29.1 PG (ref 27–31)
MCH RBC QN AUTO: 29.3 PG (ref 27–31)
MCH RBC QN AUTO: 29.3 PG (ref 27–31)
MCH RBC QN AUTO: 29.4 PG (ref 27–31)
MCH RBC QN AUTO: 29.5 PG (ref 27–31)
MCH RBC QN AUTO: 29.5 PG (ref 27–31)
MCH RBC QN AUTO: 29.7 PG (ref 27–31)
MCH RBC QN AUTO: 29.7 PG (ref 27–31)
MCH RBC QN AUTO: 29.8 PG (ref 27–31)
MCH RBC QN AUTO: 29.8 PG (ref 27–31)
MCH RBC QN AUTO: 29.9 PG (ref 27–31)
MCH RBC QN AUTO: 29.9 PG (ref 27–31)
MCH RBC QN AUTO: 30.4 PG (ref 27–31)
MCH RBC QN AUTO: 30.4 PG (ref 27–31)
MCH RBC QN AUTO: 30.6 PG (ref 27–31)
MCHC RBC AUTO-ENTMCNC: 30.6 G/DL (ref 32–36)
MCHC RBC AUTO-ENTMCNC: 30.6 G/DL (ref 32–36)
MCHC RBC AUTO-ENTMCNC: 30.8 G/DL (ref 32–36)
MCHC RBC AUTO-ENTMCNC: 30.9 G/DL (ref 32–36)
MCHC RBC AUTO-ENTMCNC: 31 G/DL (ref 32–36)
MCHC RBC AUTO-ENTMCNC: 31.3 G/DL (ref 32–36)
MCHC RBC AUTO-ENTMCNC: 31.3 G/DL (ref 32–36)
MCHC RBC AUTO-ENTMCNC: 31.4 G/DL (ref 32–36)
MCHC RBC AUTO-ENTMCNC: 31.4 G/DL (ref 32–36)
MCHC RBC AUTO-ENTMCNC: 31.5 G/DL (ref 32–36)
MCHC RBC AUTO-ENTMCNC: 31.6 G/DL (ref 32–36)
MCHC RBC AUTO-ENTMCNC: 31.7 G/DL (ref 32–36)
MCHC RBC AUTO-ENTMCNC: 31.8 G/DL (ref 32–36)
MCHC RBC AUTO-ENTMCNC: 31.9 G/DL (ref 32–36)
MCHC RBC AUTO-ENTMCNC: 32 G/DL (ref 32–36)
MCHC RBC AUTO-ENTMCNC: 32.1 G/DL (ref 32–36)
MCHC RBC AUTO-ENTMCNC: 32.2 G/DL (ref 32–36)
MCHC RBC AUTO-ENTMCNC: 32.3 G/DL (ref 32–36)
MCHC RBC AUTO-ENTMCNC: 32.4 G/DL (ref 32–36)
MCHC RBC AUTO-ENTMCNC: 32.4 G/DL (ref 32–36)
MCHC RBC AUTO-ENTMCNC: 32.5 G/DL (ref 32–36)
MCHC RBC AUTO-ENTMCNC: 32.7 G/DL (ref 32–36)
MCHC RBC AUTO-ENTMCNC: 32.8 G/DL (ref 32–36)
MCHC RBC AUTO-ENTMCNC: 32.9 G/DL (ref 32–36)
MCHC RBC AUTO-ENTMCNC: 33 G/DL (ref 32–36)
MCHC RBC AUTO-ENTMCNC: 33 G/DL (ref 32–36)
MCHC RBC AUTO-ENTMCNC: 33.1 G/DL (ref 32–36)
MCHC RBC AUTO-ENTMCNC: 33.2 G/DL (ref 32–36)
MCHC RBC AUTO-ENTMCNC: 33.6 G/DL (ref 32–36)
MCHC RBC AUTO-ENTMCNC: 33.7 G/DL (ref 32–36)
MCV RBC AUTO: 78 FL (ref 82–98)
MCV RBC AUTO: 80 FL (ref 82–98)
MCV RBC AUTO: 80 FL (ref 82–98)
MCV RBC AUTO: 81 FL (ref 82–98)
MCV RBC AUTO: 81 FL (ref 82–98)
MCV RBC AUTO: 82 FL (ref 82–98)
MCV RBC AUTO: 83 FL (ref 82–98)
MCV RBC AUTO: 83 FL (ref 82–98)
MCV RBC AUTO: 85 FL (ref 82–98)
MCV RBC AUTO: 85 FL (ref 82–98)
MCV RBC AUTO: 87 FL (ref 82–98)
MCV RBC AUTO: 88 FL (ref 82–98)
MCV RBC AUTO: 90 FL (ref 82–98)
MCV RBC AUTO: 90 FL (ref 82–98)
MCV RBC AUTO: 91 FL (ref 82–98)
MCV RBC AUTO: 92 FL (ref 82–98)
MCV RBC AUTO: 93 FL (ref 82–98)
MCV RBC AUTO: 94 FL (ref 82–98)
MCV RBC AUTO: 95 FL (ref 82–98)
METAMYELOCYTES NFR BLD MANUAL: 1 %
METAMYELOCYTES NFR BLD MANUAL: 1 %
METAMYELOCYTES NFR BLD MANUAL: 2 %
METAMYELOCYTES NFR BLD MANUAL: 2 %
MICROSCOPIC COMMENT: ABNORMAL
MICROSCOPIC COMMENT: NORMAL
MICROSCOPIC COMMENT: NORMAL
MICROSCOPIC EXAM: NORMAL
MIN VOL: 15.9
MIN VOL: 9.08
MODE: ABNORMAL
MONOCYTES # BLD AUTO: 0 K/UL (ref 0.3–1)
MONOCYTES # BLD AUTO: 0.1 K/UL (ref 0.3–1)
MONOCYTES # BLD AUTO: 0.2 K/UL (ref 0.3–1)
MONOCYTES # BLD AUTO: 0.4 K/UL (ref 0.3–1)
MONOCYTES # BLD AUTO: 0.5 K/UL (ref 0.3–1)
MONOCYTES # BLD AUTO: 0.7 K/UL (ref 0.3–1)
MONOCYTES # BLD AUTO: 0.8 K/UL (ref 0.3–1)
MONOCYTES # BLD AUTO: 0.8 K/UL (ref 0.3–1)
MONOCYTES # BLD AUTO: 0.9 K/UL (ref 0.3–1)
MONOCYTES # BLD AUTO: 1 K/UL (ref 0.3–1)
MONOCYTES # BLD AUTO: 1 K/UL (ref 0.3–1)
MONOCYTES # BLD AUTO: 1.1 K/UL (ref 0.3–1)
MONOCYTES # BLD AUTO: 1.1 K/UL (ref 0.3–1)
MONOCYTES # BLD AUTO: 1.5 K/UL (ref 0.3–1)
MONOCYTES # BLD AUTO: 1.7 K/UL (ref 0.3–1)
MONOCYTES # BLD AUTO: 1.8 K/UL (ref 0.3–1)
MONOCYTES # BLD AUTO: 2 K/UL (ref 0.3–1)
MONOCYTES # BLD AUTO: 2.1 K/UL (ref 0.3–1)
MONOCYTES # BLD AUTO: 2.4 K/UL (ref 0.3–1)
MONOCYTES # BLD AUTO: 2.4 K/UL (ref 0.3–1)
MONOCYTES # BLD AUTO: 2.6 K/UL (ref 0.3–1)
MONOCYTES # BLD AUTO: 2.8 K/UL (ref 0.3–1)
MONOCYTES # BLD AUTO: ABNORMAL K/UL (ref 0.3–1)
MONOCYTES NFR BLD: 0 % (ref 4–15)
MONOCYTES NFR BLD: 0 % (ref 4–15)
MONOCYTES NFR BLD: 10.2 % (ref 4–15)
MONOCYTES NFR BLD: 10.6 % (ref 4–15)
MONOCYTES NFR BLD: 10.6 % (ref 4–15)
MONOCYTES NFR BLD: 11 % (ref 4–15)
MONOCYTES NFR BLD: 11.1 % (ref 4–15)
MONOCYTES NFR BLD: 11.8 % (ref 4–15)
MONOCYTES NFR BLD: 11.8 % (ref 4–15)
MONOCYTES NFR BLD: 12.6 % (ref 4–15)
MONOCYTES NFR BLD: 12.7 % (ref 4–15)
MONOCYTES NFR BLD: 12.8 % (ref 4–15)
MONOCYTES NFR BLD: 14.3 % (ref 4–15)
MONOCYTES NFR BLD: 14.5 % (ref 4–15)
MONOCYTES NFR BLD: 14.6 % (ref 4–15)
MONOCYTES NFR BLD: 16.1 % (ref 4–15)
MONOCYTES NFR BLD: 16.6 % (ref 4–15)
MONOCYTES NFR BLD: 17.5 % (ref 4–15)
MONOCYTES NFR BLD: 17.9 % (ref 4–15)
MONOCYTES NFR BLD: 2 % (ref 4–15)
MONOCYTES NFR BLD: 27 % (ref 4–15)
MONOCYTES NFR BLD: 3.2 % (ref 4–15)
MONOCYTES NFR BLD: 3.3 % (ref 4–15)
MONOCYTES NFR BLD: 32 % (ref 4–15)
MONOCYTES NFR BLD: 4 % (ref 4–15)
MONOCYTES NFR BLD: 4.2 % (ref 4–15)
MONOCYTES NFR BLD: 4.4 % (ref 4–15)
MONOCYTES NFR BLD: 4.8 % (ref 4–15)
MONOCYTES NFR BLD: 4.8 % (ref 4–15)
MONOCYTES NFR BLD: 5 % (ref 4–15)
MONOCYTES NFR BLD: 5 % (ref 4–15)
MONOCYTES NFR BLD: 5.2 % (ref 4–15)
MONOCYTES NFR BLD: 5.2 % (ref 4–15)
MONOCYTES NFR BLD: 5.3 % (ref 4–15)
MONOCYTES NFR BLD: 6.4 % (ref 4–15)
MONOCYTES NFR BLD: 7 % (ref 4–15)
MONOCYTES NFR BLD: 8 % (ref 4–15)
MONOCYTES NFR BLD: 8.7 % (ref 4–15)
MONOCYTES NFR BLD: 9.1 % (ref 4–15)
MRSA SCREEN BY PCR: NEGATIVE
MV PEAK A VEL: 0.61 M/S
MV PEAK A VEL: 0.72 M/S
MV PEAK E VEL: 0.45 M/S
MV PEAK E VEL: 0.6 M/S
MV STENOSIS PRESSURE HALF TIME: 50.66 MS
MV STENOSIS PRESSURE HALF TIME: 69.56 MS
MV VALVE AREA P 1/2 METHOD: 3.16 CM2
MV VALVE AREA P 1/2 METHOD: 4.34 CM2
MYCOPLASMA PNEUMONIAE: NOT DETECTED
MYELOCYTES NFR BLD MANUAL: 1 %
MYELOCYTES NFR BLD MANUAL: 1 %
MYELOCYTES NFR BLD MANUAL: 4 %
NEUTROPHILS # BLD AUTO: 0.2 K/UL (ref 1.8–7.7)
NEUTROPHILS # BLD AUTO: 0.2 K/UL (ref 1.8–7.7)
NEUTROPHILS # BLD AUTO: 0.6 K/UL (ref 1.8–7.7)
NEUTROPHILS # BLD AUTO: 1.7 K/UL (ref 1.8–7.7)
NEUTROPHILS # BLD AUTO: 1.8 K/UL (ref 1.8–7.7)
NEUTROPHILS # BLD AUTO: 10.7 K/UL (ref 1.8–7.7)
NEUTROPHILS # BLD AUTO: 10.8 K/UL (ref 1.8–7.7)
NEUTROPHILS # BLD AUTO: 11.1 K/UL (ref 1.8–7.7)
NEUTROPHILS # BLD AUTO: 11.4 K/UL (ref 1.8–7.7)
NEUTROPHILS # BLD AUTO: 11.5 K/UL (ref 1.8–7.7)
NEUTROPHILS # BLD AUTO: 11.8 K/UL (ref 1.8–7.7)
NEUTROPHILS # BLD AUTO: 11.9 K/UL (ref 1.8–7.7)
NEUTROPHILS # BLD AUTO: 13.1 K/UL (ref 1.8–7.7)
NEUTROPHILS # BLD AUTO: 13.2 K/UL (ref 1.8–7.7)
NEUTROPHILS # BLD AUTO: 13.3 K/UL (ref 1.8–7.7)
NEUTROPHILS # BLD AUTO: 13.6 K/UL (ref 1.8–7.7)
NEUTROPHILS # BLD AUTO: 15.8 K/UL (ref 1.8–7.7)
NEUTROPHILS # BLD AUTO: 19.1 K/UL (ref 1.8–7.7)
NEUTROPHILS # BLD AUTO: 2.2 K/UL (ref 1.8–7.7)
NEUTROPHILS # BLD AUTO: 2.6 K/UL (ref 1.8–7.7)
NEUTROPHILS # BLD AUTO: 2.9 K/UL (ref 1.8–7.7)
NEUTROPHILS # BLD AUTO: 3.5 K/UL (ref 1.8–7.7)
NEUTROPHILS # BLD AUTO: 4.6 K/UL (ref 1.8–7.7)
NEUTROPHILS # BLD AUTO: 5.3 K/UL (ref 1.8–7.7)
NEUTROPHILS # BLD AUTO: 7.1 K/UL (ref 1.8–7.7)
NEUTROPHILS # BLD AUTO: 7.1 K/UL (ref 1.8–7.7)
NEUTROPHILS # BLD AUTO: 8.3 K/UL (ref 1.8–7.7)
NEUTROPHILS # BLD AUTO: 8.3 K/UL (ref 1.8–7.7)
NEUTROPHILS # BLD AUTO: 8.9 K/UL (ref 1.8–7.7)
NEUTROPHILS # BLD AUTO: 9.8 K/UL (ref 1.8–7.7)
NEUTROPHILS # BLD AUTO: ABNORMAL K/UL (ref 1.8–7.7)
NEUTROPHILS NFR BLD: 53 % (ref 38–73)
NEUTROPHILS NFR BLD: 54 % (ref 38–73)
NEUTROPHILS NFR BLD: 68.6 % (ref 38–73)
NEUTROPHILS NFR BLD: 69 % (ref 38–73)
NEUTROPHILS NFR BLD: 69.4 % (ref 38–73)
NEUTROPHILS NFR BLD: 71 % (ref 38–73)
NEUTROPHILS NFR BLD: 72 % (ref 38–73)
NEUTROPHILS NFR BLD: 72.9 % (ref 38–73)
NEUTROPHILS NFR BLD: 72.9 % (ref 38–73)
NEUTROPHILS NFR BLD: 73.3 % (ref 38–73)
NEUTROPHILS NFR BLD: 73.7 % (ref 38–73)
NEUTROPHILS NFR BLD: 74 % (ref 38–73)
NEUTROPHILS NFR BLD: 75.5 % (ref 38–73)
NEUTROPHILS NFR BLD: 75.8 % (ref 38–73)
NEUTROPHILS NFR BLD: 76.8 % (ref 38–73)
NEUTROPHILS NFR BLD: 76.9 % (ref 38–73)
NEUTROPHILS NFR BLD: 77.2 % (ref 38–73)
NEUTROPHILS NFR BLD: 78 % (ref 38–73)
NEUTROPHILS NFR BLD: 78.3 % (ref 38–73)
NEUTROPHILS NFR BLD: 78.9 % (ref 38–73)
NEUTROPHILS NFR BLD: 79.3 % (ref 38–73)
NEUTROPHILS NFR BLD: 80.5 % (ref 38–73)
NEUTROPHILS NFR BLD: 80.6 % (ref 38–73)
NEUTROPHILS NFR BLD: 80.6 % (ref 38–73)
NEUTROPHILS NFR BLD: 80.9 % (ref 38–73)
NEUTROPHILS NFR BLD: 81.3 % (ref 38–73)
NEUTROPHILS NFR BLD: 82 % (ref 38–73)
NEUTROPHILS NFR BLD: 82.4 % (ref 38–73)
NEUTROPHILS NFR BLD: 82.6 % (ref 38–73)
NEUTROPHILS NFR BLD: 82.9 % (ref 38–73)
NEUTROPHILS NFR BLD: 85.1 % (ref 38–73)
NEUTROPHILS NFR BLD: 85.7 % (ref 38–73)
NEUTROPHILS NFR BLD: 85.9 % (ref 38–73)
NEUTROPHILS NFR BLD: 87.7 % (ref 38–73)
NEUTROPHILS NFR BLD: 89 % (ref 38–73)
NEUTROPHILS NFR BLD: 89.2 % (ref 38–73)
NEUTROPHILS NFR BLD: 89.7 % (ref 38–73)
NEUTROPHILS NFR BLD: 90 % (ref 38–73)
NEUTROPHILS NFR BLD: 93 % (ref 38–73)
NEUTS BAND NFR BLD MANUAL: 1 %
NEUTS BAND NFR BLD MANUAL: 10 %
NEUTS BAND NFR BLD MANUAL: 15 %
NEUTS BAND NFR BLD MANUAL: 2 %
NEUTS BAND NFR BLD MANUAL: 3 %
NEUTS BAND NFR BLD MANUAL: 3 %
NEUTS BAND NFR BLD MANUAL: 5.3 %
NEUTS BAND NFR BLD MANUAL: 6.5 %
NEUTS BAND NFR BLD MANUAL: 9 %
NITRITE UR QL STRIP: NEGATIVE
NRBC BLD-RTO: 0 /100 WBC
NRBC BLD-RTO: 1 /100 WBC
NRBC BLD-RTO: 10 /100 WBC
NRBC BLD-RTO: 7 /100 WBC
NRBC BLD-RTO: 9 /100 WBC
NUM UNITS TRANS PACKED RBC: NORMAL
OSMOLALITY SERPL: 280 MOSM/KG (ref 280–300)
OSMOLALITY SERPL: 283 MOSM/KG (ref 280–300)
OSMOLALITY SERPL: 286 MOSM/KG (ref 280–300)
OSMOLALITY UR: 564 MOSM/KG (ref 50–1200)
OSMOLALITY UR: 721 MOSM/KG (ref 50–1200)
OSMOLALITY UR: 829 MOSM/KG (ref 50–1200)
OVALOCYTES BLD QL SMEAR: ABNORMAL
PCO2 BLDA: 26.1 MMHG (ref 35–45)
PCO2 BLDA: 28.8 MMHG (ref 35–45)
PCO2 BLDA: 35.8 MMHG (ref 35–45)
PEEP: 8
PEEP: 8
PH SMN: 7.26 [PH] (ref 7.35–7.45)
PH SMN: 7.33 [PH] (ref 7.35–7.45)
PH SMN: 7.39 [PH] (ref 7.35–7.45)
PH UR STRIP: 5 [PH] (ref 5–8)
PH UR STRIP: 6 [PH] (ref 5–8)
PHOSPHATE SERPL-MCNC: 2.3 MG/DL (ref 2.7–4.5)
PHOSPHATE SERPL-MCNC: 2.3 MG/DL (ref 2.7–4.5)
PHOSPHATE SERPL-MCNC: 2.6 MG/DL (ref 2.7–4.5)
PHOSPHATE SERPL-MCNC: 2.7 MG/DL (ref 2.7–4.5)
PHOSPHATE SERPL-MCNC: 2.8 MG/DL (ref 2.7–4.5)
PHOSPHATE SERPL-MCNC: 3 MG/DL (ref 2.7–4.5)
PHOSPHATE SERPL-MCNC: 3.1 MG/DL (ref 2.7–4.5)
PHOSPHATE SERPL-MCNC: 3.1 MG/DL (ref 2.7–4.5)
PHOSPHATE SERPL-MCNC: 3.2 MG/DL (ref 2.7–4.5)
PHOSPHATE SERPL-MCNC: 3.3 MG/DL (ref 2.7–4.5)
PHOSPHATE SERPL-MCNC: 3.4 MG/DL (ref 2.7–4.5)
PHOSPHATE SERPL-MCNC: 3.4 MG/DL (ref 2.7–4.5)
PHOSPHATE SERPL-MCNC: 3.5 MG/DL (ref 2.7–4.5)
PHOSPHATE SERPL-MCNC: 3.6 MG/DL (ref 2.7–4.5)
PHOSPHATE SERPL-MCNC: 3.6 MG/DL (ref 2.7–4.5)
PHOSPHATE SERPL-MCNC: 3.7 MG/DL (ref 2.7–4.5)
PHOSPHATE SERPL-MCNC: 3.8 MG/DL (ref 2.7–4.5)
PHOSPHATE SERPL-MCNC: 3.8 MG/DL (ref 2.7–4.5)
PHOSPHATE SERPL-MCNC: 3.9 MG/DL (ref 2.7–4.5)
PHOSPHATE SERPL-MCNC: 4.4 MG/DL (ref 2.7–4.5)
PHOSPHATE SERPL-MCNC: 4.4 MG/DL (ref 2.7–4.5)
PHOSPHATE SERPL-MCNC: 4.6 MG/DL (ref 2.7–4.5)
PHOSPHATE SERPL-MCNC: 4.8 MG/DL (ref 2.7–4.5)
PHOSPHATE SERPL-MCNC: 6.1 MG/DL (ref 2.7–4.5)
PIP: 22
PIP: 24
PISA TR MAX VEL: 2.03 M/S
PISA TR MAX VEL: 2.17 M/S
PLATELET # BLD AUTO: 10 K/UL (ref 150–450)
PLATELET # BLD AUTO: 10 K/UL (ref 150–450)
PLATELET # BLD AUTO: 109 K/UL (ref 150–450)
PLATELET # BLD AUTO: 11 K/UL (ref 150–450)
PLATELET # BLD AUTO: 12 K/UL (ref 150–450)
PLATELET # BLD AUTO: 12 K/UL (ref 150–450)
PLATELET # BLD AUTO: 13 K/UL (ref 150–450)
PLATELET # BLD AUTO: 15 K/UL (ref 150–450)
PLATELET # BLD AUTO: 17 K/UL (ref 150–450)
PLATELET # BLD AUTO: 18 K/UL (ref 150–450)
PLATELET # BLD AUTO: 185 K/UL (ref 150–450)
PLATELET # BLD AUTO: 189 K/UL (ref 150–450)
PLATELET # BLD AUTO: 19 K/UL (ref 150–450)
PLATELET # BLD AUTO: 21 K/UL (ref 150–450)
PLATELET # BLD AUTO: 22 K/UL (ref 150–450)
PLATELET # BLD AUTO: 23 K/UL (ref 150–450)
PLATELET # BLD AUTO: 27 K/UL (ref 150–450)
PLATELET # BLD AUTO: 28 K/UL (ref 150–450)
PLATELET # BLD AUTO: 35 K/UL (ref 150–450)
PLATELET # BLD AUTO: 41 K/UL (ref 150–450)
PLATELET # BLD AUTO: 45 K/UL (ref 150–450)
PLATELET # BLD AUTO: 47 K/UL (ref 150–450)
PLATELET # BLD AUTO: 50 K/UL (ref 150–450)
PLATELET # BLD AUTO: 51 K/UL (ref 150–450)
PLATELET # BLD AUTO: 51 K/UL (ref 150–450)
PLATELET # BLD AUTO: 53 K/UL (ref 150–450)
PLATELET # BLD AUTO: 53 K/UL (ref 150–450)
PLATELET # BLD AUTO: 55 K/UL (ref 150–450)
PLATELET # BLD AUTO: 56 K/UL (ref 150–450)
PLATELET # BLD AUTO: 6 K/UL (ref 150–450)
PLATELET # BLD AUTO: 63 K/UL (ref 150–450)
PLATELET # BLD AUTO: 7 K/UL (ref 150–450)
PLATELET # BLD AUTO: 70 K/UL (ref 150–450)
PLATELET # BLD AUTO: 78 K/UL (ref 150–450)
PLATELET # BLD AUTO: 78 K/UL (ref 150–450)
PLATELET # BLD AUTO: 8 K/UL (ref 150–450)
PLATELET # BLD AUTO: 81 K/UL (ref 150–450)
PLATELET # BLD AUTO: 9 K/UL (ref 150–450)
PLATELET # BLD AUTO: 9 K/UL (ref 150–450)
PLATELET # BLD AUTO: 90 K/UL (ref 150–450)
PLATELET # BLD AUTO: 92 K/UL (ref 150–450)
PLATELET # BLD AUTO: 93 K/UL (ref 150–450)
PLATELET BLD QL SMEAR: ABNORMAL
PMV BLD AUTO: 10.2 FL (ref 9.2–12.9)
PMV BLD AUTO: 10.3 FL (ref 9.2–12.9)
PMV BLD AUTO: 10.3 FL (ref 9.2–12.9)
PMV BLD AUTO: 10.4 FL (ref 9.2–12.9)
PMV BLD AUTO: 10.5 FL (ref 9.2–12.9)
PMV BLD AUTO: 10.5 FL (ref 9.2–12.9)
PMV BLD AUTO: 10.6 FL (ref 9.2–12.9)
PMV BLD AUTO: 10.7 FL (ref 9.2–12.9)
PMV BLD AUTO: 10.8 FL (ref 9.2–12.9)
PMV BLD AUTO: 10.8 FL (ref 9.2–12.9)
PMV BLD AUTO: 10.9 FL (ref 9.2–12.9)
PMV BLD AUTO: 10.9 FL (ref 9.2–12.9)
PMV BLD AUTO: 11.2 FL (ref 9.2–12.9)
PMV BLD AUTO: 11.3 FL (ref 9.2–12.9)
PMV BLD AUTO: 11.5 FL (ref 9.2–12.9)
PMV BLD AUTO: 11.5 FL (ref 9.2–12.9)
PMV BLD AUTO: 11.6 FL (ref 9.2–12.9)
PMV BLD AUTO: 11.7 FL (ref 9.2–12.9)
PMV BLD AUTO: 11.9 FL (ref 9.2–12.9)
PMV BLD AUTO: 12 FL (ref 9.2–12.9)
PMV BLD AUTO: 12.1 FL (ref 9.2–12.9)
PMV BLD AUTO: 12.3 FL (ref 9.2–12.9)
PMV BLD AUTO: 12.3 FL (ref 9.2–12.9)
PMV BLD AUTO: 12.7 FL (ref 9.2–12.9)
PMV BLD AUTO: 13.4 FL (ref 9.2–12.9)
PMV BLD AUTO: 9.3 FL (ref 9.2–12.9)
PMV BLD AUTO: 9.9 FL (ref 9.2–12.9)
PMV BLD AUTO: ABNORMAL FL (ref 9.2–12.9)
PO2 BLDA: 145 MMHG (ref 80–100)
PO2 BLDA: 27 MMHG (ref 40–60)
PO2 BLDA: 89 MMHG (ref 80–100)
POC BE: -11 MMOL/L
POC BE: -11 MMOL/L
POC BE: -9 MMOL/L
POC IONIZED CALCIUM: 1.19 MMOL/L (ref 1.06–1.42)
POC IONIZED CALCIUM: 1.28 MMOL/L (ref 1.06–1.42)
POC SATURATED O2: 52 % (ref 95–100)
POC SATURATED O2: 96 % (ref 95–100)
POC SATURATED O2: 99 % (ref 95–100)
POC TCO2 (MEASURED): 24 MMOL/L (ref 23–29)
POC TCO2: 16 MMOL/L (ref 23–27)
POC TCO2: 16 MMOL/L (ref 24–29)
POC TCO2: 17 MMOL/L (ref 23–27)
POCT GLUCOSE: 100 MG/DL (ref 70–110)
POCT GLUCOSE: 105 MG/DL (ref 70–110)
POCT GLUCOSE: 111 MG/DL (ref 70–110)
POCT GLUCOSE: 111 MG/DL (ref 70–110)
POCT GLUCOSE: 114 MG/DL (ref 70–110)
POCT GLUCOSE: 114 MG/DL (ref 70–110)
POCT GLUCOSE: 116 MG/DL (ref 70–110)
POCT GLUCOSE: 116 MG/DL (ref 70–110)
POCT GLUCOSE: 117 MG/DL (ref 70–110)
POCT GLUCOSE: 122 MG/DL (ref 70–110)
POCT GLUCOSE: 123 MG/DL (ref 70–110)
POCT GLUCOSE: 127 MG/DL (ref 70–110)
POCT GLUCOSE: 132 MG/DL (ref 70–110)
POCT GLUCOSE: 134 MG/DL (ref 70–110)
POCT GLUCOSE: 141 MG/DL (ref 70–110)
POCT GLUCOSE: 143 MG/DL (ref 70–110)
POCT GLUCOSE: 145 MG/DL (ref 70–110)
POCT GLUCOSE: 146 MG/DL (ref 70–110)
POCT GLUCOSE: 148 MG/DL (ref 70–110)
POCT GLUCOSE: 148 MG/DL (ref 70–110)
POCT GLUCOSE: 149 MG/DL (ref 70–110)
POCT GLUCOSE: 149 MG/DL (ref 70–110)
POCT GLUCOSE: 150 MG/DL (ref 70–110)
POCT GLUCOSE: 158 MG/DL (ref 70–110)
POCT GLUCOSE: 160 MG/DL (ref 70–110)
POCT GLUCOSE: 162 MG/DL (ref 70–110)
POCT GLUCOSE: 168 MG/DL (ref 70–110)
POCT GLUCOSE: 172 MG/DL (ref 70–110)
POCT GLUCOSE: 174 MG/DL (ref 70–110)
POCT GLUCOSE: 178 MG/DL (ref 70–110)
POCT GLUCOSE: 186 MG/DL (ref 70–110)
POCT GLUCOSE: 187 MG/DL (ref 70–110)
POCT GLUCOSE: 188 MG/DL (ref 70–110)
POCT GLUCOSE: 193 MG/DL (ref 70–110)
POCT GLUCOSE: 194 MG/DL (ref 70–110)
POCT GLUCOSE: 199 MG/DL (ref 70–110)
POCT GLUCOSE: 207 MG/DL (ref 70–110)
POCT GLUCOSE: 210 MG/DL (ref 70–110)
POCT GLUCOSE: 213 MG/DL (ref 70–110)
POCT GLUCOSE: 214 MG/DL (ref 70–110)
POCT GLUCOSE: 217 MG/DL (ref 70–110)
POCT GLUCOSE: 218 MG/DL (ref 70–110)
POCT GLUCOSE: 223 MG/DL (ref 70–110)
POCT GLUCOSE: 224 MG/DL (ref 70–110)
POCT GLUCOSE: 226 MG/DL (ref 70–110)
POCT GLUCOSE: 227 MG/DL (ref 70–110)
POCT GLUCOSE: 229 MG/DL (ref 70–110)
POCT GLUCOSE: 231 MG/DL (ref 70–110)
POCT GLUCOSE: 233 MG/DL (ref 70–110)
POCT GLUCOSE: 234 MG/DL (ref 70–110)
POCT GLUCOSE: 235 MG/DL (ref 70–110)
POCT GLUCOSE: 237 MG/DL (ref 70–110)
POCT GLUCOSE: 239 MG/DL (ref 70–110)
POCT GLUCOSE: 248 MG/DL (ref 70–110)
POCT GLUCOSE: 248 MG/DL (ref 70–110)
POCT GLUCOSE: 257 MG/DL (ref 70–110)
POCT GLUCOSE: 258 MG/DL (ref 70–110)
POCT GLUCOSE: 258 MG/DL (ref 70–110)
POCT GLUCOSE: 259 MG/DL (ref 70–110)
POCT GLUCOSE: 260 MG/DL (ref 70–110)
POCT GLUCOSE: 261 MG/DL (ref 70–110)
POCT GLUCOSE: 262 MG/DL (ref 70–110)
POCT GLUCOSE: 265 MG/DL (ref 70–110)
POCT GLUCOSE: 267 MG/DL (ref 70–110)
POCT GLUCOSE: 271 MG/DL (ref 70–110)
POCT GLUCOSE: 278 MG/DL (ref 70–110)
POCT GLUCOSE: 285 MG/DL (ref 70–110)
POCT GLUCOSE: 287 MG/DL (ref 70–110)
POCT GLUCOSE: 289 MG/DL (ref 70–110)
POCT GLUCOSE: 308 MG/DL (ref 70–110)
POCT GLUCOSE: 319 MG/DL (ref 70–110)
POCT GLUCOSE: 335 MG/DL (ref 70–110)
POCT GLUCOSE: 335 MG/DL (ref 70–110)
POCT GLUCOSE: 355 MG/DL (ref 70–110)
POCT GLUCOSE: 405 MG/DL (ref 70–110)
POCT GLUCOSE: 80 MG/DL (ref 70–110)
POCT GLUCOSE: 83 MG/DL (ref 70–110)
POCT GLUCOSE: 93 MG/DL (ref 70–110)
POCT GLUCOSE: 99 MG/DL (ref 70–110)
POIKILOCYTOSIS BLD QL SMEAR: SLIGHT
POLYCHROMASIA BLD QL SMEAR: ABNORMAL
POTASSIUM BLD-SCNC: 3.9 MMOL/L (ref 3.5–5.1)
POTASSIUM BLD-SCNC: 4.6 MMOL/L (ref 3.5–5.1)
POTASSIUM SERPL-SCNC: 3.5 MMOL/L (ref 3.5–5.1)
POTASSIUM SERPL-SCNC: 3.6 MMOL/L (ref 3.5–5.1)
POTASSIUM SERPL-SCNC: 3.7 MMOL/L (ref 3.5–5.1)
POTASSIUM SERPL-SCNC: 3.7 MMOL/L (ref 3.5–5.1)
POTASSIUM SERPL-SCNC: 3.8 MMOL/L (ref 3.5–5.1)
POTASSIUM SERPL-SCNC: 3.9 MMOL/L (ref 3.5–5.1)
POTASSIUM SERPL-SCNC: 4 MMOL/L (ref 3.5–5.1)
POTASSIUM SERPL-SCNC: 4.1 MMOL/L (ref 3.5–5.1)
POTASSIUM SERPL-SCNC: 4.2 MMOL/L (ref 3.5–5.1)
POTASSIUM SERPL-SCNC: 4.3 MMOL/L (ref 3.5–5.1)
POTASSIUM SERPL-SCNC: 4.4 MMOL/L (ref 3.5–5.1)
POTASSIUM SERPL-SCNC: 4.4 MMOL/L (ref 3.5–5.1)
POTASSIUM SERPL-SCNC: 4.5 MMOL/L (ref 3.5–5.1)
POTASSIUM SERPL-SCNC: 4.5 MMOL/L (ref 3.5–5.1)
POTASSIUM SERPL-SCNC: 4.6 MMOL/L (ref 3.5–5.1)
POTASSIUM SERPL-SCNC: 4.7 MMOL/L (ref 3.5–5.1)
POTASSIUM SERPL-SCNC: 5.1 MMOL/L (ref 3.5–5.1)
PROCALCITONIN SERPL IA-MCNC: 0.3 NG/ML (ref 0–0.5)
PROCALCITONIN SERPL IA-MCNC: 0.57 NG/ML (ref 0–0.5)
PROCALCITONIN SERPL IA-MCNC: 0.62 NG/ML (ref 0–0.5)
PROT SERPL-MCNC: 3.5 G/DL (ref 6–8.4)
PROT SERPL-MCNC: 3.5 G/DL (ref 6–8.4)
PROT SERPL-MCNC: 3.6 G/DL (ref 6–8.4)
PROT SERPL-MCNC: 3.6 G/DL (ref 6–8.4)
PROT SERPL-MCNC: 3.7 G/DL (ref 6–8.4)
PROT SERPL-MCNC: 3.9 G/DL (ref 6–8.4)
PROT SERPL-MCNC: 4 G/DL (ref 6–8.4)
PROT SERPL-MCNC: 4 G/DL (ref 6–8.4)
PROT SERPL-MCNC: 4.1 G/DL (ref 6–8.4)
PROT SERPL-MCNC: 4.4 G/DL (ref 6–8.4)
PROT SERPL-MCNC: 4.4 G/DL (ref 6–8.4)
PROT SERPL-MCNC: 4.5 G/DL (ref 6–8.4)
PROT SERPL-MCNC: 4.6 G/DL (ref 6–8.4)
PROT SERPL-MCNC: 4.6 G/DL (ref 6–8.4)
PROT SERPL-MCNC: 4.7 G/DL (ref 6–8.4)
PROT SERPL-MCNC: 4.9 G/DL (ref 6–8.4)
PROT SERPL-MCNC: 5 G/DL (ref 6–8.4)
PROT SERPL-MCNC: 5 G/DL (ref 6–8.4)
PROT SERPL-MCNC: 5.1 G/DL (ref 6–8.4)
PROT SERPL-MCNC: 5.2 G/DL (ref 6–8.4)
PROT SERPL-MCNC: 5.6 G/DL (ref 6–8.4)
PROT SERPL-MCNC: 5.6 G/DL (ref 6–8.4)
PROT SERPL-MCNC: 5.7 G/DL (ref 6–8.4)
PROT SERPL-MCNC: 6.5 G/DL (ref 6–8.4)
PROT SERPL-MCNC: 6.9 G/DL (ref 6–8.4)
PROT SERPL-MCNC: 7.5 G/DL (ref 6–8.4)
PROT SERPL-MCNC: 7.5 G/DL (ref 6–8.4)
PROT SERPL-MCNC: 7.8 G/DL (ref 6–8.4)
PROT UR QL STRIP: ABNORMAL
PROT UR QL STRIP: NEGATIVE
PROTHROMBIN TIME: 10.9 SEC (ref 9–12.5)
PROTHROMBIN TIME: 11.5 SEC (ref 9–12.5)
PROTHROMBIN TIME: 11.6 SEC (ref 9–12.5)
RA MAJOR: 4.01 CM
RA MAJOR: 4.14 CM
RA WIDTH: 3.18 CM
RA WIDTH: 3.28 CM
RBC # BLD AUTO: 2.22 M/UL (ref 4.6–6.2)
RBC # BLD AUTO: 2.25 M/UL (ref 4.6–6.2)
RBC # BLD AUTO: 2.29 M/UL (ref 4.6–6.2)
RBC # BLD AUTO: 2.33 M/UL (ref 4.6–6.2)
RBC # BLD AUTO: 2.37 M/UL (ref 4.6–6.2)
RBC # BLD AUTO: 2.38 M/UL (ref 4.6–6.2)
RBC # BLD AUTO: 2.41 M/UL (ref 4.6–6.2)
RBC # BLD AUTO: 2.43 M/UL (ref 4.6–6.2)
RBC # BLD AUTO: 2.45 M/UL (ref 4.6–6.2)
RBC # BLD AUTO: 2.46 M/UL (ref 4.6–6.2)
RBC # BLD AUTO: 2.5 M/UL (ref 4.6–6.2)
RBC # BLD AUTO: 2.5 M/UL (ref 4.6–6.2)
RBC # BLD AUTO: 2.52 M/UL (ref 4.6–6.2)
RBC # BLD AUTO: 2.55 M/UL (ref 4.6–6.2)
RBC # BLD AUTO: 2.57 M/UL (ref 4.6–6.2)
RBC # BLD AUTO: 2.58 M/UL (ref 4.6–6.2)
RBC # BLD AUTO: 2.59 M/UL (ref 4.6–6.2)
RBC # BLD AUTO: 2.61 M/UL (ref 4.6–6.2)
RBC # BLD AUTO: 2.64 M/UL (ref 4.6–6.2)
RBC # BLD AUTO: 2.64 M/UL (ref 4.6–6.2)
RBC # BLD AUTO: 2.69 M/UL (ref 4.6–6.2)
RBC # BLD AUTO: 2.7 M/UL (ref 4.6–6.2)
RBC # BLD AUTO: 2.71 M/UL (ref 4.6–6.2)
RBC # BLD AUTO: 2.71 M/UL (ref 4.6–6.2)
RBC # BLD AUTO: 2.72 M/UL (ref 4.6–6.2)
RBC # BLD AUTO: 2.76 M/UL (ref 4.6–6.2)
RBC # BLD AUTO: 2.79 M/UL (ref 4.6–6.2)
RBC # BLD AUTO: 2.79 M/UL (ref 4.6–6.2)
RBC # BLD AUTO: 2.81 M/UL (ref 4.6–6.2)
RBC # BLD AUTO: 2.82 M/UL (ref 4.6–6.2)
RBC # BLD AUTO: 2.87 M/UL (ref 4.6–6.2)
RBC # BLD AUTO: 2.9 M/UL (ref 4.6–6.2)
RBC # BLD AUTO: 2.97 M/UL (ref 4.6–6.2)
RBC # BLD AUTO: 2.98 M/UL (ref 4.6–6.2)
RBC # BLD AUTO: 3.05 M/UL (ref 4.6–6.2)
RBC # BLD AUTO: 3.12 M/UL (ref 4.6–6.2)
RBC # BLD AUTO: 3.55 M/UL (ref 4.6–6.2)
RBC # BLD AUTO: 3.75 M/UL (ref 4.6–6.2)
RBC # BLD AUTO: 3.85 M/UL (ref 4.6–6.2)
RBC # BLD AUTO: 3.88 M/UL (ref 4.6–6.2)
RBC # BLD AUTO: 4.05 M/UL (ref 4.6–6.2)
RBC # BLD AUTO: 4.06 M/UL (ref 4.6–6.2)
RBC #/AREA URNS AUTO: 1 /HPF (ref 0–4)
RBC #/AREA URNS AUTO: 15 /HPF (ref 0–4)
RBC #/AREA URNS AUTO: 2 /HPF (ref 0–4)
RBC #/AREA URNS AUTO: 3 /HPF (ref 0–4)
RBC #/AREA URNS AUTO: 41 /HPF (ref 0–4)
RBC #/AREA URNS HPF: 7 /HPF (ref 0–4)
RESPIRATORY INFECTION PANEL SOURCE: ABNORMAL
RH BLD: NORMAL
RIGHT VENTRICULAR END-DIASTOLIC DIMENSION: 3.02 CM
RIGHT VENTRICULAR END-DIASTOLIC DIMENSION: 3.13 CM
RSV RNA NPH QL NAA+NON-PROBE: NOT DETECTED
RV TISSUE DOPPLER FREE WALL SYSTOLIC VELOCITY 1 (APICAL 4 CHAMBER VIEW): 13.19 CM/S
RV TISSUE DOPPLER FREE WALL SYSTOLIC VELOCITY 1 (APICAL 4 CHAMBER VIEW): 16.05 CM/S
RV+EV RNA NPH QL NAA+NON-PROBE: NOT DETECTED
SAMPLE: ABNORMAL
SARS-COV-2 RDRP RESP QL NAA+PROBE: NEGATIVE
SARS-COV-2 RDRP RESP QL NAA+PROBE: POSITIVE
SARS-COV-2 RNA RESP QL NAA+PROBE: NOT DETECTED
SATURATED IRON: 21 % (ref 20–50)
SCHISTOCYTES BLD QL SMEAR: ABNORMAL
SCHISTOCYTES BLD QL SMEAR: ABNORMAL
SCHISTOCYTES BLD QL SMEAR: PRESENT
SCHISTOCYTES BLD QL SMEAR: PRESENT
SINUS: 3.63 CM
SINUS: 3.88 CM
SITE: ABNORMAL
SMUDGE CELLS BLD QL SMEAR: PRESENT
SODIUM BLD-SCNC: 132 MMOL/L (ref 136–145)
SODIUM BLD-SCNC: 132 MMOL/L (ref 136–145)
SODIUM SERPL-SCNC: 125 MMOL/L (ref 136–145)
SODIUM SERPL-SCNC: 125 MMOL/L (ref 136–145)
SODIUM SERPL-SCNC: 126 MMOL/L (ref 136–145)
SODIUM SERPL-SCNC: 127 MMOL/L (ref 136–145)
SODIUM SERPL-SCNC: 128 MMOL/L (ref 136–145)
SODIUM SERPL-SCNC: 128 MMOL/L (ref 136–145)
SODIUM SERPL-SCNC: 129 MMOL/L (ref 136–145)
SODIUM SERPL-SCNC: 130 MMOL/L (ref 136–145)
SODIUM SERPL-SCNC: 131 MMOL/L (ref 136–145)
SODIUM SERPL-SCNC: 132 MMOL/L (ref 136–145)
SODIUM SERPL-SCNC: 133 MMOL/L (ref 136–145)
SODIUM SERPL-SCNC: 133 MMOL/L (ref 136–145)
SODIUM SERPL-SCNC: 136 MMOL/L (ref 136–145)
SODIUM SERPL-SCNC: 136 MMOL/L (ref 136–145)
SODIUM UR-SCNC: 41 MMOL/L (ref 20–250)
SODIUM UR-SCNC: 45 MMOL/L (ref 20–250)
SODIUM UR-SCNC: 81 MMOL/L (ref 20–250)
SP GR UR STRIP: 1.01 (ref 1–1.03)
SP GR UR STRIP: 1.01 (ref 1–1.03)
SP GR UR STRIP: 1.02 (ref 1–1.03)
SP GR UR STRIP: 1.03 (ref 1–1.03)
SP GR UR STRIP: 1.03 (ref 1–1.03)
SP GR UR STRIP: >1.03 (ref 1–1.03)
SP GR UR STRIP: >1.03 (ref 1–1.03)
SP02: 100
SP02: 97
SPECIMEN SOURCE: NORMAL
SPHEROCYTES BLD QL SMEAR: ABNORMAL
SQUAMOUS #/AREA URNS AUTO: 0 /HPF
SQUAMOUS #/AREA URNS AUTO: 1 /HPF
SQUAMOUS #/AREA URNS AUTO: 1 /HPF
SQUAMOUS #/AREA URNS HPF: 2 /HPF
STJ: 3.06 CM
STJ: 3.13 CM
STOMATOCYTES BLD QL SMEAR: PRESENT
STOMATOCYTES BLD QL SMEAR: PRESENT
TARGETS BLD QL SMEAR: ABNORMAL
TDI LATERAL: 0.09 M/S
TDI LATERAL: 0.1 M/S
TDI SEPTAL: 0.07 M/S
TDI SEPTAL: 0.07 M/S
TDI: 0.08 M/S
TDI: 0.09 M/S
TOTAL IRON BINDING CAPACITY: 200 UG/DL (ref 250–450)
TOXIC GRANULES BLD QL SMEAR: PRESENT
TR MAX PG: 16 MMHG
TR MAX PG: 19 MMHG
TRANS ERYTHROCYTES VOL PATIENT: NORMAL ML
TRANS ERYTHROCYTES VOL PATIENT: NORMAL ML
TRANSFERRIN SERPL-MCNC: 143 MG/DL (ref 200–375)
TRICUSPID ANNULAR PLANE SYSTOLIC EXCURSION: 1.93 CM
TRICUSPID ANNULAR PLANE SYSTOLIC EXCURSION: 2.08 CM
TROPONIN I SERPL DL<=0.01 NG/ML-MCNC: 0.02 NG/ML (ref 0–0.03)
TROPONIN I SERPL DL<=0.01 NG/ML-MCNC: 0.03 NG/ML (ref 0–0.03)
TROPONIN I SERPL HS-MCNC: 31.1 PG/ML (ref 0–14.9)
TROPONIN I SERPL HS-MCNC: 37 PG/ML (ref 0–14.9)
TROPONIN I SERPL HS-MCNC: 38.8 PG/ML (ref 0–14.9)
TSH SERPL DL<=0.005 MIU/L-ACNC: 0.81 UIU/ML (ref 0.34–5.6)
UNIT NUMBER: NORMAL
URATE SERPL-MCNC: 1.7 MG/DL (ref 3.4–7)
URATE SERPL-MCNC: 1.8 MG/DL (ref 3.4–7)
URATE SERPL-MCNC: 1.8 MG/DL (ref 3.4–7)
URATE SERPL-MCNC: 1.9 MG/DL (ref 3.4–7)
URATE SERPL-MCNC: 2 MG/DL (ref 3.4–7)
URATE SERPL-MCNC: 2.7 MG/DL (ref 3.4–7)
URATE SERPL-MCNC: 3 MG/DL (ref 3.4–7)
URATE SERPL-MCNC: 3.2 MG/DL (ref 3.4–7)
URATE SERPL-MCNC: 3.4 MG/DL (ref 3.4–7)
URATE SERPL-MCNC: 3.8 MG/DL (ref 3.4–7)
URATE SERPL-MCNC: 3.8 MG/DL (ref 3.4–7)
URATE SERPL-MCNC: 4.1 MG/DL (ref 3.4–7)
URATE SERPL-MCNC: 4.3 MG/DL (ref 3.4–7)
URATE SERPL-MCNC: 4.4 MG/DL (ref 3.4–7)
URATE SERPL-MCNC: 4.6 MG/DL (ref 3.4–7)
URATE SERPL-MCNC: 4.6 MG/DL (ref 3.4–7)
URATE SERPL-MCNC: 4.9 MG/DL (ref 3.4–7)
URATE SERPL-MCNC: 5.2 MG/DL (ref 3.4–7)
URATE SERPL-MCNC: 5.2 MG/DL (ref 3.4–7)
URN SPEC COLLECT METH UR: ABNORMAL
UROBILINOGEN UR STRIP-ACNC: ABNORMAL EU/DL
UROBILINOGEN UR STRIP-ACNC: ABNORMAL EU/DL
VANCOMYCIN SERPL-MCNC: 7.5 UG/ML
VANCOMYCIN TROUGH SERPL-MCNC: 10.1 UG/ML
VANCOMYCIN TROUGH SERPL-MCNC: 15.9 UG/ML (ref 10–22)
VANCOMYCIN TROUGH SERPL-MCNC: 30.7 UG/ML (ref 10–22)
VANCOMYCIN TROUGH SERPL-MCNC: 33 UG/ML (ref 10–22)
VIT B12 SERPL-MCNC: 271 PG/ML (ref 210–950)
VT: 480
VT: 480
WBC # BLD AUTO: 0.21 K/UL (ref 3.9–12.7)
WBC # BLD AUTO: 0.23 K/UL (ref 3.9–12.7)
WBC # BLD AUTO: 0.27 K/UL (ref 3.9–12.7)
WBC # BLD AUTO: 0.28 K/UL (ref 3.9–12.7)
WBC # BLD AUTO: 0.71 K/UL (ref 3.9–12.7)
WBC # BLD AUTO: 0.74 K/UL (ref 3.9–12.7)
WBC # BLD AUTO: 1.89 K/UL (ref 3.9–12.7)
WBC # BLD AUTO: 10.84 K/UL (ref 3.9–12.7)
WBC # BLD AUTO: 10.89 K/UL (ref 3.9–12.7)
WBC # BLD AUTO: 11.35 K/UL (ref 3.9–12.7)
WBC # BLD AUTO: 11.76 K/UL (ref 3.9–12.7)
WBC # BLD AUTO: 11.8 K/UL (ref 3.9–12.7)
WBC # BLD AUTO: 12.15 K/UL (ref 3.9–12.7)
WBC # BLD AUTO: 12.59 K/UL (ref 3.9–12.7)
WBC # BLD AUTO: 12.91 K/UL (ref 3.9–12.7)
WBC # BLD AUTO: 14.05 K/UL (ref 3.9–12.7)
WBC # BLD AUTO: 14.42 K/UL (ref 3.9–12.7)
WBC # BLD AUTO: 14.61 K/UL (ref 3.9–12.7)
WBC # BLD AUTO: 15.01 K/UL (ref 3.9–12.7)
WBC # BLD AUTO: 15.93 K/UL (ref 3.9–12.7)
WBC # BLD AUTO: 16.07 K/UL (ref 3.9–12.7)
WBC # BLD AUTO: 16.18 K/UL (ref 3.9–12.7)
WBC # BLD AUTO: 16.57 K/UL (ref 3.9–12.7)
WBC # BLD AUTO: 16.98 K/UL (ref 3.9–12.7)
WBC # BLD AUTO: 18.06 K/UL (ref 3.9–12.7)
WBC # BLD AUTO: 18.54 K/UL (ref 3.9–12.7)
WBC # BLD AUTO: 2.1 K/UL (ref 3.9–12.7)
WBC # BLD AUTO: 2.4 K/UL (ref 3.9–12.7)
WBC # BLD AUTO: 2.7 K/UL (ref 3.9–12.7)
WBC # BLD AUTO: 2.72 K/UL (ref 3.9–12.7)
WBC # BLD AUTO: 21 K/UL (ref 3.9–12.7)
WBC # BLD AUTO: 22.06 K/UL (ref 3.9–12.7)
WBC # BLD AUTO: 23.65 K/UL (ref 3.9–12.7)
WBC # BLD AUTO: 3.28 K/UL (ref 3.9–12.7)
WBC # BLD AUTO: 3.61 K/UL (ref 3.9–12.7)
WBC # BLD AUTO: 5.13 K/UL (ref 3.9–12.7)
WBC # BLD AUTO: 6.23 K/UL (ref 3.9–12.7)
WBC # BLD AUTO: 7.35 K/UL (ref 3.9–12.7)
WBC # BLD AUTO: 7.62 K/UL (ref 3.9–12.7)
WBC # BLD AUTO: 9.22 K/UL (ref 3.9–12.7)
WBC # BLD AUTO: 9.22 K/UL (ref 3.9–12.7)
WBC # BLD AUTO: 9.25 K/UL (ref 3.9–12.7)
WBC #/AREA URNS AUTO: 1 /HPF (ref 0–5)
WBC #/AREA URNS AUTO: 3 /HPF (ref 0–5)
WBC #/AREA URNS AUTO: 38 /HPF (ref 0–5)
WBC #/AREA URNS AUTO: 4 /HPF (ref 0–5)
WBC #/AREA URNS HPF: 5 /HPF (ref 0–5)
YEAST UR QL AUTO: ABNORMAL
YEAST UR QL AUTO: ABNORMAL
YEAST UR QL AUTO: NORMAL
YEAST URNS QL MICRO: ABNORMAL

## 2023-01-01 PROCEDURE — 36415 COLL VENOUS BLD VENIPUNCTURE: CPT | Performed by: INTERNAL MEDICINE

## 2023-01-01 PROCEDURE — 63600175 PHARM REV CODE 636 W HCPCS

## 2023-01-01 PROCEDURE — 88342 CHG IMMUNOCYTOCHEMISTRY: ICD-10-PCS | Mod: 26,59,, | Performed by: PATHOLOGY

## 2023-01-01 PROCEDURE — 80053 COMPREHEN METABOLIC PANEL: CPT

## 2023-01-01 PROCEDURE — 99213 OFFICE O/P EST LOW 20 MIN: CPT | Mod: PBBFAC,PO | Performed by: INTERNAL MEDICINE

## 2023-01-01 PROCEDURE — 25000003 PHARM REV CODE 250

## 2023-01-01 PROCEDURE — 25000003 PHARM REV CODE 250: Performed by: INTERNAL MEDICINE

## 2023-01-01 PROCEDURE — 99292 PR CRITICAL CARE, ADDL 30 MIN: ICD-10-PCS | Mod: FS,25,, | Performed by: NURSE PRACTITIONER

## 2023-01-01 PROCEDURE — 99215 OFFICE O/P EST HI 40 MIN: CPT | Mod: S$PBB,,, | Performed by: INTERNAL MEDICINE

## 2023-01-01 PROCEDURE — 25000003 PHARM REV CODE 250: Performed by: NURSE PRACTITIONER

## 2023-01-01 PROCEDURE — 86920 COMPATIBILITY TEST SPIN: CPT | Performed by: INTERNAL MEDICINE

## 2023-01-01 PROCEDURE — 63600175 PHARM REV CODE 636 W HCPCS: Performed by: STUDENT IN AN ORGANIZED HEALTH CARE EDUCATION/TRAINING PROGRAM

## 2023-01-01 PROCEDURE — 38222 DX BONE MARROW BX & ASPIR: CPT

## 2023-01-01 PROCEDURE — 25500020 PHARM REV CODE 255: Performed by: STUDENT IN AN ORGANIZED HEALTH CARE EDUCATION/TRAINING PROGRAM

## 2023-01-01 PROCEDURE — 99232 PR SUBSEQUENT HOSPITAL CARE,LEVL II: ICD-10-PCS | Mod: ,,, | Performed by: INTERNAL MEDICINE

## 2023-01-01 PROCEDURE — 3008F PR BODY MASS INDEX (BMI) DOCUMENTED: ICD-10-PCS | Mod: CPTII,,, | Performed by: INTERNAL MEDICINE

## 2023-01-01 PROCEDURE — 88305 TISSUE EXAM BY PATHOLOGIST: ICD-10-PCS | Mod: 26,,, | Performed by: PATHOLOGY

## 2023-01-01 PROCEDURE — 3074F PR MOST RECENT SYSTOLIC BLOOD PRESSURE < 130 MM HG: ICD-10-PCS | Mod: CPTII,,, | Performed by: INTERNAL MEDICINE

## 2023-01-01 PROCEDURE — 4010F PR ACE/ARB THEARPY RXD/TAKEN: ICD-10-PCS | Mod: CPTII,,, | Performed by: INTERNAL MEDICINE

## 2023-01-01 PROCEDURE — 36415 COLL VENOUS BLD VENIPUNCTURE: CPT

## 2023-01-01 PROCEDURE — 88311 PR  DECALCIFY TISSUE: ICD-10-PCS | Mod: 26,,, | Performed by: PATHOLOGY

## 2023-01-01 PROCEDURE — 25000003 PHARM REV CODE 250: Performed by: STUDENT IN AN ORGANIZED HEALTH CARE EDUCATION/TRAINING PROGRAM

## 2023-01-01 PROCEDURE — 83605 ASSAY OF LACTIC ACID: CPT | Performed by: NURSE PRACTITIONER

## 2023-01-01 PROCEDURE — 83735 ASSAY OF MAGNESIUM: CPT

## 2023-01-01 PROCEDURE — 36620 INSERTION CATHETER ARTERY: CPT

## 2023-01-01 PROCEDURE — 99900035 HC TECH TIME PER 15 MIN (STAT)

## 2023-01-01 PROCEDURE — 93010 ELECTROCARDIOGRAM REPORT: CPT | Mod: ,,, | Performed by: INTERNAL MEDICINE

## 2023-01-01 PROCEDURE — 85025 COMPLETE CBC W/AUTO DIFF WBC: CPT | Performed by: INTERNAL MEDICINE

## 2023-01-01 PROCEDURE — 63600175 PHARM REV CODE 636 W HCPCS: Performed by: NURSE PRACTITIONER

## 2023-01-01 PROCEDURE — 88189 FLOWCYTOMETRY/READ 16 & >: CPT | Mod: ,,, | Performed by: PATHOLOGY

## 2023-01-01 PROCEDURE — 96361 HYDRATE IV INFUSION ADD-ON: CPT

## 2023-01-01 PROCEDURE — 84550 ASSAY OF BLOOD/URIC ACID: CPT | Performed by: INTERNAL MEDICINE

## 2023-01-01 PROCEDURE — A4216 STERILE WATER/SALINE, 10 ML: HCPCS | Performed by: NURSE PRACTITIONER

## 2023-01-01 PROCEDURE — 85007 BL SMEAR W/DIFF WBC COUNT: CPT | Performed by: INTERNAL MEDICINE

## 2023-01-01 PROCEDURE — 80048 BASIC METABOLIC PNL TOTAL CA: CPT | Performed by: NURSE PRACTITIONER

## 2023-01-01 PROCEDURE — P9037 PLATE PHERES LEUKOREDU IRRAD: HCPCS

## 2023-01-01 PROCEDURE — 85025 COMPLETE CBC W/AUTO DIFF WBC: CPT | Performed by: NURSE PRACTITIONER

## 2023-01-01 PROCEDURE — 63600175 PHARM REV CODE 636 W HCPCS: Performed by: INTERNAL MEDICINE

## 2023-01-01 PROCEDURE — 84145 PROCALCITONIN (PCT): CPT | Performed by: NURSE PRACTITIONER

## 2023-01-01 PROCEDURE — 84132 ASSAY OF SERUM POTASSIUM: CPT

## 2023-01-01 PROCEDURE — 99222 1ST HOSP IP/OBS MODERATE 55: CPT | Mod: ,,, | Performed by: COLON & RECTAL SURGERY

## 2023-01-01 PROCEDURE — A4216 STERILE WATER/SALINE, 10 ML: HCPCS | Performed by: INTERNAL MEDICINE

## 2023-01-01 PROCEDURE — 87040 BLOOD CULTURE FOR BACTERIA: CPT | Mod: 59 | Performed by: NURSE PRACTITIONER

## 2023-01-01 PROCEDURE — 80048 BASIC METABOLIC PNL TOTAL CA: CPT | Mod: XB | Performed by: INTERNAL MEDICINE

## 2023-01-01 PROCEDURE — U0002 COVID-19 LAB TEST NON-CDC: HCPCS | Performed by: EMERGENCY MEDICINE

## 2023-01-01 PROCEDURE — 93010 EKG 12-LEAD: ICD-10-PCS | Mod: ,,, | Performed by: INTERNAL MEDICINE

## 2023-01-01 PROCEDURE — 63600175 PHARM REV CODE 636 W HCPCS: Mod: TB

## 2023-01-01 PROCEDURE — 84100 ASSAY OF PHOSPHORUS: CPT

## 2023-01-01 PROCEDURE — 82728 ASSAY OF FERRITIN: CPT

## 2023-01-01 PROCEDURE — 63600175 PHARM REV CODE 636 W HCPCS: Mod: JG | Performed by: INTERNAL MEDICINE

## 2023-01-01 PROCEDURE — 99285 EMERGENCY DEPT VISIT HI MDM: CPT | Mod: 25

## 2023-01-01 PROCEDURE — 85027 COMPLETE CBC AUTOMATED: CPT | Performed by: INTERNAL MEDICINE

## 2023-01-01 PROCEDURE — 85025 COMPLETE CBC W/AUTO DIFF WBC: CPT

## 2023-01-01 PROCEDURE — 85025 COMPLETE CBC W/AUTO DIFF WBC: CPT | Mod: 91

## 2023-01-01 PROCEDURE — 86900 BLOOD TYPING SEROLOGIC ABO: CPT | Performed by: INTERNAL MEDICINE

## 2023-01-01 PROCEDURE — 80048 BASIC METABOLIC PNL TOTAL CA: CPT | Performed by: INTERNAL MEDICINE

## 2023-01-01 PROCEDURE — 36430 TRANSFUSION BLD/BLD COMPNT: CPT

## 2023-01-01 PROCEDURE — 83735 ASSAY OF MAGNESIUM: CPT | Mod: 91

## 2023-01-01 PROCEDURE — 99215 PR OFFICE/OUTPT VISIT, EST, LEVL V, 40-54 MIN: ICD-10-PCS | Mod: S$PBB,,, | Performed by: INTERNAL MEDICINE

## 2023-01-01 PROCEDURE — 86920 COMPATIBILITY TEST SPIN: CPT | Performed by: STUDENT IN AN ORGANIZED HEALTH CARE EDUCATION/TRAINING PROGRAM

## 2023-01-01 PROCEDURE — 99285 EMERGENCY DEPT VISIT HI MDM: CPT | Mod: 25,27

## 2023-01-01 PROCEDURE — 84100 ASSAY OF PHOSPHORUS: CPT | Performed by: STUDENT IN AN ORGANIZED HEALTH CARE EDUCATION/TRAINING PROGRAM

## 2023-01-01 PROCEDURE — 94761 N-INVAS EAR/PLS OXIMETRY MLT: CPT

## 2023-01-01 PROCEDURE — 88184 FLOWCYTOMETRY/ TC 1 MARKER: CPT | Performed by: PATHOLOGY

## 2023-01-01 PROCEDURE — 85027 COMPLETE CBC AUTOMATED: CPT | Performed by: STUDENT IN AN ORGANIZED HEALTH CARE EDUCATION/TRAINING PROGRAM

## 2023-01-01 PROCEDURE — 99999 PR PBB SHADOW E&M-EST. PATIENT-LVL IV: ICD-10-PCS | Mod: PBBFAC,,, | Performed by: INTERNAL MEDICINE

## 2023-01-01 PROCEDURE — P9040 RBC LEUKOREDUCED IRRADIATED: HCPCS | Performed by: STUDENT IN AN ORGANIZED HEALTH CARE EDUCATION/TRAINING PROGRAM

## 2023-01-01 PROCEDURE — 96360 HYDRATION IV INFUSION INIT: CPT

## 2023-01-01 PROCEDURE — 99213 OFFICE O/P EST LOW 20 MIN: CPT | Mod: PBBFAC,PO,25 | Performed by: INTERNAL MEDICINE

## 2023-01-01 PROCEDURE — 83930 ASSAY OF BLOOD OSMOLALITY: CPT | Performed by: INTERNAL MEDICINE

## 2023-01-01 PROCEDURE — 87186 SC STD MICRODIL/AGAR DIL: CPT | Performed by: NURSE PRACTITIONER

## 2023-01-01 PROCEDURE — 85027 COMPLETE CBC AUTOMATED: CPT | Performed by: EMERGENCY MEDICINE

## 2023-01-01 PROCEDURE — 83605 ASSAY OF LACTIC ACID: CPT | Mod: 91 | Performed by: INTERNAL MEDICINE

## 2023-01-01 PROCEDURE — 87040 BLOOD CULTURE FOR BACTERIA: CPT | Performed by: NURSE PRACTITIONER

## 2023-01-01 PROCEDURE — 85018 HEMOGLOBIN: CPT | Performed by: STUDENT IN AN ORGANIZED HEALTH CARE EDUCATION/TRAINING PROGRAM

## 2023-01-01 PROCEDURE — 12000002 HC ACUTE/MED SURGE SEMI-PRIVATE ROOM

## 2023-01-01 PROCEDURE — 88342 IMHCHEM/IMCYTCHM 1ST ANTB: CPT | Mod: 59 | Performed by: PATHOLOGY

## 2023-01-01 PROCEDURE — 51702 INSERT TEMP BLADDER CATH: CPT

## 2023-01-01 PROCEDURE — 99999 PR PBB SHADOW E&M-EST. PATIENT-LVL III: ICD-10-PCS | Mod: PBBFAC,,, | Performed by: INTERNAL MEDICINE

## 2023-01-01 PROCEDURE — 4010F ACE/ARB THERAPY RXD/TAKEN: CPT | Mod: CPTII,,, | Performed by: INTERNAL MEDICINE

## 2023-01-01 PROCEDURE — 85379 FIBRIN DEGRADATION QUANT: CPT

## 2023-01-01 PROCEDURE — 99223 PR INITIAL HOSPITAL CARE,LEVL III: ICD-10-PCS | Mod: ,,, | Performed by: INTERNAL MEDICINE

## 2023-01-01 PROCEDURE — 80053 COMPREHEN METABOLIC PANEL: CPT | Performed by: EMERGENCY MEDICINE

## 2023-01-01 PROCEDURE — 80202 ASSAY OF VANCOMYCIN: CPT | Performed by: INTERNAL MEDICINE

## 2023-01-01 PROCEDURE — 99232 SBSQ HOSP IP/OBS MODERATE 35: CPT | Mod: ,,, | Performed by: INTERNAL MEDICINE

## 2023-01-01 PROCEDURE — 96366 THER/PROPH/DIAG IV INF ADDON: CPT

## 2023-01-01 PROCEDURE — 20600001 HC STEP DOWN PRIVATE ROOM

## 2023-01-01 PROCEDURE — 85025 COMPLETE CBC W/AUTO DIFF WBC: CPT | Mod: 91 | Performed by: INTERNAL MEDICINE

## 2023-01-01 PROCEDURE — 3078F PR MOST RECENT DIASTOLIC BLOOD PRESSURE < 80 MM HG: ICD-10-PCS | Mod: CPTII,,, | Performed by: INTERNAL MEDICINE

## 2023-01-01 PROCEDURE — 78815 PET IMAGE W/CT SKULL-THIGH: CPT | Mod: TC,PO

## 2023-01-01 PROCEDURE — 99223 1ST HOSP IP/OBS HIGH 75: CPT | Mod: ,,,

## 2023-01-01 PROCEDURE — 84550 ASSAY OF BLOOD/URIC ACID: CPT | Mod: 91

## 2023-01-01 PROCEDURE — 85097 BONE MARROW INTERPRETATION: CPT | Mod: ,,, | Performed by: PATHOLOGY

## 2023-01-01 PROCEDURE — 99214 OFFICE O/P EST MOD 30 MIN: CPT | Mod: PBBFAC,PO | Performed by: INTERNAL MEDICINE

## 2023-01-01 PROCEDURE — 77334 RADIATION TREATMENT AID(S): CPT | Mod: TC | Performed by: RADIOLOGY

## 2023-01-01 PROCEDURE — 80047 BASIC METABLC PNL IONIZED CA: CPT

## 2023-01-01 PROCEDURE — 88341 IMHCHEM/IMCYTCHM EA ADD ANTB: CPT | Mod: 59 | Performed by: PATHOLOGY

## 2023-01-01 PROCEDURE — P9021 RED BLOOD CELLS UNIT: HCPCS | Performed by: INTERNAL MEDICINE

## 2023-01-01 PROCEDURE — 84100 ASSAY OF PHOSPHORUS: CPT | Mod: 91

## 2023-01-01 PROCEDURE — 63600175 PHARM REV CODE 636 W HCPCS: Performed by: EMERGENCY MEDICINE

## 2023-01-01 PROCEDURE — 83735 ASSAY OF MAGNESIUM: CPT | Performed by: NURSE PRACTITIONER

## 2023-01-01 PROCEDURE — 99223 1ST HOSP IP/OBS HIGH 75: CPT | Mod: ,,, | Performed by: INTERNAL MEDICINE

## 2023-01-01 PROCEDURE — 77295 3-D RADIOTHERAPY PLAN: CPT | Mod: TC | Performed by: RADIOLOGY

## 2023-01-01 PROCEDURE — 81001 URINALYSIS AUTO W/SCOPE: CPT | Performed by: STUDENT IN AN ORGANIZED HEALTH CARE EDUCATION/TRAINING PROGRAM

## 2023-01-01 PROCEDURE — 93005 ELECTROCARDIOGRAM TRACING: CPT

## 2023-01-01 PROCEDURE — 99233 SBSQ HOSP IP/OBS HIGH 50: CPT | Mod: ,,, | Performed by: INTERNAL MEDICINE

## 2023-01-01 PROCEDURE — 25000242 PHARM REV CODE 250 ALT 637 W/ HCPCS

## 2023-01-01 PROCEDURE — 82962 GLUCOSE BLOOD TEST: CPT

## 2023-01-01 PROCEDURE — 36415 COLL VENOUS BLD VENIPUNCTURE: CPT | Performed by: NURSE PRACTITIONER

## 2023-01-01 PROCEDURE — 99999 PR PBB SHADOW E&M-EST. PATIENT-LVL III: CPT | Mod: PBBFAC,,, | Performed by: INTERNAL MEDICINE

## 2023-01-01 PROCEDURE — 99900026 HC AIRWAY MAINTENANCE (STAT)

## 2023-01-01 PROCEDURE — 96365 THER/PROPH/DIAG IV INF INIT: CPT

## 2023-01-01 PROCEDURE — 88313 SPECIAL STAINS GROUP 2: CPT | Mod: 26,,, | Performed by: PATHOLOGY

## 2023-01-01 PROCEDURE — 80053 COMPREHEN METABOLIC PANEL: CPT | Mod: 91 | Performed by: INTERNAL MEDICINE

## 2023-01-01 PROCEDURE — 88341 PR IHC OR ICC EACH ADD'L SINGLE ANTIBODY  STAINPR: ICD-10-PCS | Mod: 26,,, | Performed by: PATHOLOGY

## 2023-01-01 PROCEDURE — 86900 BLOOD TYPING SEROLOGIC ABO: CPT | Performed by: STUDENT IN AN ORGANIZED HEALTH CARE EDUCATION/TRAINING PROGRAM

## 2023-01-01 PROCEDURE — 88341 IMHCHEM/IMCYTCHM EA ADD ANTB: CPT | Mod: 26,,, | Performed by: PATHOLOGY

## 2023-01-01 PROCEDURE — 36415 COLL VENOUS BLD VENIPUNCTURE: CPT | Performed by: STUDENT IN AN ORGANIZED HEALTH CARE EDUCATION/TRAINING PROGRAM

## 2023-01-01 PROCEDURE — 85610 PROTHROMBIN TIME: CPT

## 2023-01-01 PROCEDURE — 82746 ASSAY OF FOLIC ACID SERUM: CPT | Performed by: NURSE PRACTITIONER

## 2023-01-01 PROCEDURE — 77300 RADIATION THERAPY DOSE PLAN: CPT | Mod: 26,,, | Performed by: RADIOLOGY

## 2023-01-01 PROCEDURE — 88189 PR  FLOWCYTOMETRY/READ, 16 & > MARKERS: ICD-10-PCS | Mod: ,,, | Performed by: PATHOLOGY

## 2023-01-01 PROCEDURE — 77263 PR  RADIATION THERAPY PLAN COMPLEX: ICD-10-PCS | Mod: ,,, | Performed by: RADIOLOGY

## 2023-01-01 PROCEDURE — 36410 VNPNXR 3YR/> PHY/QHP DX/THER: CPT

## 2023-01-01 PROCEDURE — 63600175 PHARM REV CODE 636 W HCPCS: Mod: TB,JG | Performed by: INTERNAL MEDICINE

## 2023-01-01 PROCEDURE — 83930 ASSAY OF BLOOD OSMOLALITY: CPT

## 2023-01-01 PROCEDURE — C1751 CATH, INF, PER/CENT/MIDLINE: HCPCS

## 2023-01-01 PROCEDURE — 37799 UNLISTED PX VASCULAR SURGERY: CPT

## 2023-01-01 PROCEDURE — 99222 PR INITIAL HOSPITAL CARE,LEVL II: ICD-10-PCS | Mod: ,,, | Performed by: COLON & RECTAL SURGERY

## 2023-01-01 PROCEDURE — 83605 ASSAY OF LACTIC ACID: CPT | Mod: 91 | Performed by: STUDENT IN AN ORGANIZED HEALTH CARE EDUCATION/TRAINING PROGRAM

## 2023-01-01 PROCEDURE — 80053 COMPREHEN METABOLIC PANEL: CPT | Performed by: INTERNAL MEDICINE

## 2023-01-01 PROCEDURE — 83935 ASSAY OF URINE OSMOLALITY: CPT | Performed by: STUDENT IN AN ORGANIZED HEALTH CARE EDUCATION/TRAINING PROGRAM

## 2023-01-01 PROCEDURE — 3008F BODY MASS INDEX DOCD: CPT | Mod: CPTII,,, | Performed by: INTERNAL MEDICINE

## 2023-01-01 PROCEDURE — 80162 ASSAY OF DIGOXIN TOTAL: CPT

## 2023-01-01 PROCEDURE — 85018 HEMOGLOBIN: CPT | Mod: 91 | Performed by: INTERNAL MEDICINE

## 2023-01-01 PROCEDURE — 85007 BL SMEAR W/DIFF WBC COUNT: CPT | Performed by: STUDENT IN AN ORGANIZED HEALTH CARE EDUCATION/TRAINING PROGRAM

## 2023-01-01 PROCEDURE — 83605 ASSAY OF LACTIC ACID: CPT | Performed by: STUDENT IN AN ORGANIZED HEALTH CARE EDUCATION/TRAINING PROGRAM

## 2023-01-01 PROCEDURE — 77014 HC CT GUIDANCE RADIATION THERAPY FLDS PLACEMENT: CPT | Mod: TC | Performed by: RADIOLOGY

## 2023-01-01 PROCEDURE — 88313 SPECIAL STAINS GROUP 2: CPT | Mod: 59 | Performed by: PATHOLOGY

## 2023-01-01 PROCEDURE — 88333 PR  INTRAOPERATIVE CYTO PATH CONSULT, INITIAL SITE: ICD-10-PCS | Mod: 26,,, | Performed by: PATHOLOGY

## 2023-01-01 PROCEDURE — 84300 ASSAY OF URINE SODIUM: CPT | Performed by: NURSE PRACTITIONER

## 2023-01-01 PROCEDURE — 99999 PR PBB SHADOW E&M-EST. PATIENT-LVL IV: CPT | Mod: PBBFAC,,, | Performed by: INTERNAL MEDICINE

## 2023-01-01 PROCEDURE — 3074F SYST BP LT 130 MM HG: CPT | Mod: CPTII,,, | Performed by: INTERNAL MEDICINE

## 2023-01-01 PROCEDURE — 31500 INSERT EMERGENCY AIRWAY: CPT | Mod: ,,, | Performed by: INTERNAL MEDICINE

## 2023-01-01 PROCEDURE — 84550 ASSAY OF BLOOD/URIC ACID: CPT | Performed by: EMERGENCY MEDICINE

## 2023-01-01 PROCEDURE — 85730 THROMBOPLASTIN TIME PARTIAL: CPT | Performed by: INTERNAL MEDICINE

## 2023-01-01 PROCEDURE — 87040 BLOOD CULTURE FOR BACTERIA: CPT | Mod: 59 | Performed by: STUDENT IN AN ORGANIZED HEALTH CARE EDUCATION/TRAINING PROGRAM

## 2023-01-01 PROCEDURE — 88305 TISSUE EXAM BY PATHOLOGIST: CPT | Performed by: PATHOLOGY

## 2023-01-01 PROCEDURE — 99238 HOSP IP/OBS DSCHRG MGMT 30/<: CPT | Mod: ,,, | Performed by: INTERNAL MEDICINE

## 2023-01-01 PROCEDURE — 36620 PR INSERT CATH,ART,PERCUT,SHORTTERM: ICD-10-PCS | Mod: 59,,, | Performed by: NURSE PRACTITIONER

## 2023-01-01 PROCEDURE — P9016 RBC LEUKOCYTES REDUCED: HCPCS | Performed by: INTERNAL MEDICINE

## 2023-01-01 PROCEDURE — 94002 VENT MGMT INPAT INIT DAY: CPT

## 2023-01-01 PROCEDURE — 94799 UNLISTED PULMONARY SVC/PX: CPT

## 2023-01-01 PROCEDURE — 96375 TX/PRO/DX INJ NEW DRUG ADDON: CPT

## 2023-01-01 PROCEDURE — 85384 FIBRINOGEN ACTIVITY: CPT | Performed by: INTERNAL MEDICINE

## 2023-01-01 PROCEDURE — 27200966 HC CLOSED SUCTION SYSTEM

## 2023-01-01 PROCEDURE — 83615 LACTATE (LD) (LDH) ENZYME: CPT | Performed by: NURSE PRACTITIONER

## 2023-01-01 PROCEDURE — P9037 PLATE PHERES LEUKOREDU IRRAD: HCPCS | Performed by: STUDENT IN AN ORGANIZED HEALTH CARE EDUCATION/TRAINING PROGRAM

## 2023-01-01 PROCEDURE — 88311 DECALCIFY TISSUE: CPT | Performed by: PATHOLOGY

## 2023-01-01 PROCEDURE — 87040 BLOOD CULTURE FOR BACTERIA: CPT | Performed by: EMERGENCY MEDICINE

## 2023-01-01 PROCEDURE — 81001 URINALYSIS AUTO W/SCOPE: CPT

## 2023-01-01 PROCEDURE — 83615 LACTATE (LD) (LDH) ENZYME: CPT | Performed by: INTERNAL MEDICINE

## 2023-01-01 PROCEDURE — 88237 TISSUE CULTURE BONE MARROW: CPT | Performed by: NURSE PRACTITIONER

## 2023-01-01 PROCEDURE — 3078F DIAST BP <80 MM HG: CPT | Mod: CPTII,,, | Performed by: INTERNAL MEDICINE

## 2023-01-01 PROCEDURE — 83605 ASSAY OF LACTIC ACID: CPT

## 2023-01-01 PROCEDURE — 99291 PR CRITICAL CARE, E/M 30-74 MINUTES: ICD-10-PCS | Mod: CR,CS,, | Performed by: EMERGENCY MEDICINE

## 2023-01-01 PROCEDURE — 87641 MR-STAPH DNA AMP PROBE: CPT | Performed by: INTERNAL MEDICINE

## 2023-01-01 PROCEDURE — 88307 TISSUE EXAM BY PATHOLOGIST: CPT | Performed by: PATHOLOGY

## 2023-01-01 PROCEDURE — 83605 ASSAY OF LACTIC ACID: CPT | Mod: 91

## 2023-01-01 PROCEDURE — 77263 THER RADIOLOGY TX PLNG CPLX: CPT | Mod: ,,, | Performed by: RADIOLOGY

## 2023-01-01 PROCEDURE — 84466 ASSAY OF TRANSFERRIN: CPT | Performed by: NURSE PRACTITIONER

## 2023-01-01 PROCEDURE — 83615 LACTATE (LD) (LDH) ENZYME: CPT

## 2023-01-01 PROCEDURE — 27000221 HC OXYGEN, UP TO 24 HOURS

## 2023-01-01 PROCEDURE — 87040 BLOOD CULTURE FOR BACTERIA: CPT | Performed by: STUDENT IN AN ORGANIZED HEALTH CARE EDUCATION/TRAINING PROGRAM

## 2023-01-01 PROCEDURE — 87502 INFLUENZA DNA AMP PROBE: CPT | Performed by: EMERGENCY MEDICINE

## 2023-01-01 PROCEDURE — 83880 ASSAY OF NATRIURETIC PEPTIDE: CPT

## 2023-01-01 PROCEDURE — 25500020 PHARM REV CODE 255: Performed by: EMERGENCY MEDICINE

## 2023-01-01 PROCEDURE — 99291 CRITICAL CARE FIRST HOUR: CPT | Mod: ,,,

## 2023-01-01 PROCEDURE — C9113 INJ PANTOPRAZOLE SODIUM, VIA: HCPCS | Performed by: INTERNAL MEDICINE

## 2023-01-01 PROCEDURE — 84550 ASSAY OF BLOOD/URIC ACID: CPT

## 2023-01-01 PROCEDURE — 84100 ASSAY OF PHOSPHORUS: CPT | Mod: 91 | Performed by: INTERNAL MEDICINE

## 2023-01-01 PROCEDURE — 38222 DX BONE MARROW BX & ASPIR: CPT | Mod: LT,,, | Performed by: NURSE PRACTITIONER

## 2023-01-01 PROCEDURE — 25000003 PHARM REV CODE 250: Performed by: EMERGENCY MEDICINE

## 2023-01-01 PROCEDURE — 83735 ASSAY OF MAGNESIUM: CPT | Mod: 91 | Performed by: INTERNAL MEDICINE

## 2023-01-01 PROCEDURE — 63600175 PHARM REV CODE 636 W HCPCS: Mod: TB,JG

## 2023-01-01 PROCEDURE — 80053 COMPREHEN METABOLIC PANEL: CPT | Mod: 91 | Performed by: EMERGENCY MEDICINE

## 2023-01-01 PROCEDURE — 83735 ASSAY OF MAGNESIUM: CPT | Performed by: EMERGENCY MEDICINE

## 2023-01-01 PROCEDURE — 85014 HEMATOCRIT: CPT

## 2023-01-01 PROCEDURE — 99499 UNLISTED E&M SERVICE: CPT | Mod: ,,, | Performed by: EMERGENCY MEDICINE

## 2023-01-01 PROCEDURE — 87798 DETECT AGENT NOS DNA AMP: CPT | Mod: 59 | Performed by: NURSE PRACTITIONER

## 2023-01-01 PROCEDURE — 1159F MED LIST DOCD IN RCRD: CPT | Mod: CPTII,,, | Performed by: INTERNAL MEDICINE

## 2023-01-01 PROCEDURE — 77295 3-D RADIOTHERAPY PLAN: CPT | Mod: 26,,, | Performed by: RADIOLOGY

## 2023-01-01 PROCEDURE — 88311 DECALCIFY TISSUE: CPT | Mod: 26,,, | Performed by: PATHOLOGY

## 2023-01-01 PROCEDURE — 99223 PR INITIAL HOSPITAL CARE,LEVL III: ICD-10-PCS | Mod: ,,,

## 2023-01-01 PROCEDURE — 83935 ASSAY OF URINE OSMOLALITY: CPT

## 2023-01-01 PROCEDURE — 83615 LACTATE (LD) (LDH) ENZYME: CPT | Mod: 91

## 2023-01-01 PROCEDURE — 96415 CHEMO IV INFUSION ADDL HR: CPT

## 2023-01-01 PROCEDURE — 84550 ASSAY OF BLOOD/URIC ACID: CPT | Performed by: NURSE PRACTITIONER

## 2023-01-01 PROCEDURE — 88307 PR  SURG PATH,LEVEL V: ICD-10-PCS | Mod: 26,,, | Performed by: PATHOLOGY

## 2023-01-01 PROCEDURE — 36620 INSERTION CATHETER ARTERY: CPT | Mod: 59,,, | Performed by: NURSE PRACTITIONER

## 2023-01-01 PROCEDURE — P9037 PLATE PHERES LEUKOREDU IRRAD: HCPCS | Performed by: CLINICAL NURSE SPECIALIST

## 2023-01-01 PROCEDURE — 36415 COLL VENOUS BLD VENIPUNCTURE: CPT | Performed by: EMERGENCY MEDICINE

## 2023-01-01 PROCEDURE — 99291 CRITICAL CARE FIRST HOUR: CPT | Mod: FS,25,, | Performed by: NURSE PRACTITIONER

## 2023-01-01 PROCEDURE — 99291 PR CRITICAL CARE, E/M 30-74 MINUTES: ICD-10-PCS | Mod: FS,25,, | Performed by: NURSE PRACTITIONER

## 2023-01-01 PROCEDURE — 96411 CHEMO IV PUSH ADDL DRUG: CPT

## 2023-01-01 PROCEDURE — 84300 ASSAY OF URINE SODIUM: CPT

## 2023-01-01 PROCEDURE — 84295 ASSAY OF SERUM SODIUM: CPT

## 2023-01-01 PROCEDURE — 38222 PR BONE MARROW BIOPSY(IES) W/ASPIRATION(S); DIAGNOSTIC: ICD-10-PCS | Mod: LT,,, | Performed by: NURSE PRACTITIONER

## 2023-01-01 PROCEDURE — 1159F PR MEDICATION LIST DOCUMENTED IN MEDICAL RECORD: ICD-10-PCS | Mod: CPTII,,, | Performed by: INTERNAL MEDICINE

## 2023-01-01 PROCEDURE — 99233 PR SUBSEQUENT HOSPITAL CARE,LEVL III: ICD-10-PCS | Mod: ,,, | Performed by: INTERNAL MEDICINE

## 2023-01-01 PROCEDURE — 87040 BLOOD CULTURE FOR BACTERIA: CPT | Mod: 59 | Performed by: EMERGENCY MEDICINE

## 2023-01-01 PROCEDURE — 88333 PATH CONSLTJ SURG CYTO XM 1: CPT | Mod: 26,,, | Performed by: PATHOLOGY

## 2023-01-01 PROCEDURE — 82803 BLOOD GASES ANY COMBINATION: CPT

## 2023-01-01 PROCEDURE — 83605 ASSAY OF LACTIC ACID: CPT | Performed by: EMERGENCY MEDICINE

## 2023-01-01 PROCEDURE — S0030 INJECTION, METRONIDAZOLE: HCPCS | Performed by: STUDENT IN AN ORGANIZED HEALTH CARE EDUCATION/TRAINING PROGRAM

## 2023-01-01 PROCEDURE — 85014 HEMATOCRIT: CPT | Performed by: INTERNAL MEDICINE

## 2023-01-01 PROCEDURE — 87086 URINE CULTURE/COLONY COUNT: CPT | Performed by: NURSE PRACTITIONER

## 2023-01-01 PROCEDURE — 99238 PR HOSPITAL DISCHARGE DAY,<30 MIN: ICD-10-PCS | Mod: ,,, | Performed by: INTERNAL MEDICINE

## 2023-01-01 PROCEDURE — 80053 COMPREHEN METABOLIC PANEL: CPT | Performed by: NURSE PRACTITIONER

## 2023-01-01 PROCEDURE — 84100 ASSAY OF PHOSPHORUS: CPT | Performed by: EMERGENCY MEDICINE

## 2023-01-01 PROCEDURE — 86850 RBC ANTIBODY SCREEN: CPT | Performed by: STUDENT IN AN ORGANIZED HEALTH CARE EDUCATION/TRAINING PROGRAM

## 2023-01-01 PROCEDURE — 99499 NO LOS: ICD-10-PCS | Mod: ,,, | Performed by: EMERGENCY MEDICINE

## 2023-01-01 PROCEDURE — 84100 ASSAY OF PHOSPHORUS: CPT | Performed by: INTERNAL MEDICINE

## 2023-01-01 PROCEDURE — 99292 CRITICAL CARE ADDL 30 MIN: CPT | Mod: FS,25,, | Performed by: NURSE PRACTITIONER

## 2023-01-01 PROCEDURE — 93010 ELECTROCARDIOGRAM REPORT: CPT | Mod: ,,, | Performed by: SPECIALIST

## 2023-01-01 PROCEDURE — 99291 CRITICAL CARE FIRST HOUR: CPT | Mod: CR,CS,, | Performed by: EMERGENCY MEDICINE

## 2023-01-01 PROCEDURE — 77290 THER RAD SIMULAJ FIELD CPLX: CPT | Mod: TC | Performed by: RADIOLOGY

## 2023-01-01 PROCEDURE — 93010 EKG 12-LEAD: ICD-10-PCS | Mod: ,,, | Performed by: SPECIALIST

## 2023-01-01 PROCEDURE — 85610 PROTHROMBIN TIME: CPT | Performed by: NURSE PRACTITIONER

## 2023-01-01 PROCEDURE — 99222 1ST HOSP IP/OBS MODERATE 55: CPT | Mod: ,,, | Performed by: INTERNAL MEDICINE

## 2023-01-01 PROCEDURE — 77334 RADIATION TREATMENT AID(S): CPT | Mod: 26,,, | Performed by: RADIOLOGY

## 2023-01-01 PROCEDURE — G0378 HOSPITAL OBSERVATION PER HR: HCPCS

## 2023-01-01 PROCEDURE — 88341 IMHCHEM/IMCYTCHM EA ADD ANTB: CPT | Mod: 26,59,, | Performed by: PATHOLOGY

## 2023-01-01 PROCEDURE — 96376 TX/PRO/DX INJ SAME DRUG ADON: CPT

## 2023-01-01 PROCEDURE — 96413 CHEMO IV INFUSION 1 HR: CPT

## 2023-01-01 PROCEDURE — 85652 RBC SED RATE AUTOMATED: CPT

## 2023-01-01 PROCEDURE — 80048 BASIC METABOLIC PNL TOTAL CA: CPT | Mod: XB

## 2023-01-01 PROCEDURE — C9113 INJ PANTOPRAZOLE SODIUM, VIA: HCPCS | Performed by: STUDENT IN AN ORGANIZED HEALTH CARE EDUCATION/TRAINING PROGRAM

## 2023-01-01 PROCEDURE — 88185 FLOWCYTOMETRY/TC ADD-ON: CPT | Performed by: PATHOLOGY

## 2023-01-01 PROCEDURE — 84300 ASSAY OF URINE SODIUM: CPT | Performed by: STUDENT IN AN ORGANIZED HEALTH CARE EDUCATION/TRAINING PROGRAM

## 2023-01-01 PROCEDURE — 77334 PR  RADN TREATMENT AID(S) COMPLX: ICD-10-PCS | Mod: 26,,, | Performed by: RADIOLOGY

## 2023-01-01 PROCEDURE — 82728 ASSAY OF FERRITIN: CPT | Performed by: NURSE PRACTITIONER

## 2023-01-01 PROCEDURE — 84443 ASSAY THYROID STIM HORMONE: CPT | Performed by: EMERGENCY MEDICINE

## 2023-01-01 PROCEDURE — 88313 PR  SPECIAL STAINS,GROUP II: ICD-10-PCS | Mod: 26,,, | Performed by: PATHOLOGY

## 2023-01-01 PROCEDURE — 96372 THER/PROPH/DIAG INJ SC/IM: CPT | Performed by: NURSE PRACTITIONER

## 2023-01-01 PROCEDURE — 85730 THROMBOPLASTIN TIME PARTIAL: CPT

## 2023-01-01 PROCEDURE — 82607 VITAMIN B-12: CPT | Performed by: NURSE PRACTITIONER

## 2023-01-01 PROCEDURE — 1111F PR DISCHARGE MEDS RECONCILED W/ CURRENT OUTPATIENT MED LIST: ICD-10-PCS | Mod: CPTII,,, | Performed by: INTERNAL MEDICINE

## 2023-01-01 PROCEDURE — 88341 PR IHC OR ICC EACH ADD'L SINGLE ANTIBODY  STAINPR: ICD-10-PCS | Mod: 26,59,, | Performed by: PATHOLOGY

## 2023-01-01 PROCEDURE — 20000000 HC ICU ROOM

## 2023-01-01 PROCEDURE — 27000207 HC ISOLATION

## 2023-01-01 PROCEDURE — 96374 THER/PROPH/DIAG INJ IV PUSH: CPT | Mod: 59

## 2023-01-01 PROCEDURE — 99900031 HC PATIENT EDUCATION (STAT)

## 2023-01-01 PROCEDURE — 88342 IMHCHEM/IMCYTCHM 1ST ANTB: CPT | Mod: 26,59,, | Performed by: PATHOLOGY

## 2023-01-01 PROCEDURE — 93010 ELECTROCARDIOGRAM REPORT: CPT | Mod: 77,,, | Performed by: INTERNAL MEDICINE

## 2023-01-01 PROCEDURE — 88185 FLOWCYTOMETRY/TC ADD-ON: CPT | Mod: 59 | Performed by: PATHOLOGY

## 2023-01-01 PROCEDURE — 88342 IMHCHEM/IMCYTCHM 1ST ANTB: CPT | Performed by: PATHOLOGY

## 2023-01-01 PROCEDURE — 25500020 PHARM REV CODE 255

## 2023-01-01 PROCEDURE — 80076 HEPATIC FUNCTION PANEL: CPT | Performed by: NURSE PRACTITIONER

## 2023-01-01 PROCEDURE — P9016 RBC LEUKOCYTES REDUCED: HCPCS | Performed by: STUDENT IN AN ORGANIZED HEALTH CARE EDUCATION/TRAINING PROGRAM

## 2023-01-01 PROCEDURE — 77300 PR RADIATION THERAPY,DOSIMETRY PLAN: ICD-10-PCS | Mod: 26,,, | Performed by: RADIOLOGY

## 2023-01-01 PROCEDURE — 31500 PR INSERT, EMERGENCY ENDOTRACH AIRWAY: ICD-10-PCS | Mod: ,,, | Performed by: INTERNAL MEDICINE

## 2023-01-01 PROCEDURE — 93005 ELECTROCARDIOGRAM TRACING: CPT | Performed by: INTERNAL MEDICINE

## 2023-01-01 PROCEDURE — 1111F DSCHRG MED/CURRENT MED MERGE: CPT | Mod: CPTII,,, | Performed by: INTERNAL MEDICINE

## 2023-01-01 PROCEDURE — 80053 COMPREHEN METABOLIC PANEL: CPT | Performed by: STUDENT IN AN ORGANIZED HEALTH CARE EDUCATION/TRAINING PROGRAM

## 2023-01-01 PROCEDURE — 86140 C-REACTIVE PROTEIN: CPT | Performed by: EMERGENCY MEDICINE

## 2023-01-01 PROCEDURE — 83735 ASSAY OF MAGNESIUM: CPT | Performed by: STUDENT IN AN ORGANIZED HEALTH CARE EDUCATION/TRAINING PROGRAM

## 2023-01-01 PROCEDURE — 83690 ASSAY OF LIPASE: CPT | Performed by: EMERGENCY MEDICINE

## 2023-01-01 PROCEDURE — 82248 BILIRUBIN DIRECT: CPT

## 2023-01-01 PROCEDURE — 99233 SBSQ HOSP IP/OBS HIGH 50: CPT | Mod: FS,,, | Performed by: INTERNAL MEDICINE

## 2023-01-01 PROCEDURE — 25000003 PHARM REV CODE 250: Performed by: CLINICAL NURSE SPECIALIST

## 2023-01-01 PROCEDURE — 99238 PR HOSPITAL DISCHARGE DAY,<30 MIN: ICD-10-PCS | Mod: ,,, | Performed by: NURSE PRACTITIONER

## 2023-01-01 PROCEDURE — 88305 TISSUE EXAM BY PATHOLOGIST: CPT | Mod: 26,,, | Performed by: PATHOLOGY

## 2023-01-01 PROCEDURE — 77290 THER RAD SIMULAJ FIELD CPLX: CPT | Mod: 26,,, | Performed by: RADIOLOGY

## 2023-01-01 PROCEDURE — 27100108

## 2023-01-01 PROCEDURE — 77290 PR  SET RADN THERAPY FIELD COMPLEX: ICD-10-PCS | Mod: 26,,, | Performed by: RADIOLOGY

## 2023-01-01 PROCEDURE — 77300 RADIATION THERAPY DOSE PLAN: CPT | Mod: TC | Performed by: RADIOLOGY

## 2023-01-01 PROCEDURE — 82010 KETONE BODYS QUAN: CPT | Performed by: EMERGENCY MEDICINE

## 2023-01-01 PROCEDURE — 96367 TX/PROPH/DG ADDL SEQ IV INF: CPT

## 2023-01-01 PROCEDURE — 99233 PR SUBSEQUENT HOSPITAL CARE,LEVL III: ICD-10-PCS | Mod: FS,,, | Performed by: INTERNAL MEDICINE

## 2023-01-01 PROCEDURE — 87077 CULTURE AEROBIC IDENTIFY: CPT | Mod: 59 | Performed by: NURSE PRACTITIONER

## 2023-01-01 PROCEDURE — 80053 COMPREHEN METABOLIC PANEL: CPT | Mod: 91

## 2023-01-01 PROCEDURE — 84550 ASSAY OF BLOOD/URIC ACID: CPT | Mod: 91 | Performed by: INTERNAL MEDICINE

## 2023-01-01 PROCEDURE — 83735 ASSAY OF MAGNESIUM: CPT | Performed by: INTERNAL MEDICINE

## 2023-01-01 PROCEDURE — P9073 PLATELETS PHERESIS PATH REDU: HCPCS | Performed by: INTERNAL MEDICINE

## 2023-01-01 PROCEDURE — 43752 NASAL/OROGASTRIC W/TUBE PLMT: CPT

## 2023-01-01 PROCEDURE — 99238 HOSP IP/OBS DSCHRG MGMT 30/<: CPT | Mod: ,,, | Performed by: NURSE PRACTITIONER

## 2023-01-01 PROCEDURE — 83880 ASSAY OF NATRIURETIC PEPTIDE: CPT | Performed by: EMERGENCY MEDICINE

## 2023-01-01 PROCEDURE — 85025 COMPLETE CBC W/AUTO DIFF WBC: CPT | Performed by: EMERGENCY MEDICINE

## 2023-01-01 PROCEDURE — 84484 ASSAY OF TROPONIN QUANT: CPT | Performed by: EMERGENCY MEDICINE

## 2023-01-01 PROCEDURE — 83935 ASSAY OF URINE OSMOLALITY: CPT | Performed by: NURSE PRACTITIONER

## 2023-01-01 PROCEDURE — 85610 PROTHROMBIN TIME: CPT | Performed by: INTERNAL MEDICINE

## 2023-01-01 PROCEDURE — 83930 ASSAY OF BLOOD OSMOLALITY: CPT | Performed by: NURSE PRACTITIONER

## 2023-01-01 PROCEDURE — 25500020 PHARM REV CODE 255: Performed by: INTERNAL MEDICINE

## 2023-01-01 PROCEDURE — 81001 URINALYSIS AUTO W/SCOPE: CPT | Performed by: NURSE PRACTITIONER

## 2023-01-01 PROCEDURE — 93005 ELECTROCARDIOGRAM TRACING: CPT | Performed by: SPECIALIST

## 2023-01-01 PROCEDURE — 82330 ASSAY OF CALCIUM: CPT

## 2023-01-01 PROCEDURE — 81003 URINALYSIS AUTO W/O SCOPE: CPT | Performed by: EMERGENCY MEDICINE

## 2023-01-01 PROCEDURE — 88307 TISSUE EXAM BY PATHOLOGIST: CPT | Mod: 26,,, | Performed by: PATHOLOGY

## 2023-01-01 PROCEDURE — 99291 PR CRITICAL CARE, E/M 30-74 MINUTES: ICD-10-PCS | Mod: ,,,

## 2023-01-01 PROCEDURE — 81001 URINALYSIS AUTO W/SCOPE: CPT | Performed by: EMERGENCY MEDICINE

## 2023-01-01 PROCEDURE — 85007 BL SMEAR W/DIFF WBC COUNT: CPT | Performed by: EMERGENCY MEDICINE

## 2023-01-01 PROCEDURE — 84484 ASSAY OF TROPONIN QUANT: CPT

## 2023-01-01 PROCEDURE — A9552 F18 FDG: HCPCS | Mod: PO

## 2023-01-01 PROCEDURE — 85097 PR  BONE MARROW,SMEAR INTERPRETATION: ICD-10-PCS | Mod: ,,, | Performed by: PATHOLOGY

## 2023-01-01 PROCEDURE — 83036 HEMOGLOBIN GLYCOSYLATED A1C: CPT | Performed by: NURSE PRACTITIONER

## 2023-01-01 PROCEDURE — 94003 VENT MGMT INPAT SUBQ DAY: CPT

## 2023-01-01 PROCEDURE — 99222 PR INITIAL HOSPITAL CARE,LEVL II: ICD-10-PCS | Mod: ,,, | Performed by: INTERNAL MEDICINE

## 2023-01-01 PROCEDURE — 82550 ASSAY OF CK (CPK): CPT

## 2023-01-01 PROCEDURE — 83880 ASSAY OF NATRIURETIC PEPTIDE: CPT | Performed by: STUDENT IN AN ORGANIZED HEALTH CARE EDUCATION/TRAINING PROGRAM

## 2023-01-01 PROCEDURE — 77295 PR 3D RADIOTHERAPY PLAN: ICD-10-PCS | Mod: 26,,, | Performed by: RADIOLOGY

## 2023-01-01 PROCEDURE — 84145 PROCALCITONIN (PCT): CPT | Performed by: INTERNAL MEDICINE

## 2023-01-01 PROCEDURE — S0030 INJECTION, METRONIDAZOLE: HCPCS | Performed by: INTERNAL MEDICINE

## 2023-01-01 PROCEDURE — 84484 ASSAY OF TROPONIN QUANT: CPT | Mod: 91 | Performed by: NURSE PRACTITIONER

## 2023-01-01 PROCEDURE — 88333 PATH CONSLTJ SURG CYTO XM 1: CPT | Performed by: PATHOLOGY

## 2023-01-01 PROCEDURE — 87040 BLOOD CULTURE FOR BACTERIA: CPT | Mod: 59

## 2023-01-01 PROCEDURE — 83036 HEMOGLOBIN GLYCOSYLATED A1C: CPT | Performed by: EMERGENCY MEDICINE

## 2023-01-01 PROCEDURE — 93010 EKG 12-LEAD: ICD-10-PCS | Mod: 77,,, | Performed by: INTERNAL MEDICINE

## 2023-01-01 PROCEDURE — 80048 BASIC METABOLIC PNL TOTAL CA: CPT | Mod: 91 | Performed by: NURSE PRACTITIONER

## 2023-01-01 RX ORDER — OXYCODONE HYDROCHLORIDE 10 MG/1
10 TABLET ORAL EVERY 6 HOURS PRN
Status: DISCONTINUED | OUTPATIENT
Start: 2023-01-01 | End: 2023-01-01 | Stop reason: HOSPADM

## 2023-01-01 RX ORDER — NOREPINEPHRINE BITARTRATE/D5W 4MG/250ML
0-3 PLASTIC BAG, INJECTION (ML) INTRAVENOUS CONTINUOUS
Status: DISCONTINUED | OUTPATIENT
Start: 2023-01-01 | End: 2023-01-01 | Stop reason: HOSPADM

## 2023-01-01 RX ORDER — CEFEPIME HYDROCHLORIDE 1 G/50ML
2 INJECTION, SOLUTION INTRAVENOUS
Status: DISCONTINUED | OUTPATIENT
Start: 2023-01-01 | End: 2023-01-01

## 2023-01-01 RX ORDER — SODIUM,POTASSIUM PHOSPHATES 280-250MG
2 POWDER IN PACKET (EA) ORAL
Status: DISCONTINUED | OUTPATIENT
Start: 2023-01-01 | End: 2023-01-01 | Stop reason: HOSPADM

## 2023-01-01 RX ORDER — ONDANSETRON HCL IN 0.9 % NACL 8 MG/50 ML
8 INTRAVENOUS SOLUTION, PIGGYBACK (ML) INTRAVENOUS
Status: COMPLETED | OUTPATIENT
Start: 2023-01-01 | End: 2023-01-01

## 2023-01-01 RX ORDER — TAMSULOSIN HYDROCHLORIDE 0.4 MG/1
0.4 CAPSULE ORAL DAILY
Status: DISCONTINUED | OUTPATIENT
Start: 2023-01-01 | End: 2023-01-01 | Stop reason: HOSPADM

## 2023-01-01 RX ORDER — SODIUM CHLORIDE 0.9 % (FLUSH) 0.9 %
10 SYRINGE (ML) INJECTION
Status: DISCONTINUED | OUTPATIENT
Start: 2023-01-01 | End: 2023-01-01 | Stop reason: HOSPADM

## 2023-01-01 RX ORDER — BISACODYL 10 MG
10 SUPPOSITORY, RECTAL RECTAL ONCE
Status: DISCONTINUED | OUTPATIENT
Start: 2023-01-01 | End: 2023-01-01

## 2023-01-01 RX ORDER — TALC
6 POWDER (GRAM) TOPICAL NIGHTLY PRN
Status: DISCONTINUED | OUTPATIENT
Start: 2023-01-01 | End: 2023-01-01 | Stop reason: HOSPADM

## 2023-01-01 RX ORDER — PROPOFOL 10 MG/ML
VIAL (ML) INTRAVENOUS
Status: COMPLETED
Start: 2023-01-01 | End: 2023-01-01

## 2023-01-01 RX ORDER — SULFAMETHOXAZOLE AND TRIMETHOPRIM 800; 160 MG/1; MG/1
1 TABLET ORAL
Status: DISCONTINUED | OUTPATIENT
Start: 2023-01-01 | End: 2023-01-01 | Stop reason: HOSPADM

## 2023-01-01 RX ORDER — ACETAMINOPHEN 325 MG/1
650 TABLET ORAL
Status: CANCELLED | OUTPATIENT
Start: 2023-01-01

## 2023-01-01 RX ORDER — FENTANYL CITRATE 50 UG/ML
INJECTION, SOLUTION INTRAMUSCULAR; INTRAVENOUS
Status: COMPLETED | OUTPATIENT
Start: 2023-01-01 | End: 2023-01-01

## 2023-01-01 RX ORDER — FUROSEMIDE 10 MG/ML
40 INJECTION INTRAMUSCULAR; INTRAVENOUS ONCE
Status: COMPLETED | OUTPATIENT
Start: 2023-01-01 | End: 2023-01-01

## 2023-01-01 RX ORDER — ASCORBIC ACID 500 MG
500 TABLET ORAL 2 TIMES DAILY
Status: DISCONTINUED | OUTPATIENT
Start: 2023-01-01 | End: 2023-01-01 | Stop reason: HOSPADM

## 2023-01-01 RX ORDER — PROMETHAZINE HYDROCHLORIDE 25 MG/1
25 TABLET ORAL EVERY 6 HOURS PRN
Status: DISCONTINUED | OUTPATIENT
Start: 2023-01-01 | End: 2023-01-01 | Stop reason: HOSPADM

## 2023-01-01 RX ORDER — METRONIDAZOLE 500 MG/1
500 TABLET ORAL EVERY 8 HOURS
Status: DISCONTINUED | OUTPATIENT
Start: 2023-01-01 | End: 2023-01-01

## 2023-01-01 RX ORDER — INSULIN ASPART 100 [IU]/ML
0-5 INJECTION, SOLUTION INTRAVENOUS; SUBCUTANEOUS
Status: DISCONTINUED | OUTPATIENT
Start: 2023-01-01 | End: 2023-01-01 | Stop reason: HOSPADM

## 2023-01-01 RX ORDER — ACETAMINOPHEN 325 MG/1
650 TABLET ORAL EVERY 6 HOURS PRN
Status: DISCONTINUED | OUTPATIENT
Start: 2023-01-01 | End: 2023-01-01 | Stop reason: HOSPADM

## 2023-01-01 RX ORDER — HYDROMORPHONE HYDROCHLORIDE 2 MG/ML
1 INJECTION, SOLUTION INTRAMUSCULAR; INTRAVENOUS; SUBCUTANEOUS
Status: COMPLETED | OUTPATIENT
Start: 2023-01-01 | End: 2023-01-01

## 2023-01-01 RX ORDER — HYDROMORPHONE HYDROCHLORIDE 1 MG/ML
1 INJECTION, SOLUTION INTRAMUSCULAR; INTRAVENOUS; SUBCUTANEOUS ONCE
Status: COMPLETED | OUTPATIENT
Start: 2023-01-01 | End: 2023-01-01

## 2023-01-01 RX ORDER — FAMOTIDINE 10 MG/ML
20 INJECTION INTRAVENOUS
Status: CANCELLED | OUTPATIENT
Start: 2023-01-01

## 2023-01-01 RX ORDER — ACYCLOVIR 400 MG/1
400 TABLET ORAL 2 TIMES DAILY
Status: DISCONTINUED | OUTPATIENT
Start: 2023-01-01 | End: 2023-01-01 | Stop reason: HOSPADM

## 2023-01-01 RX ORDER — HYDROCODONE BITARTRATE AND ACETAMINOPHEN 5; 325 MG/1; MG/1
1 TABLET ORAL EVERY 6 HOURS PRN
Status: DISCONTINUED | OUTPATIENT
Start: 2023-01-01 | End: 2023-01-01

## 2023-01-01 RX ORDER — MUPIROCIN 20 MG/G
OINTMENT TOPICAL 2 TIMES DAILY
Status: DISCONTINUED | OUTPATIENT
Start: 2023-01-01 | End: 2023-01-01 | Stop reason: HOSPADM

## 2023-01-01 RX ORDER — HEPARIN 100 UNIT/ML
300 SYRINGE INTRAVENOUS
Status: DISCONTINUED | OUTPATIENT
Start: 2023-01-01 | End: 2023-01-01 | Stop reason: HOSPADM

## 2023-01-01 RX ORDER — VASOPRESSIN 20 [USP'U]/ML
INJECTION, SOLUTION INTRAMUSCULAR; SUBCUTANEOUS
Status: COMPLETED
Start: 2023-01-01 | End: 2023-01-01

## 2023-01-01 RX ORDER — PROCHLORPERAZINE EDISYLATE 5 MG/ML
5 INJECTION INTRAMUSCULAR; INTRAVENOUS EVERY 6 HOURS PRN
Status: DISCONTINUED | OUTPATIENT
Start: 2023-01-01 | End: 2023-01-01 | Stop reason: HOSPADM

## 2023-01-01 RX ORDER — LISINOPRIL 2.5 MG/1
2.5 TABLET ORAL DAILY
Status: ON HOLD | COMMUNITY
Start: 2023-01-01 | End: 2023-01-01 | Stop reason: HOSPADM

## 2023-01-01 RX ORDER — METOPROLOL TARTRATE 25 MG/1
12 TABLET ORAL EVERY 6 HOURS
Status: DISCONTINUED | OUTPATIENT
Start: 2023-01-01 | End: 2023-01-01

## 2023-01-01 RX ORDER — SODIUM CHLORIDE 0.9 % (FLUSH) 0.9 %
10 SYRINGE (ML) INJECTION EVERY 12 HOURS PRN
Status: DISCONTINUED | OUTPATIENT
Start: 2023-01-01 | End: 2023-01-01 | Stop reason: HOSPADM

## 2023-01-01 RX ORDER — PHENYLEPHRINE HCL IN 0.9% NACL 1 MG/10 ML
SYRINGE (ML) INTRAVENOUS
Status: COMPLETED
Start: 2023-01-01 | End: 2023-01-01

## 2023-01-01 RX ORDER — HYDROCODONE BITARTRATE AND ACETAMINOPHEN 5; 325 MG/1; MG/1
TABLET ORAL
Qty: 90 TABLET | Refills: 0 | Status: ON HOLD | OUTPATIENT
Start: 2023-01-01 | End: 2023-01-01

## 2023-01-01 RX ORDER — HEPARIN 100 UNIT/ML
300 SYRINGE INTRAVENOUS
Status: CANCELLED | OUTPATIENT
Start: 2023-01-01

## 2023-01-01 RX ORDER — ACYCLOVIR 200 MG/1
400 CAPSULE ORAL 2 TIMES DAILY
Status: DISCONTINUED | OUTPATIENT
Start: 2023-01-01 | End: 2023-01-01 | Stop reason: HOSPADM

## 2023-01-01 RX ORDER — HYDROMORPHONE HYDROCHLORIDE 1 MG/ML
1 INJECTION, SOLUTION INTRAMUSCULAR; INTRAVENOUS; SUBCUTANEOUS
Status: COMPLETED | OUTPATIENT
Start: 2023-01-01 | End: 2023-01-01

## 2023-01-01 RX ORDER — HYDROCODONE BITARTRATE AND ACETAMINOPHEN 500; 5 MG/1; MG/1
TABLET ORAL
Status: DISCONTINUED | OUTPATIENT
Start: 2023-01-01 | End: 2023-01-01 | Stop reason: HOSPADM

## 2023-01-01 RX ORDER — ATORVASTATIN CALCIUM 10 MG/1
10 TABLET, FILM COATED ORAL DAILY
Status: ON HOLD | COMMUNITY
Start: 2023-01-01 | End: 2023-01-01 | Stop reason: ALTCHOICE

## 2023-01-01 RX ORDER — HYDROMORPHONE HYDROCHLORIDE 1 MG/ML
1 INJECTION, SOLUTION INTRAMUSCULAR; INTRAVENOUS; SUBCUTANEOUS EVERY 4 HOURS PRN
Status: DISCONTINUED | OUTPATIENT
Start: 2023-01-01 | End: 2023-01-01 | Stop reason: HOSPADM

## 2023-01-01 RX ORDER — NALOXONE HCL 0.4 MG/ML
0.02 VIAL (ML) INJECTION
Status: DISCONTINUED | OUTPATIENT
Start: 2023-01-01 | End: 2023-01-01 | Stop reason: HOSPADM

## 2023-01-01 RX ORDER — SODIUM CHLORIDE 0.9 % (FLUSH) 0.9 %
10 SYRINGE (ML) INJECTION
Status: CANCELLED | OUTPATIENT
Start: 2023-01-01

## 2023-01-01 RX ORDER — HEPARIN 100 UNIT/ML
500 SYRINGE INTRAVENOUS
Status: COMPLETED | OUTPATIENT
Start: 2023-01-01 | End: 2023-01-01

## 2023-01-01 RX ORDER — INDOMETHACIN 25 MG/1
CAPSULE ORAL
Status: COMPLETED
Start: 2023-01-01 | End: 2023-01-01

## 2023-01-01 RX ORDER — NOREPINEPHRINE BITARTRATE/D5W 4MG/250ML
0-3 PLASTIC BAG, INJECTION (ML) INTRAVENOUS CONTINUOUS
Status: DISCONTINUED | OUTPATIENT
Start: 2023-01-01 | End: 2023-01-01

## 2023-01-01 RX ORDER — ONDANSETRON 4 MG/1
8 TABLET, ORALLY DISINTEGRATING ORAL EVERY 8 HOURS PRN
Status: CANCELLED | OUTPATIENT
Start: 2023-01-01

## 2023-01-01 RX ORDER — POLYETHYLENE GLYCOL 3350 17 G/17G
17 POWDER, FOR SOLUTION ORAL DAILY
Status: DISCONTINUED | OUTPATIENT
Start: 2023-01-01 | End: 2023-01-01 | Stop reason: HOSPADM

## 2023-01-01 RX ORDER — POLYETHYLENE GLYCOL 3350 17 G/17G
17 POWDER, FOR SOLUTION ORAL 2 TIMES DAILY PRN
Qty: 14 PACKET | Refills: 1 | Status: ON HOLD | OUTPATIENT
Start: 2023-01-01 | End: 2023-01-01

## 2023-01-01 RX ORDER — MAGNESIUM SULFATE HEPTAHYDRATE 40 MG/ML
2 INJECTION, SOLUTION INTRAVENOUS ONCE
Status: COMPLETED | OUTPATIENT
Start: 2023-01-01 | End: 2023-01-01

## 2023-01-01 RX ORDER — METOPROLOL TARTRATE 50 MG/1
50 TABLET ORAL EVERY 6 HOURS
Status: DISCONTINUED | OUTPATIENT
Start: 2023-01-01 | End: 2023-01-01

## 2023-01-01 RX ORDER — HYDROMORPHONE HYDROCHLORIDE 1 MG/ML
1 INJECTION, SOLUTION INTRAMUSCULAR; INTRAVENOUS; SUBCUTANEOUS EVERY 6 HOURS PRN
Status: DISCONTINUED | OUTPATIENT
Start: 2023-01-01 | End: 2023-01-01

## 2023-01-01 RX ORDER — GLUCAGON 1 MG
1 KIT INJECTION
Status: DISCONTINUED | OUTPATIENT
Start: 2023-01-01 | End: 2023-01-01 | Stop reason: HOSPADM

## 2023-01-01 RX ORDER — AMIODARONE HYDROCHLORIDE 200 MG/1
200 TABLET ORAL 2 TIMES DAILY
Status: DISCONTINUED | OUTPATIENT
Start: 2023-01-01 | End: 2023-01-01 | Stop reason: HOSPADM

## 2023-01-01 RX ORDER — ACYCLOVIR 400 MG/1
400 TABLET ORAL 2 TIMES DAILY
Qty: 60 TABLET | Refills: 11 | Status: ON HOLD | OUTPATIENT
Start: 2023-01-01 | End: 2023-01-01

## 2023-01-01 RX ORDER — HYDROCODONE BITARTRATE AND ACETAMINOPHEN 500; 5 MG/1; MG/1
TABLET ORAL
Status: DISCONTINUED | OUTPATIENT
Start: 2023-01-01 | End: 2023-01-01

## 2023-01-01 RX ORDER — POLYETHYLENE GLYCOL 3350 17 G/17G
17 POWDER, FOR SOLUTION ORAL 2 TIMES DAILY PRN
Status: DISCONTINUED | OUTPATIENT
Start: 2023-01-01 | End: 2023-01-01 | Stop reason: HOSPADM

## 2023-01-01 RX ORDER — ONDANSETRON 2 MG/ML
8 INJECTION INTRAMUSCULAR; INTRAVENOUS
Status: COMPLETED | OUTPATIENT
Start: 2023-01-01 | End: 2023-01-01

## 2023-01-01 RX ORDER — VANCOMYCIN HCL IN 5 % DEXTROSE 1G/250ML
1000 PLASTIC BAG, INJECTION (ML) INTRAVENOUS
Status: DISCONTINUED | OUTPATIENT
Start: 2023-01-01 | End: 2023-01-01 | Stop reason: HOSPADM

## 2023-01-01 RX ORDER — ACETAMINOPHEN 325 MG/1
650 TABLET ORAL
Status: COMPLETED | OUTPATIENT
Start: 2023-01-01 | End: 2023-01-01

## 2023-01-01 RX ORDER — MIDODRINE HYDROCHLORIDE 2.5 MG/1
2.5 TABLET ORAL ONCE
Status: COMPLETED | OUTPATIENT
Start: 2023-01-01 | End: 2023-01-01

## 2023-01-01 RX ORDER — HYDROCODONE BITARTRATE AND ACETAMINOPHEN 10; 325 MG/1; MG/1
1 TABLET ORAL EVERY 6 HOURS PRN
Status: DISCONTINUED | OUTPATIENT
Start: 2023-01-01 | End: 2023-01-01

## 2023-01-01 RX ORDER — HYDROMORPHONE HYDROCHLORIDE 1 MG/ML
1 INJECTION, SOLUTION INTRAMUSCULAR; INTRAVENOUS; SUBCUTANEOUS
Status: DISCONTINUED | OUTPATIENT
Start: 2023-01-01 | End: 2023-01-01

## 2023-01-01 RX ORDER — SODIUM CHLORIDE 9 MG/ML
INJECTION, SOLUTION INTRAVENOUS CONTINUOUS
Status: DISCONTINUED | OUTPATIENT
Start: 2023-01-01 | End: 2023-01-01

## 2023-01-01 RX ORDER — PANTOPRAZOLE SODIUM 40 MG/1
40 TABLET, DELAYED RELEASE ORAL 2 TIMES DAILY
Status: DISCONTINUED | OUTPATIENT
Start: 2023-01-01 | End: 2023-01-01

## 2023-01-01 RX ORDER — HYDROCODONE BITARTRATE AND ACETAMINOPHEN 10; 325 MG/1; MG/1
1 TABLET ORAL EVERY 4 HOURS PRN
Status: DISCONTINUED | OUTPATIENT
Start: 2023-01-01 | End: 2023-01-01 | Stop reason: HOSPADM

## 2023-01-01 RX ORDER — LORAZEPAM 0.5 MG/1
0.5 TABLET ORAL ONCE AS NEEDED
Status: COMPLETED | OUTPATIENT
Start: 2023-01-01 | End: 2023-01-01

## 2023-01-01 RX ORDER — FAMOTIDINE 20 MG/1
20 TABLET, FILM COATED ORAL 2 TIMES DAILY
Status: DISCONTINUED | OUTPATIENT
Start: 2023-01-01 | End: 2023-01-01

## 2023-01-01 RX ORDER — CEFEPIME HYDROCHLORIDE 1 G/50ML
2 INJECTION, SOLUTION INTRAVENOUS ONCE
Status: DISCONTINUED | OUTPATIENT
Start: 2023-01-01 | End: 2023-01-01

## 2023-01-01 RX ORDER — TRAZODONE HYDROCHLORIDE 50 MG/1
50 TABLET ORAL NIGHTLY PRN
Status: DISCONTINUED | OUTPATIENT
Start: 2023-01-01 | End: 2023-01-01

## 2023-01-01 RX ORDER — NOREPINEPHRINE BITARTRATE 1 MG/ML
INJECTION, SOLUTION INTRAVENOUS
Status: COMPLETED
Start: 2023-01-01 | End: 2023-01-01

## 2023-01-01 RX ORDER — SUCCINYLCHOLINE CHLORIDE 20 MG/ML
INJECTION INTRAMUSCULAR; INTRAVENOUS
Status: COMPLETED
Start: 2023-01-01 | End: 2023-01-01

## 2023-01-01 RX ORDER — LORAZEPAM 2 MG/ML
1 INJECTION INTRAMUSCULAR ONCE
Status: COMPLETED | OUTPATIENT
Start: 2023-01-01 | End: 2023-01-01

## 2023-01-01 RX ORDER — METOPROLOL TARTRATE 1 MG/ML
5 INJECTION, SOLUTION INTRAVENOUS EVERY 5 MIN PRN
Status: DISCONTINUED | OUTPATIENT
Start: 2023-01-01 | End: 2023-01-01

## 2023-01-01 RX ORDER — SODIUM CHLORIDE 9 MG/ML
INJECTION, SOLUTION INTRAVENOUS CONTINUOUS
Status: ACTIVE | OUTPATIENT
Start: 2023-01-01 | End: 2023-01-01

## 2023-01-01 RX ORDER — SODIUM CHLORIDE 9 MG/ML
INJECTION, SOLUTION INTRAVENOUS CONTINUOUS
Status: CANCELLED | OUTPATIENT
Start: 2023-01-01

## 2023-01-01 RX ORDER — ONDANSETRON 2 MG/ML
4 INJECTION INTRAMUSCULAR; INTRAVENOUS
Status: COMPLETED | OUTPATIENT
Start: 2023-01-01 | End: 2023-01-01

## 2023-01-01 RX ORDER — LANOLIN ALCOHOL/MO/W.PET/CERES
800 CREAM (GRAM) TOPICAL
Status: DISCONTINUED | OUTPATIENT
Start: 2023-01-01 | End: 2023-01-01 | Stop reason: HOSPADM

## 2023-01-01 RX ORDER — INSULIN ASPART 100 [IU]/ML
1-10 INJECTION, SOLUTION INTRAVENOUS; SUBCUTANEOUS
Status: DISCONTINUED | OUTPATIENT
Start: 2023-01-01 | End: 2023-01-01 | Stop reason: HOSPADM

## 2023-01-01 RX ORDER — POLYETHYLENE GLYCOL 3350, SODIUM SULFATE ANHYDROUS, SODIUM BICARBONATE, SODIUM CHLORIDE, POTASSIUM CHLORIDE 236; 22.74; 6.74; 5.86; 2.97 G/4L; G/4L; G/4L; G/4L; G/4L
4000 POWDER, FOR SOLUTION ORAL ONCE
Status: COMPLETED | OUTPATIENT
Start: 2023-01-01 | End: 2023-01-01

## 2023-01-01 RX ORDER — INSULIN ASPART 100 [IU]/ML
7 INJECTION, SOLUTION INTRAVENOUS; SUBCUTANEOUS
Status: DISCONTINUED | OUTPATIENT
Start: 2023-01-01 | End: 2023-01-01 | Stop reason: HOSPADM

## 2023-01-01 RX ORDER — CHLORPROMAZINE HYDROCHLORIDE 25 MG/1
25 TABLET, FILM COATED ORAL 3 TIMES DAILY
Status: DISCONTINUED | OUTPATIENT
Start: 2023-01-01 | End: 2023-01-01

## 2023-01-01 RX ORDER — ONDANSETRON 2 MG/ML
INJECTION INTRAMUSCULAR; INTRAVENOUS
Status: COMPLETED
Start: 2023-01-01 | End: 2023-01-01

## 2023-01-01 RX ORDER — SIMETHICONE 80 MG
1 TABLET,CHEWABLE ORAL 4 TIMES DAILY PRN
Status: DISCONTINUED | OUTPATIENT
Start: 2023-01-01 | End: 2023-01-01 | Stop reason: HOSPADM

## 2023-01-01 RX ORDER — PROCHLORPERAZINE EDISYLATE 5 MG/ML
10 INJECTION INTRAMUSCULAR; INTRAVENOUS EVERY 6 HOURS PRN
Status: DISCONTINUED | OUTPATIENT
Start: 2023-01-01 | End: 2023-01-01 | Stop reason: HOSPADM

## 2023-01-01 RX ORDER — PANTOPRAZOLE SODIUM 40 MG/1
40 TABLET, DELAYED RELEASE ORAL 2 TIMES DAILY
Qty: 60 TABLET | Refills: 1 | Status: ON HOLD | OUTPATIENT
Start: 2023-01-01 | End: 2023-01-01

## 2023-01-01 RX ORDER — SODIUM CHLORIDE 9 MG/ML
INJECTION, SOLUTION INTRAVENOUS CONTINUOUS
Status: DISCONTINUED | OUTPATIENT
Start: 2023-01-01 | End: 2023-01-01 | Stop reason: HOSPADM

## 2023-01-01 RX ORDER — DEXTROSE 40 %
30 GEL (GRAM) ORAL
Status: DISCONTINUED | OUTPATIENT
Start: 2023-01-01 | End: 2023-01-01 | Stop reason: HOSPADM

## 2023-01-01 RX ORDER — MIDAZOLAM HYDROCHLORIDE 1 MG/ML
INJECTION INTRAMUSCULAR; INTRAVENOUS
Status: COMPLETED | OUTPATIENT
Start: 2023-01-01 | End: 2023-01-01

## 2023-01-01 RX ORDER — HYDROMORPHONE HYDROCHLORIDE 1 MG/ML
1 INJECTION, SOLUTION INTRAMUSCULAR; INTRAVENOUS; SUBCUTANEOUS ONCE AS NEEDED
Status: COMPLETED | OUTPATIENT
Start: 2023-01-01 | End: 2023-01-01

## 2023-01-01 RX ORDER — FAMOTIDINE 10 MG/ML
20 INJECTION INTRAVENOUS 2 TIMES DAILY
Status: DISCONTINUED | OUTPATIENT
Start: 2023-01-01 | End: 2023-01-01 | Stop reason: HOSPADM

## 2023-01-01 RX ORDER — PANTOPRAZOLE SODIUM 40 MG/10ML
80 INJECTION, POWDER, LYOPHILIZED, FOR SOLUTION INTRAVENOUS ONCE
Status: COMPLETED | OUTPATIENT
Start: 2023-01-01 | End: 2023-01-01

## 2023-01-01 RX ORDER — IBUPROFEN 200 MG
16 TABLET ORAL
Status: DISCONTINUED | OUTPATIENT
Start: 2023-01-01 | End: 2023-01-01 | Stop reason: HOSPADM

## 2023-01-01 RX ORDER — DEXTROSE 40 %
15 GEL (GRAM) ORAL
Status: DISCONTINUED | OUTPATIENT
Start: 2023-01-01 | End: 2023-01-01 | Stop reason: HOSPADM

## 2023-01-01 RX ORDER — FAMOTIDINE 10 MG/ML
20 INJECTION INTRAVENOUS
Status: DISCONTINUED | OUTPATIENT
Start: 2023-01-01 | End: 2023-01-01

## 2023-01-01 RX ORDER — MEPERIDINE HYDROCHLORIDE 50 MG/ML
25 INJECTION INTRAMUSCULAR; INTRAVENOUS; SUBCUTANEOUS
Status: CANCELLED | OUTPATIENT
Start: 2023-01-01 | End: 2023-03-14

## 2023-01-01 RX ORDER — METOPROLOL TARTRATE 25 MG/1
25 TABLET, FILM COATED ORAL EVERY 6 HOURS
Status: DISCONTINUED | OUTPATIENT
Start: 2023-01-01 | End: 2023-01-01

## 2023-01-01 RX ORDER — ROCURONIUM BROMIDE 10 MG/ML
INJECTION, SOLUTION INTRAVENOUS
Status: COMPLETED
Start: 2023-01-01 | End: 2023-01-01

## 2023-01-01 RX ORDER — TAMSULOSIN HYDROCHLORIDE 0.4 MG/1
0.4 CAPSULE ORAL DAILY
Status: DISCONTINUED | OUTPATIENT
Start: 2023-01-01 | End: 2023-01-01

## 2023-01-01 RX ORDER — INSULIN ASPART 100 [IU]/ML
3 INJECTION, SOLUTION INTRAVENOUS; SUBCUTANEOUS
Status: DISCONTINUED | OUTPATIENT
Start: 2023-01-01 | End: 2023-01-01

## 2023-01-01 RX ORDER — DIGOXIN 0.25 MG/ML
500 INJECTION INTRAMUSCULAR; INTRAVENOUS ONCE
Status: COMPLETED | OUTPATIENT
Start: 2023-01-01 | End: 2023-01-01

## 2023-01-01 RX ORDER — SULFAMETHOXAZOLE AND TRIMETHOPRIM 400; 80 MG/1; MG/1
1 TABLET ORAL DAILY
Status: DISCONTINUED | OUTPATIENT
Start: 2023-01-01 | End: 2023-01-01

## 2023-01-01 RX ORDER — DOCUSATE SODIUM 100 MG/1
100 CAPSULE, LIQUID FILLED ORAL 2 TIMES DAILY
Status: DISCONTINUED | OUTPATIENT
Start: 2023-01-01 | End: 2023-01-01 | Stop reason: HOSPADM

## 2023-01-01 RX ORDER — ACETAMINOPHEN 325 MG/1
650 TABLET ORAL EVERY 4 HOURS PRN
Status: DISCONTINUED | OUTPATIENT
Start: 2023-01-01 | End: 2023-01-01 | Stop reason: HOSPADM

## 2023-01-01 RX ORDER — HEPARIN 100 UNIT/ML
500 SYRINGE INTRAVENOUS
Status: CANCELLED | OUTPATIENT
Start: 2023-01-01

## 2023-01-01 RX ORDER — PANTOPRAZOLE SODIUM 40 MG/1
40 TABLET, DELAYED RELEASE ORAL DAILY
Status: DISCONTINUED | OUTPATIENT
Start: 2023-01-01 | End: 2023-01-01 | Stop reason: HOSPADM

## 2023-01-01 RX ORDER — DOXORUBICIN HYDROCHLORIDE 2 MG/ML
50 INJECTION, SOLUTION INTRAVENOUS
Status: CANCELLED | OUTPATIENT
Start: 2023-01-01

## 2023-01-01 RX ORDER — ENOXAPARIN SODIUM 100 MG/ML
40 INJECTION SUBCUTANEOUS
Status: DISCONTINUED | OUTPATIENT
Start: 2023-01-01 | End: 2023-01-01

## 2023-01-01 RX ORDER — MORPHINE SULFATE 15 MG/1
15 TABLET, FILM COATED, EXTENDED RELEASE ORAL 2 TIMES DAILY
Qty: 60 TABLET | Refills: 0 | Status: ON HOLD | OUTPATIENT
Start: 2023-01-01 | End: 2023-01-01

## 2023-01-01 RX ORDER — MEPERIDINE HYDROCHLORIDE 50 MG/ML
25 INJECTION INTRAMUSCULAR; INTRAVENOUS; SUBCUTANEOUS EVERY 30 MIN PRN
Status: CANCELLED | OUTPATIENT
Start: 2023-01-01

## 2023-01-01 RX ORDER — DEXAMETHASONE SODIUM PHOSPHATE 1 MG/ML
1 SOLUTION/ DROPS OPHTHALMIC
Status: DISCONTINUED | OUTPATIENT
Start: 2023-01-01 | End: 2023-01-01 | Stop reason: HOSPADM

## 2023-01-01 RX ORDER — ONDANSETRON 2 MG/ML
4 INJECTION INTRAMUSCULAR; INTRAVENOUS EVERY 8 HOURS PRN
Status: DISCONTINUED | OUTPATIENT
Start: 2023-01-01 | End: 2023-01-01 | Stop reason: HOSPADM

## 2023-01-01 RX ORDER — ONDANSETRON 2 MG/ML
8 INJECTION INTRAMUSCULAR; INTRAVENOUS
Status: CANCELLED | OUTPATIENT
Start: 2023-01-01

## 2023-01-01 RX ORDER — HYDROMORPHONE HCL IN 0.9% NACL 6 MG/30 ML
PATIENT CONTROLLED ANALGESIA SYRINGE INTRAVENOUS CONTINUOUS
Status: DISCONTINUED | OUTPATIENT
Start: 2023-01-01 | End: 2023-01-01

## 2023-01-01 RX ORDER — LACTULOSE 10 G/15ML
20 SOLUTION ORAL 3 TIMES DAILY
Status: DISCONTINUED | OUTPATIENT
Start: 2023-01-01 | End: 2023-01-01 | Stop reason: HOSPADM

## 2023-01-01 RX ORDER — ALPRAZOLAM 0.5 MG/1
0.5 TABLET ORAL NIGHTLY PRN
Status: DISCONTINUED | OUTPATIENT
Start: 2023-01-01 | End: 2023-01-01 | Stop reason: HOSPADM

## 2023-01-01 RX ORDER — SULFAMETHOXAZOLE AND TRIMETHOPRIM 800; 160 MG/1; MG/1
1 TABLET ORAL
Qty: 12 TABLET | Refills: 1 | Status: ON HOLD | OUTPATIENT
Start: 2023-01-01 | End: 2023-01-01

## 2023-01-01 RX ORDER — PROPOFOL 10 MG/ML
INJECTION, EMULSION INTRAVENOUS
Status: COMPLETED
Start: 2023-01-01 | End: 2023-01-01

## 2023-01-01 RX ORDER — HYDROXYZINE HYDROCHLORIDE 25 MG/1
50 TABLET, FILM COATED ORAL 3 TIMES DAILY PRN
Status: DISCONTINUED | OUTPATIENT
Start: 2023-01-01 | End: 2023-01-01 | Stop reason: HOSPADM

## 2023-01-01 RX ORDER — DOXORUBICIN HYDROCHLORIDE 2 MG/ML
50 INJECTION, SOLUTION INTRAVENOUS
Status: COMPLETED | OUTPATIENT
Start: 2023-01-01 | End: 2023-01-01

## 2023-01-01 RX ORDER — IBUPROFEN 100 MG/5ML
1000 SUSPENSION, ORAL (FINAL DOSE FORM) ORAL DAILY
Status: ON HOLD | COMMUNITY
End: 2023-01-01

## 2023-01-01 RX ORDER — DEXAMETHASONE 4 MG/1
40 TABLET ORAL
Status: DISCONTINUED | OUTPATIENT
Start: 2023-01-01 | End: 2023-01-01 | Stop reason: HOSPADM

## 2023-01-01 RX ORDER — BISACODYL 10 MG
10 SUPPOSITORY, RECTAL RECTAL DAILY PRN
Qty: 5 SUPPOSITORY | Refills: 0 | Status: ON HOLD | OUTPATIENT
Start: 2023-01-01 | End: 2023-01-01

## 2023-01-01 RX ORDER — DEXAMETHASONE SODIUM PHOSPHATE 4 MG/ML
40 INJECTION, SOLUTION INTRA-ARTICULAR; INTRALESIONAL; INTRAMUSCULAR; INTRAVENOUS; SOFT TISSUE ONCE
Status: DISCONTINUED | OUTPATIENT
Start: 2023-01-01 | End: 2023-01-01

## 2023-01-01 RX ORDER — FENTANYL CITRATE-0.9 % NACL/PF 10 MCG/ML
0-200 PLASTIC BAG, INJECTION (ML) INTRAVENOUS CONTINUOUS
Status: DISCONTINUED | OUTPATIENT
Start: 2023-01-01 | End: 2023-01-01 | Stop reason: HOSPADM

## 2023-01-01 RX ORDER — PREDNISONE 50 MG/1
50 TABLET ORAL DAILY
Status: DISCONTINUED | OUTPATIENT
Start: 2023-01-01 | End: 2023-01-01

## 2023-01-01 RX ORDER — ROCURONIUM BROMIDE 10 MG/ML
90 INJECTION, SOLUTION INTRAVENOUS ONCE
Status: COMPLETED | OUTPATIENT
Start: 2023-01-01 | End: 2023-01-01

## 2023-01-01 RX ORDER — CEFEPIME HYDROCHLORIDE 1 G/50ML
2 INJECTION, SOLUTION INTRAVENOUS
Status: DISCONTINUED | OUTPATIENT
Start: 2023-01-01 | End: 2023-01-01 | Stop reason: HOSPADM

## 2023-01-01 RX ORDER — ALLOPURINOL 100 MG/1
100 TABLET ORAL DAILY
Status: DISCONTINUED | OUTPATIENT
Start: 2023-01-01 | End: 2023-01-01 | Stop reason: HOSPADM

## 2023-01-01 RX ORDER — DEXAMETHASONE 4 MG/1
40 TABLET ORAL DAILY
Status: COMPLETED | OUTPATIENT
Start: 2023-01-01 | End: 2023-01-01

## 2023-01-01 RX ORDER — ALBUTEROL SULFATE 90 UG/1
2 AEROSOL, METERED RESPIRATORY (INHALATION) EVERY 6 HOURS
Status: DISCONTINUED | OUTPATIENT
Start: 2023-01-01 | End: 2023-01-01

## 2023-01-01 RX ORDER — DIPHENHYDRAMINE HCL 25 MG
25 CAPSULE ORAL ONCE
Status: COMPLETED | OUTPATIENT
Start: 2023-01-01 | End: 2023-01-01

## 2023-01-01 RX ORDER — IBUPROFEN 200 MG
24 TABLET ORAL
Status: DISCONTINUED | OUTPATIENT
Start: 2023-01-01 | End: 2023-01-01 | Stop reason: HOSPADM

## 2023-01-01 RX ORDER — HYDROMORPHONE HYDROCHLORIDE 1 MG/ML
0.5 INJECTION, SOLUTION INTRAMUSCULAR; INTRAVENOUS; SUBCUTANEOUS
Status: DISCONTINUED | OUTPATIENT
Start: 2023-01-01 | End: 2023-01-01

## 2023-01-01 RX ORDER — ADENOSINE 3 MG/ML
INJECTION, SOLUTION INTRAVENOUS
Status: DISCONTINUED
Start: 2023-01-01 | End: 2023-01-01 | Stop reason: WASHOUT

## 2023-01-01 RX ORDER — PREDNISONE 1 MG/1
100 TABLET ORAL
Status: CANCELLED | OUTPATIENT
Start: 2023-01-01

## 2023-01-01 RX ORDER — NALOXONE HCL 0.4 MG/ML
0.02 VIAL (ML) INJECTION
Status: DISCONTINUED | OUTPATIENT
Start: 2023-01-01 | End: 2023-01-01

## 2023-01-01 RX ORDER — PANTOPRAZOLE SODIUM 40 MG/1
40 TABLET, DELAYED RELEASE ORAL DAILY
Status: DISCONTINUED | OUTPATIENT
Start: 2023-01-01 | End: 2023-01-01

## 2023-01-01 RX ORDER — ONDANSETRON 2 MG/ML
4 INJECTION INTRAMUSCULAR; INTRAVENOUS EVERY 6 HOURS PRN
Status: DISCONTINUED | OUTPATIENT
Start: 2023-01-01 | End: 2023-01-01 | Stop reason: HOSPADM

## 2023-01-01 RX ORDER — VANCOMYCIN HCL IN 5 % DEXTROSE 1G/250ML
1000 PLASTIC BAG, INJECTION (ML) INTRAVENOUS
Status: COMPLETED | OUTPATIENT
Start: 2023-01-01 | End: 2023-01-01

## 2023-01-01 RX ORDER — HEPARIN 100 UNIT/ML
300 SYRINGE INTRAVENOUS
Status: DISCONTINUED | OUTPATIENT
Start: 2023-01-01 | End: 2023-01-01

## 2023-01-01 RX ORDER — DIGOXIN 0.25 MG/ML
250 INJECTION INTRAMUSCULAR; INTRAVENOUS EVERY 8 HOURS
Status: DISCONTINUED | OUTPATIENT
Start: 2023-01-01 | End: 2023-01-01

## 2023-01-01 RX ORDER — CHLORHEXIDINE GLUCONATE ORAL RINSE 1.2 MG/ML
15 SOLUTION DENTAL 2 TIMES DAILY
Status: DISCONTINUED | OUTPATIENT
Start: 2023-01-01 | End: 2023-01-01

## 2023-01-01 RX ORDER — ACETAMINOPHEN 325 MG/1
650 TABLET ORAL EVERY 6 HOURS PRN
Status: DISCONTINUED | OUTPATIENT
Start: 2023-01-01 | End: 2023-01-01

## 2023-01-01 RX ORDER — PANTOPRAZOLE SODIUM 40 MG/10ML
40 INJECTION, POWDER, LYOPHILIZED, FOR SOLUTION INTRAVENOUS EVERY 12 HOURS
Status: DISCONTINUED | OUTPATIENT
Start: 2023-01-01 | End: 2023-01-01

## 2023-01-01 RX ORDER — ACETAMINOPHEN 325 MG/1
650 TABLET ORAL
Status: DISCONTINUED | OUTPATIENT
Start: 2023-01-01 | End: 2023-01-01

## 2023-01-01 RX ORDER — HYDROMORPHONE HYDROCHLORIDE 1 MG/ML
0.5 INJECTION, SOLUTION INTRAMUSCULAR; INTRAVENOUS; SUBCUTANEOUS EVERY 6 HOURS PRN
Status: DISCONTINUED | OUTPATIENT
Start: 2023-01-01 | End: 2023-01-01 | Stop reason: HOSPADM

## 2023-01-01 RX ORDER — OXYCODONE HYDROCHLORIDE 5 MG/1
5 TABLET ORAL EVERY 6 HOURS PRN
Status: DISCONTINUED | OUTPATIENT
Start: 2023-01-01 | End: 2023-01-01 | Stop reason: HOSPADM

## 2023-01-01 RX ORDER — POLYETHYLENE GLYCOL 3350, SODIUM SULFATE ANHYDROUS, SODIUM BICARBONATE, SODIUM CHLORIDE, POTASSIUM CHLORIDE 236; 22.74; 6.74; 5.86; 2.97 G/4L; G/4L; G/4L; G/4L; G/4L
2 POWDER, FOR SOLUTION ORAL ONCE
Qty: 2000 ML | Refills: 0 | Status: SHIPPED | OUTPATIENT
Start: 2023-01-01 | End: 2023-01-01 | Stop reason: SDUPTHER

## 2023-01-01 RX ORDER — HYDROCODONE BITARTRATE AND ACETAMINOPHEN 5; 325 MG/1; MG/1
1 TABLET ORAL EVERY 4 HOURS PRN
Status: DISCONTINUED | OUTPATIENT
Start: 2023-01-01 | End: 2023-01-01 | Stop reason: HOSPADM

## 2023-01-01 RX ORDER — METOPROLOL TARTRATE 25 MG/1
12.5 TABLET ORAL EVERY 6 HOURS
Status: DISCONTINUED | OUTPATIENT
Start: 2023-01-01 | End: 2023-01-01

## 2023-01-01 RX ORDER — HYDROMORPHONE HYDROCHLORIDE 2 MG/ML
2 INJECTION, SOLUTION INTRAMUSCULAR; INTRAVENOUS; SUBCUTANEOUS ONCE AS NEEDED
Status: DISCONTINUED | OUTPATIENT
Start: 2023-01-01 | End: 2023-01-01

## 2023-01-01 RX ORDER — POLYETHYLENE GLYCOL 3350, SODIUM SULFATE ANHYDROUS, SODIUM BICARBONATE, SODIUM CHLORIDE, POTASSIUM CHLORIDE 236; 22.74; 6.74; 5.86; 2.97 G/4L; G/4L; G/4L; G/4L; G/4L
2 POWDER, FOR SOLUTION ORAL ONCE
Qty: 2000 ML | Refills: 0 | Status: SHIPPED | OUTPATIENT
Start: 2023-01-01 | End: 2023-01-01

## 2023-01-01 RX ORDER — PREDNISONE 50 MG/1
50 TABLET ORAL DAILY
Qty: 100 TABLET | Refills: 2 | Status: ON HOLD | OUTPATIENT
Start: 2023-01-01 | End: 2023-01-01

## 2023-01-01 RX ORDER — ETOMIDATE 2 MG/ML
INJECTION INTRAVENOUS
Status: COMPLETED
Start: 2023-01-01 | End: 2023-01-01

## 2023-01-01 RX ORDER — MORPHINE SULFATE 15 MG/1
15 TABLET, FILM COATED, EXTENDED RELEASE ORAL EVERY 12 HOURS
Status: DISCONTINUED | OUTPATIENT
Start: 2023-01-01 | End: 2023-01-01

## 2023-01-01 RX ORDER — LANOLIN ALCOHOL/MO/W.PET/CERES
1000 CREAM (GRAM) TOPICAL DAILY
Status: DISCONTINUED | OUTPATIENT
Start: 2023-01-01 | End: 2023-01-01 | Stop reason: HOSPADM

## 2023-01-01 RX ORDER — MORPHINE SULFATE 2 MG/ML
2 INJECTION, SOLUTION INTRAMUSCULAR; INTRAVENOUS ONCE
Status: COMPLETED | OUTPATIENT
Start: 2023-01-01 | End: 2023-01-01

## 2023-01-01 RX ORDER — LIDOCAINE 50 MG/G
1 PATCH TOPICAL
Status: DISCONTINUED | OUTPATIENT
Start: 2023-01-01 | End: 2023-01-01 | Stop reason: HOSPADM

## 2023-01-01 RX ORDER — LIDOCAINE HYDROCHLORIDE 10 MG/ML
INJECTION INFILTRATION; PERINEURAL
Status: COMPLETED | OUTPATIENT
Start: 2023-01-01 | End: 2023-01-01

## 2023-01-01 RX ORDER — SODIUM CHLORIDE 0.9 % (FLUSH) 0.9 %
10 SYRINGE (ML) INJECTION EVERY 12 HOURS
Status: DISCONTINUED | OUTPATIENT
Start: 2023-01-01 | End: 2023-01-01 | Stop reason: HOSPADM

## 2023-01-01 RX ORDER — ALLOPURINOL 100 MG/1
300 TABLET ORAL DAILY
Status: DISCONTINUED | OUTPATIENT
Start: 2023-01-01 | End: 2023-01-01 | Stop reason: HOSPADM

## 2023-01-01 RX ORDER — ENOXAPARIN SODIUM 100 MG/ML
1 INJECTION SUBCUTANEOUS 2 TIMES DAILY
Status: DISCONTINUED | OUTPATIENT
Start: 2023-01-01 | End: 2023-01-01 | Stop reason: HOSPADM

## 2023-01-01 RX ORDER — METOPROLOL TARTRATE 1 MG/ML
5 INJECTION, SOLUTION INTRAVENOUS EVERY 5 MIN PRN
Status: COMPLETED | OUTPATIENT
Start: 2023-01-01 | End: 2023-01-01

## 2023-01-01 RX ORDER — METRONIDAZOLE 500 MG/100ML
500 INJECTION, SOLUTION INTRAVENOUS
Status: DISCONTINUED | OUTPATIENT
Start: 2023-01-01 | End: 2023-01-01

## 2023-01-01 RX ORDER — INDOMETHACIN 25 MG/1
50 CAPSULE ORAL ONCE
Status: COMPLETED | OUTPATIENT
Start: 2023-01-01 | End: 2023-01-01

## 2023-01-01 RX ORDER — SODIUM CHLORIDE, SODIUM LACTATE, POTASSIUM CHLORIDE, CALCIUM CHLORIDE 600; 310; 30; 20 MG/100ML; MG/100ML; MG/100ML; MG/100ML
INJECTION, SOLUTION INTRAVENOUS CONTINUOUS
Status: ACTIVE | OUTPATIENT
Start: 2023-01-01 | End: 2023-01-01

## 2023-01-01 RX ORDER — LIDOCAINE HYDROCHLORIDE 20 MG/ML
10 INJECTION, SOLUTION INFILTRATION; PERINEURAL ONCE
Status: COMPLETED | OUTPATIENT
Start: 2023-01-01 | End: 2023-01-01

## 2023-01-01 RX ORDER — METOPROLOL TARTRATE 1 MG/ML
INJECTION, SOLUTION INTRAVENOUS
Status: COMPLETED
Start: 2023-01-01 | End: 2023-01-01

## 2023-01-01 RX ORDER — LANOLIN ALCOHOL/MO/W.PET/CERES
1000 CREAM (GRAM) TOPICAL DAILY
Qty: 30 TABLET | Refills: 1 | Status: SHIPPED | OUTPATIENT
Start: 2023-01-01 | End: 2023-01-01

## 2023-01-01 RX ORDER — MEPERIDINE HYDROCHLORIDE 50 MG/ML
25 INJECTION INTRAMUSCULAR; INTRAVENOUS; SUBCUTANEOUS EVERY 30 MIN PRN
Status: DISCONTINUED | OUTPATIENT
Start: 2023-01-01 | End: 2023-01-01

## 2023-01-01 RX ORDER — AMOXICILLIN 250 MG
1 CAPSULE ORAL 2 TIMES DAILY
Status: DISCONTINUED | OUTPATIENT
Start: 2023-01-01 | End: 2023-01-01 | Stop reason: HOSPADM

## 2023-01-01 RX ORDER — ASPIRIN 325 MG
325 TABLET ORAL
Status: COMPLETED | OUTPATIENT
Start: 2023-01-01 | End: 2023-01-01

## 2023-01-01 RX ORDER — SULFAMETHOXAZOLE AND TRIMETHOPRIM 800; 160 MG/1; MG/1
1 TABLET ORAL
Status: DISCONTINUED | OUTPATIENT
Start: 2023-01-01 | End: 2023-01-01

## 2023-01-01 RX ORDER — ONDANSETRON 8 MG/1
8 TABLET, ORALLY DISINTEGRATING ORAL EVERY 8 HOURS PRN
Status: DISCONTINUED | OUTPATIENT
Start: 2023-01-01 | End: 2023-01-01 | Stop reason: HOSPADM

## 2023-01-01 RX ORDER — ALPRAZOLAM 0.5 MG/1
0.5 TABLET ORAL NIGHTLY PRN
Qty: 30 TABLET | Refills: 0 | Status: ON HOLD | OUTPATIENT
Start: 2023-01-01 | End: 2023-01-01 | Stop reason: HOSPADM

## 2023-01-01 RX ORDER — TAMSULOSIN HYDROCHLORIDE 0.4 MG/1
0.4 CAPSULE ORAL DAILY
Qty: 30 CAPSULE | Refills: 11 | Status: ON HOLD | OUTPATIENT
Start: 2023-01-01 | End: 2023-01-01

## 2023-01-01 RX ORDER — METRONIDAZOLE 500 MG/100ML
500 INJECTION, SOLUTION INTRAVENOUS
Status: DISCONTINUED | OUTPATIENT
Start: 2023-01-01 | End: 2023-01-01 | Stop reason: HOSPADM

## 2023-01-01 RX ORDER — ALLOPURINOL 100 MG/1
100 TABLET ORAL DAILY
Status: DISCONTINUED | OUTPATIENT
Start: 2023-01-01 | End: 2023-01-01

## 2023-01-01 RX ORDER — PANTOPRAZOLE SODIUM 40 MG/10ML
40 INJECTION, POWDER, LYOPHILIZED, FOR SOLUTION INTRAVENOUS 2 TIMES DAILY
Status: DISCONTINUED | OUTPATIENT
Start: 2023-01-01 | End: 2023-01-01 | Stop reason: HOSPADM

## 2023-01-01 RX ORDER — METFORMIN HYDROCHLORIDE 1000 MG/1
1000 TABLET ORAL 2 TIMES DAILY
Status: ON HOLD | COMMUNITY
Start: 2023-01-01 | End: 2023-01-01

## 2023-01-01 RX ORDER — ACETAMINOPHEN 325 MG/1
650 TABLET ORAL ONCE
Status: COMPLETED | OUTPATIENT
Start: 2023-01-01 | End: 2023-01-01

## 2023-01-01 RX ORDER — HYDROMORPHONE HYDROCHLORIDE 1 MG/ML
1 INJECTION, SOLUTION INTRAMUSCULAR; INTRAVENOUS; SUBCUTANEOUS EVERY 4 HOURS PRN
Status: DISCONTINUED | OUTPATIENT
Start: 2023-01-01 | End: 2023-01-01

## 2023-01-01 RX ORDER — PROPOFOL 10 MG/ML
0-50 INJECTION, EMULSION INTRAVENOUS CONTINUOUS
Status: DISCONTINUED | OUTPATIENT
Start: 2023-01-01 | End: 2023-01-01 | Stop reason: HOSPADM

## 2023-01-01 RX ORDER — METOPROLOL TARTRATE 25 MG/1
12.5 TABLET ORAL ONCE
Status: COMPLETED | OUTPATIENT
Start: 2023-01-01 | End: 2023-01-01

## 2023-01-01 RX ORDER — PANTOPRAZOLE SODIUM 40 MG/1
40 TABLET, DELAYED RELEASE ORAL 2 TIMES DAILY
Status: DISCONTINUED | OUTPATIENT
Start: 2023-01-01 | End: 2023-01-01 | Stop reason: HOSPADM

## 2023-01-01 RX ORDER — EPINEPHRINE 1 MG/ML
INJECTION, SOLUTION, CONCENTRATE INTRAVENOUS
Status: COMPLETED
Start: 2023-01-01 | End: 2023-01-01

## 2023-01-01 RX ORDER — MEPERIDINE HYDROCHLORIDE 25 MG/ML
25 INJECTION INTRAMUSCULAR; INTRAVENOUS; SUBCUTANEOUS EVERY 30 MIN PRN
Status: DISCONTINUED | OUTPATIENT
Start: 2023-01-01 | End: 2023-01-01 | Stop reason: HOSPADM

## 2023-01-01 RX ORDER — HEPARIN 100 UNIT/ML
500 SYRINGE INTRAVENOUS
Status: DISCONTINUED | OUTPATIENT
Start: 2023-01-01 | End: 2023-01-01 | Stop reason: HOSPADM

## 2023-01-01 RX ORDER — SODIUM CHLORIDE 0.9 % (FLUSH) 0.9 %
10 SYRINGE (ML) INJECTION
Status: DISCONTINUED | OUTPATIENT
Start: 2023-01-01 | End: 2023-01-01

## 2023-01-01 RX ORDER — CHLORHEXIDINE GLUCONATE ORAL RINSE 1.2 MG/ML
15 SOLUTION DENTAL 2 TIMES DAILY
Status: DISCONTINUED | OUTPATIENT
Start: 2023-01-01 | End: 2023-01-01 | Stop reason: HOSPADM

## 2023-01-01 RX ORDER — FAMOTIDINE 10 MG/ML
20 INJECTION INTRAVENOUS
Status: COMPLETED | OUTPATIENT
Start: 2023-01-01 | End: 2023-01-01

## 2023-01-01 RX ORDER — ETOMIDATE 2 MG/ML
60 INJECTION INTRAVENOUS ONCE
Status: COMPLETED | OUTPATIENT
Start: 2023-01-01 | End: 2023-01-01

## 2023-01-01 RX ORDER — PREDNISONE 1 MG/1
100 TABLET ORAL
Status: DISCONTINUED | OUTPATIENT
Start: 2023-01-01 | End: 2023-01-01

## 2023-01-01 RX ORDER — HYDROMORPHONE HYDROCHLORIDE 2 MG/ML
0.5 INJECTION, SOLUTION INTRAMUSCULAR; INTRAVENOUS; SUBCUTANEOUS
Status: COMPLETED | OUTPATIENT
Start: 2023-01-01 | End: 2023-01-01

## 2023-01-01 RX ADMIN — ENOXAPARIN SODIUM 90 MG: 100 INJECTION SUBCUTANEOUS at 08:02

## 2023-01-01 RX ADMIN — ACYCLOVIR 400 MG: 200 CAPSULE ORAL at 08:03

## 2023-01-01 RX ADMIN — INSULIN ASPART 2 UNITS: 100 INJECTION, SOLUTION INTRAVENOUS; SUBCUTANEOUS at 09:03

## 2023-01-01 RX ADMIN — FUROSEMIDE 40 MG: 10 INJECTION, SOLUTION INTRAMUSCULAR; INTRAVENOUS at 06:03

## 2023-01-01 RX ADMIN — AMIODARONE HYDROCHLORIDE 1 MG/MIN: 1.8 INJECTION, SOLUTION INTRAVENOUS at 10:03

## 2023-01-01 RX ADMIN — HYDROCORTISONE SODIUM SUCCINATE 100 MG: 100 INJECTION, POWDER, FOR SOLUTION INTRAMUSCULAR; INTRAVENOUS at 06:03

## 2023-01-01 RX ADMIN — ALLOPURINOL 100 MG: 100 TABLET ORAL at 08:02

## 2023-01-01 RX ADMIN — MIDAZOLAM HYDROCHLORIDE 1 MG: 1 INJECTION, SOLUTION INTRAMUSCULAR; INTRAVENOUS at 04:03

## 2023-01-01 RX ADMIN — CEFEPIME 2 G: 2 INJECTION, POWDER, FOR SOLUTION INTRAVENOUS at 09:02

## 2023-01-01 RX ADMIN — ONDANSETRON 4 MG: 2 INJECTION INTRAMUSCULAR; INTRAVENOUS at 07:02

## 2023-01-01 RX ADMIN — HYDROXYZINE HYDROCHLORIDE 50 MG: 25 TABLET, FILM COATED ORAL at 11:03

## 2023-01-01 RX ADMIN — FENTANYL CITRATE 50 MCG: 50 INJECTION, SOLUTION INTRAMUSCULAR; INTRAVENOUS at 04:03

## 2023-01-01 RX ADMIN — CEFEPIME 2 G: 2 INJECTION, POWDER, FOR SOLUTION INTRAVENOUS at 05:03

## 2023-01-01 RX ADMIN — LACTULOSE 20 G: 20 SOLUTION ORAL at 10:02

## 2023-01-01 RX ADMIN — ACYCLOVIR 400 MG: 200 CAPSULE ORAL at 09:03

## 2023-01-01 RX ADMIN — INSULIN ASPART 3 UNITS: 100 INJECTION, SOLUTION INTRAVENOUS; SUBCUTANEOUS at 07:02

## 2023-01-01 RX ADMIN — CEFEPIME 2 G: 2 INJECTION, POWDER, FOR SOLUTION INTRAVENOUS at 12:02

## 2023-01-01 RX ADMIN — HEPARIN 500 UNITS: 100 SYRINGE at 11:01

## 2023-01-01 RX ADMIN — PANTOPRAZOLE SODIUM 40 MG: 40 TABLET, DELAYED RELEASE ORAL at 09:02

## 2023-01-01 RX ADMIN — METOPROLOL TARTRATE 50 MG: 50 TABLET, FILM COATED ORAL at 11:03

## 2023-01-01 RX ADMIN — PANTOPRAZOLE SODIUM 40 MG: 40 TABLET, DELAYED RELEASE ORAL at 09:03

## 2023-01-01 RX ADMIN — IOHEXOL 100 ML: 350 INJECTION, SOLUTION INTRAVENOUS at 02:02

## 2023-01-01 RX ADMIN — INSULIN ASPART 2 UNITS: 100 INJECTION, SOLUTION INTRAVENOUS; SUBCUTANEOUS at 08:02

## 2023-01-01 RX ADMIN — PANTOPRAZOLE SODIUM 40 MG: 40 TABLET, DELAYED RELEASE ORAL at 08:03

## 2023-01-01 RX ADMIN — HYDROMORPHONE HYDROCHLORIDE 1 MG: 1 INJECTION, SOLUTION INTRAMUSCULAR; INTRAVENOUS; SUBCUTANEOUS at 06:02

## 2023-01-01 RX ADMIN — TAMSULOSIN HYDROCHLORIDE 0.4 MG: 0.4 CAPSULE ORAL at 09:03

## 2023-01-01 RX ADMIN — ACETAMINOPHEN 650 MG: 325 TABLET ORAL at 10:03

## 2023-01-01 RX ADMIN — INSULIN ASPART 4 UNITS: 100 INJECTION, SOLUTION INTRAVENOUS; SUBCUTANEOUS at 12:02

## 2023-01-01 RX ADMIN — CYANOCOBALAMIN TAB 1000 MCG 1000 MCG: 1000 TAB at 08:02

## 2023-01-01 RX ADMIN — Medication 500 MG: at 09:02

## 2023-01-01 RX ADMIN — INSULIN ASPART 7 UNITS: 100 INJECTION, SOLUTION INTRAVENOUS; SUBCUTANEOUS at 12:02

## 2023-01-01 RX ADMIN — MORPHINE SULFATE 15 MG: 15 TABLET, EXTENDED RELEASE ORAL at 08:02

## 2023-01-01 RX ADMIN — FENTANYL CITRATE 25 MCG: 50 INJECTION, SOLUTION INTRAMUSCULAR; INTRAVENOUS at 04:03

## 2023-01-01 RX ADMIN — SODIUM CHLORIDE 1000 ML: 0.9 INJECTION, SOLUTION INTRAVENOUS at 12:01

## 2023-01-01 RX ADMIN — PANTOPRAZOLE SODIUM 40 MG: 40 TABLET, DELAYED RELEASE ORAL at 10:03

## 2023-01-01 RX ADMIN — NOREPINEPHRINE BITARTRATE 0.02 MCG/KG/MIN: 4 INJECTION, SOLUTION INTRAVENOUS at 12:03

## 2023-01-01 RX ADMIN — METOPROLOL TARTRATE 50 MG: 50 TABLET, FILM COATED ORAL at 12:03

## 2023-01-01 RX ADMIN — METOROPROLOL TARTRATE 5 MG: 5 INJECTION, SOLUTION INTRAVENOUS at 09:03

## 2023-01-01 RX ADMIN — LIDOCAINE 1 PATCH: 50 PATCH TOPICAL at 11:02

## 2023-01-01 RX ADMIN — ALLOPURINOL 100 MG: 100 TABLET ORAL at 08:03

## 2023-01-01 RX ADMIN — TAMSULOSIN HYDROCHLORIDE 0.4 MG: 0.4 CAPSULE ORAL at 08:03

## 2023-01-01 RX ADMIN — SODIUM CHLORIDE: 0.9 INJECTION, SOLUTION INTRAVENOUS at 03:02

## 2023-01-01 RX ADMIN — DEXAMETHASONE 40 MG: 4 TABLET ORAL at 11:03

## 2023-01-01 RX ADMIN — INSULIN ASPART 8 UNITS: 100 INJECTION, SOLUTION INTRAVENOUS; SUBCUTANEOUS at 12:03

## 2023-01-01 RX ADMIN — CHLORHEXIDINE GLUCONATE 15 ML: 1.2 RINSE ORAL at 08:02

## 2023-01-01 RX ADMIN — SODIUM CHLORIDE, PRESERVATIVE FREE 10 ML: 5 INJECTION INTRAVENOUS at 09:02

## 2023-01-01 RX ADMIN — DEXAMETHASONE 40 MG: 4 TABLET ORAL at 08:03

## 2023-01-01 RX ADMIN — ETOMIDATE 16 MG: 2 INJECTION INTRAVENOUS at 06:03

## 2023-01-01 RX ADMIN — HYDROMORPHONE HYDROCHLORIDE 1 MG: 1 INJECTION, SOLUTION INTRAMUSCULAR; INTRAVENOUS; SUBCUTANEOUS at 05:03

## 2023-01-01 RX ADMIN — LACTULOSE 20 G: 20 SOLUTION ORAL at 08:03

## 2023-01-01 RX ADMIN — MIDAZOLAM HYDROCHLORIDE 0.5 MG: 1 INJECTION, SOLUTION INTRAMUSCULAR; INTRAVENOUS at 04:03

## 2023-01-01 RX ADMIN — METRONIDAZOLE 500 MG: 500 TABLET ORAL at 09:02

## 2023-01-01 RX ADMIN — EPINEPHRINE 0.1 MCG/KG/MIN: 1 INJECTION INTRAMUSCULAR; INTRAVENOUS; SUBCUTANEOUS at 07:03

## 2023-01-01 RX ADMIN — SENNOSIDES AND DOCUSATE SODIUM 1 TABLET: 50; 8.6 TABLET ORAL at 08:02

## 2023-01-01 RX ADMIN — ONDANSETRON 4 MG: 2 INJECTION INTRAMUSCULAR; INTRAVENOUS at 06:02

## 2023-01-01 RX ADMIN — THERA TABS 1 TABLET: TAB at 08:02

## 2023-01-01 RX ADMIN — PHENYLEPHRINE HYDROCHLORIDE 0.5 MCG/KG/MIN: 10 INJECTION INTRAVENOUS at 10:03

## 2023-01-01 RX ADMIN — IOHEXOL 100 ML: 350 INJECTION, SOLUTION INTRAVENOUS at 12:01

## 2023-01-01 RX ADMIN — INSULIN ASPART 7 UNITS: 100 INJECTION, SOLUTION INTRAVENOUS; SUBCUTANEOUS at 06:02

## 2023-01-01 RX ADMIN — INSULIN DETEMIR 5 UNITS: 100 INJECTION, SOLUTION SUBCUTANEOUS at 11:02

## 2023-01-01 RX ADMIN — LACTULOSE 20 G: 20 SOLUTION ORAL at 04:03

## 2023-01-01 RX ADMIN — ACYCLOVIR 400 MG: 400 TABLET ORAL at 08:02

## 2023-01-01 RX ADMIN — INSULIN ASPART 3 UNITS: 100 INJECTION, SOLUTION INTRAVENOUS; SUBCUTANEOUS at 08:02

## 2023-01-01 RX ADMIN — REMDESIVIR 100 MG: 100 INJECTION, POWDER, LYOPHILIZED, FOR SOLUTION INTRAVENOUS at 08:02

## 2023-01-01 RX ADMIN — Medication: at 01:03

## 2023-01-01 RX ADMIN — TAMSULOSIN HYDROCHLORIDE 0.4 MG: 0.4 CAPSULE ORAL at 08:02

## 2023-01-01 RX ADMIN — TRAZODONE HYDROCHLORIDE 50 MG: 50 TABLET ORAL at 09:03

## 2023-01-01 RX ADMIN — HYDROMORPHONE HYDROCHLORIDE 1 MG: 1 INJECTION, SOLUTION INTRAMUSCULAR; INTRAVENOUS; SUBCUTANEOUS at 11:02

## 2023-01-01 RX ADMIN — INSULIN ASPART 6 UNITS: 100 INJECTION, SOLUTION INTRAVENOUS; SUBCUTANEOUS at 08:03

## 2023-01-01 RX ADMIN — ACETAMINOPHEN 650 MG: 325 TABLET ORAL at 01:03

## 2023-01-01 RX ADMIN — SODIUM CHLORIDE 1000 ML: 0.9 INJECTION, SOLUTION INTRAVENOUS at 02:01

## 2023-01-01 RX ADMIN — INSULIN ASPART 2 UNITS: 100 INJECTION, SOLUTION INTRAVENOUS; SUBCUTANEOUS at 11:03

## 2023-01-01 RX ADMIN — INSULIN ASPART 3 UNITS: 100 INJECTION, SOLUTION INTRAVENOUS; SUBCUTANEOUS at 01:02

## 2023-01-01 RX ADMIN — Medication: at 02:03

## 2023-01-01 RX ADMIN — METOROPROLOL TARTRATE 5 MG: 5 INJECTION, SOLUTION INTRAVENOUS at 05:03

## 2023-01-01 RX ADMIN — SODIUM CHLORIDE 1000 ML: 0.9 INJECTION, SOLUTION INTRAVENOUS at 09:01

## 2023-01-01 RX ADMIN — SODIUM CHLORIDE, PRESERVATIVE FREE 10 ML: 5 INJECTION INTRAVENOUS at 04:02

## 2023-01-01 RX ADMIN — SODIUM CHLORIDE 500 ML: 0.9 INJECTION, SOLUTION INTRAVENOUS at 05:02

## 2023-01-01 RX ADMIN — HYDROCODONE BITARTRATE AND ACETAMINOPHEN 1 TABLET: 10; 325 TABLET ORAL at 02:02

## 2023-01-01 RX ADMIN — INSULIN ASPART 2 UNITS: 100 INJECTION, SOLUTION INTRAVENOUS; SUBCUTANEOUS at 09:02

## 2023-01-01 RX ADMIN — ACYCLOVIR 400 MG: 200 CAPSULE ORAL at 09:02

## 2023-01-01 RX ADMIN — NALXONE HYDROCHLORIDE 0.02 MG: 0.4 INJECTION INTRAMUSCULAR; INTRAVENOUS; SUBCUTANEOUS at 06:03

## 2023-01-01 RX ADMIN — DEXAMETHASONE 40 MG: 4 TABLET ORAL at 02:03

## 2023-01-01 RX ADMIN — ALLOPURINOL 300 MG: 100 TABLET ORAL at 09:03

## 2023-01-01 RX ADMIN — SULFAMETHOXAZOLE AND TRIMETHOPRIM 1 TABLET: 800; 160 TABLET ORAL at 08:03

## 2023-01-01 RX ADMIN — POLYETHYLENE GLYCOL 3350 17 G: 17 POWDER, FOR SOLUTION ORAL at 09:03

## 2023-01-01 RX ADMIN — THERA TABS 1 TABLET: TAB at 08:03

## 2023-01-01 RX ADMIN — SODIUM CHLORIDE, POTASSIUM CHLORIDE, SODIUM LACTATE AND CALCIUM CHLORIDE: 600; 310; 30; 20 INJECTION, SOLUTION INTRAVENOUS at 02:02

## 2023-01-01 RX ADMIN — ONDANSETRON 4 MG: 2 INJECTION INTRAMUSCULAR; INTRAVENOUS at 09:02

## 2023-01-01 RX ADMIN — METRONIDAZOLE 500 MG: 500 TABLET ORAL at 05:02

## 2023-01-01 RX ADMIN — SODIUM CHLORIDE, POTASSIUM CHLORIDE, SODIUM LACTATE AND CALCIUM CHLORIDE 1000 ML: 600; 310; 30; 20 INJECTION, SOLUTION INTRAVENOUS at 05:03

## 2023-01-01 RX ADMIN — CEFEPIME 2 G: 2 INJECTION, POWDER, FOR SOLUTION INTRAVENOUS at 09:03

## 2023-01-01 RX ADMIN — PIPERACILLIN SODIUM AND TAZOBACTAM SODIUM 3.38 G: 3; .375 INJECTION, POWDER, LYOPHILIZED, FOR SOLUTION INTRAVENOUS at 07:02

## 2023-01-01 RX ADMIN — Medication 500 MG: at 08:02

## 2023-01-01 RX ADMIN — ONDANSETRON 8 MG: 2 INJECTION INTRAMUSCULAR; INTRAVENOUS at 03:03

## 2023-01-01 RX ADMIN — VANCOMYCIN HYDROCHLORIDE 1500 MG: 1.5 INJECTION, POWDER, LYOPHILIZED, FOR SOLUTION INTRAVENOUS at 05:02

## 2023-01-01 RX ADMIN — HYDROCODONE BITARTRATE AND ACETAMINOPHEN 1 TABLET: 10; 325 TABLET ORAL at 07:02

## 2023-01-01 RX ADMIN — LACTULOSE 20 G: 20 SOLUTION ORAL at 09:03

## 2023-01-01 RX ADMIN — INSULIN ASPART 4 UNITS: 100 INJECTION, SOLUTION INTRAVENOUS; SUBCUTANEOUS at 11:03

## 2023-01-01 RX ADMIN — PREDNISONE 50 MG: 5 TABLET ORAL at 08:02

## 2023-01-01 RX ADMIN — AMIODARONE HYDROCHLORIDE 200 MG: 200 TABLET ORAL at 09:03

## 2023-01-01 RX ADMIN — DOXORUBICIN HYDROCHLORIDE 112 MG: 2 INJECTION, SOLUTION INTRAVENOUS at 12:01

## 2023-01-01 RX ADMIN — PANTOPRAZOLE SODIUM 40 MG: 40 TABLET, DELAYED RELEASE ORAL at 08:02

## 2023-01-01 RX ADMIN — VANCOMYCIN HYDROCHLORIDE 1500 MG: 1.5 INJECTION, POWDER, LYOPHILIZED, FOR SOLUTION INTRAVENOUS at 09:02

## 2023-01-01 RX ADMIN — LACTULOSE 20 G: 20 SOLUTION ORAL at 08:02

## 2023-01-01 RX ADMIN — VANCOMYCIN HYDROCHLORIDE 1250 MG: 1.25 INJECTION, POWDER, LYOPHILIZED, FOR SOLUTION INTRAVENOUS at 11:03

## 2023-01-01 RX ADMIN — SENNOSIDES AND DOCUSATE SODIUM 1 TABLET: 50; 8.6 TABLET ORAL at 09:02

## 2023-01-01 RX ADMIN — THERA TABS 1 TABLET: TAB at 09:03

## 2023-01-01 RX ADMIN — ACYCLOVIR 400 MG: 200 CAPSULE ORAL at 08:02

## 2023-01-01 RX ADMIN — POLYETHYLENE GLYCOL 3350 17 G: 17 POWDER, FOR SOLUTION ORAL at 08:02

## 2023-01-01 RX ADMIN — HYDROMORPHONE HYDROCHLORIDE 1 MG: 1 INJECTION, SOLUTION INTRAMUSCULAR; INTRAVENOUS; SUBCUTANEOUS at 04:03

## 2023-01-01 RX ADMIN — METOPROLOL TARTRATE 50 MG: 50 TABLET, FILM COATED ORAL at 05:03

## 2023-01-01 RX ADMIN — INSULIN DETEMIR 6 UNITS: 100 INJECTION, SOLUTION SUBCUTANEOUS at 08:02

## 2023-01-01 RX ADMIN — MUPIROCIN 1 G: 20 OINTMENT TOPICAL at 08:02

## 2023-01-01 RX ADMIN — ALLOPURINOL 100 MG: 100 TABLET ORAL at 09:02

## 2023-01-01 RX ADMIN — FUROSEMIDE 40 MG: 10 INJECTION, SOLUTION INTRAMUSCULAR; INTRAVENOUS at 05:03

## 2023-01-01 RX ADMIN — INSULIN ASPART 1 UNITS: 100 INJECTION, SOLUTION INTRAVENOUS; SUBCUTANEOUS at 11:02

## 2023-01-01 RX ADMIN — METOPROLOL TARTRATE 12.5 MG: 25 TABLET, FILM COATED ORAL at 03:03

## 2023-01-01 RX ADMIN — INSULIN ASPART 4 UNITS: 100 INJECTION, SOLUTION INTRAVENOUS; SUBCUTANEOUS at 04:03

## 2023-01-01 RX ADMIN — MUPIROCIN: 20 OINTMENT TOPICAL at 09:03

## 2023-01-01 RX ADMIN — HYDROCODONE BITARTRATE AND ACETAMINOPHEN 1 TABLET: 10; 325 TABLET ORAL at 08:02

## 2023-01-01 RX ADMIN — INSULIN ASPART 3 UNITS: 100 INJECTION, SOLUTION INTRAVENOUS; SUBCUTANEOUS at 05:02

## 2023-01-01 RX ADMIN — HYDROXYZINE HYDROCHLORIDE 50 MG: 25 TABLET, FILM COATED ORAL at 10:02

## 2023-01-01 RX ADMIN — CYANOCOBALAMIN TAB 1000 MCG 1000 MCG: 1000 TAB at 07:02

## 2023-01-01 RX ADMIN — Medication: at 12:03

## 2023-01-01 RX ADMIN — ACETAMINOPHEN 650 MG: 325 TABLET ORAL at 09:01

## 2023-01-01 RX ADMIN — SODIUM CHLORIDE 250 ML: 0.9 INJECTION, SOLUTION INTRAVENOUS at 06:03

## 2023-01-01 RX ADMIN — CHLORPROMAZINE HYDROCHLORIDE 25 MG: 25 TABLET, FILM COATED ORAL at 10:02

## 2023-01-01 RX ADMIN — METOPROLOL TARTRATE 50 MG: 50 TABLET, FILM COATED ORAL at 08:03

## 2023-01-01 RX ADMIN — TAMSULOSIN HYDROCHLORIDE 0.4 MG: 0.4 CAPSULE ORAL at 09:02

## 2023-01-01 RX ADMIN — HYDROCODONE BITARTRATE AND ACETAMINOPHEN 1 TABLET: 5; 325 TABLET ORAL at 05:02

## 2023-01-01 RX ADMIN — SODIUM CHLORIDE: 9 INJECTION, SOLUTION INTRAVENOUS at 09:03

## 2023-01-01 RX ADMIN — SODIUM CHLORIDE: 0.9 INJECTION, SOLUTION INTRAVENOUS at 06:02

## 2023-01-01 RX ADMIN — INSULIN ASPART 4 UNITS: 100 INJECTION, SOLUTION INTRAVENOUS; SUBCUTANEOUS at 04:02

## 2023-01-01 RX ADMIN — AMIODARONE HYDROCHLORIDE 150 MG: 1.5 INJECTION, SOLUTION INTRAVENOUS at 10:03

## 2023-01-01 RX ADMIN — VASOPRESSIN 0.04 UNITS/MIN: 20 INJECTION PARENTERAL at 11:03

## 2023-01-01 RX ADMIN — HYDROMORPHONE HYDROCHLORIDE 1 MG: 1 INJECTION, SOLUTION INTRAMUSCULAR; INTRAVENOUS; SUBCUTANEOUS at 09:02

## 2023-01-01 RX ADMIN — SODIUM CHLORIDE 1000 ML: 9 INJECTION, SOLUTION INTRAVENOUS at 06:03

## 2023-01-01 RX ADMIN — INSULIN ASPART 2 UNITS: 100 INJECTION, SOLUTION INTRAVENOUS; SUBCUTANEOUS at 10:02

## 2023-01-01 RX ADMIN — ONDANSETRON 4 MG: 2 INJECTION INTRAMUSCULAR; INTRAVENOUS at 08:02

## 2023-01-01 RX ADMIN — INSULIN ASPART 2 UNITS: 100 INJECTION, SOLUTION INTRAVENOUS; SUBCUTANEOUS at 10:03

## 2023-01-01 RX ADMIN — HYDROCODONE BITARTRATE AND ACETAMINOPHEN 1 TABLET: 10; 325 TABLET ORAL at 04:02

## 2023-01-01 RX ADMIN — NOREPINEPHRINE BITARTRATE 0.64 MCG/KG/MIN: 1 INJECTION, SOLUTION, CONCENTRATE INTRAVENOUS at 11:03

## 2023-01-01 RX ADMIN — HYDROCODONE BITARTRATE AND ACETAMINOPHEN 1 TABLET: 10; 325 TABLET ORAL at 09:02

## 2023-01-01 RX ADMIN — HYDROCODONE BITARTRATE AND ACETAMINOPHEN 1 TABLET: 10; 325 TABLET ORAL at 11:02

## 2023-01-01 RX ADMIN — DEXAMETHASONE 40 MG: 4 TABLET ORAL at 03:03

## 2023-01-01 RX ADMIN — VASOPRESSIN 0.04 UNITS/MIN: 20 INJECTION PARENTERAL at 03:03

## 2023-01-01 RX ADMIN — ALLOPURINOL 300 MG: 100 TABLET ORAL at 08:03

## 2023-01-01 RX ADMIN — LACTULOSE 20 G: 20 SOLUTION ORAL at 02:03

## 2023-01-01 RX ADMIN — ONDANSETRON 8 MG: 2 INJECTION INTRAMUSCULAR; INTRAVENOUS at 09:01

## 2023-01-01 RX ADMIN — VANCOMYCIN HYDROCHLORIDE 1500 MG: 1.5 INJECTION, POWDER, LYOPHILIZED, FOR SOLUTION INTRAVENOUS at 04:02

## 2023-01-01 RX ADMIN — INDOMETHACIN 50 MEQ: 25 CAPSULE ORAL at 07:03

## 2023-01-01 RX ADMIN — ENOXAPARIN SODIUM 40 MG: 100 INJECTION SUBCUTANEOUS at 08:02

## 2023-01-01 RX ADMIN — HYDROMORPHONE HYDROCHLORIDE 1 MG: 1 INJECTION, SOLUTION INTRAMUSCULAR; INTRAVENOUS; SUBCUTANEOUS at 05:02

## 2023-01-01 RX ADMIN — SODIUM BICARBONATE 50 MEQ: 84 INJECTION, SOLUTION INTRAVENOUS at 06:03

## 2023-01-01 RX ADMIN — PIPERACILLIN SODIUM AND TAZOBACTAM SODIUM 3.38 G: 3; .375 INJECTION, POWDER, LYOPHILIZED, FOR SOLUTION INTRAVENOUS at 12:02

## 2023-01-01 RX ADMIN — HYDROMORPHONE HYDROCHLORIDE 1 MG: 2 INJECTION INTRAMUSCULAR; INTRAVENOUS; SUBCUTANEOUS at 01:01

## 2023-01-01 RX ADMIN — CEFEPIME HYDROCHLORIDE 2 G: 2 INJECTION, SOLUTION INTRAVENOUS at 08:02

## 2023-01-01 RX ADMIN — FAMOTIDINE 20 MG: 10 INJECTION INTRAVENOUS at 08:03

## 2023-01-01 RX ADMIN — INSULIN ASPART 6 UNITS: 100 INJECTION, SOLUTION INTRAVENOUS; SUBCUTANEOUS at 12:02

## 2023-01-01 RX ADMIN — DOCUSATE SODIUM 50 MG: 50 CAPSULE, LIQUID FILLED ORAL at 08:03

## 2023-01-01 RX ADMIN — PANTOPRAZOLE SODIUM 40 MG: 40 INJECTION, POWDER, FOR SOLUTION INTRAVENOUS at 08:02

## 2023-01-01 RX ADMIN — LORAZEPAM 1 MG: 2 INJECTION INTRAMUSCULAR; INTRAVENOUS at 01:03

## 2023-01-01 RX ADMIN — HYDROCODONE BITARTRATE AND ACETAMINOPHEN 1 TABLET: 5; 325 TABLET ORAL at 08:02

## 2023-01-01 RX ADMIN — SODIUM CHLORIDE: 0.9 INJECTION, SOLUTION INTRAVENOUS at 01:02

## 2023-01-01 RX ADMIN — MAGNESIUM SULFATE 2 G: 2 INJECTION INTRAVENOUS at 09:03

## 2023-01-01 RX ADMIN — INSULIN HUMAN 7 UNITS: 100 INJECTION, SOLUTION PARENTERAL at 02:01

## 2023-01-01 RX ADMIN — ONDANSETRON 4 MG: 2 INJECTION INTRAMUSCULAR; INTRAVENOUS at 12:03

## 2023-01-01 RX ADMIN — POTASSIUM & SODIUM PHOSPHATES POWDER PACK 280-160-250 MG 2 PACKET: 280-160-250 PACK at 12:02

## 2023-01-01 RX ADMIN — INSULIN ASPART 2 UNITS: 100 INJECTION, SOLUTION INTRAVENOUS; SUBCUTANEOUS at 08:03

## 2023-01-01 RX ADMIN — POLYETHYLENE GLYCOL 3350 17 G: 17 POWDER, FOR SOLUTION ORAL at 08:03

## 2023-01-01 RX ADMIN — PREDNISONE 50 MG: 20 TABLET ORAL at 09:02

## 2023-01-01 RX ADMIN — MAGNESIUM SULFATE 2 G: 2 INJECTION INTRAVENOUS at 11:02

## 2023-01-01 RX ADMIN — DOCUSATE SODIUM 50 MG: 50 CAPSULE, LIQUID FILLED ORAL at 09:03

## 2023-01-01 RX ADMIN — PREDNISONE 50 MG: 20 TABLET ORAL at 08:02

## 2023-01-01 RX ADMIN — DEXAMETHASONE 1 DROP: 1 SUSPENSION/ DROPS OPHTHALMIC at 09:03

## 2023-01-01 RX ADMIN — CEFEPIME 2 G: 2 INJECTION, POWDER, FOR SOLUTION INTRAVENOUS at 03:02

## 2023-01-01 RX ADMIN — Medication: at 11:03

## 2023-01-01 RX ADMIN — LORAZEPAM 0.5 MG: 0.5 TABLET ORAL at 12:03

## 2023-01-01 RX ADMIN — SODIUM CHLORIDE, PRESERVATIVE FREE 10 ML: 5 INJECTION INTRAVENOUS at 04:01

## 2023-01-01 RX ADMIN — CEFEPIME 2 G: 2 INJECTION, POWDER, FOR SOLUTION INTRAVENOUS at 04:02

## 2023-01-01 RX ADMIN — LACTULOSE 20 G: 20 SOLUTION ORAL at 03:03

## 2023-01-01 RX ADMIN — HYDROMORPHONE HYDROCHLORIDE 0.5 MG: 2 INJECTION INTRAMUSCULAR; INTRAVENOUS; SUBCUTANEOUS at 12:01

## 2023-01-01 RX ADMIN — MIDODRINE HYDROCHLORIDE 2.5 MG: 2.5 TABLET ORAL at 03:02

## 2023-01-01 RX ADMIN — CEFEPIME 2 G: 2 INJECTION, POWDER, FOR SOLUTION INTRAVENOUS at 02:03

## 2023-01-01 RX ADMIN — POLYETHYLENE GLYCOL 3350 17 G: 17 POWDER, FOR SOLUTION ORAL at 04:03

## 2023-01-01 RX ADMIN — ENOXAPARIN SODIUM 90 MG: 100 INJECTION SUBCUTANEOUS at 09:02

## 2023-01-01 RX ADMIN — SULFAMETHOXAZOLE AND TRIMETHOPRIM 1 TABLET: 400; 80 TABLET ORAL at 12:02

## 2023-01-01 RX ADMIN — HYDROCORTISONE SODIUM SUCCINATE 100 MG: 100 INJECTION, POWDER, FOR SOLUTION INTRAMUSCULAR; INTRAVENOUS at 10:03

## 2023-01-01 RX ADMIN — CEFEPIME 2 G: 2 INJECTION, POWDER, FOR SOLUTION INTRAVENOUS at 01:02

## 2023-01-01 RX ADMIN — Medication 300 UNITS: at 02:01

## 2023-01-01 RX ADMIN — MEROPENEM 1 G: 1 INJECTION INTRAVENOUS at 05:03

## 2023-01-01 RX ADMIN — INSULIN ASPART 1 UNITS: 100 INJECTION, SOLUTION INTRAVENOUS; SUBCUTANEOUS at 09:02

## 2023-01-01 RX ADMIN — METRONIDAZOLE 500 MG: 500 TABLET ORAL at 05:03

## 2023-01-01 RX ADMIN — SODIUM BICARBONATE 50 MEQ: 84 INJECTION, SOLUTION INTRAVENOUS at 07:03

## 2023-01-01 RX ADMIN — HYDROMORPHONE HYDROCHLORIDE 1 MG: 1 INJECTION, SOLUTION INTRAMUSCULAR; INTRAVENOUS; SUBCUTANEOUS at 07:02

## 2023-01-01 RX ADMIN — DIPHENHYDRAMINE HYDROCHLORIDE 25 MG: 25 CAPSULE ORAL at 11:03

## 2023-01-01 RX ADMIN — VANCOMYCIN HYDROCHLORIDE 1500 MG: 1.5 INJECTION, POWDER, LYOPHILIZED, FOR SOLUTION INTRAVENOUS at 06:02

## 2023-01-01 RX ADMIN — CISPLATIN 218 MG: 1 INJECTION, SOLUTION INTRAVENOUS at 05:03

## 2023-01-01 RX ADMIN — CEFEPIME 2 G: 2 INJECTION, POWDER, FOR SOLUTION INTRAVENOUS at 12:03

## 2023-01-01 RX ADMIN — HYDROCORTISONE SODIUM SUCCINATE 100 MG: 100 INJECTION, POWDER, FOR SOLUTION INTRAMUSCULAR; INTRAVENOUS at 06:02

## 2023-01-01 RX ADMIN — SODIUM CHLORIDE, POTASSIUM CHLORIDE, SODIUM LACTATE AND CALCIUM CHLORIDE 500 ML: 600; 310; 30; 20 INJECTION, SOLUTION INTRAVENOUS at 12:03

## 2023-01-01 RX ADMIN — ONDANSETRON 4 MG: 2 INJECTION INTRAMUSCULAR; INTRAVENOUS at 12:02

## 2023-01-01 RX ADMIN — DOCUSATE SODIUM 100 MG: 100 CAPSULE, LIQUID FILLED ORAL at 08:02

## 2023-01-01 RX ADMIN — PANTOPRAZOLE SODIUM 40 MG: 40 INJECTION, POWDER, FOR SOLUTION INTRAVENOUS at 09:02

## 2023-01-01 RX ADMIN — METOPROLOL TARTRATE 25 MG: 25 TABLET, FILM COATED ORAL at 01:03

## 2023-01-01 RX ADMIN — INSULIN ASPART 4 UNITS: 100 INJECTION, SOLUTION INTRAVENOUS; SUBCUTANEOUS at 09:03

## 2023-01-01 RX ADMIN — HYDROMORPHONE HYDROCHLORIDE 1 MG: 1 INJECTION, SOLUTION INTRAMUSCULAR; INTRAVENOUS; SUBCUTANEOUS at 12:03

## 2023-01-01 RX ADMIN — MUPIROCIN: 20 OINTMENT TOPICAL at 08:03

## 2023-01-01 RX ADMIN — DEXAMETHASONE 1 DROP: 1 SUSPENSION/ DROPS OPHTHALMIC at 04:03

## 2023-01-01 RX ADMIN — CEFEPIME 2 G: 2 INJECTION, POWDER, FOR SOLUTION INTRAVENOUS at 08:02

## 2023-01-01 RX ADMIN — HYDROMORPHONE HYDROCHLORIDE 1 MG: 1 INJECTION, SOLUTION INTRAMUSCULAR; INTRAVENOUS; SUBCUTANEOUS at 03:02

## 2023-01-01 RX ADMIN — IOHEXOL 100 ML: 350 INJECTION, SOLUTION INTRAVENOUS at 01:02

## 2023-01-01 RX ADMIN — INSULIN DETEMIR 5 UNITS: 100 INJECTION, SOLUTION SUBCUTANEOUS at 08:02

## 2023-01-01 RX ADMIN — HYDROCODONE BITARTRATE AND ACETAMINOPHEN 1 TABLET: 5; 325 TABLET ORAL at 03:02

## 2023-01-01 RX ADMIN — SODIUM CHLORIDE: 9 INJECTION, SOLUTION INTRAVENOUS at 10:03

## 2023-01-01 RX ADMIN — HYDROCODONE BITARTRATE AND ACETAMINOPHEN 1 TABLET: 10; 325 TABLET ORAL at 10:02

## 2023-01-01 RX ADMIN — HYDROMORPHONE HYDROCHLORIDE 1 MG: 1 INJECTION, SOLUTION INTRAMUSCULAR; INTRAVENOUS; SUBCUTANEOUS at 03:03

## 2023-01-01 RX ADMIN — ONDANSETRON 4 MG: 2 INJECTION INTRAMUSCULAR; INTRAVENOUS at 10:02

## 2023-01-01 RX ADMIN — AMIODARONE HYDROCHLORIDE 0.5 MG/MIN: 50 INJECTION, SOLUTION INTRAVENOUS at 04:03

## 2023-01-01 RX ADMIN — REMDESIVIR 200 MG: 100 INJECTION, POWDER, LYOPHILIZED, FOR SOLUTION INTRAVENOUS at 06:02

## 2023-01-01 RX ADMIN — PROPOFOL 15 MCG/KG/MIN: 10 INJECTION, EMULSION INTRAVENOUS at 03:03

## 2023-01-01 RX ADMIN — INSULIN ASPART 2 UNITS: 100 INJECTION, SOLUTION INTRAVENOUS; SUBCUTANEOUS at 01:03

## 2023-01-01 RX ADMIN — CEFEPIME 2 G: 2 INJECTION, POWDER, FOR SOLUTION INTRAVENOUS at 07:02

## 2023-01-01 RX ADMIN — DIGOXIN 250 MCG: 250 INJECTION, SOLUTION INTRAMUSCULAR; INTRAVENOUS at 10:03

## 2023-01-01 RX ADMIN — SODIUM CHLORIDE: 0.9 INJECTION, SOLUTION INTRAVENOUS at 02:02

## 2023-01-01 RX ADMIN — INSULIN DETEMIR 6 UNITS: 100 INJECTION, SOLUTION SUBCUTANEOUS at 09:02

## 2023-01-01 RX ADMIN — HYDROXYZINE HYDROCHLORIDE 50 MG: 25 TABLET, FILM COATED ORAL at 09:03

## 2023-01-01 RX ADMIN — METRONIDAZOLE 500 MG: 500 TABLET ORAL at 09:03

## 2023-01-01 RX ADMIN — APREPITANT 130 MG: 130 INJECTION, EMULSION INTRAVENOUS at 04:03

## 2023-01-01 RX ADMIN — PREDNISONE 50 MG: 50 TABLET ORAL at 08:02

## 2023-01-01 RX ADMIN — VANCOMYCIN HYDROCHLORIDE 500 MG: 500 INJECTION, POWDER, LYOPHILIZED, FOR SOLUTION INTRAVENOUS at 08:02

## 2023-01-01 RX ADMIN — METRONIDAZOLE 500 MG: 5 INJECTION, SOLUTION INTRAVENOUS at 10:02

## 2023-01-01 RX ADMIN — HYDROCODONE BITARTRATE AND ACETAMINOPHEN 1 TABLET: 10; 325 TABLET ORAL at 03:02

## 2023-01-01 RX ADMIN — AMIODARONE HYDROCHLORIDE 200 MG: 200 TABLET ORAL at 08:03

## 2023-01-01 RX ADMIN — NOREPINEPHRINE BITARTRATE 0.46 MCG/KG/MIN: 4 INJECTION, SOLUTION INTRAVENOUS at 08:03

## 2023-01-01 RX ADMIN — ACYCLOVIR 400 MG: 400 TABLET ORAL at 09:02

## 2023-01-01 RX ADMIN — METOPROLOL TARTRATE 25 MG: 25 TABLET, FILM COATED ORAL at 06:03

## 2023-01-01 RX ADMIN — INSULIN ASPART 5 UNITS: 100 INJECTION, SOLUTION INTRAVENOUS; SUBCUTANEOUS at 08:02

## 2023-01-01 RX ADMIN — Medication: at 10:03

## 2023-01-01 RX ADMIN — DIPHENHYDRAMINE HYDROCHLORIDE 50 MG: 50 INJECTION INTRAMUSCULAR; INTRAVENOUS at 09:01

## 2023-01-01 RX ADMIN — HYDROCODONE BITARTRATE AND ACETAMINOPHEN 1 TABLET: 10; 325 TABLET ORAL at 06:02

## 2023-01-01 RX ADMIN — EPINEPHRINE 0.7 MCG/KG/MIN: 1 INJECTION INTRAMUSCULAR; INTRAVENOUS; SUBCUTANEOUS at 09:03

## 2023-01-01 RX ADMIN — THERA TABS 1 TABLET: TAB at 09:02

## 2023-01-01 RX ADMIN — AMIODARONE HYDROCHLORIDE 200 MG: 200 TABLET ORAL at 10:03

## 2023-01-01 RX ADMIN — SODIUM CHLORIDE 1000 ML: 0.9 INJECTION, SOLUTION INTRAVENOUS at 08:02

## 2023-01-01 RX ADMIN — METOROPROLOL TARTRATE 5 MG: 5 INJECTION, SOLUTION INTRAVENOUS at 06:03

## 2023-01-01 RX ADMIN — SODIUM CHLORIDE, SODIUM LACTATE, POTASSIUM CHLORIDE, AND CALCIUM CHLORIDE 1000 ML: .6; .31; .03; .02 INJECTION, SOLUTION INTRAVENOUS at 12:02

## 2023-01-01 RX ADMIN — SODIUM CHLORIDE, POTASSIUM CHLORIDE, SODIUM LACTATE AND CALCIUM CHLORIDE 500 ML: 600; 310; 30; 20 INJECTION, SOLUTION INTRAVENOUS at 09:02

## 2023-01-01 RX ADMIN — SODIUM CHLORIDE, PRESERVATIVE FREE 10 ML: 5 INJECTION INTRAVENOUS at 02:01

## 2023-01-01 RX ADMIN — VANCOMYCIN HYDROCHLORIDE 2000 MG: 500 INJECTION, POWDER, LYOPHILIZED, FOR SOLUTION INTRAVENOUS at 04:03

## 2023-01-01 RX ADMIN — HYDROCORTISONE SODIUM SUCCINATE 100 MG: 100 INJECTION, POWDER, FOR SOLUTION INTRAMUSCULAR; INTRAVENOUS at 08:02

## 2023-01-01 RX ADMIN — SULFAMETHOXAZOLE AND TRIMETHOPRIM 1 TABLET: 800; 160 TABLET ORAL at 04:02

## 2023-01-01 RX ADMIN — VANCOMYCIN HYDROCHLORIDE 1000 MG: 1 INJECTION, POWDER, LYOPHILIZED, FOR SOLUTION INTRAVENOUS at 05:02

## 2023-01-01 RX ADMIN — CEFEPIME 2 G: 2 INJECTION, POWDER, FOR SOLUTION INTRAVENOUS at 06:03

## 2023-01-01 RX ADMIN — Medication 25 MCG/HR: at 01:03

## 2023-01-01 RX ADMIN — INSULIN ASPART 7 UNITS: 100 INJECTION, SOLUTION INTRAVENOUS; SUBCUTANEOUS at 08:02

## 2023-01-01 RX ADMIN — VANCOMYCIN HYDROCHLORIDE 1000 MG: 1 INJECTION, POWDER, LYOPHILIZED, FOR SOLUTION INTRAVENOUS at 09:02

## 2023-01-01 RX ADMIN — CHLORHEXIDINE GLUCONATE 15 ML: 1.2 RINSE ORAL at 09:02

## 2023-01-01 RX ADMIN — PREDNISONE 50 MG: 50 TABLET ORAL at 09:02

## 2023-01-01 RX ADMIN — HYDROMORPHONE HYDROCHLORIDE 1 MG: 1 INJECTION, SOLUTION INTRAMUSCULAR; INTRAVENOUS; SUBCUTANEOUS at 02:03

## 2023-01-01 RX ADMIN — SODIUM CHLORIDE 800 MG: 0.9 INJECTION, SOLUTION INTRAVENOUS at 10:01

## 2023-01-01 RX ADMIN — HYDROMORPHONE HYDROCHLORIDE 0.5 MG: 1 INJECTION, SOLUTION INTRAMUSCULAR; INTRAVENOUS; SUBCUTANEOUS at 11:03

## 2023-01-01 RX ADMIN — HYDROXYZINE HYDROCHLORIDE 50 MG: 25 TABLET, FILM COATED ORAL at 11:02

## 2023-01-01 RX ADMIN — CEFEPIME 2 G: 2 INJECTION, POWDER, FOR SOLUTION INTRAVENOUS at 03:03

## 2023-01-01 RX ADMIN — PIPERACILLIN SODIUM AND TAZOBACTAM SODIUM 3.38 G: 3; .375 INJECTION, POWDER, LYOPHILIZED, FOR SOLUTION INTRAVENOUS at 01:02

## 2023-01-01 RX ADMIN — DIGOXIN 500 MCG: 0.25 INJECTION INTRAMUSCULAR; INTRAVENOUS at 08:03

## 2023-01-01 RX ADMIN — ASPIRIN 325 MG ORAL TABLET 325 MG: 325 PILL ORAL at 04:02

## 2023-01-01 RX ADMIN — INSULIN ASPART 2 UNITS: 100 INJECTION, SOLUTION INTRAVENOUS; SUBCUTANEOUS at 05:03

## 2023-01-01 RX ADMIN — VANCOMYCIN HYDROCHLORIDE 1750 MG: 500 INJECTION, POWDER, LYOPHILIZED, FOR SOLUTION INTRAVENOUS at 04:02

## 2023-01-01 RX ADMIN — INSULIN ASPART 7 UNITS: 100 INJECTION, SOLUTION INTRAVENOUS; SUBCUTANEOUS at 09:02

## 2023-01-01 RX ADMIN — ONDANSETRON 4 MG: 2 INJECTION INTRAMUSCULAR; INTRAVENOUS at 01:02

## 2023-01-01 RX ADMIN — Medication: at 08:03

## 2023-01-01 RX ADMIN — ONDANSETRON HYDROCHLORIDE 4 MG: 2 SOLUTION INTRAMUSCULAR; INTRAVENOUS at 12:01

## 2023-01-01 RX ADMIN — SODIUM CHLORIDE, POTASSIUM CHLORIDE, SODIUM LACTATE AND CALCIUM CHLORIDE 1000 ML: 600; 310; 30; 20 INJECTION, SOLUTION INTRAVENOUS at 07:03

## 2023-01-01 RX ADMIN — SODIUM CHLORIDE, POTASSIUM CHLORIDE, SODIUM LACTATE AND CALCIUM CHLORIDE: 600; 310; 30; 20 INJECTION, SOLUTION INTRAVENOUS at 12:02

## 2023-01-01 RX ADMIN — HYDROCODONE BITARTRATE AND ACETAMINOPHEN 1 TABLET: 5; 325 TABLET ORAL at 10:02

## 2023-01-01 RX ADMIN — METOROPROLOL TARTRATE 5 MG: 5 INJECTION, SOLUTION INTRAVENOUS at 07:03

## 2023-01-01 RX ADMIN — TRAZODONE HYDROCHLORIDE 50 MG: 50 TABLET ORAL at 08:02

## 2023-01-01 RX ADMIN — SODIUM CHLORIDE, POTASSIUM CHLORIDE, SODIUM LACTATE AND CALCIUM CHLORIDE 1000 ML: 600; 310; 30; 20 INJECTION, SOLUTION INTRAVENOUS at 06:02

## 2023-01-01 RX ADMIN — CYCLOPHOSPHAMIDE 1660 MG: 200 INJECTION, SOLUTION INTRAVENOUS at 12:01

## 2023-01-01 RX ADMIN — Medication 800 MG: at 04:02

## 2023-01-01 RX ADMIN — INSULIN ASPART 4 UNITS: 100 INJECTION, SOLUTION INTRAVENOUS; SUBCUTANEOUS at 12:03

## 2023-01-01 RX ADMIN — METRONIDAZOLE 500 MG: 500 TABLET ORAL at 08:02

## 2023-01-01 RX ADMIN — HYDROMORPHONE HYDROCHLORIDE 1 MG: 1 INJECTION, SOLUTION INTRAMUSCULAR; INTRAVENOUS; SUBCUTANEOUS at 08:03

## 2023-01-01 RX ADMIN — SULFAMETHOXAZOLE AND TRIMETHOPRIM 1 TABLET: 800; 160 TABLET ORAL at 09:02

## 2023-01-01 RX ADMIN — INSULIN ASPART 8 UNITS: 100 INJECTION, SOLUTION INTRAVENOUS; SUBCUTANEOUS at 10:03

## 2023-01-01 RX ADMIN — MORPHINE SULFATE 2 MG: 2 INJECTION, SOLUTION INTRAMUSCULAR; INTRAVENOUS at 03:02

## 2023-01-01 RX ADMIN — METRONIDAZOLE 500 MG: 500 TABLET ORAL at 02:03

## 2023-01-01 RX ADMIN — INSULIN ASPART 2 UNITS: 100 INJECTION, SOLUTION INTRAVENOUS; SUBCUTANEOUS at 06:03

## 2023-01-01 RX ADMIN — SODIUM CHLORIDE 1000 ML: 9 INJECTION, SOLUTION INTRAVENOUS at 02:03

## 2023-01-01 RX ADMIN — CHLORPROMAZINE HYDROCHLORIDE 25 MG: 25 TABLET, FILM COATED ORAL at 08:02

## 2023-01-01 RX ADMIN — OXYCODONE 5 MG: 5 TABLET ORAL at 06:02

## 2023-01-01 RX ADMIN — DOCUSATE SODIUM 50 MG: 50 CAPSULE, LIQUID FILLED ORAL at 09:02

## 2023-01-01 RX ADMIN — VANCOMYCIN HYDROCHLORIDE 1750 MG: 500 INJECTION, POWDER, LYOPHILIZED, FOR SOLUTION INTRAVENOUS at 08:03

## 2023-01-01 RX ADMIN — METRONIDAZOLE 500 MG: 500 TABLET ORAL at 06:02

## 2023-01-01 RX ADMIN — ACYCLOVIR 400 MG: 400 TABLET ORAL at 12:02

## 2023-01-01 RX ADMIN — ALBUTEROL SULFATE 2 PUFF: 108 INHALANT RESPIRATORY (INHALATION) at 06:02

## 2023-01-01 RX ADMIN — POLYETHYLENE GLYCOL 3350, SODIUM SULFATE ANHYDROUS, SODIUM BICARBONATE, SODIUM CHLORIDE, POTASSIUM CHLORIDE 4000 ML: 236; 22.74; 6.74; 5.86; 2.97 POWDER, FOR SOLUTION ORAL at 10:03

## 2023-01-01 RX ADMIN — SODIUM CHLORIDE, POTASSIUM CHLORIDE, SODIUM LACTATE AND CALCIUM CHLORIDE 1000 ML: 600; 310; 30; 20 INJECTION, SOLUTION INTRAVENOUS at 04:02

## 2023-01-01 RX ADMIN — SODIUM CHLORIDE, SODIUM LACTATE, POTASSIUM CHLORIDE, AND CALCIUM CHLORIDE 1000 ML: .6; .31; .03; .02 INJECTION, SOLUTION INTRAVENOUS at 04:02

## 2023-01-01 RX ADMIN — METRONIDAZOLE 500 MG: 500 TABLET ORAL at 03:02

## 2023-01-01 RX ADMIN — ACETAMINOPHEN 650 MG: 325 TABLET ORAL at 11:03

## 2023-01-01 RX ADMIN — ACYCLOVIR 400 MG: 400 TABLET ORAL at 07:02

## 2023-01-01 RX ADMIN — ALLOPURINOL 100 MG: 100 TABLET ORAL at 09:03

## 2023-01-01 RX ADMIN — LACTULOSE 20 G: 20 SOLUTION ORAL at 03:02

## 2023-01-01 RX ADMIN — Medication: at 03:03

## 2023-01-01 RX ADMIN — SODIUM CHLORIDE: 9 INJECTION, SOLUTION INTRAVENOUS at 08:03

## 2023-01-01 RX ADMIN — HYDROMORPHONE HYDROCHLORIDE 2 MG: 1 INJECTION, SOLUTION INTRAMUSCULAR; INTRAVENOUS; SUBCUTANEOUS at 10:03

## 2023-01-01 RX ADMIN — FAMOTIDINE 20 MG: 10 INJECTION INTRAVENOUS at 09:01

## 2023-01-01 RX ADMIN — HYDROCODONE BITARTRATE AND ACETAMINOPHEN 1 TABLET: 10; 325 TABLET ORAL at 01:02

## 2023-01-01 RX ADMIN — MAGNESIUM SULFATE 2 G: 2 INJECTION INTRAVENOUS at 04:02

## 2023-01-01 RX ADMIN — POLYETHYLENE GLYCOL 3350 17 G: 17 POWDER, FOR SOLUTION ORAL at 10:03

## 2023-01-01 RX ADMIN — METRONIDAZOLE 500 MG: 5 INJECTION, SOLUTION INTRAVENOUS at 03:02

## 2023-01-01 RX ADMIN — MORPHINE SULFATE 15 MG: 15 TABLET, EXTENDED RELEASE ORAL at 09:03

## 2023-01-01 RX ADMIN — FAMOTIDINE 20 MG: 20 TABLET, FILM COATED ORAL at 08:02

## 2023-01-01 RX ADMIN — PIPERACILLIN SODIUM AND TAZOBACTAM SODIUM 4.5 G: 4; .5 INJECTION, POWDER, LYOPHILIZED, FOR SOLUTION INTRAVENOUS at 08:02

## 2023-01-01 RX ADMIN — METOPROLOL TARTRATE 25 MG: 25 TABLET, FILM COATED ORAL at 04:03

## 2023-01-01 RX ADMIN — PIPERACILLIN SODIUM AND TAZOBACTAM SODIUM 3.38 G: 3; .375 INJECTION, POWDER, LYOPHILIZED, FOR SOLUTION INTRAVENOUS at 03:02

## 2023-01-01 RX ADMIN — INSULIN ASPART 5 UNITS: 100 INJECTION, SOLUTION INTRAVENOUS; SUBCUTANEOUS at 10:02

## 2023-01-01 RX ADMIN — INSULIN ASPART 3 UNITS: 100 INJECTION, SOLUTION INTRAVENOUS; SUBCUTANEOUS at 06:02

## 2023-01-01 RX ADMIN — CYTARABINE 2180 MG: 100 INJECTION, SOLUTION INTRATHECAL; INTRAVENOUS; SUBCUTANEOUS at 06:03

## 2023-01-01 RX ADMIN — ACETAMINOPHEN 650 MG: 325 TABLET ORAL at 04:03

## 2023-01-01 RX ADMIN — NOREPINEPHRINE BITARTRATE 1.06 MCG/KG/MIN: 1 INJECTION, SOLUTION, CONCENTRATE INTRAVENOUS at 06:03

## 2023-01-01 RX ADMIN — OXYCODONE 10 MG: 5 TABLET ORAL at 10:02

## 2023-01-01 RX ADMIN — PROPOFOL 5 MCG/KG/MIN: 10 INJECTION, EMULSION INTRAVENOUS at 07:03

## 2023-01-01 RX ADMIN — HYDROCORTISONE SODIUM SUCCINATE 100 MG: 100 INJECTION, POWDER, FOR SOLUTION INTRAMUSCULAR; INTRAVENOUS at 12:02

## 2023-01-01 RX ADMIN — ONDANSETRON 4 MG: 2 INJECTION INTRAMUSCULAR; INTRAVENOUS at 09:03

## 2023-01-01 RX ADMIN — Medication 500 MCG: at 06:03

## 2023-01-01 RX ADMIN — SODIUM CHLORIDE: 9 INJECTION, SOLUTION INTRAVENOUS at 03:03

## 2023-01-01 RX ADMIN — METRONIDAZOLE 500 MG: 500 TABLET ORAL at 06:03

## 2023-01-01 RX ADMIN — ACETAMINOPHEN 650 MG: 325 TABLET ORAL at 07:02

## 2023-01-01 RX ADMIN — METRONIDAZOLE 500 MG: 500 INJECTION, SOLUTION INTRAVENOUS at 02:03

## 2023-01-01 RX ADMIN — CEFEPIME 1 G: 1 INJECTION, POWDER, FOR SOLUTION INTRAMUSCULAR; INTRAVENOUS at 10:02

## 2023-01-01 RX ADMIN — CEFEPIME 2 G: 2 INJECTION, POWDER, FOR SOLUTION INTRAVENOUS at 04:03

## 2023-01-01 RX ADMIN — INDOMETHACIN 50 MEQ: 25 CAPSULE ORAL at 06:03

## 2023-01-01 RX ADMIN — HYDROMORPHONE HYDROCHLORIDE 1 MG: 1 INJECTION, SOLUTION INTRAMUSCULAR; INTRAVENOUS; SUBCUTANEOUS at 08:02

## 2023-01-01 RX ADMIN — INSULIN DETEMIR 5 UNITS: 100 INJECTION, SOLUTION SUBCUTANEOUS at 09:03

## 2023-01-01 RX ADMIN — DEXAMETHASONE 40 MG: 4 TABLET ORAL at 04:03

## 2023-01-01 RX ADMIN — METOPROLOL TARTRATE 25 MG: 25 TABLET, FILM COATED ORAL at 05:03

## 2023-01-01 RX ADMIN — MORPHINE SULFATE 15 MG: 15 TABLET, EXTENDED RELEASE ORAL at 10:03

## 2023-01-01 RX ADMIN — INSULIN DETEMIR 5 UNITS: 100 INJECTION, SOLUTION SUBCUTANEOUS at 08:03

## 2023-01-01 RX ADMIN — HYDROMORPHONE HYDROCHLORIDE 0.5 MG: 1 INJECTION, SOLUTION INTRAMUSCULAR; INTRAVENOUS; SUBCUTANEOUS at 12:02

## 2023-01-01 RX ADMIN — PREDNISONE 50 MG: 5 TABLET ORAL at 11:02

## 2023-01-01 RX ADMIN — INSULIN ASPART 7 UNITS: 100 INJECTION, SOLUTION INTRAVENOUS; SUBCUTANEOUS at 05:02

## 2023-01-01 RX ADMIN — CEFEPIME 2 G: 2 INJECTION, POWDER, FOR SOLUTION INTRAVENOUS at 07:03

## 2023-01-01 RX ADMIN — SODIUM CHLORIDE 1000 ML: 9 INJECTION, SOLUTION INTRAVENOUS at 08:03

## 2023-01-01 RX ADMIN — INSULIN ASPART 6 UNITS: 100 INJECTION, SOLUTION INTRAVENOUS; SUBCUTANEOUS at 05:02

## 2023-01-01 RX ADMIN — ROCURONIUM BROMIDE 90 MG: 10 INJECTION, SOLUTION INTRAVENOUS at 06:03

## 2023-01-01 RX ADMIN — SODIUM CHLORIDE: 0.9 INJECTION, SOLUTION INTRAVENOUS at 11:01

## 2023-01-01 RX ADMIN — INSULIN ASPART 4 UNITS: 100 INJECTION, SOLUTION INTRAVENOUS; SUBCUTANEOUS at 06:03

## 2023-01-01 RX ADMIN — SODIUM CHLORIDE 500 ML: 9 INJECTION, SOLUTION INTRAVENOUS at 06:03

## 2023-01-01 RX ADMIN — INSULIN ASPART 3 UNITS: 100 INJECTION, SOLUTION INTRAVENOUS; SUBCUTANEOUS at 12:02

## 2023-01-01 RX ADMIN — Medication: at 06:03

## 2023-01-01 RX ADMIN — VASOPRESSIN 0.04 UNITS/MIN: 20 INJECTION PARENTERAL at 08:03

## 2023-01-01 RX ADMIN — ONDANSETRON 8 MG: 2 INJECTION INTRAMUSCULAR; INTRAVENOUS at 05:03

## 2023-01-01 RX ADMIN — VINCRISTINE SULFATE 2 MG: 1 INJECTION, SOLUTION INTRAVENOUS at 12:01

## 2023-01-01 RX ADMIN — LACTULOSE 20 G: 20 SOLUTION ORAL at 10:03

## 2023-01-01 RX ADMIN — ONDANSETRON 8 MG: 2 INJECTION INTRAMUSCULAR; INTRAVENOUS at 11:01

## 2023-01-01 RX ADMIN — ALBUTEROL SULFATE 2 PUFF: 108 INHALANT RESPIRATORY (INHALATION) at 12:02

## 2023-01-01 RX ADMIN — METOPROLOL TARTRATE 25 MG: 25 TABLET, FILM COATED ORAL at 11:03

## 2023-01-01 RX ADMIN — DEXAMETHASONE 1 DROP: 1 SUSPENSION/ DROPS OPHTHALMIC at 03:03

## 2023-01-01 RX ADMIN — HYDROMORPHONE HYDROCHLORIDE 1 MG: 1 INJECTION, SOLUTION INTRAMUSCULAR; INTRAVENOUS; SUBCUTANEOUS at 01:02

## 2023-01-01 RX ADMIN — DEXAMETHASONE 1 DROP: 1 SUSPENSION/ DROPS OPHTHALMIC at 05:03

## 2023-01-01 RX ADMIN — SULFAMETHOXAZOLE AND TRIMETHOPRIM 1 TABLET: 800; 160 TABLET ORAL at 09:03

## 2023-01-01 RX ADMIN — INSULIN ASPART 3 UNITS: 100 INJECTION, SOLUTION INTRAVENOUS; SUBCUTANEOUS at 10:03

## 2023-01-01 RX ADMIN — SODIUM CHLORIDE, POTASSIUM CHLORIDE, SODIUM LACTATE AND CALCIUM CHLORIDE 1000 ML: 600; 310; 30; 20 INJECTION, SOLUTION INTRAVENOUS at 12:02

## 2023-01-01 RX ADMIN — METRONIDAZOLE 500 MG: 500 TABLET ORAL at 01:03

## 2023-01-01 RX ADMIN — SODIUM CHLORIDE: 9 INJECTION, SOLUTION INTRAVENOUS at 02:03

## 2023-01-01 RX ADMIN — HYDROMORPHONE HYDROCHLORIDE 1 MG: 1 INJECTION, SOLUTION INTRAMUSCULAR; INTRAVENOUS; SUBCUTANEOUS at 07:03

## 2023-01-01 RX ADMIN — METOPROLOL TARTRATE 12.5 MG: 25 TABLET, FILM COATED ORAL at 08:03

## 2023-01-01 RX ADMIN — LIDOCAINE HYDROCHLORIDE 10 ML: 20 INJECTION, SOLUTION INFILTRATION; PERINEURAL at 11:03

## 2023-01-01 RX ADMIN — METOPROLOL TARTRATE 50 MG: 50 TABLET, FILM COATED ORAL at 06:03

## 2023-01-01 RX ADMIN — HYDROMORPHONE HYDROCHLORIDE 1 MG: 1 INJECTION, SOLUTION INTRAMUSCULAR; INTRAVENOUS; SUBCUTANEOUS at 11:03

## 2023-01-01 RX ADMIN — LIDOCAINE HYDROCHLORIDE 5 ML: 10 INJECTION, SOLUTION INFILTRATION; PERINEURAL at 04:03

## 2023-01-01 RX ADMIN — METOPROLOL TARTRATE 25 MG: 25 TABLET, FILM COATED ORAL at 12:03

## 2023-01-01 RX ADMIN — VANCOMYCIN HYDROCHLORIDE 1500 MG: 1.5 INJECTION, POWDER, LYOPHILIZED, FOR SOLUTION INTRAVENOUS at 12:03

## 2023-01-01 RX ADMIN — CEFEPIME 2 G: 2 INJECTION, POWDER, FOR SOLUTION INTRAVENOUS at 08:03

## 2023-01-01 RX ADMIN — INSULIN ASPART 6 UNITS: 100 INJECTION, SOLUTION INTRAVENOUS; SUBCUTANEOUS at 05:03

## 2023-01-01 RX ADMIN — HEPARIN 500 UNITS: 100 SYRINGE at 04:01

## 2023-01-01 RX ADMIN — ALBUTEROL SULFATE 2 PUFF: 108 INHALANT RESPIRATORY (INHALATION) at 08:02

## 2023-01-01 RX ADMIN — DOCUSATE SODIUM 100 MG: 100 CAPSULE, LIQUID FILLED ORAL at 09:02

## 2023-01-01 RX ADMIN — VANCOMYCIN HYDROCHLORIDE 1500 MG: 1.5 INJECTION, POWDER, LYOPHILIZED, FOR SOLUTION INTRAVENOUS at 12:02

## 2023-01-01 RX ADMIN — SODIUM CHLORIDE: 0.9 INJECTION, SOLUTION INTRAVENOUS at 09:01

## 2023-01-01 RX ADMIN — REMDESIVIR 100 MG: 100 INJECTION, POWDER, LYOPHILIZED, FOR SOLUTION INTRAVENOUS at 09:02

## 2023-01-01 RX ADMIN — IOHEXOL 100 ML: 350 INJECTION, SOLUTION INTRAVENOUS at 07:02

## 2023-01-01 RX ADMIN — DOCUSATE SODIUM 50 MG: 50 CAPSULE, LIQUID FILLED ORAL at 08:02

## 2023-01-01 RX ADMIN — CEFEPIME 2 G: 2 INJECTION, POWDER, FOR SOLUTION INTRAVENOUS at 02:02

## 2023-01-01 RX ADMIN — INSULIN ASPART 10 UNITS: 100 INJECTION, SOLUTION INTRAVENOUS; SUBCUTANEOUS at 04:02

## 2023-01-01 RX ADMIN — INSULIN ASPART 5 UNITS: 100 INJECTION, SOLUTION INTRAVENOUS; SUBCUTANEOUS at 06:02

## 2023-01-01 RX ADMIN — PANTOPRAZOLE SODIUM 80 MG: 40 INJECTION, POWDER, FOR SOLUTION INTRAVENOUS at 02:03

## 2023-01-01 RX ADMIN — PIPERACILLIN SODIUM AND TAZOBACTAM SODIUM 3.38 G: 3; .375 INJECTION, POWDER, LYOPHILIZED, FOR SOLUTION INTRAVENOUS at 04:02

## 2023-01-01 RX ADMIN — SODIUM CHLORIDE, POTASSIUM CHLORIDE, SODIUM LACTATE AND CALCIUM CHLORIDE 500 ML: 600; 310; 30; 20 INJECTION, SOLUTION INTRAVENOUS at 01:03

## 2023-01-01 RX ADMIN — SODIUM CHLORIDE 1000 ML: 0.9 INJECTION, SOLUTION INTRAVENOUS at 12:02

## 2023-01-01 RX ADMIN — ALBUTEROL SULFATE 2 PUFF: 108 INHALANT RESPIRATORY (INHALATION) at 01:02

## 2023-01-01 RX ADMIN — SODIUM CHLORIDE 1000 ML: 0.9 INJECTION, SOLUTION INTRAVENOUS at 05:02

## 2023-01-01 RX ADMIN — ONDANSETRON 16 MG: 2 INJECTION INTRAMUSCULAR; INTRAVENOUS at 09:01

## 2023-01-01 RX ADMIN — METRONIDAZOLE 500 MG: 500 TABLET ORAL at 03:03

## 2023-01-01 RX ADMIN — SODIUM BICARBONATE 50 MEQ: 84 INJECTION, SOLUTION INTRAVENOUS at 09:03

## 2023-01-01 RX ADMIN — CYTARABINE 2180 MG: 100 INJECTION, SOLUTION INTRATHECAL; INTRAVENOUS; SUBCUTANEOUS at 05:03

## 2023-01-01 RX ADMIN — AMIODARONE HYDROCHLORIDE 1 MG/MIN: 50 INJECTION, SOLUTION INTRAVENOUS at 03:03

## 2023-01-01 RX ADMIN — HEPARIN 300 UNITS: 100 SYRINGE at 02:01

## 2023-01-01 RX ADMIN — SULFAMETHOXAZOLE AND TRIMETHOPRIM 1 TABLET: 800; 160 TABLET ORAL at 08:02

## 2023-01-01 RX ADMIN — PANTOPRAZOLE SODIUM 40 MG: 40 TABLET, DELAYED RELEASE ORAL at 12:02

## 2023-01-01 RX ADMIN — METOROPROLOL TARTRATE 5 MG: 5 INJECTION, SOLUTION INTRAVENOUS at 11:03

## 2023-01-01 RX ADMIN — MUPIROCIN 1 G: 20 OINTMENT TOPICAL at 09:02

## 2023-01-01 RX ADMIN — METOPROLOL TARTRATE 5 MG: 5 INJECTION, SOLUTION INTRAVENOUS at 01:03

## 2023-01-01 RX ADMIN — ONDANSETRON 8 MG: 2 INJECTION INTRAMUSCULAR; INTRAVENOUS at 04:03

## 2023-01-01 RX ADMIN — ACYCLOVIR 400 MG: 200 CAPSULE ORAL at 10:03

## 2023-01-01 RX ADMIN — PANTOPRAZOLE SODIUM 40 MG: 40 TABLET, DELAYED RELEASE ORAL at 07:02

## 2023-01-09 NOTE — PROGRESS NOTES
"Subjective:       Patient ID: Dwight Hoffmann is a 52 y.o. male.    Chief Complaint:   HPI  52 year old male with a history of NIDDM2 and HLD who presents today with complaints of night sweats for weeks. It is moderate. It is associated with 25 lb wt loss over the last 2 months, urinary hesitancy, urinary frequency, occasional blood streaked stools, weakness, fatigue, neck pain, knee pain, and pelvic pain. He denies measured fever, chills, N/V/D, chest pain, or SOB. He was seen by his primary last month about the blood streaked stools and referred to Dr. Clemens for colonoscopy on 9/1 with multiple polypectomies with path report of tubular adenomas. In the ED, labs revealed microcytic anemia, he was mildly tachycardic on arrival  which improved spontaneously, /60,  and CT abd/pelvis revealing: "Thick-walled collection containing feculent material, widely communicating with small bowel, seen centrally in the pelvis. Findings are suspicious for necrotic mass or abscess arising from the small bowel  Surgery was consulted and performed exploratory laparotomy 9/14/22 with resection of the mass and anastomosis of small bowel. The mass appeared to be an abscess with surrounding necrosis.  Patient with signs of sepsis including tachycardic and leukocytosis which could be related to surgery as well which complicated his picture however he was covered for this with with IV antibiotics. An NG tube to LIWS suction was started and the patient was put on IV fluids while he was NPO. Briefly on PPN. He did have a positive blood culture which showed coagulase negative Staph suspected to be a contaminant. He was briefly on vancomycin which was discontinued. ID consulted.  Patient was put on meropenem and fluconazole.  Cultures from surgery with Enterobacter cloacae and Proteus mirabilis.  His bowel function slowly returned, and he no longer needed NGT to suction.  The pathologic diagnosis on the mass was diffuse large B " cell lymphoma.  Oncology service was asked to consult  Patient underwent bone marrow biopsy 9/22/22 for staging.  CT of chest showed no enlarged lymph nodes. Did have another laparoscopy 9/23/22 due to abdominal fluid collection with cleanout thought to be more necrotic than infective. ID planned for outpatient ertapenem infusions to complete 2 week course  Review of Systems    Rectal bleeding,stopped went to hospital was scoped  non bleeding hemorrhoids   Has abd pain,better now     No fever, mild sob+    Pt doeing much better   Occ sob on activity    Denies seizure activity or focal weaknesses or symptoms related to TIA, no head aches or blurred vision reported  Denies issues with skin rash or bruising  Denies issues with swelling of feet, tingling or numbness   +issues with sleep,     Good family support reported         Past Medical History:   Diagnosis Date    Cancer     Diabetes mellitus     Digestive disorder     Prediabetes      Past Surgical History:   Procedure Laterality Date    APPENDECTOMY  07/15/2014    COLONOSCOPY      COLONOSCOPY N/A 02/09/2022    ERROR.   He did not have a colonoscopy with Dr. Sanders.    COLONOSCOPY N/A 09/01/2022    Procedure: COLONOSCOPY;  Surgeon: Andrea Clemens MD;  Location: [facility];  Service: Endoscopy;  Laterality: N/A;    FLEXIBLE SIGMOIDOSCOPY N/A 11/7/2022    Procedure: SIGMOIDOSCOPY, FLEXIBLE;  Surgeon: Manuel Sanders MD;  Location: [facility];  Service: Endoscopy;  Laterality: N/A;    INCISION AND DRAINAGE OF ABDOMEN N/A 09/14/2022    Procedure: INCISION AND DRAINAGE, ABDOMEN;  Surgeon: Jan Oliveira Jr., MD;  Location: [facility] OR;  Service: General;  Laterality: N/A;    INSERTION OF TUNNELED CENTRAL VENOUS CATHETER (CVC) WITH SUBCUTANEOUS PORT N/A 10/31/2022    Procedure: [id number];  Surgeon: Jan Oliveira Jr., MD;  Location: [facility] OR;  Service: General;  Laterality: N/A;    LAPAROSCOPIC DRAINAGE OF ABDOMEN N/A 09/23/2022    Procedure:  DRAINAGE, ABDOMEN, LAPAROSCOPIC;  Surgeon: Jan Oliveira Jr., MD;  Location: [facility];  Service: General;  Laterality: N/A;     Family History   Problem Relation Age of Onset    Cancer Mother         Colon    Diabetes Mother     Hypertension Mother     Seizures Father     Heart murmur Sister     Seizures Sister       Social History     Socioeconomic History    Marital status:     Number of children: 2   Occupational History    Occupation: Truck [employer]   Tobacco Use    Smoking status: Never    Smokeless tobacco: Never   Substance and Sexual Activity    Alcohol use: Not Currently     Comment: occasional    Drug use: No    Sexual activity: Yes     Partners: Female     Social Determinants of Health     Financial Resource Strain: Low Risk     Difficulty of Paying Living Expenses: Not hard at all   Food Insecurity: No Food Insecurity    Worried About Running Out of Food in the Last Year: Never true    Ran Out of Food in the Last Year: Never true   Transportation Needs: No Transportation Needs    Lack of Transportation (Medical): No    Lack of Transportation (Non-Medical): No   Physical Activity: Inactive    Days of Exercise per Week: 0 days    Minutes of Exercise per Session: 0 min   Stress: No Stress Concern Present    Feeling of Stress : Only a little   Social Connections: Moderately Isolated    Frequency of Communication with Friends and Family: Twice a week    Frequency of Social Gatherings with Friends and Family: Twice a week    Attends Jew Services: Never    Active Member of Clubs or Organizations: No    Attends Club or Organization Meetings: Never    Marital Status:    Housing Stability: Low Risk     Unable to Pay for Housing in the Last Year: No    Number of Places Lived in the Last Year: 1    Unstable Housing in the Last Year: No     Review of patient's allergies indicates:  No Known Allergies    Current Outpatient Medications:     allopurinoL (ZYLOPRIM) 100 MG tablet, Take 1 tablet (100  mg total) by mouth once daily., Disp: 90 tablet, Rfl: 3    blood sugar diagnostic Strp, To check BG 2 times daily, to use with insurance preferred meter, Disp: 200 each, Rfl: 1    blood-glucose meter kit, To check BG 2 times daily, to use with insurance preferred meter, Disp: 1 each, Rfl: 0    butenafine 1 % cream, Per instructions 2 TIMES DAILY (route: topical), Disp: , Rfl:     chlorproMAZINE (THORAZINE) 25 MG tablet, Take 1 tablet (25 mg total) by mouth 3 (three) times daily., Disp: 90 tablet, Rfl: 11    ciprofloxacin HCl (CIPRO) 500 MG tablet, Take 1 tablet (500 mg total) by mouth once daily., Disp: 30 tablet, Rfl: 0    docusate sodium (COLACE) 50 MG capsule, Take 1 capsule (50 mg total) by mouth 2 (two) times daily., Disp: 60 capsule, Rfl: 0    HYDROcodone-acetaminophen (NORCO) 5-325 mg per tablet, 1 tablet EVERY 4 HOURS (route: oral), Disp: 90 tablet, Rfl: 0    lancets Misc, To check BG 2 times daily, to use with insurance preferred meter, Disp: 200 each, Rfl: 1    ondansetron (ZOFRAN-ODT) 4 MG TbDL, Take 1 tablet (4 mg total) by mouth every 6 (six) hours as needed., Disp: 90 tablet, Rfl: 2    ondansetron (ZOFRAN-ODT) 4 MG TbDL, Take 1 tablet by mouth once daily., Disp: , Rfl:     pantoprazole (PROTONIX) 40 MG tablet, Take 1 tablet (40 mg total) by mouth once daily., Disp: 30 tablet, Rfl: 11    promethazine (PHENERGAN) 12.5 MG Tab, Take 1 tablet by mouth once daily., Disp: , Rfl:     clotrimazole (LOTRIMIN) 1 % cream, Apply topically 2 (two) times daily., Disp: 28 g, Rfl: 0    Current Facility-Administered Medications:     0.9%  NaCl infusion (for blood administration), , Intravenous, Once, Josee Reid MD    Physical Exam    Wt Readings from Last 3 Encounters:   01/09/23 93.3 kg (205 lb 11 oz)   12/29/22 91.1 kg (200 lb 12.8 oz)   12/28/22 91.8 kg (202 lb 4.8 oz)     Temp Readings from Last 3 Encounters:   01/09/23 97.1 °F (36.2 °C) (Temporal)   12/29/22 97.3 °F (36.3 °C)   12/28/22 98.6 °F (37 °C)      BP Readings from Last 3 Encounters:   01/09/23 113/75   12/29/22 115/79   12/28/22 109/71     Pulse Readings from Last 3 Encounters:   01/09/23 109   12/29/22 (!) 114   12/28/22 (!) 114      VITAL SIGNS:  as above   GENERAL: appears well-built, well-nourished.  No anxiety, no agitation, and in no distress.  Patient is awake, alert, oriented and cooperative.  HEENT:  Showed no congestion. Trachea is central no obvious icterus or pallor noted no hoarseness. no obvious JVD   NECK:  Supple.  No JVD. No obvious cervical submental or supraclavicular adenopathy.  RS:the visualized portion of  Chest expands well. chest appears symmetric, no audible wheezes.  No dyspnea recognized  ABDOMEN:  abdomen appears  slightly distended, sx scar   EXTREMITIES:  Without edema.  NEUROLOGICAL:  The patient is appropriate, higher functions are normal.  No  obvious neurological deficits.  normal judgement normal thought content  No confusion, no speech impediment. Cranial nerves are intact and show no deficit. No gross motor deficits noted   SKIN MUSCULOSKELETAL: no joint or skeletal deformity, no clubbing of nails.  No visible rash ecchymosis or petechiae   Lab Results   Component Value Date    WBC 10.84 01/09/2023    HGB 10.6 (L) 01/09/2023    HCT 33.5 (L) 01/09/2023    MCV 83 01/09/2023     01/09/2023       BMP  Lab Results   Component Value Date     01/09/2023    K 3.8 01/09/2023    CL 99 01/09/2023    CO2 26 01/09/2023    BUN 12 01/09/2023    CREATININE 0.8 01/09/2023    CALCIUM 9.1 01/09/2023    ANIONGAP 11 01/09/2023    ESTGFRAFRICA 111 12/28/2021    EGFRNONAA 96 12/28/2021     Lab Results   Component Value Date    IRON 46 12/19/2022    TIBC 281 12/19/2022    FERRITIN 996 (H) 12/19/2022 9/26/22  Final Pathologic Diagnosis 1. Small bowel, resection:  Diffuse large B-cell lymphoma, non-germinal   center origin.  Final classification is pending C-MYC rearrangement analysis   result.  See comment.   2. Omentum,  resection: Diffuse large B-cell lymphoma, non-germinal center   origin.  Final classification is pending C-MYC rearrangement analysis result.    Flow cytometry analysis of tissue was not performed.   Immunohistochemical studies were performed on the paraffin embedded tissue   block 1 C with adequate positive and negative controls.  The atypical   lymphocytes are positive for CD20,  BCL6, MUM1, MYC, high Ki-67 (99%) and are   negative for CD10, CD30, BCL-2, cyclin D1, .  Reactive T-cells are CD3   positive and BCL-2 positive.  In Situ hybridization for EBV is negative.   Findings are consistent with diffuse large B-cell lymphoma, non germinal   center origin.  Final classification is pending C-MYC rearrangement analysis   by FISH.  A supplemental report will follow  9/22  The left ventricle is normal in size with concentric remodeling and normal systolic function.  The estimated ejection fraction is 60%.  Grade I left ventricular diastolic dysfunction.  Normal right ventricular size with normal right ventricular systolic function.  Mild left atrial enlargement.  Mild mitral regurgitation.  Mild to moderate tricuspid regurgitation.  Normal central venous pressure (3 mmHg).  The estimated PA systolic pressure is 49 mmHg.  There is pulmonary hypertension.  Mild right atrial enlargement.  No vegetations were seen  9/22/22   Bone marrow, right iliac crest, aspirate, clot and core biopsy:   --Normocellular marrow for age ranging from 30-70% with trilineage   hematopoiesis, see comment   --No increase in blasts by aspirate count and CD34 immunohistochemical stain,   see comment   --Increased megakaryocytes with atypia, see comment   --No morphologic evidence of metastatic lymphoma   --Stainable iron is not increased, see comment   --Minimal to mild reticulin fibrosis   Comment:  Concomitantly submitted flow cytometric analysis detects a tight   cluster of myeloid blasts.   The blast gate is mildly increased with a  tight   cluster of blasts present showing coexpression of CD13 and CD34 with   expression of cytoplasmic myeloperoxidase.  The tight   cluster represents approximately 6% of total events.   Lymphocytes are   composed of a mixture of  polyclonal B lymphocytes and T lymphocytes that are   immunophenotypically unremarkable.   This marrow shows overall normocellular marrow for age with no morphologic   evidence of metastatic lymphoma.  There is some atypia of the megakaryocytic   lineage as well as a tight cluster of CD34/CD13 positive blasts detected by   flow cytometry, although increased blasts are not appreciated on the aspirate   smear or by CD34 immunohistochemical stain.  By CD61 immunohistochemical   stain, megakaryocytes appear mildly increased without significant clustering   appreciated.  Overt morphologic dysplasia of the myeloid and erythroid   lineages is not appreciated.  These findings are abnormal, and a   myelodysplastic or myeloproliferative process can not be completely ruled   out.  Therefore, correlation with any available cytogenetic and molecular   studies is recommended including studies to rule out myeloproliferative   neoplasms.  If chromosomes are normal, next generation sequencing could be   considered.       Summary echo 10/6/22    The left ventricle is normal in size with concentric remodeling and normal systolic function.  The estimated ejection fraction is 60%.  Grade I left ventricular diastolic dysfunction.  Normal right ventricular size with normal right ventricular systolic function.  Mild left atrial enlargement.  Mild mitral regurgitation.  Mild to moderate tricuspid regurgitation.  Normal central venous pressure (3 mmHg).  The estimated PA systolic pressure is 49 mmHg.  There is pulmonary hypertension.  Mild right atrial enlargement.  No vegetations were seen     IMPRESSION:pet 10/14/22     Large multiloculated necrotic soft tissue mass in the central abdomen extending into the  pelvis which is markedly hypermetabolic. This is consistent with the provided history of lymphoproliferative malignancy.     Numerous additional hypermetabolic soft tissue masses in abdomen, also suspicious for lymphoproliferative malignancy.     Mild diffuse osseous FDG hypermetabolism which could be on the basis of red marrow reconversion or lymphoproliferative malignancy. Please correlate with bone marrow biopsy results.     Mild asymmetric right palatine tonsillar FDG activity, nonspecific. Attention on follow-up is recommended.  Patient Active Problem List   Diagnosis    Small bowel mass    Anemia, chronic disease    Diabetes mellitus type 2, noninsulin dependent    Arthralgia of bilat knees and neck    Moderate malnutrition    Intra-abdominal abscess    S/P exploratory laparotomy    S/P small bowel resection    DLBCL (diffuse large B cell lymphoma)    After care    Iron deficiency anemia, unspecified    Hyperlipidemia, unspecified    Rectal bleeding    Pancytopenia    Chemotherapy induced neutropenia    Encounter for prevention of neutropenia due to chemotherapy        Assessment and Plan     DLBCL:bone marrow bx  neg for lymphoma with tight cluster of blasts 6%( smal number)  positive for CD20,  BCL6, MUM1, MYC, high Ki-67 (99%)   -negative for CD10, CD30, BCL-2, cyclin D1, .  Reactive T-cells are CD3   --FISH negative for rearrangement of MYC ; no IGH/MYC fusion was observed  uric acid  7.3 sent allopurinol  HBV, HCV, HIV serologies; negative  PET CT  10/22 as above  --IPI pending; might require IT chemotherapy based on IPI  --discussed treatment with [facility], as it is still the standard of care for n on GC DLBCL  --plan for 6 cycles,    . Add nelasta to gregimen    See me for next cycle cbc, cmp, uric acid, phosphorus magnesium ldh schedule cycle 5 pet scan 1/11/23  . T2DM not on long term insulin     --on metformin , follows with his PCP        3. Depression     --he denies suicidal ideation  --he has  upcoming appointment with psychiatry           4. Abnormal BM biopsy     --tight cluster of myeloid blasts approximating 6% was found on BM done on 9/22/22  --significance un clear  --no dysplastic changes  --cytogenetics normal  --will require repeat BM biopsy after chemotherapy         anemia and symptomatic, 1 unit prbc ordered  Palpable mass to left rib cage, usg ordered    Podiatry for nail issues

## 2023-01-10 NOTE — PLAN OF CARE
Problem: Fatigue  Goal: Improved Activity Tolerance  Outcome: Met     Problem: Depression  Goal: Improved Mood  Outcome: Met

## 2023-01-12 NOTE — PLAN OF CARE
Problem: Fatigue  Goal: Improved Activity Tolerance  Outcome: Ongoing, Progressing  Intervention: Promote Improved Energy  Flowsheets (Taken 1/12/2023 5910)  Fatigue Management: frequent rest breaks encouraged

## 2023-01-13 NOTE — PROGRESS NOTES
"Subjective:       Patient ID: Dwight Hoffmann is a 52 y.o. male.    Chief Complaint:   HPI  52 year old male with a history of NIDDM2 and HLD who presents today with complaints of night sweats for weeks. It is moderate. It is associated with 25 lb wt loss over the last 2 months, urinary hesitancy, urinary frequency, occasional blood streaked stools, weakness, fatigue, neck pain, knee pain, and pelvic pain. He denies measured fever, chills, N/V/D, chest pain, or SOB. He was seen by his primary last month about the blood streaked stools and referred to Dr. Clemens for colonoscopy on 9/1 with multiple polypectomies with path report of tubular adenomas. In the ED, labs revealed microcytic anemia, he was mildly tachycardic on arrival  which improved spontaneously, /60,  and CT abd/pelvis revealing: "Thick-walled collection containing feculent material, widely communicating with small bowel, seen centrally in the pelvis. Findings are suspicious for necrotic mass or abscess arising from the small bowel  Surgery was consulted and performed exploratory laparotomy 9/14/22 with resection of the mass and anastomosis of small bowel. The mass appeared to be an abscess with surrounding necrosis.  Patient with signs of sepsis including tachycardic and leukocytosis which could be related to surgery as well which complicated his picture however he was covered for this with with IV antibiotics. An NG tube to LIWS suction was started and the patient was put on IV fluids while he was NPO. Briefly on PPN. He did have a positive blood culture which showed coagulase negative Staph suspected to be a contaminant. He was briefly on vancomycin which was discontinued. ID consulted.  Patient was put on meropenem and fluconazole.  Cultures from surgery with Enterobacter cloacae and Proteus mirabilis.  His bowel function slowly returned, and he no longer needed NGT to suction.  The pathologic diagnosis on the mass was diffuse large B " cell lymphoma.  Oncology service was asked to consult  Patient underwent bone marrow biopsy 9/22/22 for staging.  CT of chest showed no enlarged lymph nodes. Did have another laparoscopy 9/23/22 due to abdominal fluid collection with cleanout thought to be more necrotic than infective. ID planned for outpatient ertapenem infusions to complete 2 week course  Review of Systems    Rectal bleeding,stopped went to hospital was scoped  non bleeding hemorrhoids   Has abd pain,better now     No fever, mild sob+    Pt doeing much better   Occ sob on activity    Denies seizure activity or focal weaknesses or symptoms related to TIA, no head aches or blurred vision reported  Denies issues with skin rash or bruising  Denies issues with swelling of feet, tingling or numbness   +issues with sleep,     Good family support reported         Past Medical History:   Diagnosis Date    Cancer     Diabetes mellitus     Digestive disorder     Prediabetes      Past Surgical History:   Procedure Laterality Date    APPENDECTOMY  07/15/2014    COLONOSCOPY      COLONOSCOPY N/A 02/09/2022    ERROR.   He did not have a colonoscopy with Dr. Sanders.    COLONOSCOPY N/A 09/01/2022    Procedure: COLONOSCOPY;  Surgeon: Andrea Clemens MD;  Location: [facility];  Service: Endoscopy;  Laterality: N/A;    FLEXIBLE SIGMOIDOSCOPY N/A 11/7/2022    Procedure: SIGMOIDOSCOPY, FLEXIBLE;  Surgeon: Manuel Sanders MD;  Location: [facility];  Service: Endoscopy;  Laterality: N/A;    INCISION AND DRAINAGE OF ABDOMEN N/A 09/14/2022    Procedure: INCISION AND DRAINAGE, ABDOMEN;  Surgeon: Jan Oliveira Jr., MD;  Location: [facility] OR;  Service: General;  Laterality: N/A;    INSERTION OF TUNNELED CENTRAL VENOUS CATHETER (CVC) WITH SUBCUTANEOUS PORT N/A 10/31/2022    Procedure: [id number]-[id number];  Surgeon: Jan Oliveira Jr., MD;  Location: [facility] OR;  Service: General;  Laterality: N/A;    LAPAROSCOPIC DRAINAGE OF ABDOMEN N/A 09/23/2022    Procedure:  DRAINAGE, ABDOMEN, LAPAROSCOPIC;  Surgeon: Jan Oliveira Jr., MD;  Location: [facility];  Service: General;  Laterality: N/A;     Family History   Problem Relation Age of Onset    Cancer Mother         Colon    Diabetes Mother     Hypertension Mother     Seizures Father     Heart murmur Sister     Seizures Sister       Social History     Socioeconomic History    Marital status:     Number of children: 2   Occupational History    Occupation: Truck [employer]   Tobacco Use    Smoking status: Never    Smokeless tobacco: Never   Substance and Sexual Activity    Alcohol use: Not Currently     Comment: occasional    Drug use: No    Sexual activity: Yes     Partners: Female     Social Determinants of Health     Financial Resource Strain: Low Risk     Difficulty of Paying Living Expenses: Not hard at all   Food Insecurity: No Food Insecurity    Worried About Running Out of Food in the Last Year: Never true    Ran Out of Food in the Last Year: Never true   Transportation Needs: No Transportation Needs    Lack of Transportation (Medical): No    Lack of Transportation (Non-Medical): No   Physical Activity: Inactive    Days of Exercise per Week: 0 days    Minutes of Exercise per Session: 0 min   Stress: No Stress Concern Present    Feeling of Stress : Only a little   Social Connections: Moderately Isolated    Frequency of Communication with Friends and Family: Twice a week    Frequency of Social Gatherings with Friends and Family: Twice a week    Attends Orthodox Services: Never    Active Member of Clubs or Organizations: No    Attends Club or Organization Meetings: Never    Marital Status:    Housing Stability: Low Risk     Unable to Pay for Housing in the Last Year: No    Number of Places Lived in the Last Year: 1    Unstable Housing in the Last Year: No     Review of patient's allergies indicates:  No Known Allergies    Current Outpatient Medications:     allopurinoL (ZYLOPRIM) 100 MG tablet, Take 1 tablet (100  mg total) by mouth once daily., Disp: 90 tablet, Rfl: 3    blood sugar diagnostic Strp, To check BG 2 times daily, to use with insurance preferred meter, Disp: 200 each, Rfl: 1    butenafine 1 % cream, Per instructions 2 TIMES DAILY (route: topical), Disp: , Rfl:     chlorproMAZINE (THORAZINE) 25 MG tablet, Take 1 tablet (25 mg total) by mouth 3 (three) times daily., Disp: 90 tablet, Rfl: 11    ciprofloxacin HCl (CIPRO) 500 MG tablet, Take 1 tablet (500 mg total) by mouth once daily., Disp: 30 tablet, Rfl: 0    docusate sodium (COLACE) 50 MG capsule, Take 1 capsule (50 mg total) by mouth 2 (two) times daily., Disp: 60 capsule, Rfl: 0    HYDROcodone-acetaminophen (NORCO) 5-325 mg per tablet, 1 tablet EVERY 4 HOURS (route: oral), Disp: 90 tablet, Rfl: 0    lancets Misc, To check BG 2 times daily, to use with insurance preferred meter, Disp: 200 each, Rfl: 1    ondansetron (ZOFRAN-ODT) 4 MG TbDL, Take 1 tablet (4 mg total) by mouth every 6 (six) hours as needed., Disp: 90 tablet, Rfl: 2    ondansetron (ZOFRAN-ODT) 4 MG TbDL, Take 1 tablet by mouth once daily., Disp: , Rfl:     pantoprazole (PROTONIX) 40 MG tablet, Take 1 tablet (40 mg total) by mouth once daily., Disp: 30 tablet, Rfl: 11    predniSONE (DELTASONE) 50 MG Tab, Take 1 tablet (50 mg total) by mouth once daily., Disp: 100 tablet, Rfl: 2    promethazine (PHENERGAN) 12.5 MG Tab, Take 1 tablet by mouth once daily., Disp: , Rfl:     blood-glucose meter kit, To check BG 2 times daily, to use with insurance preferred meter, Disp: 1 each, Rfl: 0    clotrimazole (LOTRIMIN) 1 % cream, Apply topically 2 (two) times daily., Disp: 28 g, Rfl: 0    Current Facility-Administered Medications:     0.9%  NaCl infusion (for blood administration), , Intravenous, Once, Josee Reid MD    Physical Exam    Wt Readings from Last 3 Encounters:   01/13/23 92.2 kg (203 lb 4.2 oz)   01/12/23 91.3 kg (201 lb 4.8 oz)   01/11/23 91.6 kg (202 lb)     Temp Readings from Last 3  Encounters:   01/13/23 97.8 °F (36.6 °C) (Temporal)   01/12/23 97.5 °F (36.4 °C)   01/11/23 97.5 °F (36.4 °C)     BP Readings from Last 3 Encounters:   01/13/23 118/76   01/12/23 125/68   01/11/23 111/75     Pulse Readings from Last 3 Encounters:   01/13/23 81   01/12/23 80   01/11/23 89      VITAL SIGNS:  as above   GENERAL: appears well-built, well-nourished.  No anxiety, no agitation, and in no distress.  Patient is awake, alert, oriented and cooperative.  HEENT:  Showed no congestion. Trachea is central no obvious icterus or pallor noted no hoarseness. no obvious JVD   NECK:  Supple.  No JVD. No obvious cervical submental or supraclavicular adenopathy.  RS:the visualized portion of  Chest expands well. chest appears symmetric, no audible wheezes.  No dyspnea recognized  ABDOMEN:  abdomen appears  slightly distended, sx scar   EXTREMITIES:  Without edema.  NEUROLOGICAL:  The patient is appropriate, higher functions are normal.  No  obvious neurological deficits.  normal judgement normal thought content  No confusion, no speech impediment. Cranial nerves are intact and show no deficit. No gross motor deficits noted   SKIN MUSCULOSKELETAL: no joint or skeletal deformity, no clubbing of nails.  No visible rash ecchymosis or petechiae   Lab Results   Component Value Date    WBC 10.84 01/09/2023    HGB 10.6 (L) 01/09/2023    HCT 33.5 (L) 01/09/2023    MCV 83 01/09/2023     01/09/2023       BMP  Lab Results   Component Value Date     01/09/2023    K 3.8 01/09/2023    CL 99 01/09/2023    CO2 26 01/09/2023    BUN 12 01/09/2023    CREATININE 0.8 01/09/2023    CALCIUM 9.1 01/09/2023    ANIONGAP 11 01/09/2023    ESTGFRAFRICA 111 12/28/2021    EGFRNONAA 96 12/28/2021     Lab Results   Component Value Date    IRON 46 12/19/2022    TIBC 281 12/19/2022    FERRITIN 996 (H) 12/19/2022 9/26/22  Final Pathologic Diagnosis 1. Small bowel, resection:  Diffuse large B-cell lymphoma, non-germinal   center origin.  Final  classification is pending C-MYC rearrangement analysis   result.  See comment.   2. Omentum, resection: Diffuse large B-cell lymphoma, non-germinal center   origin.  Final classification is pending C-MYC rearrangement analysis result.    Flow cytometry analysis of tissue was not performed.   Immunohistochemical studies were performed on the paraffin embedded tissue   block 1 C with adequate positive and negative controls.  The atypical   lymphocytes are positive for CD20,  BCL6, MUM1, MYC, high Ki-67 (99%) and are   negative for CD10, CD30, BCL-2, cyclin D1, .  Reactive T-cells are CD3   positive and BCL-2 positive.  In Situ hybridization for EBV is negative.   Findings are consistent with diffuse large B-cell lymphoma, non germinal   center origin.  Final classification is pending C-MYC rearrangement analysis   by FISH.  A supplemental report will follow  9/22  The left ventricle is normal in size with concentric remodeling and normal systolic function.  The estimated ejection fraction is 60%.  Grade I left ventricular diastolic dysfunction.  Normal right ventricular size with normal right ventricular systolic function.  Mild left atrial enlargement.  Mild mitral regurgitation.  Mild to moderate tricuspid regurgitation.  Normal central venous pressure (3 mmHg).  The estimated PA systolic pressure is 49 mmHg.  There is pulmonary hypertension.  Mild right atrial enlargement.  No vegetations were seen  9/22/22   Bone marrow, right iliac crest, aspirate, clot and core biopsy:   --Normocellular marrow for age ranging from 30-70% with trilineage   hematopoiesis, see comment   --No increase in blasts by aspirate count and CD34 immunohistochemical stain,   see comment   --Increased megakaryocytes with atypia, see comment   --No morphologic evidence of metastatic lymphoma   --Stainable iron is not increased, see comment   --Minimal to mild reticulin fibrosis   Comment:  Concomitantly submitted flow cytometric analysis  detects a tight   cluster of myeloid blasts.   The blast gate is mildly increased with a tight   cluster of blasts present showing coexpression of CD13 and CD34 with   expression of cytoplasmic myeloperoxidase.  The tight   cluster represents approximately 6% of total events.   Lymphocytes are   composed of a mixture of  polyclonal B lymphocytes and T lymphocytes that are   immunophenotypically unremarkable.   This marrow shows overall normocellular marrow for age with no morphologic   evidence of metastatic lymphoma.  There is some atypia of the megakaryocytic   lineage as well as a tight cluster of CD34/CD13 positive blasts detected by   flow cytometry, although increased blasts are not appreciated on the aspirate   smear or by CD34 immunohistochemical stain.  By CD61 immunohistochemical   stain, megakaryocytes appear mildly increased without significant clustering   appreciated.  Overt morphologic dysplasia of the myeloid and erythroid   lineages is not appreciated.  These findings are abnormal, and a   myelodysplastic or myeloproliferative process can not be completely ruled   out.  Therefore, correlation with any available cytogenetic and molecular   studies is recommended including studies to rule out myeloproliferative   neoplasms.  If chromosomes are normal, next generation sequencing could be   considered.       Summary echo 10/6/22    The left ventricle is normal in size with concentric remodeling and normal systolic function.  The estimated ejection fraction is 60%.  Grade I left ventricular diastolic dysfunction.  Normal right ventricular size with normal right ventricular systolic function.  Mild left atrial enlargement.  Mild mitral regurgitation.  Mild to moderate tricuspid regurgitation.  Normal central venous pressure (3 mmHg).  The estimated PA systolic pressure is 49 mmHg.  There is pulmonary hypertension.  Mild right atrial enlargement.  No vegetations were seen     IMPRESSION:pet 10/14/22      Large multiloculated necrotic soft tissue mass in the central abdomen extending into the pelvis which is markedly hypermetabolic. This is consistent with the provided history of lymphoproliferative malignancy.     Numerous additional hypermetabolic soft tissue masses in abdomen, also suspicious for lymphoproliferative malignancy.     Mild diffuse osseous FDG hypermetabolism which could be on the basis of red marrow reconversion or lymphoproliferative malignancy. Please correlate with bone marrow biopsy results.     Mild asymmetric right palatine tonsillar FDG activity, nonspecific. Attention on follow-up is recommended.    IMPRESSION:1/23pet  Resolution of the hypermetabolic mass within the lower mid pelvis and mesenteric adenopathy     New diffuse hypermetabolic wall thickening of several loops of small bowel in the lower midpelvis compatible with lymphoma     19 mm hypermetabolic nodule in the anterior right mid abdomen consistent with lymphadenopathy     No evidence of adenopathy within the neck or chest  Patient Active Problem List   Diagnosis    Small bowel mass    Anemia, chronic disease    Diabetes mellitus type 2, noninsulin dependent    Arthralgia of bilat knees and neck    Moderate malnutrition    Intra-abdominal abscess    S/P exploratory laparotomy    S/P small bowel resection    DLBCL (diffuse large B cell lymphoma)    After care    Iron deficiency anemia, unspecified    Hyperlipidemia, unspecified    Rectal bleeding    Pancytopenia    Chemotherapy induced neutropenia    Encounter for prevention of neutropenia due to chemotherapy        Assessment and Plan     DLBCL:bone marrow bx  neg for lymphoma with tight cluster of blasts 6%( smal number)  positive for CD20,  BCL6, MUM1, MYC, high Ki-67 (99%)   -negative for CD10, CD30, BCL-2, cyclin D1, .  Reactive T-cells are CD3   --FISH negative for rearrangement of MYC ; no IGH/MYC fusion was observed  uric acid  7.3 sent allopurinol  HBV, HCV, HIV  serologies; negative  PET CT  10/22 as above  --IPI pending; might require IT chemotherapy based on IPI  --discussed treatment with [facility], as it is still the standard of care for n on GC DLBCL  --plan for 6 cycles,    . Add nelasta to gregimen  Pet after 4 cycles as above repeat in 2 monre cycles    See me for next cycle cbc, cmp, uric acid, phosphorus magnesium ldh schedule cycle 5 pet scan 1/11/23  . T2DM not on long term insulin     --on metformin , follows with his PCP        3. Depression     --he denies suicidal ideation  --he has upcoming appointment with psychiatry           4. Abnormal BM biopsy     --tight cluster of myeloid blasts approximating 6% was found on BM done on 9/22/22  --significance un clear  --no dysplastic changes  --cytogenetics normal  --will require repeat BM biopsy after chemotherapy         anemia and symptomatic, 1 unit prbc ordered  Palpable mass to left rib cage, usg ordered    Podiatry for nail issues

## 2023-01-13 NOTE — Clinical Note
Pleae sscheudle fluids after chemo for next cycle as well. Wife doest see those dates  He already has chemo dates

## 2023-01-19 NOTE — TELEPHONE ENCOUNTER
Spoke with pt's home health nurse, Halle. She states that pt's HR was 132 sustained. /60 No reported chest pain or palpitations. SHAINA Chua notified. Orders for pt to come to infusion center for IVF or present to nearest ER.     Called pt to notify. Pt states he will come to infusion center for IVF.    ----- Message from Macey Vasquez sent at 1/19/2023 10:37 AM CST -----  Type: Need Medical Advice   Who Called: Halle Cook callback number: [phone]   Additional Info: Home health called stating the above patient heart rate is elevated more than normal  Please call to further assist, Thanks

## 2023-01-19 NOTE — TELEPHONE ENCOUNTER
Spoke with pt about missing appt for add on fluids today 1/19/23 at 1100. Pt stated he is still waiting for a ride and does not know what time he will get here. Expressed to pt that he will need to go to ER for fluids if he can not get here for 1400. Charge Rn notified.

## 2023-01-28 NOTE — DISCHARGE INSTRUCTIONS
Drink 2 L of GoLYTELY.  This was likely cause you to have a significant bowel movement.  Hopefully this will improve her symptoms.  If you have increasing abdominal pain fevers vomiting or any other concerns return emergency room.

## 2023-01-28 NOTE — ED PROVIDER NOTES
Encounter Date: 1/27/2023       History     Chief Complaint   Patient presents with    Fatigue     With severe RLQ abdominal pain. Also, has been having flu-like symptoms. Took a total of  hydrocodone/ace within the past hour and 2 capsules of nyquil. Theraflu last dose was yesterday     Patient is a 52-year-old male with a past medical history of diffuse large B-cell lymphoma type 2 diabetes intestinal mass who underwent resection and anastomosis of the mass on September 14th subsequently requiring drainage of an intra-abdominal abscess on September 23, 2022 presents emergency room for evaluation of abdominal pain.  The patient is currently on chemotherapy under the guidance of Dr. Reid.  The patient states he had a abdominal pain a few days ago but then it started again an hour and a half ago as present mostly in the right lower quadrant.  He has been nauseous but does not have any vomiting.  He is still passing flatus.  He denies any urinary complaints.  He has no fevers.    Review of patient's allergies indicates:  No Known Allergies  Past Medical History:   Diagnosis Date    Cancer     Diabetes mellitus     Digestive disorder     Prediabetes      Past Surgical History:   Procedure Laterality Date    APPENDECTOMY  07/15/2014    COLONOSCOPY      COLONOSCOPY N/A 02/09/2022    ERROR.   He did not have a colonoscopy with Dr. Horta.    COLONOSCOPY N/A 09/01/2022    Procedure: COLONOSCOPY;  Surgeon: Andrea Clemens MD;  Location: [facility];  Service: Endoscopy;  Laterality: N/A;    FLEXIBLE SIGMOIDOSCOPY N/A 11/7/2022    Procedure: SIGMOIDOSCOPY, FLEXIBLE;  Surgeon: Manuel Horta MD;  Location: [facility] ENDO;  Service: Endoscopy;  Laterality: N/A;    INCISION AND DRAINAGE OF ABDOMEN N/A 09/14/2022    Procedure: INCISION AND DRAINAGE, ABDOMEN;  Surgeon: Jan Oliveira Jr., MD;  Location: [facility];  Service: General;  Laterality: N/A;    INSERTION OF TUNNELED CENTRAL VENOUS CATHETER (CVC) WITH  SUBCUTANEOUS PORT N/A 10/31/2022    Procedure: [id number];  Surgeon: Jan Oliveira Jr., MD;  Location: [facility] OR;  Service: General;  Laterality: N/A;    LAPAROSCOPIC DRAINAGE OF ABDOMEN N/A 09/23/2022    Procedure: DRAINAGE, ABDOMEN, LAPAROSCOPIC;  Surgeon: Jan Oliveira Jr., MD;  Location: [facility] OR;  Service: General;  Laterality: N/A;     Family History   Problem Relation Age of Onset    Cancer Mother         Colon    Diabetes Mother     Hypertension Mother     Seizures Father     Heart murmur Sister     Seizures Sister      Social History     Tobacco Use    Smoking status: Never    Smokeless tobacco: Never   Substance Use Topics    Alcohol use: Not Currently     Comment: occasional    Drug use: No     Review of Systems   Constitutional:  Negative for appetite change, fatigue and fever.   HENT:  Negative for ear pain, rhinorrhea and sore throat.    Eyes:  Negative for pain and visual disturbance.   Respiratory:  Negative for cough and shortness of breath.    Cardiovascular:  Negative for chest pain.   Gastrointestinal:  Positive for abdominal pain and nausea. Negative for diarrhea and vomiting.   Genitourinary:  Negative for difficulty urinating.   Musculoskeletal:  Negative for arthralgias.   Skin:  Negative for rash.   Neurological:  Negative for weakness, numbness and headaches.   All other systems reviewed and are negative.    Physical Exam     Initial Vitals   BP Pulse Resp Temp SpO2   01/27/23 2342 01/27/23 2343 01/27/23 2344 01/27/23 2344 01/27/23 2343   130/68 99 (!) 22 98.4 °F (36.9 °C) 99 %      MAP       --                Physical Exam    Nursing note and vitals reviewed.  Constitutional: He appears well-developed and well-nourished. No distress.   HENT:   Head: Normocephalic and atraumatic.   Mouth/Throat: Oropharynx is clear and moist.   Eyes: Conjunctivae and EOM are normal. Pupils are equal, round, and reactive to light.   Neck: Neck supple.   Normal range of  motion.  Cardiovascular:  Normal rate, regular rhythm, normal heart sounds and intact distal pulses.     Exam reveals no gallop and no friction rub.       No murmur heard.  Pulmonary/Chest: Breath sounds normal. He has no wheezes. He has no rhonchi. He has no rales.   Abdominal: Abdomen is soft. Bowel sounds are normal. There is abdominal tenderness (right lower right lateral quadrant.).   Vertical surgical incision is clean dry and intact with no palpable hernia There is guarding (mild). There is no rebound.   Musculoskeletal:         General: No edema. Normal range of motion.      Cervical back: Normal range of motion and neck supple.     Neurological: He is alert and oriented to person, place, and time. He has normal strength. No sensory deficit. GCS score is 15. GCS eye subscore is 4. GCS verbal subscore is 5. GCS motor subscore is 6.   Skin: Skin is warm and dry.   Psychiatric: He has a normal mood and affect.       ED Course   Procedures  Labs Reviewed   CBC W/ AUTO DIFFERENTIAL - Abnormal; Notable for the following components:       Result Value    RBC 3.75 (*)     Hemoglobin 9.6 (*)     Hematocrit 29.4 (*)     MCV 78 (*)     MCH 25.6 (*)     RDW 19.7 (*)     Lymph % 11.0 (*)     Mono % 32.0 (*)     All other components within normal limits   COMPREHENSIVE METABOLIC PANEL - Abnormal; Notable for the following components:    Sodium 132 (*)     Glucose 439 (*)     Albumin 2.9 (*)     ALT 7 (*)     All other components within normal limits   POCT GLUCOSE - Abnormal; Notable for the following components:    POCT Glucose 335 (*)     All other components within normal limits   LIPASE          Imaging Results               CT Abdomen Pelvis With Contrast (Final result)  Result time 01/28/23 07:54:11      Final result by Pablo De La Torre MD (01/28/23 07:54:11)                   Impression:      1. Large enhancing heterogeneous soft tissue mass of the right lower quadrant which has slightly decreased in size over  the interval with a small satellite lesion of the right mid abdomen consistent with the patient's known lymphoma.  2. Small subcentimeter hypoattenuating hepatic lesion which is too small to characterize.  3. Diverticulosis.  4. Prior small bowel resection.  5. Mild prostatic hypertrophy.  This report was flagged in Epic as abnormal.    The preliminary and final reports are concordant.      Electronically signed by: Pablo De La Torre  Date:    01/28/2023  Time:    07:54               Narrative:    EXAMINATION:  CT ABDOMEN PELVIS WITH CONTRAST    CLINICAL HISTORY:  Abdominal abscess/infection suspected;    TECHNIQUE:  Low dose axial images, sagittal and coronal reformations were obtained from the lung bases to the pubic symphysis following the IV administration of 100 mL of Omnipaque 350 .  Oral contrast was not given.    COMPARISON:  CT 11/06/2022.    FINDINGS:  The liver and spleen are normal in size and attenuation.  The gallbladder, pancreas and adrenal glands are unremarkable.    Kidneys are normal in size and enhance homogeneously.  Small 5 mm hypoattenuating nodule involving the dome of the liver.  No renal calculi.  No changes of hydronephrosis.  No perinephric inflammatory change.  Chronic small right renal cyst.    Scattered air and stool throughout the colon.  Scattered diverticula within the colon.  Surgical clips within the left abdomen from previous partial small bowel resection.  Within the right lower quadrant there is a large heterogeneously enhancing soft tissue attenuation mass measuring approximate 9.4 cm AP x 10.5 cm transverse which appears to involve both loops of small bowel and a segment of the proximal colon.  This is consistent with the patient's known lymphoma.  This has slightly decreased in size over the interval.  There is a smaller heterogeneously enhancing soft tissue attenuation mass of the right mid abdomen between loops of small bowel measuring 2.5 x 2.4 cm consistent with a satellite  lesion of lymphoma.    The bladder is distended.  Prostate is mildly enlarged.  Seminal vesicles and rectum are unremarkable.  No significant pelvic or inguinal lymphadenopathy.                                       Medications   HYDROmorphone (PF) injection 0.5 mg (0.5 mg Intravenous Given 1/28/23 0029)   ondansetron injection 4 mg (4 mg Intravenous Given 1/28/23 0028)   sodium chloride 0.9% bolus 1,000 mL 1,000 mL (0 mLs Intravenous Stopped 1/28/23 0157)   iohexoL (OMNIPAQUE 350) 350 mg iodine/mL injection (100 mLs  Given 1/28/23 0054)   HYDROmorphone (PF) injection 1 mg (1 mg Intravenous Given 1/28/23 0136)   insulin regular injection 7 Units 0.07 mL (7 Units Intravenous Given 1/28/23 0225)                 ED Course as of 01/29/23 0248   Sat Jan 28, 2023   0401 Patient is feeling much better.  Patient states he last had a bowel movement yesterday but prior to that was 7 days.  He has been struggling with constipation.  He has been taking MiraLax intermittently.  He is not using enemas.  He does have a mass in his abdomen which could have been the cause of the pain.  He has no signs of bowel obstruction.  There is no signs of free air.  He would like to go home and I will prescribe GoLYTELY as this is worked well for him in the past.  I do not think he has acute appendicitis.  He finally urinated but has no dysuria.  I doubt he has urinary tract infection causing his pain.  I do not believe this is ureterolithiasis or nephrolithiasis.  There is no signs of bowel obstruction.  Patient had a large meal at [employer] prior to symptoms starting this likely triggered peristalsis leading to colicky pain associated with constipation [JS]      ED Course User Index  [JS] Zack Rodrigez MD                 Clinical Impression:   Final diagnoses:  [K59.09] Other constipation (Primary)        ED Disposition Condition    Discharge Stable          ED Prescriptions       Medication Sig Dispense Start Date End Date Auth. Provider     polyethylene glycol (GOLYTELY) 236-22.74-6.74 -5.86 gram suspension  (Status: Discontinued) Take 2,000 mLs (2 L total) by mouth once. for 1 dose 2,000 mL 2023 Zack Rodrigez MD    polyethylene glycol (GOLYTELY) 236-22.74-6.74 -5.86 gram suspension () Take 2,000 mLs (2 L total) by mouth once. for 1 dose 2,000 mL 2023 Zack Rodrigez MD          Follow-up Information       Follow up With Specialties Details Why Contact Info    Josee Reid MD Hematology and Oncology, Hematology, Oncology  Call Monday to let Dr. Reid know you were in the emergency department [address] [address]  [phone]               Zack Rodrigez MD  23 [phone]

## 2023-01-31 NOTE — PROGRESS NOTES
"Subjective:       Patient ID: Dwight Hoffmann is a 52 y.o. male.    Chief Complaint:   HPI  52 year old male with a history of NIDDM2 and HLD who presents today with complaints of night sweats for weeks. It is moderate. It is associated with 25 lb wt loss over the last 2 months, urinary hesitancy, urinary frequency, occasional blood streaked stools, weakness, fatigue, neck pain, knee pain, and pelvic pain. He denies measured fever, chills, N/V/D, chest pain, or SOB. He was seen by his primary last month about the blood streaked stools and referred to Dr. Clemens for colonoscopy on 9/1 with multiple polypectomies with path report of tubular adenomas. In the ED, labs revealed microcytic anemia, he was mildly tachycardic on arrival  which improved spontaneously, /60,  and CT abd/pelvis revealing: "Thick-walled collection containing feculent material, widely communicating with small bowel, seen centrally in the pelvis. Findings are suspicious for necrotic mass or abscess arising from the small bowel  Surgery was consulted and performed exploratory laparotomy 9/14/22 with resection of the mass and anastomosis of small bowel. The mass appeared to be an abscess with surrounding necrosis.  Patient with signs of sepsis including tachycardic and leukocytosis which could be related to surgery as well which complicated his picture however he was covered for this with with IV antibiotics. An NG tube to LIWS suction was started and the patient was put on IV fluids while he was NPO. Briefly on PPN. He did have a positive blood culture which showed coagulase negative Staph suspected to be a contaminant. He was briefly on vancomycin which was discontinued. ID consulted.  Patient was put on meropenem and fluconazole.  Cultures from surgery with Enterobacter cloacae and Proteus mirabilis.  His bowel function slowly returned, and he no longer needed NGT to suction.  The pathologic diagnosis on the mass was diffuse large B " cell lymphoma.  Oncology service was asked to consult  Patient underwent bone marrow biopsy 9/22/22 for staging.  CT of chest showed no enlarged lymph nodes. Did have another laparoscopy 9/23/22 due to abdominal fluid collection with cleanout thought to be more necrotic than infective. ID planned for outpatient ertapenem infusions to complete 2 week course  Review of Systems    Rectal bleeding,stopped went to hospital was scoped  non bleeding hemorrhoids  1/28/23 Lower pelvic pain went to Ed ,bowel gas and stools noted. Somehwat better using linzess and miralax    No fever, mild sob+      Occ sob on activity    Denies seizure activity or focal weaknesses or symptoms related to TIA, no head aches or blurred vision reported  Denies issues with skin rash or bruising  Denies issues with swelling of feet, tingling or numbness   +issues with sleep,     Good family support reported         Past Medical History:   Diagnosis Date    Cancer     Diabetes mellitus     Digestive disorder     Prediabetes      Past Surgical History:   Procedure Laterality Date    APPENDECTOMY  07/15/2014    COLONOSCOPY      COLONOSCOPY N/A 02/09/2022    ERROR.   He did not have a colonoscopy with Dr. Sanders.    COLONOSCOPY N/A 09/01/2022    Procedure: COLONOSCOPY;  Surgeon: Andrea Clemens MD;  Location: [facility];  Service: Endoscopy;  Laterality: N/A;    FLEXIBLE SIGMOIDOSCOPY N/A 11/7/2022    Procedure: SIGMOIDOSCOPY, FLEXIBLE;  Surgeon: Manuel Sanders MD;  Location: [facility];  Service: Endoscopy;  Laterality: N/A;    INCISION AND DRAINAGE OF ABDOMEN N/A 09/14/2022    Procedure: INCISION AND DRAINAGE, ABDOMEN;  Surgeon: Jan Oliveira Jr., MD;  Location: [facility] OR;  Service: General;  Laterality: N/A;    INSERTION OF TUNNELED CENTRAL VENOUS CATHETER (CVC) WITH SUBCUTANEOUS PORT N/A 10/31/2022    Procedure: [id number];  Surgeon: Jan Oliveira Jr., MD;  Location: [facility] OR;  Service: General;  Laterality: N/A;     LAPAROSCOPIC DRAINAGE OF ABDOMEN N/A 09/23/2022    Procedure: DRAINAGE, ABDOMEN, LAPAROSCOPIC;  Surgeon: Jan Oliveira Jr., MD;  Location: [facility];  Service: General;  Laterality: N/A;     Family History   Problem Relation Age of Onset    Cancer Mother         Colon    Diabetes Mother     Hypertension Mother     Seizures Father     Heart murmur Sister     Seizures Sister       Social History     Socioeconomic History    Marital status:     Number of children: 2   Occupational History    Occupation: [employer]   Tobacco Use    Smoking status: Never    Smokeless tobacco: Never   Substance and Sexual Activity    Alcohol use: Not Currently     Comment: occasional    Drug use: No    Sexual activity: Yes     Partners: Female     Social Determinants of Health     Financial Resource Strain: Low Risk     Difficulty of Paying Living Expenses: Not hard at all   Food Insecurity: No Food Insecurity    Worried About Running Out of Food in the Last Year: Never true    Ran Out of Food in the Last Year: Never true   Transportation Needs: No Transportation Needs    Lack of Transportation (Medical): No    Lack of Transportation (Non-Medical): No   Physical Activity: Inactive    Days of Exercise per Week: 0 days    Minutes of Exercise per Session: 0 min   Stress: No Stress Concern Present    Feeling of Stress : Only a little   Social Connections: Moderately Isolated    Frequency of Communication with Friends and Family: Twice a week    Frequency of Social Gatherings with Friends and Family: Twice a week    Attends Temple Services: Never    Active Member of Clubs or Organizations: No    Attends Club or Organization Meetings: Never    Marital Status:    Housing Stability: Low Risk     Unable to Pay for Housing in the Last Year: No    Number of Places Lived in the Last Year: 1    Unstable Housing in the Last Year: No     Review of patient's allergies indicates:  No Known Allergies    Current Outpatient  Medications:     allopurinoL (ZYLOPRIM) 100 MG tablet, Take 1 tablet (100 mg total) by mouth once daily., Disp: 90 tablet, Rfl: 3    blood sugar diagnostic Strp, To check BG 2 times daily, to use with insurance preferred meter, Disp: 200 each, Rfl: 1    butenafine 1 % cream, Per instructions 2 TIMES DAILY (route: topical), Disp: , Rfl:     chlorproMAZINE (THORAZINE) 25 MG tablet, Take 1 tablet (25 mg total) by mouth 3 (three) times daily., Disp: 90 tablet, Rfl: 11    ciprofloxacin HCl (CIPRO) 500 MG tablet, Take 1 tablet (500 mg total) by mouth once daily., Disp: 30 tablet, Rfl: 0    docusate sodium (COLACE) 50 MG capsule, Take 1 capsule (50 mg total) by mouth 2 (two) times daily., Disp: 60 capsule, Rfl: 0    HYDROcodone-acetaminophen (NORCO) 5-325 mg per tablet, 1 tablet EVERY 4 HOURS (route: oral), Disp: 90 tablet, Rfl: 0    lancets Misc, To check BG 2 times daily, to use with insurance preferred meter, Disp: 200 each, Rfl: 1    ondansetron (ZOFRAN-ODT) 4 MG TbDL, Take 1 tablet by mouth once daily., Disp: , Rfl:     pantoprazole (PROTONIX) 40 MG tablet, Take 1 tablet (40 mg total) by mouth once daily., Disp: 30 tablet, Rfl: 11    predniSONE (DELTASONE) 50 MG Tab, Take 1 tablet (50 mg total) by mouth once daily., Disp: 100 tablet, Rfl: 2    promethazine (PHENERGAN) 12.5 MG Tab, Take 1 tablet by mouth once daily., Disp: , Rfl:     blood-glucose meter kit, To check BG 2 times daily, to use with insurance preferred meter, Disp: 1 each, Rfl: 0    clotrimazole (LOTRIMIN) 1 % cream, Apply topically 2 (two) times daily., Disp: 28 g, Rfl: 0    Current Facility-Administered Medications:     0.9%  NaCl infusion (for blood administration), , Intravenous, Once, Josee Reid MD    Physical Exam    Wt Readings from Last 3 Encounters:   01/31/23 88.7 kg (195 lb 8.8 oz)   01/27/23 88.9 kg (196 lb)   01/19/23 89.2 kg (196 lb 9.6 oz)     Temp Readings from Last 3 Encounters:   01/31/23 98.4 °F (36.9 °C) (Oral)   01/27/23 98.4  °F (36.9 °C) (Oral)   01/19/23 98.1 °F (36.7 °C)     BP Readings from Last 3 Encounters:   01/31/23 120/78   01/28/23 113/77   01/19/23 106/73     Pulse Readings from Last 3 Encounters:   01/31/23 (!) 116   01/28/23 92   01/19/23 94      VITAL SIGNS:  as above   GENERAL: appears well-built, well-nourished.  No anxiety, no agitation, and in no distress.  Patient is awake, alert, oriented and cooperative.  HEENT:  Showed no congestion. Trachea is central no obvious icterus or pallor noted no hoarseness. no obvious JVD   NECK:  Supple.  No JVD. No obvious cervical submental or supraclavicular adenopathy.  RS:the visualized portion of  Chest expands well. chest appears symmetric, no audible wheezes.  No dyspnea recognized  ABDOMEN:  abdomen appears  slightly distended, sx scar   EXTREMITIES:  Without edema.  NEUROLOGICAL:  The patient is appropriate, higher functions are normal.  No  obvious neurological deficits.  normal judgement normal thought content  No confusion, no speech impediment. Cranial nerves are intact and show no deficit. No gross motor deficits noted   SKIN MUSCULOSKELETAL: no joint or skeletal deformity, no clubbing of nails.  No visible rash ecchymosis or petechiae   Lab Results   Component Value Date    WBC 15.01 (H) 01/31/2023    HGB 10.5 (L) 01/31/2023    HCT 32.5 (L) 01/31/2023    MCV 80 (L) 01/31/2023    PLT 81 (L) 01/31/2023       BMP  Lab Results   Component Value Date     (L) 01/31/2023    K 4.7 01/31/2023    CL 91 (L) 01/31/2023    CO2 26 01/31/2023    BUN 24 (H) 01/31/2023    CREATININE 1.0 01/31/2023    CALCIUM 9.6 01/31/2023    ANIONGAP 14 01/31/2023    ESTGFRAFRICA 111 12/28/2021    EGFRNONAA 96 12/28/2021     Lab Results   Component Value Date    IRON 46 12/19/2022    TIBC 281 12/19/2022    FERRITIN 996 (H) 12/19/2022 9/26/22  Final Pathologic Diagnosis 1. Small bowel, resection:  Diffuse large B-cell lymphoma, non-germinal   center origin.  Final classification is pending C-MYC  rearrangement analysis   result.  See comment.   2. Omentum, resection: Diffuse large B-cell lymphoma, non-germinal center   origin.  Final classification is pending C-MYC rearrangement analysis result.    Flow cytometry analysis of tissue was not performed.   Immunohistochemical studies were performed on the paraffin embedded tissue   block 1 C with adequate positive and negative controls.  The atypical   lymphocytes are positive for CD20,  BCL6, MUM1, MYC, high Ki-67 (99%) and are   negative for CD10, CD30, BCL-2, cyclin D1, .  Reactive T-cells are CD3   positive and BCL-2 positive.  In Situ hybridization for EBV is negative.   Findings are consistent with diffuse large B-cell lymphoma, non germinal   center origin.  Final classification is pending C-MYC rearrangement analysis   by FISH.  A supplemental report will follow  9/22  The left ventricle is normal in size with concentric remodeling and normal systolic function.  The estimated ejection fraction is 60%.  Grade I left ventricular diastolic dysfunction.  Normal right ventricular size with normal right ventricular systolic function.  Mild left atrial enlargement.  Mild mitral regurgitation.  Mild to moderate tricuspid regurgitation.  Normal central venous pressure (3 mmHg).  The estimated PA systolic pressure is 49 mmHg.  There is pulmonary hypertension.  Mild right atrial enlargement.  No vegetations were seen  9/22/22   Bone marrow, right iliac crest, aspirate, clot and core biopsy:   --Normocellular marrow for age ranging from 30-70% with trilineage   hematopoiesis, see comment   --No increase in blasts by aspirate count and CD34 immunohistochemical stain,   see comment   --Increased megakaryocytes with atypia, see comment   --No morphologic evidence of metastatic lymphoma   --Stainable iron is not increased, see comment   --Minimal to mild reticulin fibrosis   Comment:  Concomitantly submitted flow cytometric analysis detects a tight   cluster of  myeloid blasts.   The blast gate is mildly increased with a tight   cluster of blasts present showing coexpression of CD13 and CD34 with   expression of cytoplasmic myeloperoxidase.  The tight   cluster represents approximately 6% of total events.   Lymphocytes are   composed of a mixture of  polyclonal B lymphocytes and T lymphocytes that are   immunophenotypically unremarkable.   This marrow shows overall normocellular marrow for age with no morphologic   evidence of metastatic lymphoma.  There is some atypia of the megakaryocytic   lineage as well as a tight cluster of CD34/CD13 positive blasts detected by   flow cytometry, although increased blasts are not appreciated on the aspirate   smear or by CD34 immunohistochemical stain.  By CD61 immunohistochemical   stain, megakaryocytes appear mildly increased without significant clustering   appreciated.  Overt morphologic dysplasia of the myeloid and erythroid   lineages is not appreciated.  These findings are abnormal, and a   myelodysplastic or myeloproliferative process can not be completely ruled   out.  Therefore, correlation with any available cytogenetic and molecular   studies is recommended including studies to rule out myeloproliferative   neoplasms.  If chromosomes are normal, next generation sequencing could be   considered.       Summary echo 10/6/22    The left ventricle is normal in size with concentric remodeling and normal systolic function.  The estimated ejection fraction is 60%.  Grade I left ventricular diastolic dysfunction.  Normal right ventricular size with normal right ventricular systolic function.  Mild left atrial enlargement.  Mild mitral regurgitation.  Mild to moderate tricuspid regurgitation.  Normal central venous pressure (3 mmHg).  The estimated PA systolic pressure is 49 mmHg.  There is pulmonary hypertension.  Mild right atrial enlargement.  No vegetations were seen     IMPRESSION:pet 10/14/22     Large multiloculated necrotic  soft tissue mass in the central abdomen extending into the pelvis which is markedly hypermetabolic. This is consistent with the provided history of lymphoproliferative malignancy.     Numerous additional hypermetabolic soft tissue masses in abdomen, also suspicious for lymphoproliferative malignancy.     Mild diffuse osseous FDG hypermetabolism which could be on the basis of red marrow reconversion or lymphoproliferative malignancy. Please correlate with bone marrow biopsy results.     Mild asymmetric right palatine tonsillar FDG activity, nonspecific. Attention on follow-up is recommended.    IMPRESSION:1/23pet  Resolution of the hypermetabolic mass within the lower mid pelvis and mesenteric adenopathy     New diffuse hypermetabolic wall thickening of several loops of small bowel in the lower midpelvis compatible with lymphoma     19 mm hypermetabolic nodule in the anterior right mid abdomen consistent with lymphadenopathy     No evidence of adenopathy within the neck or chest  Patient Active Problem List   Diagnosis    Small bowel mass    Anemia, chronic disease    Diabetes mellitus type 2, noninsulin dependent    Arthralgia of bilat knees and neck    Moderate malnutrition    Intra-abdominal abscess    S/P exploratory laparotomy    S/P small bowel resection    DLBCL (diffuse large B cell lymphoma)    After care    Iron deficiency anemia, unspecified    Hyperlipidemia, unspecified    Rectal bleeding    Pancytopenia    Chemotherapy induced neutropenia    Encounter for prevention of neutropenia due to chemotherapy        Assessment and Plan     DLBCL:bone marrow bx  neg for lymphoma with tight cluster of blasts 6%( smal number)  positive for CD20,  BCL6, MUM1, MYC, high Ki-67 (99%)   -negative for CD10, CD30, BCL-2, cyclin D1, .  Reactive T-cells are CD3   --FISH negative for rearrangement of MYC ; no IGH/MYC fusion was observed  uric acid  7.3 sent allopurinol  HBV, HCV, HIV serologies; negative  PET CT  10/22  as above  --IPI pending; might require IT chemotherapy based on IPI  --discussed treatment with [facility], as it is still the standard of care for n on GC DLBCL  --plan for 6 cycles,    . Add nelasta to gregimen  Pet after 4 cycles as above repeat in 2 monre cycles   Delay chemo x 1 week due to thrombocytopenia see me next week with cbc, cmp   Good bowel regimen discussed at length with pt and wife  . T2DM not on long term insulin     --on metformin , follows with his PCP        3. Depression     --he denies suicidal ideation  --he has upcoming appointment with psychiatry           4. Abnormal BM biopsy     --tight cluster of myeloid blasts approximating 6% was found on BM done on 9/22/22  --significance un clear  --no dysplastic changes  --cytogenetics normal  --will require repeat BM biopsy after chemotherapy         anemia and symptomatic, 1 unit prbc ordered  Palpable mass to left rib cage, usg ordered    Podiatry for nail issues

## 2023-01-31 NOTE — TELEPHONE ENCOUNTER
Incoming call from pt's wife notifying staff that pt is vomiting. She reports that pt is not taking antiemetics. Pt advised to take both meds, alternating as prescribed. Encouraged pt to keep scheduled appt for IVF. Spoke with Dr Reid regarding pt's condition. She has advised pt take antiemetics at this time. This nurse advised pt stay hydrated and if vomiting persists/pt cannot keep anything down, he should proceed to ED. Pt's wife v/u.

## 2023-02-06 PROBLEM — U07.1 COVID-19: Status: ACTIVE | Noted: 2023-01-01

## 2023-02-06 PROBLEM — E87.20 METABOLIC ACIDOSIS: Status: ACTIVE | Noted: 2023-01-01

## 2023-02-06 PROBLEM — D75.89 BICYTOPENIA: Status: ACTIVE | Noted: 2023-01-01

## 2023-02-06 PROBLEM — K62.5 RECTAL BLEEDING: Status: RESOLVED | Noted: 2022-01-01 | Resolved: 2023-01-01

## 2023-02-06 NOTE — ASSESSMENT & PLAN NOTE
Patient is anemic to 9.9 (VICKIE) and has platelets of 93. past was pancytopenic.    - transfuse Hgb <7 or platelets <10 or <50 AND bleeding  - daily CBC

## 2023-02-06 NOTE — H&P
[facility] - Emergency Dept  Hematology  Bone Marrow Transplant  H&P    Subjective:     Principal Problem: Sepsis    HPI: This is a 52 year old gentleman with DLBCL s/p C4D27 R-CHOP and T2DM not on insulin who presents from his oncologist (Dr. Reid) with worsening po intake, malaise, and N/V. Patient states that symptom started around Saturday and since then, patient has been feeling progressively ill. Patient was able to tolerate PO intake until yesterday when he vomited his food. He denies any blood or bile in the vomit. Patient also had a bowel movement yesterday and was able to pass gas but still has some abdominal discomfort. Patient denies any hematemesis, hematochezia, lightheadedness, chest pain, fevers, or shortness of breath. Of note, patient states he has been continuing his prednisone 50 mg since his last chemo session on the 1/10th. Patient was supposed to have a session on 10/22 but was deferred.     In the ED, patient was found to be COVID positive but not requiring any supplemental O2. Patient also was tachycardic and had a leukocytosis. Lactic was elevated to 4.1 and went up to 4.4 after fluid resuscitation. Patient was started on broad spectrum antibiotics and admitted to BMT.       Oncology hx:  DLBCL:bone marrow bx  neg for lymphoma with tight cluster of blasts 6%( smal number)  positive for CD20,  BCL6, MUM1, MYC, high Ki-67 (99%)   -negative for CD10, CD30, BCL-2, cyclin D1, .  Reactive T-cells are CD3   --FISH negative for rearrangement of MYC ; no IGH/MYC fusion was observed  uric acid  7.3 sent allopurinol  HBV, HCV, HIV serologies; negative  PET CT  10/22 as above  --IPI pending; might require IT chemotherapy based on IPI  --discussed treatment with RCHOP, as it is still the standard of care for n on GC DLBCL  --plan for 6 cycles,    . Add nelasta to gregimen  Pet after 4 cycles as above repeat in 2 monre cycles   Delayed chemo 1/31/22  due to thrombocytopenia  today pt very frail and  not eating or d aj, pt referred to ED, to main campus, as we have decisions to make, scans/ scope, EGD, would like transplant services to see him  as there was a discussion of care  plan that included   IT therapy etc, concern for progression , what would be second line ? If so, I will place him on treatment s chedule for next week here , wife will keep me posted.       Patient information was obtained from patient, relative(s), past medical records, and ER records.         Patient has no known allergies.     Past Medical History:   Diagnosis Date    Cancer     Diabetes mellitus     Digestive disorder     Prediabetes      Past Surgical History:   Procedure Laterality Date    APPENDECTOMY  07/15/2014    COLONOSCOPY      COLONOSCOPY N/A 02/09/2022    ERROR.   He did not have a colonoscopy with Dr. Sanders.    COLONOSCOPY N/A 09/01/2022    Procedure: COLONOSCOPY;  Surgeon: Andrea Clemens MD;  Location: [facility];  Service: Endoscopy;  Laterality: N/A;    FLEXIBLE SIGMOIDOSCOPY N/A 11/7/2022    Procedure: SIGMOIDOSCOPY, FLEXIBLE;  Surgeon: Manuel Sanders MD;  Location: [facility];  Service: Endoscopy;  Laterality: N/A;    INCISION AND DRAINAGE OF ABDOMEN N/A 09/14/2022    Procedure: INCISION AND DRAINAGE, ABDOMEN;  Surgeon: Jan Oliveira Jr., MD;  Location: [facility];  Service: General;  Laterality: N/A;    INSERTION OF TUNNELED CENTRAL VENOUS CATHETER (CVC) WITH SUBCUTANEOUS PORT N/A 10/31/2022    Procedure: [id number];  Surgeon: Jan Oliveira Jr., MD;  Location: [facility] OR;  Service: General;  Laterality: N/A;    LAPAROSCOPIC DRAINAGE OF ABDOMEN N/A 09/23/2022    Procedure: DRAINAGE, ABDOMEN, LAPAROSCOPIC;  Surgeon: Jan lOiveira Jr., MD;  Location: [facility] OR;  Service: General;  Laterality: N/A;     Family History       Problem Relation (Age of Onset)    Cancer Mother    Diabetes Mother    Heart murmur Sister    Hypertension Mother    Seizures Father, Sister           Tobacco Use    Smoking status: Never    Smokeless tobacco: Never   Substance and Sexual Activity    Alcohol use: Not Currently     Comment: occasional    Drug use: No    Sexual activity: Yes     Partners: Female       Review of Systems   Constitutional:  Positive for appetite change and fatigue. Negative for chills and fever.   HENT:  Negative for ear pain and sinus pain.    Eyes:  Negative for photophobia, pain and visual disturbance.   Respiratory:  Negative for chest tightness, shortness of breath and wheezing.    Cardiovascular:  Negative for chest pain and leg swelling.   Gastrointestinal:  Positive for abdominal pain, diarrhea, nausea and vomiting. Negative for blood in stool and constipation.   Endocrine: Negative for polydipsia and polyuria.   Genitourinary:  Positive for difficulty urinating. Negative for dysuria, flank pain, frequency, penile pain and testicular pain.   Musculoskeletal:  Negative for back pain, neck pain and neck stiffness.   Skin:  Negative for color change and rash.   Neurological:  Negative for dizziness, syncope, weakness, numbness and headaches.   Psychiatric/Behavioral:  Negative for confusion and sleep disturbance.    Objective:     Vital Signs (Most Recent):  Temp: 98.5 °F (36.9 °C) (02/06/23 1127)  Pulse: 98 (02/06/23 1632)  Resp: 20 (02/06/23 1402)  BP: 93/60 (02/06/23 1632)  SpO2: 100 % (02/06/23 1632) Vital Signs (24h Range):  Temp:  [96.6 °F (35.9 °C)-98.5 °F (36.9 °C)] 98.5 °F (36.9 °C)  Pulse:  [] 98  Resp:  [17-28] 20  SpO2:  [98 %-100 %] 100 %  BP: ()/(60-79) 93/60     Weight: 86 kg (189 lb 9.5 oz)  Body mass index is 25.01 kg/m².  Body surface area is 2.1 meters squared.    ECOG SCORE           [unfilled]    Lines/Drains/Airways       Central Venous Catheter Line  Duration                  PowerPort A Cath Single Lumen 10/31/22 0826 right internal jugular 98 days              Peripheral Intravenous Line  Duration                  Peripheral IV - Single  Lumen 02/06/23 1253 20 G Anterior;Proximal;Right Forearm <1 day                    Physical Exam  Constitutional:       General: He is not in acute distress.     Appearance: He is not ill-appearing.   HENT:      Head: Normocephalic and atraumatic.      Right Ear: External ear normal.      Left Ear: External ear normal.      Nose: Nose normal. No congestion or rhinorrhea.      Mouth/Throat:      Mouth: Mucous membranes are moist.      Pharynx: Oropharynx is clear. No oropharyngeal exudate or posterior oropharyngeal erythema.   Eyes:      General: No scleral icterus.        Right eye: No discharge.         Left eye: No discharge.      Extraocular Movements: Extraocular movements intact.      Conjunctiva/sclera: Conjunctivae normal.   Cardiovascular:      Rate and Rhythm: Regular rhythm. Tachycardia present.      Pulses: Normal pulses.      Heart sounds: Normal heart sounds. No murmur heard.    No gallop.   Pulmonary:      Effort: Pulmonary effort is normal. No respiratory distress.      Breath sounds: Normal breath sounds. No stridor. No wheezing.   Chest:      Chest wall: No tenderness.   Abdominal:      General: Abdomen is flat. Bowel sounds are normal. There is no distension.      Palpations: Abdomen is soft. There is mass (right sided mass).      Tenderness: There is abdominal tenderness. There is no guarding or rebound.      Hernia: No hernia is present.   Musculoskeletal:         General: No swelling, tenderness or deformity. Normal range of motion.      Cervical back: Normal range of motion. No rigidity or tenderness.      Right lower leg: No edema.      Left lower leg: No edema.   Skin:     General: Skin is warm and dry.      Capillary Refill: Capillary refill takes less than 2 seconds.      Coloration: Skin is not jaundiced.      Findings: No bruising, erythema or rash.   Neurological:      General: No focal deficit present.      Mental Status: He is alert and oriented to person, place, and time.      Cranial  Nerves: No cranial nerve deficit.      Sensory: No sensory deficit.      Motor: No weakness.   Psychiatric:         Mood and Affect: Mood normal.         Thought Content: Thought content normal.       Significant Labs:   CBC:   Recent Labs   Lab 02/06/23  0847 02/06/23  1244 02/06/23  1259   WBC 21.00* 23.65*  --    HGB 10.2* 9.9*  --    HCT 32.0* 30.9* 33*   PLT 90* 93*  --    , CMP:   Recent Labs   Lab 02/06/23  0847 02/06/23  1244   * 131*   K 4.7 4.7   CL 92* 97   CO2 20* 19*   * 307*   BUN 25* 27*   CREATININE 1.0 0.9   CALCIUM 9.3 9.7   PROT 7.5 6.9   ALBUMIN 3.5 3.2*   BILITOT 1.5* 1.3*   ALKPHOS 116 125   AST 20 8*   ALT 14 9*   ANIONGAP 19* 15   , and All pertinent labs from the last 24 hours have been reviewed.    Diagnostic Results:  I have reviewed all pertinent imaging results/findings within the past 24 hours.    Assessment/Plan:     * Sepsis  52 yoM with DLBCL s/p R-CHOP who presented with N/V x 3 days found to be covid positive and admitted for sepsis. Patient was tachycardic and had a leukocyte count of 23. Patient had an elevated lactic of 4.1. Patient was started on vanc and cefepime.    - will d/c antibiotics. Likely lactic elevation due to poor po intake and lymphoma and covid  - IVF  - repeat lactic  - start covid protocol with remdesivir  - f/u covid labs  - will continue patient's prednisone    Metabolic acidosis  Bicarb of 19. Non anion gap. Lactic 4.1    - see sepsis    Bicytopenia  Patient is anemic to 9.9 (VICKIE) and has platelets of 93. past was pancytopenic.    - transfuse Hgb <7 or platelets <10 or <50 AND bleeding  - daily CBC    COVID-19  See sepsis    DLBCL (diffuse large B cell lymphoma)  See onc history in HPI    Diabetes mellitus type 2, noninsulin dependent  Patient on metformin at home. Patient's glucose >300 on admission likely 2/2 steroid use.     - d/c metformin  - will give 3 u Aspart x1 dose  - LDSSI  - diabetic diet  - glucose q4    Small bowel mass  CT showed  stable mass findings.     - continue to monitor for s/s of obstruction        VTE Risk Mitigation (From admission, onward)         Ordered     enoxaparin injection 90 mg  2 times daily         02/06/23 9543                Disposition: admit to inpatient    Saad Rojo MD  Bone Marrow Transplant  Hematology  [facility] - Emergency Dept

## 2023-02-06 NOTE — ED NOTES
I-STAT Chem-8+ Results:   Value Reference Range   Sodium 132 136-145 mmol/L   Potassium  4.6 3.5-5.1 mmol/L   Chloride 94  mmol/L   Ionized Calcium 1.19 1.06-1.42 mmol/L   CO2 (measured) 24 23-29 mmol/L   Glucose 312  mg/dL   BUN 26 6-30 mg/dL   Creatinine 0.8 0.5-1.4 mg/dL   Hematocrit 33 36-54%

## 2023-02-06 NOTE — SUBJECTIVE & OBJECTIVE
Patient information was obtained from patient, relative(s), past medical records, and ER records.         Patient has no known allergies.     Past Medical History:   Diagnosis Date    Cancer     Diabetes mellitus     Digestive disorder     Prediabetes      Past Surgical History:   Procedure Laterality Date    APPENDECTOMY  07/15/2014    COLONOSCOPY      COLONOSCOPY N/A 02/09/2022    ERROR.   He did not have a colonoscopy with Dr. Sanders.    COLONOSCOPY N/A 09/01/2022    Procedure: COLONOSCOPY;  Surgeon: Andrea Clemens MD;  Location: [facility] ENDO;  Service: Endoscopy;  Laterality: N/A;    FLEXIBLE SIGMOIDOSCOPY N/A 11/7/2022    Procedure: SIGMOIDOSCOPY, FLEXIBLE;  Surgeon: Manuel Sanders MD;  Location: [facility] ENDO;  Service: Endoscopy;  Laterality: N/A;    INCISION AND DRAINAGE OF ABDOMEN N/A 09/14/2022    Procedure: INCISION AND DRAINAGE, ABDOMEN;  Surgeon: Jan Oliveira Jr., MD;  Location: [facility] OR;  Service: General;  Laterality: N/A;    INSERTION OF TUNNELED CENTRAL VENOUS CATHETER (CVC) WITH SUBCUTANEOUS PORT N/A 10/31/2022    Procedure: [id number];  Surgeon: Jan Oliveira Jr., MD;  Location: [facility] OR;  Service: General;  Laterality: N/A;    LAPAROSCOPIC DRAINAGE OF ABDOMEN N/A 09/23/2022    Procedure: DRAINAGE, ABDOMEN, LAPAROSCOPIC;  Surgeon: Jan Oliveira Jr., MD;  Location: [facility] OR;  Service: General;  Laterality: N/A;     Family History       Problem Relation (Age of Onset)    Cancer Mother    Diabetes Mother    Heart murmur Sister    Hypertension Mother    Seizures Father, Sister          Tobacco Use    Smoking status: Never    Smokeless tobacco: Never   Substance and Sexual Activity    Alcohol use: Not Currently     Comment: occasional    Drug use: No    Sexual activity: Yes     Partners: Female       Review of Systems   Constitutional:  Positive for appetite change and fatigue. Negative for chills and fever.   HENT:  Negative for ear pain and sinus pain.    Eyes:   Negative for photophobia, pain and visual disturbance.   Respiratory:  Negative for chest tightness, shortness of breath and wheezing.    Cardiovascular:  Negative for chest pain and leg swelling.   Gastrointestinal:  Positive for abdominal pain, diarrhea, nausea and vomiting. Negative for blood in stool and constipation.   Endocrine: Negative for polydipsia and polyuria.   Genitourinary:  Positive for difficulty urinating. Negative for dysuria, flank pain, frequency, penile pain and testicular pain.   Musculoskeletal:  Negative for back pain, neck pain and neck stiffness.   Skin:  Negative for color change and rash.   Neurological:  Negative for dizziness, syncope, weakness, numbness and headaches.   Psychiatric/Behavioral:  Negative for confusion and sleep disturbance.    Objective:     Vital Signs (Most Recent):  Temp: 98.5 °F (36.9 °C) (02/06/23 1127)  Pulse: 98 (02/06/23 1632)  Resp: 20 (02/06/23 1402)  BP: 93/60 (02/06/23 1632)  SpO2: 100 % (02/06/23 1632) Vital Signs (24h Range):  Temp:  [96.6 °F (35.9 °C)-98.5 °F (36.9 °C)] 98.5 °F (36.9 °C)  Pulse:  [] 98  Resp:  [17-28] 20  SpO2:  [98 %-100 %] 100 %  BP: ()/(60-79) 93/60     Weight: 86 kg (189 lb 9.5 oz)  Body mass index is 25.01 kg/m².  Body surface area is 2.1 meters squared.    ECOG SCORE           [unfilled]    Lines/Drains/Airways       Central Venous Catheter Line  Duration                  PowerPort A Cath Single Lumen 10/31/22 0826 right internal jugular 98 days              Peripheral Intravenous Line  Duration                  Peripheral IV - Single Lumen 02/06/23 1253 20 G Anterior;Proximal;Right Forearm <1 day                    Physical Exam  Constitutional:       General: He is not in acute distress.     Appearance: He is not ill-appearing.   HENT:      Head: Normocephalic and atraumatic.      Right Ear: External ear normal.      Left Ear: External ear normal.      Nose: Nose normal. No congestion or rhinorrhea.      Mouth/Throat:       Mouth: Mucous membranes are moist.      Pharynx: Oropharynx is clear. No oropharyngeal exudate or posterior oropharyngeal erythema.   Eyes:      General: No scleral icterus.        Right eye: No discharge.         Left eye: No discharge.      Extraocular Movements: Extraocular movements intact.      Conjunctiva/sclera: Conjunctivae normal.   Cardiovascular:      Rate and Rhythm: Regular rhythm. Tachycardia present.      Pulses: Normal pulses.      Heart sounds: Normal heart sounds. No murmur heard.    No gallop.   Pulmonary:      Effort: Pulmonary effort is normal. No respiratory distress.      Breath sounds: Normal breath sounds. No stridor. No wheezing.   Chest:      Chest wall: No tenderness.   Abdominal:      General: Abdomen is flat. Bowel sounds are normal. There is no distension.      Palpations: Abdomen is soft. There is mass (right sided mass).      Tenderness: There is abdominal tenderness. There is no guarding or rebound.      Hernia: No hernia is present.   Musculoskeletal:         General: No swelling, tenderness or deformity. Normal range of motion.      Cervical back: Normal range of motion. No rigidity or tenderness.      Right lower leg: No edema.      Left lower leg: No edema.   Skin:     General: Skin is warm and dry.      Capillary Refill: Capillary refill takes less than 2 seconds.      Coloration: Skin is not jaundiced.      Findings: No bruising, erythema or rash.   Neurological:      General: No focal deficit present.      Mental Status: He is alert and oriented to person, place, and time.      Cranial Nerves: No cranial nerve deficit.      Sensory: No sensory deficit.      Motor: No weakness.   Psychiatric:         Mood and Affect: Mood normal.         Thought Content: Thought content normal.       Significant Labs:   CBC:   Recent Labs   Lab 02/06/23  0847 02/06/23  1244 02/06/23  1259   WBC 21.00* 23.65*  --    HGB 10.2* 9.9*  --    HCT 32.0* 30.9* 33*   PLT 90* 93*  --    , CMP:    Recent Labs   Lab 02/06/23  0847 02/06/23  1244   * 131*   K 4.7 4.7   CL 92* 97   CO2 20* 19*   * 307*   BUN 25* 27*   CREATININE 1.0 0.9   CALCIUM 9.3 9.7   PROT 7.5 6.9   ALBUMIN 3.5 3.2*   BILITOT 1.5* 1.3*   ALKPHOS 116 125   AST 20 8*   ALT 14 9*   ANIONGAP 19* 15   , and All pertinent labs from the last 24 hours have been reviewed.    Diagnostic Results:  I have reviewed all pertinent imaging results/findings within the past 24 hours.

## 2023-02-06 NOTE — ASSESSMENT & PLAN NOTE
Patient on metformin at home. Patient's glucose >300 on admission likely 2/2 steroid use.     - d/c metformin  - will give 3 u Aspart x1 dose  - LDSSI  - diabetic diet  - glucose q4

## 2023-02-06 NOTE — TELEPHONE ENCOUNTER
Spoke with Howie at [facility] pharmacy. Gave verbal order for pt's prednisone rx.    ----- Message from Jamari Ness sent at 2/6/2023 11:48 AM CST -----  Contact: Crystal from [facility] at [phone]  Type:  Pharmacy Calling to Clarify an RX    Name of Caller:  Crystal  Pharmacy Name:  [facility]  Prescription Name:  predniSONE (DELTASONE) 50 MG Tab  What do they need to clarify?:  directions  Best Call Back Number:  [phone]  Additional Information:  Crystal from [facility] is calling the office to get the correct directions on the pt's predniSONE (DELTASONE) 50 MG Tab. Please call back and advise.

## 2023-02-06 NOTE — PROGRESS NOTES
"Subjective:       Patient ID: Dwight Hoffmann is a 52 y.o. male.    Chief Complaint:   HPI  52 year old male with a history of NIDDM2 and HLD who presents today with complaints of night sweats for weeks. It is moderate. It is associated with 25 lb wt loss over the last 2 months, urinary hesitancy, urinary frequency, occasional blood streaked stools, weakness, fatigue, neck pain, knee pain, and pelvic pain. He denies measured fever, chills, N/V/D, chest pain, or SOB. He was seen by his primary last month about the blood streaked stools and referred to Dr. Clemens for colonoscopy on 9/1 with multiple polypectomies with path report of tubular adenomas. In the ED, labs revealed microcytic anemia, he was mildly tachycardic on arrival  which improved spontaneously, /60,  and CT abd/pelvis revealing: "Thick-walled collection containing feculent material, widely communicating with small bowel, seen centrally in the pelvis. Findings are suspicious for necrotic mass or abscess arising from the small bowel  Surgery was consulted and performed exploratory laparotomy 9/14/22 with resection of the mass and anastomosis of small bowel. The mass appeared to be an abscess with surrounding necrosis.  Patient with signs of sepsis including tachycardic and leukocytosis which could be related to surgery as well which complicated his picture however he was covered for this with with IV antibiotics. An NG tube to LIWS suction was started and the patient was put on IV fluids while he was NPO. Briefly on PPN. He did have a positive blood culture which showed coagulase negative Staph suspected to be a contaminant. He was briefly on vancomycin which was discontinued. ID consulted.  Patient was put on meropenem and fluconazole.  Cultures from surgery with Enterobacter cloacae and Proteus mirabilis.  His bowel function slowly returned, and he no longer needed NGT to suction.     Oncology hx:   The pathologic diagnosis on the mass " was diffuse large B cell lymphoma.     bone marrow biopsy 9/22/22 for staging.  CT of chest showed no enlarged lymph nodes. Did have another laparoscopy 9/23/22 due to abdominal fluid collection with cleanout thought to be more necrotic than infective.  outpatient ertapenem infusions completed    bone marrow bx  neg for lymphoma with tight cluster of blasts 6%( smal number)  positive for CD20,  BCL6, MUM1, MYC, high Ki-67 (99%)   -negative for CD10, CD30, BCL-2, cyclin D1, .  Reactive T-cells are CD3   --FISH negative for rearrangement of MYC ; no IGH/MYC fusion was observed  uric acid  7.3 sent allopurinol  HBV, HCV, HIV serologies; negative    might require IT ?    -11/22 started -plan for 6 cycles, R-HOP   . Add nelasta to gregimen  Pet after 4 cycles  see below. repeat in 2 more cycles    Review of Systems    Rectal bleeding,stopped went to hospital was scoped  non bleeding hemorrhoids  1/28/23 Lower pelvic pain went to Ed ,bowel gas and stools noted. Somehwat better using linzess and miralax    No fever, mild sob+    2/6/23: C/o not eating, not drinking this weekend, very tired, had a large BM but feels like food is sticking in his chest, ahrdly able to sit up and brush his teeth  sob on activity  Denies seizure activity or focal weaknesses or symptoms related to TIA, no head aches or blurred vision reported  Denies issues with skin rash or bruising  Denies issues with swelling of feet, tingling or numbness   +issues with sleep,     Good family support reported         Past Medical History:   Diagnosis Date    Cancer     Diabetes mellitus     Digestive disorder     Prediabetes      Past Surgical History:   Procedure Laterality Date    APPENDECTOMY  07/15/2014    COLONOSCOPY      COLONOSCOPY N/A 02/09/2022    ERROR.   He did not have a colonoscopy with Dr. Horta.    COLONOSCOPY N/A 09/01/2022    Procedure: COLONOSCOPY;  Surgeon: Andrea Clemens MD;  Location: [facility];  Service: Endoscopy;   Laterality: N/A;    FLEXIBLE SIGMOIDOSCOPY N/A 11/7/2022    Procedure: SIGMOIDOSCOPY, FLEXIBLE;  Surgeon: Manuel Sanders MD;  Location: [facility] ENDO;  Service: Endoscopy;  Laterality: N/A;    INCISION AND DRAINAGE OF ABDOMEN N/A 09/14/2022    Procedure: INCISION AND DRAINAGE, ABDOMEN;  Surgeon: Jan Oliveira Jr., MD;  Location: [facility] OR;  Service: General;  Laterality: N/A;    INSERTION OF TUNNELED CENTRAL VENOUS CATHETER (CVC) WITH SUBCUTANEOUS PORT N/A 10/31/2022    Procedure: [id number];  Surgeon: Jan Oliveira Jr., MD;  Location: [facility] OR;  Service: General;  Laterality: N/A;    LAPAROSCOPIC DRAINAGE OF ABDOMEN N/A 09/23/2022    Procedure: DRAINAGE, ABDOMEN, LAPAROSCOPIC;  Surgeon: Jan Oliveira Jr., MD;  Location: [facility] OR;  Service: General;  Laterality: N/A;     Family History   Problem Relation Age of Onset    Cancer Mother         Colon    Diabetes Mother     Hypertension Mother     Seizures Father     Heart murmur Sister     Seizures Sister       Social History     Socioeconomic History    Marital status:     Number of children: 2   Occupational History    Occupation: Truck Drive   Tobacco Use    Smoking status: Never    Smokeless tobacco: Never   Substance and Sexual Activity    Alcohol use: Not Currently     Comment: occasional    Drug use: No    Sexual activity: Yes     Partners: Female     Social Determinants of Health     Financial Resource Strain: Low Risk     Difficulty of Paying Living Expenses: Not hard at all   Food Insecurity: No Food Insecurity    Worried About Running Out of Food in the Last Year: Never true    Ran Out of Food in the Last Year: Never true   Transportation Needs: No Transportation Needs    Lack of Transportation (Medical): No    Lack of Transportation (Non-Medical): No   Physical Activity: Inactive    Days of Exercise per Week: 0 days    Minutes of Exercise per Session: 0 min   Stress: No Stress Concern Present    Feeling of Stress : Only a  little   Social Connections: Moderately Isolated    Frequency of Communication with Friends and Family: Twice a week    Frequency of Social Gatherings with Friends and Family: Twice a week    Attends Buddhism Services: Never    Active Member of Clubs or Organizations: No    Attends Club or Organization Meetings: Never    Marital Status:    Housing Stability: Low Risk     Unable to Pay for Housing in the Last Year: No    Number of Places Lived in the Last Year: 1    Unstable Housing in the Last Year: No     Review of patient's allergies indicates:  No Known Allergies    Current Outpatient Medications:     allopurinoL (ZYLOPRIM) 100 MG tablet, Take 1 tablet (100 mg total) by mouth once daily., Disp: 90 tablet, Rfl: 3    blood sugar diagnostic Strp, To check BG 2 times daily, to use with insurance preferred meter, Disp: 200 each, Rfl: 1    butenafine 1 % cream, Per instructions 2 TIMES DAILY (route: topical), Disp: , Rfl:     chlorproMAZINE (THORAZINE) 25 MG tablet, Take 1 tablet (25 mg total) by mouth 3 (three) times daily., Disp: 90 tablet, Rfl: 11    ciprofloxacin HCl (CIPRO) 500 MG tablet, Take 1 tablet (500 mg total) by mouth once daily., Disp: 30 tablet, Rfl: 0    docusate sodium (COLACE) 50 MG capsule, Take 1 capsule (50 mg total) by mouth 2 (two) times daily., Disp: 60 capsule, Rfl: 0    HYDROcodone-acetaminophen (NORCO) 5-325 mg per tablet, 1 tablet EVERY 4 HOURS (route: oral), Disp: 90 tablet, Rfl: 0    lancets Misc, To check BG 2 times daily, to use with insurance preferred meter, Disp: 200 each, Rfl: 1    ondansetron (ZOFRAN-ODT) 4 MG TbDL, Take 1 tablet by mouth once daily., Disp: , Rfl:     pantoprazole (PROTONIX) 40 MG tablet, Take 1 tablet (40 mg total) by mouth once daily., Disp: 30 tablet, Rfl: 11    predniSONE (DELTASONE) 50 MG Tab, Take 1 tablet (50 mg total) by mouth once daily., Disp: 100 tablet, Rfl: 2    promethazine (PHENERGAN) 12.5 MG Tab, Take 1 tablet by mouth once daily., Disp: ,  Rfl:     blood-glucose meter kit, To check BG 2 times daily, to use with insurance preferred meter, Disp: 1 each, Rfl: 0    clotrimazole (LOTRIMIN) 1 % cream, Apply topically 2 (two) times daily., Disp: 28 g, Rfl: 0    Current Facility-Administered Medications:     0.9%  NaCl infusion (for blood administration), , Intravenous, Once, Josee Reid MD    Physical Exam    Wt Readings from Last 3 Encounters:   02/06/23 86.1 kg (189 lb 13.1 oz)   01/31/23 88.7 kg (195 lb 8.8 oz)   01/27/23 88.9 kg (196 lb)     Temp Readings from Last 3 Encounters:   02/06/23 96.6 °F (35.9 °C) (Temporal)   01/31/23 98.4 °F (36.9 °C) (Oral)   01/27/23 98.4 °F (36.9 °C) (Oral)     BP Readings from Last 3 Encounters:   02/06/23 103/75   01/31/23 120/78   01/28/23 113/77     Pulse Readings from Last 3 Encounters:   02/06/23 (!) 144   01/31/23 (!) 116   01/28/23 92      VITAL SIGNS:  as above   GENERAL: appears well-built, well-nourished.frail, weak   No anxiety, no agitation, and in no distress.  Patient is awake, alert, oriented and cooperative.  HEENT:  Showed no congestion. Trachea is central no obvious icterus or pallor noted no hoarseness. no obvious JVD   NECK:  Supple.  No JVD. No obvious cervical submental or supraclavicular adenopathy.  RS:the visualized portion of  Chest expands well. chest appears symmetric, no audible wheezes.  No dyspnea recognized  ABDOMEN:  abdomen appears  slightly distended, sx scar   EXTREMITIES:  Without edema.  NEUROLOGICAL:  The patient is appropriate, higher functions are normal.  No  obvious neurological deficits.  normal judgement normal thought content  No confusion, no speech impediment. Cranial nerves are intact and show no deficit. No gross motor deficits noted   SKIN MUSCULOSKELETAL: no joint or skeletal deformity, no clubbing of nails.  No visible rash ecchymosis or petechiae   Lab Results   Component Value Date    WBC 21.00 (H) 02/06/2023    HGB 10.2 (L) 02/06/2023    HCT 32.0 (L) 02/06/2023     MCV 83 02/06/2023    PLT 90 (L) 02/06/2023       BMP  Lab Results   Component Value Date     (L) 02/06/2023    K 4.7 02/06/2023    CL 92 (L) 02/06/2023    CO2 20 (L) 02/06/2023    BUN 25 (H) 02/06/2023    CREATININE 1.0 02/06/2023    CALCIUM 9.3 02/06/2023    ANIONGAP 19 (H) 02/06/2023    ESTGFRAFRICA 111 12/28/2021    EGFRNONAA 96 12/28/2021     Lab Results   Component Value Date    IRON 46 12/19/2022    TIBC 281 12/19/2022    FERRITIN 996 (H) 12/19/2022 9/26/22  Final Pathologic Diagnosis 1. Small bowel, resection:  Diffuse large B-cell lymphoma, non-germinal   center origin.  Final classification is pending C-MYC rearrangement analysis   result.  See comment.   2. Omentum, resection: Diffuse large B-cell lymphoma, non-germinal center   origin.  Final classification is pending C-MYC rearrangement analysis result.    Flow cytometry analysis of tissue was not performed.   Immunohistochemical studies were performed on the paraffin embedded tissue   block 1 C with adequate positive and negative controls.  The atypical   lymphocytes are positive for CD20,  BCL6, MUM1, MYC, high Ki-67 (99%) and are   negative for CD10, CD30, BCL-2, cyclin D1, .  Reactive T-cells are CD3   positive and BCL-2 positive.  In Situ hybridization for EBV is negative.   Findings are consistent with diffuse large B-cell lymphoma, non germinal   center origin.  Final classification is pending C-MYC rearrangement analysis   by FISH.  A supplemental report will follow  9/22  The left ventricle is normal in size with concentric remodeling and normal systolic function.  The estimated ejection fraction is 60%.  Grade I left ventricular diastolic dysfunction.  Normal right ventricular size with normal right ventricular systolic function.  Mild left atrial enlargement.  Mild mitral regurgitation.  Mild to moderate tricuspid regurgitation.  Normal central venous pressure (3 mmHg).  The estimated PA systolic pressure is 49 mmHg.  There is  pulmonary hypertension.  Mild right atrial enlargement.  No vegetations were seen  9/22/22   Bone marrow, right iliac crest, aspirate, clot and core biopsy:   --Normocellular marrow for age ranging from 30-70% with trilineage   hematopoiesis, see comment   --No increase in blasts by aspirate count and CD34 immunohistochemical stain,   see comment   --Increased megakaryocytes with atypia, see comment   --No morphologic evidence of metastatic lymphoma   --Stainable iron is not increased, see comment   --Minimal to mild reticulin fibrosis   Comment:  Concomitantly submitted flow cytometric analysis detects a tight   cluster of myeloid blasts.   The blast gate is mildly increased with a tight   cluster of blasts present showing coexpression of CD13 and CD34 with   expression of cytoplasmic myeloperoxidase.  The tight   cluster represents approximately 6% of total events.   Lymphocytes are   composed of a mixture of  polyclonal B lymphocytes and T lymphocytes that are   immunophenotypically unremarkable.   This marrow shows overall normocellular marrow for age with no morphologic   evidence of metastatic lymphoma.  There is some atypia of the megakaryocytic   lineage as well as a tight cluster of CD34/CD13 positive blasts detected by   flow cytometry, although increased blasts are not appreciated on the aspirate   smear or by CD34 immunohistochemical stain.  By CD61 immunohistochemical   stain, megakaryocytes appear mildly increased without significant clustering   appreciated.  Overt morphologic dysplasia of the myeloid and erythroid   lineages is not appreciated.  These findings are abnormal, and a   myelodysplastic or myeloproliferative process can not be completely ruled   out.  Therefore, correlation with any available cytogenetic and molecular   studies is recommended including studies to rule out myeloproliferative   neoplasms.  If chromosomes are normal, next generation sequencing could be   considered.        Summary echo 10/6/22    The left ventricle is normal in size with concentric remodeling and normal systolic function.  The estimated ejection fraction is 60%.  Grade I left ventricular diastolic dysfunction.  Normal right ventricular size with normal right ventricular systolic function.  Mild left atrial enlargement.  Mild mitral regurgitation.  Mild to moderate tricuspid regurgitation.  Normal central venous pressure (3 mmHg).  The estimated PA systolic pressure is 49 mmHg.  There is pulmonary hypertension.  Mild right atrial enlargement.  No vegetations were seen     IMPRESSION:pet 10/14/22     Large multiloculated necrotic soft tissue mass in the central abdomen extending into the pelvis which is markedly hypermetabolic. This is consistent with the provided history of lymphoproliferative malignancy.     Numerous additional hypermetabolic soft tissue masses in abdomen, also suspicious for lymphoproliferative malignancy.     Mild diffuse osseous FDG hypermetabolism which could be on the basis of red marrow reconversion or lymphoproliferative malignancy. Please correlate with bone marrow biopsy results.     Mild asymmetric right palatine tonsillar FDG activity, nonspecific. Attention on follow-up is recommended.    IMPRESSION:1/23pet  Resolution of the hypermetabolic mass within the lower mid pelvis and mesenteric adenopathy     New diffuse hypermetabolic wall thickening of several loops of small bowel in the lower midpelvis compatible with lymphoma     19 mm hypermetabolic nodule in the anterior right mid abdomen consistent with lymphadenopathy     No evidence of adenopathy within the neck or chest  Patient Active Problem List   Diagnosis    Small bowel mass    Anemia, chronic disease    Diabetes mellitus type 2, noninsulin dependent    Arthralgia of bilat knees and neck    Moderate malnutrition    Intra-abdominal abscess    S/P exploratory laparotomy    S/P small bowel resection    DLBCL (diffuse large B  cell lymphoma)    After care    Iron deficiency anemia, unspecified    Hyperlipidemia, unspecified    Pancytopenia    Chemotherapy induced neutropenia    Encounter for prevention of neutropenia due to chemotherapy        Assessment and Plan     DLBCL:bone marrow bx  neg for lymphoma with tight cluster of blasts 6%( smal number)  positive for CD20,  BCL6, MUM1, MYC, high Ki-67 (99%)   -negative for CD10, CD30, BCL-2, cyclin D1, .  Reactive T-cells are CD3   --FISH negative for rearrangement of MYC ; no IGH/MYC fusion was observed  uric acid  7.3 sent allopurinol  HBV, HCV, HIV serologies; negative  PET CT  10/22 as above  --IPI pending; might require IT chemotherapy based on IPI  --discussed treatment with [facility], as it is still the standard of care for n on GC DLBCL  --plan for 6 cycles,    . Add nelasta to gregimen  Pet after 4 cycles as above repeat in 2 monre cycles   Delayed chemo 1/31/22  due to thrombocytopenia  today pt very frail and not eating or d rinking, pt referred to ED, to [facility], as we have decisions to make, scans/ scope, EGD, would like transplant services to see him  as there was a discussion of care  plan that included   IT therapy etc, concern for progression , what would be second line ? If so, I will place him on treatment s chedule for next week here , wife will keep me posted.     . T2DM not on long term insulin     --on metformin , follows with his PCP        3. Depression     --he denies suicidal ideation  --he has upcoming appointment with psychiatry           4. Abnormal BM biopsy     --tight cluster of myeloid blasts approximating 6% was found on BM done on 9/22/22  --significance un clear  --no dysplastic changes  --cytogenetics normal  --will require repeat BM biopsy after chemotherapy         anemia and symptomatic, 1 unit prbc ordered  Palpable mass to left rib cage, usg ordered    Podiatry for nail issues

## 2023-02-06 NOTE — ASSESSMENT & PLAN NOTE
52 yoM with DLBCL s/p [facility] who presented with N/V x 3 days found to be covid positive and admitted for sepsis. Patient was tachycardic and had a leukocyte count of 23. Patient had an elevated lactic of 4.1. Patient was started on vanc and cefepime.    - will d/c antibiotics. Likely lactic elevation due to poor po intake and lymphoma and covid  - IVF  - repeat lactic  - start covid protocol with remdesivir  - f/u covid labs  - will continue patient's prednisone

## 2023-02-06 NOTE — ED PROVIDER NOTES
Encounter Date: 2/6/2023       History     Chief Complaint   Patient presents with    Multiple complaints     Sent from hem onc, vomiting and high hr     52-year-old male admitted in late summer or early fall 2022 for sepsis secondary to intra-abdominal abscess.  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  Patient initiated outpatient treatment for same after completing a course of IV antibiotics.  Patient had improved but presented to Heme-Onc clinic today with report of p.o. intolerance, worsening symptoms, and generalized ill feeling.  Patient found to be significantly tachycardic in clinic and referred to emergency department.     Review of patient's allergies indicates:  No Known Allergies  Past Medical History:   Diagnosis Date    Cancer     Diabetes mellitus     Digestive disorder     Prediabetes      Past Surgical History:   Procedure Laterality Date    APPENDECTOMY  07/15/2014    COLONOSCOPY      COLONOSCOPY N/A 02/09/2022    ERROR.   He did not have a colonoscopy with Dr. Sanders.    COLONOSCOPY N/A 09/01/2022    Procedure: COLONOSCOPY;  Surgeon: Andrea Clemens MD;  Location: [facility];  Service: Endoscopy;  Laterality: N/A;    FLEXIBLE SIGMOIDOSCOPY N/A 11/7/2022    Procedure: SIGMOIDOSCOPY, FLEXIBLE;  Surgeon: Manuel Sanders MD;  Location: [facility];  Service: Endoscopy;  Laterality: N/A;    INCISION AND DRAINAGE OF ABDOMEN N/A 09/14/2022    Procedure: INCISION AND DRAINAGE, ABDOMEN;  Surgeon: Jan Oliveira Jr., MD;  Location: [facility];  Service: General;  Laterality: N/A;    INSERTION OF TUNNELED CENTRAL VENOUS CATHETER (CVC) WITH SUBCUTANEOUS PORT N/A 10/31/2022    Procedure: [id number];  Surgeon: Jan Oliveira Jr., MD;  Location: [facility] OR;  Service: General;  Laterality: N/A;    LAPAROSCOPIC DRAINAGE OF ABDOMEN N/A 09/23/2022    Procedure: DRAINAGE, ABDOMEN, LAPAROSCOPIC;  Surgeon: Jan Oliveira Jr., MD;  Location: [facility];  Service: General;  Laterality: N/A;      Family History   Problem Relation Age of Onset    Cancer Mother         Colon    Diabetes Mother     Hypertension Mother     Seizures Father     Heart murmur Sister     Seizures Sister      Social History     Tobacco Use    Smoking status: Never    Smokeless tobacco: Never   Substance Use Topics    Alcohol use: Not Currently     Comment: occasional    Drug use: No     Review of Systems   All other systems reviewed and are negative.    Physical Exam     Initial Vitals [02/06/23 1127]   BP Pulse Resp Temp SpO2   110/79 (!) 140 (!) 28 98.5 °F (36.9 °C) 99 %      MAP       --         Physical Exam    Constitutional: He appears well-developed and well-nourished.   HENT:   Head: Normocephalic and atraumatic.   Dry Mucous Membranes   Eyes: Conjunctivae and EOM are normal. Pupils are equal, round, and reactive to light.   Neck: Neck supple.   Normal range of motion.  Cardiovascular:  Regular rhythm, normal heart sounds and intact distal pulses.           tachycardic   Pulmonary/Chest: Breath sounds normal. No respiratory distress. He has no wheezes. He has no rales.   Abdominal: Abdomen is soft. Bowel sounds are normal.   Musculoskeletal:         General: No tenderness or edema. Normal range of motion.      Cervical back: Normal range of motion and neck supple.     Neurological: He is alert and oriented to person, place, and time. He has normal strength and normal reflexes.   Skin: Skin is warm and dry. Capillary refill takes less than 2 seconds.   Psychiatric: He has a normal mood and affect. Judgment and thought content normal.       ED Course   Procedures  Labs Reviewed   CBC W/ AUTO DIFFERENTIAL - Abnormal; Notable for the following components:       Result Value    WBC 23.65 (*)     RBC 3.85 (*)     Hemoglobin 9.9 (*)     Hematocrit 30.9 (*)     MCV 80 (*)     MCH 25.7 (*)     RDW 20.8 (*)     Platelets 93 (*)     Immature Granulocytes 2.0 (*)     Gran # (ANC) 19.1 (*)     Immature Grans (Abs) 0.48 (*)     Mono #  2.8 (*)     Gran % 80.9 (*)     Lymph % 5.1 (*)     Platelet Estimate Decreased (*)     All other components within normal limits   COMPREHENSIVE METABOLIC PANEL - Abnormal; Notable for the following components:    Sodium 131 (*)     CO2 19 (*)     Glucose 307 (*)     BUN 27 (*)     Albumin 3.2 (*)     Total Bilirubin 1.3 (*)     AST 8 (*)     ALT 9 (*)     All other components within normal limits   URINALYSIS, REFLEX TO URINE CULTURE - Abnormal; Notable for the following components:    Specific Gravity, UA >1.030 (*)     Protein, UA Trace (*)     Glucose, UA 4+ (*)     Ketones, UA Trace (*)     All other components within normal limits    Narrative:     Specimen Source->Urine   LACTIC ACID, PLASMA - Abnormal; Notable for the following components:    Lactate (Lactic Acid) 4.1 (*)     All other components within normal limits   SARS-COV-2 RNA AMPLIFICATION, QUAL - Abnormal; Notable for the following components:    SARS-CoV-2 RNA, Amplification, Qual Positive (*)     All other components within normal limits   LACTIC ACID, PLASMA - Abnormal; Notable for the following components:    Lactate (Lactic Acid) 4.4 (*)     All other components within normal limits   D DIMER, QUANTITATIVE - Abnormal; Notable for the following components:    D-Dimer 1.39 (*)     All other components within normal limits   URINALYSIS MICROSCOPIC - Abnormal; Notable for the following components:    Hyaline Casts, UA 70 (*)     All other components within normal limits    Narrative:     Specimen Source->Urine   ISTAT PROCEDURE - Abnormal; Notable for the following components:    POC Glucose 312 (*)     POC Sodium 132 (*)     POC Chloride 94 (*)     POC Hematocrit 33 (*)     All other components within normal limits   CULTURE, BLOOD   CULTURE, BLOOD   LIPASE   PHOSPHORUS   URIC ACID   BETA - HYDROXYBUTYRATE, SERUM   PROTIME-INR   APTT   SEDIMENTATION RATE   B-TYPE NATRIURETIC PEPTIDE   CK   FERRITIN   LACTATE DEHYDROGENASE   URIC ACID   PHOSPHORUS    LACTIC ACID, PLASMA   ISTAT CHEM8     EKG Readings: (Independently Interpreted)   Initial Reading: No STEMI. Previous EKG: Compared with most recent EKG Rhythm: Sinus Tachycardia.   ECG Results              EKG 12-lead (Final result)  Result time 02/06/23 12:28:19      Final result by Interface, Lab In [facility] (02/06/23 12:28:19)                   Narrative:    Test Reason : R00.0,    Vent. Rate : 150 BPM     Atrial Rate : 150 BPM     P-R Int : 094 ms          QRS Dur : 078 ms      QT Int : 286 ms       P-R-T Axes : 070 047 075 degrees     QTc Int : 451 ms    Sinus tachycardia with short WI  Nonspecific ST abnormality  Abnormal ECG  When compared with ECG of 30-OCT-2022 16:08,  WI interval has decreased  Confirmed by Wali MERLOS, Janel ([id number]) on 2/6/2023 12:28:11 PM    Referred By: [id number]   SELF           Confirmed By:Jaenl Keyes MD                                  Imaging Results              CT Chest Abdomen Pelvis With Contrast (xpd) (Final result)  Result time 02/06/23 15:42:38      Final result by Candelario Devi MD (02/06/23 15:42:38)                   Impression:      Multiple mesenteric nodules and masses, some of which demonstrate necrosis, compatible with reported history of lymphoma.  No significant changes in the abdomen and pelvis compared to 01/28/2023.    No involvement of the spleen or thoracic lymph nodes.    Other findings as described.      Electronically signed by: Candelario Devi  Date:    02/06/2023  Time:    15:42               Narrative:    EXAMINATION:  CT CHEST ABDOMEN PELVIS WITH CONTRAST (XPD)    CLINICAL HISTORY:  Hematologic malignancy, assess treatment response;Metastatic disease evaluation;    TECHNIQUE:  Low dose axial images, sagittal and coronal reformations were obtained from the thoracic inlet to the pubic symphysis following the IV administration of 100 mL of Omnipaque 350 .  No oral contrast was administered.    COMPARISON:  CT abdomen pelvis 01/28/2023; NM PET  01/11/2023; CT chest 09/21/2022    FINDINGS:  LINES/TUBES:  Right chest wall Port-A-Cath terminates in the SVC.    SOFT TISSUES: No axillary or subpectoral lymphadenopathy. Multiple thyroid nodules, similar to prior.    HEART AND MEDIASTINUM: No mediastinal or hilar lymphadenopathy. Heart and pericardium are within normal limits. Left-sided aortic arch. The main pulmonary artery is normal in size.    PLEURA: No pleural effusion or pneumothorax.    LUNGS AND AIRWAYS: Airways are patent. No focal consolidation.  No suspicious pulmonary nodules.    HEPATOBILIARY: Unchanged subcentimeter hypodensity in the right hepatic dome.  No enlarging focal lesions.  Normal gallbladder.  No biliary ductal dilatation.    SPLEEN: No splenomegaly.    PANCREAS: No focal masses or ductal dilatation.    ADRENALS: No adrenal nodules.    KIDNEYS/URETERS: No hydronephrosis, stones, or solid mass lesions.  Right renal cyst.    PELVIC ORGANS/BLADDER: Bladder is unremarkable.  Prostatomegaly.    PERITONEUM / RETROPERITONEUM: No free air or fluid.    LYMPH NODES: There is an 11.1 x 9.6 cm partially necrotic mesenteric mass in the right hemiabdomen (3:117).  There is a 2.8 x 2.6 cm mesenteric nodule in the right hemiabdomen (3:97).  There is a 6.5 x 4.9 cm necrotic mesenteric mass in the left hemiabdomen (3:124).  No significant changes from 01/28/2023.    VESSELS: Scattered atherosclerosis.    GI TRACT: Postoperative changes from partial small-bowel resection.  Colonic diverticulosis.  No bowel distention or wall thickening.  Prior appendectomy.    BONES: No fractures or aggressive focal osseous lesions.                                    X-Rays:   Independently Interpreted Readings:   Other Readings:  CT chest abdomen and pelvis with IV contrast:  Concern for colitis.  No obvious free air fluid.  Large stool burden.  Awaiting formal read  Medications   vancomycin 1.75 g in 5 % dextrose 500 mL IVPB (1,750 mg Intravenous New Bag 2/6/23 1620)    allopurinoL tablet 100 mg (has no administration in time range)   ALPRAZolam tablet 0.5 mg (has no administration in time range)   pantoprazole EC tablet 40 mg (has no administration in time range)   predniSONE tablet 50 mg (has no administration in time range)   chlorproMAZINE tablet 25 mg (has no administration in time range)   sodium chloride 0.9% flush 10 mL (has no administration in time range)   remdesivir 200 mg in sodium chloride 0.9% 250 mL infusion (has no administration in time range)   remdesivir 100 mg in sodium chloride 0.9% 250 mL infusion (has no administration in time range)   glucose chewable tablet 16 g (has no administration in time range)   glucose chewable tablet 24 g (has no administration in time range)   glucagon (human recombinant) injection 1 mg (has no administration in time range)   albuterol inhaler 2 puff (has no administration in time range)   ascorbic acid (vitamin C) tablet 500 mg (has no administration in time range)   multivitamin tablet (has no administration in time range)   dextrose 10% bolus 125 mL 125 mL (has no administration in time range)   dextrose 10% bolus 250 mL 250 mL (has no administration in time range)   enoxaparin injection 90 mg (has no administration in time range)   oxyCODONE immediate release tablet 10 mg (has no administration in time range)   oxyCODONE immediate release tablet 5 mg (has no administration in time range)   senna-docusate 8.6-50 mg per tablet 1 tablet (has no administration in time range)   polyethylene glycol packet 17 g (has no administration in time range)   insulin aspart U-100 pen 0-5 Units (has no administration in time range)   tamsulosin 24 hr capsule 0.4 mg (has no administration in time range)   lactated ringers bolus 1,000 mL (has no administration in time range)   insulin aspart U-100 pen 3 Units (has no administration in time range)   ondansetron injection 4 mg (4 mg Intravenous Given 2/6/23 8318)   lactated ringers bolus 1,000  "mL (0 mLs Intravenous Stopped 2/6/23 1404)   ceFEPIme (MAXIPIME) 2 g in dextrose 5 % in water (D5W) 5 % 50 mL IVPB (MB+) (0 g Intravenous Stopped 2/6/23 1515)   iohexoL (OMNIPAQUE 350) injection 100 mL (100 mLs Intravenous Given 2/6/23 1419)     Medical Decision Making:   History:   I obtained history from: someone other than patient.       <> Summary of History: Family member provides additional history.  She reports the patient has not taking the prescribed chlorpromazine because he has not had hiccups.  She reports he is had decreased p.o. intake but no melena, hematemesis, or  Old Medical Records: I decided to obtain old medical records.  Old Records Summarized: records from clinic visits, records from previous admission(s) and records from another hospital.       <> Summary of Records: Patient referred by his primary oncologist in [place] to be assess for possible immunotherapy or transplant as well as for treatment of his tachycardia and dehydration.  Rising white blood cell count noted over last week    White blood cell count increased from 15-21 over last 6 days     Hyperglycemia and acidosis noted at clinic today  Initial Assessment:   Of greatest concern at this time is possible small bowel obstruction secondary to adhesions, recurrence or worsening of B-cell lymphoma, tumor lysis syndrome, dehydration, DKA, or simple dehydration.   Independently Interpreted Test(s):   I have ordered and independently interpreted X-rays - see prior notes.  I have ordered and independently interpreted EKG Reading(s) - see prior notes  Clinical Tests:   Lab Tests: Ordered and Reviewed  Radiological Study: Ordered and Reviewed  Medical Tests: Ordered and Reviewed  Sepsis Perfusion Assessment: "I attest a sepsis perfusion exam was performed within 6 hours of sepsis, severe sepsis, or septic shock presentation, following fluid resuscitation."  ED Management:  See ED course for additional HPI, ROS, PE, lab, imaging, consultant " discussion, procedure interpretation, and Medical Decision Making.    Other:   I have discussed this case with another health care provider.       <> Summary of the Discussion: Discussed with bone marrow transplant team for admission  Additional MDM:   Sepsis - SIRS Criteria: Temperature: Temp Normal. Heart Rate: HR > 90. Tachypnea: RR Normal. White Count: WBC > 12,000. .  Sepsis: Vascular access: A mark-cath was accessed. The lactic acid was: high ( >4.0 ). Antibiotic selection is designed to cover the patient at risk for MRSA and pseudomonas. The patient was treated with Zosyn 4.5 g IVPB and Vancomycin 20 mg/kg IVPB. The patient was hypotensive and required fluid resuscitation. The patient was treated with the following IV Fluids to maximize the BP and the patient's CVP: NS bolus (30 mL/kg).        Attending Attestation:         Attending Critical Care:   Critical Care Times:   Direct Patient Care (initial evaluation, reassessments, and time considering the case)................................................................15 minutes.   Additional History from reviewing old medical records or taking additional history from the family, EMS, PCP, etc.......................5 minutes.   Ordering, Reviewing, and Interpreting Diagnostic Studies...............................................................................................................5 minutes.   Documentation..................................................................................................................................................................................5 minutes.   Consultation with other Physicians. .................................................................................................................................................10 minutes.   Consultation with the patient's family directly relating to the patient's condition, care, and DNR status (when patient unable)......5 minutes.    ==============================================================  Total Critical Care Time - exclusive of procedural time: 45 minutes.  ==============================================================  Critical care was necessary to treat or prevent imminent or life-threatening deterioration of the following conditions: sepsis and renal failure.   Critical care was time spent personally by me on the following activities: obtaining history from patient or relative, examination of patient, review of old charts, ordering lab, x-rays, and/or EKG, development of treatment plan with patient or relative, ordering and performing treatments and interventions, evaluation of patient's response to treatment, interpretation of cardiac measurements, discussion with consultants and re-evaluation of patient's conition.   Critical Care Condition: life-threatening         ED Course as of 02/06/23 1801   Mon Feb 06, 2023   1150 52-year-old male admitted in late summer or early fall 2022 for sepsis secondary to intra-abdominal abscess.  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  Patient initiated outpatient treatment for same after completing a course of IV antibiotics.  Patient had improved but presented to Heme-Onc clinic today with report of p.o. intolerance, worsening symptoms, and generalized ill feeling.  Patient found to be significantly tachycardic in clinic and referred to emergency department [DS]   1206 Of greatest concern at this time is possible small bowel obstruction secondary to adhesions, recurrence or worsening of B-cell lymphoma, tumor lysis syndrome, dehydration, DKA, or simple dehydration. [DS]   1314 Further history obtained from family member.  The patient was receiving Neupogen in his not had Neulasta yet.  However, last Neupogen injection was December 29th.  Given ongoing increase of white blood cell count, will initiate broad-spectrum antibiotics.  BUN and creatinine 26 and 0.8 noted.  Patient should be safe to  receive cefepime [DS]   1437 Lactic acid noted to be elevated.  IV antibiotics and fluids already ordered.  COVID positive.  Hematology-Oncology team notified.  Will assess lung fields on chest x-ray.  Uric acid, phosphorus, lipase all reassuring.  Tachycardia has improved greatly. [DS]   1756 Urinalysis shows significant concentration, trace ketonuria, and significant glucosuria as expected based on patient's chemistry results.  His glucose has improved from previous.  His lactic acid is not significantly improving but his heart rate is. [DS]   1757 Formal read imaging of abdomen shows no acute obstructive process.  He has significant lymph node changes consistent with his known history of lymphoma [DS]      ED Course User Index  [DS] Maulik Riley MD                 Clinical Impression:   Final diagnoses:  [R00.0] Tachycardia  [U07.1] COVID  [A41.9] Sepsis, due to unspecified organism, unspecified whether acute organ dysfunction present (Primary)  [C83.30] Diffuse large B-cell lymphoma, unspecified body region        ED Disposition Condition    Admit                 Maulik Riley MD  02/06/23 8139

## 2023-02-06 NOTE — HPI
This is a 52 year old gentleman with DLBCL s/p C4D27 R-[facility] and T2DM not on insulin who presents from his oncologist (Dr. Reid) with worsening po intake, malaise, and N/V. Patient states that symptom started around Saturday and since then, patient has been feeling progressively ill. Patient was able to tolerate PO intake until yesterday when he vomited his food. He denies any blood or bile in the vomit. Patient also had a bowel movement yesterday and was able to pass gas but still has some abdominal discomfort. Patient denies any hematemesis, hematochezia, lightheadedness, chest pain, fevers, or shortness of breath. Of note, patient states he has been continuing his prednisone 50 mg since his last chemo session on the 1/10th. Patient was supposed to have a session on 10/22 but was deferred.     In the ED, patient was found to be COVID positive but not requiring any supplemental O2. Patient also was tachycardic and had a leukocytosis. Lactic was elevated to 4.1 and went up to 4.4 after fluid resuscitation. Patient was started on broad spectrum antibiotics and admitted to BMT.       Oncology hx:  DLBCL:bone marrow bx  neg for lymphoma with tight cluster of blasts 6%( smal number)  positive for CD20,  BCL6, MUM1, MYC, high Ki-67 (99%)   -negative for CD10, CD30, BCL-2, cyclin D1, .  Reactive T-cells are CD3   --FISH negative for rearrangement of MYC ; no IGH/MYC fusion was observed  uric acid  7.3 sent allopurinol  HBV, HCV, HIV serologies; negative  PET CT  10/22 as above  --IPI pending; might require IT chemotherapy based on IPI  --discussed treatment with [facility], as it is still the standard of care for n on GC DLBCL  --plan for 6 cycles,    . Add nelasta to gregimen  Pet after 4 cycles as above repeat in 2 monre cycles   Delayed chemo 1/31/22  due to thrombocytopenia  today pt very frail and not eating or d rinking, pt referred to ED, to [facility], as we have decisions to make, scans/ scope, EGD, would like  transplant services to see him  as there was a discussion of care  plan that included   IT therapy etc, concern for progression , what would be second line ? If so, I will place him on treatment s chedule for next week here , wife will keep me posted.

## 2023-02-07 NOTE — SUBJECTIVE & OBJECTIVE
Subjective:     Interval History: NAEON. Lactic acidosis improving. Started bactrim for PCP prophylaxis.     Objective:     Vital Signs (Most Recent):  Temp: 98.1 °F (36.7 °C) (02/07/23 0750)  Pulse: (!) 113 (02/07/23 0750)  Resp: 20 (02/07/23 0750)  BP: 102/66 (02/07/23 0750)  SpO2: 98 % (02/07/23 0750)   Vital Signs (24h Range):  Temp:  [97.9 °F (36.6 °C)-98.3 °F (36.8 °C)] 98.1 °F (36.7 °C)  Pulse:  [] 113  Resp:  [14-20] 20  SpO2:  [95 %-100 %] 98 %  BP: ()/(60-78) 102/66     Weight: 86 kg (189 lb 9.5 oz)  Body mass index is 25.01 kg/m².  Body surface area is 2.1 meters squared.    ECOG SCORE             Intake/Output - Last 3 Shifts         02/05 0700  02/06 0659 02/06 0700  02/07 0659 02/07 0700  02/08 0659    IV Piggyback  2000 500.2    Total Intake(mL/kg)  2000 (23.3) 500.2 (5.8)    Urine (mL/kg/hr)   1200 (2.9)    Total Output   1200    Net  +2000 -699.8                   Physical Exam  Constitutional:       General: He is not in acute distress.     Appearance: He is not ill-appearing.   HENT:      Head: Normocephalic and atraumatic.      Right Ear: External ear normal.      Left Ear: External ear normal.      Nose: Nose normal. No congestion or rhinorrhea.      Mouth/Throat:      Mouth: Mucous membranes are moist.      Pharynx: Oropharynx is clear. No oropharyngeal exudate or posterior oropharyngeal erythema.   Eyes:      General: No scleral icterus.        Right eye: No discharge.         Left eye: No discharge.      Extraocular Movements: Extraocular movements intact.      Conjunctiva/sclera: Conjunctivae normal.   Cardiovascular:      Rate and Rhythm: Regular rhythm. Tachycardia present.      Pulses: Normal pulses.      Heart sounds: Normal heart sounds. No murmur heard.    No gallop.   Pulmonary:      Effort: Pulmonary effort is normal. No respiratory distress.      Breath sounds: Normal breath sounds. No stridor. No wheezing.   Chest:      Chest wall: No tenderness.   Abdominal:       General: Abdomen is flat. Bowel sounds are normal. There is no distension.      Palpations: Abdomen is soft. There is mass (right sided mass).      Tenderness: There is abdominal tenderness. There is no guarding or rebound.      Hernia: No hernia is present.   Musculoskeletal:         General: No swelling, tenderness or deformity. Normal range of motion.      Cervical back: Normal range of motion. No rigidity or tenderness.      Right lower leg: No edema.      Left lower leg: No edema.   Skin:     General: Skin is warm and dry.      Capillary Refill: Capillary refill takes less than 2 seconds.      Coloration: Skin is not jaundiced.      Findings: No bruising, erythema or rash.   Neurological:      General: No focal deficit present.      Mental Status: He is alert and oriented to person, place, and time.      Cranial Nerves: No cranial nerve deficit.      Sensory: No sensory deficit.      Motor: No weakness.   Psychiatric:         Mood and Affect: Mood normal.         Thought Content: Thought content normal.       Significant Labs:   CBC:   Recent Labs   Lab 02/06/23  1244 02/06/23  1259 02/07/23  0410 02/07/23  0537   WBC 23.65*  --  11.35 11.76   HGB 9.9*  --  7.2* 7.7*   HCT 30.9* 33* 22.8* 24.4*   PLT 93*  --  70* 78*   , CMP:   Recent Labs   Lab 02/06/23  0847 02/06/23  1244 02/07/23  0410   * 131* 133*   K 4.7 4.7 3.5   CL 92* 97 102   CO2 20* 19* 20*   * 307* 266*   BUN 25* 27* 14   CREATININE 1.0 0.9 0.7   CALCIUM 9.3 9.7 8.2*   PROT 7.5 6.9 5.0*   ALBUMIN 3.5 3.2* 2.4*   BILITOT 1.5* 1.3* 1.1*   ALKPHOS 116 125 91   AST 20 8* 11   ALT 14 9* 6*   ANIONGAP 19* 15 11   , and All pertinent labs from the last 24 hours have been reviewed.    Diagnostic Results:  I have reviewed all pertinent imaging results/findings within the past 24 hours.

## 2023-02-07 NOTE — ED NOTES
I-STAT Chem-8+ Results:   Value Reference Range   Sodium 133 136-145 mmol/L   Potassium  3.7 3.5-5.1 mmol/L   Chloride 94  mmol/L   Ionized Calcium 1.22 1.06-1.42 mmol/L   CO2 (measured) 27 23-29 mmol/L   Glucose 284  mg/dL   BUN 15 6-30 mg/dL   Creatinine 0.7 0.5-1.4 mg/dL   Hematocrit 27 36-54%

## 2023-02-07 NOTE — ED NOTES
Patient identifiers for Dwight Hoffmann 52 y.o. male checked and correct.  Chief Complaint   Patient presents with    Multiple complaints     Sent from hem onc, vomiting and high hr     Past Medical History:   Diagnosis Date    Cancer     Diabetes mellitus     Digestive disorder     Prediabetes      Allergies reported: Review of patient's allergies indicates:  No Known Allergies      LOC: Patient is awake, alert, and aware of environment with an appropriate affect. Patient is oriented x 4 and speaking appropriately.  APPEARANCE: Patient resting comfortably and in no acute distress. Patient is clean and well groomed, patient's clothing is properly fastened.  SKIN: The skin is warm and dry. Patient has normal skin turgor and moist mucus membranes.   MUSKULOSKELETAL: Patient is moving all extremities well, no obvious deformities noted. Pulses intact. General malaise  RESPIRATORY: Airway is open and patent. Respirations are spontaneous and non-labored with normal effort and rate.  CARDIAC: Patient has an irregular rate and rhythm. Sinus tach on cardiac monitor. No peripheral edema noted.   ABDOMEN: No distention noted. Soft and non-tender upon palpation. C/o n/v and decreased tolerance to PO intake  NEUROLOGICAL: pupils 3mm, PERRL. Facial expression is symmetrical. Hand grasps are equal bilaterally. Normal sensation in all extremities when touched with finger.

## 2023-02-07 NOTE — PROGRESS NOTES
[facility] - Emergency Dept  Hematology  Bone Marrow Transplant  Progress Note    Patient Name: Dwight Hoffmann  Admission Date: 2/6/2023  Hospital Length of Stay: 1 days  Code Status: Full Code    Subjective:     Interval History: NAEON. Lactic acidosis improving. Started bactrim for PCP prophylaxis.     Objective:     Vital Signs (Most Recent):  Temp: 98.1 °F (36.7 °C) (02/07/23 0750)  Pulse: (!) 113 (02/07/23 0750)  Resp: 20 (02/07/23 0750)  BP: 102/66 (02/07/23 0750)  SpO2: 98 % (02/07/23 0750)   Vital Signs (24h Range):  Temp:  [97.9 °F (36.6 °C)-98.3 °F (36.8 °C)] 98.1 °F (36.7 °C)  Pulse:  [] 113  Resp:  [14-20] 20  SpO2:  [95 %-100 %] 98 %  BP: ()/(60-78) 102/66     Weight: 86 kg (189 lb 9.5 oz)  Body mass index is 25.01 kg/m².  Body surface area is 2.1 meters squared.    ECOG SCORE             Intake/Output - Last 3 Shifts         02/05 0700  02/06 0659 02/06 0700  02/07 0659 02/07 0700  02/08 0659    IV Piggyback  2000 500.2    Total Intake(mL/kg)  2000 (23.3) 500.2 (5.8)    Urine (mL/kg/hr)   1200 (2.9)    Total Output   1200    Net  +2000 -699.8                   Physical Exam  Constitutional:       General: He is not in acute distress.     Appearance: He is not ill-appearing.   HENT:      Head: Normocephalic and atraumatic.      Right Ear: External ear normal.      Left Ear: External ear normal.      Nose: Nose normal. No congestion or rhinorrhea.      Mouth/Throat:      Mouth: Mucous membranes are moist.      Pharynx: Oropharynx is clear. No oropharyngeal exudate or posterior oropharyngeal erythema.   Eyes:      General: No scleral icterus.        Right eye: No discharge.         Left eye: No discharge.      Extraocular Movements: Extraocular movements intact.      Conjunctiva/sclera: Conjunctivae normal.   Cardiovascular:      Rate and Rhythm: Regular rhythm. Tachycardia present.      Pulses: Normal pulses.      Heart sounds: Normal heart sounds. No murmur heard.    No gallop.   Pulmonary:       Effort: Pulmonary effort is normal. No respiratory distress.      Breath sounds: Normal breath sounds. No stridor. No wheezing.   Chest:      Chest wall: No tenderness.   Abdominal:      General: Abdomen is flat. Bowel sounds are normal. There is no distension.      Palpations: Abdomen is soft. There is mass (right sided mass).      Tenderness: There is abdominal tenderness. There is no guarding or rebound.      Hernia: No hernia is present.   Musculoskeletal:         General: No swelling, tenderness or deformity. Normal range of motion.      Cervical back: Normal range of motion. No rigidity or tenderness.      Right lower leg: No edema.      Left lower leg: No edema.   Skin:     General: Skin is warm and dry.      Capillary Refill: Capillary refill takes less than 2 seconds.      Coloration: Skin is not jaundiced.      Findings: No bruising, erythema or rash.   Neurological:      General: No focal deficit present.      Mental Status: He is alert and oriented to person, place, and time.      Cranial Nerves: No cranial nerve deficit.      Sensory: No sensory deficit.      Motor: No weakness.   Psychiatric:         Mood and Affect: Mood normal.         Thought Content: Thought content normal.       Significant Labs:   CBC:   Recent Labs   Lab 02/06/23  1244 02/06/23  1259 02/07/23  0410 02/07/23  0537   WBC 23.65*  --  11.35 11.76   HGB 9.9*  --  7.2* 7.7*   HCT 30.9* 33* 22.8* 24.4*   PLT 93*  --  70* 78*   , CMP:   Recent Labs   Lab 02/06/23  0847 02/06/23  1244 02/07/23  0410   * 131* 133*   K 4.7 4.7 3.5   CL 92* 97 102   CO2 20* 19* 20*   * 307* 266*   BUN 25* 27* 14   CREATININE 1.0 0.9 0.7   CALCIUM 9.3 9.7 8.2*   PROT 7.5 6.9 5.0*   ALBUMIN 3.5 3.2* 2.4*   BILITOT 1.5* 1.3* 1.1*   ALKPHOS 116 125 91   AST 20 8* 11   ALT 14 9* 6*   ANIONGAP 19* 15 11   , and All pertinent labs from the last 24 hours have been reviewed.    Diagnostic Results:  I have reviewed all pertinent imaging  results/findings within the past 24 hours.    Assessment/Plan:     * Sepsis  52 yoM with DLBCL s/p R-CHOP who presented with N/V x 3 days found to be covid positive and admitted for sepsis. Patient was tachycardic and had a leukocyte count of 23. Patient had an elevated lactic of 4.1. Patient was started on vanc and cefepime and was discontinued in setting of covid.     - continue fluid resuscitation and trend lactic  - continue covid protocol with remdesivir  - will continue patient's prednisone, will start pjp prophylaxis    Metabolic acidosis  Bicarb of 19. Non anion gap. Lactic 4.1. improving after fluid resuscitation. Acidosis improving.     - see sepsis    Bicytopenia  Patient is anemic to 9.9 (VICKIE) and has platelets of 93. past was pancytopenic.    - transfuse Hgb <7 or platelets <10 or <50 AND bleeding  - daily CBC    COVID-19  See sepsis    DLBCL (diffuse large B cell lymphoma)  See onc history in [facility]    - will talk with Dr. Linder and discuss IT  - hold off on IT currently    Diabetes mellitus type 2, noninsulin dependent  Patient on metformin at home. Patient's glucose >300 on admission likely 2/2 steroid use.     - d/c metformin  - will give 3 u Aspart x1 dose  - LDSSI  - diabetic diet  - glucose q4    Small bowel mass  CT showed stable mass findings.     - continue to monitor for s/s of obstruction        VTE Risk Mitigation (From admission, onward)         Ordered     enoxaparin injection 90 mg  2 times daily         02/06/23 3879                Disposition: remain inpatient    Saad Rojo MD  Bone Marrow Transplant  [facility] - Emergency Dept

## 2023-02-07 NOTE — ASSESSMENT & PLAN NOTE
52 yoM with DLBCL s/p R-CHOP who presented with N/V x 3 days found to be covid positive and admitted for sepsis. Patient was tachycardic and had a leukocyte count of 23. Patient had an elevated lactic of 4.1. Patient was started on vanc and cefepime and was discontinued in setting of covid.     - continue fluid resuscitation and trend lactic  - continue covid protocol with remdesivir  - will continue patient's prednisone, will start pjp prophylaxis

## 2023-02-07 NOTE — PT/OT/SLP PROGRESS
Physical Therapy Treatment    Patient Name:  Dwight Hoffmann   MRN:  [id number]    Recommendations:     Discharge Recommendations:  home   Discharge Equipment Recommendations: walker, rolling   Barriers to discharge: None    Assessment:     Dwight Hoffmann is a 52 y.o. male admitted with a medical diagnosis of Small bowel mass.  He presents with the following impairments/functional limitations:  weakness, impaired endurance, impaired functional mobility, gait instability Pt is agreeable to participate in PT treatment. He ambulated 250' x3 using RW with SBA.    Rehab Prognosis: Good; patient would benefit from acute skilled PT services to address these deficits and reach maximum level of function.    Recent Surgery: Procedure(s) (LRB):  DRAINAGE, ABDOMEN, LAPAROSCOPIC (N/A) 2 Days Post-Op    Plan:     During this hospitalization, patient to be seen daily to address the identified rehab impairments via gait training, therapeutic activities, therapeutic exercises and progress toward the following goals:    Plan of Care Expires:  09/30/22    Subjective     Chief Complaint: gas and stomach pain this morning, but feeling better right now  Patient/Family Comments/goals: maximize functional strength and mobility prior to discharge.     Pain/Comfort:  Pain Rating 1: 0/10      Objective:     Communicated with nurse Morales prior to session.  Patient found HOB elevated with JAVAN drain upon PT entry to room.     General Precautions: Standard, fall   Orthopedic Precautions:N/A   Braces: N/A  Respiratory Status: Room air     Functional Mobility:  Bed Mobility:     Supine to Sit: independence  Transfers:     Sit to Stand:  independence with no AD  Gait: Ambulated 250' x3 using RW with SBA       AM-PAC 6 CLICK MOBILITY          Therapeutic Activities and Exercises:   Ambulated 250' x3 using RW with SBA     Patient left HOB elevated with all lines intact, call button in reach, and nurse notified..    GOALS:   Multidisciplinary Problems        Physical Therapy Goals          Problem: Physical Therapy    Goal Priority Disciplines Outcome Goal Variances Interventions   Physical Therapy Goal     PT, PT/OT Ongoing, Progressing     Description: Goals to be met by: 2022     Patient will increase functional independence with mobility by performin). Supine to sit with Modified [place]  2). Sit to supine with Modified [place]  3). Sit to stand transfer with Modified [place]  4). Gait  x > 250 feet with Modified [place]                          Time Tracking:     PT Received On: 22  PT Start Time: 1046     PT Stop Time: 1058  PT Total Time (min): 12 min     Billable Minutes: Gait Training 12    Treatment Type: Treatment  PT/PTA: PT     PTA Visit Number: 4     2022   normal (ped)...

## 2023-02-07 NOTE — ED NOTES
Pt resting comfortably in bed, denies SOB, denies chest pain. VSS.  Pt RR even/unlabored. Skin warm/dry, afebrile.  Pt voids spontaneously, urinal within reach.  Bed low/locked, siderails upx2, pt agrees to plan of care.

## 2023-02-07 NOTE — ASSESSMENT & PLAN NOTE
Bicarb of 19. Non anion gap. Lactic 4.1. improving after fluid resuscitation. Acidosis improving.     - see sepsis

## 2023-02-07 NOTE — CARE UPDATE
Called for lactic acidosis to 4.5. Discussion with rapid response concerning elevated level s/p IVF. Patient had received 2L IVF right prior to lab draw. On examination, patient asymptomatic and denies any CP, SOB or LH or dizziness. SBP consistently >100. Patient was given additional L IVF and lactic acid resolved to 2. LDH and ferritin elevated but rest of labs reassuring. Given improvement will hold off on CCM consult or further intervention.     Frankie Borges D.O.  PGY-3 Internal Medicine  BMT

## 2023-02-07 NOTE — ED NOTES
Pt  EKG obtained and mused. HR decreased almost immediately after- pt BP showing 85/63 with 121 HR. Pt asymptomatic at this time

## 2023-02-07 NOTE — ED NOTES
Called hematology/ oncology about lactic of 4.5. will order an additional liter of LR and repeat lactic in 3 hours.

## 2023-02-07 NOTE — CARE UPDATE
"RAPID RESPONSE NURSE CHART REVIEW       Chart Reviewed: 02/07/2023, 2:51 AM    MRN: [id number]  Bed: ED 37/37    Dx: Sepsis    Dwight Hoffmann has a past medical history of Cancer, Diabetes mellitus, Digestive disorder, and Prediabetes.    Last VS: /67 (BP Location: Right arm, Patient Position: Lying)   Pulse 105   Temp 97.9 °F (36.6 °C) (Oral)   Resp 19   Ht 6' 1" (1.854 m)   Wt 86 kg (189 lb 9.5 oz)   SpO2 96%   BMI 25.01 kg/m²     24H Vital Sign Range:  Temp:  [96.6 °F (35.9 °C)-98.5 °F (36.9 °C)]   Pulse:  []   Resp:  [14-28]   BP: ()/(60-79)   SpO2:  [95 %-100 %]     Level of Consciousness (AVPU): alert    Recent Labs     02/06/23  0847 02/06/23  1244 02/06/23  1259   WBC 21.00* 23.65*  --    HGB 10.2* 9.9*  --    HCT 32.0* 30.9* 33*   PLT 90* 93*  --        Recent Labs     02/06/23  0847 02/06/23  1244 02/06/23  1702   * 131*  --    K 4.7 4.7  --    CL 92* 97  --    CO2 20* 19*  --    CREATININE 1.0 0.9  --    * 307*  --    PHOS  --  4.4 3.8        No results for input(s): PH, PCO2, PO2, HCO3, POCSATURATED, BE in the last 72 hours.     MEWS score: 2    Patient discussed with Dr Borges with [facility] medicine d/t rising lactic acid. Vital signs stable. Plan to give patient additional 1L fluid bolus and recheck lactic.  bedside Rachna NELSON, charge LESLIE Sandoval  contacted. No concerns verbalized at this time. Instructed to call [phone] for further concerns or assistance.    Elba Knight RN       "

## 2023-02-07 NOTE — ED NOTES
Spoke with hemo/onc on-call MD about CBC results. Reports results are expected with leukemia diagnoses. No new orders at this time

## 2023-02-07 NOTE — ED NOTES
Significant decrease in RBC and WBC noted with 4am labs. Pt denies bloody BM.   Confirming results with istat --- Hematocrit 27.   10cc of blood wasted from IV prior to taking blood for a specimen. Reordering and resending CBC.

## 2023-02-07 NOTE — HOSPITAL COURSE
Patient admitted to BMT for further management of COVID infection. He was started on hospital protocol with remdesivir x 3 days and continued on home prednisone 50mg (he has been taking since his last cycle, started on bactrim). He completed protocol early due to improvement in symptoms.  He did have asymptomatic episodes of tachycardia and hypotension, improved with fluid resuscitation.      His PET scan from 1/11/23 was reviewed with radiology, hypermetabolic loops of small bowel most consistent with previous known disease and improved. He is felt to have a partial response in therapy. Would recommend to continue current regimen with RCHOP.      Patient to see Dr Reid on 2/13/23 / Dr Linder on 2/15/23 for further management for DLBCL.     Discharge delayed due to late transfusion. Patient discharged on 2/9/23.

## 2023-02-08 NOTE — DISCHARGE SUMMARY
[facility] - Telemetry Stepdown  Hematology  Bone Marrow Transplant  Discharge Summary      Patient Name: Dwight Hoffmann  MRN: [id number]  Admission Date: 2/6/2023  Hospital Length of Stay: 2 days  Discharge Date and Time:  02/08/2023 10:26 AM  Attending Physician: Mamadou Montes MD   Discharging Provider: Annalisa Segura MD  Primary Care Provider: BOSTON Oswald (Inactive)    HPI: This is a 52 year old gentleman with DLBCL s/p C4D27 R-CHOP and T2DM not on insulin who presents from his oncologist (Dr. Reid) with worsening po intake, malaise, and N/V. Patient states that symptom started around Saturday and since then, patient has been feeling progressively ill. Patient was able to tolerate PO intake until yesterday when he vomited his food. He denies any blood or bile in the vomit. Patient also had a bowel movement yesterday and was able to pass gas but still has some abdominal discomfort. Patient denies any hematemesis, hematochezia, lightheadedness, chest pain, fevers, or shortness of breath. Of note, patient states he has been continuing his prednisone 50 mg since his last chemo session on the 1/10th.     In the ED, patient was found to be COVID positive but not requiring any supplemental O2. Patient also was tachycardic and had a leukocytosis. Lactic was elevated to 4.1 and went up to 4.4 after fluid resuscitation. Patient was started on broad spectrum antibiotics and admitted to BMT.     Hospital Course: Patient admitted to BMT for further management of COVID infection. He was started on hospital protocol with remdesivir x 3 days and continued on home prednisone 50mg (he has been taking since his last cycle, started on bactrim). He completed protocol early due to improvement in symptoms.  He did have asymptomatic episodes of tachycardia and hypotension, improved with fluid resuscitation.     His PET scan from 1/11/23 was reviewed with radiology, hypermetabolic loops of small bowel most consistent with previous  known disease and improved. He is felt to have a partial response in therapy. Would recommend to continue current regimen with RCHOP.     Patient to see Dr Reid on 2/13/23 / Dr Linder on 2/15/23 for further management for DLBCL.     Goals of Care Treatment Preferences:  Code Status: Full Code        Significant Diagnostic Studies: Labs:   CMP   Recent Labs   Lab 02/06/23  1244 02/07/23  0410 02/07/23  1411 02/08/23  0343   * 133* 130* 132*   K 4.7 3.5 4.4 3.6   CL 97 102 99 101   CO2 19* 20* 21* 22*   * 266* 370* 130*   BUN 27* 14 15 13   CREATININE 0.9 0.7 0.9 0.7   CALCIUM 9.7 8.2* 8.3* 8.3*   PROT 6.9 5.0*  --  4.7*   ALBUMIN 3.2* 2.4*  --  2.3*   BILITOT 1.3* 1.1*  --  0.9   ALKPHOS 125 91  --  83   AST 8* 11  --  10   ALT 9* 6*  --  <5*   ANIONGAP 15 11 10 9    and CBC   Recent Labs   Lab 02/07/23  0410 02/07/23  0537 02/08/23  0343   WBC 11.35 11.76 14.05*   HGB 7.2* 7.7* 6.6*   HCT 22.8* 24.4* 20.3*   PLT 70* 78* 92*       Pending Diagnostic Studies:       Procedure Component Value Units Date/Time    D-Dimer, Quantitative [[id number]]     Order Status: Sent Lab Status: No result     Specimen: Blood     Echo [[id number]]     Order Status: Sent Lab Status: No result     Ferritin [[id number]]     Order Status: Sent Lab Status: No result     Specimen: Blood           Final Active Diagnoses:    Diagnosis Date Noted POA    PRINCIPAL PROBLEM:  Sepsis [A41.9] 09/26/2022 Yes    COVID-19 [U07.1] 02/06/2023 Yes    Bicytopenia [D75.89] 02/06/2023 Yes    Metabolic acidosis [E87.20] 02/06/2023 Yes    DLBCL (diffuse large B cell lymphoma) [C83.30] 09/28/2022 Yes    Diabetes mellitus type 2, noninsulin dependent [E11.9] 09/14/2022 Yes    Small bowel mass [K63.89] 09/14/2022 Yes      Problems Resolved During this Admission:      Discharged Condition: good    Disposition: Home    Follow Up:   Future Appointments   Date Time Provider Department Center   2/13/2023  7:30 AM LAB, [facility] LAB [facility] [address]   2/13/2023   9:20 AM Josee Reid MD [facility] HEM ON O at [facility] CC   2/14/2023 10:00 AM CHAIR 08 [facility] CC [facility] CHEMO [facility]   2/15/2023 12:30 PM [facility] LAB [facility] LABBMT [facility] Quick   2/15/2023  1:30 PM Vani Linder MD [facility] HC BMT [facility]   2/16/2023  7:00 AM CHAIR 19 [facility] CC [facility] CHEMO [facility]   3/1/2023  8:00 AM CHAIR 18 [facility] CC [facility] CHEMO [facility]   3/7/2023 11:00 AM CHAIR 16 [facility] CHEMO [facility]   3/9/2023  7:00 AM CHAIR 08 [facility] CHEMO [facility]         Patient Instructions:   No discharge procedures on file.  Medications:  Reconciled Home Medications:      Medication List        START taking these medications      sulfamethoxazole-trimethoprim 800-160mg 800-160 mg Tab  Commonly known as: BACTRIM DS  Take 1 tablet by mouth 3 (three) times a week.  Start taking on: February 10, 2023     tamsulosin 0.4 mg Cap  Commonly known as: FLOMAX  Take 1 capsule (0.4 mg total) by mouth once daily.  Start taking on: February 9, 2023            CONTINUE taking these medications      allopurinoL 100 MG tablet  Commonly known as: ZYLOPRIM  Take 1 tablet (100 mg total) by mouth once daily.     ALPRAZolam 0.5 MG tablet  Commonly known as: XANAX  Take 1 tablet (0.5 mg total) by mouth nightly as needed for Anxiety.     blood sugar diagnostic Strp  To check BG 2 times daily, to use with insurance preferred meter     blood-glucose meter kit  To check BG 2 times daily, to use with insurance preferred meter     butenafine 1 % cream  Per instructions 2 TIMES DAILY (route: topical)     ciprofloxacin HCl 500 MG tablet  Commonly known as: CIPRO  Take 1 tablet (500 mg total) by mouth once daily.     clotrimazole 1 % cream  Commonly known as: LOTRIMIN  Apply topically 2 (two) times daily.     docusate sodium 50 MG capsule  Commonly known as: COLACE  Take 1 capsule (50 mg total) by mouth 2 (two) times daily.     HYDROcodone-acetaminophen 5-325 mg per tablet  Commonly known as: NORCO  1 tablet EVERY 4 HOURS (route:  oral)     lancets Misc  To check BG 2 times daily, to use with insurance preferred meter     ondansetron 4 MG Tbdl  Commonly known as: ZOFRAN-ODT  Take 1 tablet by mouth once daily.     pantoprazole 40 MG tablet  Commonly known as: PROTONIX  Take 1 tablet (40 mg total) by mouth once daily.     predniSONE 50 MG Tab  Commonly known as: DELTASONE  Take 1 tablet (50 mg total) by mouth once daily.     promethazine 12.5 MG Tab  Commonly known as: PHENERGAN  Take 1 tablet by mouth once daily.            STOP taking these medications      chlorproMAZINE 25 MG tablet  Commonly known as: THORAZINE              Annalisa Segura MD  Bone Marrow Transplant  [facility] - Telemetry Stepdown

## 2023-02-08 NOTE — NURSING
Pt admitted to 8071 from ED  VSS, tachy at 112  Denies SOB/chest pain/chills/N/V  , resident on call notified

## 2023-02-09 NOTE — ASSESSMENT & PLAN NOTE
His PET scan from 1/11/23 was reviewed with radiology, hypermetabolic loops of small bowel most consistent with previous known disease and improved. He is felt to have a partial response in therapy. Would recommend to continue current regimen with RCHOP.      Patient to see Dr Reid on 2/13/23 / Dr Linder on 2/15/23 for further management for DLBCL.

## 2023-02-09 NOTE — SUBJECTIVE & OBJECTIVE
Subjective:     Interval History: Patient reports feeling well. On room air. Initially planned for discharge after completion of rbc transfusion, delayed since this took some time.     Objective:     Vital Signs (Most Recent):  Temp: 98.3 °F (36.8 °C) (02/09/23 0819)  Pulse: 103 (02/09/23 0819)  Resp: 18 (02/09/23 0819)  BP: 108/80 (02/09/23 0819)  SpO2: 99 % (02/09/23 0819) Vital Signs (24h Range):  Temp:  [98.2 °F (36.8 °C)-99.2 °F (37.3 °C)] 98.3 °F (36.8 °C)  Pulse:  [] 103  Resp:  [16-19] 18  SpO2:  [95 %-100 %] 99 %  BP: (105-139)/(55-80) 108/80     Weight: 85.7 kg (189 lb)  Body mass index is 24.94 kg/m².  Body surface area is 2.1 meters squared.    ECOG SCORE           0    Intake/Output - Last 3 Shifts         02/07 0700  02/08 0659 02/08 0700  02/09 0659 02/09 0700  02/10 0659    I.V. (mL/kg) 1000 (11.6)      Blood  243     IV Piggyback 500.2      Total Intake(mL/kg) 1500.2 (17.4) 243 (2.8)     Urine (mL/kg/hr) 1200 (0.6)      Total Output 1200      Net +300.2 +243            Stool Occurrence 1 x              Physical Exam  Alert awake oriented x3  Regular rate and rhythm  Lungs clear to auscultation bilaterally  Abdomen soft nontender  No lower extremity edema    Significant Labs:   All pertinent labs from the last 24 hours have been reviewed.    Diagnostic Results:  I have reviewed all pertinent imaging results/findings within the past 24 hours.

## 2023-02-09 NOTE — DISCHARGE SUMMARY
[facility] - Telemetry Stepdown  Hematology  Bone Marrow Transplant  Discharge Summary      Patient Name: Dwight Hoffmann  MRN: [id number]  Admission Date: 2/6/2023  Hospital Length of Stay: 3 days  Discharge Date and Time:  02/09/2023 8:49 AM  Attending Physician: Mamadou Montes MD   Discharging Provider: Saad Rojo MD  Primary Care Provider: BOSTON Oswald (Inactive)    HPI: This is a 52 year old gentleman with DLBCL s/p C4D27 R-CHOP and T2DM not on insulin who presents from his oncologist (Dr. Reid) with worsening po intake, malaise, and N/V. Patient states that symptom started around Saturday and since then, patient has been feeling progressively ill. Patient was able to tolerate PO intake until yesterday when he vomited his food. He denies any blood or bile in the vomit. Patient also had a bowel movement yesterday and was able to pass gas but still has some abdominal discomfort. Patient denies any hematemesis, hematochezia, lightheadedness, chest pain, fevers, or shortness of breath. Of note, patient states he has been continuing his prednisone 50 mg since his last chemo session on the 1/10th. Patient was supposed to have a session on 10/22 but was deferred.     In the ED, patient was found to be COVID positive but not requiring any supplemental O2. Patient also was tachycardic and had a leukocytosis. Lactic was elevated to 4.1 and went up to 4.4 after fluid resuscitation. Patient was started on broad spectrum antibiotics and admitted to BMT.       Oncology hx:  DLBCL:bone marrow bx  neg for lymphoma with tight cluster of blasts 6%( smal number)  positive for CD20,  BCL6, MUM1, MYC, high Ki-67 (99%)   -negative for CD10, CD30, BCL-2, cyclin D1, .  Reactive T-cells are CD3   --FISH negative for rearrangement of MYC ; no IGH/MYC fusion was observed  uric acid  7.3 sent allopurinol  HBV, HCV, HIV serologies; negative  PET CT  10/22 as above  --IPI pending; might require IT chemotherapy based on  "IPI  --discussed treatment with RCHOP, as it is still the standard of care for n on GC DLBCL  --plan for 6 cycles,    . Add nelasta to [facility]  Pet after 4 cycles as above repeat in 2 monre cycles   Delayed chemo 1/31/22  due to thrombocytopenia  today pt very frail and not eating or d rinking, pt referred to ED, to main [place], as we have decisions to make, scans/ scope, EGD, would like transplant services to see him  as there was a discussion of care  plan that included   IT therapy etc, concern for progression , what would be second line ? If so, I will place him on treatment s chedule for next week here , wife will keep me posted.       * No surgery found *     Hospital Course: Patient admitted to BMT for further management of COVID infection. He was started on hospital protocol with remdesivir x 3 days and continued on home prednisone 50mg (he has been taking since his last cycle, started on bactrim). He completed protocol early due to improvement in symptoms.  He did have asymptomatic episodes of tachycardia and hypotension, improved with fluid resuscitation.      His PET scan from 1/11/23 was reviewed with radiology, hypermetabolic loops of small bowel most consistent with previous known disease and improved. He is felt to have a partial response in therapy. Would recommend to continue current regimen with RCHOP.      Patient to see Dr Reid on 2/13/23 / Dr Linder on 2/15/23 for further management for DLBCL.     Discharge delayed due to late transfusion. Patient discharged on 2/9/23.       Goals of Care Treatment Preferences:  Code Status: Full Code    /80   Pulse 103   Temp 98.3 °F (36.8 °C)   Resp 18   Ht 6' 1" (1.854 m)   Wt 85.7 kg (189 lb)   SpO2 99%   BMI 24.94 kg/m²     Physical Exam  Constitutional:       General: He is not in acute distress.     Appearance: He is not ill-appearing.   HENT:      Head: Normocephalic and atraumatic.      Right Ear: External ear normal.      Left Ear: " External ear normal.      Nose: Nose normal. No congestion or rhinorrhea.      Mouth/Throat:      Mouth: Mucous membranes are moist.      Pharynx: Oropharynx is clear. No oropharyngeal exudate or posterior oropharyngeal erythema.   Eyes:      General: No scleral icterus.        Right eye: No discharge.         Left eye: No discharge.      Extraocular Movements: Extraocular movements intact.      Conjunctiva/sclera: Conjunctivae normal.   Cardiovascular:      Rate and Rhythm: Regular rhythm. Tachycardia present.      Pulses: Normal pulses.      Heart sounds: Normal heart sounds. No murmur heard.    No gallop.   Pulmonary:      Effort: Pulmonary effort is normal. No respiratory distress.      Breath sounds: Normal breath sounds. No stridor. No wheezing.   Chest:      Chest wall: No tenderness.   Abdominal:      General: Abdomen is flat. Bowel sounds are normal. There is no distension.      Palpations: Abdomen is soft. There is mass (right sided mass).      Tenderness: There is abdominal tenderness. There is no guarding or rebound.      Hernia: No hernia is present.   Musculoskeletal:         General: No swelling, tenderness or deformity. Normal range of motion.      Cervical back: Normal range of motion. No rigidity or tenderness.      Right lower leg: No edema.      Left lower leg: No edema.   Skin:     General: Skin is warm and dry.      Capillary Refill: Capillary refill takes less than 2 seconds.      Coloration: Skin is not jaundiced.      Findings: No bruising, erythema or rash.   Neurological:      General: No focal deficit present.      Mental Status: He is alert and oriented to person, place, and time.      Cranial Nerves: No cranial nerve deficit.      Sensory: No sensory deficit.      Motor: No weakness.   Psychiatric:         Mood and Affect: Mood normal.         Thought Content: Thought content normal.        Consults (From admission, onward)          Status Ordering Provider     [facility]  [facility]        Provider:  (Not yet assigned)    Completed JUDITH AMOS            Significant Diagnostic Studies: Labs: All labs within the past 24 hours have been reviewed    Pending Diagnostic Studies:       Procedure Component Value Units Date/Time    Lactate Dehydrogenase [[id number]] Collected: 02/09/23 0830    Order Status: Sent Lab Status: In process Updated: 02/09/23 0830    Specimen: Blood     Phosphorus [[id number]] Collected: 02/09/23 0830    Order Status: Sent Lab Status: In process Updated: 02/09/23 0830    Specimen: Blood     Uric Acid [[id number]] Collected: 02/09/23 0830    Order Status: Sent Lab Status: In process Updated: 02/09/23 0830    Specimen: Blood           Final Active Diagnoses:    Diagnosis Date Noted POA    PRINCIPAL PROBLEM:  Sepsis [A41.9] 09/26/2022 Yes    COVID-19 [U07.1] 02/06/2023 Yes    Bicytopenia [D75.89] 02/06/2023 Yes    Metabolic acidosis [E87.20] 02/06/2023 Yes    DLBCL (diffuse large B cell lymphoma) [C83.30] 09/28/2022 Yes    Diabetes mellitus type 2, noninsulin dependent [E11.9] 09/14/2022 Yes    Small bowel mass [K63.89] 09/14/2022 Yes      Problems Resolved During this Admission:      Discharged Condition: stable    Disposition:     Follow Up:    Patient Instructions:   No discharge procedures on file.  Medications:  Reconciled Home Medications:      Medication List        START taking these medications      acyclovir 400 MG tablet  Commonly known as: ZOVIRAX  Take 1 tablet (400 mg total) by mouth 2 (two) times daily.     sulfamethoxazole-trimethoprim 800-160mg 800-160 mg Tab  Commonly known as: BACTRIM DS  Take 1 tablet by mouth 3 (three) times a week.  Start taking on: February 10, 2023     tamsulosin 0.4 mg Cap  Commonly known as: FLOMAX  Take 1 capsule (0.4 mg total) by mouth once daily.            CONTINUE taking these medications      allopurinoL 100 MG tablet  Commonly known as: ZYLOPRIM  Take 1 tablet (100 mg total) by mouth once daily.     ALPRAZolam  0.5 MG tablet  Commonly known as: XANAX  Take 1 tablet (0.5 mg total) by mouth nightly as needed for Anxiety.     blood sugar diagnostic Strp  To check BG 2 times daily, to use with insurance preferred meter     blood-glucose meter kit  To check BG 2 times daily, to use with insurance preferred meter     butenafine 1 % cream  Per instructions 2 TIMES DAILY (route: topical)     ciprofloxacin HCl 500 MG tablet  Commonly known as: CIPRO  Take 1 tablet (500 mg total) by mouth once daily.     clotrimazole 1 % cream  Commonly known as: LOTRIMIN  Apply topically 2 (two) times daily.     docusate sodium 50 MG capsule  Commonly known as: COLACE  Take 1 capsule (50 mg total) by mouth 2 (two) times daily.     HYDROcodone-acetaminophen 5-325 mg per tablet  Commonly known as: NORCO  1 tablet EVERY 4 HOURS (route: oral)     lancets Misc  To check BG 2 times daily, to use with insurance preferred meter     ondansetron 4 MG Tbdl  Commonly known as: ZOFRAN-ODT  Take 1 tablet by mouth once daily.     pantoprazole 40 MG tablet  Commonly known as: PROTONIX  Take 1 tablet (40 mg total) by mouth once daily.     predniSONE 50 MG Tab  Commonly known as: DELTASONE  Take 1 tablet (50 mg total) by mouth once daily.     promethazine 12.5 MG Tab  Commonly known as: PHENERGAN  Take 1 tablet by mouth once daily.            STOP taking these medications      chlorproMAZINE 25 MG tablet  Commonly known as: THORAZINE              Saad Rojo MD  Bone Marrow Transplant  [facility] - Telemetry Stepdown

## 2023-02-09 NOTE — PLAN OF CARE
Problem: Adult Inpatient Plan of Care  Goal: Plan of Care Review  Outcome: Ongoing, Progressing  Goal: Patient-Specific Goal (Individualized)  Outcome: Ongoing, Progressing  Goal: Absence of Hospital-Acquired Illness or Injury  Outcome: Ongoing, Progressing  Goal: Optimal Comfort and Wellbeing  Outcome: Ongoing, Progressing  Goal: Readiness for Transition of Care  Outcome: Ongoing, Progressing     Problem: Diabetes Comorbidity  Goal: Blood Glucose Level Within Targeted Range  Outcome: Ongoing, Progressing     Problem: Adjustment to Illness (Sepsis/Septic Shock)  Goal: Optimal Coping  Outcome: Ongoing, Progressing     Problem: Bleeding (Sepsis/Septic Shock)  Goal: Absence of Bleeding  Outcome: Ongoing, Progressing     Problem: Glycemic Control Impaired (Sepsis/Septic Shock)  Goal: Blood Glucose Level Within Desired Range  Outcome: Ongoing, Progressing     Problem: Infection Progression (Sepsis/Septic Shock)  Goal: Absence of Infection Signs and Symptoms  Outcome: Ongoing, Progressing     Problem: Nutrition Impaired (Sepsis/Septic Shock)  Goal: Optimal Nutrition Intake  Outcome: Ongoing, Progressing

## 2023-02-09 NOTE — PROGRESS NOTES
[facility] - Telemetry Stepdown  Hematology  Bone Marrow Transplant  Progress Note    Patient Name: Dwight Hoffmann  Admission Date: 2/6/2023  Hospital Length of Stay: 3 days  Code Status: Full Code    Subjective:     Interval History: Patient reports feeling well. On room air. Initially planned for discharge after completion of rbc transfusion, delayed since this took some time.     Objective:     Vital Signs (Most Recent):  Temp: 98.3 °F (36.8 °C) (02/09/23 0819)  Pulse: 103 (02/09/23 0819)  Resp: 18 (02/09/23 0819)  BP: 108/80 (02/09/23 0819)  SpO2: 99 % (02/09/23 0819) Vital Signs (24h Range):  Temp:  [98.2 °F (36.8 °C)-99.2 °F (37.3 °C)] 98.3 °F (36.8 °C)  Pulse:  [] 103  Resp:  [16-19] 18  SpO2:  [95 %-100 %] 99 %  BP: (105-139)/(55-80) 108/80     Weight: 85.7 kg (189 lb)  Body mass index is 24.94 kg/m².  Body surface area is 2.1 meters squared.    ECOG SCORE           0    Intake/Output - Last 3 Shifts         02/07 0700  02/08 0659 02/08 0700 02/09 0659 02/09 0700  02/10 0659    I.V. (mL/kg) 1000 (11.6)      Blood  243     IV Piggyback 500.2      Total Intake(mL/kg) 1500.2 (17.4) 243 (2.8)     Urine (mL/kg/hr) 1200 (0.6)      Total Output 1200      Net +300.2 +243            Stool Occurrence 1 x              Physical Exam  Alert awake oriented x3  Regular rate and rhythm  Lungs clear to auscultation bilaterally  Abdomen soft nontender  No lower extremity edema    Significant Labs:   All pertinent labs from the last 24 hours have been reviewed.    Diagnostic Results:  I have reviewed all pertinent imaging results/findings within the past 24 hours.    Assessment/Plan:     * Sepsis  52 yoM with DLBCL s/p R-CHOP who presented with N/V x 3 days found to be covid positive and admitted for sepsis. Patient was tachycardic and had a leukocyte count of 23. Patient had an elevated lactic of 4.1. Patient was started on vanc and cefepime and was discontinued in setting of covid.     - continue fluid resuscitation  and trend lactic  - continue covid protocol with remdesivir  - will continue patient's prednisone, will start pjp prophylaxis    Metabolic acidosis  Bicarb of 19. Non anion gap. Lactic 4.1. improving after fluid resuscitation. Acidosis improving.     - see sepsis    Bicytopenia  Patient is anemic to 9.9 (VICKIE) and has platelets of 93. past was pancytopenic.    - transfuse Hgb <7 or platelets <10 or <50 AND bleeding  - daily CBC    COVID-19  See sepsis    DLBCL (diffuse large B cell lymphoma)  His PET scan from 1/11/23 was reviewed with radiology, hypermetabolic loops of small bowel most consistent with previous known disease and improved. He is felt to have a partial response in therapy. Would recommend to continue current regimen with RCHOP.      Patient to see Dr Reid on 2/13/23 / Dr Linder on 2/15/23 for further management for DLBCL.     Diabetes mellitus type 2, noninsulin dependent  Patient on metformin at home. Patient's glucose >300 on admission likely 2/2 steroid use.     - d/c metformin  - will give 3 u Aspart x1 dose  - LDSSI  - diabetic diet  - glucose q4    Small bowel mass  CT showed stable mass findings.     - continue to monitor for s/s of obstruction        VTE Risk Mitigation (From admission, onward)         Ordered     enoxaparin injection 90 mg  2 times daily         02/06/23 7707                  Annalisa Segura MD  Bone Marrow Transplant  [facility] - Telemetry Stepdown

## 2023-02-09 NOTE — NURSING
Patient educated on discharge instructions, follow-up appointments, and medications. Patient verbalized understanding.

## 2023-02-10 NOTE — TELEPHONE ENCOUNTER
----- Message from [facility] RT sent at 1/10/2022  4:48 PM CST -----  Regarding: Lab due  Due around 2/10  ----- Message -----  From: BOSTON Oswald  Sent: 1/10/2022   1:53 PM CST  To: [facility] Staff    Also, please ask patient to complete his A1c lab draw in 4 weeks as well. This lab order has already been placed. Thanks.     ----- Message -----  From: BOSTON Oswald  Sent: 1/10/2022  11:06 AM CST  To: BOSTON Oswald    Please note that patient completes his a hepatic function tests in 4 week intervals.  These hepatic function test were ordered today.         eyes are clear b/l Yes

## 2023-02-13 PROBLEM — D69.6 THROMBOCYTOPENIA: Status: ACTIVE | Noted: 2022-01-01

## 2023-02-13 PROBLEM — E87.1 HYPONATREMIA: Status: ACTIVE | Noted: 2023-01-01

## 2023-02-13 PROBLEM — R65.20 SEVERE SEPSIS: Status: ACTIVE | Noted: 2022-01-01

## 2023-02-13 NOTE — ASSESSMENT & PLAN NOTE
Obtain hemoglobin A1c, most recent hemoglobin A1c was 11 in November, low-dose sliding scale insulin, capillary glucose checks before meals and at bedtime, clear liquid diet until after CT scan then advance diet as tolerated, hold metformin

## 2023-02-13 NOTE — ED PROVIDER NOTES
Encounter Date: 2/13/2023       History     Chief Complaint   Patient presents with    Tachycardia     Sent from [facility], hypotns, tachycardic, SOB     52-year-old male with past medical history of large B-cell lymphoma sent over from the [facility] for tachycardia.  Patient reported last chemotherapy was on January 11th.  He is supposed to complete 2 more cycles.  Has been unable to complete his last cycle.  Says he is recently been diagnosed with AFib but could not have any blood thinners secondary to his platelets being less than 50.  Patient was at [facility] center today and was found to be borderline hypotensive, tachycardic in the 135-140 range.  He complain of having some shortness of breath with exertion.  He denies any chest pain or tightness.  He is not had any fever or chills.  He denies having any sick contacts.  No vomiting, no diarrhea.     Review of patient's allergies indicates:   Allergen Reactions    Albuterol (bulk) Palpitations     Past Medical History:   Diagnosis Date    Cancer     Diabetes mellitus     Digestive disorder     Prediabetes      Past Surgical History:   Procedure Laterality Date    APPENDECTOMY  07/15/2014    COLONOSCOPY      COLONOSCOPY N/A 02/09/2022    ERROR.   He did not have a colonoscopy with Dr. Horta.    COLONOSCOPY N/A 09/01/2022    Procedure: COLONOSCOPY;  Surgeon: Andrea Clemens MD;  Location: [facility] ENDO;  Service: Endoscopy;  Laterality: N/A;    FLEXIBLE SIGMOIDOSCOPY N/A 11/7/2022    Procedure: SIGMOIDOSCOPY, FLEXIBLE;  Surgeon: Manuel Horta MD;  Location: [facility] ENDO;  Service: Endoscopy;  Laterality: N/A;    INCISION AND DRAINAGE OF ABDOMEN N/A 09/14/2022    Procedure: INCISION AND DRAINAGE, ABDOMEN;  Surgeon: Jan Oliveira Jr., MD;  Location: [facility] OR;  Service: General;  Laterality: N/A;    INSERTION OF TUNNELED CENTRAL VENOUS CATHETER (CVC) WITH SUBCUTANEOUS PORT N/A 10/31/2022    Procedure: [id number];  Surgeon: Jan DAVIES  Tee Rodriguez MD;  Location: [facility] OR;  Service: General;  Laterality: N/A;    LAPAROSCOPIC DRAINAGE OF ABDOMEN N/A 09/23/2022    Procedure: DRAINAGE, ABDOMEN, LAPAROSCOPIC;  Surgeon: Jan Oliveira Jr., MD;  Location: [facility] OR;  Service: General;  Laterality: N/A;     Family History   Problem Relation Age of Onset    Cancer Mother         Colon    Diabetes Mother     Hypertension Mother     Seizures Father     Heart murmur Sister     Seizures Sister      Social History     Tobacco Use    Smoking status: Never    Smokeless tobacco: Never   Substance Use Topics    Alcohol use: Not Currently     Comment: occasional    Drug use: No     Review of Systems   Constitutional:  Positive for fatigue. Negative for chills and fever.   HENT:  Negative for congestion and sore throat.    Respiratory:  Positive for shortness of breath. Negative for cough and chest tightness.    Cardiovascular:  Negative for chest pain and palpitations.   Gastrointestinal:  Negative for abdominal pain, diarrhea, nausea and vomiting.   Genitourinary:  Negative for dysuria.   Musculoskeletal:  Negative for back pain and neck pain.   Skin:  Negative for rash.   Neurological:  Negative for weakness and headaches.   Hematological:  Does not bruise/bleed easily.   All other systems reviewed and are negative.    Physical Exam     Initial Vitals [02/13/23 1153]   BP Pulse Resp Temp SpO2   98/76 (!) 133 18 98.5 °F (36.9 °C) 97 %      MAP       --         Physical Exam    Nursing note and vitals reviewed.  Constitutional: He appears well-developed and well-nourished. He is not diaphoretic. No distress.   HENT:   Head: Normocephalic and atraumatic.   Mouth/Throat: Oropharynx is clear and moist. No oropharyngeal exudate.   Dry mucous membranes   Eyes: Conjunctivae and EOM are normal. Pupils are equal, round, and reactive to light.   Neck: Neck supple. No tracheal deviation present.   Normal range of motion.  Cardiovascular:  Regular rhythm, normal heart  sounds and intact distal pulses.           No murmur heard.  Tachycardic, regular   Pulmonary/Chest: Breath sounds normal. No stridor. No respiratory distress. He has no wheezes. He has no rhonchi. He has no rales.   Abdominal: Abdomen is soft. Bowel sounds are normal. He exhibits no distension. There is no abdominal tenderness. There is no rebound and no guarding.   Musculoskeletal:         General: No tenderness or edema. Normal range of motion.      Cervical back: Normal range of motion and neck supple.     Neurological: He is alert and oriented to person, place, and time. He has normal strength. No cranial nerve deficit or sensory deficit. GCS score is 15. GCS eye subscore is 4. GCS verbal subscore is 5. GCS motor subscore is 6.   Skin: Skin is warm and dry. Capillary refill takes less than 2 seconds. No rash noted. No erythema. No pallor.   Psychiatric: He has a normal mood and affect. His behavior is normal. Thought content normal.       ED Course   Procedures  Labs Reviewed   URINALYSIS, REFLEX TO URINE CULTURE - Abnormal; Notable for the following components:       Result Value    Appearance, UA Hazy (*)     Protein, UA Trace (*)     Glucose, UA Trace (*)     Ketones, UA 1+ (*)     Bilirubin (UA) 1+ (*)     Urobilinogen, UA 4.0-6.0 (*)     All other components within normal limits    Narrative:     Specimen Source->Urine   CBC WITHOUT DIFFERENTIAL - Abnormal; Notable for the following components:    WBC 22.06 (*)     RBC 3.55 (*)     Hemoglobin 10.1 (*)     Hematocrit 30.1 (*)     RDW 22.7 (*)     Platelets 78 (*)     All other components within normal limits   COMPREHENSIVE METABOLIC PANEL - Abnormal; Notable for the following components:    Sodium 132 (*)     Chloride 93 (*)     Glucose 186 (*)     Calcium 8.6 (*)     Total Protein 5.6 (*)     Albumin 2.7 (*)     Total Bilirubin 1.5 (*)     All other components within normal limits    Narrative:     Recoll. [id number] by LENORE at 02/13/2023 13:07, reason:  Specimen   hemolyzed Called to ED   TROPONIN I HIGH SENSITIVITY - Abnormal; Notable for the following components:    Troponin I High Sensitivity 31.1 (*)     All other components within normal limits    Narrative:     Recoll. [id number] by PFD at 02/13/2023 13:07, reason: Specimen   hemolyzed Called to ED   PLATELET REVIEW - Abnormal; Notable for the following components:    Platelet Estimate Decreased (*)     All other components within normal limits   LACTIC ACID, PLASMA - Abnormal; Notable for the following components:    Lactate (Lactic Acid) 2.5 (*)     All other components within normal limits    Narrative:     LA critical result(s) repeated. Called and verbal readback obtained   from Alessio Fleming RN/ed by WCS 02/13/2023 16:27   C-REACTIVE PROTEIN - Abnormal; Notable for the following components:    CRP 3.66 (*)     All other components within normal limits   TSH    Narrative:     Recoll. [id number] by PFD at 02/13/2023 13:07, reason: Specimen   hemolyzed Called to ED   B-TYPE NATRIURETIC PEPTIDE   SARS-COV-2 RNA AMPLIFICATION, QUAL   INFLUENZA A AND B ANTIGEN   C-REACTIVE PROTEIN   MAGNESIUM   MAGNESIUM   HEMOGLOBIN A1C   HEMOGLOBIN A1C (REFERRAL)   POCT GLUCOSE MONITORING CONTINUOUS        ECG Results              EKG 12-lead (In process)  Result time 02/13/23 12:18:26      In process by Interface, Lab In [facility] (02/13/23 12:18:26)                   Narrative:    Test Reason : R00.0,    Vent. Rate : 130 BPM     Atrial Rate : 130 BPM     P-R Int : 098 ms          QRS Dur : 086 ms      QT Int : 308 ms       P-R-T Axes : 042 026 072 degrees     QTc Int : 453 ms    Sinus tachycardia with short PA  Otherwise normal ECG  When compared with ECG of 07-FEB-2023 16:18,  No significant change was found    Referred By: [email or web address]   SELF           Confirmed By:                       In process by Interface, Lab In [facility] (02/13/23 12:16:42)                   Narrative:    Test Reason : R00.0,    Vent. Rate : 130 BPM      Atrial Rate : 130 BPM     P-R Int : 098 ms          QRS Dur : 086 ms      QT Int : 308 ms       P-R-T Axes : 042 026 072 degrees     QTc Int : 453 ms    Sinus tachycardia with short CO  Otherwise normal ECG  When compared with ECG of 07-FEB-2023 16:18,  No significant change was found    Referred By: [email or web address]   SELF           Confirmed By:                                   Imaging Results              CT Abdomen Pelvis  Without Contrast (Final result)  Result time 02/13/23 17:54:00      Final result by Josh Prince MD (02/13/23 17:54:00)                   Narrative:    CMS MANDATED QUALITY DATA - CT RADIATION - 436    All CT scans at this facility utilize dose modulation, iterative reconstruction, and/or weight based dosing when appropriate to reduce radiation dose to as low as reasonably achievable.        Reason: Sepsis; h/o b cell lymphoma Pt had iv contrast at 14:00 today for CTA Chest    TECHNIQUE: CT abdomen and pelvis without IV or oral contrast. Study is tailored for detection of urinary tract calculi and evaluation of solid organs, hollow viscera, and vascular structures is limited.    COMPARISON: CT 2/6/2023    CT ABDOMEN:  Partially visualized lung bases are clear.    Spleen measures 9.1 cm in length. Noncontrast liver, gallbladder, pancreas, and adrenals are normal. Right renal cyst unchanged, and kidneys are otherwise normal. Negative for hydronephrosis. Aorta is of normal caliber with minor atherosclerotic plaque.    Diverticula arise from the colon.    Wall thickening of segment of small intestines in right lower quadrant is unchanged. L shaped heterogeneous mass in the mesenteric fat of right lower quadrant measures 10.3 x 11.5 cm, previously measuring 10.6 x 9.8 cm. Slightly more superiorly in the right abdomen is additional 3.1 cm mass which previously measured 2.2 cm.    Anastomosis of intestines in left abdomen appears unremarkable. In left midabdomen, capacious loop of small intestines with  wall thickening is similar to that on prior exam.    No suspicious osseous abnormality.    CT PELVIS:  Circumferential bladder wall thickening is newly evident. Prostate remains enlarged. No enlarged pelvic lymph nodes or free pelvic fluid. No suspicious osseous abnormality.    IMPRESSION:    1. Interval worsening of multifocal masses within the abdomen in patient with known lymphoma, felt to account for such.  2. 2 masses/area of intestinal wall thickening in right and left abdomen, differential diagnosis of which has been previously discussed, intimately associated with loops of intestines and likely reflecting lymphomatous involvement of the intestine, although discrete walled off abscess or necrotic mass can also be considered, again unchanged from that previously discussed.  3. Mild circumferential bladder wall thickening likely secondary to chronic bladder obstruction and enlarged prostate. Correlation for BPH is requested.  4. No other new abnormality.    Electronically signed by:  Josh Prince MD  2/13/2023 5:54 PM CST Workstation: [id number]                                     CTA Chest Non-Coronary (PE Studies) (Final result)  Result time 02/13/23 14:21:47      Final result by Keren Mccarthy MD (02/13/23 14:21:47)                   Narrative:    All CT scans at this facility used dose modulation, iterative reconstruction and/or weight-based dosing when appropriate to reduce radiation doses  as low as reasonably achievable.    HISTORY: Tachycardia., Chest pain, lymphoma    FINDINGS: Thin axial imaging through the chest was performed with 100 mL Omnipaque 350 IV contrast, with sagittal and coronal reformatted images and 3-D reconstructions performed on an independent workstation, with images stored in the patient's permanent electronic medical record.    This is a high-grade quality study for the evaluation of pulmonary embolism. The pulmonary arteries are normal in appearance without pulmonary emboli  noted up to the subsegmental level, noting limitations of CT technique for identifying small isolated subsegmental emboli.    The heart is normal in size. The aorta is normal in caliber without aneurysm or dissection.  There is no hilar, mediastinal or axillary adenopathy.  There is a right IJ Port-A-Cath with tip in the superior vena cava.    LUNGS: There are no pulmonary nodules, infiltrates or pleural effusions.  The esophagus is normal. The musculature is normal.  The visualized portion of the upper abdomen is unremarkable. There are no acute osseous abnormalities.      IMPRESSION: No CT evidence of pulmonary emboli    No acute pulmonary process.        Electronically signed by:  Keren Mccarthy MD  2/13/2023 2:21 PM CST Workstation: [id number]                                     X-Ray Chest AP Portable (Final result)  Result time 02/13/23 12:50:07      Final result by Gordon Das MD (02/13/23 12:50:07)                   Narrative:    HISTORY: Tachycardia.    FINDINGS: Portable chest radiograph at 1224 hours compared to prior exams shows right internal jugular injection port-type central venous catheter, unchanged in position. The cardiomediastinal silhouette and pulmonary vasculature are within normal limits.    The lungs are normally expanded, with no consolidation, large pleural effusion, or evidence of pulmonary edema. No confluent infiltrates or pneumothorax. There are no significant osseous abnormalities.    IMPRESSION: No evidence of active cardiopulmonary disease.    Electronically signed by:  Gordon Das MD  2/13/2023 12:50 PM CST Workstation: [id number]                                     Medications   0.9%  NaCl infusion ( Intravenous New Bag 2/14/23 0330)   acyclovir tablet 400 mg (400 mg Oral Given 2/14/23 0834)   allopurinoL tablet 100 mg (100 mg Oral Given 2/14/23 0833)   ALPRAZolam tablet 0.5 mg (has no administration in time range)   docusate sodium capsule 100 mg (100 mg Oral Given  2/14/23 0834)   tamsulosin 24 hr capsule 0.4 mg (0.4 mg Oral Given 2/14/23 0833)   sodium chloride 0.9% flush 10 mL (10 mLs Intravenous Given 2/14/23 0900)   acetaminophen tablet 650 mg (has no administration in time range)   HYDROcodone-acetaminophen 5-325 mg per tablet 1 tablet (1 tablet Oral Given 2/13/23 2049)   HYDROmorphone injection 1 mg (1 mg Intravenous Given 2/13/23 1844)   polyethylene glycol packet 17 g (has no administration in time range)   ondansetron injection 4 mg (4 mg Intravenous Given 2/13/23 2056)   promethazine tablet 25 mg (has no administration in time range)   melatonin tablet 6 mg (has no administration in time range)   NORepinephrine 4 mg in dextrose 5% 250 mL infusion (premix) (titrating) (0 mcg/kg/min × 86.6 kg Intravenous Not Given 2/13/23 1715)   glucose chewable tablet 16 g (has no administration in time range)   glucose chewable tablet 24 g (has no administration in time range)   glucagon (human recombinant) injection 1 mg (has no administration in time range)   dextrose 10% bolus 125 mL 125 mL (has no administration in time range)   dextrose 10% bolus 250 mL 250 mL (has no administration in time range)   insulin aspart U-100 pen 0-5 Units (3 Units Subcutaneous Given 2/14/23 0725)   potassium bicarbonate disintegrating tablet 50 mEq (has no administration in time range)   potassium bicarbonate disintegrating tablet 35 mEq (has no administration in time range)   potassium bicarbonate disintegrating tablet 60 mEq (has no administration in time range)   magnesium oxide tablet 800 mg (800 mg Oral Given 2/14/23 0448)   magnesium oxide tablet 800 mg (has no administration in time range)   potassium, sodium phosphates 280-160-250 mg packet 2 packet (has no administration in time range)   potassium, sodium phosphates 280-160-250 mg packet 2 packet (has no administration in time range)   potassium, sodium phosphates 280-160-250 mg packet 2 packet (has no administration in time range)    vancomycin - pharmacy to dose (has no administration in time range)   ceFEPIme (MAXIPIME) 2 g in sodium chloride 0.9% 50 mL IVPB (0 g Intravenous Stopped 2/14/23 0517)   vancomycin (VANCOCIN) 1,500 mg in sodium chloride 0.9% 500 mL IVPB (0 mg Intravenous Stopped 2/14/23 0743)   mupirocin 2 % ointment (1 g Nasal Given 2/14/23 0846)   chlorhexidine 0.12 % solution 15 mL (15 mLs Mouth/Throat Given 2/14/23 0846)   pantoprazole injection 40 mg (40 mg Intravenous Given 2/14/23 0845)   0.9%  NaCl infusion (for blood administration) (has no administration in time range)   0.9%  NaCl infusion (for blood administration) (has no administration in time range)   lactated ringers bolus 1,000 mL (0 mLs Intravenous Stopped 2/13/23 1348)   iohexoL (OMNIPAQUE 350) injection 100 mL (100 mLs Intravenous Given 2/13/23 1359)   lactated ringers bolus 1,000 mL (0 mLs Intravenous Stopped 2/13/23 1730)   aspirin tablet 325 mg (325 mg Oral Given 2/13/23 1634)   sodium chloride 0.9% bolus 500 mL 500 mL (0 mLs Intravenous Stopped 2/13/23 1800)   vancomycin in dextrose 5 % 1 gram/250 mL IVPB 1,000 mg (0 mg Intravenous Stopped 2/13/23 1858)     Followed by   vancomycin 500 mg in dextrose 5 % 100 mL IVPB (ready to mix system) (0 mg Intravenous Stopped 2/13/23 2149)   hydrocortisone sodium succinate injection 100 mg (100 mg Intravenous Given 2/14/23 0833)     Medical Decision Making:   Clinical Tests:   Lab Tests: Ordered and Reviewed  Radiological Study: Ordered and Reviewed  Medical Tests: Ordered and Reviewed  ED Management:  52-year-old male with past medical history of lymphoma presents emergency department with shortness of breath, fast heart rate.  Sent in for further evaluation.  Patient considered for PE, congestive heart failure.  No evidence of this found.  No evidence of pneumonia pneumothorax.  Patient has complain of shortness of breath with exertion.  No acute etiology found for this.  Patient white count trending up, lactic acid  added on and is elevated.  Patient given IV fluids for sepsis.  Patient given cefepime in the emergency department to cover for any occult infectious etiology.  Patient will be admitted to Hospital Medicine.           ED Course as of 02/14/23 0924 Mon Feb 13, 2023   1258 EKG 11:08 a.m. sinus tachycardia versus atrial flutter, rate is 130.  No ST elevation or depression.  No STEMI.  EKG interpreted independently by me. [JR]   1523 Rosalba from hospital medicine will admit the patient [JR]      ED Course User Index  [JR] Daniel Liu DO                 Clinical Impression:   Final diagnoses:  [R00.0] Tachycardia  [E86.0] Dehydration (Primary)  [A41.9, R65.20] Severe sepsis        ED Disposition Condition    Admit Stable                Daniel Liu DO  02/14/23 0924       Daniel Liu DO  02/14/23 0927

## 2023-02-13 NOTE — PROGRESS NOTES
This nurse asked by pt's spouse to [facility] Pt found in hallway, reporting shortness of breath. Pt's O2 sat 100% and . Pt denies chest pain or pressure. Dr Reid has advised pt to go to ED to be evaluated and treated by cardiology. Pt and spouse v/u.  
patient

## 2023-02-13 NOTE — ASSESSMENT & PLAN NOTE
Patient was diagnosed with SARS-COVID-2 infection on 02/06, symptom onset on 02/04, it is 10 days post infection but he is immunocompromised with lymphoma and on corticosteroids, obtain SARs COVID 2 IgG antibody and keep in isolation until resulted, previously received remdesivir for 3 days, no pneumonia on chest x-ray, no oxygen requirement

## 2023-02-13 NOTE — H&P
[facility] - Emergency Dept  Hospital Medicine  History & Physical    Patient Name: Dwight Hoffmann  MRN: [id number]  Patient Class: IP- Inpatient  Admission Date: 2/13/2023  Attending Physician: Arelis Malone MD   Primary Care Provider: BOSTON Oswald (Inactive)         Patient information was obtained from patient, spouse/SO, past medical records and ER records.     Subjective:     Principal Problem:Severe sepsis    Chief Complaint:   Chief Complaint   Patient presents with    Tachycardia     [facility], hypotns, tachycardic, SOB        HPI: Dwight Hoffmann is a 52-year-old male with chronic medical problems including diabetes, B-cell lymphoma currently undergoing chemotherapy, hypertension and hyperlipidemia and recently diagnosed with SARS-COVID-2 infection who presented to the emergency room for shortness of breath and weakness. He is accompanied by his wife who also provides history.  He was sent over from the [facility] this morning with tachycardia and hypotension.  His wife said that when he went to the doctor's office this morning he started feeling short of breath and got extremely weak and was not able to stand up.  Prior to today he was feeling well and had a good day yesterday, no fevers or chills, no diarrhea, headaches or dizziness, ate and drank and was voiding well.  He states he is actually feeling better now while he is lying down and denies shortness of breath this time.  He does complain right lower quadrant abdominal pain and states that he think it is cancer pain.  It is controlled with hydrocodone and he takes MiraLax for constipation.  He was hospitalized at [facility] in [place] from February 6 to 9th and had gone to see the oncologist there and was diagnosed with SARS-COVID-2, symptom onset February 4th, he got 3 days of remdesivir and never required oxygen.  He presented to the hospital with poor intake, malaise and nausea and vomiting.  He  had an episode of atrial fibrillation at [facility] that was attributed to COVID and he was not placed on rate control and has low platelets so no anticoagulation.  He has not been able to have chemo for a month because of low platelets so has not had a dose of Neulasta since early January and continues on prednisone 50 mg daily since his 1st cycle.  He was started on Bactrim 3 times a week and acyclovir twice daily prior to discharge while on the prednisone.    In the ED, a CTA of chest was negative for pulmonary embolus, infiltrates or pleural effusions and chest x-ray with nothing acute.  Lab work significant for elevated WBC of 22.06, platelets 78, BUN/CR 10.1/30.1, mild hyponatremia with sodium 132, potassium 3.9, serum bicarb 26, chloride 93, BUN/CR 20/0.8, calcium 8.6 corrects to 9.6, total bili room in with slight elevation at 1.5 and ALT/AST 14/16.  Troponin elevated at 31.1 and BNP 42.  12 lead EKG with sinus tachycardia, short TN interval with  and ventricular rate 130.  He was given 2 L lactated Ringer's and cefepime 2 g IV.  Vital signs with temperature 97°, heart rate 140 initially at time of exam in his 115 and regular, blood pressure with map around 70 in the high 90s systolic, respiratory rate 18 and O2 sat 98% on room air.  He will be admitted to the hospitalist service for further evaluation and treatment to the intensive care unit.      Past Medical History:   Diagnosis Date    Cancer     Diabetes mellitus     Digestive disorder     Prediabetes        Past Surgical History:   Procedure Laterality Date    APPENDECTOMY  07/15/2014    COLONOSCOPY      COLONOSCOPY N/A 02/09/2022    ERROR.   He did not have a colonoscopy with Dr. Horta.    COLONOSCOPY N/A 09/01/2022    Procedure: COLONOSCOPY;  Surgeon: Andrea Clemens MD;  Location: [facility];  Service: Endoscopy;  Laterality: N/A;    FLEXIBLE SIGMOIDOSCOPY N/A 11/7/2022    Procedure: SIGMOIDOSCOPY, FLEXIBLE;  Surgeon: Manuel SALINAS  MD Tommy;  Location: [facility] ENDO;  Service: Endoscopy;  Laterality: N/A;    INCISION AND DRAINAGE OF ABDOMEN N/A 09/14/2022    Procedure: INCISION AND DRAINAGE, ABDOMEN;  Surgeon: Jan Oliveira Jr., MD;  Location: [facility] OR;  Service: General;  Laterality: N/A;    INSERTION OF TUNNELED CENTRAL VENOUS CATHETER (CVC) WITH SUBCUTANEOUS PORT N/A 10/31/2022    Procedure: [id number];  Surgeon: Jan Oliveira Jr., MD;  Location: [facility] OR;  Service: General;  Laterality: N/A;    LAPAROSCOPIC DRAINAGE OF ABDOMEN N/A 09/23/2022    Procedure: DRAINAGE, ABDOMEN, LAPAROSCOPIC;  Surgeon: Jan Oliveira Jr., MD;  Location: [facility] OR;  Service: General;  Laterality: N/A;       Review of patient's allergies indicates:   Allergen Reactions    Albuterol (bulk) Palpitations       Scheduled Meds:   acyclovir  400 mg Oral BID    [START ON 2/14/2023] allopurinoL  100 mg Oral Daily    ceFEPime (MAXIPIME) IVPB  2 g Intravenous Q8H    docusate sodium  100 mg Oral BID    enoxparin  40 mg Subcutaneous Q24H    famotidine  20 mg Oral BID    hydrocortisone sodium succinate  100 mg Intravenous Q8H    lactated ringers  1,000 mL Intravenous Once    sodium chloride 0.9%  500 mL Intravenous Once    sodium chloride 0.9%  10 mL Intravenous Q12H    [START ON 2/15/2023] sulfamethoxazole-trimethoprim 800-160mg  1 tablet Oral Every Mon, Wed, Fri    [START ON 2/14/2023] tamsulosin  0.4 mg Oral Daily    vancomycin (VANCOCIN) IVPB  1,000 mg Intravenous ED 1 Time    Followed by    vancomycin (VANCOCIN) IVPB  500 mg Intravenous Once     Continuous Infusions:   sodium chloride 0.9%      NORepinephrine bitartrate-D5W       PRN Meds:.acetaminophen, ALPRAZolam, dextrose 10%, dextrose 10%, glucagon (human recombinant), glucose, glucose, HYDROcodone-acetaminophen, HYDROmorphone, insulin aspart U-100, magnesium oxide, magnesium oxide, melatonin, ondansetron, polyethylene glycol, potassium bicarbonate, potassium bicarbonate,  potassium bicarbonate, potassium, sodium phosphates, potassium, sodium phosphates, potassium, sodium phosphates, promethazine, Pharmacy to dose Vancomycin consult **AND** vancomycin - pharmacy to dose         Current Outpatient Medications on File Prior to Encounter   Medication Sig    acyclovir (ZOVIRAX) 400 MG tablet Take 1 tablet (400 mg total) by mouth 2 (two) times daily.    allopurinoL (ZYLOPRIM) 100 MG tablet Take 1 tablet (100 mg total) by mouth once daily.    ALPRAZolam (XANAX) 0.5 MG tablet Take 1 tablet (0.5 mg total) by mouth nightly as needed for Anxiety.    atorvastatin (LIPITOR) 10 MG tablet Take 10 mg by mouth once daily.    HYDROcodone-acetaminophen (NORCO) 5-325 mg per tablet 1 tablet EVERY 4 HOURS (route: oral) (Patient taking differently: Take 1 tablet by mouth every 4 (four) hours as needed. 1 tablet EVERY 4 HOURS (route: oral))    lisinopriL (PRINIVIL,ZESTRIL) 2.5 MG tablet Take 2.5 mg by mouth once daily.    metFORMIN (GLUCOPHAGE) 1000 MG tablet Take 1,000 mg by mouth 2 (two) times daily.    ondansetron (ZOFRAN-ODT) 4 MG TbDL Take 1 tablet by mouth once daily.    pantoprazole (PROTONIX) 40 MG tablet Take 1 tablet (40 mg total) by mouth once daily.    predniSONE (DELTASONE) 50 MG Tab Take 1 tablet (50 mg total) by mouth once daily.    promethazine (PHENERGAN) 12.5 MG Tab Take 1 tablet by mouth once daily.    sulfamethoxazole-trimethoprim 800-160mg (BACTRIM DS) 800-160 mg Tab Take 1 tablet by mouth 3 (three) times a week.    tamsulosin (FLOMAX) 0.4 mg Cap Take 1 capsule (0.4 mg total) by mouth once daily.    blood sugar diagnostic Strp To check BG 2 times daily, to use with insurance preferred meter    blood-glucose meter kit To check BG 2 times daily, to use with insurance preferred meter    butenafine 1 % cream Per instructions 2 TIMES DAILY (route: topical)    clotrimazole (LOTRIMIN) 1 % cream Apply topically 2 (two) times daily.    docusate sodium (COLACE) 50 MG capsule  Take 1 capsule (50 mg total) by mouth 2 (two) times daily.    lancets Misc To check BG 2 times daily, to use with insurance preferred meter    [DISCONTINUED] ciprofloxacin HCl (CIPRO) 500 MG tablet Take 1 tablet (500 mg total) by mouth once daily. (Patient not taking: Reported on 2/13/2023)     Family History       Problem Relation (Age of Onset)    Cancer Mother    Diabetes Mother    Heart murmur Sister    Hypertension Mother    Seizures Father, Sister          Tobacco Use    Smoking status: Never    Smokeless tobacco: Never   Substance and Sexual Activity    Alcohol use: Not Currently     Comment: occasional    Drug use: No    Sexual activity: Yes     Partners: Female     Review of Systems   All other systems reviewed and are negative.  Twelve point review of systems obtained and negative except as stated above in HPI      Objective:     Vital Signs (Most Recent):  Temp: 98.5 °F (36.9 °C) (02/13/23 1153)  Pulse: (!) 116 (02/13/23 1631)  Resp: 18 (02/13/23 1631)  BP: 95/60 (02/13/23 1620)  SpO2: 97 % (02/13/23 1630)   Vital Signs (24h Range):  Temp:  [97 °F (36.1 °C)-98.5 °F (36.9 °C)] 97 °F (36.1 °C)  Pulse:  [113-140] 116  Resp:  [18-28] 18  SpO2:  [96 %-99 %] 97 %  BP: (95-99)/(59-76) 95/60     Weight: 86.6 kg (191 lb)  Body mass index is 25.2 kg/m².    Physical Exam  Vitals and nursing note reviewed.   Constitutional:       Comments: Middle-aged male, lying in bed, he is in no acute distress, appears chronically ill, his wife is at bedside and also provides history.   HENT:      Head: Normocephalic.      Right Ear: Hearing and external ear normal.      Left Ear: Hearing and external ear normal.      Nose: Nose normal.      Mouth/Throat:      Mouth: Mucous membranes are moist.      Pharynx: Oropharynx is clear.      Comments: No oral lesions, ulcerations or blisters, moist, no erythema or exudates.  Eyes:      General: Vision grossly intact. Gaze aligned appropriately.      Conjunctiva/sclera:  Conjunctivae normal.   Cardiovascular:      Rate and Rhythm: Regular rhythm. Tachycardia present.      Pulses: Normal pulses.      Heart sounds: Normal heart sounds. No murmur heard.  Pulmonary:      Effort: Pulmonary effort is normal.      Breath sounds: Normal breath sounds. No wheezing or rhonchi.   Chest:      Chest wall: No tenderness.   Abdominal:      General: Abdomen is protuberant. Bowel sounds are normal. There is no distension.      Palpations: Abdomen is soft.      Tenderness: There is abdominal tenderness.      Comments: Tender at right lower quadrant to light palpation.   Musculoskeletal:         General: No swelling. Normal range of motion.      Comments: Bilateral lower extremities with no edema.  Hands are mildly edematous.   Skin:     General: Skin is warm and dry.      Capillary Refill: Capillary refill takes 2 to 3 seconds.      Coloration: Skin is pale.      Comments: Ecchymosis to right arm.  There is a port at right chest that is not access.   Neurological:      General: No focal deficit present.      Mental Status: He is alert and oriented to person, place, and time.   Psychiatric:         Mood and Affect: Mood normal.         Behavior: Behavior normal.         Thought Content: Thought content normal.         Judgment: Judgment normal.           Significant Labs: All pertinent labs within the past 24 hours have been reviewed.    CBC:   Recent Labs   Lab 02/13/23  0836 02/13/23  1131   WBC 20.71* 22.06*   HGB 10.0* 10.1*   HCT 30.4* 30.1*   PLT 51* 78*     CMP:   Recent Labs   Lab 02/13/23  0836 02/13/23  1311   * 132*   K 4.1 3.9   CL 95 93*   CO2 25 26   * 186*   BUN 18 20   CREATININE 0.8 0.8   CALCIUM 9.4 8.6*   PROT 6.6 5.6*   ALBUMIN 3.2* 2.7*   BILITOT 1.5* 1.5*   ALKPHOS 98 79   AST 19 16   ALT 17 14   ANIONGAP 13 13     Cardiac Markers:   Recent Labs   Lab 02/13/23  1131   BNP 42       Lactic Acid:   Recent Labs   Lab 02/13/23  1527   LACTATE 2.5*     Troponin:   Recent  Labs   Lab 02/13/23  1311   TROPONINIHS 31.1*     TSH:   Recent Labs   Lab 02/13/23  1311   TSH 0.810     Urine Studies:   Recent Labs   Lab 02/13/23  1348   COLORU Yellow   APPEARANCEUA Hazy*   PHUR 6.0   SPECGRAV 1.030   PROTEINUA Trace*   GLUCUA Trace*   KETONESU 1+*   BILIRUBINUA 1+*   OCCULTUA Negative   NITRITE Negative   UROBILINOGEN 4.0-6.0*   LEUKOCYTESUR Negative       Significant Imaging: I have reviewed all pertinent imaging results/findings within the past 24 hours.    Assessment/Plan:     * Severe sepsis  Lactic acid 2.5, WBC elevated at 22.06, difficult to say if it is elevated because long-term steroid use, recent COVID infection, no chemo since early January, has been on daily prednisone since chemotherapy started, CTA chest with no pneumonia or signs of infection, blood cultures pending, access for obtain blood culture from port, obtain noncontrasted CTA abdomen and pelvis as he can not have any more contrast today, start cefepime and vancomycin with pharmacy to dose, consider metronidazole intra-abdominal source, continue fluid resuscitation with 30 cc per kg bolus then normal saline at 1:25 a.m. an hour, monitor in intensive care unit, start Levophed if map less than 65 to keep map greater than 65, repeat CBC in a.m., repeat lactic acid, stress dose steroids, hold Bactrim and resume acyclovir, consult to Oncology    Hyponatremia  Mild hyponatremia at 132., repeat chemistry in a.m.    COVID-19 virus infection  Patient was diagnosed with SARS-COVID-2 infection on 02/06, symptom onset on 02/04, it is 10 days post infection but he is immunocompromised with lymphoma and on corticosteroids, obtain SARs COVID 2 IgG antibody and keep in isolation until resulted, previously received remdesivir for 3 days, no pneumonia on chest x-ray, no oxygen requirement    Thrombocytopenia  Platelets 78, avoid anticoagulants      DLBCL (diffuse large B cell lymphoma)  Consult to Oncology      Diabetes mellitus type 2,  noninsulin dependent  Obtain hemoglobin A1c, most recent hemoglobin A1c was 11 in November, low-dose sliding scale insulin, capillary glucose checks before meals and at bedtime, clear liquid diet until after CT scan then advance diet as tolerated, hold metformin    Anemia, chronic disease  H&H 10.1/30.1, stable, received PRBC at hospitalization last week, repeat CBC in a.m.      VTE Risk Mitigation (From admission, onward)         Ordered     enoxaparin injection 40 mg  Every 24 hours (non-standard times)         02/13/23 1637     IP VTE HIGH RISK PATIENT  Once         02/13/23 1621     Place sequential compression device  Until discontinued         02/13/23 1621               VTE prophylaxis: SCD's    Patient was examined at 1525    Code status: FULL CODE      Rosalba River NP  Department of Hospital Medicine   [facility] - Emergency Dept

## 2023-02-13 NOTE — SUBJECTIVE & OBJECTIVE
Past Medical History:   Diagnosis Date    Cancer     Diabetes mellitus     Digestive disorder     Prediabetes        Past Surgical History:   Procedure Laterality Date    APPENDECTOMY  07/15/2014    COLONOSCOPY      COLONOSCOPY N/A 02/09/2022    ERROR.   He did not have a colonoscopy with Dr. Sanders.    COLONOSCOPY N/A 09/01/2022    Procedure: COLONOSCOPY;  Surgeon: Andrea Clemens MD;  Location: [facility] ENDO;  Service: Endoscopy;  Laterality: N/A;    FLEXIBLE SIGMOIDOSCOPY N/A 11/7/2022    Procedure: SIGMOIDOSCOPY, FLEXIBLE;  Surgeon: Manuel Sanders MD;  Location: [facility] ENDO;  Service: Endoscopy;  Laterality: N/A;    INCISION AND DRAINAGE OF ABDOMEN N/A 09/14/2022    Procedure: INCISION AND DRAINAGE, ABDOMEN;  Surgeon: Jan Oliveira Jr., MD;  Location: [facility];  Service: General;  Laterality: N/A;    INSERTION OF TUNNELED CENTRAL VENOUS CATHETER (CVC) WITH SUBCUTANEOUS PORT N/A 10/31/2022    Procedure: [id number];  Surgeon: Jan Oliveira Jr., MD;  Location: [facility] OR;  Service: General;  Laterality: N/A;    LAPAROSCOPIC DRAINAGE OF ABDOMEN N/A 09/23/2022    Procedure: DRAINAGE, ABDOMEN, LAPAROSCOPIC;  Surgeon: Jan Oliveira Jr., MD;  Location: [facility];  Service: General;  Laterality: N/A;       Review of patient's allergies indicates:   Allergen Reactions    Albuterol (bulk) Palpitations       No current facility-administered medications on file prior to encounter.     Current Outpatient Medications on File Prior to Encounter   Medication Sig    acyclovir (ZOVIRAX) 400 MG tablet Take 1 tablet (400 mg total) by mouth 2 (two) times daily.    allopurinoL (ZYLOPRIM) 100 MG tablet Take 1 tablet (100 mg total) by mouth once daily.    ALPRAZolam (XANAX) 0.5 MG tablet Take 1 tablet (0.5 mg total) by mouth nightly as needed for Anxiety.    atorvastatin (LIPITOR) 10 MG tablet Take 10 mg by mouth once daily.    HYDROcodone-acetaminophen (NORCO) 5-325 mg per tablet 1 tablet EVERY 4 HOURS  (route: oral) (Patient taking differently: Take 1 tablet by mouth every 4 (four) hours as needed. 1 tablet EVERY 4 HOURS (route: oral))    lisinopriL (PRINIVIL,ZESTRIL) 2.5 MG tablet Take 2.5 mg by mouth once daily.    metFORMIN (GLUCOPHAGE) 1000 MG tablet Take 1,000 mg by mouth 2 (two) times daily.    ondansetron (ZOFRAN-ODT) 4 MG TbDL Take 1 tablet by mouth once daily.    pantoprazole (PROTONIX) 40 MG tablet Take 1 tablet (40 mg total) by mouth once daily.    predniSONE (DELTASONE) 50 MG Tab Take 1 tablet (50 mg total) by mouth once daily.    promethazine (PHENERGAN) 12.5 MG Tab Take 1 tablet by mouth once daily.    sulfamethoxazole-trimethoprim 800-160mg (BACTRIM DS) 800-160 mg Tab Take 1 tablet by mouth 3 (three) times a week.    tamsulosin (FLOMAX) 0.4 mg Cap Take 1 capsule (0.4 mg total) by mouth once daily.    blood sugar diagnostic Strp To check BG 2 times daily, to use with insurance preferred meter    blood-glucose meter kit To check BG 2 times daily, to use with insurance preferred meter    butenafine 1 % cream Per instructions 2 TIMES DAILY (route: topical)    clotrimazole (LOTRIMIN) 1 % cream Apply topically 2 (two) times daily.    docusate sodium (COLACE) 50 MG capsule Take 1 capsule (50 mg total) by mouth 2 (two) times daily.    lancets Misc To check BG 2 times daily, to use with insurance preferred meter    [DISCONTINUED] ciprofloxacin HCl (CIPRO) 500 MG tablet Take 1 tablet (500 mg total) by mouth once daily. (Patient not taking: Reported on 2/13/2023)     Family History       Problem Relation (Age of Onset)    Cancer Mother    Diabetes Mother    Heart murmur Sister    Hypertension Mother    Seizures Father, Sister          Tobacco Use    Smoking status: Never    Smokeless tobacco: Never   Substance and Sexual Activity    Alcohol use: Not Currently     Comment: occasional    Drug use: No    Sexual activity: Yes     Partners: Female     Review of Systems   All other systems reviewed and are  negative.  Twelve point review of systems obtained and negative except as stated above in HPI      Objective:     Vital Signs (Most Recent):  Temp: 98.5 °F (36.9 °C) (02/13/23 1153)  Pulse: (!) 116 (02/13/23 1631)  Resp: 18 (02/13/23 1631)  BP: 95/60 (02/13/23 1620)  SpO2: 97 % (02/13/23 1630)   Vital Signs (24h Range):  Temp:  [97 °F (36.1 °C)-98.5 °F (36.9 °C)] 97 °F (36.1 °C)  Pulse:  [113-140] 116  Resp:  [18-28] 18  SpO2:  [96 %-99 %] 97 %  BP: (95-99)/(59-76) 95/60     Weight: 86.6 kg (191 lb)  Body mass index is 25.2 kg/m².    Physical Exam  Vitals and nursing note reviewed.   Constitutional:       Comments: Middle-aged male, lying in bed, he is in no acute distress, appears chronically ill, his wife is at bedside and also provides history.   HENT:      Head: Normocephalic.      Right Ear: Hearing and external ear normal.      Left Ear: Hearing and external ear normal.      Nose: Nose normal.      Mouth/Throat:      Mouth: Mucous membranes are moist.      Pharynx: Oropharynx is clear.      Comments: No oral lesions, ulcerations or blisters, moist, no erythema or exudates.  Eyes:      General: Vision grossly intact. Gaze aligned appropriately.      Conjunctiva/sclera: Conjunctivae normal.   Cardiovascular:      Rate and Rhythm: Regular rhythm. Tachycardia present.      Pulses: Normal pulses.      Heart sounds: Normal heart sounds. No murmur heard.  Pulmonary:      Effort: Pulmonary effort is normal.      Breath sounds: Normal breath sounds. No wheezing or rhonchi.   Chest:      Chest wall: No tenderness.   Abdominal:      General: Abdomen is protuberant. Bowel sounds are normal. There is no distension.      Palpations: Abdomen is soft.      Tenderness: There is abdominal tenderness.      Comments: Tender at right lower quadrant to light palpation.   Musculoskeletal:         General: No swelling. Normal range of motion.      Comments: Bilateral lower extremities with no edema.  Hands are mildly edematous.    Skin:     General: Skin is warm and dry.      Capillary Refill: Capillary refill takes 2 to 3 seconds.      Coloration: Skin is pale.      Comments: Ecchymosis to right arm.  There is a port at right chest that is not access.   Neurological:      General: No focal deficit present.      Mental Status: He is alert and oriented to person, place, and time.   Psychiatric:         Mood and Affect: Mood normal.         Behavior: Behavior normal.         Thought Content: Thought content normal.         Judgment: Judgment normal.           Significant Labs: All pertinent labs within the past 24 hours have been reviewed.    CBC:   Recent Labs   Lab 02/13/23  0836 02/13/23  1131   WBC 20.71* 22.06*   HGB 10.0* 10.1*   HCT 30.4* 30.1*   PLT 51* 78*     CMP:   Recent Labs   Lab 02/13/23  0836 02/13/23  1311   * 132*   K 4.1 3.9   CL 95 93*   CO2 25 26   * 186*   BUN 18 20   CREATININE 0.8 0.8   CALCIUM 9.4 8.6*   PROT 6.6 5.6*   ALBUMIN 3.2* 2.7*   BILITOT 1.5* 1.5*   ALKPHOS 98 79   AST 19 16   ALT 17 14   ANIONGAP 13 13     Cardiac Markers:   Recent Labs   Lab 02/13/23  1131   BNP 42     Coagulation: No results for input(s): PT, INR, APTT in the last 48 hours.  Lactic Acid:   Recent Labs   Lab 02/13/23  1527   LACTATE 2.5*     Troponin:   Recent Labs   Lab 02/13/23  1311   TROPONINIHS 31.1*     TSH:   Recent Labs   Lab 02/13/23  1311   TSH 0.810     Urine Studies:   Recent Labs   Lab 02/13/23  1348   COLORU Yellow   APPEARANCEUA Hazy*   PHUR 6.0   SPECGRAV 1.030   PROTEINUA Trace*   GLUCUA Trace*   KETONESU 1+*   BILIRUBINUA 1+*   OCCULTUA Negative   NITRITE Negative   UROBILINOGEN 4.0-6.0*   LEUKOCYTESUR Negative       Significant Imaging: I have reviewed all pertinent imaging results/findings within the past 24 hours.

## 2023-02-13 NOTE — HPI
Dwight Hoffmann is a 52-year-old male with chronic medical problems including diabetes, B-cell lymphoma currently undergoing chemotherapy, hypertension and hyperlipidemia and recently diagnosed with SARS-COVID-2 infection who presented to the emergency room for shortness of breath and weakness. He is accompanied by his wife who also provides history.  [facility] this morning with tachycardia and hypotension.  His wife said that when he went to the doctor's office this morning he started feeling short of breath and got extremely weak and was not able to stand up.  Prior to today he was feeling well and had a good day yesterday, no fevers or chills, no diarrhea, headaches or dizziness, ate and drank and was voiding well.  He states he is actually feeling better now while he is lying down and denies shortness of breath this time.  He does complain right lower quadrant abdominal pain and states that he think it is cancer pain.  It is controlled with hydrocodone and he takes MiraLax for constipation.  He was hospitalized at [facility] in [place] from February 6 to 9th and had gone to see the oncologist there and was diagnosed with SARS-COVID-2, symptom onset February 4th, he got 3 days of remdesivir and never required oxygen.  He presented to the hospital with poor intake, malaise and nausea and vomiting.  He had an episode of atrial fibrillation at [facility] that was attributed to COVID and he was not placed on rate control and has low platelets so no anticoagulation.  He has not been able to have chemo for a month because of low platelets so has not had a dose of Neulasta since early January and continues on prednisone 50 mg daily since his 1st cycle.  He was started on Bactrim 3 times a week and acyclovir twice daily prior to discharge while on the prednisone.    In the ED, a CTA of chest was negative for pulmonary embolus, infiltrates or pleural effusions and chest x-ray with  nothing acute.  Lab work significant for elevated WBC of 22.06, platelets 78, BUN/CR 10.1/30.1, mild hyponatremia with sodium 132, potassium 3.9, serum bicarb 26, chloride 93, BUN/CR 20/0.8, calcium 8.6 corrects to 9.6, total bili room in with slight elevation at 1.5 and ALT/AST 14/16.  Troponin elevated at 31.1 and BNP 42.  12 lead EKG with sinus tachycardia, short IA interval with  and ventricular rate 130.  He was given 2 L lactated Ringer's and cefepime 2 g IV.  Vital signs with temperature 97°, heart rate 140 initially at time of exam in his 115 and regular, blood pressure with map around 70 in the high 90s systolic, respiratory rate 18 and O2 sat 98% on room air.  He will be admitted to the hospitalist service for further evaluation and treatment to the intensive care unit.

## 2023-02-13 NOTE — ASSESSMENT & PLAN NOTE
Lactic acid 2.5, WBC elevated at 22.06, difficult to say if it is elevated because long-term steroid use, recent COVID infection, no chemo since early January, has been on daily prednisone since chemotherapy started, CTA chest with no pneumonia or signs of infection, blood cultures pending, access for obtain blood culture from port, obtain noncontrasted CTA abdomen and pelvis as he can not have any more contrast today, start cefepime and vancomycin with pharmacy to dose, consider metronidazole intra-abdominal source, continue fluid resuscitation with 30 cc per kg bolus then normal saline at 1:25 a.m. an hour, monitor in intensive care unit, start Levophed if map less than 65 to keep map greater than 65, repeat CBC in a.m., repeat lactic acid, stress dose steroids, hold Bactrim and resume acyclovir, consult to Oncology

## 2023-02-14 NOTE — PLAN OF CARE
[facility]  Initial Discharge Assessment       Primary Care Provider: BOSTON Oswald (Inactive)    Admission Diagnosis: Severe sepsis [A41.9, R65.20]    Admission Date: 2/13/2023  Expected Discharge Date: 2/17/2023    Discharge Barriers Identified: None    Patient previously on service with [facility], but was discharged 1/24/23 per liaison Mamie.  Will follow for discharge planning needs, pending clinical course.     Payor: MEDICAID / Plan: [place] [facility] / Product Type: Managed Medicaid /     Extended Emergency Contact Information  Primary Emergency Contact: Sundar Echevarria   [place]  Mobile Phone: [phone]  Relation: Spouse    Discharge Plan A: Home with family  Discharge Plan B: Home with family      [facility] - [place] - [address]  [address]  [facility] [address]  Phone: [phone] Fax: [phone]    [facility] [place] - [address]  [address]  [facility] [address]  Phone: [phone] Fax: [phone]      Initial Assessment (most recent)       Adult Discharge Assessment - 02/14/23 1424          Discharge Assessment    Assessment Type Discharge Planning Assessment     People in Home spouse     Do you expect to return to your current living situation? Yes     Do you have help at home or someone to help you manage your care at home? Yes     Who are your caregiver(s) and their phone number(s)? Sundar Echevarria (Spouse)   [phone] (Mobile)     Prior to hospitilization cognitive status: Alert/Oriented     Current cognitive status: Alert/Oriented     Equipment Currently Used at Home none     Readmission within 30 days? Yes     Patient currently being followed by outpatient case management? No     Do you currently have service(s) that help you manage your care at home? No     Do you take prescription medications? Yes     Do you have prescription coverage? Yes     Coverage [place] Medicaid     Are you on dialysis?  No     Do you take coumadin? No     Discharge Plan A Home with family     Discharge Plan B Home with family     DME Needed Upon Discharge  none     Discharge Barriers Identified None                     Readmission Assessment (most recent)       Readmission Assessment - 02/14/23 1425          Readmission    Why were you hospitalized in the last 30 days? Sepsis     When you left the hospital where did you go? Home with Home Health     Did patient/caregiver refused recommended DC plan? No     Was this a planned readmission? No

## 2023-02-14 NOTE — PROGRESS NOTES
"Subjective:       Patient ID: Dwight Hoffmann is a 52 y.o. male.    Chief Complaint:   HPI  52 year old male with T2 DM , HLD , with diagnosis of DLBCL9/2022,  at presentation  he had nightsweats, 25 lb wt loss over 2 months,  blood streaked stools and abdominal pain. CT abdomen showed   "Thick-walled collection containing feculent material, widely communicating with small bowel, seen centrally in the pelvis. Findings are suspicious for necrotic mass or abscess arising from the small bowel  Surgery was consulted and he underwent exploratory laparotomy 9/14/22 , with resection of the mass and anastomosis of small bowel.   The mass appeared to be an abscess with surrounding necrosis.  He had signs of sepsis, including tachycardia and leukocytosis. He was treated for this with with IV antibiotics.   Briefly on PPN.     Cultures from surgery showed Enterobacter cloacae and Proteus mirabilis.  His bowel function slowly returned, and he no longer needed NGT to suction.    The pathologic diagnosis on the mass was diffuse large B cell lymphoma.  He underwent bone marrow biopsy 9/22/22 for staging.  CT of chest showed no enlarged lymph nodes. He had  another laparoscopy 9/23/22 due to abdominal fluid collection with cleanout thought to be more necrotic than infective. outpatient ertapenem infusions to complete 2 week course.after adequate time for healing pt start chemotherapy for DLBCL. Also saw Dr. Linder transplant services  Oncology hx:  bone marrow bx  neg for lymphoma with tight cluster of blasts 6%( smal number)  positive for CD20,  BCL6, MUM1, MYC, high Ki-67 (99%)   -negative for CD10, CD30, BCL-2, cyclin D1, .  Reactive T-cells are CD3   --FISH negative for rearrangement of MYC ; no IGH/MYC fusion was observed  uric acid  7.3 sent allopurinol  HBV, HCV, HIV serologies; negative    might require IT ?    -11/22 started -plan for 6 cycles, R-CHOPwith growth factor support    Pet after 4 cycles  see below. " repeat in 2 more cycles    HPI to date:  Rectal bleeding,stopped   1/28/23 Lower pelvic pain went to Ed ,bowel gas and stools noted. Somehwat better using linzess and miralax    No fever, mild sob+    2/6/23: C/o not eating, not drinking this weekend, very tired, had a large BM but feels like food is sticking in his chest, ahrdly able to sit up and brush his teeth  sob on activity   Admitted at main [facility] + covid 2/2023 treated with remdesivir. asymptomatic episodes of tachycardia and hypotension, improved with fluid resuscitation.   PET scan from 1/11/23 was reviewed with radiology, hypermetabolic loops of small bowel most consistent with previous known disease and improved. He is felt to have a partial response in therapy. Would recommend to continue current regimen with [facility].     ROS:  Tierd, weak, abd pain+  Tachycardic ad sob.   No appetite, forcing food    Past Medical History:   Diagnosis Date    Cancer     Diabetes mellitus     Digestive disorder     Prediabetes      Past Surgical History:   Procedure Laterality Date    APPENDECTOMY  07/15/2014    COLONOSCOPY      COLONOSCOPY N/A 02/09/2022    ERROR.   He did not have a colonoscopy with Dr. Sanders.    COLONOSCOPY N/A 09/01/2022    Procedure: COLONOSCOPY;  Surgeon: Andrea Clemens MD;  Location: [facility];  Service: Endoscopy;  Laterality: N/A;    FLEXIBLE SIGMOIDOSCOPY N/A 11/7/2022    Procedure: SIGMOIDOSCOPY, FLEXIBLE;  Surgeon: Manuel Sanders MD;  Location: [facility];  Service: Endoscopy;  Laterality: N/A;    INCISION AND DRAINAGE OF ABDOMEN N/A 09/14/2022    Procedure: INCISION AND DRAINAGE, ABDOMEN;  Surgeon: Jan Oliveira Jr., MD;  Location: [facility] OR;  Service: General;  Laterality: N/A;    INSERTION OF TUNNELED CENTRAL VENOUS CATHETER (CVC) WITH SUBCUTANEOUS PORT N/A 10/31/2022    Procedure: [id number]-[id number];  Surgeon: Jan Oliveira Jr., MD;  Location: [facility] OR;  Service: General;  Laterality: N/A;    LAPAROSCOPIC DRAINAGE  OF ABDOMEN N/A 09/23/2022    Procedure: DRAINAGE, ABDOMEN, LAPAROSCOPIC;  Surgeon: Jan Oliveira Jr., MD;  [facility];  Service: General;  Laterality: N/A;     Family History   Problem Relation Age of Onset    Cancer Mother         Colon    Diabetes Mother     Hypertension Mother     Seizures Father     Heart murmur Sister     Seizures Sister       Social History     Socioeconomic History    Marital status:     Number of children: 2   Occupational History    Occupation: Truck [employer]   Tobacco Use    Smoking status: Never    Smokeless tobacco: Never   Substance and Sexual Activity    Alcohol use: Not Currently     Comment: occasional    Drug use: No    Sexual activity: Yes     Partners: Female     Social Determinants of Health     Financial Resource Strain: Low Risk     Difficulty of Paying Living Expenses: Not hard at all   Food Insecurity: No Food Insecurity    Worried About Running Out of Food in the Last Year: Never true    Ran Out of Food in the Last Year: Never true   Transportation Needs: No Transportation Needs    Lack of Transportation (Medical): No    Lack of Transportation (Non-Medical): No   Physical Activity: Inactive    Days of Exercise per Week: 0 days    Minutes of Exercise per Session: 0 min   Stress: No Stress Concern Present    Feeling of Stress : Only a little   Social Connections: Moderately Isolated    Frequency of Communication with Friends and Family: Twice a week    Frequency of Social Gatherings with Friends and Family: Twice a week    Attends Faith Services: Never    Active Member of Clubs or Organizations: No    Attends Club or Organization Meetings: Never    Marital Status:    Housing Stability: Low Risk     Unable to Pay for Housing in the Last Year: No    Number of Places Lived in the Last Year: 1    Unstable Housing in the Last Year: No     Review of patient's allergies indicates:   Allergen Reactions    Albuterol (bulk) Palpitations         Physical Exam     Wt Readings from Last 3 Encounters:   02/13/23 86.6 kg (191 lb)   02/13/23 87.5 kg (192 lb 14.4 oz)   02/08/23 85.7 kg (189 lb)     Temp Readings from Last 3 Encounters:   02/13/23 98.3 °F (36.8 °C) (Oral)   02/13/23 97 °F (36.1 °C) (Temporal)   02/09/23 97.9 °F (36.6 °C) (Oral)     BP Readings from Last 3 Encounters:   02/13/23 100/61   02/13/23 (!) 98/59   02/09/23 112/68     Pulse Readings from Last 3 Encounters:   02/13/23 104   02/13/23 (!) 140   02/09/23 (!) 112      VITAL SIGNS:  as above   GENERAL: appears well-built, well-nourished.frail, weak   No anxiety, no agitation, and in no distress.  Patient is awake, alert, oriented and cooperative.  HEENT:  Showed no congestion. Trachea is central no obvious icterus or pallor noted no hoarseness. no obvious JVD   NECK:  Supple.  No JVD. No obvious cervical submental or supraclavicular adenopathy.  RS:the visualized portion of  Chest expands well. chest appears symmetric, no audible wheezes.  No dyspnea recognized  ABDOMEN:  abdomen appears  slightly distended, sx scar   EXTREMITIES:  Without edema.  NEUROLOGICAL:  The patient is appropriate, higher functions are normal.  No  obvious neurological deficits.  normal judgement normal thought content  No confusion, no speech impediment. Cranial nerves are intact and show no deficit. No gross motor deficits noted   SKIN MUSCULOSKELETAL: no joint or skeletal deformity, no clubbing of nails.  No visible rash ecchymosis or petechiae   Lab Results   Component Value Date    WBC 22.06 (H) 02/13/2023    HGB 10.1 (L) 02/13/2023    HCT 30.1 (L) 02/13/2023    MCV 85 02/13/2023    PLT 78 (L) 02/13/2023       BMP  Lab Results   Component Value Date     (L) 02/13/2023    K 3.9 02/13/2023    CL 93 (L) 02/13/2023    CO2 26 02/13/2023    BUN 20 02/13/2023    CREATININE 0.8 02/13/2023    CALCIUM 8.6 (L) 02/13/2023    ANIONGAP 13 02/13/2023    ESTGFRAFRICA 111 12/28/2021    EGFRNONAA 96 12/28/2021     Lab Results   Component  Value Date    IRON 46 12/19/2022    TIBC 281 12/19/2022    FERRITIN 2,019 (H) 02/08/2023 9/26/22  Final Pathologic Diagnosis 1. Small bowel, resection:  Diffuse large B-cell lymphoma, non-germinal   center origin.  Final classification is pending C-MYC rearrangement analysis   result.  See comment.   2. Omentum, resection: Diffuse large B-cell lymphoma, non-germinal center   origin.  Final classification is pending C-MYC rearrangement analysis result.    Flow cytometry analysis of tissue was not performed.   Immunohistochemical studies were performed on the paraffin embedded tissue   block 1 C with adequate positive and negative controls.  The atypical   lymphocytes are positive for CD20,  BCL6, MUM1, MYC, high Ki-67 (99%) and are   negative for CD10, CD30, BCL-2, cyclin D1, .  Reactive T-cells are CD3   positive and BCL-2 positive.  In Situ hybridization for EBV is negative.   Findings are consistent with diffuse large B-cell lymphoma, non germinal   center origin.  Final classification is pending C-MYC rearrangement analysis   by FISH.  A supplemental report will follow  9/22  The left ventricle is normal in size with concentric remodeling and normal systolic function.  The estimated ejection fraction is 60%.  Grade I left ventricular diastolic dysfunction.  Normal right ventricular size with normal right ventricular systolic function.  Mild left atrial enlargement.  Mild mitral regurgitation.  Mild to moderate tricuspid regurgitation.  Normal central venous pressure (3 mmHg).  The estimated PA systolic pressure is 49 mmHg.  There is pulmonary hypertension.  Mild right atrial enlargement.  No vegetations were seen  9/22/22   Bone marrow, right iliac crest, aspirate, clot and core biopsy:   --Normocellular marrow for age ranging from 30-70% with trilineage   hematopoiesis, see comment   --No increase in blasts by aspirate count and CD34 immunohistochemical stain,   see comment   --Increased megakaryocytes  with atypia, see comment   --No morphologic evidence of metastatic lymphoma   --Stainable iron is not increased, see comment   --Minimal to mild reticulin fibrosis   Comment:  Concomitantly submitted flow cytometric analysis detects a tight   cluster of myeloid blasts.   The blast gate is mildly increased with a tight   cluster of blasts present showing coexpression of CD13 and CD34 with   expression of cytoplasmic myeloperoxidase.  The tight   cluster represents approximately 6% of total events.   Lymphocytes are   composed of a mixture of  polyclonal B lymphocytes and T lymphocytes that are   immunophenotypically unremarkable.   This marrow shows overall normocellular marrow for age with no morphologic   evidence of metastatic lymphoma.  There is some atypia of the megakaryocytic   lineage as well as a tight cluster of CD34/CD13 positive blasts detected by   flow cytometry, although increased blasts are not appreciated on the aspirate   smear or by CD34 immunohistochemical stain.  By CD61 immunohistochemical   stain, megakaryocytes appear mildly increased without significant clustering   appreciated.  Overt morphologic dysplasia of the myeloid and erythroid   lineages is not appreciated.  These findings are abnormal, and a   myelodysplastic or myeloproliferative process can not be completely ruled   out.  Therefore, correlation with any available cytogenetic and molecular   studies is recommended including studies to rule out myeloproliferative   neoplasms.  If chromosomes are normal, next generation sequencing could be   considered.       Summary echo 10/6/22    The left ventricle is normal in size with concentric remodeling and normal systolic function.  The estimated ejection fraction is 60%.  Grade I left ventricular diastolic dysfunction.  Normal right ventricular size with normal right ventricular systolic function.  Mild left atrial enlargement.  Mild mitral regurgitation.  Mild to moderate tricuspid  regurgitation.  Normal central venous pressure (3 mmHg).  The estimated PA systolic pressure is 49 mmHg.  There is pulmonary hypertension.  Mild right atrial enlargement.  No vegetations were seen     IMPRESSION:pet 10/14/22     Large multiloculated necrotic soft tissue mass in the central abdomen extending into the pelvis which is markedly hypermetabolic. This is consistent with the provided history of lymphoproliferative malignancy.     Numerous additional hypermetabolic soft tissue masses in abdomen, also suspicious for lymphoproliferative malignancy.     Mild diffuse osseous FDG hypermetabolism which could be on the basis of red marrow reconversion or lymphoproliferative malignancy. Please correlate with bone marrow biopsy results.     Mild asymmetric right palatine tonsillar FDG activity, nonspecific. Attention on follow-up is recommended.    IMPRESSION:1/23pet  Resolution of the hypermetabolic mass within the lower mid pelvis and mesenteric adenopathy     New diffuse hypermetabolic wall thickening of several loops of small bowel in the lower midpelvis compatible with lymphoma     19 mm hypermetabolic nodule in the anterior right mid abdomen consistent with lymphadenopathy     No evidence of adenopathy within the neck or chest  Patient Active Problem List   Diagnosis    Small bowel mass    Anemia, chronic disease    Diabetes mellitus type 2, noninsulin dependent    Arthralgia of bilat knees and neck    Moderate malnutrition    Intra-abdominal abscess    S/P exploratory laparotomy    S/P small bowel resection    Severe sepsis    DLBCL (diffuse large B cell lymphoma)    After care    Iron deficiency anemia, unspecified    Hyperlipidemia, unspecified    Thrombocytopenia    Chemotherapy induced neutropenia    Encounter for prevention of neutropenia due to chemotherapy    COVID-19 virus infection    Bicytopenia    Metabolic acidosis    Hyponatremia        Assessment and Plan     DLBCL:bone marrow bx  neg for  lymphoma with tight cluster of blasts 6%( smal number)  positive for CD20,  BCL6, MUM1, MYC, high Ki-67 (99%)   -negative for CD10, CD30, BCL-2, cyclin D1, .  Reactive T-cells are CD3   --FISH negative for rearrangement of MYC ; no IGH/MYC fusion was observed  uric acid  7.3 sent allopurinol  HBV, HCV, HIV serologies; negative  PET CT  10/22 as above   might require IT chemotherapy based on IPI  --discussed treatment with [facility], as it is still the standard of care for n on GC DLBCL  --plan for 6 cycles,    . Add nelasta to regimen  Pet after 4 cycles as above repeat in 2 monre cycles   Delayed chemo 1/31/22  due to thrombocytopenia plts 50 today pt very frail and not eating or   drinking, pt referred to EDfor tachycardia and sob  See me next week with [facility]mp for treatment if counts imporve  , t. T2DM not on long term insulin     --on metformin , follows with his PCP        3. Depression     --he denies suicidal ideation  --he has upcoming appointment with psychiatry           4. Abnormal BM biopsy     --tight cluster of myeloid blasts approximating 6% was found on BM done on 9/22/22  --significance un clear  --no dysplastic changes  --cytogenetics normal  --will require repeat BM biopsy after chemotherapy            Podiatry for nail issues

## 2023-02-14 NOTE — CONSULTS
[facility]  Hematology/Oncology  Consult Note    Patient Name: Dwight Hoffmann  MRN: [id number]  Admission Date: 2/13/2023  Hospital Length of Stay: 1 days  Code Status: Full Code   Attending Provider: Ced Hendrix MD  Consulting Provider: Elba Chua NP  Primary Care Physician: BOSTON Oswald (Inactive)  Principal Problem:Severe sepsis    Consults  Subjective:     HPI: 52-year-old male referred to the ED from oncologist office due to tachycardia.  Patient with known history of diffuse large B-cell lymphoma, last chemotherapy 1/11/23, followed locally by Dr. Reid, recent admission at [facility] [place] [facility] bone marrow transplant unit, presented there due to shortness of breath, he was diagnosed with COVID-19, received remdesivir by 3, prednisone 50 mg daily.  He states last week he had nausea and vomiting but this has now resolved.  Does report continued shortness of breath.  No headache, upper respiratory tract symptoms, redness/discharge from his right-sided Port-A-Cath, does have right lower quadrant discomfort which he states is chronic and following his previous surgery (resection of mass from intestine resulting in his diagnosis of DLBCL).  Chart review seems he has been tachycardic since January, oncology note last 3 visits heart rate, 92, 116, 144.  Labs with WBC 22, platelets 78, lactic acid 2.5.  Previous echo EF of 65%.  EKG with sinus tachycardia.  Will treat empirically for severe sepsis, CTA no PE, CT abdomen and pelvis noncontrast pending, IV fluids and if needed Levophed to support blood pressure, will do stress dose steroids given on prednisone, repeat chest x-ray in a.m. to see if any new infiltrates with hydration, pancultures were submitted.  Per medical record    At the time of my interview he is in bed resting quietly with no signs of obvious distress.  He is in the process of receiving 1 unit packed red blood cell.  He voices no complaints to me at the time of my visit    Oncology  Treatment Plan:   IP CHOP + OP RITUXIMAB Q3W    Medications:  Continuous Infusions:   sodium chloride 0.9% 125 mL/hr at 02/14/23 0330    NORepinephrine bitartrate-D5W       Scheduled Meds:   acyclovir  400 mg Oral BID    allopurinoL  100 mg Oral Daily    ceFEPime (MAXIPIME) IVPB  2 g Intravenous Q8H    chlorhexidine  15 mL Mouth/Throat BID    docusate sodium  100 mg Oral BID    LIDOcaine  1 patch Transdermal Q24H    mupirocin   Nasal BID    pantoprazole  40 mg Intravenous BID    predniSONE  50 mg Oral Daily    sodium chloride 0.9%  10 mL Intravenous Q12H    tamsulosin  0.4 mg Oral Daily    vancomycin (VANCOCIN) IVPB  1,500 mg Intravenous Q12H     PRN Meds:sodium chloride, sodium chloride, acetaminophen, ALPRAZolam, dextrose 10%, dextrose 10%, glucagon (human recombinant), glucose, glucose, HYDROcodone-acetaminophen, HYDROmorphone, insulin aspart U-100, magnesium oxide, magnesium oxide, melatonin, ondansetron, polyethylene glycol, potassium bicarbonate, potassium bicarbonate, potassium bicarbonate, potassium, sodium phosphates, potassium, sodium phosphates, potassium, sodium phosphates, promethazine, vancomycin - pharmacy to dose     Review of patient's allergies indicates:   Allergen Reactions    Albuterol (bulk) Palpitations        Past Medical History:   Diagnosis Date    Cancer     Diabetes mellitus     Digestive disorder     Prediabetes      Past Surgical History:   Procedure Laterality Date    APPENDECTOMY  07/15/2014    COLONOSCOPY      COLONOSCOPY N/A 02/09/2022    ERROR.   He did not have a colonoscopy with Dr. Sanders.    COLONOSCOPY N/A 09/01/2022    Procedure: COLONOSCOPY;  Surgeon: Andrea Clemens MD;  Location: [facility];  Service: Endoscopy;  Laterality: N/A;    FLEXIBLE SIGMOIDOSCOPY N/A 11/7/2022    Procedure: SIGMOIDOSCOPY, FLEXIBLE;  Surgeon: Manuel Sanders MD;  Location: [facility];  Service: Endoscopy;  Laterality: N/A;    INCISION AND DRAINAGE OF ABDOMEN N/A 09/14/2022    Procedure:  INCISION AND DRAINAGE, ABDOMEN;  Surgeon: Jan Oliveira Jr., MD;  Location: [facility] OR;  Service: General;  Laterality: N/A;    INSERTION OF TUNNELED CENTRAL VENOUS CATHETER (CVC) WITH SUBCUTANEOUS PORT N/A 10/31/2022    Procedure: VIBHNYKLS-DZPB-S-CATH;  Surgeon: Jan Oliveira Jr., MD;  Location: [facility] OR;  Service: General;  Laterality: N/A;    LAPAROSCOPIC DRAINAGE OF ABDOMEN N/A 09/23/2022    Procedure: DRAINAGE, ABDOMEN, LAPAROSCOPIC;  Surgeon: Jan Oliveira Jr., MD;  Location: [facility] OR;  Service: General;  Laterality: N/A;     Family History       Problem Relation (Age of Onset)    Cancer Mother    Diabetes Mother    Heart murmur Sister    Hypertension Mother    Seizures Father, Sister          Tobacco Use    Smoking status: Never    Smokeless tobacco: Never   Substance and Sexual Activity    Alcohol use: Not Currently     Comment: occasional    Drug use: No    Sexual activity: Yes     Partners: Female       Review of Systems  As per mentioned in HPI; all other systems reviewed and negative  Objective:     Vital Signs (Most Recent):  Temp: 98.5 °F (36.9 °C) (02/14/23 1345)  Pulse: 102 (02/14/23 1345)  Resp: 18 (02/14/23 1136)  BP: 119/70 (02/14/23 1345)  SpO2: 97 % (02/14/23 1345) Vital Signs (24h Range):  Temp:  [97.7 °F (36.5 °C)-99.1 °F (37.3 °C)] 98.5 °F (36.9 °C)  Pulse:  [] 102  Resp:  [12-28] 18  SpO2:  [94 %-100 %] 97 %  BP: ()/(55-70) 119/70     Weight: 87.7 kg (193 lb 5.5 oz)  Body mass index is 25.51 kg/m².  Body surface area is 2.13 meters squared.      Intake/Output Summary (Last 24 hours) at 2/14/2023 1629  Last data filed at 2/14/2023 1355  Gross per 24 hour   Intake 3168.58 ml   Output 1150 ml   Net 2018.58 ml       Physical Exam  PHYSICAL EXAM:     Vitals:    02/14/23 1345   BP: 119/70   Pulse: 102   Resp:    Temp: 98.5 °F (36.9 °C)       GENERAL: Comfortable looking patient. Patient is in no distress.  Awake, alert and oriented to time, person and place.  No  anxiety, or agitation.        RESPIRATORY:  No intercostal retractions.  No dullness to percussion.  Chest is clear to auscultation.  No rales, rhonchi or wheezes.  No crepitus.  Good air entry bilaterally.    CARDIOVASCULAR:  S1 and S2 are normally heard without murmurs or gallops.  All peripheral pulses are present.    ABDOMEN:  Normal abdomen.  No hepatosplenomegaly.  No free fluid.  Bowel sounds are present.  No hernia noted. No masses.  No rebound or tenderness.  No guarding or rigidity.  Umbilicus is midline.    LYMPHATICS:  No axillary, cervical, supraclavicular, submental, or inguinal lymphadenopathy.    SKIN/MUSCULOSKELETAL:  There is no evidence of excoriation marks or ecchmosis.  No rashes.  No cyanosis.  No clubbing.  No joint or skeletal deformities noted.  Normal range of motion.    NEUROLOGIC:  Higher functions are appropriate.     GENITAL/RECTAL:  Exams are deferred.   Significant Labs:   CBC:   Recent Labs   Lab 02/13/23  1131 02/14/23  0340 02/14/23  0901   WBC 22.06* 14.61* 14.42*   HGB 10.1* 7.4* 7.0*   HCT 30.1* 22.5* 21.2*   PLT 78* 41* 45*    and CMP:   Recent Labs   Lab 02/13/23  0836 02/13/23  1311 02/14/23  0340   * 132* 130*   K 4.1 3.9 4.7   CL 95 93* 96   CO2 25 26 28   * 186* 285*   BUN 18 20 20   CREATININE 0.8 0.8 0.6   CALCIUM 9.4 8.6* 8.1*   PROT 6.6 5.6* 5.2*   ALBUMIN 3.2* 2.7* 2.5*   BILITOT 1.5* 1.5* 1.5*   ALKPHOS 98 79 72   AST 19 16 15   ALT 17 14 13   ANIONGAP 13 13 6*       Diagnostic Results:  I have reviewed all pertinent imaging results/findings within the past 24 hours.    Assessment/Plan:     Active Diagnoses:    Diagnosis Date Noted POA    PRINCIPAL PROBLEM:  Severe sepsis [A41.9, R65.20] 09/26/2022 Unknown    Hyponatremia [E87.1] 02/13/2023 Unknown    COVID-19 virus infection [U07.1] 02/06/2023 Unknown    Thrombocytopenia [D69.6] 11/06/2022 Unknown    DLBCL (diffuse large B cell lymphoma) [C83.30] 09/28/2022 Yes    Diabetes mellitus type 2, noninsulin  dependent [E11.9] 09/14/2022 Yes    Anemia, chronic disease [D63.8] 09/14/2022 Yes      Problems Resolved During this Admission:       Diffuse large B-cell lymphoma:   -CT scan is concerning for disease progression  -hold chemotherapy until resolution of acute illness  -transfuse 1 unit packed red blood cell For hemoglobin less than 7  -transfuse 1 unit of platelets for platelet count less than 20,000  Thank you for your consult. I will follow-up with patient. Please contact us if you have any additional questions.    Elba Chua NP  Hematology/Oncology  Former[facility]

## 2023-02-14 NOTE — NURSING
Patient rec'd from ER via stretcher, in NAD, patient transferred self to physical bed, vitals assessed, physical assessment performed, patient pleasant and answers all questions appropriately, no complaints at this time

## 2023-02-14 NOTE — RESPIRATORY THERAPY
02/13/23 1935   Patient Assessment/Suction   Level of Consciousness (AVPU) alert   Respiratory Effort Unlabored   Expansion/Accessory Muscles/Retractions no use of accessory muscles   All Lung Fields Breath Sounds clear   PRE-TX-O2   Device (Oxygen Therapy) room air   SpO2 95 %   Pulse Oximetry Type Continuous   $ Pulse Oximetry - Multiple Charge Pulse Oximetry - Multiple   Pulse 99   Resp 20   /66   Education   $ Education 15 min   Respiratory Evaluation   $ Care Plan Tech Time 15 min   $ Eval/Re-eval Charges Re-evaluation

## 2023-02-14 NOTE — PLAN OF CARE
ACICU DAILY GOALS       A: Awake    RASS: Goal -  0   Actual -  0   Restraint necessity:  N/A  B: Breath   SBT: Not intubated   C: Coordinate A & B, analgesics/sedatives   Pain: managed    SAT: Not intubated  D: Delirium   CAM-ICU:    E: Early(intubated/ Progressive (non-intubated) Mobility   MOVE Screen: Pass   Activity: Activity Management: Sitting at edge of bed - L2  FAS: Feeding/Nutrition   Diet order: Diet/Nutrition Received: consistent carb/diabetic diet,   Fluid restriction:    T: Thrombus   DVT prophylaxis: VTE Required Core Measure: Pharmacological prophylaxis initiated/maintained  H: HOB Elevation   Head of Bed (HOB) Positioning: HOB at 60 degrees  U: Ulcer Prophylaxis   GI: yes  G: Glucose control   managed Glycemic Management: blood glucose monitored  S: Skin   Bundle compliance: yes   Bathing/Skin Care: linen changed Date: 2/13/23  B: Bowel Function   no issues   I: Indwelling Catheters   Weir necessity:      D: De-escalation Antibx   Yes  Plan for the day   Manage pain, control blood sugar, and control N/V. Assist patient with ADLs when needed.  Family/Goals of care/Code Status   Code Status: Full Code     No acute events throughout day, VS and assessment per flow sheet, patient progressing towards goals as tolerated, plan of care reviewed with Dwight Hoffmann and family, all concerns addressed, will continue to monitor.

## 2023-02-14 NOTE — PROGRESS NOTES
Pharmacokinetic Initial Assessment: IV Vancomycin    Assessment/Plan:    Initiate intravenous vancomycin with loading dose of 1500 mg once followed by a maintenance dose of vancomycin 1500 mg IV every 12 hours  Desired empiric serum trough concentration is 10 to 15 mcg/mL  Draw vancomycin trough level 60 min prior to fourth dose on 2/15/23 at approximately 05:00 am.  Pharmacy will continue to follow and monitor vancomycin.      Please contact pharmacy at extension [phone] with any questions regarding this assessment.     Thank you for the consult,   Ashtyn Dewitt       Patient brief summary:  Dwight Hoffmann is a 52 y.o. male initiated on antimicrobial therapy with IV Vancomycin for treatment of suspected sepsis    Drug Allergies:   Review of patient's allergies indicates:   Allergen Reactions    Albuterol (bulk) Palpitations       Actual Body Weight:   86.6 kg    Renal Function:   Estimated Creatinine Clearance: 119.3 mL/min (based on SCr of 0.8 mg/dL).,     Dialysis Method (if applicable):  N/A    CBC (last 72 hours):  Recent Labs   Lab Result Units 02/13/23  0836 02/13/23  1131   WBC K/uL 20.71* 22.06*   Hemoglobin g/dL 10.0* 10.1*   Hematocrit % 30.4* 30.1*   Platelets K/uL 51* 78*   Gran % % 77.5*  --    Lymph % % 8.0*  --    Mono % % 12.7  --    Eosinophil % % 0.2  --    Basophil % % 0.1  --    Differential Method  Automated  --        Metabolic Panel (last 72 hours):  Recent Labs   Lab Result Units 02/13/23  0836 02/13/23  1311 02/13/23  1348   Sodium mmol/L 133* 132*  --    Potassium mmol/L 4.1 3.9  --    Chloride mmol/L 95 93*  --    CO2 mmol/L 25 26  --    Glucose mg/dL 186* 186*  --    Glucose, UA   --   --  Trace*   BUN mg/dL 18 20  --    Creatinine mg/dL 0.8 0.8  --    Albumin g/dL 3.2* 2.7*  --    Total Bilirubin mg/dL 1.5* 1.5*  --    Alkaline Phosphatase U/L 98 79  --    AST U/L 19 16  --    ALT U/L 17 14  --    Magnesium mg/dL  --  1.8  --        Drug levels (last 3 results):  No results for input(s):  VANCOMYCINRA, VANCORANDOM, VANCOMYCINPE, VANCOPEAK, VANCOMYCINTR, VANCOTROUGH in the last 72 hours.    Microbiologic Results:  Microbiology Results (last 7 days)       Procedure Component Value Units Date/Time    Blood culture [[id number]] Collected: 02/13/23 1751    Order Status: Sent Specimen: Blood from Line, Port A Cath Updated: 02/13/23 1758    Respiratory Infection Panel (PCR), Nasopharyngeal [[id number]] Collected: 02/13/23 1751    Order Status: Completed Specimen: Nasopharyngeal Swab Updated: 02/13/23 1757     Respiratory Infection Panel Source NP swab    Narrative:      Respiratory Infection Panel source->NP Swab    Blood culture #2 **CANNOT BE ORDERED STAT** [[id number]] Collected: 02/13/23 1540    Order Status: Sent Specimen: Blood from Peripheral, Antecubital, Right Updated: 02/13/23 1559    Blood culture [[id number]]     Order Status: Canceled Specimen: Blood     Blood culture #1 **CANNOT BE ORDERED STAT** [[id number]] Collected: 02/13/23 1531    Order Status: Sent Specimen: Blood from Peripheral, Antecubital, Left Updated: 02/13/23 1536

## 2023-02-15 NOTE — PLAN OF CARE
Discharge orders and chart reviewed with no further post-acute discharge needs identified at this time.  At this time, patient is cleared for discharge from Case Management standpoint.        02/15/23 1508   Final Note   Assessment Type Final Discharge Note   Anticipated Discharge Disposition Home   Hospital Resources/Appts/Education Provided Appointments scheduled and added to AVS   Post-Acute Status   Post-Acute Authorization Other   Other Status No Post-Acute Service Needs   Discharge Delays None known at this time

## 2023-02-15 NOTE — PROGRESS NOTES
[facility] Medicine  Progress Note    Patient Name: Dwight Hoffmann  MRN: [id number]  Patient Class: IP- Inpatient   Admission Date: 2/13/2023  Length of Stay: 1 days  Attending Physician: Ced Hendrix MD  Primary Care Provider: BOSTON Oswald (Inactive)        Subjective:     Principal Problem:Severe sepsis    Interval History: Blood pressure improved. Pt c/o RLQ pain, CT concerning for progression of disease.  For 1 unit of blood, 1 unit platelets.    Review of Systems  Objective:     Vital Signs (Most Recent):  Temp: 98.4 °F (36.9 °C) (02/14/23 1600)  Pulse: 103 (02/14/23 1700)  Resp: 18 (02/14/23 1136)  BP: 129/70 (02/14/23 1700)  SpO2: 98 % (02/14/23 1700) Vital Signs (24h Range):  Temp:  [98 °F (36.7 °C)-99.1 °F (37.3 °C)] 98.4 °F (36.9 °C)  Pulse:  [] 103  Resp:  [12-28] 18  SpO2:  [94 %-100 %] 98 %  BP: ()/(53-83) 129/70     Weight: 87.7 kg (193 lb 5.5 oz)  Body mass index is 25.51 kg/m².    Intake/Output Summary (Last 24 hours) at 2/14/2023 1828  Last data filed at 2/14/2023 1701  Gross per 24 hour   Intake 3738.58 ml   Output 2150 ml   Net 1588.58 ml      Physical Exam  Physical Exam  Vitals and nursing note reviewed.   Constitutional:       Comments: Middle-aged male, lying in bed, he is in no acute distress, appears chronically ill, his wife is at bedside and also provides history.   HENT:      Head: Normocephalic.      Right Ear: Hearing and external ear normal.      Left Ear: Hearing and external ear normal.      Nose: Nose normal.      Mouth/Throat:      Mouth: Mucous membranes are moist.      Pharynx: Oropharynx is clear.      Comments: No oral lesions, ulcerations or blisters, moist, no erythema or exudates.  Eyes:      General: Vision grossly intact. Gaze aligned appropriately.      Conjunctiva/sclera: Conjunctivae normal.   Cardiovascular:      Rate and Rhythm: Regular rhythm. Tachycardia present.      Pulses: Normal pulses.      Heart sounds:  Normal heart sounds. No murmur heard.  Pulmonary:      Effort: Pulmonary effort is normal.      Breath sounds: Normal breath sounds. No wheezing or rhonchi.   Chest:      Chest wall: No tenderness.   Abdominal:      General: Abdomen is protuberant. Bowel sounds are normal. There is no distension.      Palpations: Abdomen is soft.      Tenderness: There is abdominal tenderness.      Comments: Tender at right lower quadrant to light palpation.   Musculoskeletal:         General: No swelling. Normal range of motion.      Comments: Bilateral lower extremities with no edema.  Hands are mildly edematous.   Skin:     General: Skin is warm and dry.      Capillary Refill: Capillary refill takes 2 to 3 seconds.      Coloration: Skin is pale.      Comments: Ecchymosis to right arm.  There is a port at right chest that is not access.   Neurological:      General: No focal deficit present.      Mental Status: He is alert and oriented to person, place, and time.   Psychiatric:         Mood and Affect: Mood normal.         Behavior: Behavior normal.         Thought Content: Thought content normal.         Judgment: Judgment normal.            Significant Labs: All pertinent labs within the past 24 hours have been reviewed.    Significant Imaging: I have reviewed all pertinent imaging results/findings within the past 24 hours.    Assessment/Plan:      Active Diagnoses:    Diagnosis Date Noted POA    PRINCIPAL PROBLEM:  Severe sepsis [A41.9, R65.20] 09/26/2022 Unknown    Hyponatremia [E87.1] 02/13/2023 Unknown    COVID-19 virus infection [U07.1] 02/06/2023 Unknown    Thrombocytopenia [D69.6] 11/06/2022 Unknown    DLBCL (diffuse large B cell lymphoma) [C83.30] 09/28/2022 Yes    Diabetes mellitus type 2, noninsulin dependent [E11.9] 09/14/2022 Yes    Anemia, chronic disease [D63.8] 09/14/2022 Yes      Problems Resolved During this Admission:     VTE Risk Mitigation (From admission, onward)           Ordered     IP VTE HIGH RISK  PATIENT  Once         02/13/23 1621     Place sequential compression device  Until discontinued         02/13/23 1621                  Concern for severe sepsis based on lab work, however unclear if just simply related to chronic prednisone use  -no clinical evidence of infection  -continue antibiotics until cultures are definitively negative  -CT abdomen pelvis concerning for progression of disease        Hyponatremia  Mild hyponatremia   Check ur osm, p osm, uric acid  Some dilution s/t hyperglycemia     COVID-19 virus infection  Patient was diagnosed with SARS-COVID-2 infection on 02/06, symptom onset on 02/04, it is 10 days will dc isolation     Thrombocytopenia/AoCD  For 1 u plts, 1 prbc        DLBCL (diffuse large B cell lymphoma)  Consult to Oncology, appreciated        Diabetes mellitus type 2, noninsulin dependent  SSI     SCD, PPI    Ced Hendrix MD  Department of Hospital Medicine   [facility]

## 2023-02-15 NOTE — PLAN OF CARE
Problem: Oral Intake Inadequate  Goal: Improved Oral Intake  Outcome: Ongoing, Progressing  Intervention: Promote and Optimize Oral Intake  Flowsheets (Taken 2/15/2023 1343)  Oral Nutrition Promotion: calorie-dense liquids provided

## 2023-02-15 NOTE — PROGRESS NOTES
[facility]  Hematology/Oncology  Progress note     Patient Name: Dwight Hoffmann  MRN: [id number]  Admission Date: 2/13/2023  Hospital Length of Stay: 2 days  Code Status: Full Code   Attending Provider: Ced Hendrix MD  Consulting Provider: Elba Chua NP  Primary Care Physician: BOSTON Oswald (Inactive)  Principal Problem:Severe sepsis    Consults  Subjective:     HPI: 52-year-old male referred to the ED from oncologist office due to tachycardia.  Patient with known history of diffuse large B-cell lymphoma, last chemotherapy 1/11/23, followed locally by Dr. Reid, recent admission at [facility] bone marrow transplant unit, presented there due to shortness of breath, he was diagnosed with COVID-19, received remdesivir by 3, prednisone 50 mg daily.  He states last week he had nausea and vomiting but this has now resolved.  Does report continued shortness of breath.  No headache, upper respiratory tract symptoms, redness/discharge from his right-sided Port-A-Cath, does have right lower quadrant discomfort which he states is chronic and following his previous surgery (resection of mass from intestine resulting in his diagnosis of DLBCL).  Chart review seems he has been tachycardic since January, oncology note last 3 visits heart rate, 92, 116, 144.  Labs with WBC 22, platelets 78, lactic acid 2.5.  Previous echo EF of 65%.  EKG with sinus tachycardia.  Will treat empirically for severe sepsis, CTA no PE, CT abdomen and pelvis noncontrast pending, IV fluids and if needed Levophed to support blood pressure, will do stress dose steroids given on prednisone, repeat chest x-ray in a.m. to see if any new infiltrates with hydration, pancultures were submitted.  Per medical record    At the time of my interview he is in bed resting quietly with no signs of obvious distress.  He is in the process of receiving 1 unit packed red blood cell.  He voices no complaints to me at the time of my visit    2/15/2023:   Patient seen and examined today.  He is feeling well with no complaints or concerns at this time    Oncology Treatment Plan:   IP CHOP + OP RITUXIMAB Q3W    Medications:  Continuous Infusions:   sodium chloride 0.9% 125 mL/hr at 02/14/23 0330    NORepinephrine bitartrate-D5W       Scheduled Meds:   acyclovir  400 mg Oral BID    allopurinoL  100 mg Oral Daily    ceFEPime (MAXIPIME) IVPB  2 g Intravenous Q8H    chlorhexidine  15 mL Mouth/Throat BID    cyanocobalamin  1,000 mcg Oral Daily    docusate sodium  100 mg Oral BID    LIDOcaine  1 patch Transdermal Q24H    mupirocin   Nasal BID    pantoprazole  40 mg Intravenous BID    predniSONE  50 mg Oral Daily    sodium chloride 0.9%  10 mL Intravenous Q12H    tamsulosin  0.4 mg Oral Daily     PRN Meds:sodium chloride, sodium chloride, acetaminophen, ALPRAZolam, dextrose 10%, dextrose 10%, glucagon (human recombinant), glucose, glucose, HYDROcodone-acetaminophen, HYDROmorphone, insulin aspart U-100, magnesium oxide, magnesium oxide, melatonin, ondansetron, polyethylene glycol, potassium bicarbonate, potassium bicarbonate, potassium bicarbonate, potassium, sodium phosphates, potassium, sodium phosphates, potassium, sodium phosphates, promethazine     Review of patient's allergies indicates:   Allergen Reactions    Albuterol (bulk) Palpitations        Past Medical History:   Diagnosis Date    Cancer     Diabetes mellitus     Digestive disorder     Prediabetes      Past Surgical History:   Procedure Laterality Date    APPENDECTOMY  07/15/2014    COLONOSCOPY      COLONOSCOPY N/A 02/09/2022    ERROR.   He did not have a colonoscopy with Dr. Sanders.    COLONOSCOPY N/A 09/01/2022    Procedure: COLONOSCOPY;  Surgeon: Andrea Clemens MD;  Location: [facility];  Service: Endoscopy;  Laterality: N/A;    FLEXIBLE SIGMOIDOSCOPY N/A 11/7/2022    Procedure: SIGMOIDOSCOPY, FLEXIBLE;  Surgeon: Manuel Sanders MD;  Location: [facility];  Service: Endoscopy;  Laterality: N/A;     INCISION AND DRAINAGE OF ABDOMEN N/A 09/14/2022    Procedure: INCISION AND DRAINAGE, ABDOMEN;  Surgeon: Jan Oliveira Jr., MD;  Location: [facility] OR;  Service: General;  Laterality: N/A;    INSERTION OF TUNNELED CENTRAL VENOUS CATHETER (CVC) WITH SUBCUTANEOUS PORT N/A 10/31/2022    Procedure: [id number];  Surgeon: Jan Oliveira Jr., MD;  Location: [facility] OR;  Service: General;  Laterality: N/A;    LAPAROSCOPIC DRAINAGE OF ABDOMEN N/A 09/23/2022    Procedure: DRAINAGE, ABDOMEN, LAPAROSCOPIC;  Surgeon: Jan Oliveira Jr., MD;  Location: [facility] OR;  Service: General;  Laterality: N/A;     Family History       Problem Relation (Age of Onset)    Cancer Mother    Diabetes Mother    Heart murmur Sister    Hypertension Mother    Seizures Father, Sister          Tobacco Use    Smoking status: Never    Smokeless tobacco: Never   Substance and Sexual Activity    Alcohol use: Not Currently     Comment: occasional    Drug use: No    Sexual activity: Yes     Partners: Female       Review of Systems  As per mentioned in HPI; all other systems reviewed and negative  Objective:     Vital Signs (Most Recent):  Temp: 98.1 °F (36.7 °C) (02/15/23 1101)  Pulse: 84 (02/15/23 1101)  Resp: 16 (02/15/23 1109)  BP: 120/61 (02/15/23 1101)  SpO2: 100 % (02/15/23 1101) Vital Signs (24h Range):  Temp:  [97.9 °F (36.6 °C)-98.4 °F (36.9 °C)] 98.1 °F (36.7 °C)  Pulse:  [] 84  Resp:  [14-20] 16  SpO2:  [92 %-100 %] 100 %  BP: ()/(53-73) 120/61     Weight: 87.7 kg (193 lb 5.5 oz)  Body mass index is 25.51 kg/m².  Body surface area is 2.13 meters squared.      Intake/Output Summary (Last 24 hours) at 2/15/2023 1514  Last data filed at 2/15/2023 0600  Gross per 24 hour   Intake 3229.9 ml   Output 2700 ml   Net 529.9 ml         Physical Exam  PHYSICAL EXAM:     Vitals:    02/15/23 1109   BP:    Pulse:    Resp: 16   Temp:        GENERAL: Comfortable looking patient. Patient is in no distress.  Awake, alert and oriented  to time, person and place.  No anxiety, or agitation.        RESPIRATORY:  No intercostal retractions.  No dullness to percussion.  Chest is clear to auscultation.  No rales, rhonchi or wheezes.  No crepitus.  Good air entry bilaterally.    CARDIOVASCULAR:  S1 and S2 are normally heard without murmurs or gallops.  All peripheral pulses are present.    ABDOMEN:  Normal abdomen.  No hepatosplenomegaly.  No free fluid.  Bowel sounds are present.  No hernia noted. No masses.  No rebound or tenderness.  No guarding or rigidity.  Umbilicus is midline.    LYMPHATICS:  No axillary, cervical, supraclavicular, submental, or inguinal lymphadenopathy.    SKIN/MUSCULOSKELETAL:  There is no evidence of excoriation marks or ecchmosis.  No rashes.  No cyanosis.  No clubbing.  No joint or skeletal deformities noted.  Normal range of motion.    NEUROLOGIC:  Higher functions are appropriate.     GENITAL/RECTAL:  Exams are deferred.   Significant Labs:   CBC:   Recent Labs   Lab 02/14/23  0901 02/14/23  1634 02/15/23  0503   WBC 14.42* 16.57* 15.93*   HGB 7.0* 8.0* 7.8*   HCT 21.2* 24.3* 23.6*   PLT 45* 51* 51*      and CMP:   Recent Labs   Lab 02/14/23  0340 02/15/23  0503   * 132*   K 4.7 4.1   CL 96 102   CO2 28 26   * 251*   BUN 20 15   CREATININE 0.6 0.6   CALCIUM 8.1* 7.9*   PROT 5.2* 4.9*   ALBUMIN 2.5* 2.5*   BILITOT 1.5* 1.4*   ALKPHOS 72 73   AST 15 15   ALT 13 12   ANIONGAP 6* 4*         Diagnostic Results:  I have reviewed all pertinent imaging results/findings within the past 24 hours.    Assessment/Plan:     Active Diagnoses:    Diagnosis Date Noted POA    PRINCIPAL PROBLEM:  Severe sepsis [A41.9, R65.20] 09/26/2022 Unknown    Hyponatremia [E87.1] 02/13/2023 Unknown    COVID-19 virus infection [U07.1] 02/06/2023 Unknown    Thrombocytopenia [D69.6] 11/06/2022 Unknown    DLBCL (diffuse large B cell lymphoma) [C83.30] 09/28/2022 Yes    Diabetes mellitus type 2, noninsulin dependent [E11.9] 09/14/2022 Yes     Anemia, chronic disease [D63.8] 09/14/2022 Yes      Problems Resolved During this Admission:       Diffuse large B-cell lymphoma:   -CT scan is concerning for disease progression  -hold chemotherapy until resolution of acute illness  -transfuse 1 unit packed red blood cell For hemoglobin less than 7  -transfuse 1 unit of platelets for platelet count less than 20,000  -we have reached out to Dr. Mamadou Montes at [facility].  He states patient name may need exploratory lap to investigate multifocal masses.  -he will need to follow-up at [facility]  -no new recommendations from Hematology Oncology    Thank you for your consult. I will follow-up with patient. Please contact us if you have any additional questions.    Elba Chua NP  Hematology/Oncology  [facility]

## 2023-02-15 NOTE — PROGRESS NOTES
"[facility]  Adult Nutrition   Progress Note (Initial Assessment)     SUMMARY     Recommendations  Recommendation/Intervention:   1) Continue current diet order as medically appropriate and tolerated w/ diabetic restrictions in place. Encourage adequate intake as tolerated.   2) Ordered ONS Glucerna TID to assist in meeting estimated energy and protein needs when PO intake of meal is insufficient.   3) Menu  to assist in daily meal choices.   4) RD to continue to monitor PO intake, labs, weight, and will make recommendations as needed.    Goals: Pt to meet at least 75% of estimated energy and protein needs via PO intake of meals and nutritional supplements.  Nutrition Goal Status: new    Dietitian Rounds Brief  Pt assessed 2' ICU status. Pt is a 51 y/o M admitted for severe sepsis. Pt was sent from [facility] for [facility]. BP has improved. Pt is a good PO intake of meals, consuming 50-75% of meals per RN. Pt is w/ previous moderate malnutrition d/x. RD agrees w/ malnutrition d/x s/t decreased appetite and altered GI function related to cancer as evidenced by RD estimated < 75% of needs meet + 14 lb wt loss (6.7% body wt) within a 2 month time span. Ordered ONS Glucerna TID to assist w/ weight restoration and prevention of further wt loss. RD to continue to monitor intake, labs, and plan of care.     Diet order:   Current Diet Order: Diabetic     Evaluation of Received Nutrient/Fluid Intake  Energy Calories Required: not meeting needs  Protein Required: not meeting needs  Fluid Required: not meeting needs  Tolerance: tolerating     % Intake of Estimated Energy Needs: 25 - 50 %  % Meal Intake: 25 - 50 %      Intake/Output Summary (Last 24 hours) at 2/15/2023 1300  Last data filed at 2/15/2023 0600  Gross per 24 hour   Intake 3469.9 ml   Output 2700 ml   Net 769.9 ml        Anthropometrics  Temp: 98.1 °F (36.7 °C)  Height Method: Stated  Height: 6' 1" (185.4 cm)  Height (inches): 73 " in  Weight Method: Bed Scale  Weight: 87.7 kg (193 lb 5.5 oz)  Weight (lb): 193.35 lb  Ideal Body Weight (IBW), Male: 184 lb  % Ideal Body Weight, Male (lb): 105.08 %  BMI (Calculated): 25.5  BMI Grade: 25 - 29.9 - overweight       Estimated/Assessed Needs  Weight Used For Calorie Calculations: 88 kg (194 lb 0.1 oz)  Energy Calorie Requirements (kcal): 8202-1302 (20-25 kcal/kg)  Energy Need Method: Kcal/kg  Protein Requirements: 132-176 (1.5-2 gm/kg)  Weight Used For Protein Calculations: 88 kg (194 lb 0.1 oz)  Fluid Requirements (mL): 1760 (20 ml/kg)     RDA Method (mL): 1760       Reason for Assessment  Reason For Assessment: other (see comments) (ICU admit)  Diagnosis: other (see comments) (Severe sepsis)  Relevant Medical History: T2DM, HLD, moderate malnutrition  Interdisciplinary Rounds: attended    Nutrition/Diet History  Food Allergies: NKFA  Factors Affecting Nutritional Intake: decreased appetite, depression    Nutrition Risk Screen  Nutrition Risk Screen: no indicators present     MST Score: 0  Have you recently lost weight without trying?: No  Weight loss score: 0  Have you been eating poorly because of a decreased appetite?: No  Appetite score: 0       Weight History:  Wt Readings from Last 5 Encounters:   02/13/23 87.7 kg (193 lb 5.5 oz)   02/13/23 87.5 kg (192 lb 14.4 oz)   02/08/23 85.7 kg (189 lb)   02/06/23 86.1 kg (189 lb 13.1 oz)   01/31/23 88.7 kg (195 lb 8.8 oz)        Medications:Pertinent Medications Reviewed  Scheduled Meds:   acyclovir  400 mg Oral BID    allopurinoL  100 mg Oral Daily    ceFEPime (MAXIPIME) IVPB  2 g Intravenous Q8H    chlorhexidine  15 mL Mouth/Throat BID    cyanocobalamin  1,000 mcg Oral Daily    docusate sodium  100 mg Oral BID    LIDOcaine  1 patch Transdermal Q24H    mupirocin   Nasal BID    pantoprazole  40 mg Intravenous BID    predniSONE  50 mg Oral Daily    sodium chloride 0.9%  10 mL Intravenous Q12H    tamsulosin  0.4 mg Oral Daily     Continuous Infusions:    sodium chloride 0.9% 125 mL/hr at 02/14/23 0330    NORepinephrine bitartrate-D5W       PRN Meds:.sodium chloride, sodium chloride, acetaminophen, ALPRAZolam, dextrose 10%, dextrose 10%, glucagon (human recombinant), glucose, glucose, HYDROcodone-acetaminophen, HYDROmorphone, insulin aspart U-100, magnesium oxide, magnesium oxide, melatonin, ondansetron, polyethylene glycol, potassium bicarbonate, potassium bicarbonate, potassium bicarbonate, potassium, sodium phosphates, potassium, sodium phosphates, potassium, sodium phosphates, promethazine    Labs: Pertinent Labs Reviewed  Clinical Chemistry:  Recent Labs   Lab 02/09/23  0008 02/09/23  0450 02/09/23  0830 02/13/23  0836 02/15/23  0503   NA  --  136  --    < > 132*   K  --  4.5  --    < > 4.1   CL  --  104  --    < > 102   CO2  --  22*  --    < > 26   GLU  --  157*  --    < > 251*   BUN  --  15  --    < > 15   CREATININE  --  0.7  --    < > 0.6   CALCIUM  --  8.3*  --    < > 7.9*   PROT  --  4.9*  --    < > 4.9*   ALBUMIN  --  2.4*  --    < > 2.5*   BILITOT  --  1.0  --    < > 1.4*   ALKPHOS  --  90  --    < > 73   AST  --  13  --    < > 15   ALT  --  6*  --    < > 12   ANIONGAP  --  10  --    < > 4*   MG  --  1.8  --    < > 1.9   PHOS 2.3* 2.3* 2.7  --   --     < > = values in this interval not displayed.     CBC:   Recent Labs   Lab 02/15/23  0503   WBC 15.93*   RBC 2.70*   HGB 7.8*   HCT 23.6*   PLT 51*   MCV 87   MCH 28.9   MCHC 33.1     Cardiac Profile:  Recent Labs   Lab 02/13/23  1131   BNP 42     Inflammatory Labs:  Recent Labs   Lab 02/13/23  1311   CRP 3.66*     Diabetes:  Recent Labs   Lab 02/08/23  2152 02/09/23  0754 02/09/23  1119 02/15/23  0503   HGBA1C  --   --   --  7.7*   POCTGLUCOSE 258* 149* 237*  --      Thyroid & Parathyroid:  Recent Labs   Lab 02/13/23  1311   TSH 0.810       Monitor and Evaluation  Food and Nutrient Intake: energy intake, food and beverage intake  Food and Nutrient Adminstration: diet order  Knowledge/Beliefs/Attitudes:  food and nutrition knowledge/skill  Physical Activity and Function: nutrition-related ADLs and IADLs  Anthropometric Measurements: weight, weight change, body mass index  Biochemical Data, Medical Tests and Procedures: electrolyte and renal panel, gastrointestinal profile, glucose/endocrine profile  Nutrition-Focused Physical Findings: overall appearance     Nutrition Risk  Level of Risk/Frequency of Follow-up: moderate - high     Nutrition Follow-Up  RD Follow-up?: Yes      Vandana Bravo RD 02/15/2023 1:00 PM

## 2023-02-15 NOTE — PROGRESS NOTES
Pharmacokinetic Assessment Follow Up: IV Vancomycin    Patient brief summary:  Dwight Hoffmann is a 52 y.o. male initiated on antimicrobial therapy with IV Vancomycin for treatment of Sepsis    The patient's current regimen is Vancomycin 1500 mg every 12 hours.      Actual Body Weight = 87.7 kg (193 lb 5.5 oz).    Renal Function:   Estimated Creatinine Clearance: 159.1 mL/min (based on SCr of 0.6 mg/dL).      Vancomycin serum concentration assessment(s):    The trough level was drawn correctly and can be used to guide therapy at this time. The measurement is within the desired definitive target range of 10 to 15 mcg/mL.    Vancomycin Regimen Plan:    Continue regimen to Vancomycin 1500 mg IV every 12 hours with next serum trough concentration measured at 17:00 prior to 7th dose on 2/16.    Drug levels (last 3 results):  Recent Labs   Lab Result Units 02/15/23  0503   Vancomycin-Trough ug/mL 10.1       Pharmacy will continue to follow and monitor vancomycin.    Please contact pharmacy at extension [phone] for questions regarding this assessment.    Thank you for the consult,   Laura Crowell

## 2023-02-17 NOTE — TELEPHONE ENCOUNTER
Called pt to notify that tx will be rescheduled to Thursday and labs and office visit will be on Wednesday. Pt v/u.

## 2023-02-17 NOTE — PROGRESS NOTES
Pt had covid admitted, non contrast scan 2/13 showed progression ct with contrasts on 2/15 shows stable dz, so we will cont current regimen and get him f/u with Dr Montes

## 2023-02-22 PROBLEM — D64.9 ANEMIA: Status: ACTIVE | Noted: 2022-01-01

## 2023-02-22 PROBLEM — R17 ELEVATED BILIRUBIN: Status: ACTIVE | Noted: 2023-01-01

## 2023-02-22 NOTE — ED PROVIDER NOTES
Encounter Date: 2/22/2023       History     Chief Complaint   Patient presents with    Weakness     Pt presents to ed complaining of worsening generalized weakness. Pt has lymphoma.     52-year-old male with history of B-cell lymphoma, with focus of lymphoma of the right lower abdomen, presents now for generalized fatigue, decreased p.o. intake, worsening lower abdominal pain.  Patient was admitted last week for dehydration and scans at that time showed no acute source of infection.  Patient home and was treating his pain with Percocet until today when the pain worsened he became more nauseous and could not eat or drink.  He tried a scrambled egg without success.  He denies fevers or chills.  Denies back pain.  The pain today is similar to pain that he has been having chronically.  He denies dysuria hematuria urgency or frequency.    Review of patient's allergies indicates:   Allergen Reactions    Albuterol (bulk) Palpitations     Past Medical History:   Diagnosis Date    Cancer     Diabetes mellitus     Digestive disorder     Prediabetes      Past Surgical History:   Procedure Laterality Date    APPENDECTOMY  07/15/2014    COLONOSCOPY      COLONOSCOPY N/A 02/09/2022    ERROR.   He did not have a colonoscopy with Dr. Sanders.    COLONOSCOPY N/A 09/01/2022    Procedure: COLONOSCOPY;  Surgeon: Andrea Clemens MD;  Location: [facility];  Service: Endoscopy;  Laterality: N/A;    FLEXIBLE SIGMOIDOSCOPY N/A 11/7/2022    Procedure: SIGMOIDOSCOPY, FLEXIBLE;  Surgeon: Manuel Sanders MD;  Location: [facility];  Service: Endoscopy;  Laterality: N/A;    INCISION AND DRAINAGE OF ABDOMEN N/A 09/14/2022    Procedure: INCISION AND DRAINAGE, ABDOMEN;  Surgeon: Jan Oliveira Jr., MD;  Location: [facility];  Service: General;  Laterality: N/A;    INSERTION OF TUNNELED CENTRAL VENOUS CATHETER (CVC) WITH SUBCUTANEOUS PORT N/A 10/31/2022    Procedure: [id number];  Surgeon: Jan Oliveira Jr., MD;  Location: [facility]  OR;  Service: General;  Laterality: N/A;    LAPAROSCOPIC DRAINAGE OF ABDOMEN N/A 09/23/2022    Procedure: DRAINAGE, ABDOMEN, LAPAROSCOPIC;  Surgeon: Jan Oliveira Jr., MD;  Location: [facility] OR;  Service: General;  Laterality: N/A;     Family History   Problem Relation Age of Onset    Cancer Mother         Colon    Diabetes Mother     Hypertension Mother     Seizures Father     Heart murmur Sister     Seizures Sister      Social History     Tobacco Use    Smoking status: Never    Smokeless tobacco: Never   Substance Use Topics    Alcohol use: Not Currently     Comment: occasional    Drug use: No     Review of Systems   Constitutional:  Positive for fatigue. Negative for activity change and appetite change.   HENT:  Negative for congestion and drooling.    Eyes:  Negative for discharge and itching.   Respiratory:  Negative for cough and chest tightness.    Cardiovascular:  Negative for chest pain and leg swelling.   Gastrointestinal:  Positive for abdominal pain and nausea. Negative for abdominal distention.   Genitourinary:  Negative for difficulty urinating and dysuria.   Musculoskeletal:  Negative for arthralgias.   Skin:  Negative for color change and pallor.   Neurological:  Negative for dizziness and facial asymmetry.   Psychiatric/Behavioral:  Negative for agitation and behavioral problems.      Physical Exam     Initial Vitals [02/22/23 1500]   BP Pulse Resp Temp SpO2   114/77 (!) 135 20 98 °F (36.7 °C) 100 %      MAP       --         Physical Exam    Nursing note and vitals reviewed.  Constitutional: He appears well-developed and well-nourished.   HENT:   Head: Normocephalic and atraumatic.   Mouth/Throat: Oropharynx is clear and moist.   Eyes: Conjunctivae and EOM are normal. Pupils are equal, round, and reactive to light.   Neck: No thyromegaly present.   Normal range of motion.  Cardiovascular:  Normal rate, regular rhythm and intact distal pulses.           Pulmonary/Chest: Breath sounds normal. No  respiratory distress. He has no wheezes.   Abdominal: Abdomen is soft. Bowel sounds are normal. He exhibits no distension. There is abdominal tenderness.   Lower abdominal tenderness without rebound or guarding   Musculoskeletal:         General: No tenderness or edema. Normal range of motion.      Cervical back: Normal range of motion.     Neurological: He is alert and oriented to person, place, and time. He has normal strength. No cranial nerve deficit.   Skin: Skin is warm and dry. No rash noted.   Psychiatric: He has a normal mood and affect. His behavior is normal. Thought content normal.       ED Course   Procedures  Labs Reviewed   CBC W/ AUTO DIFFERENTIAL - Abnormal; Notable for the following components:       Result Value    WBC 18.06 (*)     RBC 2.90 (*)     Hemoglobin 8.3 (*)     Hematocrit 26.1 (*)     MCHC 31.8 (*)     RDW 22.8 (*)     Platelets 56 (*)     Gran % 93.0 (*)     Lymph % 1.0 (*)     Mono % 0.0 (*)     Platelet Estimate Decreased (*)     All other components within normal limits   COMPREHENSIVE METABOLIC PANEL - Abnormal; Notable for the following components:    Sodium 125 (*)     Chloride 92 (*)     CO2 21 (*)     Glucose 277 (*)     BUN 24 (*)     Calcium 8.1 (*)     Total Protein 5.6 (*)     Albumin 2.7 (*)     Total Bilirubin 4.0 (*)     All other components within normal limits   PHOSPHORUS - Abnormal; Notable for the following components:    Phosphorus 4.6 (*)     All other components within normal limits   LACTIC ACID, PLASMA - Abnormal; Notable for the following components:    Lactate (Lactic Acid) 3.0 (*)     All other components within normal limits    Narrative:     LA critical result(s) repeated. Called and verbal readback obtained   from Dr. Byers/ED by JBTiago 02/22/2023 17:30   CULTURE, BLOOD   CULTURE, BLOOD   B-TYPE NATRIURETIC PEPTIDE   MAGNESIUM   URINALYSIS, REFLEX TO URINE CULTURE   TYPE & SCREEN   GROUP & RH   POCT GLUCOSE MONITORING CONTINUOUS          Imaging Results   "            X-Ray Chest AP Portable (Final result)  Result time 02/22/23 17:48:46      Final result by Vamsi Reid MD (02/22/23 17:48:46)                   Narrative:    XR CHEST 1 VIEW    CLINICAL HISTORY:  52 years Male (Pt presents to ed complaining of worsening generalized weakness. Pt has lymphoma.)    COMPARISON: February 14, 2023    FINDINGS: Cardiac silhouette size is within normal limits. Right IJ port catheter is in place with tip overlying the SVC. No alveolar or interstitial infiltrate. No pleural effusion or pneumothorax. No acute osseous abnormality.    IMPRESSION:    No acute pulmonary pathology.    Electronically signed by:  Vamsi Reid MD  2/22/2023 5:48 PM CST Workstation: [id number]                                  X-Rays:   Independently Interpreted Readings:   Chest X-Ray: Normal heart size.  No infiltrates.  No acute abnormalities.   Medications   sodium chloride 0.9% bolus 1,000 mL 1,000 mL (has no administration in time range)   piperacillin-tazobactam 4.5 g in dextrose 5 % 100 mL IVPB (ready to mix system) (has no administration in time range)   ondansetron 4 mg/2 mL injection (has no administration in time range)   sodium chloride 0.9% bolus 1,000 mL 1,000 mL (0 mLs Intravenous Stopped 2/22/23 1821)   HYDROmorphone injection 1 mg (1 mg Intravenous Given 2/22/23 1833)     Medical Decision Making:   Clinical Tests:   Sepsis Perfusion Assessment: "I attest a sepsis perfusion exam was performed within 6 hours of sepsis, severe sepsis, or septic shock presentation, following fluid resuscitation."    Sepsis Perfusion Assessment Complete: 2/22/2023 6:37 PM             ED Course as of 02/22/23 1846 Wed Feb 22, 2023   1836 Magnesium [BS]   1836 Comprehensive metabolic panel(!) [BS]   1836 Phosphorus(!) [BS]   1836 Brain natriuretic peptide [BS]   1836 Type & Screen [BS]   1836 CBC auto differential(!) [BS]   1836 Lactic acid, plasma(!!) [BS]   1836 Group & Rh [BS]   1837 X-Ray Chest AP " Portable [BS]      ED Course User Index  [BS] Dominic Nelson MD               52-year-old male with abdominal B-cell lymphoma on active chemotherapy presents for generalized fatigue, decreased p.o. intake and worsening lower abdominal pain ongoing for 2 days.  Vitals notable for tachycardia, relative hypotension.  Patient is afebrile.  Patient is uncomfortable and chronically ill-appearing but nontoxic.  Abdominal exam notable for tenderness to palpation or quadrant without rebound.  Patient is not peritoneal, doubt perforation.  Clinical picture favors dehydration given patient's recurrent lower abdominal pain which is similar in character to prior, will start sepsis workup with 2 L fluid bolus, blood cultures, urinalysis, urine cultures and Zosyn for intra-abdominal coverage along with CT abdomen pelvis to rule out for abdominal infection.  Workup notable for leukocytosis of 18 and lactic acidosis of 3 along with metabolic derangements of mild hyponatremia and mild hypochloremia consistent with dehydration.  Anticipate admission for hypovolemic shock and symptom control but workup pending CT abdomen pelvis.        Clinical Impression:   Final diagnoses:  [R53.1] Weakness  [C83.33] Diffuse large B-cell lymphoma of intra-abdominal lymph nodes (Primary)  [R10.30] Lower abdominal pain  [E86.0] Dehydration               Dominic Nelson MD  02/22/23 7728

## 2023-02-22 NOTE — Clinical Note
Diagnosis: Sepsis [740522]   Future Attending Provider: CARINA MARMOLEJO [[id number]]   Admitting Provider:: CARINA MARMOLEJO [[id number]]   Special Needs:: Fall Risk [15]

## 2023-02-22 NOTE — TELEPHONE ENCOUNTER
Outgoing call to pt per spouse's request. She states that pt has not been eating or drinking, he does not want to get his labs or come to his appt. Spoke with pt as well. He states that he has been in pain for 3 days and has not been able to sleep. Pt states his pain meds are not working. He says he just wants to rest now, but he does not have pain at this time. He states he has drank today, and his last BM was yesterday. Pt's labs rescheduled and he is scheduled to see Dr Khoobehi today.

## 2023-02-23 NOTE — ASSESSMENT & PLAN NOTE
Patient with WBC elevated at 18.06, lactic acid 3.0, tachycardic, initially hypotensive, afebrile, given 2 L normal saline bolus and started on Zosyn, there is a CTA abdomen and pelvis pending, urinalysis is pending, chest x-ray is clear, we will deescalate antibiotics as indicated

## 2023-02-23 NOTE — ASSESSMENT & PLAN NOTE
Consult to Oncology, he was supposed to start chemotherapy tomorrow after it has been on hold since late January because of pancytopenia, patient states today pain is under control with hydrocodone alternating a lower dose with the higher dose, we will hold morphine as he is not taken that, we will also resume prednisone as he said he has been taking, but we will consult Oncology

## 2023-02-23 NOTE — ASSESSMENT & PLAN NOTE
Sodium 125, has had mild hyponatremia for the last week ranging from 130-133, we will obtain serum and urine osmolality, urine sodium and uric acid, BMP every 6 hours starting after hydration and then in a.m. and tomorrow afternoon, continue normal saline at 1:25 a.m. an hour

## 2023-02-23 NOTE — ASSESSMENT & PLAN NOTE
Most recent hemoglobin A1c 7.7 on 02/15, low-dose sliding scale insulin with capillary glucose checks before meals and at bedtime, hold metformin, adjust as needed

## 2023-02-23 NOTE — HPI
Dwight Hoffmann is a 52-year-old male with chronic medical problems including B-cell lymphoma on chemotherapy, hyperlipidemia, diabetes and hypertension presents to the emergency room complaining uncontrolled pain and vomiting.  He states that he has been having severe lower bilateral abdominal pain for several days and pain meds he is had a Jehovah up in working.  He said Oncology recently changed his medication and this morning he actually had some pain relief after taking Norco 7.5 followed by Norco 5 and then a Norco 7.5.  He is continued to have a very poor appetite because of the pain.  He said this afternoon he drank a root beer and then tried to eat scrambled egg but it was very salty and it made him vomit.  He states he has not really been feeling nauseated or having vomited prior.  He has been having normal bowel movements with no bleeding and states his last bowel movement was this morning.  He complains of generalized weakness and says he can walk but has difficulty getting in and out of chairs because he has no strength.  He tells me he was supposed to start chemotherapy tomorrow which has been on hold since the end of January because of thrombocytopenia.  He has been seeing his oncologist but has had very poor oral intake for the last several days.  He was last admitted to the hospital on February 13 for dehydration and weakness with low blood pressure and tachycardia.  He improved after hydration and was discharged home.    In the ED, vital signs with temperature 98°, heart rate 135, blood pressure 114/77, respiratory rate 18 25 and O2 sat 100, lab work significant for WBC 18.06, platelets 56, hemoglobin 8.3, hematocrit 26.1, sodium decreased at 125, potassium 4.3, chloride 92, serum bicarb 21, BUN/CR 24/0.9, glucose elevated to 77, magnesium 1.9, anion gap 12, lactic acid is 3.0.  Chest x-ray with no acute pulmonary pathology.  He was given Dilaudid and Zofran, a dose of Zosyn and 2 L normal saline.   At time of exam he had just returned from CT scan and results were pending.  He will be admitted to the hospitalist service for further evaluation and treatment with a consult to Oncology.

## 2023-02-23 NOTE — PLAN OF CARE
[facility] - Emergency Dept  Initial Discharge Assessment       Primary Care Provider: Primary Doctor No    Admission Diagnosis: Sepsis [A41.9]    Admission Date: 2/22/2023  Expected Discharge Date: 2/25/2023    Social work intern met with Pt at bedside to complete discharge assessment. Pt AAOx4s. Demographics, PCP, and insurance verified. No home health. No dialysis. Pt reports ability to complete ADLs without assistance. Pt verbalized plan to discharge home via family transport. Pt has no other needs to be addressed at this time.      Discharge Barriers Identified: None    Payor: MEDICAID / Plan: [place] [employer] CONNECT / Product Type: Managed Medicaid /     Extended Emergency Contact Information  Primary Emergency Contact: Sundar Echevarria   [place]  Mobile Phone: [phone]  Relation: Spouse    Discharge Plan A: Home with family  Discharge Plan B: Home with family      [facility] - [place] - [address]  [address]  [facility] [address]  Phone: [phone] Fax: [phone]    [facility] [address] [place] [address] [place] - [address]  [address]  [facility] [address]  Phone: [phone] Fax: [phone]      Initial Assessment (most recent)       Adult Discharge Assessment - 02/23/23 1127          Discharge Assessment    Assessment Type Discharge Planning Assessment     Confirmed/corrected address, phone number and insurance Yes     Confirmed Demographics Correct on Facesheet     Source of Information patient     Does patient/caregiver understand observation status Yes     Communicated CATRACHO with patient/caregiver Date not available/Unable to determine     Reason For Admission sepsis     People in Home spouse   Sundar Echevarria (Spouse)   [phone] (Mobile)    Facility Arrived From: home     Do you expect to return to your current living situation? Yes     Do you have help at home or someone to help you manage your care at home? Yes     Who are your caregiver(s)  and their phone number(s)? EchevarriaSundar (Spouse)   [phone] (Mobile)     Prior to hospitilization cognitive status: Alert/Oriented     Current cognitive status: Alert/Oriented     Equipment Currently Used at Home none     Readmission within 30 days? Yes   readmit for sepsis    Patient currently being followed by outpatient case management? No     Do you currently have service(s) that help you manage your care at home? No     Do you take prescription medications? Yes     Do you have prescription coverage? Yes     Coverage Payor:  MEDICAID - [facility] CONNECT     Do you have any problems affording any of your prescribed medications? No     Is the patient taking medications as prescribed? yes     Who is going to help you get home at discharge? Sundar Echevarria (Spouse)   [phone] (Mobile)     How do you get to doctors appointments? family or friend will provide     Are you on dialysis? No     Do you take coumadin? No     Discharge Plan A Home with family     Discharge Plan B Home with family     DME Needed Upon Discharge  none     Discharge Plan discussed with: Patient     Discharge Barriers Identified None        Physical Activity    On average, how many days per week do you engage in moderate to strenuous exercise (like a brisk walk)? 0 days     On average, how many minutes do you engage in exercise at this level? 0 min        Financial Resource Strain    How hard is it for you to pay for the very basics like food, housing, medical care, and heating? Not very hard        Housing Stability    In the last 12 months, was there a time when you were not able to pay the mortgage or rent on time? No     In the last 12 months, how many places have you lived? 1     In the last 12 months, was there a time when you did not have a steady place to sleep or slept in a shelter (including now)? No        Transportation Needs    In the past 12 months, has lack of transportation kept you from medical appointments or from getting  medications? No     In the past 12 months, has lack of transportation kept you from meetings, work, or from getting things needed for daily living? No        Food Insecurity    Within the past 12 months, you worried that your food would run out before you got the money to buy more. Never true     Within the past 12 months, the food you bought just didn't last and you didn't have money to get more. Never true        Stress    Do you feel stress - tense, restless, nervous, or anxious, or unable to sleep at night because your mind is troubled all the time - these days? Rather much        Social Connections    In a typical week, how many times do you talk on the phone with family, friends, or neighbors? More than three times a week     How often do you get together with friends or relatives? More than three times a week     How often do you attend Amish or Pentecostalism services? Never     Do you belong to any clubs or organizations such as Amish groups, unions, fraternal or athletic groups, or school groups? No     How often do you attend meetings of the clubs or organizations you belong to? Never     Are you , , , , never , or living with a partner?         Alcohol Use    Q1: How often do you have a drink containing alcohol? Never     Q2: How many drinks containing alcohol do you have on a typical day when you are drinking? Patient does not drink     Q3: How often do you have six or more drinks on one occasion? Never        OTHER    Name(s) of People in Home Sundar Echevarria (Spouse)   [phone] (Mobile)                     Readmission Assessment (most recent)       Readmission Assessment - 02/23/23 1130          Readmission    Why were you hospitalized in the last 30 days? sepsis     Why were you readmitted? Related to previous admission     When you left the hospital how did you feel? felt ok     When you left the hospital where did you go? Home with Family     Did  patient/caregiver refused recommended DC plan? No     Tell me about what happened between when you left the hospital and the day you returned. begin feeling bad     When did you start not feeling well? Saturday     Did you try to manage your symptoms your self? No     Did you call anyone? Yes     Who did you call? PCP     Did you try to see or did see a doctor or nurse before you came? No     Why? ED     Did you have  a follow-up appointment on discharge? No     Was this a planned readmission? No

## 2023-02-23 NOTE — SUBJECTIVE & OBJECTIVE
Past Medical History:   Diagnosis Date    Cancer     Diabetes mellitus     Digestive disorder     Prediabetes        Past Surgical History:   Procedure Laterality Date    APPENDECTOMY  07/15/2014    COLONOSCOPY      COLONOSCOPY N/A 02/09/2022    ERROR.   He did not have a colonoscopy with Dr. Sanders.    COLONOSCOPY N/A 09/01/2022    Procedure: COLONOSCOPY;  Surgeon: Andrea Clemens MD;  Location: [facility] ENDO;  Service: Endoscopy;  Laterality: N/A;    FLEXIBLE SIGMOIDOSCOPY N/A 11/7/2022    Procedure: SIGMOIDOSCOPY, FLEXIBLE;  Surgeon: Manuel Sanders MD;  Location: [facility] ENDO;  Service: Endoscopy;  Laterality: N/A;    INCISION AND DRAINAGE OF ABDOMEN N/A 09/14/2022    Procedure: INCISION AND DRAINAGE, ABDOMEN;  Surgeon: Jan Oliveira Jr., MD;  Location: [facility];  Service: General;  Laterality: N/A;    INSERTION OF TUNNELED CENTRAL VENOUS CATHETER (CVC) WITH SUBCUTANEOUS PORT N/A 10/31/2022    Procedure: VOYURSEOA-[id number];  Surgeon: Jan Oliveira Jr., MD;  Location: [facility] OR;  Service: General;  Laterality: N/A;    LAPAROSCOPIC DRAINAGE OF ABDOMEN N/A 09/23/2022    Procedure: DRAINAGE, ABDOMEN, LAPAROSCOPIC;  Surgeon: Jan Oliveira Jr., MD;  Location: [facility];  Service: General;  Laterality: N/A;       Review of patient's allergies indicates:   Allergen Reactions    Albuterol (bulk) Palpitations       No current facility-administered medications on file prior to encounter.     Current Outpatient Medications on File Prior to Encounter   Medication Sig    acyclovir (ZOVIRAX) 400 MG tablet Take 1 tablet (400 mg total) by mouth 2 (two) times daily.    allopurinoL (ZYLOPRIM) 100 MG tablet Take 1 tablet (100 mg total) by mouth once daily.    ALPRAZolam (XANAX) 0.5 MG tablet Take 1 tablet (0.5 mg total) by mouth nightly as needed for Anxiety.    atorvastatin (LIPITOR) 10 MG tablet Take 10 mg by mouth once daily.    bisacodyL (DULCOLAX, BISACODYL,) 10 mg Supp Place 1 suppository (10 mg total)  rectally daily as needed (refractory constipation).    blood sugar diagnostic Strp To check BG 2 times daily, to use with insurance preferred meter    blood-glucose meter kit To check BG 2 times daily, to use with insurance preferred meter    butenafine 1 % cream Apply 1 application topically 2 (two) times daily.    clotrimazole (LOTRIMIN) 1 % cream Apply topically 2 (two) times daily.    cyanocobalamin (VITAMIN B-12) 1000 MCG tablet Take 1 tablet (1,000 mcg total) by mouth once daily.    docusate sodium (COLACE) 50 MG capsule Take 1 capsule (50 mg total) by mouth 2 (two) times daily.    HYDROcodone-acetaminophen (NORCO) 5-325 mg per tablet 1 tablet EVERY 4 HOURS (route: oral) (Patient taking differently: Take 1 tablet by mouth every 4 (four) hours as needed.)    metFORMIN (GLUCOPHAGE) 1000 MG tablet Take 1,000 mg by mouth 2 (two) times daily.    morphine (MS CONTIN) 15 MG 12 hr tablet Take 1 tablet (15 mg total) by mouth 2 (two) times daily.    multivitamin Tab Take 1 tablet by mouth once daily.    ondansetron (ZOFRAN-ODT) 4 MG TbDL Take 1 tablet by mouth once daily.    pantoprazole (PROTONIX) 40 MG tablet Take 1 tablet (40 mg total) by mouth 2 (two) times daily.    polyethylene glycol (GLYCOLAX) 17 gram PwPk Take 17 g by mouth 2 (two) times daily as needed (constipation).    predniSONE (DELTASONE) 50 MG Tab Take 1 tablet (50 mg total) by mouth once daily.    promethazine (PHENERGAN) 12.5 MG Tab Take 1 tablet by mouth once daily.    sulfamethoxazole-trimethoprim 800-160mg (BACTRIM DS) 800-160 mg Tab Take 1 tablet by mouth 3 (three) times a week.    tamsulosin (FLOMAX) 0.4 mg Cap Take 1 capsule (0.4 mg total) by mouth once daily.     Family History       Problem Relation (Age of Onset)    Cancer Mother    Diabetes Mother    Heart murmur Sister    Hypertension Mother    Seizures Father, Sister          Tobacco Use    Smoking status: Never    Smokeless tobacco: Never   Substance and Sexual Activity    Alcohol use: Not  Currently     Comment: occasional    Drug use: No    Sexual activity: Yes     Partners: Female     Review of Systems   All other systems reviewed and are negative.  Twelve point review of systems obtained and negative except as stated above in HPI      Objective:     Vital Signs (Most Recent):  Temp: 98 °F (36.7 °C) (02/22/23 1500)  Pulse: (!) 131 (02/22/23 1830)  Resp: 18 (02/22/23 1833)  BP: 96/62 (02/22/23 1830)  SpO2: 100 % (02/22/23 1500)   Vital Signs (24h Range):  Temp:  [98 °F (36.7 °C)] 98 °F (36.7 °C)  Pulse:  [118-135] 131  Resp:  [16-25] 18  SpO2:  [100 %] 100 %  BP: ()/(57-77) 96/62     Weight: 61.2 kg (135 lb)  Body mass index is 17.81 kg/m².    Physical Exam  Vitals and nursing note reviewed.   Constitutional:       Comments: Frail, thin middle-aged male, he is chronically ill-appearing but currently in no acute distress, he is alert and conversant and a good historian.   HENT:      Head: Normocephalic.      Right Ear: Hearing and external ear normal.      Left Ear: Hearing and external ear normal.      Nose: Nose normal.      Mouth/Throat:      Mouth: Mucous membranes are moist.      Pharynx: Oropharynx is clear.   Eyes:      General: Scleral icterus present.      Conjunctiva/sclera: Conjunctivae normal.   Cardiovascular:      Rate and Rhythm: Regular rhythm. Tachycardia present.      Pulses: Normal pulses.      Heart sounds: No murmur heard.  Pulmonary:      Effort: Pulmonary effort is normal.      Breath sounds: No wheezing or rhonchi.      Comments: He is on room air, respirations soft and even, no tachypnea or increased work of breathing.  O2 sat is 100%.  Abdominal:      General: There is no distension.      Palpations: Abdomen is soft.      Tenderness: There is abdominal tenderness.      Comments: Bilateral lower quadrants tender to light palpation.   Musculoskeletal:         General: Normal range of motion.      Cervical back: Normal range of motion.      Comments: Thin extremities poor  bulk, moves all extremities with equal strength though generally weak.   Skin:     General: Skin is warm and dry.      Capillary Refill: Capillary refill takes less than 2 seconds.      Coloration: Skin is jaundiced and pale.      Findings: Bruising present.      Comments: He is pale and appears somewhat sallow, bruising to upper extremities.   Neurological:      General: No focal deficit present.      Mental Status: He is alert and oriented to person, place, and time.   Psychiatric:         Mood and Affect: Mood normal.         Behavior: Behavior normal.         Thought Content: Thought content normal.         Judgment: Judgment normal.           Significant Labs: All pertinent labs within the past 24 hours have been reviewed.    A1C:   Recent Labs   Lab 02/15/23  0503   HGBA1C 7.7*     Bilirubin:   Recent Labs   Lab 02/13/23  0836 02/13/23  1311 02/14/23  0340 02/15/23  0503 02/22/23  1624   BILITOT 1.5* 1.5* 1.5* 1.4* 4.0*     CBC:   Recent Labs   Lab 02/22/23  1624   WBC 18.06*   HGB 8.3*   HCT 26.1*   PLT 56*     CMP:   Recent Labs   Lab 02/22/23  1624   *   K 4.3   CL 92*   CO2 21*   *   BUN 24*   CREATININE 0.9   CALCIUM 8.1*   PROT 5.6*   ALBUMIN 2.7*   BILITOT 4.0*   ALKPHOS 89   AST 30   ALT 19   ANIONGAP 12     Cardiac Markers:   Recent Labs   Lab 02/22/23  1624   BNP 32     Lactic Acid:   Recent Labs   Lab 02/22/23  1624   LACTATE 3.0*     Magnesium:   Recent Labs   Lab 02/22/23  1624   MG 1.9     TSH:   Recent Labs   Lab 02/13/23  1311   TSH 0.810         Significant Imaging: I have reviewed all pertinent imaging results/findings within the past 24 hours.

## 2023-02-23 NOTE — ASSESSMENT & PLAN NOTE
Patient with 8.3/26.1 H&H, 1 week ago H&H 7.8/23.6, he was typed and crossed, we will transfuse as needed or defer to Oncology recommendations; iron studies from 02/14 with iron 42, TIBC 200, saturated iron 21, transferrin 143 and ferritin 1129.

## 2023-02-23 NOTE — H&P
[facility] [place] - Emergency Dept  Hospital Medicine  History & Physical    Patient Name: Dwight Hoffmann  MRN: [id number]  Patient Class: OP- Observation  Admission Date: 2/22/2023  Attending Physician: Jyoti Rock MD   Primary Care Provider: Primary Doctor No         Patient information was obtained from patient, past medical records and ER records.     Subjective:     Principal Problem:Sepsis    Chief Complaint:   Chief Complaint   Patient presents with    Weakness     Pt presents to ed complaining of worsening generalized weakness. Pt has lymphoma.        HPI: Dwight Hoffmann is a 52-year-old male with chronic medical problems including B-cell lymphoma on chemotherapy, hyperlipidemia, diabetes and hypertension presents to the emergency room complaining uncontrolled pain and vomiting.  He states that he has been having severe lower bilateral abdominal pain for several days and pain meds he is had a Jehovah up in working.  He said Oncology recently changed his medication and this morning he actually had some pain relief after taking Norco 7.5 followed by Norco 5 and then a Norco 7.5.  He is continued to have a very poor appetite because of the pain.  He said this afternoon he drank a root beer and then tried to eat scrambled egg but it was very salty and it made him vomit.  He states he has not really been feeling nauseated or having vomited prior.  He has been having normal bowel movements with no bleeding and states his last bowel movement was this morning.  He complains of generalized weakness and says he can walk but has difficulty getting in and out of chairs because he has no strength.  He tells me he was supposed to start chemotherapy tomorrow which has been on hold since the end of January because of thrombocytopenia.  He has been seeing his oncologist but has had very poor oral intake for the last several days.  He was last admitted to the hospital on February 13 for dehydration and  weakness with low blood pressure and tachycardia.  He improved after hydration and was discharged home.    In the ED, vital signs with temperature 98°, heart rate 135, blood pressure 114/77, respiratory rate 18 25 and O2 sat 100, lab work significant for WBC 18.06, platelets 56, hemoglobin 8.3, hematocrit 26.1, sodium decreased at 125, potassium 4.3, chloride 92, serum bicarb 21, BUN/CR 24/0.9, glucose elevated to 77, magnesium 1.9, anion gap 12, lactic acid is 3.0.  Chest x-ray with no acute pulmonary pathology.  He was given Dilaudid and Zofran, a dose of Zosyn and 2 L normal saline.  At time of exam he had just returned from CT scan and results were pending.  He will be admitted to the hospitalist service for further evaluation and treatment with a consult to Oncology.      Past Medical History:   Diagnosis Date    Cancer     Diabetes mellitus     Digestive disorder     Prediabetes        Past Surgical History:   Procedure Laterality Date    APPENDECTOMY  07/15/2014    COLONOSCOPY      COLONOSCOPY N/A 02/09/2022    ERROR.   He did not have a colonoscopy with Dr. Sanders.    COLONOSCOPY N/A 09/01/2022    Procedure: COLONOSCOPY;  Surgeon: Andrea Clemens MD;  Location: [facility] ENDO;  Service: Endoscopy;  Laterality: N/A;    FLEXIBLE SIGMOIDOSCOPY N/A 11/7/2022    Procedure: SIGMOIDOSCOPY, FLEXIBLE;  Surgeon: Manuel Sanders MD;  Location: [facility];  Service: Endoscopy;  Laterality: N/A;    INCISION AND DRAINAGE OF ABDOMEN N/A 09/14/2022    Procedure: INCISION AND DRAINAGE, ABDOMEN;  Surgeon: Jan Oliveira Jr., MD;  Location: [facility] OR;  Service: General;  Laterality: N/A;    INSERTION OF TUNNELED CENTRAL VENOUS CATHETER (CVC) WITH SUBCUTANEOUS PORT N/A 10/31/2022    Procedure: [id number]-[id number]-T-CATH;  Surgeon: Jan Oliveira Jr., MD;  Location: [facility] OR;  Service: General;  Laterality: N/A;    LAPAROSCOPIC DRAINAGE OF ABDOMEN N/A 09/23/2022    Procedure: DRAINAGE, ABDOMEN, LAPAROSCOPIC;  Surgeon: Jan  SAMSON Oliveira Jr., MD;  Location: [facility];  Service: General;  Laterality: N/A;       Review of patient's allergies indicates:   Allergen Reactions    Albuterol (bulk) Palpitations       Scheduled Meds:   acyclovir  400 mg Oral BID    pantoprazole  40 mg Oral BID    [START ON 2/23/2023] predniSONE  50 mg Oral Daily    sodium chloride 0.9%  1,000 mL Intravenous Once    [START ON 2/23/2023] tamsulosin  0.4 mg Oral Daily     Continuous Infusions:   sodium chloride 0.9%       PRN Meds:.acetaminophen, ALPRAZolam, dextrose 10%, dextrose 10%, glucagon (human recombinant), glucose, glucose, HYDROcodone-acetaminophen, HYDROcodone-acetaminophen, insulin aspart U-100, melatonin, naloxone, ondansetron, polyethylene glycol, prochlorperazine, simethicone, sodium chloride 0.9%       Current Outpatient Medications on File Prior to Encounter   Medication Sig    acyclovir (ZOVIRAX) 400 MG tablet Take 1 tablet (400 mg total) by mouth 2 (two) times daily.    allopurinoL (ZYLOPRIM) 100 MG tablet Take 1 tablet (100 mg total) by mouth once daily.    ALPRAZolam (XANAX) 0.5 MG tablet Take 1 tablet (0.5 mg total) by mouth nightly as needed for Anxiety.    atorvastatin (LIPITOR) 10 MG tablet Take 10 mg by mouth once daily.    bisacodyL (DULCOLAX, BISACODYL,) 10 mg Supp Place 1 suppository (10 mg total) rectally daily as needed (refractory constipation).    blood sugar diagnostic Strp To check BG 2 times daily, to use with insurance preferred meter    blood-glucose meter kit To check BG 2 times daily, to use with insurance preferred meter    butenafine 1 % cream Apply 1 application topically 2 (two) times daily.    clotrimazole (LOTRIMIN) 1 % cream Apply topically 2 (two) times daily.    cyanocobalamin (VITAMIN B-12) 1000 MCG tablet Take 1 tablet (1,000 mcg total) by mouth once daily.    docusate sodium (COLACE) 50 MG capsule Take 1 capsule (50 mg total) by mouth 2 (two) times daily.    HYDROcodone-acetaminophen (NORCO) 5-325 mg per tablet 1  tablet EVERY 4 HOURS (route: oral) (Patient taking differently: Take 1 tablet by mouth every 4 (four) hours as needed.)    metFORMIN (GLUCOPHAGE) 1000 MG tablet Take 1,000 mg by mouth 2 (two) times daily.    morphine (MS CONTIN) 15 MG 12 hr tablet Take 1 tablet (15 mg total) by mouth 2 (two) times daily.    multivitamin Tab Take 1 tablet by mouth once daily.    ondansetron (ZOFRAN-ODT) 4 MG TbDL Take 1 tablet by mouth once daily.    pantoprazole (PROTONIX) 40 MG tablet Take 1 tablet (40 mg total) by mouth 2 (two) times daily.    polyethylene glycol (GLYCOLAX) 17 gram PwPk Take 17 g by mouth 2 (two) times daily as needed (constipation).    predniSONE (DELTASONE) 50 MG Tab Take 1 tablet (50 mg total) by mouth once daily.    promethazine (PHENERGAN) 12.5 MG Tab Take 1 tablet by mouth once daily.    sulfamethoxazole-trimethoprim 800-160mg (BACTRIM DS) 800-160 mg Tab Take 1 tablet by mouth 3 (three) times a week.    tamsulosin (FLOMAX) 0.4 mg Cap Take 1 capsule (0.4 mg total) by mouth once daily.     Family History       Problem Relation (Age of Onset)    Cancer Mother    Diabetes Mother    Heart murmur Sister    Hypertension Mother    Seizures Father, Sister          Tobacco Use    Smoking status: Never    Smokeless tobacco: Never   Substance and Sexual Activity    Alcohol use: Not Currently     Comment: occasional    Drug use: No    Sexual activity: Yes     Partners: Female     Review of Systems   All other systems reviewed and are negative.  Twelve point review of systems obtained and negative except as stated above in HPI      Objective:     Vital Signs (Most Recent):  Temp: 98 °F (36.7 °C) (02/22/23 1500)  Pulse: (!) 131 (02/22/23 1830)  Resp: 18 (02/22/23 1833)  BP: 96/62 (02/22/23 1830)  SpO2: 100 % (02/22/23 1500)   Vital Signs (24h Range):  Temp:  [98 °F (36.7 °C)] 98 °F (36.7 °C)  Pulse:  [118-135] 131  Resp:  [16-25] 18  SpO2:  [100 %] 100 %  BP: ()/(57-77) 96/62     Weight: 61.2 kg (135 lb)  Body mass  index is 17.81 kg/m².    Physical Exam  Vitals and nursing note reviewed.   Constitutional:       Comments: Frail, thin middle-aged male, he is chronically ill-appearing but currently in no acute distress, he is alert and conversant and a good historian.   HENT:      Head: Normocephalic.      Right Ear: Hearing and external ear normal.      Left Ear: Hearing and external ear normal.      Nose: Nose normal.      Mouth/Throat:      Mouth: Mucous membranes are moist.      Pharynx: Oropharynx is clear.   Eyes:      General: Scleral icterus present.      Conjunctiva/sclera: Conjunctivae normal.   Cardiovascular:      Rate and Rhythm: Regular rhythm. Tachycardia present.      Pulses: Normal pulses.      Heart sounds: No murmur heard.  Pulmonary:      Effort: Pulmonary effort is normal.      Breath sounds: No wheezing or rhonchi.      Comments: He is on room air, respirations soft and even, no tachypnea or increased work of breathing.  O2 sat is 100%.  Abdominal:      General: There is no distension.      Palpations: Abdomen is soft.      Tenderness: There is abdominal tenderness.      Comments: Bilateral lower quadrants tender to light palpation.   Musculoskeletal:         General: Normal range of motion.      Cervical back: Normal range of motion.      Comments: Thin extremities poor bulk, moves all extremities with equal strength though generally weak.   Skin:     General: Skin is warm and dry.      Capillary Refill: Capillary refill takes less than 2 seconds.      Coloration: Skin is jaundiced and pale.      Findings: Bruising present.      Comments: He is pale and appears somewhat sallow, bruising to upper extremities.   Neurological:      General: No focal deficit present.      Mental Status: He is alert and oriented to person, place, and time.   Psychiatric:         Mood and Affect: Mood normal.         Behavior: Behavior normal.         Thought Content: Thought content normal.         Judgment: Judgment normal.            Significant Labs: All pertinent labs within the past 24 hours have been reviewed.    A1C:   Recent Labs   Lab 02/15/23  0503   HGBA1C 7.7*     Bilirubin:   Recent Labs   Lab 02/13/23  0836 02/13/23  1311 02/14/23  0340 02/15/23  0503 02/22/23  1624   BILITOT 1.5* 1.5* 1.5* 1.4* 4.0*     CBC:   Recent Labs   Lab 02/22/23  1624   WBC 18.06*   HGB 8.3*   HCT 26.1*   PLT 56*     CMP:   Recent Labs   Lab 02/22/23  1624   *   K 4.3   CL 92*   CO2 21*   *   BUN 24*   CREATININE 0.9   CALCIUM 8.1*   PROT 5.6*   ALBUMIN 2.7*   BILITOT 4.0*   ALKPHOS 89   AST 30   ALT 19   ANIONGAP 12     Cardiac Markers:   Recent Labs   Lab 02/22/23  1624   BNP 32     Lactic Acid:   Recent Labs   Lab 02/22/23  1624   LACTATE 3.0*     Magnesium:   Recent Labs   Lab 02/22/23  1624   MG 1.9     TSH:   Recent Labs   Lab 02/13/23  1311   TSH 0.810         Significant Imaging: I have reviewed all pertinent imaging results/findings within the past 24 hours.    X-Ray Chest AP Portable [[id number]]Collected: 02/22/23 1726     FINDINGS: Cardiac silhouette size is within normal limits. Right IJ port catheter is in place with tip overlying the SVC. No alveolar or interstitial infiltrate. No pleural effusion or pneumothorax. No acute osseous abnormality.     IMPRESSION:     No acute pulmonary pathology.       CT Abdomen Pelvis With Contrast [[id number]]Collected: 02/22/23 1919     FINDINGS:   LOWER CHEST:   No pathologic process in imaged portion of lower chest.     LIVER:   No pathologic process.     GALLBLADDER:   Moderately distended, but otherwise normal in appearance.     BILE DUCTS:   No pathologic process.     PANCREAS:   No pathologic process.     SPLEEN:   No pathologic process.     ADRENALS:   No pathologic process.     KIDNEYS AND URETERS:   No pathologic process. Incidental note is made of a 1.6 cm right renal simple cyst.     URINARY BLADDER:   No pathologic process.     GASTROINTESTINAL TRACT:   The majority of the  stomach and small bowel are collapsed.   Surgical suture material seen in the proximal jejunum.   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   The largest focus of lymphoma is centered on the terminal ileum at the ileocecal junction, previously measuring 12.7 x 10.1 cm (AP, TV) and now measuring 14.4 x 11.6 cm. There is interval worsening dilation and mural thickening of the adjacent cecum with wall thickness measuring up to 2.7 cm, compared with 1.4 cm previously.   The remaining colon is stool-filled, nondilated and otherwise normal in appearance.     APPENDIX:   The appendix is not clearly delineated.     LYMPH NODES:   A few prominent mesenteric nodes are seen adjacent to the ileocecal mass and distal ileal mass measuring up to 0.9 cm.     PERITONEUM/MESENTERY:   No free air, significant free fluid, mass or fluid collection.     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   No arterial abnormality.     ADDITIONAL RETROPERITONEAL FINDINGS:   None.     REPRODUCTIVE ORGANS:No pathologic process.     ABDOMINAL AND PELVIC WALLS:   The ileocolic mass distends and abuts the right lower quadrant peritoneum. Tiny incidental fat-containing periumbilical hernia.     MUSCULOSKELETAL:   No pathologic process. Stable 1.3 cm benign-appearing lytic lesion within the proximal right femoral diaphysis.     ADDITIONAL FINDINGS:   None.     IMPRESSION:   1. 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   2.  No intra-abdominal abscess.   3.  Interval increase in size of the centrally necrotic ileocecal  lymphomatous mass. Superimposed infection within the necrotic mass cannot be excluded.   4.  Increased lymphomatous mural thickening of the involved cecum and distal ileum.   5.  New and slightly more prominent mesenteric nodules that happened 0.9 cm adjacent to the ileocecal and distal ileal masses.         Assessment/Plan:     * Sepsis  Patient with WBC elevated at 18.06, lactic acid 3.0, tachycardic, initially hypotensive, afebrile, given 2 L normal saline bolus and started on Zosyn, urinalysis is pending, chest x-ray is clear, deescalate antibiotics as indicated, CT abdomen pelvis as, can not exclude infection and necrotic mass, we will continue Zosyn    Elevated bilirubin  Bilirubin increased to 4.0, AST 30, ALT 19, alk-phos 89, total protein 5.6, albumin 2.7, platelets 56, calcium 8.1, obtain hepatic function panel in a.m., awaiting CT abdomen and pelvis results, add PT/INR - CT with no abnormality of liver, we will obtain liver ultrasound      Hyponatremia  Sodium 125, has had mild hyponatremia for the last week ranging from 130-133, we will obtain serum and urine osmolality, urine sodium and uric acid, BMP every 6 hours starting after hydration and then in a.m. and tomorrow afternoon, continue normal saline at 125/hour      Thrombocytopenia  Platelets 56 Platelets 41-78 in the last week, consult to Heme-Onc    DLBCL (diffuse large B cell lymphoma)  he was supposed to start chemotherapy tomorrow after it has been on hold since late January because of pancytopenia, patient states today pain is under control with hydrocodone alternating a lower dose with the higher dose, we will hold morphine as he is not taken that, we will also resume prednisone as he said he has been taking, consult Oncology - CT abdomen and pelvis as above      Diabetes mellitus type 2, noninsulin dependent  Most recent hemoglobin A1c 7.7 on 02/15, low-dose sliding scale insulin with capillary glucose checks before meals and at bedtime,  hold metformin, adjust as needed    Anemia  Patient with 8.3/26.1 H&H, 1 week ago H&H 7.8/23.6, he was typed and crossed, we will transfuse as needed or defer to Oncology recommendations; iron studies from 02/14 with iron 42, TIBC 200, saturated iron 21, transferrin 143 and ferritin 1129.         VTE Risk Mitigation (From admission, onward)           Ordered     Reason for No Pharmacological VTE Prophylaxis  Once        Question:  Reasons:  Answer:  Thrombocytopenia    02/22/23 2012     IP VTE HIGH RISK PATIENT  Once         02/22/23 2012     Place sequential compression device  Until discontinued         02/22/23 2012                   VTE prophylaxis: SCD's    Patient was examined at 1944    Code status: FULL CODE      Rosalba River NP  Department of Hospital Medicine   [facility] [place] - Emergency Dept

## 2023-02-23 NOTE — TELEPHONE ENCOUNTER
----- Message from Barbara Heredia sent at 2/23/2023  1:49 PM CST -----  Who Called: Yanira [facility] ER Nurse    What is the request in detail: Requesting call back to discuss pt consultation pt has tumor compressing his venacava and bilateral abdominal pain. Please advise.     Can the clinic reply by [facility]? No    Best Call Back Number: [phone]    Additional Information:

## 2023-02-23 NOTE — ASSESSMENT & PLAN NOTE
Bilirubin increased to 4.0, AST 30, ALT 19, alk-phos 89, total protein 5.6, albumin 2.7, platelets 56, calcium 8.1, obtain hepatic function panel in a.m., awaiting CT abdomen and pelvis results, add PT/INR

## 2023-02-24 NOTE — PROGRESS NOTES
VANCOMYCIN PHARMACOKINETIC NOTE:  Vancomycin Day # 1    Objective/Assessment:    Diagnosis/Indication for Vancomycin: Sepsis     52 y.o., male; Actual Body Weight = 61.2 kg (135 lb 0.3 oz).    The patient has the following labs:  2/24/2023 Estimated Creatinine Clearance: 91.4 mL/min (based on SCr of 0.8 mg/dL). Lab Results   Component Value Date    BUN 17 02/24/2023     Lab Results   Component Value Date    WBC 16.98 (H) 02/24/2023          Plan:  Adjust vancomycin dose and/or frequency based on the patient's actual weight and renal function:  Initiate Vancomycin 1000 mg IV every 12 hours.  Orders have been entered into patient's chart.    Vancomycin dose = 16 mg/kg actual body weight    Vancomycin trough level has been ordered for 2/26 at 03:00..    Pharmacy will manage vancomycin therapy, monitor serum vancomycin levels, monitor renal function and adjust regimen as necessary.    Thank you for allowing us to participate in this patient's care.     Roselyn Saha 2/24/2023 2:42 PM  Department of Pharmacy  Ext [phone]

## 2023-02-24 NOTE — ED NOTES
Update info on pt questioning of being transferred. Per hospitalist pt will not be transferred due to no beds being available at requested facility. Charge nurse notified.

## 2023-02-24 NOTE — CONSULTS
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     History of Present Illness: 52 year old male with history of small bowel lymphoma  presented with several days of abdominal pain associated with vomiting. CT showed interval worsening of the size of the necrotic ileocecal lymphomatous mass. The mass is compressing the inferior vena cava. He states that since admission he is felling better on increased pain medication. He is passing flatus. No nasuea this afternoon. He has plan to transfer to [facility] when bed availbale.      No current facility-administered medications on file prior to encounter.     Current Outpatient Medications on File Prior to Encounter   Medication Sig    acyclovir (ZOVIRAX) 400 MG tablet Take 1 tablet (400 mg total) by mouth 2 (two) times daily.    allopurinoL (ZYLOPRIM) 100 MG tablet Take 1 tablet (100 mg total) by mouth once daily.    ALPRAZolam (XANAX) 0.5 MG tablet Take 1 tablet (0.5 mg total) by mouth nightly as needed for Anxiety.    ascorbic acid, vitamin C, (VITAMIN C) 1000 MG tablet Take 1,000 mg by mouth once daily.    bisacodyL (DULCOLAX, BISACODYL,) 10 mg Supp Place 1 suppository (10 mg total) rectally daily as needed (refractory constipation).    butenafine 1 % cream Apply 1 application topically 2 (two) times daily.    clotrimazole (LOTRIMIN) 1 % cream Apply topically 2 (two) times daily.    HYDROcodone-acetaminophen (NORCO) 5-325 mg per tablet 1 tablet EVERY 4 HOURS (route: oral) (Patient taking differently: Take 1 tablet by mouth every 4 (four) hours as needed.)    metFORMIN (GLUCOPHAGE) 1000 MG tablet Take 1,000 mg by  mouth 2 (two) times daily.    morphine (MS CONTIN) 15 MG 12 hr tablet Take 1 tablet (15 mg total) by mouth 2 (two) times daily.    multivitamin Tab Take 1 tablet by mouth once daily.    ondansetron (ZOFRAN-ODT) 4 MG TbDL Take 1 tablet by mouth once daily.    pantoprazole (PROTONIX) 40 MG tablet Take 1 tablet (40 mg total) by mouth 2 (two) times daily.    polyethylene glycol (GLYCOLAX) 17 gram PwPk Take 17 g by mouth 2 (two) times daily as needed (constipation).    predniSONE (DELTASONE) 50 MG Tab Take 1 tablet (50 mg total) by mouth once daily.    promethazine (PHENERGAN) 12.5 MG Tab Take 1 tablet by mouth once daily.    sulfamethoxazole-trimethoprim 800-160mg (BACTRIM DS) 800-160 mg Tab Take 1 tablet by mouth 3 (three) times a week.    tamsulosin (FLOMAX) 0.4 mg Cap Take 1 capsule (0.4 mg total) by mouth once daily.    atorvastatin (LIPITOR) 10 MG tablet Take 10 mg by mouth once daily.    blood sugar diagnostic Strp To check BG 2 times daily, to use with insurance preferred meter    blood-glucose meter kit To check BG 2 times daily, to use with insurance preferred meter       Review of patient's allergies indicates:   Allergen Reactions    Albuterol (bulk) Palpitations       Past Medical History:   Diagnosis Date    Cancer     Diabetes mellitus     Digestive disorder     Prediabetes      Past Surgical History:   Procedure Laterality Date    APPENDECTOMY  07/15/2014    COLONOSCOPY      COLONOSCOPY N/A 02/09/2022    ERROR.   He did not have a colonoscopy with Dr. Sanders.    COLONOSCOPY N/A 09/01/2022    Procedure: COLONOSCOPY;  Surgeon: Andrea Clemens MD;  Location: [facility];  Service: Endoscopy;  Laterality: N/A;    FLEXIBLE SIGMOIDOSCOPY N/A 11/7/2022    Procedure: SIGMOIDOSCOPY, FLEXIBLE;  Surgeon: Manuel Sanders MD;  Location: [facility] ENDO;  Service: Endoscopy;  Laterality: N/A;    INCISION AND DRAINAGE OF ABDOMEN N/A 09/14/2022    Procedure: INCISION AND DRAINAGE, ABDOMEN;  Surgeon: Jan Oliveira  MD Michael;  Location: [facility] OR;  Service: General;  Laterality: N/A;    INSERTION OF TUNNELED CENTRAL VENOUS CATHETER (CVC) WITH SUBCUTANEOUS PORT N/A 10/31/2022    Procedure: [id number];  Surgeon: Jan Oliveira Jr., MD;  Location: [facility] OR;  Service: General;  Laterality: N/A;    LAPAROSCOPIC DRAINAGE OF ABDOMEN N/A 09/23/2022    Procedure: DRAINAGE, ABDOMEN, LAPAROSCOPIC;  Surgeon: Jan Oliveira Jr., MD;  Location: [facility] OR;  Service: General;  Laterality: N/A;     Family History       Problem Relation (Age of Onset)    Cancer Mother    Diabetes Mother    Heart murmur Sister    Hypertension Mother    Seizures Father, Sister          Tobacco Use    Smoking status: Never    Smokeless tobacco: Never   Substance and Sexual Activity    Alcohol use: Not Currently     Comment: occasional    Drug use: No    Sexual activity: Yes     Partners: Female     Review of Systems  Objective:     Vital Signs (Most Recent):  Temp: 97.8 °F (36.6 °C) (02/23/23 1145)  Pulse: 106 (02/23/23 1730)  Resp: (!) 25 (02/23/23 1935)  BP: 117/73 (02/23/23 1730)  SpO2: 98 % (02/23/23 1730)   Vital Signs (24h Range):  Temp:  [97.6 °F (36.4 °C)-97.8 °F (36.6 °C)] 97.8 °F (36.6 °C)  Pulse:  [] 106  Resp:  [9-35] 25  SpO2:  [91 %-100 %] 98 %  BP: ()/(49-78) 117/73     Weight: 61.2 kg (135 lb 0.3 oz)  Body mass index is 17.81 kg/m².      Intake/Output Summary (Last 24 hours) at 2/23/2023 2301  Last data filed at 2/23/2023 1823  Gross per 24 hour   Intake 4865 ml   Output 1600 ml   Net 3265 ml       Physical Exam  Constitutional:       General: He is awake. He is not in acute distress.     Appearance: He is not toxic-appearing.   Pulmonary:      Effort: Pulmonary effort is normal. No tachypnea, bradypnea, accessory muscle usage or respiratory distress.   Abdominal:      General: There is no distension.      Palpations: Abdomen is soft.      Tenderness: There is abdominal tenderness in the right lower quadrant,  periumbilical area and suprapubic area. There is no guarding.   Musculoskeletal:      Cervical back: Neck supple.   Neurological:      Mental Status: He is alert and oriented to person, place, and time.   Psychiatric:         Behavior: Behavior is cooperative.       Significant Labs:  CBC:   Recent Labs   Lab 02/23/23  0438 02/23/23  0550 02/23/23  2230   WBC 11.80  --   --    RBC 2.33*  --   --    HGB 6.7*   < > 8.2*   HCT 21.4*  --  25.7*   PLT 53*  --   --    MCV 92  --   --    MCH 28.8  --   --    MCHC 31.3*  --   --     < > = values in this interval not displayed.     CMP:   Recent Labs   Lab 02/23/23  0438   *   CALCIUM 7.8*   ALBUMIN 2.2*   PROT 4.9*   *   K 4.0   CO2 23   CL 99   BUN 19   CREATININE 0.7   ALKPHOS 71   ALT 17   AST 30   BILITOT 3.1*       Significant Diagnostics:  I have reviewed all pertinent imaging results/findings within the past 24 hours.    Assessment/Plan:     Active Diagnoses:    Diagnosis Date Noted POA    PRINCIPAL PROBLEM:  Sepsis [A41.9] 09/26/2022 Unknown    Elevated bilirubin [R17] 02/22/2023 Unknown    Hyponatremia [E87.1] 02/13/2023 Yes    Thrombocytopenia [D69.6] 11/06/2022 Yes    DLBCL (diffuse large B cell lymphoma) [C83.30] 09/28/2022 Yes    Diabetes mellitus type 2, noninsulin dependent [E11.9] 09/14/2022 Yes    Anemia [D64.9] 09/14/2022 Unknown      Problems Resolved During this Admission:   -clinically he is not completley obstructed. The mass is larger. It most likely would not be able to be safely removed or debulked off the vena cava. There is plan to transfer to main [place]. No indication for emergent surgery to divert bowel or resect.      Thank you for your consult.     Hunter Huerta III, MD  General Surgery  [facility] - Emergency Dept

## 2023-02-24 NOTE — PLAN OF CARE
02/24/23 1516   Final Note   Assessment Type Final Discharge Note   Anticipated Discharge Disposition Short Term     Patient being transferred to [facility] for higher level of care.    177.9

## 2023-02-24 NOTE — PROGRESS NOTES
[facility] - Emergency Dept  Hospital Medicine  Progress Note    Patient Name: Dwight Hoffmann  MRN: [id number]  Patient Class: OP- Observation   Admission Date: 2/22/2023  Length of Stay: 0 days  Attending Physician: Ced Hendrix MD  Primary Care Provider: Primary Doctor No        Subjective:     Principal Problem:Sepsis    Interval History:  Patient having ongoing right lower quadrant pain.  Discussed with Dr Khoobehi who d/w  Dr Linder, who is known the patient who recommends transfer to [facility]. May need biopsy and possible palliative radiation. Spoke w/ Dr Ba Onc of [facility] who states she will accept if we cannot do ileocecal stenting, then she will accept him. D/w Dr hinds who thinks Dr Barclay might be able to do the stenting but he wont be back until Tuesday. Updated transfer center and Dr. Aranda will get back to me in the morning after she discusses with [facility] interventional radiologist.      Review of Systems  Objective:     Vital Signs (Most Recent):  Temp: 97.8 °F (36.6 °C) (02/23/23 1145)  Pulse: 106 (02/23/23 1730)  Resp: (!) 25 (02/23/23 1935)  BP: 117/73 (02/23/23 1730)  SpO2: 98 % (02/23/23 1730)   Vital Signs (24h Range):  Temp:  [97.6 °F (36.4 °C)-97.8 °F (36.6 °C)] 97.8 °F (36.6 °C)  Pulse:  [] 106  Resp:  [9-35] 25  SpO2:  [91 %-100 %] 98 %  BP: ()/(49-78) 117/73     Weight: 61.2 kg (135 lb 0.3 oz)  Body mass index is 17.81 kg/m².    Intake/Output Summary (Last 24 hours) at 2/23/2023 2141  Last data filed at 2/23/2023 1823  Gross per 24 hour   Intake 4865 ml   Output 1600 ml   Net 3265 ml      Physical Exam  Physical Exam  Vitals and nursing note reviewed.   Constitutional:       Comments: Frail, thin middle-aged male, he is chronically ill-appearing but currently in no acute distress, he is alert and conversant and a good historian.   HENT:      Head: Normocephalic.      Right Ear: Hearing and external ear normal.      Left Ear: Hearing and external ear  normal.      Nose: Nose normal.      Mouth/Throat:      Mouth: Mucous membranes are moist.      Pharynx: Oropharynx is clear.   Eyes:      General: Scleral icterus present.      Conjunctiva/sclera: Conjunctivae normal.   Cardiovascular:      Rate and Rhythm: Regular rhythm. Tachycardia present.      Pulses: Normal pulses.      Heart sounds: No murmur heard.  Pulmonary:      Effort: Pulmonary effort is normal.      Breath sounds: No wheezing or rhonchi.      Comments: He is on room air, respirations soft and even, no tachypnea or increased work of breathing.  O2 sat is 100%.  Abdominal:      General: There is no distension.      Palpations: Abdomen is soft.      Tenderness: There is abdominal tenderness.      Comments: Bilateral lower quadrants tender to light palpation.   Musculoskeletal:         General: Normal range of motion.      Cervical back: Normal range of motion.      Comments: Thin extremities poor bulk, moves all extremities with equal strength though generally weak.   Skin:     General: Skin is warm and dry.      Capillary Refill: Capillary refill takes less than 2 seconds.      Coloration: Skin is jaundiced and pale.      Findings: Bruising present.      Comments: He is pale and appears somewhat sallow, bruising to upper extremities.   Neurological:      General: No focal deficit present.      Mental Status: He is alert and oriented to person, place, and time.   Psychiatric:         Mood and Affect: Mood normal.         Behavior: Behavior normal.         Thought Content: Thought content normal.         Judgment: Judgment normal.            Significant Labs: All pertinent labs within the past 24 hours have been reviewed.    Significant Imaging: I have reviewed all pertinent imaging results/findings within the past 24 hours.    Assessment/Plan:      Active Diagnoses:    Diagnosis Date Noted POA    PRINCIPAL PROBLEM:  Sepsis [A41.9] 09/26/2022 Unknown    Elevated bilirubin [R17] 02/22/2023 Unknown     Hyponatremia [E87.1] 02/13/2023 Yes    Thrombocytopenia [D69.6] 11/06/2022 Yes    DLBCL (diffuse large B cell lymphoma) [C83.30] 09/28/2022 Yes    Diabetes mellitus type 2, noninsulin dependent [E11.9] 09/14/2022 Yes    Anemia [D64.9] 09/14/2022 Unknown      Problems Resolved During this Admission:     VTE Risk Mitigation (From admission, onward)           Ordered     Reason for No Pharmacological VTE Prophylaxis  Once        Question:  Reasons:  Answer:  Thrombocytopenia    02/22/23 2012     IP VTE HIGH RISK PATIENT  Once         02/22/23 2012     Place sequential compression device  Until discontinued         02/22/23 2012                   Sepsis vs SIRS  On abx and fluids  Follow up culture data  Continue Zosyn  CT abdp with Mass effect from the ileocecal lymphoma resulting in collapse of the vena cava and venous return to the heart, representing a likely source for weaknes,  Interval increase in size of the centrally necrotic ileocecal lymphomatous mass. Superimposed infection within the necrotic mass cannot be excluded,  increased lymphomatous mural thickening of the involved cecum and distal ileum, New and slightly more prominent mesenteric nodules that happened 0.9 cm adjacent to the ileocecal and distal ileal masses.   Csx GI/IR, dont think we do ileocecal stenting here and Onc recommends transfer to [facility]     Elevated bilirubin  RUQ US without CBD dilation or infection        Hyponatremia  Improving on fluids        Thrombocytopenia  Chronic  Oncology consult     DLBCL (diffuse large B cell lymphoma)  Per oncology        Diabetes mellitus type 2, noninsulin dependent       Anemia      Ced Hendrix MD  Department of Hospital Medicine   [facility] - Emergency Dept

## 2023-02-24 NOTE — CONSULTS
[facility]  Department of Cardiology  Consult Note      PATIENT NAME: Dwight Hoffmann  MRN: [id number]  TODAY'S DATE: 02/24/2023  ADMIT DATE: 2/22/2023                          CONSULT REQUESTED BY: Ced Hendrix MD    SUBJECTIVE     PRINCIPAL PROBLEM: Sepsis      REASON FOR CONSULT:    SVT    HPI:    52 year old male patient with a PMH significant for B-Cell lymphoma on chemotherapy, DM, Dyslipidemia, and HTN admitted with uncontrolled pain and vomiting. Hg on admit was 6.7. He has Thrombocytopenia with platelet count 53. K 4.0 and Magnesium 1.8. TSH WNL. Recent ECHO is WNL. He had an SVT episode with HR high as high as 220 lasted for about 3 minutes ([id number]). He felt palpitations and they cam and went he said. He has had this before but it is very rare he said. He denies CP. No SOB. Feeling better now no symptoms of palpitations currently.    FROM H AND P      Weakness       Pt presents to ed complaining of worsening generalized weakness. Pt has lymphoma.         HPI: Dwight Hoffmann is a 52-year-old male with chronic medical problems including B-cell lymphoma on chemotherapy, hyperlipidemia, diabetes and hypertension presents to the emergency room complaining uncontrolled pain and vomiting.  He states that he has been having severe lower bilateral abdominal pain for several days and pain meds he is had a Jehovah up in working.  He said Oncology recently changed his medication and this morning he actually had some pain relief after taking Norco 7.5 followed by Norco 5 and then a Norco 7.5.  He is continued to have a very poor appetite because of the pain.  He said this afternoon he drank a root beer and then tried to eat scrambled egg but it was very salty and it made him vomit.  He states he has not really been feeling nauseated or having vomited prior.  He has been having normal bowel movements with no bleeding and states his last bowel movement was this morning.  He complains of  generalized weakness and says he can walk but has difficulty getting in and out of chairs because he has no strength.  He tells me he was supposed to start chemotherapy tomorrow which has been on hold since the end of January because of thrombocytopenia.  He has been seeing his oncologist but has had very poor oral intake for the last several days.  He was last admitted to the hospital on February 13 for dehydration and weakness with low blood pressure and tachycardia.  He improved after hydration and was discharged home.     In the ED, vital signs with temperature 98°, heart rate 135, blood pressure 114/77, respiratory rate 18 25 and O2 sat 100, lab work significant for WBC 18.06, platelets 56, hemoglobin 8.3, hematocrit 26.1, sodium decreased at 125, potassium 4.3, chloride 92, serum bicarb 21, BUN/CR 24/0.9, glucose elevated to 77, magnesium 1.9, anion gap 12, lactic acid is 3.0.  Chest x-ray with no acute pulmonary pathology.  He was given Dilaudid and Zofran, a dose of Zosyn and 2 L normal saline.  At time of exam he had just returned from CT scan and results were pending.  He will be admitted to the hospitalist service for further evaluation and treatment with a consult to Oncology.    Review of patient's allergies indicates:   Allergen Reactions    Albuterol (bulk) Palpitations       Past Medical History:   Diagnosis Date    Cancer     Diabetes mellitus     Digestive disorder     Prediabetes      Past Surgical History:   Procedure Laterality Date    APPENDECTOMY  07/15/2014    COLONOSCOPY      COLONOSCOPY N/A 02/09/2022    ERROR.   He did not have a colonoscopy with Dr. Sanders.    COLONOSCOPY N/A 09/01/2022    Procedure: COLONOSCOPY;  Surgeon: Andrea Clemens MD;  Location: [facility];  Service: Endoscopy;  Laterality: N/A;    FLEXIBLE SIGMOIDOSCOPY N/A 11/7/2022    Procedure: SIGMOIDOSCOPY, FLEXIBLE;  Surgeon: Manuel Sanders MD;  Location: [facility];  Service: Endoscopy;  Laterality: N/A;    INCISION  AND DRAINAGE OF ABDOMEN N/A 09/14/2022    Procedure: INCISION AND DRAINAGE, ABDOMEN;  Surgeon: Jan Oliveira Jr., MD;  Location: [facility] OR;  Service: General;  Laterality: N/A;    INSERTION OF TUNNELED CENTRAL VENOUS CATHETER (CVC) WITH SUBCUTANEOUS PORT N/A 10/31/2022    Procedure: [id number];  Surgeon: Jan Oliveira Jr., MD;  Location: [facility] OR;  Service: General;  Laterality: N/A;    LAPAROSCOPIC DRAINAGE OF ABDOMEN N/A 09/23/2022    Procedure: DRAINAGE, ABDOMEN, LAPAROSCOPIC;  Surgeon: Jan Oliveira Jr., MD;  Location: [facility] OR;  Service: General;  Laterality: N/A;     Social History     Tobacco Use    Smoking status: Never    Smokeless tobacco: Never   Substance Use Topics    Alcohol use: Not Currently     Comment: occasional    Drug use: No        REVIEW OF SYSTEMS  +abdominal pain  +weakness  +palpitations-have subsided    OBJECTIVE     VITAL SIGNS (Most Recent)  Temp: 98.9 °F (37.2 °C) (02/24/23 0300)  Pulse: 93 (02/24/23 1100)  Resp: 16 (02/24/23 1332)  BP: 99/65 (02/24/23 1100)  SpO2: 97 % (02/24/23 1100)    VENTILATION STATUS  Resp: 16 (02/24/23 1332)  SpO2: 97 % (02/24/23 1100)       I & O (Last 24H):  Intake/Output Summary (Last 24 hours) at 2/24/2023 1418  Last data filed at 2/24/2023 0814  Gross per 24 hour   Intake 2810 ml   Output 900 ml   Net 1910 ml       WEIGHTS  Wt Readings from Last 3 Encounters:   02/23/23 0200 61.2 kg (135 lb 0.3 oz)   02/22/23 1500 61.2 kg (135 lb)   02/13/23 2300 87.7 kg (193 lb 5.5 oz)   02/13/23 1153 86.6 kg (191 lb)   02/13/23 1333 87.5 kg (192 lb 14.4 oz)       PHYSICAL EXAM  GENERAL:Older thin male pallor noted  HEENT: Normocephalic. Pupils normal and conjunctivae normal.  NECK: No JVD. No bruit..   THYROID: Thyroid not enlarged. No nodules present..   CARDIAC: Regular rate and rhythm. S1 is normal.S2 is normal. HR in the 90s upon evaluation  CHEST ANATOMY: normal.   LUNGS: Clear to auscultation. No wheezing or rhonchi..   ABDOMEN: Soft no  masses or organomegaly.  No abdomen pulsations or bruits.  Normal bowel sounds. No pulsations and no masses felt, No guarding or rebound.   URINARY: No schroeder catheter   EXTREMITIES: No cyanosis, clubbing or edema noted at this time., no calf tenderness bilaterally.   PERIPHERAL VASCULAR SYSTEM: Good palpable distal pulses.   CENTRAL NERVOUS SYSTEM: No focal motor or sensory deficits noted.       HOME MEDICATIONS:  No current facility-administered medications on file prior to encounter.     Current Outpatient Medications on File Prior to Encounter   Medication Sig Dispense Refill    acyclovir (ZOVIRAX) 400 MG tablet Take 1 tablet (400 mg total) by mouth 2 (two) times daily. 60 tablet 11    allopurinoL (ZYLOPRIM) 100 MG tablet Take 1 tablet (100 mg total) by mouth once daily. 90 tablet 3    ALPRAZolam (XANAX) 0.5 MG tablet Take 1 tablet (0.5 mg total) by mouth nightly as needed for Anxiety. 30 tablet 0    ascorbic acid, vitamin C, (VITAMIN C) 1000 MG tablet Take 1,000 mg by mouth once daily.      bisacodyL (DULCOLAX, BISACODYL,) 10 mg Supp Place 1 suppository (10 mg total) rectally daily as needed (refractory constipation). 5 suppository 0    butenafine 1 % cream Apply 1 application topically 2 (two) times daily.      clotrimazole (LOTRIMIN) 1 % cream Apply topically 2 (two) times daily. 28 g 0    HYDROcodone-acetaminophen (NORCO) 5-325 mg per tablet 1 tablet EVERY 4 HOURS (route: oral) (Patient taking differently: Take 1 tablet by mouth every 4 (four) hours as needed.) 90 tablet 0    metFORMIN (GLUCOPHAGE) 1000 MG tablet Take 1,000 mg by mouth 2 (two) times daily.      morphine (MS CONTIN) 15 MG 12 hr tablet Take 1 tablet (15 mg total) by mouth 2 (two) times daily. 60 tablet 0    multivitamin Tab Take 1 tablet by mouth once daily. 30 tablet 1    ondansetron (ZOFRAN-ODT) 4 MG TbDL Take 1 tablet by mouth once daily.      pantoprazole (PROTONIX) 40 MG tablet Take 1 tablet (40 mg total) by mouth 2 (two) times daily. 60  tablet 1    polyethylene glycol (GLYCOLAX) 17 gram PwPk Take 17 g by mouth 2 (two) times daily as needed (constipation). 14 packet 1    predniSONE (DELTASONE) 50 MG Tab Take 1 tablet (50 mg total) by mouth once daily. 100 tablet 2    promethazine (PHENERGAN) 12.5 MG Tab Take 1 tablet by mouth once daily.      sulfamethoxazole-trimethoprim 800-160mg (BACTRIM DS) 800-160 mg Tab Take 1 tablet by mouth 3 (three) times a week. 12 tablet 1    tamsulosin (FLOMAX) 0.4 mg Cap Take 1 capsule (0.4 mg total) by mouth once daily. 30 capsule 11    atorvastatin (LIPITOR) 10 MG tablet Take 10 mg by mouth once daily.      blood sugar diagnostic Strp To check BG 2 times daily, to use with insurance preferred meter 200 each 1    blood-glucose meter kit To check BG 2 times daily, to use with insurance preferred meter 1 each 0       SCHEDULED MEDS:   acyclovir  400 mg Oral BID    ceFEPime (MAXIPIME) IVPB  2 g Intravenous Q8H    metronidazole  500 mg Intravenous Q8H    pantoprazole  40 mg Oral BID    predniSONE  50 mg Oral Daily    tamsulosin  0.4 mg Oral Daily       CONTINUOUS INFUSIONS:   sodium chloride 0.9% 125 mL/hr at 02/24/23 0237       PRN MEDS:sodium chloride, acetaminophen, ALPRAZolam, dextrose 10%, dextrose 10%, glucagon (human recombinant), glucose, glucose, HYDROcodone-acetaminophen, HYDROcodone-acetaminophen, insulin aspart U-100, melatonin, naloxone, ondansetron, polyethylene glycol, prochlorperazine, simethicone, sodium chloride 0.9%, Pharmacy to dose Vancomycin consult **AND** vancomycin - pharmacy to dose    LABS AND DIAGNOSTICS     CBC LAST 3 DAYS  Recent Labs   Lab 02/22/23  1624 02/23/23  0438 02/23/23  0550 02/23/23  1544 02/23/23  2230 02/24/23  0218   WBC 18.06* 11.80  --   --   --  16.98*   RBC 2.90* 2.33*  --   --   --  2.87*   HGB 8.3* 6.7*   < > 8.6* 8.2* 8.3*   HCT 26.1* 21.4*  --   --  25.7* 25.9*   MCV 90 92  --   --   --  90   MCH 28.6 28.8  --   --   --  28.9   MCHC 31.8* 31.3*  --   --   --  32.0   RDW  22.8* 23.1*  --   --   --  20.6*   PLT 56* 53*  --   --   --  53*   MPV 10.3 11.3  --   --   --  10.3   GRAN 93.0*  CANCELED 75.5*  8.9*  --   --   --  77.2*  13.1*   LYMPH 1.0*  CANCELED 8.4*  1.0  --   --   --  7.1*  1.2   MONO 0.0*  CANCELED 14.5  1.7*  --   --   --  14.3  2.4*   BASO CANCELED 0.01  --   --   --  0.02   NRBC 0 0  --   --   --  0    < > = values in this interval not displayed.       COAGULATION LAST 3 DAYS  Recent Labs   Lab 02/22/23  2144 02/23/23  1333   INR 1.1 1.1       CHEMISTRY LAST 3 DAYS  Recent Labs   Lab 02/22/23  1624 02/23/23  0157 02/23/23  0438 02/24/23  0218 02/24/23  0835   * 126*  126* 129*  --  131*   K 4.3 4.1  4.1 4.0  --  3.9   CL 92* 96  96 99  --  101   CO2 21* 23 23 23  --  21*   ANIONGAP 12 7*  7* 7*  --  9   BUN 24* 20  20 19  --  17   CREATININE 0.9 0.8  0.8 0.7  --  0.8   * 213*  213* 186*  --  163*   CALCIUM 8.1* 7.4*  7.4* 7.8*  --  8.2*   MG 1.9  --  1.8 1.8  --    ALBUMIN 2.7*  --  2.2*  --   --    PROT 5.6*  --  4.9*  --   --    ALKPHOS 89  --  71  --   --    ALT 19  --  17  --   --    AST 30  --  30  --   --    BILITOT 4.0*  --  3.1*  --   --        CARDIAC PROFILE LAST 3 DAYS  Recent Labs   Lab 02/22/23  1624   BNP 32       ENDOCRINE LAST 3 DAYS  Recent Labs   Lab 02/23/23  1345 02/24/23  0838   PROCAL 0.62* 0.57*       LAST ARTERIAL BLOOD GAS  ABG  No results for input(s): PH, PO2, PCO2, HCO3, BE in the last 168 hours.    LAST 7 DAYS MICROBIOLOGY   Microbiology Results (last 7 days)       Procedure Component Value Units Date/Time    MRSA Screen by PCR [[id number]]     Order Status: No result Specimen: Nasopharyngeal Swab from Nasal     Blood Culture #1 **CANNOT BE ORDERED STAT** [[id number]] Collected: 02/22/23 1832    Order Status: Completed Specimen: Blood from Peripheral, Antecubital, Left Updated: 02/23/23 2032     Blood Culture, Routine No Growth to date      No Growth to date    Blood Culture #2 **CANNOT BE ORDERED STAT**  [[id number]] Collected: 02/22/23 1831    Order Status: Completed Specimen: Blood from Peripheral, Antecubital, Right Updated: 02/23/23 2032     Blood Culture, Routine No Growth to date      No Growth to date            MOST RECENT IMAGING  US Abdomen Limited  EXAM DESCRIPTION:  US ABDOMEN LIMITED    CLINICAL HISTORY:  52 years  Male  RUQ - Liver, elevated bilirubin    COMPARISON:  February 22, 2023 CT abdomen pelvis.    TECHNIQUE:  Ultrasound of the right upper quadrant was performed.    FINDINGS:    Liver: The liver is not enlarged and is within normal limits in echogenicity.    Gallbladder/bile ducts: Sludge throughout the gallbladder. No shadowing gallstones, wall thickening or pericholecystic edema.  The sonographic Hylton's sign is negative. The common bile duct measures up to 0.3cm.    Pancreas: Visualized portions of the pancreatic head, neck and proximal body are unremarkable without ductal dilation. The remaining body and tail are obscured by overlying bowel gas and cannot be visualized.    Right kidney: Measures 9.4 x 5.9 x 5.9cm. No hydronephrosis or conspicuous concerning lesion. No shadowing calculi.  0.6 x 1.2 x 1.1 cm simple follicular cyst with a thin internal septation and requires no follow-up, Bosniak 2.    Aorta: No aortic aneurysm as visualized.    IVC: Unremarkable.    Other: No ascites.    IMPRESSION:  Gallbladder sludge. No acute findings.    Electronically signed by:  Jordan Coulter MD  2/22/2023 10:21 PM CST Workstation: [id number]  CT Abdomen Pelvis With Contrast  EXAM: CT ABDOMEN AND PELVIS WITH CONTRAST CT ABDOMEN PELVIS WITH CONTRAST    CLINICAL INDICATION: Male, 52 years old. Abdominal abscess/infection suspected. Weakness. Patient presents to the emergency room complaining of worsening generalized weakness. Patient has lymphoma.    TECHNIQUE: CT abdomen and pelvis was performed, following the administration of contrast, as per department protocol. Axial, sagittal, and coronal  reconstructions were obtained.    IV CONTRAST: 100 cc of Omnipaque 350    ORAL CONTRAST: None    RADIATION DOSE REDUCTION:This exam was performed according to the departmental dose-optimization program which includes automated exposure control, adjustment of the mA and/or kV according to patient size and/or use of iterative reconstruction technique.    COMPARISON: February 15, 2023 CT abdomen pelvis with IV contrast.    FINDINGS:  LOWER CHEST:  No pathologic process in imaged portion of lower chest.    LIVER:  No pathologic process.    GALLBLADDER:  Moderately distended, but otherwise normal in appearance.    BILE DUCTS:  No pathologic process.    PANCREAS:  No pathologic process.    SPLEEN:  No pathologic process.    ADRENALS:  No pathologic process.    KIDNEYS AND URETERS:  No pathologic process. Incidental note is made of a 1.6 cm right renal simple cyst.    URINARY BLADDER:  No pathologic process.    GASTROINTESTINAL TRACT:  The majority of the stomach and small bowel are collapsed.  Surgical suture material seen in the proximal jejunum.  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  The largest focus of lymphoma is centered on the terminal ileum at the ileocecal junction, previously measuring 12.7 x 10.1 cm (AP, TV) and now measuring 14.4 x 11.6 cm. There is interval worsening dilation and mural thickening of the adjacent cecum with wall thickness measuring up to 2.7 cm, compared with 1.4 cm previously.  The remaining colon is stool-filled, nondilated and otherwise normal in appearance.    APPENDIX:  The appendix is not clearly delineated.    LYMPH NODES:  A few prominent mesenteric nodes are seen adjacent to the ileocecal mass and distal ileal mass measuring up to 0.9 cm.    PERITONEUM/MESENTERY:  No free air, significant free fluid, mass or fluid collection.    VESSELS:  Mass effect from the ileocolic  lymphoma compresses the inferior vena cava just above the common iliac bifurcation, with plump venous vasculature inferior to the mass and poor filling of the vena cava superior to the mass, concerning for venous congestion.  No arterial abnormality.    ADDITIONAL RETROPERITONEAL FINDINGS:  None.    REPRODUCTIVE ORGANS:No pathologic process.    ABDOMINAL AND PELVIC WALLS:  The ileocolic mass distends and abuts the right lower quadrant peritoneum. Tiny incidental fat-containing periumbilical hernia.    MUSCULOSKELETAL:  No pathologic process. Stable 1.3 cm benign-appearing lytic lesion within the proximal right femoral diaphysis.    ADDITIONAL FINDINGS:  None.    IMPRESSION:  1.  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  2.  No intra-abdominal abscess.  3.  Interval increase in size of the centrally necrotic ileocecal lymphomatous mass. Superimposed infection within the necrotic mass cannot be excluded.  4.  Increased lymphomatous mural thickening of the involved cecum and distal ileum.  5.  New and slightly more prominent mesenteric nodules that happened 0.9 cm adjacent to the ileocecal and distal ileal masses.    Standardized Report:  [id number].    Electronically signed by:  Maulik Mckinley MD  2/22/2023 8:22 PM CST Workstation: [id number]  X-Ray Chest AP Portable  XR CHEST 1 VIEW    CLINICAL HISTORY:  52 years Male (Pt presents to ed complaining of worsening generalized weakness. Pt has lymphoma.)    COMPARISON: February 14, 2023    FINDINGS: Cardiac silhouette size is within normal limits. Right IJ port catheter is in place with tip overlying the SVC. No alveolar or interstitial infiltrate. No pleural effusion or pneumothorax. No acute osseous abnormality.    IMPRESSION:    No acute pulmonary pathology.    Electronically signed by:  Vamsi Reid MD  2/22/2023 5:48 PM CST Workstation:  [id number]      ECHOCARDIOGRAM RESULTS (last 5)  Results for orders placed during the hospital encounter of 02/06/23    Echo    Interpretation Summary  · The left ventricle is normal in size with normal systolic function. The estimated ejection fraction is 65%.  · Normal right ventricular size with normal right ventricular systolic function.  · Normal left ventricular diastolic function.      Results for orders placed during the hospital encounter of 09/13/22    Echo    Interpretation Summary  · The left ventricle is normal in size with concentric remodeling and normal systolic function.  · The estimated ejection fraction is 60%.  · Grade I left ventricular diastolic dysfunction.  · Normal right ventricular size with normal right ventricular systolic function.  · Mild left atrial enlargement.  · Mild mitral regurgitation.  · Mild to moderate tricuspid regurgitation.  · Normal central venous pressure (3 mmHg).  · The estimated PA systolic pressure is 49 mmHg.  · There is pulmonary hypertension.  · Mild right atrial enlargement.  · No vegetations were seen      CURRENT/PREVIOUS VISIT EKG  Results for orders placed or performed during the hospital encounter of 02/22/23   EKG 12-lead    Collection Time: 02/24/23  1:51 PM    Narrative    Test Reason : R00.0,    Vent. Rate : 097 BPM     Atrial Rate : 097 BPM     P-R Int : 102 ms          QRS Dur : 086 ms      QT Int : 350 ms       P-R-T Axes : 027 018 046 degrees     QTc Int : 444 ms    Sinus rhythm with short FL  Otherwise normal ECG  When compared with ECG of 22-FEB-2023 17:04,  ST no longer depressed in Anterior leads    Referred By: [email or web address]   SELF           Confirmed By:            ASSESSMENT/PLAN:     Active Hospital Problems    Diagnosis    *Sepsis    Elevated bilirubin    Hyponatremia    Thrombocytopenia    DLBCL (diffuse large B cell lymphoma)    Diabetes mellitus type 2, noninsulin dependent    Anemia       CT SCAN 2/23/23    IMPRESSION:  1.  Mass effect from  the ileocecal lymphoma resulting in collapse of the vena cava and venous return to the heart, representing a likely source for weakness.  2.  No intra-abdominal abscess.  3.  Interval increase in size of the centrally necrotic ileocecal lymphomatous mass. Superimposed infection within the necrotic mass cannot be excluded.  4.  Increased lymphomatous mural thickening of the involved cecum and distal ileum.  5.  New and slightly more prominent mesenteric nodules that happened 0.9 cm adjacent to the ileocecal and distal ileal masses.      ASSESSMENT & PLAN:     SVT- About 3 minutes and subsided on its own-   Anemia hg 6.7 on admit, now 8.3  Sepsis  Diffuse Larve B Cell Lymphoma on Chemo  DM Type 2  Hypomagnesemia  Nausea and abdominal pain-CT showed interval worsening of the size of the necrotic ileocecal lymphomatous mass. - pressing on vena cava      RECOMMENDATIONS:      Recent ECHO (2/8/23) WNL  BP is on soft side.  Replace Mag  BP on soft side midodrine was given yesterday-mass pressing on vena cava  Continue IVF  Continue to watch on telemetry             Fatuma Kim NP  [facility]  Department of Cardiology  Date of Service: 02/24/2023

## 2023-02-24 NOTE — PLAN OF CARE
Problem: Adult Inpatient Plan of Care  Goal: Plan of Care Review  Outcome: Ongoing, Progressing     Problem: Adult Inpatient Plan of Care  Goal: Patient-Specific Goal (Individualized)  Outcome: Ongoing, Progressing     Problem: Adult Inpatient Plan of Care  Goal: Absence of Hospital-Acquired Illness or Injury  Outcome: Ongoing, Progressing     Problem: Adult Inpatient Plan of Care  Goal: Optimal Comfort and Wellbeing  Outcome: Ongoing, Progressing     Problem: Adult Inpatient Plan of Care  Goal: Readiness for Transition of Care  Outcome: Ongoing, Progressing     Problem: Diabetes Comorbidity  Goal: Blood Glucose Level Within Targeted Range  Outcome: Ongoing, Progressing     Problem: Adjustment to Illness (Sepsis/Septic Shock)  Goal: Optimal Coping  Outcome: Ongoing, Progressing     Problem: Bleeding (Sepsis/Septic Shock)  Goal: Absence of Bleeding  Outcome: Ongoing, Progressing     Problem: Glycemic Control Impaired (Sepsis/Septic Shock)  Goal: Blood Glucose Level Within Desired Range  Outcome: Ongoing, Progressing     Problem: Infection Progression (Sepsis/Septic Shock)  Goal: Absence of Infection Signs and Symptoms  Outcome: Ongoing, Progressing     Problem: Nutrition Impaired (Sepsis/Septic Shock)  Goal: Optimal Nutrition Intake  Outcome: Ongoing, Progressing

## 2023-02-24 NOTE — TELEPHONE ENCOUNTER
Call returned. Consult taken and Dr Khoobehi notified.      ----- Message from Willi Self sent at 2/24/2023 11:45 AM CST -----  Contact: Leslie  Type:  Patient Call          Who Called: [facility] MARILYN Nelson         Does the patient know what this is regarding?: Requesting a call back for a hospital consult ; Lymphoma ;  Dr Hendrix [phone]  ; please advise             Best Call Back Number: [phone]             Additional Information: Pt room number 8200

## 2023-02-25 PROBLEM — I87.1: Status: ACTIVE | Noted: 2023-01-01

## 2023-02-25 PROBLEM — D72.829 LEUKOCYTOSIS: Status: ACTIVE | Noted: 2023-01-01

## 2023-02-25 NOTE — ASSESSMENT & PLAN NOTE
Patient with small bowel resection approximately 5 months ago consistent with Diffuse large-B cell lymphoma, non germinal center origin. Additionally had omentum resection also showing DLBCL. Had bone marrow biopsy approximately 5 months ago that showed normo cellular marrow for age, no increase in blasts. Patient had PET scan from 1/11/23 showing hypermetabolic loops of small bowel most consistent with previous known disease and was thought to have improved (partial response to therapy noted from chart review) and was recommended he continue with R-CHOP at that time. Patient hospitalized at [facility] found to have likely worsening of lymphoma based on CT scan of abdomen showing partial compression of IVC. Transferred to [facility] for further evaluation given uncertainty in responsiveness to first-line chemo therapy.    --can obtain bone marrow bx to look for progression  --CRS consult for ileocecal stent placement  --consider alternative chemo regimen given progression (although appears patient has had delay in receiving chemo 2/2 to thrombocytopenia and hospital admissions)  --patient with lower abdominal pain: can do PO Norco, patient notes that dilaudid works for breakthrough pain. (looks like he only got it one time at OSH).  --continue acyclovir, allopurinol  --patient with concern for sepsis at [facility] was last on vancomycin, cefepime and IV metronidazole will continue those here for now. Low threshold to de-escalate antibiotics. On arrival thet patient is normotensives and HDS.  --Per last CT abdomen: Interval increase in size of the centrally necrotic ileocecal lymphomatous mass. Superimposed infection within the necrotic mass cannot be excluded.  -takes steroids 50mg daily

## 2023-02-25 NOTE — H&P
"[facility] - Telemetry Stepdown  Hematology  Bone Marrow Transplant  H&P    Subjective:     Principal Problem: DLBCL (diffuse large B cell lymphoma)    HPI: Dwight Hoffmann is a 52 year-old male with history of T2DM, anemia, DLBCL, HLD, thrombocytopenia presenting as transfer from [facility] presenting with refractory lymphoma, compression of IVC. Patient apparently has progressed on R-CHOP therapy but per Heme/onc note at OSH patient was not consistently getting chemotherapy treatment 2/2 to thrombocytopenia, anemia and hospital admissions. Patient was transferred to [facility] for biopsy and to see if there has been any transformation or if there is another disease process occurring given lack of response to chemo and to consider second line chemotherapy. 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 General surgery saw patient at OSH as well for concern of obstruction from lymphadenopathy but noted there was not complete obstruction. General surgery did not feel at the time the lymphadenopathy would be able to be safely removed or debulked from vena cava. There was some discussion about possible ileocecal shunt placement, but did not have capability at OSH.    Of note, patient reports that he initially presented for weakness and abdominal pain. Patient reports that he had an episode of vomiting this past Wednesday. He reports that there has been no determination of what is causing his problems at this time. He reports his previous CT scans were good and did not show any progression. For his pain he notes that Norco and Diluadid helped keep his abdominal pain under control. He has had generalized weakness that developed recently and " "prompted him to present to OSH.                  Patient information was obtained from patient and past medical records.     Oncology History:     "bone marrow bx neg for lymphoma with tight cluster of blasts 6% (smal number)    positive for CD20,  BCL6, MUM1, MYC, high Ki-67 (99%)   -negative for CD10, CD30, BCL-2, cyclin D1, .  Reactive T-cells are CD3   --FISH negative for rearrangement of MYC ; no IGH/MYC fusion was observed  uric acid  7.3 sent allopurinol  HBV, HCV, HIV serologies; negative     might require IT ?     11/22 started -plan for 6 cycles, R-CHOPwith growth factor support     Pet after 4 cycles  see below. repeat in 2 more cycles     HPI to date:  Rectal bleeding, stopped   1/28/23 Lower pelvic pain went to ED ,bowel gas and stools noted. Somehwat better using linzess and miralax     No fever, mild sob+    2/6/23: C/o not eating, not drinking this weekend, very tired, had a large BM but feels like food is sticking in his chest, hardly able to sit up and brush his teeth, sob on activity    Admitted at [facility] + covid 2/2023 treated with remdesivir. asymptomatic episodes of tachycardia and hypotension, improved with fluid resuscitation.     PET scan from 1/11/23 was reviewed with radiology, hypermetabolic loops of small bowel most consistent with previous known disease and improved. He is felt to have a partial response in therapy. Would recommend to continue current regimen with [facility]."     Medications Prior to Admission   Medication Sig Dispense Refill Last Dose    acyclovir (ZOVIRAX) 400 MG tablet Take 1 tablet (400 mg total) by mouth 2 (two) times daily. 60 tablet 11     allopurinoL (ZYLOPRIM) 100 MG tablet Take 1 tablet (100 mg total) by mouth once daily. 90 tablet 3     ALPRAZolam (XANAX) 0.5 MG tablet Take 1 tablet (0.5 mg total) by mouth nightly as needed for Anxiety. 30 tablet 0     ascorbic acid, vitamin C, (VITAMIN C) 1000 MG tablet Take 1,000 mg by mouth once daily.       " bisacodyL (DULCOLAX, BISACODYL,) 10 mg Supp Place 1 suppository (10 mg total) rectally daily as needed (refractory constipation). 5 suppository 0     butenafine 1 % cream Apply 1 application topically 2 (two) times daily.       clotrimazole (LOTRIMIN) 1 % cream Apply topically 2 (two) times daily. 28 g 0     HYDROcodone-acetaminophen (NORCO) 5-325 mg per tablet 1 tablet EVERY 4 HOURS (route: oral) (Patient taking differently: Take 1 tablet by mouth every 4 (four) hours as needed.) 90 tablet 0     metFORMIN (GLUCOPHAGE) 1000 MG tablet Take 1,000 mg by mouth 2 (two) times daily.       morphine (MS CONTIN) 15 MG 12 hr tablet Take 1 tablet (15 mg total) by mouth 2 (two) times daily. 60 tablet 0     multivitamin Tab Take 1 tablet by mouth once daily. 30 tablet 1     ondansetron (ZOFRAN-ODT) 4 MG TbDL Take 1 tablet by mouth once daily.       pantoprazole (PROTONIX) 40 MG tablet Take 1 tablet (40 mg total) by mouth 2 (two) times daily. 60 tablet 1     polyethylene glycol (GLYCOLAX) 17 gram PwPk Take 17 g by mouth 2 (two) times daily as needed (constipation). 14 packet 1     predniSONE (DELTASONE) 50 MG Tab Take 1 tablet (50 mg total) by mouth once daily. 100 tablet 2     promethazine (PHENERGAN) 12.5 MG Tab Take 1 tablet by mouth once daily.       sulfamethoxazole-trimethoprim 800-160mg (BACTRIM DS) 800-160 mg Tab Take 1 tablet by mouth 3 (three) times a week. 12 tablet 1     tamsulosin (FLOMAX) 0.4 mg Cap Take 1 capsule (0.4 mg total) by mouth once daily. 30 capsule 11     atorvastatin (LIPITOR) 10 MG tablet Take 10 mg by mouth once daily.       blood sugar diagnostic Strp To check BG 2 times daily, to use with insurance preferred meter 200 each 1     blood-glucose meter kit To check BG 2 times daily, to use with insurance preferred meter 1 each 0        Albuterol (bulk)     Past Medical History:   Diagnosis Date    Cancer     Diabetes mellitus     Digestive disorder     Prediabetes      Past Surgical  History:   Procedure Laterality Date    APPENDECTOMY  07/15/2014    COLONOSCOPY      COLONOSCOPY N/A 02/09/2022    ERROR.   He did not have a colonoscopy with Dr. Sanders.    COLONOSCOPY N/A 09/01/2022    Procedure: COLONOSCOPY;  Surgeon: Andrea Clemens MD;  Location: [facility] ENDO;  Service: Endoscopy;  Laterality: N/A;    FLEXIBLE SIGMOIDOSCOPY N/A 11/7/2022    Procedure: SIGMOIDOSCOPY, FLEXIBLE;  Surgeon: Manuel Sanders MD;  Location: [facility] ENDO;  Service: Endoscopy;  Laterality: N/A;    INCISION AND DRAINAGE OF ABDOMEN N/A 09/14/2022    Procedure: INCISION AND DRAINAGE, ABDOMEN;  Surgeon: Jan Oliveira Jr., MD;  Location: [facility] OR;  Service: General;  Laterality: N/A;    INSERTION OF TUNNELED CENTRAL VENOUS CATHETER (CVC) WITH SUBCUTANEOUS PORT N/A 10/31/2022    Procedure: HUYUJIPEN-KOVQ-V-CATH;  Surgeon: Jan Oliveira Jr., MD;  Location: [facility] OR;  Service: General;  Laterality: N/A;    LAPAROSCOPIC DRAINAGE OF ABDOMEN N/A 09/23/2022    Procedure: DRAINAGE, ABDOMEN, LAPAROSCOPIC;  Surgeon: Jan Oliveira Jr., MD;  Location: [facility] OR;  Service: General;  Laterality: N/A;     Family History       Problem Relation (Age of Onset)    Cancer Mother    Diabetes Mother    Heart murmur Sister    Hypertension Mother    Seizures Father, Sister          Tobacco Use    Smoking status: Never    Smokeless tobacco: Never   Substance and Sexual Activity    Alcohol use: Not Currently     Comment: occasional    Drug use: No    Sexual activity: Yes     Partners: Female       Review of Systems   Constitutional:  Negative for chills and fever.   HENT: Negative.     Eyes: Negative.    Respiratory:  Negative for chest tightness and shortness of breath.    Cardiovascular:  Negative for chest pain and leg swelling.   Gastrointestinal:  Positive for abdominal pain. Negative for abdominal distention, nausea and vomiting.   Genitourinary:  Negative for difficulty urinating and dysuria.   Musculoskeletal:   Negative for back pain.   Skin: Negative.    Neurological:  Negative for facial asymmetry and speech difficulty.   Psychiatric/Behavioral:  Negative for agitation and behavioral problems.    Objective:     Vital Signs (Most Recent):    Vital Signs (24h Range):  Temp:  [98.1 °F (36.7 °C)-98.9 °F (37.2 °C)] 98.1 °F (36.7 °C)  Pulse:  [] 83  Resp:  [16-20] 20  SpO2:  [95 %-99 %] 99 %  BP: ()/(57-82) 119/82        There is no height or weight on file to calculate BMI.  There is no height or weight on file to calculate BSA.    ECOG SCORE           [unfilled]    Lines/Drains/Airways       Peripheral Intravenous Line  Duration                  Peripheral IV - Single Lumen 02/23/23 2200 20 G Right Antecubital 1 day                    Physical Exam  Vitals reviewed.   Constitutional:       General: He is not in acute distress.  HENT:      Head: Normocephalic and atraumatic.      Right Ear: External ear normal.      Left Ear: External ear normal.   Eyes:      General:         Right eye: No discharge.         Left eye: No discharge.      Extraocular Movements: Extraocular movements intact.      Conjunctiva/sclera: Conjunctivae normal.   Cardiovascular:      Rate and Rhythm: Normal rate and regular rhythm.      Pulses: Normal pulses.      Heart sounds: Normal heart sounds.   Pulmonary:      Effort: Pulmonary effort is normal.      Breath sounds: Normal breath sounds.   Abdominal:      General: Abdomen is flat.      Palpations: Abdomen is soft.      Tenderness: There is abdominal tenderness (lower quadrants, not much tenderness now recently got pain medication).   Musculoskeletal:      Cervical back: Normal range of motion and neck supple.      Right lower leg: No edema.      Left lower leg: No edema.   Skin:     General: Skin is warm and dry.   Neurological:      Mental Status: He is alert and oriented to person, place, and time. Mental status is at baseline.   Psychiatric:         Mood and Affect: Mood normal.          Behavior: Behavior normal.       Significant Labs:      Recent Labs   Lab 02/23/23  0438 02/23/23  0550 02/23/23  1544 02/23/23  2230 02/24/23  0218   WBC 11.80  --   --   --  16.98*   HGB 6.7*   < > 8.6* 8.2* 8.3*   HCT 21.4*  --   --  25.7* 25.9*   PLT 53*  --   --   --  53*    < > = values in this interval not displayed.     Recent Labs   Lab 02/23/23  0438 02/24/23  0835   * 131*   K 4.0 3.9   CL 99 101   CO2 23 21*   * 163*   BUN 19 17   CREATININE 0.7 0.8   CALCIUM 7.8* 8.2*   PROT 4.9*  --    ALBUMIN 2.2*  --    BILITOT 3.1*  --    ALKPHOS 71  --    AST 30  --    ALT 17  --    ANIONGAP 7* 9       Diagnostic Results:  I have reviewed all pertinent imaging results/findings within the past 24 hours.    Assessment/Plan:     Cardiac/Vascular  Compression of vena cava  See DLBCL    Hyperlipidemia, unspecified  Continue home statin medications if taking    Renal/  Hyponatremia  Likely 2/2 to hyperglycemia  Follow up CMP this AM  Noted to have improved with fluids at OSH    ID  COVID-19 virus infection  Noted to be positive on 2/6/2023  Patient with negative test on 2/13/23    Hematology  Thrombocytopenia  Platelets of 53 at OSH.    --Continue to monitor with CBC  --Transfuse for platelets less than 10 or if actively bleeding    Oncology  * DLBCL (diffuse large B cell lymphoma)  Patient with small bowel resection approximately 5 months ago consistent with Diffuse large-B cell lymphoma, non germinal center origin. Additionally had omentum resection also showing DLBCL. Had bone marrow biopsy approximately 5 months ago that showed normo cellular marrow for age, no increase in blasts. Patient had PET scan from 1/11/23 showing hypermetabolic loops of small bowel most consistent with previous known disease and was thought to have improved (partial response to therapy noted from chart review) and was recommended he continue with R-CHOP at that time. Patient hospitalized at [facility] found to have  likely worsening of lymphoma based on CT scan of abdomen showing partial compression of IVC. Transferred to [facility] for further evaluation given uncertainty in responsiveness to first-line chemo therapy.    --can obtain bone marrow bx to look for progression  --CRS consult for ileocecal stent placement  --consider alternative chemo regimen given progression (although appears patient has had delay in receiving chemo 2/2 to thrombocytopenia and hospital admissions)  --patient with lower abdominal pain: can do PO Norco, patient notes that dilaudid works for breakthrough pain. (looks like he only got it one time at OSH).  --continue acyclovir, allopurinol  --patient with concern for sepsis at [facility] was last on vancomycin, cefepime and IV metronidazole will continue those here for now. Low threshold to de-escalate antibiotics. On arrival thet patient is normotensives and HDS.  --Per last CT abdomen: Interval increase in size of the centrally necrotic ileocecal lymphomatous mass. Superimposed infection within the necrotic mass cannot be excluded.  -takes steroids 50mg daily       Anemia  Hemoglobin of 8.3 most recently from OSH. Patient did have Hgb of 6.9.    Plan:  --type and screen  --transfuse for Hgb less than 7 or if actively bleeding.    Endocrine  Diabetes mellitus type 2, noninsulin dependent  Hemoglobin A1c from 1 week ago was 7.7  Goal glucose while inpatient: 140-180  Consider basal-bolus + SSI as needed  Hold home anti-diabetic medications  Patient takes daily steroids    GI  Small bowel mass  S/p  Resection September 2022 found to have DLBCL on pathology.         VTE Risk Mitigation (From admission, onward)         Ordered     Reason for No Pharmacological VTE Prophylaxis  Once        Question:  Reasons:  Answer:  Thrombocytopenia    02/25/23 0402     IP VTE HIGH RISK PATIENT  Once         02/25/23 0402     Place sequential compression device  Until discontinued         02/25/23 0402                 Disposition: admitted to BMT    Amarjit Alvarado MD  Bone Marrow Transplant  Hematology  [facility] - Telemetry Stepdown

## 2023-02-25 NOTE — DISCHARGE SUMMARY
"[facility] Medicine  Discharge Summary      Patient Name: Dwight Hoffmann  MRN: [id number]  Admission Date: 2/13/2023  Hospital Length of Stay: 2 days  Discharge Date and Time: 2/15/2023  6:19 PM  Attending Physician: No att. providers found   Discharging Provider: Ced Hendrix MD  Primary Care Provider: Primary Doctor No        HPI: "Dwight Hoffmann is a 52-year-old male with chronic medical problems including diabetes, B-cell lymphoma currently undergoing chemotherapy, hypertension and hyperlipidemia and recently diagnosed with SARS-COVID-2 infection who presented to the emergency room for shortness of breath and weakness. He is accompanied by his wife who also provides history.  [facility] with tachycardia and hypotension.  His wife said that when he went to the doctor's office this morning he started feeling short of breath and got extremely weak and was not able to stand up.  Prior to today he was feeling well and had a good day yesterday, no fevers or chills, no diarrhea, headaches or dizziness, ate and drank and was voiding well.  He states he is actually feeling better now while he is lying down and denies shortness of breath this time.  He does complain right lower quadrant abdominal pain and states that he think it is cancer pain.  It is controlled with hydrocodone and he takes MiraLax for constipation.  He was hospitalized at [facility] in [place] from February 6 to 9th and had gone to see the oncologist there and was diagnosed with SARS-COVID-2, symptom onset February 4th, he got 3 days of remdesivir and never required oxygen.  He presented to the hospital with poor intake, malaise and nausea and vomiting.  He had an episode of atrial fibrillation at [facility] that was attributed to COVID and he was not placed on rate control and has low platelets so no anticoagulation.  He has not been able to have chemo for a month because " "of low platelets so has not had a dose of Neulasta since early January and continues on prednisone 50 mg daily since his 1st cycle.  He was started on Bactrim 3 times a week and acyclovir twice daily prior to discharge while on the prednisone.     In the ED, a CTA of chest was negative for pulmonary embolus, infiltrates or pleural effusions and chest x-ray with nothing acute.  Lab work significant for elevated WBC of 22.06, platelets 78, BUN/CR 10.1/30.1, mild hyponatremia with sodium 132, potassium 3.9, serum bicarb 26, chloride 93, BUN/CR 20/0.8, calcium 8.6 corrects to 9.6, total bili room in with slight elevation at 1.5 and ALT/AST 14/16.  Troponin elevated at 31.1 and BNP 42.  12 lead EKG with sinus tachycardia, short DE interval with  and ventricular rate 130.  He was given 2 L lactated Ringer's and cefepime 2 g IV.  Vital signs with temperature 97°, heart rate 140 initially at time of exam in his 115 and regular, blood pressure with map around 70 in the high 90s systolic, respiratory rate 18 and O2 sat 98% on room air.  He will be admitted to the hospitalist service for further evaluation and treatment to the intensive care unit."    * No surgery found *      Hospital Course:  Patient was admitted for suspected severe sepsis associated with reason COVID virus infection on February 6th, hospitalization complicated by hyponatremia, thrombocytopenia, known history of diffuse large B-cell lymphoma, type 2 diabetes, chronic stable anemia.  No clinical, radiographic, or culture evidence was ultimately determined.  A respiratory panel did show that he was positive for metapneumovirus, droplet precautions consult.  He did have ongoing right lower quadrant pain related to his lymphoma.  He received 1 unit of blood.  Heme-Onc evaluated the patient, directed that the patient should follow-up with the [facility] [place] due to concern for progression of lymphoma.  Patient's pain stable on oral pain medication.  Patient " felt well on day of discharge wanted go home.  I answered all his questions.  Return precautions given.    Physical Exam  Vitals and nursing note reviewed.   Constitutional:       Comments: Middle-aged male, lying in bed, he is in no acute distress  HENT:      Head: Normocephalic.      Right Ear: Hearing and external ear normal.      Left Ear: Hearing and external ear normal.      Nose: Nose normal.      Mouth/Throat:      Mouth: Mucous membranes are moist.      Pharynx: Oropharynx is clear.      Comments: No oral lesions, ulcerations or blisters, moist, no erythema or exudates.  Eyes:      General: Vision grossly intact. Gaze aligned appropriately.      Conjunctiva/sclera: Conjunctivae normal.   Cardiovascular:      Rate and Rhythm: Regular rhythm. Tachycardia present.      Pulses: Normal pulses.      Heart sounds: Normal heart sounds. No murmur heard.  Pulmonary:      Effort: Pulmonary effort is normal.      Breath sounds: Normal breath sounds. No wheezing or rhonchi.   Chest:      Chest wall: No tenderness.   Abdominal:      General: Abdomen is protuberant. Bowel sounds are normal. There is no distension.      Palpations: Abdomen is soft.      Tenderness: There is abdominal tenderness.      Comments: Tender at right lower quadrant to light palpation.   Musculoskeletal:         General: No swelling. Normal range of motion.      Comments: Bilateral lower extremities with no edema.  Hands are mildly edematous.   Skin:     General: Skin is warm and dry.      Capillary Refill: Capillary refill takes 2 to 3 seconds.      Coloration: Skin is pale.      Comments: Ecchymosis to right arm.  There is a port at right chest that is not access.   Neurological:      General: No focal deficit present.      Mental Status: He is alert and oriented to person, place, and time.   Psychiatric:         Mood and Affect: Mood normal.         Behavior: Behavior normal.         Thought Content: Thought content normal.         Judgment:  Judgment normal.     Final Active Diagnoses:    Diagnosis Date Noted POA    PRINCIPAL PROBLEM:  Sepsis [A41.9] 09/26/2022 Unknown    Hyponatremia [E87.1] 02/13/2023 Unknown    COVID-19 virus infection [U07.1] 02/06/2023 Unknown    Thrombocytopenia [D69.6] 11/06/2022 Unknown    DLBCL (diffuse large B cell lymphoma) [C83.30] 09/28/2022 Yes    Diabetes mellitus type 2, noninsulin dependent [E11.9] 09/14/2022 Yes    Anemia [D64.9] 09/14/2022 Yes      Problems Resolved During this Admission:      Discharged Condition: fair    Disposition: Home or Self Care    Follow Up:   Follow-up Information       Josee Reid MD Follow up in 1 week(s).    Specialties: Hematology and Oncology, Hematology, Oncology  Why: for lymphoma  Contact information:  [address]  [place] [address]  [phone]               Fabian Barclay III, MD Follow up in 1 week(s).    Specialty: Gastroenterology  Why: for abd pain, possibly lymphoma involvement colon  Contact information:  [address] [facility]  [place] [address]  [phone]               BOSTON Oswald Follow up in 1 week(s).    Specialty: Family Medicine  Why: For post hospitalization follow-up, diabetes  Contact information:  [address]  [address]  [place] [address]  [phone]               Mamadou Montes MD Follow up in 1 week(s).    Specialty: Hematology and Oncology  Why: for lymphoma, possible need for exploratory lap to investigate multifocal masses  Contact information:  [address] [facility] [address]  [phone]                           Patient Instructions:      Diet diabetic     Activity as tolerated     Medications:  Reconciled Home Medications:      Medication List        START taking these medications      bisacodyL 10 mg Supp  Commonly known as: DULCOLAX (BISACODYL)  Place 1 suppository (10 mg total) rectally daily as needed (refractory constipation).     multivitamin Tab  Take 1 tablet by mouth once daily.     polyethylene  glycol 17 gram Pwpk  Commonly known as: GLYCOLAX  Take 17 g by mouth 2 (two) times daily as needed (constipation).            CHANGE how you take these medications      HYDROcodone-acetaminophen 5-325 mg per tablet  Commonly known as: NORCO  1 tablet EVERY 4 HOURS (route: oral)  What changed:   how much to take  how to take this  when to take this  reasons to take this     pantoprazole 40 MG tablet  Commonly known as: PROTONIX  Take 1 tablet (40 mg total) by mouth 2 (two) times daily.  What changed: when to take this            CONTINUE taking these medications      acyclovir 400 MG tablet  Commonly known as: ZOVIRAX  Take 1 tablet (400 mg total) by mouth 2 (two) times daily.     allopurinoL 100 MG tablet  Commonly known as: ZYLOPRIM  Take 1 tablet (100 mg total) by mouth once daily.     ALPRAZolam 0.5 MG tablet  Commonly known as: XANAX  Take 1 tablet (0.5 mg total) by mouth nightly as needed for Anxiety.     atorvastatin 10 MG tablet  Commonly known as: LIPITOR  Take 10 mg by mouth once daily.     blood sugar diagnostic Strp  To check BG 2 times daily, to use with insurance preferred meter     blood-glucose meter kit  To check BG 2 times daily, to use with insurance preferred meter     butenafine 1 % cream  Apply 1 application topically 2 (two) times daily.     clotrimazole 1 % cream  Commonly known as: LOTRIMIN  Apply topically 2 (two) times daily.     metFORMIN 1000 MG tablet  Commonly known as: GLUCOPHAGE  Take 1,000 mg by mouth 2 (two) times daily.     ondansetron 4 MG Tbdl  Commonly known as: ZOFRAN-ODT  Take 1 tablet by mouth once daily.     predniSONE 50 MG Tab  Commonly known as: DELTASONE  Take 1 tablet (50 mg total) by mouth once daily.     promethazine 12.5 MG Tab  Commonly known as: PHENERGAN  Take 1 tablet by mouth once daily.     sulfamethoxazole-trimethoprim 800-160mg 800-160 mg Tab  Commonly known as: BACTRIM DS  Take 1 tablet by mouth 3 (three) times a week.     tamsulosin 0.4 mg Cap  Commonly  known as: FLOMAX  Take 1 capsule (0.4 mg total) by mouth once daily.            STOP taking these medications      lancets Misc     lisinopriL 2.5 MG tablet  Commonly known as: PRINIVIL,ZESTRIL                  Pending Diagnostic Studies:       None          Indwelling Lines/Drains at time of discharge:   Lines/Drains/Airways       None                   Time spent on the discharge of patient: 40 minutes         Ced Hendrix MD  Department of Hospital Medicine  [facility]

## 2023-02-25 NOTE — NURSING
Sepsis Screen (most recent)       Sepsis Screen (IP) - 02/25/23 0748       Is the patient's history or complaint suggestive of a possible infection? Yes  -    Are there at least two of the following signs and symptoms present? Yes  -    Sepsis signs/symptoms - Tachycardia Tachycardia     >90  -    Sepsis signs/symptoms - WBC WBC < 4,000 or WBC > 12,000  -    Are any of the following organ dysfunction criteria present and not considered to be due to a chronic condition? Yes  -    Organ Dysfunction Criteria Total Bilirubin > 2.0 Platelet count < 100,000  -    Organ Dysfunction Criteria Lactate > 2.0  -    Organ Dysfunction Criteria - Resp Comp Respiratory Compromise: Requiring > 5L NC  -    Initiate Sepsis Protocol No  -    Reason sepsis not considered Pt. receiving appropriate management  -              User Key  (r) = Recorded By, (t) = Taken By, (c) = Cosigned By      Initials Name     Rowan Mcneal RN

## 2023-02-25 NOTE — NURSING
Nurses Note -- 4 Eyes      2/25/2023   0408 AM      Skin assessed during: Admit      [x] No Pressure Injuries Present    []Prevention Measures Documented      [] Yes- Altered Skin Integrity Present or Discovered   [] LDA Added if Not in Epic (Describe Wound)   [] New Altered Skin Integrity was Present on Admit and Documented in LDA   [] Wound Image Taken    Wound Care Consulted? No    Attending Nurse:  Esha Yun RN     Second RN/Staff Member:  Erin Umbehr, RN

## 2023-02-25 NOTE — ASSESSMENT & PLAN NOTE
WBC of 16 most recently  Continue abx for now  No evidence of fevers/chills from history and HDS  Could be related to underlying malignancy vs bowel infection

## 2023-02-25 NOTE — CONSULTS
"Subjective:       Patient ID: Dwight Hoffmann is a 52 y.o. male.    Chief Complaint: Weakness, abdominal discomfort    HPI Dwight Hoffmann is a 52 year-old male with history of T2DM, anemia, DLBCL, HLD, thrombocytopenia presenting as transfer from [facility] presenting with refractory lymphoma. Patient apparently has progressed on R-CHOP therapy but per Heme/onc note at OSH patient was not consistently getting chemotherapy treatment 2/2 to thrombocytopenia, anemia and hospital admissions. Patient was transferred to [facility] for biopsy and to see if there has been any transformation or if there is another disease process occurring given lack of response to chemo and to consider second line chemotherapy.     CT scan obtained at the outside hospital read as possible extrinsic compression of the IVC from the progressively enlarging tumor burden.    Patient reports worsening weakness. Notes that his urine has been "root beer colored" at home. Denies lower extremity swelling.    OR September 2022 for SBR; pathology notable for lymphoma.      Review of patient's allergies indicates:   Allergen Reactions    Albuterol (bulk) Palpitations       Past Medical History:   Diagnosis Date    Cancer     Diabetes mellitus     Digestive disorder     Prediabetes        Past Surgical History:   Procedure Laterality Date    APPENDECTOMY  07/15/2014    COLONOSCOPY      COLONOSCOPY N/A 02/09/2022    ERROR.   He did not have a colonoscopy with Dr. Horta.    COLONOSCOPY N/A 09/01/2022    Procedure: COLONOSCOPY;  Surgeon: Andrea Clemens MD;  Location: [facility];  Service: Endoscopy;  Laterality: N/A;    FLEXIBLE SIGMOIDOSCOPY N/A 11/7/2022    Procedure: SIGMOIDOSCOPY, FLEXIBLE;  Surgeon: Manuel Horta MD;  Location: [facility];  Service: Endoscopy;  Laterality: N/A;    INCISION AND DRAINAGE OF ABDOMEN N/A 09/14/2022    Procedure: INCISION AND DRAINAGE, ABDOMEN;  Surgeon: Jan Oliveira Jr., MD;  Location: [facility] OR;  " Service: General;  Laterality: N/A;    INSERTION OF TUNNELED CENTRAL VENOUS CATHETER (CVC) WITH SUBCUTANEOUS PORT N/A 10/31/2022    Procedure: [id number]-Q-CATH;  Surgeon: Jan Oliveira Jr., MD;  Location: [facility] OR;  Service: General;  Laterality: N/A;    LAPAROSCOPIC DRAINAGE OF ABDOMEN N/A 09/23/2022    Procedure: DRAINAGE, ABDOMEN, LAPAROSCOPIC;  Surgeon: Jan Oliveira Jr., MD;  Location: [facility] OR;  Service: General;  Laterality: N/A;       Current Facility-Administered Medications   Medication Dose Route Frequency Provider Last Rate Last Admin    acyclovir capsule 400 mg  400 mg Oral BID Amarjit Alvarado MD   400 mg at 02/25/23 0951    allopurinoL tablet 100 mg  100 mg Oral Daily Amarjit Alvarado MD   100 mg at 02/25/23 0951    ceFEPIme (MAXIPIME) 1 g in dextrose 5 % in water (D5W) 5 % 50 mL IVPB (MB+)  1 g Intravenous Q24H Amarjit Alvarado MD   Stopped at 02/25/23 1038    dextrose 10% bolus 125 mL 125 mL  12.5 g Intravenous PRN Amarjit Alvarado MD        dextrose 10% bolus 250 mL 250 mL  25 g Intravenous PRN Amarjit Alvarado MD        dextrose 40 % gel 15,000 mg  15 g Oral PRN Amarjit Alvarado MD        dextrose 40 % gel 30,000 mg  30 g Oral PRN Amarjit Alvarado MD        glucagon (human recombinant) injection 1 mg  1 mg Intramuscular PRN Amarjit Alvarado MD        HYDROcodone-acetaminophen  mg per tablet 1 tablet  1 tablet Oral Q6H PRN Amarjit Alvarado MD   1 tablet at 02/25/23 0951    insulin detemir U-100 pen 5 Units  5 Units Subcutaneous Daily Amarjit Alvarado MD        metroNIDAZOLE tablet 500 mg  500 mg Oral Q8H Amarjit Alvarado MD   500 mg at 02/25/23 0951    naloxone 0.4 mg/mL injection 0.02 mg  0.02 mg Intravenous PRN Amarjit Alvarado MD        ondansetron injection 4 mg  4 mg Intravenous Q8H PRN Amarjit Alvarado MD        pantoprazole EC tablet 40 mg  40 mg Oral BID Amarjit Alvarado MD   40 mg at 02/25/23 0915     predniSONE tablet 50 mg  50 mg Oral Daily Amarjit Alvarado MD   50 mg at 02/25/23 0951    sodium chloride 0.9% flush 10 mL  10 mL Intravenous Q12H PRN Amarjit Alvarado MD        sulfamethoxazole-trimethoprim 800-160mg per tablet 1 tablet  1 tablet Oral Once per day on Mon Wed Fri Amarjit Alvarado MD   1 tablet at 02/25/23 0440    tamsulosin 24 hr capsule 0.4 mg  0.4 mg Oral Daily Amarjit Alvarado MD   0.4 mg at 02/25/23 0951    vancomycin - pharmacy to dose   Intravenous pharmacy to manage frequency Amarjit Alvarado MD        vancomycin 1,500 mg in dextrose 5 % (D5W) 250 mL IVPB (Vial-Mate)  15 mg/kg Intravenous Q12H Sana Liu .7 mL/hr at 02/25/23 0558 1,500 mg at 02/25/23 0558       Family History   Problem Relation Age of Onset    Cancer Mother         Colon    Diabetes Mother     Hypertension Mother     Seizures Father     Heart murmur Sister     Seizures Sister        Social History     Socioeconomic History    Marital status:     Number of children: 2   Occupational History    Occupation: [employer]   Tobacco Use    Smoking status: Never    Smokeless tobacco: Never   Substance and Sexual Activity    Alcohol use: Not Currently     Comment: occasional    Drug use: No    Sexual activity: Yes     Partners: Female     Social Determinants of Health     Financial Resource Strain: Low Risk     Difficulty of Paying Living Expenses: Not very hard   Food Insecurity: No Food Insecurity    Worried About Running Out of Food in the Last Year: Never true    Ran Out of Food in the Last Year: Never true   Transportation Needs: No Transportation Needs    Lack of Transportation (Medical): No    Lack of Transportation (Non-Medical): No   Physical Activity: Inactive    Days of Exercise per Week: 0 days    Minutes of Exercise per Session: 0 min   Stress: Stress Concern Present    Feeling of Stress : Rather much   Social Connections: Moderately Isolated    Frequency of Communication with  "Friends and Family: More than three times a week    Frequency of Social Gatherings with Friends and Family: More than three times a week    Attends Hinduism Services: Never    Active Member of Clubs or Organizations: No    Attends Club or Organization Meetings: Never    Marital Status:    Housing Stability: Low Risk     Unable to Pay for Housing in the Last Year: No    Number of Places Lived in the Last Year: 1    Unstable Housing in the Last Year: No       Review of Systems    Objective:      Physical Exam      Physical Exam:  /68 (BP Location: Left arm, Patient Position: Lying)   Pulse 101   Temp 98.1 °F (36.7 °C) (Oral)   Resp 18   Ht 6' 1" (1.854 m)   Wt 92.5 kg (203 lb 14.8 oz)   SpO2 98%   BMI 26.90 kg/m²   General: no distress  Head: normocephalic  Mallampati Score   Neck: supple, symmetrical, trachea midline  Lungs:  normal respiratory effort  Heart: regular rate and rhythm  Abdomen:  palpable mass in the right hemiabdomen, minimally tender. Left hemiabdomen soft, nontender  Extremities: no cyanosis or edema, or clubbing    Lab Results   Component Value Date    WBC 18.54 (H) 02/25/2023    HGB 7.4 (L) 02/25/2023    HCT 23.9 (L) 02/25/2023    MCV 93 02/25/2023    PLT 55 (L) 02/25/2023     BMP  Lab Results   Component Value Date     (L) 02/25/2023    K 4.0 02/25/2023    CL 97 02/25/2023    CO2 21 (L) 02/25/2023    BUN 11 02/25/2023    CREATININE 0.7 02/25/2023    CALCIUM 8.5 (L) 02/25/2023    ANIONGAP 8 02/25/2023    ESTGFRAFRICA 111 12/28/2021    EGFRNONAA 96 12/28/2021     CMP  Sodium   Date Value Ref Range Status   02/25/2023 126 (L) 136 - 145 mmol/L Final     Potassium   Date Value Ref Range Status   02/25/2023 4.0 3.5 - 5.1 mmol/L Final     Chloride   Date Value Ref Range Status   02/25/2023 97 95 - 110 mmol/L Final     CO2   Date Value Ref Range Status   02/25/2023 21 (L) 23 - 29 mmol/L Final     Glucose   Date Value Ref Range Status   02/25/2023 163 (H) 70 - 110 mg/dL Final "     BUN   Date Value Ref Range Status   02/25/2023 11 6 - 20 mg/dL Final     Creatinine   Date Value Ref Range Status   02/25/2023 0.7 0.5 - 1.4 mg/dL Final     Calcium   Date Value Ref Range Status   02/25/2023 8.5 (L) 8.7 - 10.5 mg/dL Final     Total Protein   Date Value Ref Range Status   02/25/2023 5.1 (L) 6.0 - 8.4 g/dL Final     Albumin   Date Value Ref Range Status   02/25/2023 2.3 (L) 3.5 - 5.2 g/dL Final     Total Bilirubin   Date Value Ref Range Status   02/25/2023 2.0 (H) 0.1 - 1.0 mg/dL Final     Comment:     For infants and newborns, interpretation of results should be based  on gestational age, weight and in agreement with clinical  observations.    Premature Infant recommended reference ranges:  Up to 24 hours.............<8.0 mg/dL  Up to 48 hours............<12.0 mg/dL  3-5 days..................<15.0 mg/dL  6-29 days.................<15.0 mg/dL       Alkaline Phosphatase   Date Value Ref Range Status   02/25/2023 106 55 - 135 U/L Final     AST   Date Value Ref Range Status   02/25/2023 27 10 - 40 U/L Final     ALT   Date Value Ref Range Status   02/25/2023 16 10 - 44 U/L Final     Anion Gap   Date Value Ref Range Status   02/25/2023 8 8 - 16 mmol/L Final     eGFR if    Date Value Ref Range Status   12/28/2021 111 > OR = 60 mL/min/1.73m2 Final     eGFR if non    Date Value Ref Range Status   12/28/2021 96 > OR = 60 mL/min/1.73m2 Final     Lab Results   Component Value Date    CEA <1.7 09/16/2022           Assessment:       1. Large cell lymphoma    2. Chest pain          Plan:       Dwight Hoffmann is a 52 year-old male with history of T2DM, anemia, DLBCL, HLD, thrombocytopenia presenting as transfer from [facility] presenting with refractory lymphoma.Dwight Hoffmann is a 52 year-old male with history of T2DM, anemia, DLBCL, HLD, thrombocytopenia presenting as transfer from [facility] presenting with refractory lymphoma.    IVC distortion  seen on CT likely secondary to dehydration. No clinical evidence of obstruction, and the IVC caudal to the tumor also flat on imaging.    Patient is not obstructed at this time. Recommend home laxatives given stool burden on CT.    Given his ongoing dehydration, recommend consideration of home IV fluids upon discharge.    Discussed with Dr. Wheeler.    Rosalba Coker MD  Department of Colon & Rectal Surgery

## 2023-02-25 NOTE — NURSING
Patient arrived via stretcher with ambulance personnel. AAOx4. Transferred to bed independently. Pt denies pain or any other concerns at this time. Patient oriented to room. MD notified of pts arrival. Side rails up x2. Bed in the lowest position and bed wheels locked. Call light within reach. Will continue to monitor.

## 2023-02-25 NOTE — ASSESSMENT & PLAN NOTE
Hemoglobin A1c from 1 week ago was 7.7  Goal glucose while inpatient: 140-180  Consider basal-bolus + SSI as needed  Hold home anti-diabetic medications  Patient takes daily steroids

## 2023-02-25 NOTE — HPI
"Dwight Hoffmann is a 52 year-old male with history of T2DM, anemia, DLBCL, HLD, thrombocytopenia presenting as transfer from [facility] presenting with refractory lymphoma, compression of IVC. Patient apparently has progressed on R-CHOP therapy but per Heme/onc note at OSH patient was not consistently getting chemotherapy treatment 2/2 to thrombocytopenia, anemia and hospital admissions. Patient was transferred to [facility] for biopsy and to see if there has been any transformation or if there is another disease process occurring given lack of response to chemo and to consider second line chemotherapy. 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 General surgery saw patient at OSH as well for concern of obstruction from lymphadenopathy but noted there was not complete obstruction. General surgery did not feel at the time the lymphadenopathy would be able to be safely removed or debulked from vena cava. There was some discussion about possible ileocecal shunt placement, but did not have capability at OSH.    Of note, patient reports that he initially presented for weakness and abdominal pain. Patient reports that he had an episode of vomiting this past Wednesday. He reports that there has been no determination of what is causing his problems at this time. He reports his previous CT scans were good and did not show any progression. For his pain he notes that Norco and Diluadid helped keep his abdominal pain under control. He has had generalized weakness that developed recently and prompted him to present to OSH.              "

## 2023-02-25 NOTE — NURSING
[place] [facility] [place].  Tele monitor removed.  Pt with no s/s of acute distress at this time.

## 2023-02-25 NOTE — PROGRESS NOTES
Pharmacokinetic Initial Assessment: IV Vancomycin    Assessment/Plan:    Initiate intravenous vancomycin with 1500 mg every 12 hours  Desired empiric serum trough concentration is 15 to 20 mcg/mL  Draw vancomycin random level on 02/26/23 at 0430.  Pharmacy will continue to follow and monitor vancomycin.      Please contact pharmacy at extension [phone] with any questions regarding this assessment.     Thank you for the consult,   Peace Ramirez, Pharm D.       Patient brief summary:  Dwight Hoffmann is a 52 y.o. male initiated on antimicrobial therapy with IV Vancomycin for treatment of suspected bacteremia    Drug Allergies:   Review of patient's allergies indicates:   Allergen Reactions    Albuterol (bulk) Palpitations       Actual Body Weight:   92.5 kg    Renal Function:   Estimated Creatinine Clearance: 119.3 mL/min (based on SCr of 0.8 mg/dL).,     Dialysis Method (if applicable):  N/A    CBC (last 72 hours):  Recent Labs   Lab Result Units 02/22/23  1624 02/23/23  0438 02/23/23  0550 02/23/23  1544 02/23/23  2230 02/24/23  0218   WBC K/uL 18.06* 11.80  --   --   --  16.98*   Hemoglobin g/dL 8.3* 6.7* 6.9* 8.6* 8.2* 8.3*   Hematocrit % 26.1* 21.4*  --   --  25.7* 25.9*   Platelets K/uL 56* 53*  --   --   --  53*   Gran % % 93.0* 75.5*  --   --   --  77.2*   Lymph % % 1.0* 8.4*  --   --   --  7.1*   Mono % % 0.0* 14.5  --   --   --  14.3   Eosinophil % % 0.0 0.5  --   --   --  0.1   Basophil % % 0.0 0.1  --   --   --  0.1   Differential Method  Manual Automated  --   --   --  Automated       Metabolic Panel (last 72 hours):  Recent Labs   Lab Result Units 02/22/23  1624 02/22/23 2006 02/23/23  0146 02/23/23  0157 02/23/23  0438 02/24/23  0218 02/24/23  0835   Sodium mmol/L 125*  --   --  126*  126* 129*  --  131*   Sodium, Urine mmol/L  --   --  41  --   --   --   --    Potassium mmol/L 4.3  --   --  4.1  4.1 4.0  --  3.9   Chloride mmol/L 92*  --   --  96  96 99  --  101   CO2 mmol/L 21*  --   --  23  23  23  --  21*   Glucose mg/dL 277*  --   --  213*  213* 186*  --  163*   Glucose, UA   --  3+*  --   --   --   --   --    BUN mg/dL 24*  --   --  20  20 19  --  17   Creatinine mg/dL 0.9  --   --  0.8  0.8 0.7  --  0.8   Albumin g/dL 2.7*  --   --   --  2.2*  --   --    Total Bilirubin mg/dL 4.0*  --   --   --  3.1*  --   --    Alkaline Phosphatase U/L 89  --   --   --  71  --   --    AST U/L 30  --   --   --  30  --   --    ALT U/L 19  --   --   --  17  --   --    Magnesium mg/dL 1.9  --   --   --  1.8 1.8  --    Phosphorus mg/dL 4.6*  --   --   --   --   --   --        Drug levels (last 3 results):  No results for input(s): VANCOMYCINRA, VANCORANDOM, VANCOMYCINPE, VANCOPEAK, VANCOMYCINTR, VANCOTROUGH in the last 72 hours.    Microbiologic Results:  Microbiology Results (last 7 days)       ** No results found for the last 168 hours. **

## 2023-02-25 NOTE — SUBJECTIVE & OBJECTIVE
"Patient information was obtained from patient and past medical records.     Oncology History:     "bone marrow bx neg for lymphoma with tight cluster of blasts 6% (smal number)    positive for CD20,  BCL6, MUM1, MYC, high Ki-67 (99%)   -negative for CD10, CD30, BCL-2, cyclin D1, .  Reactive T-cells are CD3   --FISH negative for rearrangement of MYC ; no IGH/MYC fusion was observed  uric acid  7.3 sent allopurinol  HBV, HCV, HIV serologies; negative     might require IT ?     11/22 started -plan for 6 cycles, R-CHOPwith growth factor support     Pet after 4 cycles  see below. repeat in 2 more cycles     HPI to date:  Rectal bleeding, stopped   1/28/23 Lower pelvic pain went to ED ,bowel gas and stools noted. Somehwat better using linzess and miralax     No fever, mild sob+    2/6/23: C/o not eating, not drinking this weekend, very tired, had a large BM but feels like food is sticking in his chest, hardly able to sit up and brush his teeth, sob on activity    Admitted at [facility] + covid 2/2023 treated with remdesivir. asymptomatic episodes of tachycardia and hypotension, improved with fluid resuscitation.     PET scan from 1/11/23 was reviewed with radiology, hypermetabolic loops of small bowel most consistent with previous known disease and improved. He is felt to have a partial response in therapy. Would recommend to continue current regimen with [facility]."     Medications Prior to Admission   Medication Sig Dispense Refill Last Dose    acyclovir (ZOVIRAX) 400 MG tablet Take 1 tablet (400 mg total) by mouth 2 (two) times daily. 60 tablet 11     allopurinoL (ZYLOPRIM) 100 MG tablet Take 1 tablet (100 mg total) by mouth once daily. 90 tablet 3     ALPRAZolam (XANAX) 0.5 MG tablet Take 1 tablet (0.5 mg total) by mouth nightly as needed for Anxiety. 30 tablet 0     ascorbic acid, vitamin C, (VITAMIN C) 1000 MG tablet Take 1,000 mg by mouth once daily.       bisacodyL (DULCOLAX, BISACODYL,) 10 mg Supp Place 1 " suppository (10 mg total) rectally daily as needed (refractory constipation). 5 suppository 0     butenafine 1 % cream Apply 1 application topically 2 (two) times daily.       clotrimazole (LOTRIMIN) 1 % cream Apply topically 2 (two) times daily. 28 g 0     HYDROcodone-acetaminophen (NORCO) 5-325 mg per tablet 1 tablet EVERY 4 HOURS (route: oral) (Patient taking differently: Take 1 tablet by mouth every 4 (four) hours as needed.) 90 tablet 0     metFORMIN (GLUCOPHAGE) 1000 MG tablet Take 1,000 mg by mouth 2 (two) times daily.       morphine (MS CONTIN) 15 MG 12 hr tablet Take 1 tablet (15 mg total) by mouth 2 (two) times daily. 60 tablet 0     multivitamin Tab Take 1 tablet by mouth once daily. 30 tablet 1     ondansetron (ZOFRAN-ODT) 4 MG TbDL Take 1 tablet by mouth once daily.       pantoprazole (PROTONIX) 40 MG tablet Take 1 tablet (40 mg total) by mouth 2 (two) times daily. 60 tablet 1     polyethylene glycol (GLYCOLAX) 17 gram PwPk Take 17 g by mouth 2 (two) times daily as needed (constipation). 14 packet 1     predniSONE (DELTASONE) 50 MG Tab Take 1 tablet (50 mg total) by mouth once daily. 100 tablet 2     promethazine (PHENERGAN) 12.5 MG Tab Take 1 tablet by mouth once daily.       sulfamethoxazole-trimethoprim 800-160mg (BACTRIM DS) 800-160 mg Tab Take 1 tablet by mouth 3 (three) times a week. 12 tablet 1     tamsulosin (FLOMAX) 0.4 mg Cap Take 1 capsule (0.4 mg total) by mouth once daily. 30 capsule 11     atorvastatin (LIPITOR) 10 MG tablet Take 10 mg by mouth once daily.       blood sugar diagnostic Strp To check BG 2 times daily, to use with insurance preferred meter 200 each 1     blood-glucose meter kit To check BG 2 times daily, to use with insurance preferred meter 1 each 0        Albuterol (bulk)     Past Medical History:   Diagnosis Date    Cancer     Diabetes mellitus     Digestive disorder     Prediabetes      Past Surgical History:   Procedure Laterality Date    APPENDECTOMY  07/15/2014     COLONOSCOPY      COLONOSCOPY N/A 02/09/2022    ERROR.   He did not have a colonoscopy with Dr. Sanders.    COLONOSCOPY N/A 09/01/2022    Procedure: COLONOSCOPY;  Surgeon: Andrea Clemens MD;  Location: [facility] ENDO;  Service: Endoscopy;  Laterality: N/A;    FLEXIBLE SIGMOIDOSCOPY N/A 11/7/2022    Procedure: SIGMOIDOSCOPY, FLEXIBLE;  Surgeon: Manuel Sanders MD;  Location: [facility] ENDO;  Service: Endoscopy;  Laterality: N/A;    INCISION AND DRAINAGE OF ABDOMEN N/A 09/14/2022    Procedure: INCISION AND DRAINAGE, ABDOMEN;  Surgeon: Jan Oliveira Jr., MD;  Location: [facility] OR;  Service: General;  Laterality: N/A;    INSERTION OF TUNNELED CENTRAL VENOUS CATHETER (CVC) WITH SUBCUTANEOUS PORT N/A 10/31/2022    Procedure: [id number];  Surgeon: Jan Oliveira Jr., MD;  Location: [facility] OR;  Service: General;  Laterality: N/A;    LAPAROSCOPIC DRAINAGE OF ABDOMEN N/A 09/23/2022    Procedure: DRAINAGE, ABDOMEN, LAPAROSCOPIC;  Surgeon: Jan Oliveira Jr., MD;  Location: [facility] OR;  Service: General;  Laterality: N/A;     Family History       Problem Relation (Age of Onset)    Cancer Mother    Diabetes Mother    Heart murmur Sister    Hypertension Mother    Seizures Father, Sister          Tobacco Use    Smoking status: Never    Smokeless tobacco: Never   Substance and Sexual Activity    Alcohol use: Not Currently     Comment: occasional    Drug use: No    Sexual activity: Yes     Partners: Female       Review of Systems   Constitutional:  Negative for chills and fever.   HENT: Negative.     Eyes: Negative.    Respiratory:  Negative for chest tightness and shortness of breath.    Cardiovascular:  Negative for chest pain and leg swelling.   Gastrointestinal:  Positive for abdominal pain. Negative for abdominal distention, nausea and vomiting.   Genitourinary:  Negative for difficulty urinating and dysuria.   Musculoskeletal:  Negative for back pain.   Skin: Negative.    Neurological:  Negative for facial  asymmetry and speech difficulty.   Psychiatric/Behavioral:  Negative for agitation and behavioral problems.    Objective:     Vital Signs (Most Recent):    Vital Signs (24h Range):  Temp:  [98.1 °F (36.7 °C)-98.9 °F (37.2 °C)] 98.1 °F (36.7 °C)  Pulse:  [] 83  Resp:  [16-20] 20  SpO2:  [95 %-99 %] 99 %  BP: ()/(57-82) 119/82        There is no height or weight on file to calculate BMI.  There is no height or weight on file to calculate BSA.    ECOG SCORE           [unfilled]    Lines/Drains/Airways       Peripheral Intravenous Line  Duration                  Peripheral IV - Single Lumen 02/23/23 2200 20 G Right Antecubital 1 day                    Physical Exam  Vitals reviewed.   Constitutional:       General: He is not in acute distress.  HENT:      Head: Normocephalic and atraumatic.      Right Ear: External ear normal.      Left Ear: External ear normal.   Eyes:      General:         Right eye: No discharge.         Left eye: No discharge.      Extraocular Movements: Extraocular movements intact.      Conjunctiva/sclera: Conjunctivae normal.   Cardiovascular:      Rate and Rhythm: Normal rate and regular rhythm.      Pulses: Normal pulses.      Heart sounds: Normal heart sounds.   Pulmonary:      Effort: Pulmonary effort is normal.      Breath sounds: Normal breath sounds.   Abdominal:      General: Abdomen is flat.      Palpations: Abdomen is soft.      Tenderness: There is abdominal tenderness (lower quadrants, not much tenderness now recently got pain medication).   Musculoskeletal:      Cervical back: Normal range of motion and neck supple.      Right lower leg: No edema.      Left lower leg: No edema.   Skin:     General: Skin is warm and dry.   Neurological:      Mental Status: He is alert and oriented to person, place, and time. Mental status is at baseline.   Psychiatric:         Mood and Affect: Mood normal.         Behavior: Behavior normal.       Significant Labs:      Recent Labs   Lab  02/23/23  0438 02/23/23  0550 02/23/23  1544 02/23/23  2230 02/24/23  0218   WBC 11.80  --   --   --  16.98*   HGB 6.7*   < > 8.6* 8.2* 8.3*   HCT 21.4*  --   --  25.7* 25.9*   PLT 53*  --   --   --  53*    < > = values in this interval not displayed.     Recent Labs   Lab 02/23/23  0438 02/24/23  0835   * 131*   K 4.0 3.9   CL 99 101   CO2 23 21*   * 163*   BUN 19 17   CREATININE 0.7 0.8   CALCIUM 7.8* 8.2*   PROT 4.9*  --    ALBUMIN 2.2*  --    BILITOT 3.1*  --    ALKPHOS 71  --    AST 30  --    ALT 17  --    ANIONGAP 7* 9       Diagnostic Results:  I have reviewed all pertinent imaging results/findings within the past 24 hours.

## 2023-02-25 NOTE — PROGRESS NOTES
Pharmacist Renal Dose Adjustment Note    Dwight Hoffmann is a 52 y.o. male being treated with the medication cefepime    Patient Data:    Vital Signs (Most Recent):  Temp: 98.1 °F (36.7 °C) (02/25/23 1128)  Pulse: 98 (02/25/23 1128)  Resp: 18 (02/25/23 0951)  BP: 111/71 (02/25/23 1128)  SpO2: 97 % (02/25/23 1128) Vital Signs (72h Range):  Temp:  [97.6 °F (36.4 °C)-98.9 °F (37.2 °C)]   Pulse:  []   Resp:  [9-35]   BP: ()/(49-82)   SpO2:  [91 %-100 %]      Recent Labs   Lab 02/23/23  0438 02/24/23  0835 02/25/23  0447   CREATININE 0.7 0.8 0.7     Serum creatinine: 0.7 mg/dL 02/25/23 0447  Estimated creatinine clearance: 136.3 mL/min    Medication:cefepime dose: 1g frequency q24h will be changed to medication:cefepime dose:2g frequency:q8h for crcl > 60    Pharmacist's Name: Carlos Weiss  Pharmacist's Extension: [id number]

## 2023-02-25 NOTE — HPI
Mr Dwight Hoffmann is a 52-year-old male with history of diffuse large B-cell lymphoma.      He was recently admitted in the hospital and discharged.  He is now readmitted for weakness.  Found to have disease progression on CT scan.  Was supposed to see Dr. Linder in clinic before he was admitted but unfortunately ended up in the ER.  There was discussion that his previous visit about an exploratory lap to investigate multiple masses.  Patient is now having compression of his IVC.    He is still having weakness but states he feels little better since admission.

## 2023-02-25 NOTE — ASSESSMENT & PLAN NOTE
Platelets of 53 at OSH.    --Continue to monitor with CBC  --Transfuse for platelets less than 10 or if actively bleeding

## 2023-02-25 NOTE — ASSESSMENT & PLAN NOTE
Hemoglobin of 8.3 most recently from OSH. Patient did have Hgb of 6.9.    Plan:  --type and screen  --transfuse for Hgb less than 7 or if actively bleeding.

## 2023-02-25 NOTE — CONSULTS
[facility]  Hematology/Oncology  Consult Note    Patient Name: Dwight Hoffmann  MRN: [id number]  Admission Date: 2/22/2023  Hospital Length of Stay: 0 days  Code Status: Full Code   Attending Provider: Ced Hendrix MD  Consulting Provider: Aurash Khoobehi, MD  Primary Care Physician: Primary Doctor No  Principal Problem:Sepsis    Inpatient consult to Hematology/Oncology  Consult performed by: Aurash Khoobehi, MD  Consult ordered by: Ced Hendrix MD      Subjective:     HPI:  This is a 52-year-old male with history of diffuse large B-cell lymphoma known to us.  He was recently admitted in the hospital and discharged.  He is now readmitted for weakness.  Found to have disease progression on CT scan.  Was supposed to see Dr. Linder in clinic before he was admitted but unfortunately ended up in the ER.  There was discussion that his previous visit about an exploratory lap to investigate multiple masses.  Patient is now having compression of his IVC.  It does not periods responding chemotherapy.  He is still having weakness but states he feels little better since admission.    Oncology Treatment Plan:   IP CHOP + OP RITUXIMAB Q3W    Medications:  Continuous Infusions:   sodium chloride 0.9% 125 mL/hr at 02/24/23 0237     Scheduled Meds:   acyclovir  400 mg Oral BID    ceFEPime (MAXIPIME) IVPB  2 g Intravenous Q8H    magnesium sulfate IVPB  2 g Intravenous Once    metronidazole  500 mg Intravenous Q8H    pantoprazole  40 mg Oral BID    predniSONE  50 mg Oral Daily    tamsulosin  0.4 mg Oral Daily    vancomycin (VANCOCIN) IVPB  1,000 mg Intravenous Q12H     PRN Meds:sodium chloride, acetaminophen, ALPRAZolam, dextrose 10%, dextrose 10%, glucagon (human recombinant), glucose, glucose, HYDROcodone-acetaminophen, HYDROcodone-acetaminophen, insulin aspart U-100, melatonin, naloxone, ondansetron, polyethylene glycol, prochlorperazine, simethicone, sodium chloride 0.9%, Pharmacy to dose Vancomycin  consult **AND** vancomycin - pharmacy to dose     Review of patient's allergies indicates:   Allergen Reactions    Albuterol (bulk) Palpitations        Past Medical History:   Diagnosis Date    Cancer     Diabetes mellitus     Digestive disorder     Prediabetes      Past Surgical History:   Procedure Laterality Date    APPENDECTOMY  07/15/2014    COLONOSCOPY      COLONOSCOPY N/A 02/09/2022    ERROR.   He did not have a colonoscopy with Dr. Sanders.    COLONOSCOPY N/A 09/01/2022    Procedure: COLONOSCOPY;  Surgeon: Andrea Clemens MD;  Location: [facility] ENDO;  Service: Endoscopy;  Laterality: N/A;    FLEXIBLE SIGMOIDOSCOPY N/A 11/7/2022    Procedure: SIGMOIDOSCOPY, FLEXIBLE;  Surgeon: Manuel Sanders MD;  Location: [facility] ENDO;  Service: Endoscopy;  Laterality: N/A;    INCISION AND DRAINAGE OF ABDOMEN N/A 09/14/2022    Procedure: INCISION AND DRAINAGE, ABDOMEN;  Surgeon: Jan Oliveira Jr., MD;  Location: [facility] OR;  Service: General;  Laterality: N/A;    INSERTION OF TUNNELED CENTRAL VENOUS CATHETER (CVC) WITH SUBCUTANEOUS PORT N/A 10/31/2022    Procedure: [id number];  Surgeon: Jan Oliveira Jr., MD;  Location: [facility] OR;  Service: General;  Laterality: N/A;    LAPAROSCOPIC DRAINAGE OF ABDOMEN N/A 09/23/2022    Procedure: DRAINAGE, ABDOMEN, LAPAROSCOPIC;  Surgeon: Jan Oliveira Jr., MD;  Location: [facility] OR;  Service: General;  Laterality: N/A;     Family History       Problem Relation (Age of Onset)    Cancer Mother    Diabetes Mother    Heart murmur Sister    Hypertension Mother    Seizures Father, Sister          Tobacco Use    Smoking status: Never    Smokeless tobacco: Never   Substance and Sexual Activity    Alcohol use: Not Currently     Comment: occasional    Drug use: No    Sexual activity: Yes     Partners: Female       Review of Systems   Constitutional:  Positive for fatigue.   HENT: Negative.     Eyes: Negative.    Respiratory: Negative.     Cardiovascular: Negative.     Gastrointestinal: Negative.    Endocrine: Negative.    Genitourinary: Negative.    Musculoskeletal: Negative.    Integumentary:  Negative.   Neurological:  Positive for weakness.   Hematological: Negative.    Psychiatric/Behavioral: Negative.     Objective:     Vital Signs (Most Recent):  Temp: 98.2 °F (36.8 °C) (02/24/23 1745)  Pulse: 96 (02/24/23 1745)  Resp: 16 (02/24/23 1745)  BP: 95/67 (02/24/23 1745)  SpO2: 98 % (02/24/23 1745) Vital Signs (24h Range):  Temp:  [97.7 °F (36.5 °C)-98.9 °F (37.2 °C)] 98.2 °F (36.8 °C)  Pulse:  [] 96  Resp:  [16-25] 16  SpO2:  [95 %-98 %] 98 %  BP: ()/(57-81) 95/67     Weight: 61.2 kg (135 lb 0.3 oz)  Body mass index is 17.81 kg/m².  Body surface area is 1.78 meters squared.      Intake/Output Summary (Last 24 hours) at 2/24/2023 1801  Last data filed at 2/24/2023 0814  Gross per 24 hour   Intake 631 ml   Output 450 ml   Net 181 ml       Physical Exam  Vitals and nursing note reviewed.   Constitutional:       Appearance: Normal appearance.   HENT:      Head: Normocephalic and atraumatic.      Nose: Nose normal.      Mouth/Throat:      Mouth: Mucous membranes are moist.      Pharynx: Oropharynx is clear.   Eyes:      Extraocular Movements: Extraocular movements intact.      Conjunctiva/sclera: Conjunctivae normal.   Cardiovascular:      Rate and Rhythm: Normal rate and regular rhythm.      Heart sounds: Normal heart sounds.   Pulmonary:      Effort: Pulmonary effort is normal.      Breath sounds: Normal breath sounds.   Abdominal:      General: Abdomen is flat. Bowel sounds are normal.      Palpations: Abdomen is soft.   Musculoskeletal:         General: Normal range of motion.      Cervical back: Normal range of motion and neck supple.   Skin:     General: Skin is warm and dry.   Neurological:      General: No focal deficit present.      Mental Status: He is alert and oriented to person, place, and time. Mental status is at baseline.   Psychiatric:         Mood and  Affect: Mood normal.       Significant Labs:   CBC:   Recent Labs   Lab 02/23/23  0438 02/23/23  0550 02/23/23  1544 02/23/23  2230 02/24/23  0218   WBC 11.80  --   --   --  16.98*   HGB 6.7*   < > 8.6* 8.2* 8.3*   HCT 21.4*  --   --  25.7* 25.9*   PLT 53*  --   --   --  53*    < > = values in this interval not displayed.    and CMP:   Recent Labs   Lab 02/23/23  0157 02/23/23  0438 02/24/23  0835   *  126* 129* 131*   K 4.1  4.1 4.0 3.9   CL 96  96 99 101   CO2 23 23 23 21*   *  213* 186* 163*   BUN 20  20 19 17   CREATININE 0.8  0.8 0.7 0.8   CALCIUM 7.4*  7.4* 7.8* 8.2*   PROT  --  4.9*  --    ALBUMIN  --  2.2*  --    BILITOT  --  3.1*  --    ALKPHOS  --  71  --    AST  --  30  --    ALT  --  17  --    ANIONGAP 7*  7* 7* 9       Diagnostic Results:  I have reviewed all pertinent imaging results/findings within the past 24 hours.    Assessment/Plan:     Active Diagnoses:    Diagnosis Date Noted POA    PRINCIPAL PROBLEM:  Sepsis [A41.9] 09/26/2022 Unknown    Elevated bilirubin [R17] 02/22/2023 Unknown    Hyponatremia [E87.1] 02/13/2023 Yes    Thrombocytopenia [D69.6] 11/06/2022 Yes    DLBCL (diffuse large B cell lymphoma) [C83.30] 09/28/2022 Yes    Diabetes mellitus type 2, noninsulin dependent [E11.9] 09/14/2022 Yes    Anemia [D64.9] 09/14/2022 Unknown      Problems Resolved During this Admission:       DLBCL  -seems to have progressed on R-CHOP, but also has not consistently got chemotherapy due to issues during treatment such as thrombocytopenia, anemia, and hospital admissions.    -recommend to transfer to [facility] for further evaluation.  Patient needs a biopsy of his recent progression to see if he had any transformation or any other type of disease given his lack of response.  -overall has a poor prognosis given the lack of response to chemotherapy, but needs to try second-line treatment, which could hopefully be done at main campus.    -will follow along with you    Thank you for  your consult. I will follow-up with patient. Please contact us if you have any additional questions.    Aurash Khoobehi, MD  Hematology/Oncology  [facility]

## 2023-02-25 NOTE — DISCHARGE SUMMARY
[facility] Medicine  Discharge Summary      Patient Name: Dwight Hoffmann  MRN: [id number]  Admission Date: 2/22/2023  Hospital Length of Stay: 0 days  Discharge Date and Time:  02/24/2023 9:00 PM  Attending Physician: Ced Hendrix MD   Discharging Provider: Ced Hendrix MD  Primary Care Provider: Primary Doctor No        HPI: Dwight Hoffmann is a 52-year-old male with chronic medical problems including B-cell lymphoma on chemotherapy, hyperlipidemia, diabetes and hypertension presents to the emergency room complaining uncontrolled pain and vomiting.  He states that he has been having severe lower bilateral abdominal pain for several days and pain meds he is had a Jehovah up in working.  He said Oncology recently changed his medication and this morning he actually had some pain relief after taking Norco 7.5 followed by Norco 5 and then a Norco 7.5.  He is continued to have a very poor appetite because of the pain.  He said this afternoon he drank a root beer and then tried to eat scrambled egg but it was very salty and it made him vomit.  He states he has not really been feeling nauseated or having vomited prior.  He has been having normal bowel movements with no bleeding and states his last bowel movement was this morning.  He complains of generalized weakness and says he can walk but has difficulty getting in and out of chairs because he has no strength.  He tells me he was supposed to start chemotherapy tomorrow which has been on hold since the end of January because of thrombocytopenia.  He has been seeing his oncologist but has had very poor oral intake for the last several days.  He was last admitted to the hospital on February 13 for dehydration and weakness with low blood pressure and tachycardia.  He improved after hydration and was discharged home.     In the ED, vital signs with temperature 98°, heart rate 135, blood pressure 114/77, respiratory rate 18 25 and O2 sat  100, lab work significant for WBC 18.06, platelets 56, hemoglobin 8.3, hematocrit 26.1, sodium decreased at 125, potassium 4.3, chloride 92, serum bicarb 21, BUN/CR 24/0.9, glucose elevated to 77, magnesium 1.9, anion gap 12, lactic acid is 3.0.  Chest x-ray with no acute pulmonary pathology.  He was given Dilaudid and Zofran, a dose of Zosyn and 2 L normal saline.  At time of exam he had just returned from CT scan and results were pending.  He will be admitted to the hospitalist service for further evaluation and treatment with a consult to Oncology.    * No surgery found *      Hospital Course:  Patient admitted for weakness, dehydration, possible sepsis.  Treated with empiric antibiotics.  CT abdomen pelvis shows suspected progression of lymphoma on R-CHOP, compression of inferior vena cava. Our Oncology team [place] [facility]  Oncology felt patient needed a biopsy of his recent progression to see if he had any transformation or any other type of disease given his lack response.  Patient may need to try second-line treatment which hopefully could be done at the main [place].  There is always the possibility of a possible IVC stent.  Discussed with Dr. Romero of our vascular group who does not currently recommends.  Dr Liu at [place] at [facility] [place]. on day of discharge patient developed SVT which was self-limited    Physical Exam  Constitutional:       General: He is awake. He is not in acute distress.     Appearance: He is not toxic-appearing.   Pulmonary:      Effort: Pulmonary effort is normal. No tachypnea, bradypnea, accessory muscle usage or respiratory distress.   Abdominal:      General: There is no distension.      Palpations: Abdomen is soft.      Tenderness: There is abdominal tenderness in the right lower quadrant, periumbilical area and suprapubic area. There is no guarding.   Musculoskeletal:      Cervical back: Neck supple.   Neurological:      Mental Status: He is  alert and oriented to person, place, and time.   Psychiatric:         Behavior: Behavior is cooperative.     Consults:   Consults (From admission, onward)          Status Ordering Provider     Pharmacy to dose Vancomycin consult  Once        Provider:  (Not yet assigned)   See Hyperspace for full Linked Orders Report.    Acknowledged WU BRISENO     Inpatient consult to Cardiology  Once        Provider:  Darnell Liu MD    Acknowledged WU BRISENO     Inpatient consult to General Surgery  Once        Provider:  Hunter Huerta III, MD    Completed WU BRISENO     Inpatient consult to Vascular Surgery  Once        Provider:  (Not yet assigned)    Acknowledged WU BRISENO     Inpatient consult to Hematology/Oncology  Once        Provider:  Josee Reid MD    Completed WU BRISENO     Inpatient consult to Registered Dietitian/Nutritionist  Once        Provider:  (Not yet assigned)    Acknowledged ROSALBA JIMENEZ     Inpatient consult to Internal Medicine  Once        Provider:  Rosalba Jimenez NP    Acknowledged ROSALBA JIMENEZ            Final Active Diagnoses:    Diagnosis Date Noted POA    PRINCIPAL PROBLEM:  Sepsis [A41.9] 09/26/2022 Unknown    Elevated bilirubin [R17] 02/22/2023 Unknown    Hyponatremia [E87.1] 02/13/2023 Yes    Thrombocytopenia [D69.6] 11/06/2022 Yes    DLBCL (diffuse large B cell lymphoma) [C83.30] 09/28/2022 Yes    Diabetes mellitus type 2, noninsulin dependent [E11.9] 09/14/2022 Yes    Anemia [D64.9] 09/14/2022 Unknown      Problems Resolved During this Admission:      Discharged Condition: good    Disposition: Another Health Care Institution Not Defined    Follow Up:    Patient Instructions:   No discharge procedures on file.  Medications:          Pending Diagnostic Studies:       None          Indwelling Lines/Drains at time of discharge:   Lines/Drains/Airways       None                   Time spent on the discharge of patient: 40  minutes         Ced Hendrix MD  Department of Hospital Medicine  [facility]

## 2023-02-26 NOTE — HOSPITAL COURSE
02/26/2023 No acute intervention by CRS indicated at this time.  Feeling better this morning after IVF, tolerating PO.  C/o constipation; giving lactulose.  CBC stabilizing today.  Will consult I.R. in the morning about performing the biopsy he was to have in [place] on Tuesday done here instead.  02/27/2023 Patient continues to do well, abdominal pain controlled with pain regimen. Taking good PO. Per IR, no safe window for bx of abdominal mass. Will discuss with GI regarding possibility of endoscopic approach.   02/28/2023 Patient declining bx today. Will consult Rad onc for possible radiation therapy. Stat CTAP ordered given worsening abdominal pain, no perforation however interval enlargement of mass.   03/01/2023 Pain improved with adjustments to pain regimen. Rad onc planning for inpatient radiation therapy.   03/02/2023 Started on PCA pump with improvement in pain. Unable to tolerate sim yesterday, will attempt again today vs tomorrow per rad onc availability. Transfused one unit pRBCs.   03/03/2023: successfully completed sim yesterday. Holding off on radiation at this time pending possibility of lymph node bx.  03/04/2023 developed new-onset AFRVR. Given lopressor IV and placed on amio gtt. Anticipate bx likely 3/6.   03/05/2023 rate improved on amio gtt and PO lopressor. D/w cardiology. Will transition off amio gtt and continue rate control with lopressor. No AC given thrombocytopenia.   03/06/2023 entered AFRVR again early morning. Cardiology consulted for assistance. IR deferred procedure given AFRVR. Working on rate control with hopeful bx tomorrow.   03/07/2023 started on digoxin overnight, HR improved significantly. Transitioned dig to amio PO per EP. GI unable to perform bx due to inadequate prep. IR will attempt add-on procedure for patient.   03/08/2023 s/p IR biopsy of mesenteric adenopathy. Reports pain 6/10 this am on Dilaudid continuous PCA. Intermittent hypotension, given 500 cc bolus this am  and 1L bolus this afternoon. HR stable on po amiodarone and lopressor. Will obtain PET scan today and plan for bone marrow aspiration and biopsy tomorrow. Given difficulty with positioning and laying flat will give Ativan pre-med. Denies nausea, constipation or diarrhea.   03/09/2023 unable to lay flat for PET yesterday. D/w NM today and will hopefully attempt again tomorrow morning pending availability. Will attempt BMB today. Febrile overnight so abx added again. Transfused 1u pRBC.   03/10/2023 stepped up to MICU overnight s/o ongoing hypotension. Will start DHAP following PET today.   03/11/2023 started DHAP yesterday. Pt in significant pain, PCA pump adjusted by MICU team. Will likely stepdown today.  03/12/2023 Stepped down to BMT unit overnight. Pt with significant pain this morning, coached on utilizing PCA pump as needed. Transfused 1u Platelets.   03/13/2023 Stepped up to MICU yesterday afternoon for hypotension requiring pressors. Intubated for tachypnea and AMS. Has had increasing pressor requirements overnight, now maxed on 3x pressors. 4/4 blood culture bottles positive for GNRs, on broad spectrum abx. Also have tachyarrthymias. Family has been contacted by MICU and Pt has been made DNR. Family en route to hospital

## 2023-02-26 NOTE — PROGRESS NOTES
[facility] - Telemetry Stepdown  Hematology  Bone Marrow Transplant  Progress Note    Patient Name: Dwight Hoffmann  Admission Date: 2/25/2023  Hospital Length of Stay: 1 days  Code Status: Full Code    Subjective:     Interval History: No acute intervention by CRS indicated at this time.  Feeling better this morning after IVF, tolerating PO.  C/o constipation; giving lactulose.  CBC stabilizing today.  Will consult I.R. in the morning about performing the biopsy he was to have in [place] on Tuesday done here instead.    Objective:     Vital Signs (Most Recent):  Temp: 97.9 °F (36.6 °C) (02/26/23 1117)  Pulse: 95 (02/26/23 1117)  Resp: 16 (02/26/23 1117)  BP: (!) 101/57 (02/26/23 1117)  SpO2: 97 % (02/26/23 1117)   Vital Signs (24h Range):  Temp:  [97.6 °F (36.4 °C)-98.8 °F (37.1 °C)] 97.9 °F (36.6 °C)  Pulse:  [] 95  Resp:  [16-18] 16  SpO2:  [95 %-99 %] 97 %  BP: ()/(52-69) 101/57     Weight: 92.5 kg (203 lb 14.8 oz)  Body mass index is 26.9 kg/m².  Body surface area is 2.18 meters squared.    ECOG SCORE           [unfilled]    Intake/Output - Last 3 Shifts         02/24 0700 02/25 0659 02/25 0700 02/26 0659 02/26 0700 02/27 0659    P.O. 30      I.V. (mL/kg) 0 (0)      Other 0      Total Intake(mL/kg) 30 (0.3)      Urine (mL/kg/hr) 0 1500 (0.7) 400 (0.5)    Emesis/NG output 0      Other 0      Stool 0      Blood 0      Total Output 0 1500 400    Net +30 -1500 -400           Urine Occurrence 0 x 1050 x     Stool Occurrence 0 x      Emesis Occurrence 0 x              Physical Exam  Vitals reviewed.   Constitutional:       General: He is not in acute distress.  HENT:      Head: Normocephalic and atraumatic.      Right Ear: External ear normal.      Left Ear: External ear normal.   Eyes:      General:         Right eye: No discharge.         Left eye: No discharge.      Extraocular Movements: Extraocular movements intact.      Conjunctiva/sclera: Conjunctivae normal.   Cardiovascular:      Rate and  Rhythm: Normal rate and regular rhythm.      Pulses: Normal pulses.      Heart sounds: Normal heart sounds.   Pulmonary:      Effort: Pulmonary effort is normal.      Breath sounds: Normal breath sounds.   Abdominal:      General: Abdomen is flat.      Palpations: Abdomen is soft.      Tenderness: There is no abdominal tenderness.   Musculoskeletal:      Cervical back: Normal range of motion and neck supple.      Right lower leg: No edema.      Left lower leg: No edema.   Skin:     General: Skin is warm and dry.   Neurological:      Mental Status: He is alert and oriented to person, place, and time. Mental status is at baseline.   Psychiatric:         Mood and Affect: Mood normal.         Behavior: Behavior normal.       Significant Labs:   CBC:   Recent Labs   Lab 02/25/23  0447 02/26/23  0647   WBC 18.54* 16.07*   HGB 7.4* 7.9*   HCT 23.9* 24.1*   PLT 55* 50*    and CMP:   Recent Labs   Lab 02/25/23  0447 02/26/23  0647   * 128*   K 4.0 3.8   CL 97 97   CO2 21* 23   * 162*   BUN 11 13   CREATININE 0.7 0.7   CALCIUM 8.5* 8.4*   PROT 5.1* 5.0*   ALBUMIN 2.3* 2.2*   BILITOT 2.0* 1.9*   ALKPHOS 106 110   AST 27 25   ALT 16 15   ANIONGAP 8 8       Diagnostic Results:  None    Assessment/Plan:     * DLBCL (diffuse large B cell lymphoma)  Patient with small bowel resection approximately 5 months ago consistent with Diffuse large-B cell lymphoma, non germinal center origin. Additionally had omentum resection also showing DLBCL. Had bone marrow biopsy approximately 5 months ago that showed normo cellular marrow for age, no increase in blasts. Patient had PET scan from 1/11/23 showing hypermetabolic loops of small bowel most consistent with previous known disease and was thought to have improved (partial response to therapy noted from chart review) and was recommended he continue with R-CHOP at that time. Patient hospitalized at [facility] found to have likely worsening of lymphoma based on CT scan of  abdomen showing partial compression of IVC. Transferred to [facility] for further evaluation given uncertainty in responsiveness to first-line chemo therapy.    --can obtain bone marrow bx to look for progression  --CRS consult for ileocecal stent placement  --consider alternative chemo regimen given progression (although appears patient has had delay in receiving chemo 2/2 to thrombocytopenia and hospital admissions)  --patient with lower abdominal pain: can do PO Norco, patient notes that dilaudid works for breakthrough pain. (looks like he only got it one time at OSH).  --continue acyclovir, allopurinol  --patient with concern for sepsis at [facility] was last on vancomycin, cefepime and IV metronidazole will continue those here for now. Low threshold to de-escalate antibiotics. On arrival thet patient is normotensives and HDS.  --Per last CT abdomen: Interval increase in size of the centrally necrotic ileocecal lymphomatous mass. Superimposed infection within the necrotic mass cannot be excluded.  -takes steroids 50mg daily  - consulting IR for biopsy of mass to assess treatment response             Leukocytosis  WBC of 16 most recently  Continue abx for now  No evidence of fevers/chills from history and HDS  Could be related to underlying malignancy vs bowel infection     Compression of vena cava  CRS: this is due to dehydration because the IVC is flat throughout its course rather than just at one level.  Treating with IVF.    Hyponatremia  Likely 2/2 to hyperglycemia  Follow up CMP this AM  Noted to have improved with fluids at OSH    COVID-19 virus infection  Noted to be positive on 2/6/2023    Thrombocytopenia  Platelets of 53 at OSH.    --Continue to monitor with CBC  --Transfuse for platelets less than 10 or if actively bleeding    Hyperlipidemia, unspecified  Continue home statin medications if taking    Diabetes mellitus type 2, noninsulin dependent  Hemoglobin A1c from 1 week ago was 7.7  Goal glucose  while inpatient: 140-180  Consider basal-bolus + SSI as needed  Hold home anti-diabetic medications  Patient takes daily steroids    Anemia  Hemoglobin of 8.3 most recently from OSH. Patient did have Hgb of 6.9.    Plan:  --type and screen  --transfuse for Hgb less than 7 or if actively bleeding.    Small bowel mass  S/p  Resection September 2022 found to have DLBCL on pathology.         VTE Risk Mitigation (From admission, onward)         Ordered     Reason for No Pharmacological VTE Prophylaxis  Once        Question:  Reasons:  Answer:  Thrombocytopenia    02/25/23 0402     IP VTE HIGH RISK PATIENT  Once         02/25/23 0402     Place sequential compression device  Until discontinued         02/25/23 0402                Disposition: Remain admitted    Tra Costello MD  Bone Marrow Transplant  [facility] - Telemetry Stepdown

## 2023-02-26 NOTE — NURSING
Sepsis Screen (most recent)       Sepsis Screen (IP) - 02/26/23 0854       Is the patient's history or complaint suggestive of a possible infection? Yes  -    Are there at least two of the following signs and symptoms present? Yes  -    Sepsis signs/symptoms - Tachycardia Tachycardia     >90  -    Sepsis signs/symptoms - WBC WBC < 4,000 or WBC > 12,000  -    Are any of the following organ dysfunction criteria present and not considered to be due to a chronic condition? Yes  -    Organ Dysfunction Criteria Total Bilirubin > 2.0 Platelet count < 100,000  -    Organ Dysfunction Criteria - Resp Comp Respiratory Compromise: Requiring > 5L NC  -    Initiate Sepsis Protocol No  -    Reason sepsis not considered Pt. receiving appropriate management  -              User Key  (r) = Recorded By, (t) = Taken By, (c) = Cosigned By      Initials Name     Rowan Mcneal RN

## 2023-02-26 NOTE — PLAN OF CARE
Problem: Adult Inpatient Plan of Care  Goal: Plan of Care Review  Outcome: Ongoing, Progressing  Goal: Patient-Specific Goal (Individualized)  Outcome: Ongoing, Progressing  Goal: Absence of Hospital-Acquired Illness or Injury  Outcome: Ongoing, Progressing  Goal: Optimal Comfort and Wellbeing  Outcome: Ongoing, Progressing  Goal: Readiness for Transition of Care  Outcome: Ongoing, Progressing     Problem: Diabetes Comorbidity  Goal: Blood Glucose Level Within Targeted Range  Outcome: Ongoing, Progressing     Problem: Adjustment to Illness (Sepsis/Septic Shock)  Goal: Optimal Coping  Outcome: Ongoing, Progressing     Problem: Bleeding (Sepsis/Septic Shock)  Goal: Absence of Bleeding  Outcome: Ongoing, Progressing     Problem: Glycemic Control Impaired (Sepsis/Septic Shock)  Goal: Blood Glucose Level Within Desired Range  Outcome: Ongoing, Progressing     Problem: Infection Progression (Sepsis/Septic Shock)  Goal: Absence of Infection Signs and Symptoms  Outcome: Ongoing, Progressing     Problem: Nutrition Impaired (Sepsis/Septic Shock)  Goal: Optimal Nutrition Intake  Outcome: Ongoing, Progressing    Pt remained free of falls or injuries during shift. No signs of acute distress noted during shift.

## 2023-02-26 NOTE — SUBJECTIVE & OBJECTIVE
Subjective:     Interval History: No acute intervention by CRS indicated at this time.  Feeling better this morning after IVF, tolerating PO.  C/o constipation; giving lactulose.  CBC stabilizing today.  Will consult I.R. in the morning about performing the biopsy he was to have in [place] on Tuesday done here instead.    Objective:     Vital Signs (Most Recent):  Temp: 97.9 °F (36.6 °C) (02/26/23 1117)  Pulse: 95 (02/26/23 1117)  Resp: 16 (02/26/23 1117)  BP: (!) 101/57 (02/26/23 1117)  SpO2: 97 % (02/26/23 1117)   Vital Signs (24h Range):  Temp:  [97.6 °F (36.4 °C)-98.8 °F (37.1 °C)] 97.9 °F (36.6 °C)  Pulse:  [] 95  Resp:  [16-18] 16  SpO2:  [95 %-99 %] 97 %  BP: ()/(52-69) 101/57     Weight: 92.5 kg (203 lb 14.8 oz)  Body mass index is 26.9 kg/m².  Body surface area is 2.18 meters squared.    ECOG SCORE           [unfilled]    Intake/Output - Last 3 Shifts         02/24 0700  02/25 0659 02/25 0700 02/26 0659 02/26 0700 02/27 0659    P.O. 30      I.V. (mL/kg) 0 (0)      Other 0      Total Intake(mL/kg) 30 (0.3)      Urine (mL/kg/hr) 0 1500 (0.7) 400 (0.5)    Emesis/NG output 0      Other 0      Stool 0      Blood 0      Total Output 0 1500 400    Net +30 -1500 -400           Urine Occurrence 0 x 1050 x     Stool Occurrence 0 x      Emesis Occurrence 0 x              Physical Exam  Vitals reviewed.   Constitutional:       General: He is not in acute distress.  HENT:      Head: Normocephalic and atraumatic.      Right Ear: External ear normal.      Left Ear: External ear normal.   Eyes:      General:         Right eye: No discharge.         Left eye: No discharge.      Extraocular Movements: Extraocular movements intact.      Conjunctiva/sclera: Conjunctivae normal.   Cardiovascular:      Rate and Rhythm: Normal rate and regular rhythm.      Pulses: Normal pulses.      Heart sounds: Normal heart sounds.   Pulmonary:      Effort: Pulmonary effort is normal.      Breath sounds: Normal breath sounds.    Abdominal:      General: Abdomen is flat.      Palpations: Abdomen is soft.      Tenderness: There is no abdominal tenderness.   Musculoskeletal:      Cervical back: Normal range of motion and neck supple.      Right lower leg: No edema.      Left lower leg: No edema.   Skin:     General: Skin is warm and dry.   Neurological:      Mental Status: He is alert and oriented to person, place, and time. Mental status is at baseline.   Psychiatric:         Mood and Affect: Mood normal.         Behavior: Behavior normal.       Significant Labs:   CBC:   Recent Labs   Lab 02/25/23 0447 02/26/23  0647   WBC 18.54* 16.07*   HGB 7.4* 7.9*   HCT 23.9* 24.1*   PLT 55* 50*    and CMP:   Recent Labs   Lab 02/25/23 0447 02/26/23  0647   * 128*   K 4.0 3.8   CL 97 97   CO2 21* 23   * 162*   BUN 11 13   CREATININE 0.7 0.7   CALCIUM 8.5* 8.4*   PROT 5.1* 5.0*   ALBUMIN 2.3* 2.2*   BILITOT 2.0* 1.9*   ALKPHOS 106 110   AST 27 25   ALT 16 15   ANIONGAP 8 8       Diagnostic Results:  None

## 2023-02-26 NOTE — ASSESSMENT & PLAN NOTE
CRS: this is due to dehydration because the IVC is flat throughout its course rather than just at one level.  Treating with IVF.

## 2023-02-26 NOTE — ASSESSMENT & PLAN NOTE
Patient with small bowel resection approximately 5 months ago consistent with Diffuse large-B cell lymphoma, non germinal center origin. Additionally had omentum resection also showing DLBCL. Had bone marrow biopsy approximately 5 months ago that showed normo cellular marrow for age, no increase in blasts. Patient had PET scan from 1/11/23 showing hypermetabolic loops of small bowel most consistent with previous known disease and was thought to have improved (partial response to therapy noted from chart review) and was recommended he continue with R-CHOP at that time. Patient hospitalized at [facility] found to have likely worsening of lymphoma based on CT scan of abdomen showing partial compression of IVC. Transferred to [facility] for further evaluation given uncertainty in responsiveness to first-line chemo therapy.    --can obtain bone marrow bx to look for progression  --CRS consult for ileocecal stent placement  --consider alternative chemo regimen given progression (although appears patient has had delay in receiving chemo 2/2 to thrombocytopenia and hospital admissions)  --patient with lower abdominal pain: can do PO Norco, patient notes that dilaudid works for breakthrough pain. (looks like he only got it one time at OSH).  --continue acyclovir, allopurinol  --patient with concern for sepsis at [facility] was last on vancomycin, cefepime and IV metronidazole will continue those here for now. Low threshold to de-escalate antibiotics. On arrival thet patient is normotensives and HDS.  --Per last CT abdomen: Interval increase in size of the centrally necrotic ileocecal lymphomatous mass. Superimposed infection within the necrotic mass cannot be excluded.  -takes steroids 50mg daily  - consulting IR for biopsy of mass to assess treatment response

## 2023-02-26 NOTE — PROGRESS NOTES
Pharmacokinetic Assessment Follow Up: IV Vancomycin    Vancomycin serum concentration assessment(s):    -Trough of 15.9 drawn after dose began infusing  -Scr stable at 0.7    Vancomycin Regimen Plan:    -Continue vancomycin 1500 mg IVPB Q12H  -Will repeat trough today @ 1700    Drug levels (last 3 results):  Recent Labs   Lab Result Units 02/26/23  0647   Vancomycin-Trough ug/mL 15.9       Pharmacy will continue to follow and monitor vancomycin.    Please contact pharmacy at extension [phone] for questions regarding this assessment.    Thank you for the consult,   Carlos Weiss       Patient brief summary:  Dwight Hoffmann is a 52 y.o. male initiated on antimicrobial therapy with IV Vancomycin for treatment of sepsis    The patient's current regimen is vancomycin 1500 mg IVPB Q12H    Drug Allergies:   Review of patient's allergies indicates:   Allergen Reactions    Albuterol (bulk) Palpitations       Actual Body Weight:   93 kg    Renal Function:   Estimated Creatinine Clearance: 136.3 mL/min (based on SCr of 0.7 mg/dL).,     Dialysis Method (if applicable):  N/A    CBC (last 72 hours):  Recent Labs   Lab Result Units 02/23/23  1544 02/23/23  2230 02/24/23 0218 02/25/23  0447   WBC K/uL  --   --  16.98* 18.54*   Hemoglobin g/dL 8.6* 8.2* 8.3* 7.4*   Hematocrit %  --  25.7* 25.9* 23.9*   Platelets K/uL  --   --  53* 55*   Gran % %  --   --  77.2* 85.1*   Lymph % %  --   --  7.1* 4.2*   Mono % %  --   --  14.3 9.1   Eosinophil % %  --   --  0.1 0.1   Basophil % %  --   --  0.1 0.2   Differential Method   --   --  Automated Automated       Metabolic Panel (last 72 hours):  Recent Labs   Lab Result Units 02/24/23  0218 02/24/23  0835 02/25/23  0447 02/26/23  0647   Sodium mmol/L  --  131* 126* 128*   Potassium mmol/L  --  3.9 4.0 3.8   Chloride mmol/L  --  101 97 97   CO2 mmol/L  --  21* 21* 23   Glucose mg/dL  --  163* 163* 162*   BUN mg/dL  --  17 11 13   Creatinine mg/dL  --  0.8 0.7 0.7   Albumin g/dL  --   --  2.3*  2.2*   Total Bilirubin mg/dL  --   --  2.0* 1.9*   Alkaline Phosphatase U/L  --   --  106 110   AST U/L  --   --  27 25   ALT U/L  --   --  16 15   Magnesium mg/dL 1.8  --  2.0 1.8   Phosphorus mg/dL  --   --  2.7 2.8       Vancomycin Administrations:  vancomycin given in the last 96 hours                     vancomycin 1,500 mg in dextrose 5 % (D5W) 250 mL IVPB (Vial-Mate) (mg) 1,500 mg New Bag 02/26/23 0633     1,500 mg New Bag 02/25/23 1649     1,500 mg New Bag  0558    vancomycin in dextrose 5 % 1 gram/250 mL IVPB 1,000 mg (mg) 1,000 mg New Bag 02/24/23 2110                    Microbiologic Results:  Microbiology Results (last 7 days)       ** No results found for the last 168 hours. **           Unknown

## 2023-02-27 NOTE — SUBJECTIVE & OBJECTIVE
Subjective:     Interval History: Doing well this morning, pain controlled with pain meds. Reports eating and drinking well.     Objective:     Vital Signs (Most Recent):  Temp: 98.1 °F (36.7 °C) (02/27/23 1143)  Pulse: 107 (02/27/23 1143)  Resp: 20 (02/27/23 1143)  BP: (!) 103/59 (02/27/23 1143)  SpO2: 98 % (02/27/23 1143)   Vital Signs (24h Range):  Temp:  [97.8 °F (36.6 °C)-98.7 °F (37.1 °C)] 98.1 °F (36.7 °C)  Pulse:  [] 107  Resp:  [16-20] 20  SpO2:  [96 %-100 %] 98 %  BP: ()/(57-69) 103/59     Weight: 92.5 kg (203 lb 14.8 oz)  Body mass index is 26.9 kg/m².  Body surface area is 2.18 meters squared.    ECOG SCORE           [unfilled]    Intake/Output - Last 3 Shifts         02/25 0700  02/26 0659 02/26 0700  02/27 0659 02/27 0700  02/28 0659    P.O.  240     I.V. (mL/kg)       Other       IV Piggyback  1266.7     Total Intake(mL/kg)  1506.7 (16.3)     Urine (mL/kg/hr) 1500 (0.7) 1300 (0.6)     Emesis/NG output       Other       Stool  0     Blood       Total Output 1500 1300     Net -1500 +206.7            Urine Occurrence 1050 x 4 x     Stool Occurrence  3 x             Physical Exam  Vitals reviewed.   Constitutional:       General: He is not in acute distress.  HENT:      Head: Normocephalic and atraumatic.      Right Ear: External ear normal.      Left Ear: External ear normal.   Eyes:      General:         Right eye: No discharge.         Left eye: No discharge.      Extraocular Movements: Extraocular movements intact.      Conjunctiva/sclera: Conjunctivae normal.   Cardiovascular:      Rate and Rhythm: Normal rate and regular rhythm.      Pulses: Normal pulses.      Heart sounds: Normal heart sounds.   Pulmonary:      Effort: Pulmonary effort is normal.      Breath sounds: Normal breath sounds.   Abdominal:      General: Abdomen is flat.      Palpations: Abdomen is soft. There is mass (RLQ palpable mass).      Tenderness: There is no abdominal tenderness.   Musculoskeletal:      Cervical  back: Normal range of motion and neck supple.      Right lower leg: No edema.      Left lower leg: No edema.   Skin:     General: Skin is warm and dry.   Neurological:      Mental Status: He is alert and oriented to person, place, and time. Mental status is at baseline.   Psychiatric:         Mood and Affect: Mood normal.         Behavior: Behavior normal.       Significant Labs:   CBC:   Recent Labs   Lab 02/26/23  0647   WBC 16.07*   HGB 7.9*   HCT 24.1*   PLT 50*      and CMP:   Recent Labs   Lab 02/26/23  0647 02/27/23  0818   * 132*   K 3.8 3.8   CL 97 101   CO2 23 22*   * 138*   BUN 13 12   CREATININE 0.7 0.7   CALCIUM 8.4* 7.9*   PROT 5.0* 4.6*   ALBUMIN 2.2* 2.0*   BILITOT 1.9* 1.6*   ALKPHOS 110 90   AST 25 28   ALT 15 12   ANIONGAP 8 9         Diagnostic Results:  None

## 2023-02-27 NOTE — PLAN OF CARE
POC is reviewed and understood by patient. Independent to toilet. Pt had an EKG done for tachycardia. Pt placed back on telemetry per MD on call order. Receives dilaudid PRN for pain meds, and Zofran for nausea. Call bell in reach. HOB elevated. Bed in lowest position. WCTM.   Problem: Adult Inpatient Plan of Care  Goal: Plan of Care Review  2/27/2023 0633 by Kaitlyn Bobby LPN  Outcome: Ongoing, Progressing  2/27/2023 0633 by Kaitlyn Bobby LPN  Outcome: Ongoing, Progressing  Goal: Patient-Specific Goal (Individualized)  2/27/2023 0633 by Kaitlyn Bobby LPN  Outcome: Ongoing, Progressing  2/27/2023 0633 by Kaitlyn Bobby LPN  Outcome: Ongoing, Progressing  Goal: Absence of Hospital-Acquired Illness or Injury  2/27/2023 0633 by Kaitlyn Bobby LPN  Outcome: Ongoing, Progressing  2/27/2023 0633 by Kaitlyn Bobby LPN  Outcome: Ongoing, Progressing  Goal: Optimal Comfort and Wellbeing  2/27/2023 0633 by Kaitlyn Bobby LPN  Outcome: Ongoing, Progressing  2/27/2023 0633 by Kaitlyn Bobby LPN  Outcome: Ongoing, Progressing  Goal: Readiness for Transition of Care  2/27/2023 0633 by Kaitlyn Bobby LPN  Outcome: Ongoing, Progressing  2/27/2023 0633 by Kaitlyn Bobby LPN  Outcome: Ongoing, Progressing     Problem: Diabetes Comorbidity  Goal: Blood Glucose Level Within Targeted Range  2/27/2023 0633 by Kaitlyn Bobby LPN  Outcome: Ongoing, Progressing  2/27/2023 0633 by Kaitlyn Bobby LPN  Outcome: Ongoing, Progressing     Problem: Adjustment to Illness (Sepsis/Septic Shock)  Goal: Optimal Coping  2/27/2023 0633 by Kaitlyn Bobby LPN  Outcome: Ongoing, Progressing  2/27/2023 0633 by Kaitlyn Bobby LPN  Outcome: Ongoing, Progressing     Problem: Bleeding (Sepsis/Septic Shock)  Goal: Absence of Bleeding  2/27/2023 0633 by Kaitlyn Bobby LPN  Outcome: Ongoing, Progressing  2/27/2023 0633 by Kaitlyn Bobby LPN  Outcome: Ongoing, Progressing     Problem: Glycemic Control Impaired (Sepsis/Septic Shock)  Goal: Blood Glucose Level Within Desired  Range  2/27/2023 0633 by Kaitlyn Bobby LPN  Outcome: Ongoing, Progressing  2/27/2023 0633 by Kaityln Bobby LPN  Outcome: Ongoing, Progressing     Problem: Infection Progression (Sepsis/Septic Shock)  Goal: Absence of Infection Signs and Symptoms  2/27/2023 0633 by Kaitlyn Bobby LPN  Outcome: Ongoing, Progressing  2/27/2023 0633 by Kaitlyn Bobby LPN  Outcome: Ongoing, Progressing     Problem: Nutrition Impaired (Sepsis/Septic Shock)  Goal: Optimal Nutrition Intake  2/27/2023 0633 by Kaitlyn Bobby LPN  Outcome: Ongoing, Progressing  2/27/2023 0633 by Kaitlyn Bobby LPN  Outcome: Ongoing, Progressing     Problem: Infection  Goal: Absence of Infection Signs and Symptoms  2/27/2023 0633 by Kaitlyn Bobby LPN  Outcome: Ongoing, Progressing  2/27/2023 0633 by Kaitlyn Bobby LPN  Outcome: Ongoing, Progressing     Problem: Adult Inpatient Plan of Care  Goal: Plan of Care Review  2/27/2023 0633 by Kaitlyn Bobby LPN  Outcome: Ongoing, Progressing  2/27/2023 0633 by Kaitlyn Bobby LPN  Outcome: Ongoing, Progressing  Goal: Patient-Specific Goal (Individualized)  2/27/2023 0633 by Kaitlyn Bobby LPN  Outcome: Ongoing, Progressing  2/27/2023 0633 by Kaitlyn Bobby LPN  Outcome: Ongoing, Progressing  Goal: Absence of Hospital-Acquired Illness or Injury  2/27/2023 0633 by Kaitlyn Bobby LPN  Outcome: Ongoing, Progressing  2/27/2023 0633 by Kaitlyn Bobby LPN  Outcome: Ongoing, Progressing  Goal: Optimal Comfort and Wellbeing  2/27/2023 0633 by Kaitlyn Bobby LPN  Outcome: Ongoing, Progressing  2/27/2023 0633 by Kaitlyn Bobby LPN  Outcome: Ongoing, Progressing  Goal: Readiness for Transition of Care  2/27/2023 0633 by Kaitlyn Bobby LPN  Outcome: Ongoing, Progressing  2/27/2023 0633 by Kaitlyn Bobby LPN  Outcome: Ongoing, Progressing     Problem: Diabetes Comorbidity  Goal: Blood Glucose Level Within Targeted Range  2/27/2023 0633 by Kaitlyn Bobby LPN  Outcome: Ongoing, Progressing  2/27/2023 0633 by Kaitlyn Bobby LPN  Outcome: Ongoing,  Progressing     Problem: Adjustment to Illness (Sepsis/Septic Shock)  Goal: Optimal Coping  2/27/2023 0633 by Kaitlyn Bobby LPN  Outcome: Ongoing, Progressing  2/27/2023 0633 by Kaitlyn Bobby LPN  Outcome: Ongoing, Progressing     Problem: Bleeding (Sepsis/Septic Shock)  Goal: Absence of Bleeding  2/27/2023 0633 by Kaitlyn Bobby LPN  Outcome: Ongoing, Progressing  2/27/2023 0633 by Kaitlyn Bobby LPN  Outcome: Ongoing, Progressing     Problem: Glycemic Control Impaired (Sepsis/Septic Shock)  Goal: Blood Glucose Level Within Desired Range  2/27/2023 0633 by Kaitlyn Bobby LPN  Outcome: Ongoing, Progressing  2/27/2023 0633 by Kaitlyn Bobby LPN  Outcome: Ongoing, Progressing     Problem: Infection Progression (Sepsis/Septic Shock)  Goal: Absence of Infection Signs and Symptoms  2/27/2023 0633 by Kaitlyn Bobby LPN  Outcome: Ongoing, Progressing  2/27/2023 0633 by Kaitlyn Bobby LPN  Outcome: Ongoing, Progressing     Problem: Nutrition Impaired (Sepsis/Septic Shock)  Goal: Optimal Nutrition Intake  2/27/2023 0633 by Kaitlyn Bobby LPN  Outcome: Ongoing, Progressing  2/27/2023 0633 by Kaitlyn Bobby LPN  Outcome: Ongoing, Progressing     Problem: Infection  Goal: Absence of Infection Signs and Symptoms  2/27/2023 0633 by Kaitlyn Bobby LPN  Outcome: Ongoing, Progressing  2/27/2023 0633 by Kaitlyn Bobby LPN  Outcome: Ongoing, Progressing

## 2023-02-27 NOTE — ASSESSMENT & PLAN NOTE
Platelets of 53 at OSH.    --Continue to monitor with CBC  --Transfuse for platelets less than 10 or <50 if actively bleeding

## 2023-02-27 NOTE — CONSULTS
[facility] - Telemetry Stepdown  Adult Nutrition  Consult Note    SUMMARY     Recommendations    1. Continue regular diet as appropriate. Add DM modifier as appropriate.   2. Add ONS Boost Glucose Control TID alternate flavors.   3. RD to follow.    Goals: Meet % EEN.  Nutrition Goal Status: new  Communication of RD Recs: other (comment) (POC)    Assessment and Plan    Nutrition Problem:  Moderate/Severe Protein-Calorie Malnutrition  Malnutrition in the context of Chronic Illness/Injury    Related to (etiology):  DLBCL (diffuse large B cell lymphoma)  S/P small bowel resection    Signs and Symptoms (as evidenced by):  Energy Intake: <75% of estimated energy requirement since admit  Body Fat Depletion: moderate depletion of orbitals and triceps   Muscle Mass Depletion: moderate depletion of temples, clavicle region, interosseous muscle, and lower extremities   Weight Loss: 13.6% x 6 months     Interventions(treatment strategy):  Collaboration of nutrition care with other providers  ONS    Malnutrition Assessment  Malnutrition Type: chronic illness              Orbital Region (Subcutaneous Fat Loss): mild depletion  Upper Arm Region (Subcutaneous Fat Loss): severe depletion   [place] Region (Muscle Loss): mild depletion  Clavicle Bone Region (Muscle Loss): well nourished  Dorsal Hand (Muscle Loss): well nourished  Posterior Calf Region (Muscle Loss): severe depletion       Subcutaneous Fat Loss (Final Summary): moderate protein-calorie malnutrition  Muscle Loss Evaluation (Final Summary): moderate protein-calorie malnutrition         Reason for Assessment    Reason For Assessment: consult  Diagnosis: other (see comments) (DLBCL (diffuse large B cell lymphoma))  Relevant Medical History: T2DM, HLD, leukocytosis, moderate malnutrition, S/P smallbowel resection  Interdisciplinary Rounds: did not attend    General Information Comments: RD consulted for malnutrition. Diagnosed with moderate malnutrition 9/16/22. Pt  "receiving regular diet. Pt reports does not drink ONS at home. NFPE performed 2/27. Moderate subcutaneous fat loss, and moderate muscle loss. Pt reports  lbs. Per chart review, over the past year peak weight was 235 lbs on 8/10/22 (32 lbs weight loss since 8/10/22 [x 6 months]). Estimated 13.6% BW loss x 6 months. Reports some weight loss but unsure how much or how long. Of note, recent weight documented on 2/23 and 2/24 noted as 135 lbs (outlier values), and weight on 2/25 203 lbs. Endorses good appetite and eating 50% of trays + snacks from home. Pt continues to meet criteria for moderate malnutriton d/t suboptimal PO intake, weight loss reported, and altered GI fxn.    Nutrition Discharge Planning: Pending clinical course    Nutrition Risk Screen    Nutrition Risk Screen: no indicators present    Nutrition/Diet History    Spiritual, Cultural Beliefs, Yazdanism Practices, Values that Affect Care: no  Food Allergies: NKFA    Anthropometrics    Temp: 98.1 °F (36.7 °C)  Height: 6' 1" (185.4 cm)  Height (inches): 73 in  Weight Method: Bed Scale  Weight: 92.5 kg (203 lb 14.8 oz)  Weight (lb): 203.93 lb  Ideal Body Weight (IBW), Male: 184 lb  % Ideal Body Weight, Male (lb): 110.83 %  BMI (Calculated): 26.9  BMI Grade: 25 - 29.9 - overweight       Lab/Procedures/Meds    Pertinent Labs Reviewed: reviewed  Pertinent Labs Comments: insulin, lactulose, MVI, polyethylene glycol  Pertinent Medications Reviewed: reviewed  Pertinent Medications Comments: Na 132, Glu 138, Ca 7.9, Tpro 4.6, Alb 2, Tbili 1.6, A1c 7.7 (on 2/15), WBC 16.07, RBC 2.64, H/H 7.9/24.1, Plt 50    Estimated/Assessed Needs    Weight Used For Calorie Calculations: 92.5 kg (203 lb 14.8 oz)  Energy Calorie Requirements (kcal): 9003-2730 kcal  Energy Need Method: Kcal/kg (20-25 kcal/kg)  Protein Requirements: 111-139 g protein (1.2-1.5 g/kg)  Weight Used For Protein Calculations: 92.5 kg (203 lb 14.8 oz)  Fluid Requirements (mL): 1 ml/kcal or per MD   "   RDA Method (mL): 1850  CHO Requirement: 231 g      Nutrition Prescription Ordered    Current Diet Order: Regular    Evaluation of Received Nutrient/Fluid Intake    I/O: -1.2L since admit  Comments: LBM: 2/22  Tolerance: tolerating  % Intake of Estimated Energy Needs: 50 - 75 %  % Meal Intake: 50 - 75 %    Nutrition Risk    Level of Risk/Frequency of Follow-up:  (1x/week)       Monitor and Evaluation    Food and Nutrient Intake: energy intake, food and beverage intake  Food and Nutrient Adminstration: diet order  Knowledge/Beliefs/Attitudes: food and nutrition knowledge/skill, beliefs and attitudes  Physical Activity and Function: factors affecting access to physical activity  Anthropometric Measurements: height/length, weight, weight change, body mass index  Biochemical Data, Medical Tests and Procedures: electrolyte and renal panel, gastrointestinal profile, glucose/endocrine profile, inflammatory profile, lipid profile  Nutrition-Focused Physical Findings: overall appearance, extremities, muscles and bones       Nutrition Follow-Up    RD Follow-up?: Yes

## 2023-02-27 NOTE — ASSESSMENT & PLAN NOTE
Patient with small bowel resection approximately 5 months ago consistent with Diffuse large-B cell lymphoma, non germinal center origin. Additionally had omentum resection also showing DLBCL. Had bone marrow biopsy approximately 5 months ago that showed normo cellular marrow for age, no increase in blasts. Patient had PET scan from 1/11/23 showing hypermetabolic loops of small bowel most consistent with previous known disease and was thought to have improved (partial response to therapy noted from chart review) and was recommended he continue with R-CHOP at that time. Patient hospitalized at [facility] found to have likely worsening of lymphoma based on CT scan of abdomen showing partial compression of IVC. Transferred to [facility] [place] for further evaluation given uncertainty in responsiveness to first-line chemo therapy.    --consider alternative chemo regimen given progression (although appears patient has had delay in receiving chemo 2/2 to thrombocytopenia and hospital admissions)  --patient with lower abdominal pain: controlled with PO Norco and IV dilaudid  --continue acyclovir, allopurinol  --patient with concern for sepsis at [facility] was last on vancomycin, cefepime and IV metronidazole will continue those here for now. Low threshold to de-escalate antibiotics. On arrival thet patient is normotensives and HDS.  --Per last CT abdomen: Interval increase in size of the centrally necrotic ileocecal lymphomatous mass. Superimposed infection within the necrotic mass cannot be excluded.  -takes steroids 50mg daily  - No safe window for Bx per IR. Will discuss case with GI regarding possible endoscopic bx.

## 2023-02-27 NOTE — PLAN OF CARE
Recommendations     1. Continue regular diet as appropriate. Add DM modifier as appropriate.   2. Add ONS Boost Glucose Control TID alternate flavors.   3. RD to follow.     Goals: Meet % EEN.  Nutrition Goal Status: new  Communication of RD Recs: other (comment) (POC)

## 2023-02-27 NOTE — PLAN OF CARE
Problem: Adult Inpatient Plan of Care  Goal: Plan of Care Review  Outcome: Ongoing, Progressing  Goal: Patient-Specific Goal (Individualized)  Outcome: Ongoing, Progressing  Goal: Absence of Hospital-Acquired Illness or Injury  Outcome: Ongoing, Progressing  Goal: Optimal Comfort and Wellbeing  Outcome: Ongoing, Progressing  Goal: Readiness for Transition of Care  Outcome: Ongoing, Progressing     Problem: Diabetes Comorbidity  Goal: Blood Glucose Level Within Targeted Range  Outcome: Ongoing, Progressing     Problem: Adjustment to Illness (Sepsis/Septic Shock)  Goal: Optimal Coping  Outcome: Ongoing, Progressing     Problem: Bleeding (Sepsis/Septic Shock)  Goal: Absence of Bleeding  Outcome: Ongoing, Progressing     Problem: Glycemic Control Impaired (Sepsis/Septic Shock)  Goal: Blood Glucose Level Within Desired Range  Outcome: Ongoing, Progressing     Problem: Infection Progression (Sepsis/Septic Shock)  Goal: Absence of Infection Signs and Symptoms  Outcome: Ongoing, Progressing     Problem: Nutrition Impaired (Sepsis/Septic Shock)  Goal: Optimal Nutrition Intake  Outcome: Ongoing, Progressing     Problem: Infection  Goal: Absence of Infection Signs and Symptoms  Outcome: Ongoing, Progressing    Pt remained free of falls or injuries during shift. No signs of acute distress noted during shift.

## 2023-02-27 NOTE — PROGRESS NOTES
Pharmacokinetic Assessment Follow Up: IV Vancomycin    Vancomycin serum concentration assessment(s):    The trough level was drawn incorrectly and cannot be used to guide therapy at this time. Trough drawn at 1750 and dose given at 1725.    Vancomycin Regimen Plan:    Continue current regimen and obtain trough prior to next dose. Due 2/27 @ 0500        Drug levels (last 3 results):  Recent Labs   Lab Result Units 02/26/23  0647 02/26/23  1750   Vancomycin-Trough ug/mL 15.9 33.0*       Pharmacy will continue to follow and monitor vancomycin.    Please contact pharmacy at extension [phone] for questions regarding this assessment.    Thank you for the consult,   Rachna Espinosa       Patient brief summary:  Dwight Hoffmann is a 52 y.o. male initiated on antimicrobial therapy with IV Vancomycin for treatment of sepsis    The patient's current regimen is 1500mg IV q12h    Drug Allergies:   Review of patient's allergies indicates:   Allergen Reactions    Albuterol (bulk) Palpitations       Actual Body Weight:   92.5 kg    Renal Function:   Estimated Creatinine Clearance: 136.3 mL/min (based on SCr of 0.7 mg/dL).,     Dialysis Method (if applicable):  N/A    CBC (last 72 hours):  Recent Labs   Lab Result Units 02/23/23  2230 02/24/23  0218 02/25/23  0447 02/26/23  0647   WBC K/uL  --  16.98* 18.54* 16.07*   Hemoglobin g/dL 8.2* 8.3* 7.4* 7.9*   Hematocrit % 25.7* 25.9* 23.9* 24.1*   Platelets K/uL  --  53* 55* 50*   Gran % %  --  77.2* 85.1* 82.6*   Lymph % %  --  7.1* 4.2* 5.5*   Mono % %  --  14.3 9.1 10.6   Eosinophil % %  --  0.1 0.1 0.2   Basophil % %  --  0.1 0.2 0.1   Differential Method   --  Automated Automated Automated       Metabolic Panel (last 72 hours):  Recent Labs   Lab Result Units 02/24/23  0218 02/24/23  0835 02/25/23  0447 02/26/23  0647   Sodium mmol/L  --  131* 126* 128*   Potassium mmol/L  --  3.9 4.0 3.8   Chloride mmol/L  --  101 97 97   CO2 mmol/L  --  21* 21* 23   Glucose mg/dL  --  163* 163* 162*    BUN mg/dL  --  17 11 13   Creatinine mg/dL  --  0.8 0.7 0.7   Albumin g/dL  --   --  2.3* 2.2*   Total Bilirubin mg/dL  --   --  2.0* 1.9*   Alkaline Phosphatase U/L  --   --  106 110   AST U/L  --   --  27 25   ALT U/L  --   --  16 15   Magnesium mg/dL 1.8  --  2.0 1.8   Phosphorus mg/dL  --   --  2.7 2.8       Vancomycin Administrations:  vancomycin given in the last 96 hours                     vancomycin 1,500 mg in dextrose 5 % (D5W) 250 mL IVPB (Vial-Mate) (mg) 1,500 mg New Bag 02/26/23 1725     1,500 mg New Bag  0633     1,500 mg New Bag 02/25/23 1649     1,500 mg New Bag  0558    vancomycin in dextrose 5 % 1 gram/250 mL IVPB 1,000 mg (mg) 1,000 mg New Bag 02/24/23 2110                    Microbiologic Results:  Microbiology Results (last 7 days)       ** No results found for the last 168 hours. **

## 2023-02-27 NOTE — CONSULTS
Biopsy Consult Note  Interventional Radiology    Consult Requested By: Tra Costello MD     Reason for Consult: worsening of lymphoma based on CT scan of abdomen showing partial compression of IVC. [place] [facility] [place] given uncertainty in responsiveness to first-line chemo therapy.     SUBJECTIVE:     Chief Complaint:  abdominal pain    History of Present Illness:  Dwight Hoffmann is a 52 y.o. male with a PMHx of DLBCL, T2DM, anemia, HLD, thrombocytopenia who was admitted on 2/22/23 at [facility] and [place] [facility] [place] for progression of ileocecal mass on R-CHOP.  Interventional Radiology has been consulted for image guided biopsy. Pt has had recent imaging including a CT a/p on 2/22/23 which revealed the largest focus of lymphoma is centered on the terminal ileum at the ileocecal junction, previously measuring 12.7 x 10.1 cm (AP, TV) and now measuring 14.4 x 11.6 cm. There is interval worsening dilation and mural thickening of the adjacent cecum with wall thickness measuring up to 2.7 cm, compared with 1.4 cm previously. The pt has not undergone biopsy of this mass in the past. His last biopsy/surgery on 09/2022 small bowel, resection revealed diffuse large B-cell lymphoma, non-germinal center origin.  The pt's Cr is 0.7 currently stable/at baseline.    Review of Systems   Constitutional:  Negative for chills, fever and malaise/fatigue.   Respiratory:  Negative for cough and shortness of breath.    Cardiovascular:  Negative for chest pain and leg swelling.   Gastrointestinal:  Positive for abdominal pain. Negative for constipation, diarrhea, nausea and vomiting.   Genitourinary:  Negative for dysuria and frequency.   Musculoskeletal:  Negative for back pain and neck pain.   Neurological:  Negative for dizziness, weakness and headaches.     Scheduled Meds:   acyclovir  400 mg Oral BID    allopurinoL  100 mg Oral Daily    ceFEPime (MAXIPIME) IVPB  2 g Intravenous Q8H     docusate sodium  50 mg Oral Daily    insulin detemir U-100  5 Units Subcutaneous Daily    lactulose  20 g Oral TID    metroNIDAZOLE  500 mg Oral Q8H    multivitamin  1 tablet Oral Daily    pantoprazole  40 mg Oral BID    polyethylene glycol  17 g Oral Daily    predniSONE  50 mg Oral Daily    sulfamethoxazole-trimethoprim 800-160mg  1 tablet Oral Once per day on Mon Wed Fri    tamsulosin  0.4 mg Oral Daily     Continuous Infusions:  PRN Meds:dextrose 10%, dextrose 10%, dextrose, dextrose, glucagon (human recombinant), HYDROcodone-acetaminophen, HYDROmorphone, insulin aspart U-100, naloxone, ondansetron, sodium chloride 0.9%    Review of patient's allergies indicates:   Allergen Reactions    Albuterol (bulk) Palpitations       Past Medical History:   Diagnosis Date    Cancer     Diabetes mellitus     Digestive disorder     Prediabetes      Past Surgical History:   Procedure Laterality Date    APPENDECTOMY  07/15/2014    COLONOSCOPY      COLONOSCOPY N/A 02/09/2022    ERROR.   He did not have a colonoscopy with Dr. Sanders.    COLONOSCOPY N/A 09/01/2022    Procedure: COLONOSCOPY;  Surgeon: Andrea Clemens MD;  Location: [facility];  Service: Endoscopy;  Laterality: N/A;    FLEXIBLE SIGMOIDOSCOPY N/A 11/7/2022    Procedure: SIGMOIDOSCOPY, FLEXIBLE;  Surgeon: Manuel Sanders MD;  Location: [facility];  Service: Endoscopy;  Laterality: N/A;    INCISION AND DRAINAGE OF ABDOMEN N/A 09/14/2022    Procedure: INCISION AND DRAINAGE, ABDOMEN;  Surgeon: Jan Oliveira Jr., MD;  Location: [facility];  Service: General;  Laterality: N/A;    INSERTION OF TUNNELED CENTRAL VENOUS CATHETER (CVC) WITH SUBCUTANEOUS PORT N/A 10/31/2022    Procedure: [id number];  Surgeon: Jan Oliveira Jr., MD;  Location: [facility] OR;  Service: General;  Laterality: N/A;    LAPAROSCOPIC DRAINAGE OF ABDOMEN N/A 09/23/2022    Procedure: DRAINAGE, ABDOMEN, LAPAROSCOPIC;  Surgeon: Jan Oliveira Jr., MD;  Location: [facility];  Service:  General;  Laterality: N/A;     Family History   Problem Relation Age of Onset    Cancer Mother         Colon    Diabetes Mother     Hypertension Mother     Seizures Father     Heart murmur Sister     Seizures Sister      Social History     Tobacco Use    Smoking status: Never    Smokeless tobacco: Never   Substance Use Topics    Alcohol use: Not Currently     Comment: occasional    Drug use: No       OBJECTIVE:     Vital Signs (Most Recent)  Temp: 98.6 °F (37 °C) (02/27/23 0841)  Pulse: (!) 129 (02/27/23 0841)  Resp: 18 (02/27/23 0841)  BP: (!) 108/59 (02/27/23 0841)  SpO2: 96 % (02/27/23 0841)    Physical Exam:  Physical Exam  Vitals and nursing note reviewed.   Constitutional:       Appearance: He is well-developed.   HENT:      Head: Normocephalic and atraumatic.   Eyes:      Extraocular Movements: Extraocular movements intact.      Pupils: Pupils are equal, round, and reactive to light.   Cardiovascular:      Rate and Rhythm: Normal rate and regular rhythm.      Heart sounds: Normal heart sounds.   Pulmonary:      Effort: Pulmonary effort is normal.   Abdominal:      General: Bowel sounds are normal.      Palpations: Abdomen is soft.      Tenderness: There is abdominal tenderness (lower quadrants).   Musculoskeletal:         General: Normal range of motion.      Cervical back: Normal range of motion and neck supple.   Skin:     General: Skin is warm and dry.   Neurological:      General: No focal deficit present.      Mental Status: He is alert.   Psychiatric:         Mood and Affect: Mood normal.       Laboratory  I have reviewed all pertinent lab results within the past 24 hours.  CBC:   Recent Labs   Lab 02/26/23  0647   WBC 16.07*   RBC 2.64*   HGB 7.9*   HCT 24.1*   PLT 50*   MCV 91   MCH 29.9   MCHC 32.8     CMP:   Recent Labs   Lab 02/27/23  0818   *   CALCIUM 7.9*   ALBUMIN 2.0*   PROT 4.6*   *   K 3.8   CO2 22*      BUN 12   CREATININE 0.7   ALKPHOS 90   ALT 12   AST 28   BILITOT 1.6*        ASA/Mallampati  ASA: 3  Mallampati: 2    Imaging:  Recent imaging studies including CT a/p on 2/22/23 which was independently reviewed by Dr. Montenegro and Erlinda Banda PA-C.     ASSESSMENT/PLAN:     Assessment:  52 y.o. male with a PMHx of DLBCL, T2DM, anemia, HLD, thrombocytopenia who has been referred to IR for CT guided targeted  ileocecal mass  biopsy. Plan discussed with ordering physician.  Concern for risk of perforation and infection after review of images.    Plan:  Due to concern for increased risk of bowel perforation and infection with CT guidance, IR staff recommending biopsy to be done endoscopically.  Notified hem/onc resident.  Thank you for the consult. IR will continue to follow. Please contact with questions via [place] secure chat or cell phone [phone].    Erlinda Banda PA-C  Interventional Radiology

## 2023-02-27 NOTE — PROGRESS NOTES
[facility] - Telemetry Stepdown  Hematology  Bone Marrow Transplant  Progress Note    Patient Name: Dwight Hoffmann  Admission Date: 2/25/2023  Hospital Length of Stay: 2 days  Code Status: Full Code    Subjective:     Interval History: Doing well this morning, pain controlled with pain meds. Reports eating and drinking well.     Objective:     Vital Signs (Most Recent):  Temp: 98.1 °F (36.7 °C) (02/27/23 1143)  Pulse: 107 (02/27/23 1143)  Resp: 20 (02/27/23 1143)  BP: (!) 103/59 (02/27/23 1143)  SpO2: 98 % (02/27/23 1143)   Vital Signs (24h Range):  Temp:  [97.8 °F (36.6 °C)-98.7 °F (37.1 °C)] 98.1 °F (36.7 °C)  Pulse:  [] 107  Resp:  [16-20] 20  SpO2:  [96 %-100 %] 98 %  BP: ()/(57-69) 103/59     Weight: 92.5 kg (203 lb 14.8 oz)  Body mass index is 26.9 kg/m².  Body surface area is 2.18 meters squared.    ECOG SCORE           [unfilled]    Intake/Output - Last 3 Shifts         02/25 0700  02/26 0659 02/26 0700  02/27 0659 02/27 0700  02/28 0659    P.O.  240     I.V. (mL/kg)       Other       IV Piggyback  1266.7     Total Intake(mL/kg)  1506.7 (16.3)     Urine (mL/kg/hr) 1500 (0.7) 1300 (0.6)     Emesis/NG output       Other       Stool  0     Blood       Total Output 1500 1300     Net -1500 +206.7            Urine Occurrence 1050 x 4 x     Stool Occurrence  3 x             Physical Exam  Vitals reviewed.   Constitutional:       General: He is not in acute distress.  HENT:      Head: Normocephalic and atraumatic.      Right Ear: External ear normal.      Left Ear: External ear normal.   Eyes:      General:         Right eye: No discharge.         Left eye: No discharge.      Extraocular Movements: Extraocular movements intact.      Conjunctiva/sclera: Conjunctivae normal.   Cardiovascular:      Rate and Rhythm: Normal rate and regular rhythm.      Pulses: Normal pulses.      Heart sounds: Normal heart sounds.   Pulmonary:      Effort: Pulmonary effort is normal.      Breath sounds: Normal breath sounds.    Abdominal:      General: Abdomen is flat.      Palpations: Abdomen is soft. There is mass (RLQ palpable mass).      Tenderness: There is no abdominal tenderness.   Musculoskeletal:      Cervical back: Normal range of motion and neck supple.      Right lower leg: No edema.      Left lower leg: No edema.   Skin:     General: Skin is warm and dry.   Neurological:      Mental Status: He is alert and oriented to person, place, and time. Mental status is at baseline.   Psychiatric:         Mood and Affect: Mood normal.         Behavior: Behavior normal.       Significant Labs:   CBC:   Recent Labs   Lab 02/26/23  0647   WBC 16.07*   HGB 7.9*   HCT 24.1*   PLT 50*      and CMP:   Recent Labs   Lab 02/26/23  0647 02/27/23  0818   * 132*   K 3.8 3.8   CL 97 101   CO2 23 22*   * 138*   BUN 13 12   CREATININE 0.7 0.7   CALCIUM 8.4* 7.9*   PROT 5.0* 4.6*   ALBUMIN 2.2* 2.0*   BILITOT 1.9* 1.6*   ALKPHOS 110 90   AST 25 28   ALT 15 12   ANIONGAP 8 9         Diagnostic Results:  None    Assessment/Plan:     * DLBCL (diffuse large B cell lymphoma)  Patient with small bowel resection approximately 5 months ago consistent with Diffuse large-B cell lymphoma, non germinal center origin. Additionally had omentum resection also showing DLBCL. Had bone marrow biopsy approximately 5 months ago that showed normo cellular marrow for age, no increase in blasts. Patient had PET scan from 1/11/23 showing hypermetabolic loops of small bowel most consistent with previous known disease and was thought to have improved (partial response to therapy noted from chart review) and was recommended he continue with R-CHOP at that time. Patient hospitalized at [facility] found to have likely worsening of lymphoma based on CT scan of abdomen showing partial compression of IVC. [place] [facility] [place] given uncertainty in responsiveness to first-line chemo therapy.    --consider alternative chemo regimen given  progression (although appears patient has had delay in receiving chemo 2/2 to thrombocytopenia and hospital admissions)  --patient with lower abdominal pain: controlled with PO Norco and IV dilaudid  --continue acyclovir, allopurinol  --patient with concern for sepsis at [facility] was last on vancomycin, cefepime and IV metronidazole will continue those here for now. Low threshold to de-escalate antibiotics. On arrival thet patient is normotensives and HDS.  --Per last CT abdomen: Interval increase in size of the centrally necrotic ileocecal lymphomatous mass. Superimposed infection within the necrotic mass cannot be excluded.  -takes steroids 50mg daily  - No safe window for Bx per IR. Will discuss case with GI regarding possible endoscopic bx.              Leukocytosis  No evidence of active infection, pulling off abx as appropriate  -- DC vanc on 2/27    Compression of vena cava  CRS: this is due to dehydration because the IVC is flat throughout its course rather than just at one level.  Treating with IVF.    Hyponatremia  Likely 2/2 to hyperglycemia  Improving on admission, though still mildly hyponatremic     COVID-19 virus infection  Noted to be positive on 2/6/2023    Thrombocytopenia  Platelets of 53 at OSH.    --Continue to monitor with CBC  --Transfuse for platelets less than 10 or <50 if actively bleeding    Hyperlipidemia, unspecified  Not currently taking a statin    Diabetes mellitus type 2, noninsulin dependent  Hemoglobin A1c from 1 week ago was 7.7  Goal glucose while inpatient: 140-180  Consider basal-bolus + SSI as needed  Hold home anti-diabetic medications  Patient takes daily steroids    Anemia  Hemoglobin of 8.3 most recently from OSH. Patient did have Hgb of 6.9.    Plan:  --type and screen  --transfuse for Hgb less than 7 or if actively bleeding.    Small bowel mass  S/p  Resection September 2022 found to have DLBCL on pathology.         VTE Risk Mitigation (From admission, onward)          Ordered     Reason for No Pharmacological VTE Prophylaxis  Once        Question:  Reasons:  Answer:  Thrombocytopenia    02/25/23 0402     IP VTE HIGH RISK PATIENT  Once         02/25/23 0402     Place sequential compression device  Until discontinued         02/25/23 0402                Disposition: Pending treatment    Mina Nolasco MD  Bone Marrow Transplant  [facility] - Telemetry Stepdown

## 2023-02-28 PROBLEM — R50.9 FEVER: Status: ACTIVE | Noted: 2023-01-01

## 2023-02-28 NOTE — SUBJECTIVE & OBJECTIVE
Subjective:     Interval History: Febrile overnight and with intermittent worsening tachycardia. Placed back on vanc and sepsis workup initiated. Patient reports continued abdominal pain, otherwise no new complaints.     Objective:     Vital Signs (Most Recent):  Temp: (!) 101.9 °F (38.8 °C) (02/28/23 0442)  Pulse: (!) 152 (02/28/23 0442)  Resp: 18 (02/28/23 0731)  BP: (!) 95/51 (02/28/23 0442)  SpO2: 97 % (02/28/23 0442)   Vital Signs (24h Range):  Temp:  [97.3 °F (36.3 °C)-101.9 °F (38.8 °C)] 101.9 °F (38.8 °C)  Pulse:  [] 152  Resp:  [16-20] 18  SpO2:  [96 %-99 %] 97 %  BP: ()/(51-67) 95/51     Weight: 92.5 kg (203 lb 14.8 oz)  Body mass index is 26.9 kg/m².  Body surface area is 2.18 meters squared.    ECOG SCORE           [unfilled]    Intake/Output - Last 3 Shifts         02/26 0700  02/27 0659 02/27 0700  02/28 0659 02/28 0700  03/01 0659    P.O. 240 1200     I.V. (mL/kg)  1100 (11.9)     IV Piggyback 1266.7      Total Intake(mL/kg) 1506.7 (16.3) 2300 (24.9)     Urine (mL/kg/hr) 1300 (0.6) 2650 (1.2)     Stool 0 0     Total Output 1300 2650     Net +206.7 -350            Urine Occurrence 4 x 1 x     Stool Occurrence 3 x 3 x             Physical Exam  Vitals reviewed.   Constitutional:       General: He is not in acute distress.     Appearance: He is ill-appearing. He is not toxic-appearing or diaphoretic.   HENT:      Head: Normocephalic and atraumatic.      Right Ear: External ear normal.      Left Ear: External ear normal.   Eyes:      General:         Right eye: No discharge.         Left eye: No discharge.      Extraocular Movements: Extraocular movements intact.      Conjunctiva/sclera: Conjunctivae normal.   Cardiovascular:      Rate and Rhythm: Normal rate and regular rhythm.      Pulses: Normal pulses.      Heart sounds: Normal heart sounds.   Pulmonary:      Effort: Pulmonary effort is normal.      Breath sounds: Normal breath sounds.   Abdominal:      General: Abdomen is flat.       Palpations: Abdomen is soft. There is mass (RLQ palpable mass).      Tenderness: There is abdominal tenderness. There is no guarding or rebound.   Musculoskeletal:      Cervical back: Normal range of motion and neck supple.      Right lower leg: No edema.      Left lower leg: No edema.   Skin:     General: Skin is warm and dry.   Neurological:      Mental Status: He is alert and oriented to person, place, and time. Mental status is at baseline.   Psychiatric:         Mood and Affect: Mood normal.         Behavior: Behavior normal.       Significant Labs:   CBC: No results for input(s): WBC, HGB, HCT, PLT in the last 48 hours. and CMP:   Recent Labs   Lab 02/27/23  0818 02/28/23  0607   * 133*   K 3.8 3.7    101   CO2 22* 24   * 132*   BUN 12 14   CREATININE 0.7 0.7   CALCIUM 7.9* 8.1*   PROT 4.6* 4.5*   ALBUMIN 2.0* 2.0*   BILITOT 1.6* 1.4*   ALKPHOS 90 84   AST 28 20   ALT 12 13   ANIONGAP 9 8       Diagnostic Results:  I have reviewed all pertinent imaging results/findings within the past 24 hours.

## 2023-02-28 NOTE — NURSING
Pt C/O of severe abd. Pain 10/10 scale after receiving 1mg Dilaudid IVP and Hydrocodone 10-325mg PO. States pain is worse than anytime prior. On call Oncology MD notified. Pt given another 1mg Dilaidid IVP per orders. Pt anxious and concerned regarding new worsening pain. VSS per FS. MD visited pt and wife at BS for assessment and discussion. Pt received 50mg Hydroxyzine PO as orderd following MD visit.   Pt noted resting peacefully on next rounds, wife at BS. Pt states his pain is relieved, 0/10 scale. NAD.

## 2023-02-28 NOTE — PROGRESS NOTES
Pharmacokinetic Initial Assessment: IV Vancomycin    Assessment/Plan:  Will restart regimen of 1500mg IV q12 as patient was previously therapeutic on this regimen, with no recent changes in renal function.     Trough to be drawn 3/2 @ 0800.     Please contact pharmacy at extension [phone] with any questions regarding this assessment.     Thank you for the consult,   Kristy Ryan       Patient brief summary:  Dwight Hoffmann is a 52 y.o. male initiated on antimicrobial therapy with IV Vancomycin for treatment of suspected neutropenic fever    Drug Allergies:   Review of patient's allergies indicates:   Allergen Reactions    Albuterol (bulk) Palpitations       Actual Body Weight:   92.5kg    Renal Function:   Estimated Creatinine Clearance: 136.3 mL/min (based on SCr of 0.7 mg/dL).,       CBC (last 72 hours):  Recent Labs   Lab Result Units 02/26/23  0647   WBC K/uL 16.07*   Hemoglobin g/dL 7.9*   Hematocrit % 24.1*   Platelets K/uL 50*   Gran % % 82.6*   Lymph % % 5.5*   Mono % % 10.6   Eosinophil % % 0.2   Basophil % % 0.1   Differential Method  Automated       Metabolic Panel (last 72 hours):  Recent Labs   Lab Result Units 02/26/23  0647 02/27/23  0818 02/28/23  0607   Sodium mmol/L 128* 132* 133*   Potassium mmol/L 3.8 3.8 3.7   Chloride mmol/L 97 101 101   CO2 mmol/L 23 22* 24   Glucose mg/dL 162* 138* 132*   BUN mg/dL 13 12 14   Creatinine mg/dL 0.7 0.7 0.7   Albumin g/dL 2.2* 2.0* 2.0*   Total Bilirubin mg/dL 1.9* 1.6* 1.4*   Alkaline Phosphatase U/L 110 90 84   AST U/L 25 28 20   ALT U/L 15 12 13   Magnesium mg/dL 1.8 1.7 1.7   Phosphorus mg/dL 2.8 2.7 2.8       Drug levels (last 3 results):  Recent Labs   Lab Result Units 02/26/23  0647 02/26/23  1750 02/27/23  0818 02/28/23  0607   Vancomycin, Random ug/mL  --   --   --  7.5   Vancomycin-Trough ug/mL 15.9 33.0* 30.7*  --        Microbiologic Results:  Microbiology Results (last 7 days)       Procedure Component Value Units Date/Time    Blood  culture [[id number]] Collected: 02/28/23 0607    Order Status: Sent Specimen: Blood from Antecubital, Left Arm Updated: 02/28/23 0634    Narrative:      Collection has been rescheduled by CAR1 at 02/28/2023 05:40 Reason:   Unable to collect. Nurse Nelly notified  Collection has been rescheduled by CAR1 at 02/28/2023 05:40 Reason:   Unable to collect. Nurse Nelly notified    Blood culture [[id number]] Collected: 02/28/23 0609    Order Status: Sent Specimen: Blood from Peripheral, Left Hand Updated: 02/28/23 0634    Narrative:      Collection has been rescheduled by CAR1 at 02/28/2023 05:40 Reason:   Unable to collect. Nurse Nelly notified  Collection has been rescheduled by CAR1 at 02/28/2023 05:40 Reason:   Unable to collect. Nurse Nelly notified

## 2023-02-28 NOTE — ASSESSMENT & PLAN NOTE
This is a 52-year-old male with relapsed/refractory diffuse large B-cell lymphoma of the small bowel were underwent resection followed by chemotherapy, now with progression disease large mass in the right abdomen with acute pain CT revealing over 14 cm mass involving the ileocecal region was not a surgical candidate.

## 2023-02-28 NOTE — PLAN OF CARE
Problem: Adult Inpatient Plan of Care  Goal: Plan of Care Review  Outcome: Ongoing, Progressing  Goal: Patient-Specific Goal (Individualized)  Outcome: Ongoing, Progressing  Goal: Absence of Hospital-Acquired Illness or Injury  Outcome: Ongoing, Progressing     Problem: Diabetes Comorbidity  Goal: Blood Glucose Level Within Targeted Range  Outcome: Ongoing, Progressing  Intervention: Monitor and Manage Glycemia  Flowsheets (Taken 2/28/2023 1712)  Glycemic Management: blood glucose monitored     Problem: Infection  Goal: Absence of Infection Signs and Symptoms  Outcome: Ongoing, Progressing     Problem: Fall Injury Risk  Goal: Absence of Fall and Fall-Related Injury  Outcome: Ongoing, Progressing  Intervention: Promote Injury-Free Environment  Flowsheets (Taken 2/28/2023 1712)  Safety Promotion/Fall Prevention:   assistive device/personal item within reach   Fall Risk reviewed with patient/family   Fall Risk signage in place   nonskid shoes/socks when out of bed   side rails raised x 2   instructed to call staff for mobility     POC reviewed. Address questions and concerns. AAOX4. HR been in 140's asymptomatic. Temp 102.8 ice packs applied. Pt remain IV Abx. Refused all po meds and meals. Pain manage with Dilaudid,prn. Safety precautions maintained. Call light in reach.

## 2023-02-28 NOTE — CARE UPDATE
RAPID RESPONSE NURSE ROUND       Rounding completed with charge RNLeena for tachycardia reports patient is stable but would follow up with nurse. No additional concerns verbalized at this time. Instructed to call [phone] for further concerns or assistance.

## 2023-02-28 NOTE — ASSESSMENT & PLAN NOTE
Platelets of 53 at OSH. Plt 28 on 2/28.     --Continue to monitor with CBC  --Transfuse for platelets less than 10 or <30-50 if actively bleeding

## 2023-02-28 NOTE — HPI
Ms. 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  His last cycle of R-CHOP was in January 2023.  A CT of the abdomen and pelvis on February 22, 2022 revealed dilated distal ileal loops of small bowel with circumferential thickening measuring to 2.4 cm, compatible with lymphoma of the small bowel.  The largest focus is centered on the terminal ileum at the ileocecal junction measuring 14.4 x 11.6 cm increased since prior.  There was worsening dilation and mural thickening of the adjacent cecum with wall thickness measuring 2.7 cm.  He was admitted to [facility] and was transferred here due to acute right abdominal pain for possible surgical intervention.  However, patient refused biopsy or surgical intervention.  Also, he is not a surgical candidate after evaluation by Colorectal surgery.  He developed acute pain over the last 24 hours and a repeat CT of the abdomen and pelvis earlier today reveals soft tissue mass in the central mesentery measuring 10.5 x 8.9 cm extending to the cecum with significant thickening of the wall.  There are enlarged mesenteric nodes noted.  No convincing evidence of obstruction.  He developed fever and has been started on IV antibiotics after blood cultures were obtained.  Radiation Oncology is consulted regarding palliative radiation to this region for pain control.    At present, he continues to have right sided abdominal pain which he rates at 8/10 with Dilaudid.  He denies nausea or vomiting.  His last bowel movement was on February 26, 2023 which was loose.  He denies rectal bleeding or dark stools.

## 2023-02-28 NOTE — CARE UPDATE
"RAPID RESPONSE NURSE CHART REVIEW       Chart Reviewed: 02/28/2023, 4:53 AM    MRN: [id number]  Bed: 8082/8082 A    Dx: DLBCL (diffuse large B cell lymphoma)    Dwight Hoffmann has a past medical history of Cancer, Diabetes mellitus, Digestive disorder, and Prediabetes.    Last VS: /67   Pulse (!) 152   Temp (!) 101.9 °F (38.8 °C) (Oral)   Resp 16   Ht 6' 1" (1.854 m)   Wt 92.5 kg (203 lb 14.8 oz)   SpO2 97%   BMI 26.90 kg/m²     24H Vital Sign Range:  Temp:  [97.3 °F (36.3 °C)-101.9 °F (38.8 °C)]   Pulse:  []   Resp:  [16-20]   BP: ()/(57-67)   SpO2:  [96 %-100 %]     Level of Consciousness (AVPU): alert    Recent Labs     02/26/23  0647   WBC 16.07*   HGB 7.9*   HCT 24.1*   PLT 50*       Recent Labs     02/26/23  0647 02/27/23  0818   * 132*   K 3.8 3.8   CL 97 101   CO2 23 22*   CREATININE 0.7 0.7   * 138*   PHOS 2.8 2.7   MG 1.8 1.7        No results for input(s): PH, PCO2, PO2, HCO3, POCSATURATED, BE in the last 72 hours.     OXYGEN:             MEWS score: 2    Bedside Vi NELSON  contacted, pt asymptomatic, EKG done, remainder of VSS, communicating with MD. No concerns verbalized at this time. Instructed to call [phone] for further concerns or assistance.    Vinh Daly RN       "

## 2023-02-28 NOTE — ASSESSMENT & PLAN NOTE
Patient febrile to 101.9 on 2/28. Likely multifactorial as patient with known malignancy with possible superimposed infection of intra-abdominal mass. Currently on Vanc, cefepime, flagyl. Blood cx pending. No respiratory or urinary sxs.

## 2023-02-28 NOTE — ASSESSMENT & PLAN NOTE
CRS: this is due to dehydration because the IVC is flat throughout its course rather than just at one level.  Treating with IVF. And encouraging PO intake

## 2023-02-28 NOTE — SUBJECTIVE & OBJECTIVE
Oncology Treatment Plan:   IP CHOP + OP RITUXIMAB Q3W    Current Facility-Administered Medications   Medication    acyclovir capsule 400 mg    allopurinoL tablet 100 mg    ceFEPIme (MAXIPIME) 2 g in dextrose 5 % in water (D5W) 5 % 50 mL IVPB (MB+)    dextrose 10% bolus 125 mL 125 mL    dextrose 10% bolus 250 mL 250 mL    dextrose 40 % gel 15,000 mg    dextrose 40 % gel 30,000 mg    docusate sodium capsule 50 mg    glucagon (human recombinant) injection 1 mg    HYDROcodone-acetaminophen  mg per tablet 1 tablet    HYDROmorphone injection 1 mg    hydrOXYzine HCL tablet 50 mg    insulin aspart U-100 pen 1-10 Units    insulin detemir U-100 pen 5 Units    lactulose 20 gram/30 mL solution Soln 20 g    metroNIDAZOLE tablet 500 mg    multivitamin tablet    naloxone 0.4 mg/mL injection 0.02 mg    ondansetron injection 4 mg    pantoprazole EC tablet 40 mg    polyethylene glycol packet 17 g    predniSONE tablet 50 mg    sodium chloride 0.9% flush 10 mL    sulfamethoxazole-trimethoprim 800-160mg per tablet 1 tablet    tamsulosin 24 hr capsule 0.4 mg    vancomycin - pharmacy to dose    vancomycin 1,500 mg in dextrose 5 % (D5W) 250 mL IVPB (Vial-Mate)       Review of patient's allergies indicates:   Allergen Reactions    Albuterol (bulk) Palpitations        Past Medical History:   Diagnosis Date    Cancer     Diabetes mellitus     Digestive disorder     Prediabetes      Past Surgical History:   Procedure Laterality Date    APPENDECTOMY  07/15/2014    COLONOSCOPY      COLONOSCOPY N/A 02/09/2022    ERROR.   He did not have a colonoscopy with Dr. Sanders.    COLONOSCOPY N/A 09/01/2022    Procedure: COLONOSCOPY;  Surgeon: Andrea Clemens MD;  Location: [facility];  Service: Endoscopy;  Laterality: N/A;    FLEXIBLE SIGMOIDOSCOPY N/A 11/7/2022    Procedure: SIGMOIDOSCOPY, FLEXIBLE;  Surgeon: Manuel Sanders MD;  Location: [facility];  Service: Endoscopy;  Laterality: N/A;    INCISION AND DRAINAGE OF ABDOMEN N/A 09/14/2022     Procedure: INCISION AND DRAINAGE, ABDOMEN;  Surgeon: Jan Oliveira Jr., MD;  Location: [facility] OR;  Service: General;  Laterality: N/A;    INSERTION OF TUNNELED CENTRAL VENOUS CATHETER (CVC) WITH SUBCUTANEOUS PORT N/A 10/31/2022    Procedure: [id number]-[id number];  Surgeon: Jan Oliveira Jr., MD;  Location: [facility] OR;  Service: General;  Laterality: N/A;    LAPAROSCOPIC DRAINAGE OF ABDOMEN N/A 09/23/2022    Procedure: DRAINAGE, ABDOMEN, LAPAROSCOPIC;  Surgeon: Jan Oliveira Jr., MD;  Location: [facility] OR;  Service: General;  Laterality: N/A;     Family History       Problem Relation (Age of Onset)    Cancer Mother    Diabetes Mother    Heart murmur Sister    Hypertension Mother    Seizures Father, Sister          Tobacco Use    Smoking status: Never    Smokeless tobacco: Never   Substance and Sexual Activity    Alcohol use: Not Currently     Comment: occasional    Drug use: No    Sexual activity: Yes     Partners: Female       Review of Systems   Constitutional:  Positive for activity change, appetite change and fatigue. Negative for fever.   HENT:  Negative for ear discharge, ear pain, facial swelling, hearing loss, tinnitus, trouble swallowing and voice change.    Eyes:  Negative for discharge, redness and visual disturbance.   Respiratory:  Negative for cough, chest tightness, wheezing and stridor.    Cardiovascular:  Negative for chest pain, palpitations and leg swelling.   Gastrointestinal:  Positive for abdominal pain. Negative for anal bleeding, diarrhea, nausea, rectal pain and vomiting.   Genitourinary:  Negative for dysuria, flank pain, hematuria and urgency.   Musculoskeletal:  Negative for back pain, gait problem, joint swelling, neck pain and neck stiffness.   Skin:  Negative for rash.   Neurological:  Negative for dizziness, tremors, syncope, facial asymmetry, weakness, numbness and headaches.   Hematological:  Negative for adenopathy. Does not bruise/bleed easily.    Psychiatric/Behavioral:  Negative for confusion and hallucinations.    Objective:     Vital Signs (Most Recent):  Temp: 98.9 °F (37.2 °C) (02/28/23 1103)  Pulse: (!) 122 (02/28/23 1122)  Resp: 20 (02/28/23 1350)  BP: 98/64 (02/28/23 1103)  SpO2: 96 % (02/28/23 1103)   Vital Signs (24h Range):  Temp:  [97.3 °F (36.3 °C)-101.9 °F (38.8 °C)] 98.9 °F (37.2 °C)  Pulse:  [] 122  Resp:  [16-20] 20  SpO2:  [95 %-99 %] 96 %  BP: ()/(51-67) 98/64     Weight: 92.5 kg (203 lb 14.8 oz)  Body mass index is 26.9 kg/m².  Body surface area is 2.18 meters squared.    Physical Exam  Vitals and nursing note reviewed.   Constitutional:       Appearance: He is well-developed. He is ill-appearing.   HENT:      Head: Normocephalic and atraumatic.   Eyes:      Pupils: Pupils are equal, round, and reactive to light.   Neck:      Thyroid: No thyromegaly.      Vascular: No JVD.      Trachea: No tracheal deviation.   Cardiovascular:      Rate and Rhythm: Regular rhythm. Tachycardia present.      Heart sounds: Normal heart sounds.   Pulmonary:      Effort: Pulmonary effort is normal.      Breath sounds: Normal breath sounds. No stridor. No wheezing or rales.   Chest:      Chest wall: No tenderness.   Abdominal:      General: Bowel sounds are normal. There is no distension.      Palpations: There is no mass.      Tenderness: There is abdominal tenderness. There is guarding.   Musculoskeletal:         General: No tenderness. Normal range of motion.      Cervical back: Normal range of motion and neck supple.   Lymphadenopathy:      Cervical: No cervical adenopathy.   Skin:     General: Skin is warm.      Coloration: Skin is not pale.      Findings: No erythema or rash.   Neurological:      Mental Status: He is alert and oriented to person, place, and time.      Cranial Nerves: No cranial nerve deficit.      Motor: No abnormal muscle tone.       Significant Labs:   All pertinent labs from the last 24 hours have been  reviewed.    Diagnostic Results:  CT: CT Abdomen Pelvis  Without Contrast    Result Date: 2/28/2023  The ileal cecal/mesenteric mass appears enlarged compared to prior though adjacent bowel is difficult to separate on this noncontrast study.  There appears to be involvement of a loop of small bowel with significant wall thickening as well.  Probable new peritoneal implant as above. No convincing bowel perforation. Foci in the right femur as above.  Attention on follow-up. This report was flagged in Epic as abnormal. Electronically signed by: Maulik Hurley MD Date:    02/28/2023 Time:    13:50

## 2023-02-28 NOTE — PROGRESS NOTES
[facility] - Telemetry Stepdown  Hematology  Bone Marrow Transplant  Progress Note    Patient Name: Dwight Hoffmann  Admission Date: 2/25/2023  Hospital Length of Stay: 3 days  Code Status: Full Code    Subjective:     Interval History: Febrile overnight and with intermittent worsening tachycardia. Placed back on vanc and sepsis workup initiated. Patient reports continued abdominal pain, otherwise no new complaints.     Objective:     Vital Signs (Most Recent):  Temp: (!) 101.9 °F (38.8 °C) (02/28/23 0442)  Pulse: (!) 152 (02/28/23 0442)  Resp: 18 (02/28/23 0731)  BP: (!) 95/51 (02/28/23 0442)  SpO2: 97 % (02/28/23 0442)   Vital Signs (24h Range):  Temp:  [97.3 °F (36.3 °C)-101.9 °F (38.8 °C)] 101.9 °F (38.8 °C)  Pulse:  [] 152  Resp:  [16-20] 18  SpO2:  [96 %-99 %] 97 %  BP: ()/(51-67) 95/51     Weight: 92.5 kg (203 lb 14.8 oz)  Body mass index is 26.9 kg/m².  Body surface area is 2.18 meters squared.    ECOG SCORE           [unfilled]    Intake/Output - Last 3 Shifts         02/26 0700  02/27 0659 02/27 0700  02/28 0659 02/28 0700  03/01 0659    P.O. 240 1200     I.V. (mL/kg)  1100 (11.9)     IV Piggyback 1266.7      Total Intake(mL/kg) 1506.7 (16.3) 2300 (24.9)     Urine (mL/kg/hr) 1300 (0.6) 2650 (1.2)     Stool 0 0     Total Output 1300 2650     Net +206.7 -350            Urine Occurrence 4 x 1 x     Stool Occurrence 3 x 3 x             Physical Exam  Vitals reviewed.   Constitutional:       General: He is not in acute distress.     Appearance: He is ill-appearing. He is not toxic-appearing or diaphoretic.   HENT:      Head: Normocephalic and atraumatic.      Right Ear: External ear normal.      Left Ear: External ear normal.   Eyes:      General:         Right eye: No discharge.         Left eye: No discharge.      Extraocular Movements: Extraocular movements intact.      Conjunctiva/sclera: Conjunctivae normal.   Cardiovascular:      Rate and Rhythm: Normal rate and regular rhythm.      Pulses:  Normal pulses.      Heart sounds: Normal heart sounds.   Pulmonary:      Effort: Pulmonary effort is normal.      Breath sounds: Normal breath sounds.   Abdominal:      General: Abdomen is flat.      Palpations: Abdomen is soft. There is mass (RLQ palpable mass).      Tenderness: There is abdominal tenderness. There is no guarding or rebound.   Musculoskeletal:      Cervical back: Normal range of motion and neck supple.      Right lower leg: No edema.      Left lower leg: No edema.   Skin:     General: Skin is warm and dry.   Neurological:      Mental Status: He is alert and oriented to person, place, and time. Mental status is at baseline.   Psychiatric:         Mood and Affect: Mood normal.         Behavior: Behavior normal.       Significant Labs:   CBC: No results for input(s): WBC, HGB, HCT, PLT in the last 48 hours. and CMP:   Recent Labs   Lab 02/27/23  0818 02/28/23  0607   * 133*   K 3.8 3.7    101   CO2 22* 24   * 132*   BUN 12 14   CREATININE 0.7 0.7   CALCIUM 7.9* 8.1*   PROT 4.6* 4.5*   ALBUMIN 2.0* 2.0*   BILITOT 1.6* 1.4*   ALKPHOS 90 84   AST 28 20   ALT 12 13   ANIONGAP 9 8       Diagnostic Results:  I have reviewed all pertinent imaging results/findings within the past 24 hours.    Assessment/Plan:     * DLBCL (diffuse large B cell lymphoma)  Patient with small bowel resection approximately 5 months ago consistent with Diffuse large-B cell lymphoma, non germinal center origin. Additionally had omentum resection also showing DLBCL. Had bone marrow biopsy approximately 5 months ago that showed normo cellular marrow for age, no increase in blasts. Patient had PET scan from 1/11/23 showing hypermetabolic loops of small bowel most consistent with previous known disease and was thought to have improved (partial response to therapy noted from chart review) and was recommended he continue with R-CHOP at that time. Patient hospitalized at [facility] found to have likely worsening of  lymphoma based on CT scan of abdomen showing partial compression of IVC. [place] [facility] [place] given uncertainty in responsiveness to first-line chemo therapy.    --consider alternative chemo regimen given progression (although appears patient has had delay in receiving chemo 2/2 to thrombocytopenia and hospital admissions)  --patient with lower abdominal pain: controlled with PO Norco and IV dilaudid  --continue acyclovir, allopurinol  --patient with concern for sepsis at [facility] was last on vancomycin, cefepime and IV metronidazole will continue those here for now. Low threshold to de-escalate antibiotics. On arrival thet patient is normotensives and HDS.  --Per last CT abdomen: Interval increase in size of the centrally necrotic ileocecal lymphomatous mass. Superimposed infection within the necrotic mass cannot be excluded.  -takes steroids 50mg daily  - No safe window for Bx per IR or CRS. Discussed case with GI, however patient declining bx on 2/28.   - Rad onc consulted for radiation evaluation.              Fever  Patient febrile to 101.9 on 2/28. Likely multifactorial as patient with known malignancy with possible superimposed infection of intra-abdominal mass. Currently on Vanc, cefepime, flagyl. Blood cx pending. No respiratory or urinary sxs.     Leukocytosis  No evidence of active infection, pulling off abx as appropriate. DC vanc on 2/27 however patient subsequently fevered the following evening so placed back on vanc.     Compression of vena cava  CRS: this is due to dehydration because the IVC is flat throughout its course rather than just at one level.  Treating with IVF. And encouraging PO intake     Hyponatremia  Likely 2/2 to hyperglycemia  Improving on admission, though still mildly hyponatremic     COVID-19 virus infection  Noted to be positive on 2/6/2023    Thrombocytopenia  Platelets of 53 at OSH. Plt 28 on 2/28.     --Continue to monitor with CBC  --Transfuse for  platelets less than 10 or <30-50 if actively bleeding    Hyperlipidemia, unspecified  Not currently taking a statin    Diabetes mellitus type 2, noninsulin dependent  Hemoglobin A1c from 1 week ago was 7.7  Goal glucose while inpatient: 140-180  Consider basal-bolus + SSI as needed  Hold home anti-diabetic medications  Patient takes daily steroids    Anemia  Hemoglobin of 8.3 most recently from OSH. Patient did have Hgb of 6.9. Hgb 7.0 on 2/28.     Plan:  --type and screen  --transfuse for Hgb less than 7 or if actively bleeding.    Small bowel mass  S/p  Resection September 2022 found to have DLBCL on pathology.   -- stat CTAP ordered on 2/28 given worsening ab pain to r/o perf  -- see DLBCL for full plan         VTE Risk Mitigation (From admission, onward)         Ordered     Reason for No Pharmacological VTE Prophylaxis  Once        Question:  Reasons:  Answer:  Thrombocytopenia    02/25/23 0402     IP VTE HIGH RISK PATIENT  Once         02/25/23 0402     Place sequential compression device  Until discontinued         02/25/23 0402                Disposition: Pending tx    Mina Nolasco MD  Bone Marrow Transplant  [facility] - Telemetry Stepdown

## 2023-02-28 NOTE — ASSESSMENT & PLAN NOTE
S/p  Resection September 2022 found to have DLBCL on pathology.   -- stat CTAP ordered on 2/28 given worsening ab pain to r/o perf  -- see DLBCL for full plan

## 2023-02-28 NOTE — NURSING
"Pt HR noted 140's, NST, sustained.   Dilaudid 1 mg IVP adm. for abd. pain 7/10. Pt states his "HR occasionally goes up that high for no reason."  Pt in NAD, resting peacefully. Denies pain upon reassessment. HR remains in 140's, NST. On call MD notified. Orders noted.    VS obtained per FS. EKG received. MD notified for EKG results, pt temp 101.9, BP and HR per FS. New orders noted.   "

## 2023-02-28 NOTE — ASSESSMENT & PLAN NOTE
Patient with small bowel resection approximately 5 months ago consistent with Diffuse large-B cell lymphoma, non germinal center origin. Additionally had omentum resection also showing DLBCL. Had bone marrow biopsy approximately 5 months ago that showed normo cellular marrow for age, no increase in blasts. Patient had PET scan from 1/11/23 showing hypermetabolic loops of small bowel most consistent with previous known disease and was thought to have improved (partial response to therapy noted from chart review) and was recommended he continue with R-CHOP at that time. Patient hospitalized at [facility] found to have likely worsening of lymphoma based on CT scan of abdomen showing partial compression of IVC. [place] [facility] [place] given uncertainty in responsiveness to first-line chemo therapy.    --consider alternative chemo regimen given progression (although appears patient has had delay in receiving chemo 2/2 to thrombocytopenia and hospital admissions)  --patient with lower abdominal pain: controlled with PO Norco and IV dilaudid  --continue acyclovir, allopurinol  --patient with concern for sepsis at [facility] was last on vancomycin, cefepime and IV metronidazole will continue those here for now. Low threshold to de-escalate antibiotics. On arrival thet patient is normotensives and HDS.  --Per last CT abdomen: Interval increase in size of the centrally necrotic ileocecal lymphomatous mass. Superimposed infection within the necrotic mass cannot be excluded.  -takes steroids 50mg daily  - No safe window for Bx per IR or CRS. Discussed case with GI, however patient declining bx on 2/28.   - Rad onc consulted for radiation evaluation.

## 2023-02-28 NOTE — CONSULTS
[facility] - Telemetry Stepdown  Radiation Oncology  Consult Note    Patient Name: Dwight Hoffmann  MRN: [id number]  Admission Date: 2/25/2023  Hospital Length of Stay: 3 days  Code Status: Full Code   Attending Provider: Sana Liu MD  Consulting Provider: Mayela Collins MD  Primary Care Physician: Primary Doctor No  Principal Problem:DLBCL (diffuse large B cell lymphoma)    Inpatient consult to Radiation Oncology  Consult performed by: Mayela Collins MD  Consult ordered by: Mina Nolasco MD        Subjective:     HPI:  Ms. 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  His last cycle of R-CHOP was in January 2023.  A CT of the abdomen and pelvis on February 22, 2022 revealed dilated distal ileal loops of small bowel with circumferential thickening measuring to 2.4 cm, compatible with lymphoma of the small bowel.  The largest focus is centered on the terminal ileum at the ileocecal junction measuring 14.4 x 11.6 cm increased since prior.  There was worsening dilation and mural thickening of the adjacent cecum with wall thickness measuring 2.7 cm.  He was admitted to [facility] and was transferred here due to acute right abdominal pain for possible surgical intervention.  However, patient refused biopsy or surgical intervention.  Also, he is not a surgical candidate after evaluation by Colorectal surgery.  He developed acute pain over the last 24 hours and a repeat CT of the abdomen and pelvis earlier today reveals soft tissue mass in the central mesentery measuring 10.5 x 8.9 cm extending to the cecum with significant thickening of the wall.  There are enlarged mesenteric nodes noted.  No convincing evidence of obstruction.  He  developed fever and has been started on IV antibiotics after blood cultures were obtained.  Radiation Oncology is consulted regarding palliative radiation to this region for pain control.    At present, he continues to have right sided abdominal pain which he rates at 8/10 with Dilaudid.  He denies nausea or vomiting.  His last bowel movement was on February 26, 2023 which was loose.  He denies rectal bleeding or dark stools.        Oncology Treatment Plan:   IP CHOP + OP RITUXIMAB Q3W    Current Facility-Administered Medications   Medication    acyclovir capsule 400 mg    allopurinoL tablet 100 mg    ceFEPIme (MAXIPIME) 2 g in dextrose 5 % in water (D5W) 5 % 50 mL IVPB (MB+)    dextrose 10% bolus 125 mL 125 mL    dextrose 10% bolus 250 mL 250 mL    dextrose 40 % gel 15,000 mg    dextrose 40 % gel 30,000 mg    docusate sodium capsule 50 mg    glucagon (human recombinant) injection 1 mg    HYDROcodone-acetaminophen  mg per tablet 1 tablet    HYDROmorphone injection 1 mg    hydrOXYzine HCL tablet 50 mg    insulin aspart U-100 pen 1-10 Units    insulin detemir U-100 pen 5 Units    lactulose 20 gram/30 mL solution Soln 20 g    metroNIDAZOLE tablet 500 mg    multivitamin tablet    naloxone 0.4 mg/mL injection 0.02 mg    ondansetron injection 4 mg    pantoprazole EC tablet 40 mg    polyethylene glycol packet 17 g    predniSONE tablet 50 mg    sodium chloride 0.9% flush 10 mL    sulfamethoxazole-trimethoprim 800-160mg per tablet 1 tablet    tamsulosin 24 hr capsule 0.4 mg    vancomycin - pharmacy to dose    vancomycin 1,500 mg in dextrose 5 % (D5W) 250 mL IVPB (Vial-Mate)       Review of patient's allergies indicates:   Allergen Reactions    Albuterol (bulk) Palpitations        Past Medical History:   Diagnosis Date    Cancer     Diabetes mellitus     Digestive disorder     Prediabetes      Past Surgical History:   Procedure Laterality Date    APPENDECTOMY  07/15/2014    COLONOSCOPY       COLONOSCOPY N/A 02/09/2022    ERROR.   He did not have a colonoscopy with Dr. Sanders.    COLONOSCOPY N/A 09/01/2022    Procedure: COLONOSCOPY;  Surgeon: Andrea Clemens MD;  Location: [facility] ENDO;  Service: Endoscopy;  Laterality: N/A;    FLEXIBLE SIGMOIDOSCOPY N/A 11/7/2022    Procedure: SIGMOIDOSCOPY, FLEXIBLE;  Surgeon: Manuel Sanders MD;  Location: [facility] ENDO;  Service: Endoscopy;  Laterality: N/A;    INCISION AND DRAINAGE OF ABDOMEN N/A 09/14/2022    Procedure: INCISION AND DRAINAGE, ABDOMEN;  Surgeon: Jan Oliveira Jr., MD;  Location: [facility] OR;  Service: General;  Laterality: N/A;    INSERTION OF TUNNELED CENTRAL VENOUS CATHETER (CVC) WITH SUBCUTANEOUS PORT N/A 10/31/2022    Procedure: [id number]-[id number];  Surgeon: Jan Oliveira Jr., MD;  Location: [facility] OR;  Service: General;  Laterality: N/A;    LAPAROSCOPIC DRAINAGE OF ABDOMEN N/A 09/23/2022    Procedure: DRAINAGE, ABDOMEN, LAPAROSCOPIC;  Surgeon: Jan Oliveira Jr., MD;  Location: [facility] OR;  Service: General;  Laterality: N/A;     Family History       Problem Relation (Age of Onset)    Cancer Mother    Diabetes Mother    Heart murmur Sister    Hypertension Mother    Seizures Father, Sister          Tobacco Use    Smoking status: Never    Smokeless tobacco: Never   Substance and Sexual Activity    Alcohol use: Not Currently     Comment: occasional    Drug use: No    Sexual activity: Yes     Partners: Female       Review of Systems   Constitutional:  Positive for activity change, appetite change and fatigue. Negative for fever.   HENT:  Negative for ear discharge, ear pain, facial swelling, hearing loss, tinnitus, trouble swallowing and voice change.    Eyes:  Negative for discharge, redness and visual disturbance.   Respiratory:  Negative for cough, chest tightness, wheezing and stridor.    Cardiovascular:  Negative for chest pain, palpitations and leg swelling.   Gastrointestinal:  Positive for abdominal pain.  Negative for anal bleeding, diarrhea, nausea, rectal pain and vomiting.   Genitourinary:  Negative for dysuria, flank pain, hematuria and urgency.   Musculoskeletal:  Negative for back pain, gait problem, joint swelling, neck pain and neck stiffness.   Skin:  Negative for rash.   Neurological:  Negative for dizziness, tremors, syncope, facial asymmetry, weakness, numbness and headaches.   Hematological:  Negative for adenopathy. Does not bruise/bleed easily.   Psychiatric/Behavioral:  Negative for confusion and hallucinations.    Objective:     Vital Signs (Most Recent):  Temp: 98.9 °F (37.2 °C) (02/28/23 1103)  Pulse: (!) 122 (02/28/23 1122)  Resp: 20 (02/28/23 1350)  BP: 98/64 (02/28/23 1103)  SpO2: 96 % (02/28/23 1103)   Vital Signs (24h Range):  Temp:  [97.3 °F (36.3 °C)-101.9 °F (38.8 °C)] 98.9 °F (37.2 °C)  Pulse:  [] 122  Resp:  [16-20] 20  SpO2:  [95 %-99 %] 96 %  BP: ()/(51-67) 98/64     Weight: 92.5 kg (203 lb 14.8 oz)  Body mass index is 26.9 kg/m².  Body surface area is 2.18 meters squared.    Physical Exam  Vitals and nursing note reviewed.   Constitutional:       Appearance: He is well-developed. He is ill-appearing.   HENT:      Head: Normocephalic and atraumatic.   Eyes:      Pupils: Pupils are equal, round, and reactive to light.   Neck:      Thyroid: No thyromegaly.      Vascular: No JVD.      Trachea: No tracheal deviation.   Cardiovascular:      Rate and Rhythm: Regular rhythm. Tachycardia present.      Heart sounds: Normal heart sounds.   Pulmonary:      Effort: Pulmonary effort is normal.      Breath sounds: Normal breath sounds. No stridor. No wheezing or rales.   Chest:      Chest wall: No tenderness.   Abdominal:      General: Bowel sounds are normal. There is no distension.      Palpations: There is no mass.      Tenderness: There is abdominal tenderness. There is guarding.   Musculoskeletal:         General: No tenderness. Normal range of motion.      Cervical back: Normal  range of motion and neck supple.   Lymphadenopathy:      Cervical: No cervical adenopathy.   Skin:     General: Skin is warm.      Coloration: Skin is not pale.      Findings: No erythema or rash.   Neurological:      Mental Status: He is alert and oriented to person, place, and time.      Cranial Nerves: No cranial nerve deficit.      Motor: No abnormal muscle tone.       Significant Labs:   All pertinent labs from the last 24 hours have been reviewed.    Diagnostic Results:  CT: CT Abdomen Pelvis  Without Contrast    Result Date: 2/28/2023  The ileal cecal/mesenteric mass appears enlarged compared to prior though adjacent bowel is difficult to separate on this noncontrast study.  There appears to be involvement of a loop of small bowel with significant wall thickening as well.  Probable new peritoneal implant as above. No convincing bowel perforation. Foci in the right femur as above.  Attention on follow-up. This report was flagged in Epic as abnormal. 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      After review of the images of the CT of the abdomen and pelvis, he is noted to have enlarging mass in the right abdomen with worsening pain.  He is not amenable to biopsy.  This appears to be her disease with pain.  Therefore, he was recommended to undergo palliative radiation to this region.  I plan to deliver approximately 20- 30 Gy based on treatment planning CT.  Also recommend better pain control positioning for radiation.  I plan to obtain a treatment planning CT soon as possible.  Please call if  questions.  [phone].  Thank you for your consult. I will follow-up with patient. Please contact us if you have any additional questions.    Mayela Collins MD  Radiation Oncology  [facility] - Telemetry Stepdown

## 2023-02-28 NOTE — ASSESSMENT & PLAN NOTE
Hemoglobin of 8.3 most recently from OSH. Patient did have Hgb of 6.9. Hgb 7.0 on 2/28.     Plan:  --type and screen  --transfuse for Hgb less than 7 or if actively bleeding.

## 2023-02-28 NOTE — ASSESSMENT & PLAN NOTE
No evidence of active infection, pulling off abx as appropriate. DC vanc on 2/27 however patient subsequently fevered the following evening so placed back on vanc.

## 2023-03-01 NOTE — ASSESSMENT & PLAN NOTE
S/p  Resection September 2022 found to have DLBCL on pathology.   -- stat CTAP ordered on 2/28 given worsening ab pain to r/o perf, no evidence of perf however interval enlargement of mass   -- see DLBCL for full plan

## 2023-03-01 NOTE — PROGRESS NOTES
[facility] - Telemetry Stepdown  Hematology  Bone Marrow Transplant  Progress Note    Patient Name: Dwight Hoffmann  Admission Date: 2/25/2023  Hospital Length of Stay: 4 days  Code Status: Full Code    Subjective:     Interval History: Again febrile overnight and with intermittent worsening tachycardia. Sxs likely d/t malignancy. Discontinued vanc today. Simulation scan today for XRT planning. Pain better controlled today.     Objective:     Vital Signs (Most Recent):  Temp: (!) 100.5 °F (38.1 °C) (03/01/23 1115)  Pulse: (!) 119 (03/01/23 1130)  Resp: 16 (03/01/23 1130)  BP: (!) 97/51 (03/01/23 1130)  SpO2: 95 % (03/01/23 1115)   Vital Signs (24h Range):  Temp:  [98.3 °F (36.8 °C)-102.8 °F (39.3 °C)] 100.5 °F (38.1 °C)  Pulse:  [117-148] 119  Resp:  [16-20] 16  SpO2:  [94 %-96 %] 95 %  BP: ()/(50-77) 97/51     Weight: 92.5 kg (203 lb 14.8 oz)  Body mass index is 26.9 kg/m².  Body surface area is 2.18 meters squared.    ECOG SCORE           [unfilled]    Intake/Output - Last 3 Shifts         02/27 0700  02/28 0659 02/28 0700  03/01 0659 03/01 0700  03/02 0659    P.O. 1200  200    I.V. (mL/kg) 1100 (11.9)      IV Piggyback       Total Intake(mL/kg) 2300 (24.9)  200 (2.2)    Urine (mL/kg/hr) 2650 (1.2) 925 (0.4) 350 (0.6)    Stool 0 0     Total Output 2650 925 350    Net -350 -925 -150           Urine Occurrence 1 x      Stool Occurrence 3 x 0 x             Physical Exam  Vitals reviewed.   Constitutional:       General: He is not in acute distress.     Appearance: He is ill-appearing. He is not toxic-appearing or diaphoretic.   HENT:      Head: Normocephalic and atraumatic.      Right Ear: External ear normal.      Left Ear: External ear normal.   Eyes:      General:         Right eye: No discharge.         Left eye: No discharge.      Extraocular Movements: Extraocular movements intact.      Conjunctiva/sclera: Conjunctivae normal.   Cardiovascular:      Rate and Rhythm: Normal rate and regular rhythm.       Pulses: Normal pulses.      Heart sounds: Normal heart sounds.   Pulmonary:      Effort: Pulmonary effort is normal.      Breath sounds: Normal breath sounds.   Abdominal:      General: Abdomen is flat.      Palpations: Abdomen is soft. There is mass (RLQ palpable mass).      Tenderness: There is abdominal tenderness. There is no guarding or rebound.   Musculoskeletal:      Cervical back: Normal range of motion and neck supple.      Right lower leg: No edema.      Left lower leg: No edema.   Skin:     General: Skin is warm and dry.   Neurological:      Mental Status: He is alert and oriented to person, place, and time. Mental status is at baseline.   Psychiatric:         Mood and Affect: Mood normal.         Behavior: Behavior normal.       Significant Labs:   CBC:   Recent Labs   Lab 02/28/23  0607 03/01/23  0532 03/01/23  0649   WBC 7.62 9.22 9.25   HGB 7.0* 7.4* 7.6*   HCT 22.7* 23.9* 24.6*   PLT 28* 27* 23*      and CMP:   Recent Labs   Lab 02/28/23  0607 03/01/23  0649   * 130*   K 3.7 3.9    99   CO2 24 23   * 137*   BUN 14 14   CREATININE 0.7 0.7   CALCIUM 8.1* 8.2*   PROT 4.5* 4.7*   ALBUMIN 2.0* 1.9*   BILITOT 1.4* 1.6*   ALKPHOS 84 78   AST 20 34   ALT 13 15   ANIONGAP 8 8         Diagnostic Results:  I have reviewed all pertinent imaging results/findings within the past 24 hours.    Assessment/Plan:     * DLBCL (diffuse large B cell lymphoma)  Patient with small bowel resection approximately 5 months ago consistent with Diffuse large-B cell lymphoma, non germinal center origin. Additionally had omentum resection also showing DLBCL. Had bone marrow biopsy approximately 5 months ago that showed normo cellular marrow for age, no increase in blasts. Patient had PET scan from 1/11/23 showing hypermetabolic loops of small bowel most consistent with previous known disease and was thought to have improved (partial response to therapy noted from chart review) and was recommended he continue with  R-CHOP at that time. Patient hospitalized at [facility] found to have likely worsening of lymphoma based on CT scan of abdomen showing partial compression of IVC. Transferred to [facility] for further evaluation given uncertainty in responsiveness to first-line chemo therapy.    --consider alternative chemo regimen given progression (although appears patient has had delay in receiving chemo 2/2 to thrombocytopenia and hospital admissions)  --patient with lower abdominal pain: controlled with PO Norco and IV dilaudid  --continue acyclovir, allopurinol  --patient with concern for sepsis at [facility] was last on vancomycin, cefepime and IV metronidazole will continue those here for now. Low threshold to de-escalate antibiotics. On arrival thet patient is normotensives and HDS.  --Per last CT abdomen: Interval increase in size of the centrally necrotic ileocecal lymphomatous mass. Superimposed infection within the necrotic mass cannot be excluded.  -takes steroids 50mg daily  - No safe window for Bx per IR or CRS. Discussed case with GI, however patient declining bx on 2/28.   - Rad onc consulted for radiation evaluation, plan for inpatient radiation.              Fever  Patient febrile to 101.9 on 2/28. Likely multifactorial as patient with known malignancy with possible superimposed infection of intra-abdominal mass. Currently on cefepime, flagyl. Blood cx NGTD. No respiratory or urinary sxs.     Leukocytosis  No evidence of active infection, pulling off abx as appropriate. DC vanc on 2/27 however patient subsequently fevered the following evening so placed back on vanc. Discontinued vanc again 3/1. Will continue to monitor.     Compression of vena cava  CRS: this is due to dehydration because the IVC is flat throughout its course rather than just at one level.  Treating with IVF. And encouraging PO intake     Hyponatremia  Improved during admission, though still mildly hyponatremic     COVID-19 virus  infection  Noted to be positive on 2/6/2023    Thrombocytopenia  Platelets of 53 at OSH. Plt 23 on 2/28.     --Continue to monitor with CBC  --Transfuse for platelets less than 10 or <30-50 if actively bleeding    Hyperlipidemia, unspecified  Not currently taking a statin    Diabetes mellitus type 2, noninsulin dependent  Hemoglobin A1c from 1 week ago was 7.7  Goal glucose while inpatient: 140-180  Consider basal-bolus + SSI as needed  Hold home anti-diabetic medications  Patient takes daily steroids    Anemia  Hemoglobin of 8.3 most recently from OSH. Patient did have Hgb of 6.9. Hgb 7.6 on 2/28.     Plan:  --type and screen  --transfuse for Hgb less than 7 or if actively bleeding.    Small bowel mass  S/p  Resection September 2022 found to have DLBCL on pathology.   -- stat CTAP ordered on 2/28 given worsening ab pain to r/o perf, no evidence of perf however interval enlargement of mass   -- see DLBCL for full plan         VTE Risk Mitigation (From admission, onward)         Ordered     Reason for No Pharmacological VTE Prophylaxis  Once        Question:  Reasons:  Answer:  Thrombocytopenia    02/25/23 0402     IP VTE HIGH RISK PATIENT  Once         02/25/23 0402     Place sequential compression device  Until discontinued         02/25/23 0402                Disposition: pending tx    Mina Nolasco MD  Bone Marrow Transplant  [facility] - Telemetry Stepdown

## 2023-03-01 NOTE — PLAN OF CARE
Problem: Adult Inpatient Plan of Care  Goal: Plan of Care Review  Outcome: Ongoing, Progressing  Goal: Absence of Hospital-Acquired Illness or Injury  Outcome: Ongoing, Progressing  Goal: Optimal Comfort and Wellbeing  Outcome: Ongoing, Progressing  Goal: Readiness for Transition of Care  Outcome: Ongoing, Progressing     Problem: Diabetes Comorbidity  Goal: Blood Glucose Level Within Targeted Range  Outcome: Ongoing, Progressing     Problem: Nutrition Impaired (Sepsis/Septic Shock)  Goal: Optimal Nutrition Intake  Outcome: Ongoing, Progressing     Problem: Infection  Goal: Absence of Infection Signs and Symptoms  Outcome: Ongoing, Progressing     Problem: Fall Injury Risk  Goal: Absence of Fall and Fall-Related Injury  Outcome: Ongoing, Progressing     Problem: Skin Injury Risk Increased  Goal: Skin Health and Integrity  Outcome: Ongoing, Progressing

## 2023-03-01 NOTE — CARE UPDATE
RAPID RESPONSE NURSE ROUND       Rounding completed with charge RNLeena for tachycardia reports NAD. No additional concerns verbalized at this time. Instructed to call [phone] for further concerns or assistance.

## 2023-03-01 NOTE — NURSING
Patients heart rate in the 150's when checking on patient stated he is having 9/10 pain. Patient medicated with Prn dilaudid and md made aware of HR and Temp by bedside LPN Juana awaiting on new orders

## 2023-03-01 NOTE — ASSESSMENT & PLAN NOTE
Platelets of 53 at OSH. Plt 23 on 2/28.     --Continue to monitor with CBC  --Transfuse for platelets less than 10 or <30-50 if actively bleeding

## 2023-03-01 NOTE — NURSING
Dr anthony notified pt HR went back to 140's this am, pt has temp 101.9'F earlier. No new order received. Will cont to monitor.

## 2023-03-01 NOTE — NURSING
Dr Nolasco notified that labs called to report magnesium value >9.5 and phosporus 6.5. MD ordered to recollect CMP.    0638 Dr Nolasco [place] [address], No new order received. Will cont to monitor.

## 2023-03-01 NOTE — NURSING
Spoke with Dr Perez regarding temp and Hr, he was placing orders, Juana LPN and Declan night shift RN notified

## 2023-03-01 NOTE — HPI
Mr. Dwight Hoffmann is a 52 y.o. man with a history of DM, anemia, DLBCL, HLD, who was admitted as a transfer from Replaced by [facility] presenting with refractory lymphoma and compression of IVC.  Transferred to [facility] for biopsy and to assess for any transfortmation or disease process givn lack of response to chemo and consideration of second line chemo.  Patient with CT from outside hospital concerning for compression of vena cava from lymphadenopathy.  Cardiology consulted at OSH for brief episode of SVT prior to transfer, and noted last echo from 2/8/23 was within normal. General surgery saw patient at OSH as well for concern of obstruction from lymphadenopathy but noted there was not complete obstruction. General surgery did not feel at the time the lymphadenopathy would be able to be safely removed or debulked from vena cava. There was some discussion about possible ileocecal shunt placement, but did not have capability at OSH. Admitted to BMT and was planning for alternative chemo regimen given progression, likely DHAP as inpatient.  Bone marrow aspiration and biopsy done 3/9 for restaging and patient had been scheduled for new baseline PET scan.

## 2023-03-01 NOTE — SUBJECTIVE & OBJECTIVE
Subjective:     Interval History: Again febrile overnight and with intermittent worsening tachycardia. Sxs likely d/t malignancy. Discontinued vanc today. Simulation scan today for XRT planning. Pain better controlled today.     Objective:     Vital Signs (Most Recent):  Temp: (!) 100.5 °F (38.1 °C) (03/01/23 1115)  Pulse: (!) 119 (03/01/23 1130)  Resp: 16 (03/01/23 1130)  BP: (!) 97/51 (03/01/23 1130)  SpO2: 95 % (03/01/23 1115)   Vital Signs (24h Range):  Temp:  [98.3 °F (36.8 °C)-102.8 °F (39.3 °C)] 100.5 °F (38.1 °C)  Pulse:  [117-148] 119  Resp:  [16-20] 16  SpO2:  [94 %-96 %] 95 %  BP: ()/(50-77) 97/51     Weight: 92.5 kg (203 lb 14.8 oz)  Body mass index is 26.9 kg/m².  Body surface area is 2.18 meters squared.    ECOG SCORE           [unfilled]    Intake/Output - Last 3 Shifts         02/27 0700  02/28 0659 02/28 0700  03/01 0659 03/01 0700  03/02 0659    P.O. 1200  200    I.V. (mL/kg) 1100 (11.9)      IV Piggyback       Total Intake(mL/kg) 2300 (24.9)  200 (2.2)    Urine (mL/kg/hr) 2650 (1.2) 925 (0.4) 350 (0.6)    Stool 0 0     Total Output 2650 925 350    Net -350 -925 -150           Urine Occurrence 1 x      Stool Occurrence 3 x 0 x             Physical Exam  Vitals reviewed.   Constitutional:       General: He is not in acute distress.     Appearance: He is ill-appearing. He is not toxic-appearing or diaphoretic.   HENT:      Head: Normocephalic and atraumatic.      Right Ear: External ear normal.      Left Ear: External ear normal.   Eyes:      General:         Right eye: No discharge.         Left eye: No discharge.      Extraocular Movements: Extraocular movements intact.      Conjunctiva/sclera: Conjunctivae normal.   Cardiovascular:      Rate and Rhythm: Normal rate and regular rhythm.      Pulses: Normal pulses.      Heart sounds: Normal heart sounds.   Pulmonary:      Effort: Pulmonary effort is normal.      Breath sounds: Normal breath sounds.   Abdominal:      General: Abdomen is flat.       Palpations: Abdomen is soft. There is mass (RLQ palpable mass).      Tenderness: There is abdominal tenderness. There is no guarding or rebound.   Musculoskeletal:      Cervical back: Normal range of motion and neck supple.      Right lower leg: No edema.      Left lower leg: No edema.   Skin:     General: Skin is warm and dry.   Neurological:      Mental Status: He is alert and oriented to person, place, and time. Mental status is at baseline.   Psychiatric:         Mood and Affect: Mood normal.         Behavior: Behavior normal.       Significant Labs:   CBC:   Recent Labs   Lab 02/28/23  0607 03/01/23  0532 03/01/23  0649   WBC 7.62 9.22 9.25   HGB 7.0* 7.4* 7.6*   HCT 22.7* 23.9* 24.6*   PLT 28* 27* 23*      and CMP:   Recent Labs   Lab 02/28/23  0607 03/01/23  0649   * 130*   K 3.7 3.9    99   CO2 24 23   * 137*   BUN 14 14   CREATININE 0.7 0.7   CALCIUM 8.1* 8.2*   PROT 4.5* 4.7*   ALBUMIN 2.0* 1.9*   BILITOT 1.4* 1.6*   ALKPHOS 84 78   AST 20 34   ALT 13 15   ANIONGAP 8 8         Diagnostic Results:  I have reviewed all pertinent imaging results/findings within the past 24 hours.

## 2023-03-01 NOTE — ASSESSMENT & PLAN NOTE
Patient with small bowel resection approximately 5 months ago consistent with Diffuse large-B cell lymphoma, non germinal center origin. Additionally had omentum resection also showing DLBCL. Had bone marrow biopsy approximately 5 months ago that showed normo cellular marrow for age, no increase in blasts. Patient had PET scan from 1/11/23 showing hypermetabolic loops of small bowel most consistent with previous known disease and was thought to have improved (partial response to therapy noted from chart review) and was recommended he continue with R-CHOP at that time. Patient hospitalized at [facility] found to have likely worsening of lymphoma based on CT scan of abdomen showing partial compression of IVC. [place] [facility] [place] given uncertainty in responsiveness to first-line chemo therapy.    --consider alternative chemo regimen given progression (although appears patient has had delay in receiving chemo 2/2 to thrombocytopenia and hospital admissions)  --patient with lower abdominal pain: controlled with PO Norco and IV dilaudid  --continue acyclovir, allopurinol  --patient with concern for sepsis at [facility] was last on vancomycin, cefepime and IV metronidazole will continue those here for now. Low threshold to de-escalate antibiotics. On arrival thet patient is normotensives and HDS.  --Per last CT abdomen: Interval increase in size of the centrally necrotic ileocecal lymphomatous mass. Superimposed infection within the necrotic mass cannot be excluded.  -takes steroids 50mg daily  - No safe window for Bx per IR or CRS. Discussed case with GI, however patient declining bx on 2/28.   - Rad onc consulted for radiation evaluation, plan for inpatient radiation.

## 2023-03-01 NOTE — ASSESSMENT & PLAN NOTE
Patient febrile to 101.9 on 2/28. Likely multifactorial as patient with known malignancy with possible superimposed infection of intra-abdominal mass. Currently on cefepime, flagyl. Blood cx NGTD. No respiratory or urinary sxs.

## 2023-03-01 NOTE — ASSESSMENT & PLAN NOTE
No evidence of active infection, pulling off abx as appropriate. DC vanc on 2/27 however patient subsequently fevered the following evening so placed back on vanc. Discontinued vanc again 3/1. Will continue to monitor.

## 2023-03-01 NOTE — NURSING
Notified Ana MERLOS of patient's 's-160's. Notified primary nurse of the HR also. Patient lying in bed complaining of abdominal pain. Primary nurse notified also. Orders placed. Will continue to monitor.

## 2023-03-01 NOTE — CARE UPDATE
RAPID RESPONSE NURSE PROACTIVE ROUNDING NOTE       Time of Visit:     Admit Date: 2023  LOS: 4  Code Status: Full Code   Date of Visit: 2023  : 1970  Age: 52 y.o.  Sex: male  Race: Black or   Bed: 8082/8082 A:   MRN: [id number]  Was the patient discharged from an ICU this admission? No   Was the patient discharged from a PACU within last 24 hours? No   Did the patient receive conscious sedation/general anesthesia in last 24 hours? No  Was the patient in the ED within the past 24 hours? No  Was the patient on NIPPV within the past 24 hours? No   Attending Physician: Sana Liu MD  Primary Service: Hematology and Oncology   Time spent at the bedside: 15 -30 min    SITUATION    Notified by charge RN during rounding.  Reason for alert: -160s, Temp 102.1  Called to evaluate the patient for  vital sign changes    BACKGROUND     Why is the patient in the hospital?: DLBCL (diffuse large B cell lymphoma)    Patient has a past medical history of Cancer, Diabetes mellitus, Digestive disorder, and Prediabetes.    Last Vitals:  Temp: 99.1 °F (37.3 °C) (51)  Pulse: 121 (51)  Resp: 17 (52)  BP: 99/67 (51)  SpO2: 94 % (51)    24 Hours Vitals Range:  Temp:  [98.8 °F (37.1 °C)-102.8 °F (39.3 °C)]   Pulse:  [117-152]   Resp:  [16-20]   BP: ()/(51-77)   SpO2:  [94 %-97 %]     Labs:  Recent Labs     23  0647 23  0607   WBC 16.07* 7.62   HGB 7.9* 7.0*   HCT 24.1* 22.7*   PLT 50* 28*       Recent Labs     23  0647 23  0818 23  0607   * 132* 133*   K 3.8 3.8 3.7   CL 97 101 101   CO2 23 22* 24   CREATININE 0.7 0.7 0.7   * 138* 132*   PHOS 2.8 2.7 2.8   MG 1.8 1.7 1.7        No results for input(s): PH, PCO2, PO2, HCO3, POCSATURATED, BE in the last 72 hours.     ASSESSMENT    Physical Exam  Constitutional:       Appearance: He is ill-appearing.   Cardiovascular:      Pulses:           Radial pulses are 2+ on the  right side and 2+ on the left side.      Heart sounds: Normal heart sounds.   Pulmonary:      Effort: Pulmonary effort is normal.   Musculoskeletal:      Right lower le+ Pitting Edema present.      Left lower le+ Pitting Edema present.   Feet:      Right foot:      Skin integrity: Skin integrity normal.      Left foot:      Skin integrity: Skin integrity normal.   Skin:     General: Skin is warm and dry.      Capillary Refill: Capillary refill takes less than 2 seconds.   Psychiatric:         Mood and Affect: Mood normal.         Behavior: Behavior is cooperative.       INTERVENTIONS    The patient was seen for Medical problem. Staff concerns included elevated temp, and incr HR. The following interventions were performed: no additional interventions needed at this time.    RECOMMENDATIONS      Follow up o Lactic results, continue to monitor vital signs and changes in LOC.    PROVIDER ESCALATION    Yes/No  no        Disposition: Remain in room 8082.    FOLLOW-UP    Bedside RNDeclan  updated on plan of care. Instructed to call the Rapid Response Nurse, Kenia Robison RN at [phone] for additional questions or concerns.

## 2023-03-01 NOTE — PROGRESS NOTES
Per Amira Curran, RT at the direction of Dr Kaur, wanted it noted that pt was unable to tolerate CT simulation due to increased pain.

## 2023-03-01 NOTE — PROGRESS NOTES
Admit Assessment    Patient Identification  Dwight Hoffmann   :  1970  Admit Date:  2023  Attending Provider:  Sana Liu MD              Referral:   Pt was admitted to  with a diagnosis of DLBCL (diffuse large B cell lymphoma), and was admitted this hospital stay due to Large cell lymphoma [C85.80].   is involved was referred to the Social Work Department via routine referral.  Patient presents as a 52 y.o. year old  male.    Persons interviewed: patient and spouse.    Living Situation:  Lives with wife Sundar ([phone]) in own home.  Independent with ADLs.      Resides at [address] [place] [address]   phone: [phone] (home).      (RETIRED) Functional Status Prior  Ambulation Prior: 0-->independent  Transferrin-->independent  Toiletin-->independent  Bathin-->assistive person  Dressin-->independent  Eatin-->independent  Communication: understands/communicates without difficulty  Swallowing: swallows foods/liquids without difficulty    Current or Past Agencies and Description of Services/Supplies    DME  Agency Name: none    Home Health  Agency Name: [facility] (pending start)  Agency Phone Number: [phone] fax  Services: SN, diabetic care, past wound care    IV Infusion  Agency Name: past use of [facility]    Nutrition: Oral, diabetic diet.      Outpatient Pharmacy:     [facility] - [place], [place] - [address]  [address] [address]  Phone: [phone] Fax: [phone]    [facility] - [place], [place] - [address]  [address] [address]  Phone: [phone] Fax: [phone]      Patient Preference of agencies include: none expressed    Patient/Caregiver informed of right to choose providers or agencies.  Patient provides permission to release any necessary information to [facility] and to Non[facility] agencies as needed to facilitate patient care,  treatment planning, and patient discharge planning.  Written and verbal resources provided.      Coping   Coping well with support of family.  Fatigued this afternoon.         Adjustment to Diagnosis and Treatment  Adequate.    Emotional/Behavioral/Cognitive Issues   Hx of anxiety; compliant with Xanax 0.5mg HS PRN.           History/Current Symptoms of Anxiety/Depression: Yes  History/Current Substance Use:   Social History     Tobacco Use    Smoking status: Never    Smokeless tobacco: Never   Substance and Sexual Activity    Alcohol use: Not Currently     Comment: occasional    Drug use: No    Sexual activity: Yes     Partners: Female       Indications of Abuse/Neglect: No:   Abuse Screen (yes response referral indicated)  Feels Unsafe at Home or Work/School: no  Feels Threatened by Someone: no  Does anyone try to keep you from having contact with others or doing things outside your home?: no  Physical Signs of Abuse Present: no    Financial:  Payer/Plan Subscr  Sex Relation Sub. Ins. ID Effective Group Num   1. MEDICA[place] - [place]* ADRIENNE NEELY JAKI 1970 Male Self [id number]* 18                                    [address]        Other identified concerns/needs: need to begin HH.    Plan: return home to care of family with HH support.    Interventions/Referrals:  TBD.  Will send new orders/referral to HH when available.    Patient/caregiver engaged in treatment planning process.     providing psychosocial and supportive counseling, resources, education, assistance and discharge planning as appropriate.  Patient/caregiver state understanding of  available resources,  following, remains available.  Given SWer contact info and encouraged to call with any needs or concerns.

## 2023-03-01 NOTE — ASSESSMENT & PLAN NOTE
Hemoglobin of 8.3 most recently from OSH. Patient did have Hgb of 6.9. Hgb 7.6 on 2/28.     Plan:  --type and screen  --transfuse for Hgb less than 7 or if actively bleeding.

## 2023-03-02 PROBLEM — C85.80 LARGE CELL LYMPHOMA: Status: ACTIVE | Noted: 2023-01-01

## 2023-03-02 NOTE — ASSESSMENT & PLAN NOTE
Due to lymphoma    --Continue to monitor with CBC  --Transfuse for platelets less than 10 or <30-50 if actively bleeding

## 2023-03-02 NOTE — PLAN OF CARE
Problem: Adult Inpatient Plan of Care  Goal: Plan of Care Review  Outcome: Ongoing, Progressing  Goal: Absence of Hospital-Acquired Illness or Injury  Outcome: Ongoing, Progressing  Goal: Optimal Comfort and Wellbeing  Outcome: Ongoing, Progressing     Problem: Diabetes Comorbidity  Goal: Blood Glucose Level Within Targeted Range  Outcome: Ongoing, Progressing     Problem: Infection Progression (Sepsis/Septic Shock)  Goal: Absence of Infection Signs and Symptoms  Outcome: Ongoing, Progressing     Problem: Nutrition Impaired (Sepsis/Septic Shock)  Goal: Optimal Nutrition Intake  Outcome: Ongoing, Progressing     Problem: Fall Injury Risk  Goal: Absence of Fall and Fall-Related Injury  Outcome: Ongoing, Progressing     POC reviewed. AAOX4. Hypotensive. Remain tachycardiac. Tylenol given for temp. Pt had radiation done today tolerated well. IV Fluids in progress. Pain manage with Dilaudid q3 for abdominal pain. Remain with poor appetite and inadequate hydration. Frequent safety precautions maintained. Call light in reach. Bed in lowest position.

## 2023-03-02 NOTE — ASSESSMENT & PLAN NOTE
Patient with small bowel resection approximately 5 months ago consistent with Diffuse large-B cell lymphoma, non germinal center origin. Additionally had omentum resection also showing DLBCL. Had bone marrow biopsy approximately 5 months ago that showed normo cellular marrow for age, no increase in blasts. Patient had PET scan from 1/11/23 showing hypermetabolic loops of small bowel most consistent with previous known disease and was thought to have improved (partial response to therapy noted from chart review) and was recommended he continue with R-CHOP at that time. Patient hospitalized at [facility] found to have likely worsening of lymphoma based on CT scan of abdomen showing partial compression of IVC. Transferred to [facility] [place] further evaluation given uncertainty in responsiveness to first-line chemo therapy.    --consider alternative chemo regimen given progression (although appears patient has had delay in receiving chemo 2/2 to thrombocytopenia and hospital admissions)  --patient with lower abdominal pain: controlled with PO Norco and IV dilaudid  --continue acyclovir, allopurinol  --patient with concern for sepsis at [facility] was last on vancomycin, cefepime and IV metronidazole will continue those here for now. Low threshold to de-escalate antibiotics. On arrival thet patient is normotensives and HDS.  --Per last CT abdomen: Interval increase in size of the centrally necrotic ileocecal lymphomatous mass. Superimposed infection within the necrotic mass cannot be excluded.  - changed steroid to pulse dose dexamethasone on 3/2  - No safe window for Bx per IR or CRS. Discussed case with GI, however patient declining bx on 2/28.   - Rad onc consulted for radiation evaluation, plan for inpatient radiation. Hopeful that pain is improved with PCA pump so he can tolerate simulation

## 2023-03-02 NOTE — PLAN OF CARE
Problem: Adult Inpatient Plan of Care  Goal: Plan of Care Review  Outcome: Ongoing, Progressing  Goal: Absence of Hospital-Acquired Illness or Injury  Outcome: Ongoing, Progressing  Goal: Optimal Comfort and Wellbeing  Outcome: Ongoing, Progressing  Goal: Readiness for Transition of Care  Outcome: Ongoing, Progressing     Problem: Diabetes Comorbidity  Goal: Blood Glucose Level Within Targeted Range  Outcome: Ongoing, Progressing     Problem: Infection  Goal: Absence of Infection Signs and Symptoms  Outcome: Ongoing, Progressing     Problem: Fall Injury Risk  Goal: Absence of Fall and Fall-Related Injury  Outcome: Ongoing, Progressing     Problem: Skin Injury Risk Increased  Goal: Skin Health and Integrity  Outcome: Ongoing, Progressing

## 2023-03-02 NOTE — ASSESSMENT & PLAN NOTE
Improved during admission, though still mildly hyponatremic    [Dear  ___] : Dear  [unfilled], [Please see my note below.] : Please see my note below. [Sincerely,] : Sincerely, [FreeTextEntry2] : Radha Leary MD [FreeTextEntry1] : Please find attached our consultation on your patient, KING MUNA \par We take a multidisciplinary team approach to patient care and consider you, the referring physician, an extension of our team. We will maintain an open line of communication with you throughout your patient's treatment course.  \par It is our commitment to provide your patient with the highest quality of advanced therapeutic options. We thank you for allowing us to participate in the care of your patient.\par Please do not hesitate to contact our team with any questions or concerns at [phone].\par  [FreeTextEntry3] : Chidi Egan MD, FRCS\par \par [facility] \par Department of Cardiothoracic  Surgery \par Director of Robotic Cardiac Surgery\par Professor of Cardiovascular & Thoracic Surgery\par [employer] \par \par [facility]\par

## 2023-03-02 NOTE — SUBJECTIVE & OBJECTIVE
Subjective:     Interval History: Started on PCA pump with improvement in pain. Unable to tolerate sim yesterday, will attempt again today vs tomorrow per rad onc availability. Transfused one unit pRBCs.     Objective:     Vital Signs (Most Recent):  Temp: 98.1 °F (36.7 °C) (03/02/23 1527)  Pulse: (!) 128 (03/02/23 1530)  Resp: 18 (03/02/23 1527)  BP: 100/66 (03/02/23 1527)  SpO2: 95 % (03/02/23 1527)   Vital Signs (24h Range):  Temp:  [98.1 °F (36.7 °C)-102.1 °F (38.9 °C)] 98.1 °F (36.7 °C)  Pulse:  [110-129] 128  Resp:  [18-22] 18  SpO2:  [93 %-100 %] 95 %  BP: ()/(59-71) 100/66     Weight: 92.5 kg (203 lb 14.8 oz)  Body mass index is 26.9 kg/m².  Body surface area is 2.18 meters squared.    ECOG SCORE           [unfilled]    Intake/Output - Last 3 Shifts         02/28 0700  03/01 0659 03/01 0700  03/02 0659 03/02 0700  03/03 0659    P.O.  320 360    I.V. (mL/kg)       Total Intake(mL/kg)  320 (3.5) 360 (3.9)    Urine (mL/kg/hr) 925 (0.4) 950 (0.4) 550 (0.6)    Stool 0 0 0    Total Output 925 950 550    Net -925 -630 -190           Stool Occurrence 0 x 0 x 0 x            Physical Exam  Constitutional:       General: He is not in acute distress.     Appearance: He is well-developed. He is ill-appearing. He is not diaphoretic.   HENT:      Head: Normocephalic and atraumatic.   Eyes:      General: No scleral icterus.     Conjunctiva/sclera: Conjunctivae normal.   Cardiovascular:      Rate and Rhythm: Normal rate and regular rhythm.      Heart sounds: Normal heart sounds. No murmur heard.    No friction rub. No gallop.   Pulmonary:      Effort: Pulmonary effort is normal. No respiratory distress.      Breath sounds: Normal breath sounds. No wheezing or rales.   Abdominal:      General: Bowel sounds are normal. There is no distension.      Tenderness: There is abdominal tenderness.   Musculoskeletal:         General: Normal range of motion.      Cervical back: Normal range of motion and neck supple.    Lymphadenopathy:      Cervical: No cervical adenopathy.   Skin:     General: Skin is warm and dry.      Findings: No rash.   Neurological:      Mental Status: He is alert and oriented to person, place, and time.   Psychiatric:         Behavior: Behavior normal.       Significant Labs:   CBC:   Recent Labs   Lab 03/01/23  0532 03/01/23  0649 03/02/23  0557   WBC 9.22 9.25 6.23   HGB 7.4* 7.6* 6.4*   HCT 23.9* 24.6* 20.9*   PLT 27* 23* 21*    and CMP:   Recent Labs   Lab 03/01/23  0649 03/02/23  0557   * 130*   K 3.9 3.7   CL 99 100   CO2 23 24   * 114*   BUN 14 14   CREATININE 0.7 0.6   CALCIUM 8.2* 7.8*   PROT 4.7* 4.0*   ALBUMIN 1.9* 1.6*   BILITOT 1.6* 1.1*   ALKPHOS 78 73   AST 34 44*   ALT 15 17   ANIONGAP 8 6*       Diagnostic Results:  I have reviewed all pertinent imaging results/findings within the past 24 hours.

## 2023-03-02 NOTE — PROGRESS NOTES
[facility] - Telemetry Stepdown  Hematology  Bone Marrow Transplant  Progress Note    Patient Name: Dwight Hoffmann  Admission Date: 2/25/2023  Hospital Length of Stay: 5 days  Code Status: Full Code    Subjective:     Interval History: Started on PCA pump with improvement in pain. Unable to tolerate sim yesterday, will attempt again today vs tomorrow per rad onc availability. Transfused one unit pRBCs.     Objective:     Vital Signs (Most Recent):  Temp: 98.1 °F (36.7 °C) (03/02/23 1527)  Pulse: (!) 128 (03/02/23 1530)  Resp: 18 (03/02/23 1527)  BP: 100/66 (03/02/23 1527)  SpO2: 95 % (03/02/23 1527)   Vital Signs (24h Range):  Temp:  [98.1 °F (36.7 °C)-102.1 °F (38.9 °C)] 98.1 °F (36.7 °C)  Pulse:  [110-129] 128  Resp:  [18-22] 18  SpO2:  [93 %-100 %] 95 %  BP: ()/(59-71) 100/66     Weight: 92.5 kg (203 lb 14.8 oz)  Body mass index is 26.9 kg/m².  Body surface area is 2.18 meters squared.    ECOG SCORE           [unfilled]    Intake/Output - Last 3 Shifts         02/28 0700  03/01 0659 03/01 0700  03/02 0659 03/02 0700  03/03 0659    P.O.  320 360    I.V. (mL/kg)       Total Intake(mL/kg)  320 (3.5) 360 (3.9)    Urine (mL/kg/hr) 925 (0.4) 950 (0.4) 550 (0.6)    Stool 0 0 0    Total Output 925 950 550    Net -925 -630 -190           Stool Occurrence 0 x 0 x 0 x            Physical Exam  Constitutional:       General: He is not in acute distress.     Appearance: He is well-developed. He is ill-appearing. He is not diaphoretic.   HENT:      Head: Normocephalic and atraumatic.   Eyes:      General: No scleral icterus.     Conjunctiva/sclera: Conjunctivae normal.   Cardiovascular:      Rate and Rhythm: Normal rate and regular rhythm.      Heart sounds: Normal heart sounds. No murmur heard.    No friction rub. No gallop.   Pulmonary:      Effort: Pulmonary effort is normal. No respiratory distress.      Breath sounds: Normal breath sounds. No wheezing or rales.   Abdominal:      General: Bowel sounds are normal.  There is no distension.      Tenderness: There is abdominal tenderness.   Musculoskeletal:         General: Normal range of motion.      Cervical back: Normal range of motion and neck supple.   Lymphadenopathy:      Cervical: No cervical adenopathy.   Skin:     General: Skin is warm and dry.      Findings: No rash.   Neurological:      Mental Status: He is alert and oriented to person, place, and time.   Psychiatric:         Behavior: Behavior normal.       Significant Labs:   CBC:   Recent Labs   Lab 03/01/23  0532 03/01/23  0649 03/02/23  0557   WBC 9.22 9.25 6.23   HGB 7.4* 7.6* 6.4*   HCT 23.9* 24.6* 20.9*   PLT 27* 23* 21*    and CMP:   Recent Labs   Lab 03/01/23  0649 03/02/23  0557   * 130*   K 3.9 3.7   CL 99 100   CO2 23 24   * 114*   BUN 14 14   CREATININE 0.7 0.6   CALCIUM 8.2* 7.8*   PROT 4.7* 4.0*   ALBUMIN 1.9* 1.6*   BILITOT 1.6* 1.1*   ALKPHOS 78 73   AST 34 44*   ALT 15 17   ANIONGAP 8 6*       Diagnostic Results:  I have reviewed all pertinent imaging results/findings within the past 24 hours.    Assessment/Plan:     * DLBCL (diffuse large B cell lymphoma)  Patient with small bowel resection approximately 5 months ago consistent with Diffuse large-B cell lymphoma, non germinal center origin. Additionally had omentum resection also showing DLBCL. Had bone marrow biopsy approximately 5 months ago that showed normo cellular marrow for age, no increase in blasts. Patient had PET scan from 1/11/23 showing hypermetabolic loops of small bowel most consistent with previous known disease and was thought to have improved (partial response to therapy noted from chart review) and was recommended he continue with R-CHOP at that time. Patient hospitalized at [facility] found to have likely worsening of lymphoma based on CT scan of abdomen showing partial compression of IVC. Transferred to [facility] for further evaluation given uncertainty in responsiveness to first-line chemo therapy.    --consider  alternative chemo regimen given progression (although appears patient has had delay in receiving chemo 2/2 to thrombocytopenia and hospital admissions)  --patient with lower abdominal pain: controlled with PO Norco and IV dilaudid  --continue acyclovir, allopurinol  --patient with concern for sepsis at [facility] was last on vancomycin, cefepime and IV metronidazole will continue those here for now. Low threshold to de-escalate antibiotics. On arrival thet patient is normotensives and HDS.  --Per last CT abdomen: Interval increase in size of the centrally necrotic ileocecal lymphomatous mass. Superimposed infection within the necrotic mass cannot be excluded.  - changed steroid to pulse dose dexamethasone on 3/2  - No safe window for Bx per IR or CRS. Discussed case with GI, however patient declining bx on 2/28.   - Rad onc consulted for radiation evaluation, plan for inpatient radiation. Hopeful that pain is improved with PCA pump so he can tolerate simulation             Fever  Patient febrile to 101.9 on 2/28. Likely multifactorial as patient with known malignancy with possible superimposed infection of intra-abdominal mass. Currently on cefepime, flagyl. Blood cx NGTD. No respiratory or urinary sxs.     Leukocytosis  No evidence of active infection, pulling off abx as appropriate. DC vanc on 2/27 however patient subsequently fevered the following evening so placed back on vanc. Discontinued vanc again 3/1. Will continue to monitor.     Compression of vena cava  CRS: this is due to dehydration because the IVC is flat throughout its course rather than just at one level.  Treating with IVF. And encouraging PO intake     Hyponatremia  Improved during admission, though still mildly hyponatremic     COVID-19 virus infection  Noted to be positive on 2/6/2023    Thrombocytopenia  Due to lymphoma    --Continue to monitor with CBC  --Transfuse for platelets less than 10 or <30-50 if actively  bleeding    Hyperlipidemia, unspecified  Not currently taking a statin    Diabetes mellitus type 2, noninsulin dependent  Hemoglobin A1c from 1 week ago was 7.7  Goal glucose while inpatient: 140-180  Consider basal-bolus + SSI as needed  Hold home anti-diabetic medications  Patient takes daily steroids    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   -- stat CTAP ordered on 2/28 given worsening ab pain to r/o perf, no evidence of perf however interval enlargement of mass   -- see DLBCL for full plan         VTE Risk Mitigation (From admission, onward)         Ordered     Reason for No Pharmacological VTE Prophylaxis  Once        Question:  Reasons:  Answer:  Thrombocytopenia    02/25/23 0402     IP VTE HIGH RISK PATIENT  Once         02/25/23 0402     Place sequential compression device  Until discontinued         02/25/23 0402                Disposition: remain inpatient    Mary Grant, DO  Bone Marrow Transplant  [facility] - Telemetry Stepdown

## 2023-03-03 PROBLEM — R19.00 ABDOMINAL MASS: Status: ACTIVE | Noted: 2023-01-01

## 2023-03-03 NOTE — ASSESSMENT & PLAN NOTE
Hemoglobin A1c from 1 week ago was 7.7  Goal glucose while inpatient: 140-180  Consider basal-bolus + SSI as needed  Hold home anti-diabetic medications  Patient takes daily steroids  -- increasing daily insulin regimen as appropriate starting 3/3 given increasing hyperglycemia while on steroids

## 2023-03-03 NOTE — PLAN OF CARE
Problem: Adult Inpatient Plan of Care  Goal: Plan of Care Review  3/2/2023 1824 by Juana Briscoe LPN  Outcome: Ongoing, Progressing  Problem: Diabetes Comorbidity  Goal: Blood Glucose Level Within Targeted Range  3/2/2023 1824 by Juana Briscoe LPN  Outcome: Ongoing, Progressing  3/2/2023 1814 by Juana Briscoe LPN  Outcome: Ongoing, Progressing  Intervention: Monitor and Manage Glycemia  Flowsheets (Taken 3/2/2023 1824)  Glycemic Management:   blood glucose monitored   supplemental insulin given     Problem: Infection Progression (Sepsis/Septic Shock)  Goal: Absence of Infection Signs and Symptoms  3/2/2023 1824 by Juana Briscoe LPN  Outcome: Ongoing, Progressing     Problem: Nutrition Impaired (Sepsis/Septic Shock)  Goal: Optimal Nutrition Intake  3/2/2023 1824 by Juana Briscoe LPN  Outcome: Ongoing, Progressing       Problem: Skin Injury Risk Increased  Goal: Skin Health and Integrity  Outcome: Ongoing, Progressing     Problem: Fall Injury Risk  Goal: Absence of Fall and Fall-Related Injury  3/2/2023 1824 by Juana Briscoe LPN  Outcome: Ongoing, Progressing     Goal: Patient-Specific Goal (Individualized)  Outcome: Ongoing, Progressing  Goal: Absence of Hospital-Acquired Illness or Injury  3/2/2023 1824 by Juana Briscoe LPN  Outcome: Ongoing, Progressing     POC reviewed. AAOX4. Afebrile. Pain manage with PCA pump. Poor appetite. Family @bedside. Call light in reach.

## 2023-03-03 NOTE — ASSESSMENT & PLAN NOTE
Patient with small bowel resection approximately 5 months ago consistent with Diffuse large-B cell lymphoma, non germinal center origin. Additionally had omentum resection also showing DLBCL. Had bone marrow biopsy approximately 5 months ago that showed normo cellular marrow for age, no increase in blasts. Patient had PET scan from 1/11/23 showing hypermetabolic loops of small bowel most consistent with previous known disease and was thought to have improved (partial response to therapy noted from chart review) and was recommended he continue with R-CHOP at that time. Patient hospitalized at [facility] found to have likely worsening of lymphoma based on CT scan of abdomen showing partial compression of IVC. Transferred to [facility] for further evaluation given uncertainty in responsiveness to first-line chemo therapy.    --consider alternative chemo regimen given progression (although appears patient has had delay in receiving chemo 2/2 to thrombocytopenia and hospital admissions)  --patient with lower abdominal pain: controlled with PO Norco and IV dilaudid  --continue acyclovir, allopurinol  --patient with concern for sepsis at [facility] was last on vancomycin, cefepime and IV metronidazole will continue those here for now. Low threshold to de-escalate antibiotics. On arrival thet patient is normotensives and HDS.  --Per last CT abdomen: Interval increase in size of the centrally necrotic ileocecal lymphomatous mass. Superimposed infection within the necrotic mass cannot be excluded.  - changed steroid to pulse dose dexamethasone on 3/2  - No safe window for Bx per IR or CRS. Discussed case with GI, however patient declining bx on 2/28.   - Rad onc consulted for radiation therapy. Pt underwent sim on 3/2.   - will attempt lymph node bx if possible with IR to determine appropriate treatment regimen prior to any radiation

## 2023-03-03 NOTE — NURSING TRANSFER
Nursing Transfer Note      3/2/2023      Reason patient is being transferred: Oncology care     Transfer From: [facility]    Transfer via bed    Transfer with cardiac monitoring, PCA    Transported by RN, PCT    Medicines sent: PCA    Any special needs or follow-up needed: 1 U PRBC to be administered    Chart send with patient: Yes    Notified: MD    Patient reassessed at: 03/02/2023 2100    Upon arrival to floor: cardiac monitor applied, patient oriented to room, call bell in reach, and bed in lowest position

## 2023-03-03 NOTE — RESPIRATORY THERAPY
RAPID RESPONSE RESPIRATORY THERAPY ETCO2 CHECK         Time of visit: 947     Code Status: Full Code   : 1970  Bed: 828/828 A:   MRN: [id number]  Time spent at the bedside: < 15 min    SITUATION    Evaluated patient for: ETCo2 compliance    BACKGROUND    Why is the patient in the hospital?: DLBCL (diffuse large B cell lymphoma)    Patient has a past medical history of Cancer, Diabetes mellitus, Digestive disorder, and Prediabetes.    24 Hours Vitals Range:  Temp:  [97.3 °F (36.3 °C)-99 °F (37.2 °C)]   Pulse:  []   Resp:  [12-22]   BP: (100-113)/(65-76)   SpO2:  [93 %-99 %]     Labs:    Recent Labs     23  0649 23  0557 23  0337   * 130* 128*   K 3.9 3.7 4.7   CL 99 100 100   CO2 23 24 23   CREATININE 0.7 0.6 0.7   * 114* 230*   PHOS 3.1 2.6* 3.7   MG 1.7 1.7 1.9        No results for input(s): PH, PCO2, PO2, HCO3, POCSATURATED, BE in the last 72 hours.    ASSESSMENT/INTERVENTIONS      Last VS   Temp: 97.4 °F (36.3 °C) (725)  Pulse: 83 (725)  Resp: 19 (725)  BP: 108/73 (725)  SpO2: 96 % (725)    Level of Consciousness: Level of Consciousness (AVPU): alert  Respiratory Effort: Respiratory Effort: Unlabored Expansion/Accessory Muscle Usage: Expansion/Accessory Muscles/Retractions: no retractions, no use of accessory muscles  All Lung Field Breath Sounds: All Lung Fields Breath Sounds: Anterior:, Lateral:, clear  Is the ETCO2 monitor on? Yes  Is the patient wearing a cannula? Yes  Are ETCO2 orders placed? Yes  Is the patient on a PCA pump? Yes  ETCO2 monitored: ETCO2 (mmHg): 30 mmHg  Ambu at bedside: Ambu bag with the patient?: Yes, Adult Ambu    Active Orders   Respiratory Care    END TIDAL CO2 MONITOR Q12H     Frequency: Q12H     Number of Occurrences: Until Specified    Oxygen Continuous     Frequency: Continuous     Number of Occurrences: Until Specified     Order Questions:      Device type: Low flow      Device: Nasal Cannula (1- 5  Liters)      LPM: 2      Titrate O2 per Oxygen Titration Protocol: Yes      To maintain SpO2 goal of: >= 90%      Notify MD of: Inability to achieve desired SpO2; Sudden change in patient status and requires 20% increase in FiO2; Patient requires >60% FiO2    Pulse Oximetry Q Shift     Frequency: Q Shift     Number of Occurrences: Until Specified       RECOMMENDATIONS    We recommend: RRT Recs: Continue POC per primary team.      FOLLOW-UP    Please call back the Rapid Response RT, Ben Fernando, RRT at x [phone] for any questions or concerns.

## 2023-03-03 NOTE — PLAN OF CARE
POC reviewed with patient, no questions at this time. No acute events. VSS on room air, afebrile. Ambulates SBA to the bathroom, not always calling appropriately for assistance, bed alarm engaged for additional safety. Cefepime d/c this shift. Continues on Dilaudid PCA for pain, pt reports improvement in pain symptoms.Lactulose given to encourage BM, no BM this shift. Little urine output, little PO intake. PO intake encouraged. Pt continues with accuchecks ACHS with correction. Pt sitting up at edge of bed for meals and repositioning ind in the bed. All patient needs met at this time.      Radiation mapping completed 3/2. Plan for lymph node biopsy to determine next treatment steps.

## 2023-03-03 NOTE — PROGRESS NOTES
[facility] - Oncology ([facility])  Hematology  Bone Marrow Transplant  Progress Note    Patient Name: Dwight Hoffmann  Admission Date: 2/25/2023  Hospital Length of Stay: 6 days  Code Status: Full Code    Subjective:     Interval History: NAEON. Tolerated Sim successfully yesterday. No acute complaints this am. Will try suppository for refractory constipation. Working on possible lymph node bx.     Objective:     Vital Signs (Most Recent):  Temp: 97.4 °F (36.3 °C) (03/03/23 0725)  Pulse: 83 (03/03/23 0725)  Resp: 19 (03/03/23 0725)  BP: 108/73 (03/03/23 0725)  SpO2: 96 % (03/03/23 0725)   Vital Signs (24h Range):  Temp:  [97.3 °F (36.3 °C)-99 °F (37.2 °C)] 97.4 °F (36.3 °C)  Pulse:  [] 83  Resp:  [12-22] 19  SpO2:  [93 %-99 %] 96 %  BP: (100-113)/(65-76) 108/73     Weight: 92.5 kg (203 lb 14.8 oz)  Body mass index is 26.9 kg/m².  Body surface area is 2.18 meters squared.    ECOG SCORE           [unfilled]    Intake/Output - Last 3 Shifts         03/01 0700  03/02 0659 03/02 0700  03/03 0659 03/03 0700  03/04 0659    P.O. 320 360     Blood  350     Total Intake(mL/kg) 320 (3.5) 710 (7.7)     Urine (mL/kg/hr) 950 (0.4) 1150 (0.5)     Stool 0 0     Total Output 950 1150     Net -630 -440            Stool Occurrence 0 x 0 x             Physical Exam  Constitutional:       General: He is not in acute distress.     Appearance: He is well-developed. He is ill-appearing. He is not diaphoretic.   HENT:      Head: Normocephalic and atraumatic.   Eyes:      General: No scleral icterus.     Conjunctiva/sclera: Conjunctivae normal.   Cardiovascular:      Rate and Rhythm: Normal rate and regular rhythm.      Heart sounds: Normal heart sounds. No murmur heard.    No friction rub. No gallop.   Pulmonary:      Effort: Pulmonary effort is normal. No respiratory distress.      Breath sounds: Normal breath sounds. No wheezing or rales.   Abdominal:      General: Bowel sounds are normal. There is distension.      Tenderness: There is  abdominal tenderness.   Musculoskeletal:         General: Normal range of motion.      Cervical back: Normal range of motion and neck supple.   Lymphadenopathy:      Cervical: No cervical adenopathy.   Skin:     General: Skin is warm and dry.      Findings: No rash.   Neurological:      Mental Status: He is alert and oriented to person, place, and time.   Psychiatric:         Behavior: Behavior normal.       Significant Labs:   CBC:   Recent Labs   Lab 03/02/23  0557 03/03/23  0337   WBC 6.23 5.13   HGB 6.4* 7.8*   HCT 20.9* 24.9*   PLT 21*  --       and CMP:   Recent Labs   Lab 03/02/23  0557 03/03/23  0337   * 128*   K 3.7 4.7    100   CO2 24 23   * 230*   BUN 14 19   CREATININE 0.6 0.7   CALCIUM 7.8* 8.3*   PROT 4.0* 4.6*   ALBUMIN 1.6* 1.8*   BILITOT 1.1* 1.4*   ALKPHOS 73 79   AST 44* 41*   ALT 17 20   ANIONGAP 6* 5*         Diagnostic Results:  I have reviewed all pertinent imaging results/findings within the past 24 hours.    Assessment/Plan:     * DLBCL (diffuse large B cell lymphoma)  Patient with small bowel resection approximately 5 months ago consistent with Diffuse large-B cell lymphoma, non germinal center origin. Additionally had omentum resection also showing DLBCL. Had bone marrow biopsy approximately 5 months ago that showed normo cellular marrow for age, no increase in blasts. Patient had PET scan from 1/11/23 showing hypermetabolic loops of small bowel most consistent with previous known disease and was thought to have improved (partial response to therapy noted from chart review) and was recommended he continue with R-CHOP at that time. Patient hospitalized at [facility] found to have likely worsening of lymphoma based on CT scan of abdomen showing partial compression of IVC. Transferred to [facility] [place] for further evaluation given uncertainty in responsiveness to first-line chemo therapy.    --consider alternative chemo regimen given progression (although appears patient has had  delay in receiving chemo 2/2 to thrombocytopenia and hospital admissions)  --patient with lower abdominal pain: controlled with PO Norco and IV dilaudid  --continue acyclovir, allopurinol  --patient with concern for sepsis at [facility] was last on vancomycin, cefepime and IV metronidazole will continue those here for now. Low threshold to de-escalate antibiotics. On arrival thet patient is normotensives and HDS.  --Per last CT abdomen: Interval increase in size of the centrally necrotic ileocecal lymphomatous mass. Superimposed infection within the necrotic mass cannot be excluded.  - changed steroid to pulse dose dexamethasone on 3/2  - No safe window for Bx per IR or CRS. Discussed case with GI, however patient declining bx on 2/28.   - Rad onc consulted for radiation therapy. Pt underwent sim on 3/2.   - will attempt lymph node bx if possible with IR to determine appropriate treatment regimen prior to any radiation              Large cell lymphoma  See DLBCL above    Fever  Patient febrile to 101.9 on 2/28. Likely multifactorial as patient with known malignancy with possible superimposed infection of intra-abdominal mass. Placed on vanc, cefepime, flagyl. Blood cx NGTD. No respiratory or urinary sxs.   -- off all abx as of 3/3    Leukocytosis  No evidence of active infection, pulling off abx as appropriate. DC vanc on 2/27 however patient subsequently fevered the following evening so placed back on vanc. Discontinued vanc again 3/1. Discontinued cefepime and flagyl on 3/3. Will continue to monitor closely.     Compression of vena cava  CRS: this is due to dehydration because the IVC is flat throughout its course rather than just at one level.  Treating with IVF. And encouraging PO intake     Hyponatremia  Improved during admission, though still mildly hyponatremic     COVID-19 virus infection  Noted to be positive on 2/6/2023    Thrombocytopenia  Due to lymphoma    --Continue to monitor with  CBC  --Transfuse for platelets less than 10 or <30-50 if actively bleeding    Hyperlipidemia, unspecified  Not currently taking a statin    Diabetes mellitus type 2, noninsulin dependent  Hemoglobin A1c from 1 week ago was 7.7  Goal glucose while inpatient: 140-180  Consider basal-bolus + SSI as needed  Hold home anti-diabetic medications  Patient takes daily steroids  -- increasing daily insulin regimen as appropriate starting 3/3 given increasing hyperglycemia while on steroids     Anemia  Due to lymphoma    Plan:  --transfuse prn hemoglobin <7    Small bowel mass  S/p  Resection September 2022 found to have DLBCL on pathology.   -- stat CTAP ordered on 2/28 given worsening ab pain to r/o perf, no evidence of perf however interval enlargement of mass   -- see DLBCL for full plan         VTE Risk Mitigation (From admission, onward)         Ordered     Reason for No Pharmacological VTE Prophylaxis  Once        Question:  Reasons:  Answer:  Thrombocytopenia    02/25/23 0402     IP VTE HIGH RISK PATIENT  Once         02/25/23 0402     Place sequential compression device  Until discontinued         02/25/23 0402                Disposition: Pending treatment    Mina Nolasco MD  Bone Marrow Transplant  [facility] - Oncology ([facility])

## 2023-03-03 NOTE — NURSING
1 Unit PRBC transfused this evening per MD order for hgb=6.4. Blood consent in chart. No premed ordered. PRBC checked against order and patient at bedside by 2 RNs per protocol. Pt tolerated well; no distress noted; all VSS. Will continue to monitor throughout shift.

## 2023-03-03 NOTE — SUBJECTIVE & OBJECTIVE
Subjective:     Interval History: NAEON. Tolerated Sim successfully yesterday. No acute complaints this am. Will try suppository for refractory constipation. Working on possible lymph node bx.     Objective:     Vital Signs (Most Recent):  Temp: 97.4 °F (36.3 °C) (03/03/23 0725)  Pulse: 83 (03/03/23 0725)  Resp: 19 (03/03/23 0725)  BP: 108/73 (03/03/23 0725)  SpO2: 96 % (03/03/23 0725)   Vital Signs (24h Range):  Temp:  [97.3 °F (36.3 °C)-99 °F (37.2 °C)] 97.4 °F (36.3 °C)  Pulse:  [] 83  Resp:  [12-22] 19  SpO2:  [93 %-99 %] 96 %  BP: (100-113)/(65-76) 108/73     Weight: 92.5 kg (203 lb 14.8 oz)  Body mass index is 26.9 kg/m².  Body surface area is 2.18 meters squared.    ECOG SCORE           [unfilled]    Intake/Output - Last 3 Shifts         03/01 0700  03/02 0659 03/02 0700  03/03 0659 03/03 0700  03/04 0659    P.O. 320 360     Blood  350     Total Intake(mL/kg) 320 (3.5) 710 (7.7)     Urine (mL/kg/hr) 950 (0.4) 1150 (0.5)     Stool 0 0     Total Output 950 1150     Net -630 -440            Stool Occurrence 0 x 0 x             Physical Exam  Constitutional:       General: He is not in acute distress.     Appearance: He is well-developed. He is ill-appearing. He is not diaphoretic.   HENT:      Head: Normocephalic and atraumatic.   Eyes:      General: No scleral icterus.     Conjunctiva/sclera: Conjunctivae normal.   Cardiovascular:      Rate and Rhythm: Normal rate and regular rhythm.      Heart sounds: Normal heart sounds. No murmur heard.    No friction rub. No gallop.   Pulmonary:      Effort: Pulmonary effort is normal. No respiratory distress.      Breath sounds: Normal breath sounds. No wheezing or rales.   Abdominal:      General: Bowel sounds are normal. There is distension.      Tenderness: There is abdominal tenderness.   Musculoskeletal:         General: Normal range of motion.      Cervical back: Normal range of motion and neck supple.   Lymphadenopathy:      Cervical: No cervical adenopathy.    Skin:     General: Skin is warm and dry.      Findings: No rash.   Neurological:      Mental Status: He is alert and oriented to person, place, and time.   Psychiatric:         Behavior: Behavior normal.       Significant Labs:   CBC:   Recent Labs   Lab 03/02/23  0557 03/03/23  0337   WBC 6.23 5.13   HGB 6.4* 7.8*   HCT 20.9* 24.9*   PLT 21*  --       and CMP:   Recent Labs   Lab 03/02/23  0557 03/03/23 0337   * 128*   K 3.7 4.7    100   CO2 24 23   * 230*   BUN 14 19   CREATININE 0.6 0.7   CALCIUM 7.8* 8.3*   PROT 4.0* 4.6*   ALBUMIN 1.6* 1.8*   BILITOT 1.1* 1.4*   ALKPHOS 73 79   AST 44* 41*   ALT 17 20   ANIONGAP 6* 5*         Diagnostic Results:  I have reviewed all pertinent imaging results/findings within the past 24 hours.

## 2023-03-03 NOTE — ASSESSMENT & PLAN NOTE
No evidence of active infection, pulling off abx as appropriate. DC vanc on 2/27 however patient subsequently fevered the following evening so placed back on vanc. Discontinued vanc again 3/1. Discontinued cefepime and flagyl on 3/3. Will continue to monitor closely.

## 2023-03-03 NOTE — CONSULTS
"Biopsy Consult Note  Interventional Radiology    Consult Requested By: Mina Nolasco MD   Reason for Consult: "pt with DLBCL and large intra-abdominal mass. Mass not amenable for safe bx. any possibility to bx lymph nodes? "    SUBJECTIVE:     Chief Complaint:  DLBCL    History of Present Illness:  Dwight Hoffmann is a 52 y.o. male with a past medical history of DLBCL, T2DM, anemia, HLD, thrombocytopenia who was admitted on 2/22/23 at [facility] and transferred to [facility] 2/25/23 for progression of ileocecal mass on R-CHOP  who was transferred to [facility] on 2/25/23 for progression of ileocecal mass on R-CHOP and further mgmt of chemotherapy regimen.     Interventional Radiology has been consulted for image guided targeted R sided mesenteric lymph node biopsy.     Pt has had recent imaging including a CT a/p on 2/28/23 which revealed: The ileal cecal/mesenteric mass appears enlarged compared to prior though adjacent bowel is difficult to separate on this noncontrast study.  There appears to be involvement of a loop of small bowel with significant wall thickening as well.  Probable new peritoneal implant as above..     The pt has not undergone biopsy in the past. IR was previously consulted during this admission for possible bx of large intra abd mass. Due to concern for increased risk of bowel perforation and infection with CT guidance, IR staff recommended biopsy to be done endoscopically. Per primary team, patient declined GI biopsy. However, upon interview, patient amenable to GI biopsy.     Review of Systems   Constitutional: Negative.    HENT: Negative.     Respiratory: Negative.     Cardiovascular: Negative.    Gastrointestinal: Negative.    Musculoskeletal: Negative.    Neurological: Negative.    Psychiatric/Behavioral: Negative.        Scheduled Meds:   acyclovir  400 mg Oral BID    allopurinoL  100 mg Oral Daily    bisacodyL  10 mg Rectal Once    dexAMETHasone  40 mg Oral Daily    docusate sodium  50 mg " Oral Daily    insulin detemir U-100  7 Units Subcutaneous Daily    lactulose  20 g Oral TID    multivitamin  1 tablet Oral Daily    pantoprazole  40 mg Oral BID    polyethylene glycol  17 g Oral Daily    sulfamethoxazole-trimethoprim 800-160mg  1 tablet Oral Once per day on Mon Wed Fri    tamsulosin  0.4 mg Oral Daily     Continuous Infusions:   hydromorphone in 0.9 % NaCl 6 mg/30 ml       PRN Meds:sodium chloride, acetaminophen, dextrose 10%, dextrose 10%, dextrose, dextrose, glucagon (human recombinant), HYDROmorphone, hydrOXYzine HCL, insulin aspart U-100, naloxone, ondansetron, sodium chloride 0.9%, traZODone    Review of patient's allergies indicates:   Allergen Reactions    Albuterol (bulk) Palpitations       Past Medical History:   Diagnosis Date    Cancer     Diabetes mellitus     Digestive disorder     Prediabetes      Past Surgical History:   Procedure Laterality Date    APPENDECTOMY  07/15/2014    COLONOSCOPY      COLONOSCOPY N/A 02/09/2022    ERROR.   He did not have a colonoscopy with Dr. Sanders.    COLONOSCOPY N/A 09/01/2022    Procedure: COLONOSCOPY;  Surgeon: Andrea Clemens MD;  Location: [facility];  Service: Endoscopy;  Laterality: N/A;    FLEXIBLE SIGMOIDOSCOPY N/A 11/7/2022    Procedure: SIGMOIDOSCOPY, FLEXIBLE;  Surgeon: Manuel Sanders MD;  Location: [facility];  Service: Endoscopy;  Laterality: N/A;    INCISION AND DRAINAGE OF ABDOMEN N/A 09/14/2022    Procedure: INCISION AND DRAINAGE, ABDOMEN;  Surgeon: Jan Oliveira Jr., MD;  Location: [facility];  Service: General;  Laterality: N/A;    INSERTION OF TUNNELED CENTRAL VENOUS CATHETER (CVC) WITH SUBCUTANEOUS PORT N/A 10/31/2022    Procedure: [id number];  Surgeon: Jan Oliveira Jr., MD;  Location: [facility] OR;  Service: General;  Laterality: N/A;    LAPAROSCOPIC DRAINAGE OF ABDOMEN N/A 09/23/2022    Procedure: DRAINAGE, ABDOMEN, LAPAROSCOPIC;  Surgeon: Jan Oliveira Jr., MD;  Location: [facility];  Service: General;   Laterality: N/A;     Family History   Problem Relation Age of Onset    Cancer Mother         Colon    Diabetes Mother     Hypertension Mother     Seizures Father     Heart murmur Sister     Seizures Sister      Social History     Tobacco Use    Smoking status: Never    Smokeless tobacco: Never   Substance Use Topics    Alcohol use: Not Currently     Comment: occasional    Drug use: No       OBJECTIVE:     Vital Signs (Most Recent)  Temp: 97.8 °F (36.6 °C) (03/03/23 1139)  Pulse: 103 (03/03/23 1139)  Resp: 19 (03/03/23 1202)  BP: 99/69 (03/03/23 1139)  SpO2: 98 % (03/03/23 1139)    Physical Exam:   Physical Exam  Constitutional:       Appearance: He is ill-appearing.   HENT:      Head: Normocephalic.      Nose:      Comments: NC in place  Cardiovascular:      Rate and Rhythm: Normal rate.   Pulmonary:      Effort: Pulmonary effort is normal.   Abdominal:      General: There is distension.      Palpations: Abdomen is soft.   Musculoskeletal:         General: Normal range of motion.   Skin:     General: Skin is warm.   Neurological:      General: No focal deficit present.      Mental Status: He is alert.   Psychiatric:         Mood and Affect: Mood normal.       Laboratory  I have reviewed all pertinent lab results within the past 24 hours.  CBC:   Recent Labs   Lab 03/03/23 0337   WBC 5.13   RBC 2.72*   HGB 7.8*   HCT 24.9*   PLT 15*   MCV 92   MCH 28.7   MCHC 31.3*     BMP:   Recent Labs   Lab 03/03/23 0337   *   *   K 4.7      CO2 23   BUN 19   CREATININE 0.7   CALCIUM 8.3*   MG 1.9     CMP:   Recent Labs   Lab 03/03/23 0337   *   CALCIUM 8.3*   ALBUMIN 1.8*   PROT 4.6*   *   K 4.7   CO2 23      BUN 19   CREATININE 0.7   ALKPHOS 79   ALT 20   AST 41*   BILITOT 1.4*     LFTs:   Recent Labs   Lab 03/03/23 0337   ALT 20   AST 41*   ALKPHOS 79   BILITOT 1.4*   PROT 4.6*   ALBUMIN 1.8*     Coagulation: No results for input(s): LABPROT, INR, APTT in the last 168  hours.    ASA/Mallampati  ASA: 3  Mallampati: 2    Imaging:  Recent imaging studies including CT a/p on 2/28/23 which was independently reviewed by Warren Montenegro MD.     Narrative & Impression  EXAMINATION:  CT ABDOMEN PELVIS WITHOUT CONTRAST     CLINICAL HISTORY:  Peritonitis or perforation suspected;patient with DLBCL and ileocecal mass, worsening ab pain today though no peritoneal signs, want to rule out perf;     TECHNIQUE:  Low dose axial images, sagittal and coronal reformations were obtained from the lung bases to the pubic symphysis.  Oral contrast was not administered.     COMPARISON:  CT abdomen pelvis 7/222023     FINDINGS:  In the chest, no significant pleural or pericardial fluid.  There is some respiratory motion artifact.  No confluent consolidation.  Some atelectasis noted dependently.  No convincing mass lesion.     In the abdomen, liver is normal in overall size.  No focal mass visualized.  High-density material in the gallbladder presumably sludge.  No convincing wall thickening.  Pancreas and spleen are normal.  No adrenal mass.  Hypodensity lateral aspect of the right kidney similar.     Aorta tapers normally.  No convincing para-aortic adenopathy.  Nonenlarged nodes are noted.  Vascular calcifications present.     In the pelvis, no pelvic adenopathy.  Prostate and bladder are unremarkable for age.  There is some free fluid in the pelvis.     Significant amount of feces in the right and transverse colon.  No convincing transition point.  Scattered peritoneal fluid noted.  Heterogeneous soft tissue mass in the central mesentery measures approximately 10.5 x 8.9 cm though the adjacent bowel is difficult to distinguish is separate.  Previously this measured approximately 9.8 x 7 cm.  This extends toward the cecum where there is significant wall thickening and appears confluent.  Significant wall thickening likely involving an additional loop of small bowel series 2, image 130 measures up to 2.4 cm.   This appears increased as well.  Enlarged mesenteric nodes noted.  No convincing free intraperitoneal air.  Possible new peritoneal implant 3.7 cm series 2, image 106.     Visualized bones are well mineralized.  Degenerative change noted.  No convincing lytic nor blastic lesion.  Sclerotic focus right femur likely a bone island.  Somewhat serpiginous heterogeneous region with sclerotic borders in the right intertrochanteric region similar.     Impression:     The ileal cecal/mesenteric mass appears enlarged compared to prior though adjacent bowel is difficult to separate on this noncontrast study.  There appears to be involvement of a loop of small bowel with significant wall thickening as well.  Probable new peritoneal implant as above.     No convincing bowel perforation.     Foci in the right femur as above.  Attention on follow-up.     This report was flagged in Epic as abnormal.        Electronically signed by: Maulik Hurley MD  Date:                                            02/28/2023  Time:                                           13:50    ASSESSMENT/PLAN:     Assessment:  52 y.o. male with a past medical history of DLBCL, T2DM, anemia, HLD, thrombocytopenia who was admitted on 2/22/23 at [facility] and transferred to [facility] [place]25[place]23 for progression of ileocecal mass on R-CHOP who has been referred to IR for image guided targeted lymph node biopsy.     The procedure was discussed in detail with the patient including thorough explanations of the potential risks and benefits of image guided non targeted lymph node biopsy. Risks include sepsis, severe infection, hemorrhage, and damage to surrounding structures. The patient is a candidate for image guided targeted lymph node biopsy under moderate sedation. Plan discussed with ordering physician.The pt verbalized understanding of the plan and would like to proceed.     Plan:  Can attempt image guided non targeted of R sided mesenteric lymph node biopsy under  moderate sedation on a non urgent basis for next week. After review of CT scans, R sided mesenteric lymph node may be obscured by bowel at time of procedure. Inpatient biopsies for malignancy workups are generally not performed unless inpatient chemo or surgery is planned. Informed pt that we would add his biopsy to the IR list of procedures, but that it likely would not get done for several days, possibly not until next week, as his biopsy would have the lowest acuity and thus the lowest priority compared to other inpatient procedures. If he becomes stable for discharge before biopsy is done, we can set him up for outpatient biopsy.   On exam, patient amenable to GI biopsy. Would recommend pursuing GI biopsy given high risk of bowel perforation and infection with IR image guided biopsy.   Please make patient NPO at midnight on day of procedure   Anticoagulation history reviewed.  Coagulation labs reviewed.  Thank you for the consult. IR will follow. Please contact with questions via [employer] secure chat     Nakita Smith PA-C  Interventional Radiology  3/3/2023

## 2023-03-03 NOTE — PLAN OF CARE
Problem: Adult Inpatient Plan of Care  Goal: Plan of Care Review  Outcome: Ongoing, Progressing  Goal: Patient-Specific Goal (Individualized)  Outcome: Ongoing, Progressing  Goal: Absence of Hospital-Acquired Illness or Injury  Outcome: Ongoing, Progressing     Problem: Diabetes Comorbidity  Goal: Blood Glucose Level Within Targeted Range  Outcome: Ongoing, Progressing     Problem: Infection Progression (Sepsis/Septic Shock)  Goal: Absence of Infection Signs and Symptoms  Outcome: Ongoing, Progressing     Problem: Infection  Goal: Absence of Infection Signs and Symptoms  Outcome: Ongoing, Progressing     Problem: Nutrition Impaired (Sepsis/Septic Shock)  Goal: Optimal Nutrition Intake  Outcome: Ongoing, Progressing     Problem: Fall Injury Risk  Goal: Absence of Fall and Fall-Related Injury  Outcome: Ongoing, Progressing     POC reviewed. Pt AAOX4. Afebrile. PCA pump. Remain poor appetite. Free from falls or

## 2023-03-03 NOTE — ASSESSMENT & PLAN NOTE
Patient febrile to 101.9 on 2/28. Likely multifactorial as patient with known malignancy with possible superimposed infection of intra-abdominal mass. Placed on vanc, cefepime, flagyl. Blood cx NGTD. No respiratory or urinary sxs.   -- off all abx as of 3/3

## 2023-03-04 PROBLEM — I48.91 ATRIAL FIBRILLATION WITH RVR: Status: ACTIVE | Noted: 2023-01-01

## 2023-03-04 NOTE — ASSESSMENT & PLAN NOTE
Patient with small bowel resection approximately 5 months ago consistent with Diffuse large-B cell lymphoma, non germinal center origin. Additionally had omentum resection also showing DLBCL. Had bone marrow biopsy approximately 5 months ago that showed normo cellular marrow for age, no increase in blasts. Patient had PET scan from 1/11/23 showing hypermetabolic loops of small bowel most consistent with previous known disease and was thought to have improved (partial response to therapy noted from chart review) and was recommended he continue with R-CHOP at that time. Patient hospitalized at [facility] found to have likely worsening of lymphoma based on CT scan of abdomen showing partial compression of IVC. Transferred to [facility] for further evaluation given uncertainty in responsiveness to first-line chemo therapy.    --consider alternative chemo regimen given progression (although appears patient has had delay in receiving chemo 2/2 to thrombocytopenia and hospital admissions)  --patient with lower abdominal pain: controlled with PO Norco and IV dilaudid  --continue acyclovir, allopurinol  --patient with concern for sepsis at [facility] was last on vancomycin, cefepime and IV metronidazole will continue those here for now. Low threshold to de-escalate antibiotics. On arrival thet patient is normotensives and HDS.  --Per last CT abdomen: Interval increase in size of the centrally necrotic ileocecal lymphomatous mass. Superimposed infection within the necrotic mass cannot be excluded.  - changed steroid to pulse dose dexamethasone on 3/2  - No safe window for Bx per IR or CRS. Discussed case with GI, however patient declining bx on 2/28.   - Rad onc consulted for radiation therapy. Pt underwent sim on 3/2, however holding off on radiation at this time.  - working on getting IR Lymph node bx vs GI primary tumor bx 3/6-3/7. Will decide chemo regimen following path results.

## 2023-03-04 NOTE — SUBJECTIVE & OBJECTIVE
Subjective:     Interval History: NAEON. Pt this morning entered sudden onset AFRVR rate 200s. Rate slightly improved with IV lopressor. Now on amio gtt. Pt asymptomatic. BP stable.     Objective:     Vital Signs (Most Recent):  Temp: 97.5 °F (36.4 °C) (03/04/23 0749)  Pulse: (!) 131 (03/04/23 1210)  Resp: 14 (03/04/23 0749)  BP: 93/60 (03/04/23 1210)  SpO2: 95 % (03/04/23 1042)   Vital Signs (24h Range):  Temp:  [96.3 °F (35.7 °C)-98.6 °F (37 °C)] 97.5 °F (36.4 °C)  Pulse:  [] 131  Resp:  [12-16] 14  SpO2:  [95 %-99 %] 95 %  BP: ()/(58-77) 93/60     Weight: 92.5 kg (203 lb 14.8 oz)  Body mass index is 26.9 kg/m².  Body surface area is 2.18 meters squared.    ECOG SCORE           [unfilled]    Intake/Output - Last 3 Shifts         03/02 0700  03/03 0659 03/03 0700  03/04 0659 03/04 0700  03/05 0659    P.O. 360 593     Blood 350      Total Intake(mL/kg) 710 (7.7) 593 (6.4)     Urine (mL/kg/hr) 1150 (0.5) 750 (0.3) 500 (1)    Stool 0      Total Output 1150 750 500    Net -440 -157 -500           Urine Occurrence  1 x     Stool Occurrence 0 x              Physical Exam  Constitutional:       General: He is not in acute distress.     Appearance: He is well-developed. He is ill-appearing. He is not diaphoretic.   HENT:      Head: Normocephalic and atraumatic.   Eyes:      General: No scleral icterus.     Conjunctiva/sclera: Conjunctivae normal.   Cardiovascular:      Rate and Rhythm: Tachycardia present. Rhythm irregular.      Heart sounds: Normal heart sounds. No murmur heard.    No friction rub. No gallop.   Pulmonary:      Effort: Pulmonary effort is normal. No respiratory distress.      Breath sounds: Normal breath sounds. No wheezing or rales.   Abdominal:      General: Bowel sounds are normal. There is distension.      Tenderness: There is abdominal tenderness.   Musculoskeletal:         General: Normal range of motion.      Cervical back: Normal range of motion and neck supple.   Lymphadenopathy:       Cervical: No cervical adenopathy.   Skin:     General: Skin is warm and dry.      Findings: No rash.   Neurological:      Mental Status: He is alert and oriented to person, place, and time.   Psychiatric:         Behavior: Behavior normal.       Significant Labs:   CBC:   Recent Labs   Lab 03/03/23 0337 03/04/23 0425   WBC 5.13 12.59   HGB 7.8* 7.6*   HCT 24.9* 24.5*   PLT 15* 10*      and CMP:   Recent Labs   Lab 03/03/23 0337 03/04/23 0425   * 129*   K 4.7 4.6    97   CO2 23 23   * 196*   BUN 19 22*   CREATININE 0.7 0.7   CALCIUM 8.3* 8.3*   PROT 4.6* 4.5*   ALBUMIN 1.8* 1.9*   BILITOT 1.4* 1.2*   ALKPHOS 79 80   AST 41* 27   ALT 20 19   ANIONGAP 5* 9         Diagnostic Results:  I have reviewed all pertinent imaging results/findings within the past 24 hours.

## 2023-03-04 NOTE — PROGRESS NOTES
Josh Rosas - Oncology ([facility])  Hematology  Bone Marrow Transplant  Progress Note    Patient Name: Dwight Hoffmann  Admission Date: 2/25/2023  Hospital Length of Stay: 7 days  Code Status: Full Code    Subjective:     Interval History: NAEON. Pt this morning entered sudden onset AFRVR rate 200s. Rate slightly improved with IV lopressor. Now on amio gtt. Pt asymptomatic. BP stable.     Objective:     Vital Signs (Most Recent):  Temp: 97.5 °F (36.4 °C) (03/04/23 0749)  Pulse: (!) 131 (03/04/23 1210)  Resp: 14 (03/04/23 0749)  BP: 93/60 (03/04/23 1210)  SpO2: 95 % (03/04/23 1042)   Vital Signs (24h Range):  Temp:  [96.3 °F (35.7 °C)-98.6 °F (37 °C)] 97.5 °F (36.4 °C)  Pulse:  [] 131  Resp:  [12-16] 14  SpO2:  [95 %-99 %] 95 %  BP: ()/(58-77) 93/60     Weight: 92.5 kg (203 lb 14.8 oz)  Body mass index is 26.9 kg/m².  Body surface area is 2.18 meters squared.    ECOG SCORE           [unfilled]    Intake/Output - Last 3 Shifts         03/02 0700  03/03 0659 03/03 0700  03/04 0659 03/04 0700  03/05 0659    P.O. 360 593     Blood 350      Total Intake(mL/kg) 710 (7.7) 593 (6.4)     Urine (mL/kg/hr) 1150 (0.5) 750 (0.3) 500 (1)    Stool 0      Total Output 1150 750 500    Net -440 -157 -500           Urine Occurrence  1 x     Stool Occurrence 0 x              Physical Exam  Constitutional:       General: He is not in acute distress.     Appearance: He is well-developed. He is ill-appearing. He is not diaphoretic.   HENT:      Head: Normocephalic and atraumatic.   Eyes:      General: No scleral icterus.     Conjunctiva/sclera: Conjunctivae normal.   Cardiovascular:      Rate and Rhythm: Tachycardia present. Rhythm irregular.      Heart sounds: Normal heart sounds. No murmur heard.    No friction rub. No gallop.   Pulmonary:      Effort: Pulmonary effort is normal. No respiratory distress.      Breath sounds: Normal breath sounds. No wheezing or rales.   Abdominal:      General: Bowel sounds are normal. There is  distension.      Tenderness: There is abdominal tenderness.   Musculoskeletal:         General: Normal range of motion.      Cervical back: Normal range of motion and neck supple.   Lymphadenopathy:      Cervical: No cervical adenopathy.   Skin:     General: Skin is warm and dry.      Findings: No rash.   Neurological:      Mental Status: He is alert and oriented to person, place, and time.   Psychiatric:         Behavior: Behavior normal.       Significant Labs:   CBC:   Recent Labs   Lab 03/03/23  0337 03/04/23  0425   WBC 5.13 12.59   HGB 7.8* 7.6*   HCT 24.9* 24.5*   PLT 15* 10*      and CMP:   Recent Labs   Lab 03/03/23  0337 03/04/23  0425   * 129*   K 4.7 4.6    97   CO2 23 23   * 196*   BUN 19 22*   CREATININE 0.7 0.7   CALCIUM 8.3* 8.3*   PROT 4.6* 4.5*   ALBUMIN 1.8* 1.9*   BILITOT 1.4* 1.2*   ALKPHOS 79 80   AST 41* 27   ALT 20 19   ANIONGAP 5* 9         Diagnostic Results:  I have reviewed all pertinent imaging results/findings within the past 24 hours.    Assessment/Plan:     * DLBCL (diffuse large B cell lymphoma)  Patient with small bowel resection approximately 5 months ago consistent with Diffuse large-B cell lymphoma, non germinal center origin. Additionally had omentum resection also showing DLBCL. Had bone marrow biopsy approximately 5 months ago that showed normo cellular marrow for age, no increase in blasts. Patient had PET scan from 1/11/23 showing hypermetabolic loops of small bowel most consistent with previous known disease and was thought to have improved (partial response to therapy noted from chart review) and was recommended he continue with R-CHOP at that time. Patient hospitalized at [facility] found to have likely worsening of lymphoma based on CT scan of abdomen showing partial compression of IVC. Transferred to [facility] for further evaluation given uncertainty in responsiveness to first-line chemo therapy.    --consider alternative chemo regimen given  progression (although appears patient has had delay in receiving chemo 2/2 to thrombocytopenia and hospital admissions)  --patient with lower abdominal pain: controlled with PO Norco and IV dilaudid  --continue acyclovir, allopurinol  --patient with concern for sepsis at [facility] was last on vancomycin, cefepime and IV metronidazole will continue those here for now. Low threshold to de-escalate antibiotics. On arrival thet patient is normotensives and HDS.  --Per last CT abdomen: Interval increase in size of the centrally necrotic ileocecal lymphomatous mass. Superimposed infection within the necrotic mass cannot be excluded.  - changed steroid to pulse dose dexamethasone on 3/2  - No safe window for Bx per IR or CRS. Discussed case with GI, however patient declining bx on 2/28.   - Rad onc consulted for radiation therapy. Pt underwent sim on 3/2, however holding off on radiation at this time.  - working on getting IR Lymph node bx vs GI primary tumor bx 3/6-3/7. Will decide chemo regimen following path results.              Atrial fibrillation with RVR  On 3/4 patient with sudden onset afib with RVR rate to the 200s. Patient asymptomatic, BP at baseline. Pt given Lopressor IV 5mg x3 with moderate response in HR to 150s. Loaded with amio and placed on gtt. No prior hx of a-fib. No LAE on recent echo.   -- continue amio gtt x24 hours  -- consider initiation of PO BB therapy    Large cell lymphoma  See DLBCL above    Fever  Patient febrile to 101.9 on 2/28. Likely multifactorial as patient with known malignancy with possible superimposed infection of intra-abdominal mass. Placed on vanc, cefepime, flagyl. Blood cx NGTD. No respiratory or urinary sxs.   -- off all abx as of 3/3    Leukocytosis  No evidence of active infection, pulling off abx as appropriate. DC vanc on 2/27 however patient subsequently fevered the following evening so placed back on vanc. Discontinued vanc again 3/1. Discontinued cefepime and  flagyl on 3/3. Will continue to monitor closely.     Compression of vena cava  CRS: this is due to dehydration because the IVC is flat throughout its course rather than just at one level.  Treating with IVF. And encouraging PO intake     Hyponatremia  Improved during admission, though still mildly hyponatremic     COVID-19 virus infection  Noted to be positive on 2/6/2023    Thrombocytopenia  Due to lymphoma    --Continue to monitor with CBC  --Transfuse for platelets less than 10 or <30-50 if actively bleeding    Hyperlipidemia, unspecified  Not currently taking a statin    Diabetes mellitus type 2, noninsulin dependent  Hemoglobin A1c from 1 week ago was 7.7  Goal glucose while inpatient: 140-180  Consider basal-bolus + SSI as needed  Hold home anti-diabetic medications  Patient takes daily steroids  -- increasing daily insulin regimen as appropriate starting 3/3 given increasing hyperglycemia while on steroids     Anemia  Due to lymphoma    Plan:  --transfuse prn hemoglobin <7    Small bowel mass  S/p  Resection September 2022 found to have DLBCL on pathology.   -- stat CTAP ordered on 2/28 given worsening ab pain to r/o perf, no evidence of perf however interval enlargement of mass   -- see DLBCL for full plan         VTE Risk Mitigation (From admission, onward)         Ordered     Reason for No Pharmacological VTE Prophylaxis  Once        Question:  Reasons:  Answer:  Thrombocytopenia    02/25/23 0402     IP VTE HIGH RISK PATIENT  Once         02/25/23 0402     Place sequential compression device  Until discontinued         02/25/23 0402                Disposition: Pending treatment     Mina Nolasco MD  Bone Marrow Transplant  [facility] - Oncology ([facility])

## 2023-03-04 NOTE — ASSESSMENT & PLAN NOTE
On 3/4 patient with sudden onset afib with RVR rate to the 200s. Patient asymptomatic, BP at baseline. Pt given Lopressor IV 5mg x3 with moderate response in HR to 150s. Loaded with amio and placed on gtt. No prior hx of a-fib. No LAE on recent echo.   -- continue amio gtt x24 hours  -- consider initiation of PO BB therapy

## 2023-03-04 NOTE — PLAN OF CARE
Pt AAOx4. Pt involved in plan of care and communicating needs throughout shift. Pt c/o of abdominal pain; PCA in use. No appetite; very little output noted; fluids encouraged. Lactulose given to promote BM. Platelet 10; HCP notified. Pt remaining free from falls or injury throughout shift, bed locked and in lowest position; call light within reach.  Pt instructed to call for assistance as needed.  Q2H rounding done on pt. All VSS; no acute events so far this shift.

## 2023-03-04 NOTE — CODE/ RAPID DOCUMENTATION
RAPID RESPONSE NURSE NOTE        Admit Date: 2023  LOS: 7  Code Status: Full Code   Date of Consult: 2023  : 1970  Age: 52 y.o.  Weight:   Wt Readings from Last 1 Encounters:   23 92.5 kg (203 lb 14.8 oz)     Sex: male  Race: Black or    Bed: [address] [place] [address] A:   MRN: [id number]  Time Rapid Response Team page Received: 0916  Time Rapid Response Team at Bedside: 0920  Time Rapid Response Team left Bedside: 1045  Was the patient discharged from an ICU this admission? No   Was the patient discharged from a PACU within last 24 hours? No   Did the patient receive conscious sedation/general anesthesia in last 24 hours? No  Was the patient in the ED within the past 24 hours? No  Was the patient on NIPPV within the past 24 hours? No   Did this progress into an ARC or CPA: no  Attending Physician: Lan Kurtz MD  Primary Service: Hematology and Oncology       SITUATION    Notified by Rapid Response RT.  Reason for alert: Tachycardia  Called to evaluate the patient for Dysrythmia    BACKGROUND     Why is the patient in the hospital?: DLBCL (diffuse large B cell lymphoma)    Patient has a past medical history of Cancer, Diabetes mellitus, Digestive disorder, and Prediabetes.    Last Vitals:  Temp: 97.5 °F (36.4 °C) (749)  Pulse: 180 (916)  Resp: 14 (749)  BP: 107/60 (948)  SpO2: 99 % (916)    24 Hours Vitals Range:  Temp:  [96.3 °F (35.7 °C)-98.6 °F (37 °C)]   Pulse:  []   Resp:  [12-19]   BP: ()/(60-77)   SpO2:  [95 %-99 %]     Labs:  Recent Labs     23  0557 23   WBC 6.23 5.13 12.59   HGB 6.4* 7.8* 7.6*   HCT 20.9* 24.9* 24.5*   PLT 21* 15* 10*       Recent Labs     23  0557 23   * 128* 129*   K 3.7 4.7 4.6    100 97   CO2 24 23 23   CREATININE 0.6 0.7 0.7   * 230* 196*   PHOS 2.6* 3.7 3.6   MG 1.7 1.9 1.9        No results for input(s): PH, PCO2, PO2, HCO3,  POCSATURATED, BE in the last 72 hours.     ASSESSMENT    Called to bedside for pt . Upon exam, pt is Aox4, asymptomatic, BP is stable. Dr. Nolasco at bedside. EKG obtained, AFRVR. Cardiology consulted, recommend metoprolol x 3 then amio if rhythm is not corrected. 3 dosed IVP metoprolol given with HR decreasing to 160s, BP unchanged. Loading dose amio given and gtt started. HR decreased to 120s, no change in Bp.     Physical Exam  Cardiovascular:      Rate and Rhythm: Regular rhythm. Tachycardia present.   Pulmonary:      Effort: Pulmonary effort is normal.   Skin:     General: Skin is warm and dry.      Capillary Refill: Capillary refill takes less than 2 seconds.   Neurological:      Mental Status: He is alert and oriented to person, place, and time.      GCS: GCS eye subscore is 4. GCS verbal subscore is 5. GCS motor subscore is 6.   Psychiatric:         Attention and Perception: Attention and perception normal.         Mood and Affect: Mood is anxious.       INTERVENTIONS    The patient was seen for Cardiac problem. Staff concerns included tachycardia. The following interventions were performed: EKG, continuous cardiac monitoring continued, cardiology consult, and metoprolol given, amio gtt started.    RECOMMENDATIONS    We recommend: VS per IV vaso active drug protocol. Strict fall precautions. Maintain tele and IV access.    PROVIDER ESCALATION    Orders received and case discussed with Dr. Nolasco .    Primary team arrival time: at bedside upon our arrival    Disposition: Remain in room 828.    FOLLOW UP    charge Carlos NELSON  updated on plan of care. Instructed to call the Rapid Response Nurse, Mio Lomeli RN at [phone] for additional questions or concerns.

## 2023-03-05 NOTE — ASSESSMENT & PLAN NOTE
On 3/4 patient with sudden onset afib with RVR rate to the 200s. Patient asymptomatic, BP at baseline. Pt given Lopressor IV 5mg x3 with moderate response in HR to 150s. Loaded with amio and placed on gtt. No prior hx of a-fib. No LAE on recent echo. Discontinued amio gtt after 24 hours. Will not transition to PO amio given concern for conversion to SR as patient unable to be anticoagulated given severe thrombocytopenia. Rate controlled with PO lopressor.    -- scheduled PO lopressor 25mg q6h   -- prn IV lopressor for a-fib with HR sustaining >130s  -- anticipate transition to Toprol if continued adequate rate control

## 2023-03-05 NOTE — ASSESSMENT & PLAN NOTE
Improved during admission, though still mildly hyponatremic. Serum osm and urine studies ordered. Considering SIADH vs hypovolemic hyponatremia

## 2023-03-05 NOTE — ASSESSMENT & PLAN NOTE
Patient with small bowel resection approximately 5 months ago consistent with Diffuse large-B cell lymphoma, non germinal center origin. Additionally had omentum resection also showing DLBCL. Had bone marrow biopsy approximately 5 months ago that showed normo cellular marrow for age, no increase in blasts. Patient had PET scan from 1/11/23 showing hypermetabolic loops of small bowel most consistent with previous known disease and was thought to have improved (partial response to therapy noted from chart review) and was recommended he continue with R-CHOP at that time. Patient hospitalized at [facility] found to have likely worsening of lymphoma based on CT scan of abdomen showing partial compression of IVC. Transferred to [facility] [place] evaluation given uncertainty in responsiveness to first-line chemo therapy.    --consider alternative chemo regimen given progression (although appears patient has had delay in receiving chemo 2/2 to thrombocytopenia and hospital admissions)  --patient with lower abdominal pain: controlled with PO Norco and IV dilaudid  --continue acyclovir, allopurinol  --patient with concern for sepsis at [facility] was last on vancomycin, cefepime and IV metronidazole will continue those here for now. Low threshold to de-escalate antibiotics. On arrival thet patient is normotensives and HDS.  --Per last CT abdomen: Interval increase in size of the centrally necrotic ileocecal lymphomatous mass. Superimposed infection within the necrotic mass cannot be excluded.  - changed steroid to pulse dose dexamethasone on 3/2  - No safe window for Bx per IR or CRS. Discussed case with GI, however patient declining bx on 2/28.   - Rad onc consulted for radiation therapy. Pt underwent sim on 3/2, however holding off on radiation at this time.  - working on getting IR Lymph node bx vs GI primary tumor bx 3/6-3/7. Will decide chemo regimen following path results.

## 2023-03-05 NOTE — ASSESSMENT & PLAN NOTE
Hemoglobin A1c from 1 week ago was 7.7  Goal glucose while inpatient: 140-180  Consider basal-bolus + SSI as needed  Hold home anti-diabetic medications  Patient takes daily steroids  -- increasing daily insulin regimen as appropriate starting 3/3 given increasing hyperglycemia while on steroids. BGL much better controlled.

## 2023-03-05 NOTE — ASSESSMENT & PLAN NOTE
Due to lymphoma    -- Continue to monitor with CBC  -- will transfuse to increase Plt level to >50-70k given upcoming bx

## 2023-03-05 NOTE — RESPIRATORY THERAPY
RAPID RESPONSE RESPIRATORY THERAPY ETCO2 CHECK         Time of visit: 930     Code Status: Full Code   : 1970  Bed: 828/828 A:   MRN: [id number]  Time spent at the bedside: < 15 min    SITUATION    Evaluated patient for: ETCo2 compliance    BACKGROUND    Why is the patient in the hospital?: DLBCL (diffuse large B cell lymphoma)    Patient has a past medical history of Cancer, Diabetes mellitus, Digestive disorder, and Prediabetes.    24 Hours Vitals Range:  Temp:  [97.4 °F (36.3 °C)-97.8 °F (36.6 °C)]   Pulse:  []   Resp:  [12-16]   BP: ()/(58-76)   SpO2:  [93 %-98 %]     Labs:    Recent Labs     23   * 129* 127*   K 4.7 4.6 4.5    97 99   CO2 23 23 21*   CREATININE 0.7 0.7 0.6   * 196* 183*   PHOS 3.7 3.6 3.3   MG 1.9 1.9 1.8        No results for input(s): PH, PCO2, PO2, HCO3, POCSATURATED, BE in the last 72 hours.    ASSESSMENT/INTERVENTIONS      Last VS   Temp: 97.4 °F (36.3 °C) (720)  Pulse: 111 ( 0753)  Resp: 15 (720)  BP: 102/74 ( 1000)  SpO2: 97 % (720)    Level of Consciousness: Level of Consciousness (AVPU): alert  Respiratory Effort: Respiratory Effort: Unlabored Expansion/Accessory Muscle Usage: Expansion/Accessory Muscles/Retractions: no retractions, no use of accessory muscles  All Lung Field Breath Sounds: All Lung Fields Breath Sounds: Anterior:, Lateral:, clear  Is the ETCO2 monitor on? Yes  Is the patient wearing a cannula? Yes  Are ETCO2 orders placed? Yes  Is the patient on a PCA pump? Yes  ETCO2 monitored: ETCO2 (mmHg): 30 mmHg  Ambu at bedside: Ambu bag with the patient?: Yes, Adult Ambu    Active Orders   Respiratory Care    END TIDAL CO2 MONITOR Q12H     Frequency: Q12H     Number of Occurrences: Until Specified    Oxygen Continuous     Frequency: Continuous     Number of Occurrences: Until Specified     Order Questions:      Device type: Low flow      Device: Nasal Cannula (1- 5  Liters)      LPM: 2      Titrate O2 per Oxygen Titration Protocol: Yes      To maintain SpO2 goal of: >= 90%      Notify MD of: Inability to achieve desired SpO2; Sudden change in patient status and requires 20% increase in FiO2; Patient requires >60% FiO2    Pulse Oximetry Q Shift     Frequency: Q Shift     Number of Occurrences: Until Specified       RECOMMENDATIONS    We recommend: RRT Recs: Continue POC per primary team.      FOLLOW-UP    Please call back the Rapid Response RT, Ben Fernando, RRT at x [phone] for any questions or concerns.

## 2023-03-05 NOTE — PROGRESS NOTES
January 20, 2021      Kelly KRAUS Danelle  [id number]  [facility] [id number]    To Whom It May Concern:    This is to certify Kelly Mcclendon was evaluated on 01/20/21 and is unable to return to work.    Kelly Mcclendon should self-isolate.  ?  CDC guidelines for return to work are as follows: employer guidelines to be followed   · At least 24 hours have passed since fever resolution without use of fever reducing medication and   · Symptoms have improved and  · At least 10 days have passed since symptoms first appeared  · At least 10 days have passed since the collection date for a positive COVID-19 test.     **The loss of taste and smell may persist for weeks or months after recovery and do not need to delay the end of isolation.     Per CDC recommendations, employers should not require a COVID-19 test result or a healthcare provider’s note for employees who are sick to validate their illness, qualify for sick leave, or to return to work.    The Coronavirus is a rapidly evolving situation and recommendations are changing regularly to prevent spread of the disease and further loss of life.    Thank you for your understanding during these unusual times.     Electronically signed by:     Otilia Strange MD                 [id number] [facility] [id number]     Josh Rosas - Oncology ([facility])  Hematology  Bone Marrow Transplant  Progress Note    Patient Name: Dwight Hoffmann  Admission Date: 2/25/2023  Hospital Length of Stay: 8 days  Code Status: Full Code    Subjective:     Interval History: NAEON. Rate improved on amio gtt and po lopressor. Will DC amio today as unable to anticoagulate given thrombocytopenia. Planning for bx tomorrow with IR.     Objective:     Vital Signs (Most Recent):  Temp: 97.7 °F (36.5 °C) (03/05/23 1231)  Pulse: (!) 124 (03/05/23 1240)  Resp: 15 (03/05/23 1231)  BP: 93/69 (03/05/23 1231)  SpO2: 97 % (03/05/23 1231)   Vital Signs (24h Range):  Temp:  [97.3 °F (36.3 °C)-97.8 °F (36.6 °C)] 97.7 °F (36.5 °C)  Pulse:  [] 124  Resp:  [12-16] 15  SpO2:  [94 %-98 %] 97 %  BP: ()/(60-76) 93/69     Weight: 92.5 kg (203 lb 14.8 oz)  Body mass index is 26.9 kg/m².  Body surface area is 2.18 meters squared.    ECOG SCORE           [unfilled]    Intake/Output - Last 3 Shifts         03/03 0700  03/04 0659 03/04 0700  03/05 0659 03/05 0700  03/06 0659    P.O. 593 480     I.V. (mL/kg)  459.2 (5)     Blood   249.8    Total Intake(mL/kg) 593 (6.4) 939.2 (10.2) 249.8 (2.7)    Urine (mL/kg/hr) 750 (0.3) 1100 (0.5) 625 (1)    Stool       Total Output 750 1100 625    Net -157 -160.8 -375.3           Urine Occurrence 1 x              Physical Exam  Constitutional:       General: He is not in acute distress.     Appearance: He is well-developed. He is ill-appearing. He is not diaphoretic.   HENT:      Head: Normocephalic and atraumatic.   Eyes:      General: No scleral icterus.     Conjunctiva/sclera: Conjunctivae normal.   Cardiovascular:      Rate and Rhythm: Tachycardia present. Rhythm irregular.      Heart sounds: Normal heart sounds. No murmur heard.    No friction rub. No gallop.   Pulmonary:      Effort: Pulmonary effort is normal. No respiratory distress.      Breath sounds: Normal breath sounds. No wheezing or rales.   Abdominal:      General: Bowel  sounds are normal. There is distension.      Tenderness: There is abdominal tenderness.   Musculoskeletal:         General: Normal range of motion.      Cervical back: Normal range of motion and neck supple.   Lymphadenopathy:      Cervical: No cervical adenopathy.   Skin:     General: Skin is warm and dry.      Findings: No rash.   Neurological:      Mental Status: He is alert and oriented to person, place, and time.   Psychiatric:         Behavior: Behavior normal.       Significant Labs:   CBC:   Recent Labs   Lab 03/04/23 0425 03/05/23  0513   WBC 12.59 12.15   HGB 7.6* 8.2*   HCT 24.5* 26.1*   PLT 10* 12*      and CMP:   Recent Labs   Lab 03/04/23  0425 03/05/23  0513   * 127*   K 4.6 4.5   CL 97 99   CO2 23 21*   * 183*   BUN 22* 19   CREATININE 0.7 0.6   CALCIUM 8.3* 8.2*   PROT 4.5* 4.7*   ALBUMIN 1.9* 2.0*   BILITOT 1.2* 1.5*   ALKPHOS 80 90   AST 27 25   ALT 19 18   ANIONGAP 9 7*         Diagnostic Results:  I have reviewed all pertinent imaging results/findings within the past 24 hours.    Assessment/Plan:     * DLBCL (diffuse large B cell lymphoma)  Patient with small bowel resection approximately 5 months ago consistent with Diffuse large-B cell lymphoma, non germinal center origin. Additionally had omentum resection also showing DLBCL. Had bone marrow biopsy approximately 5 months ago that showed normo cellular marrow for age, no increase in blasts. Patient had PET scan from 1/11/23 showing hypermetabolic loops of small bowel most consistent with previous known disease and was thought to have improved (partial response to therapy noted from chart review) and was recommended he continue with R-CHOP at that time. Patient hospitalized at [facility] found to have likely worsening of lymphoma based on CT scan of abdomen showing partial compression of IVC. Transferred to [facility] for further evaluation given uncertainty in responsiveness to first-line chemo therapy.    --consider alternative  chemo regimen given progression (although appears patient has had delay in receiving chemo 2/2 to thrombocytopenia and hospital admissions)  --patient with lower abdominal pain: controlled with PO Norco and IV dilaudid  --continue acyclovir, allopurinol  --patient with concern for sepsis at [facility] was last on vancomycin, cefepime and IV metronidazole will continue those here for now. Low threshold to de-escalate antibiotics. On arrival thet patient is normotensives and HDS.  --Per last CT abdomen: Interval increase in size of the centrally necrotic ileocecal lymphomatous mass. Superimposed infection within the necrotic mass cannot be excluded.  - changed steroid to pulse dose dexamethasone on 3/2  - No safe window for Bx per IR or CRS. Discussed case with GI, however patient declining bx on 2/28.   - Rad onc consulted for radiation therapy. Pt underwent sim on 3/2, however holding off on radiation at this time.  - working on getting IR Lymph node bx vs GI primary tumor bx 3/6-3/7. Will decide chemo regimen following path results.              Atrial fibrillation with RVR  On 3/4 patient with sudden onset afib with RVR rate to the 200s. Patient asymptomatic, BP at baseline. Pt given Lopressor IV 5mg x3 with moderate response in HR to 150s. Loaded with amio and placed on gtt. No prior hx of a-fib. No LAE on recent echo. Discontinued amio gtt after 24 hours. Will not transition to PO amio given concern for conversion to SR as patient unable to be anticoagulated given severe thrombocytopenia. Rate controlled with PO lopressor.    -- scheduled PO lopressor 25mg q6h   -- prn IV lopressor for a-fib with HR sustaining >130s  -- anticipate transition to Toprol if continued adequate rate control      Large cell lymphoma  See DLBCL above    Fever  Patient febrile to 101.9 on 2/28. Likely multifactorial as patient with known malignancy with possible superimposed infection of intra-abdominal mass. Placed on vanc,  cefepime, flagyl. Blood cx NGTD. No respiratory or urinary sxs.   -- off all abx as of 3/3    Leukocytosis  No evidence of active infection, pulling off abx as appropriate. DC vanc on 2/27 however patient subsequently fevered the following evening so placed back on vanc. Discontinued vanc again 3/1. Discontinued cefepime and flagyl on 3/3. Will continue to monitor closely.     Compression of vena cava  CRS: this is due to dehydration because the IVC is flat throughout its course rather than just at one level.  Treating with IVF. And encouraging PO intake     Hyponatremia  Improved during admission, though still mildly hyponatremic. Serum osm and urine studies ordered. Considering SIADH vs hypovolemic hyponatremia     COVID-19 virus infection  Noted to be positive on 2/6/2023    Thrombocytopenia  Due to lymphoma    -- Continue to monitor with CBC  -- will transfuse to increase Plt level to >50-70k given upcoming bx     Hyperlipidemia, unspecified  Not currently taking a statin    Diabetes mellitus type 2, noninsulin dependent  Hemoglobin A1c from 1 week ago was 7.7  Goal glucose while inpatient: 140-180  Consider basal-bolus + SSI as needed  Hold home anti-diabetic medications  Patient takes daily steroids  -- increasing daily insulin regimen as appropriate starting 3/3 given increasing hyperglycemia while on steroids. BGL much better controlled.     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   -- stat CTAP ordered on 2/28 given worsening ab pain to r/o perf, no evidence of perf however interval enlargement of mass   -- see DLBCL for full plan         VTE Risk Mitigation (From admission, onward)         Ordered     Reason for No Pharmacological VTE Prophylaxis  Once        Question:  Reasons:  Answer:  Thrombocytopenia    02/25/23 0402     IP VTE HIGH RISK PATIENT  Once         02/25/23 0402     Place sequential  compression device  Until discontinued         02/25/23 0402                Disposition: Pending treatment     Mina Nolasco MD  Bone Marrow Transplant  [facility] - Oncology ([facility])

## 2023-03-05 NOTE — PLAN OF CARE
Patient continues on room air. PCA pump Dilaudid continued. Amiodarone gtt started on patient d/t suddne onset AFRVR rate 200s. PO lopressor started on patient as well. 1L NS bolus given. VSS currently. Tele monitoring continued. Blood glucose continued to be monitored and treated. Patient stable at this time.       Problem: Adult Inpatient Plan of Care  Goal: Plan of Care Review  Outcome: Ongoing, Progressing  Goal: Patient-Specific Goal (Individualized)  Outcome: Ongoing, Progressing  Goal: Absence of Hospital-Acquired Illness or Injury  Outcome: Ongoing, Progressing  Goal: Optimal Comfort and Wellbeing  Outcome: Ongoing, Progressing  Goal: Readiness for Transition of Care  Outcome: Ongoing, Progressing     Problem: Diabetes Comorbidity  Goal: Blood Glucose Level Within Targeted Range  Outcome: Ongoing, Progressing     Problem: Adjustment to Illness (Sepsis/Septic Shock)  Goal: Optimal Coping  Outcome: Ongoing, Progressing     Problem: Bleeding (Sepsis/Septic Shock)  Goal: Absence of Bleeding  Outcome: Ongoing, Progressing     Problem: Glycemic Control Impaired (Sepsis/Septic Shock)  Goal: Blood Glucose Level Within Desired Range  Outcome: Ongoing, Progressing     Problem: Infection Progression (Sepsis/Septic Shock)  Goal: Absence of Infection Signs and Symptoms  Outcome: Ongoing, Progressing     Problem: Nutrition Impaired (Sepsis/Septic Shock)  Goal: Optimal Nutrition Intake  Outcome: Ongoing, Progressing     Problem: Infection  Goal: Absence of Infection Signs and Symptoms  Outcome: Ongoing, Progressing     Problem: Fall Injury Risk  Goal: Absence of Fall and Fall-Related Injury  Outcome: Ongoing, Progressing     Problem: Skin Injury Risk Increased  Goal: Skin Health and Integrity  Outcome: Ongoing, Progressing

## 2023-03-05 NOTE — CARE UPDATE
"RAPID RESPONSE NURSE CHART REVIEW        Chart Reviewed: 03/05/2023, 12:03 AM    MRN: [id number]  Bed: 828/828 A    Dx: DLBCL (diffuse large B cell lymphoma)    Dwight Hoffmann has a past medical history of Cancer, Diabetes mellitus, Digestive disorder, and Prediabetes.    Last VS: BP 92/60 (BP Location: Left arm, Patient Position: Sitting)   Pulse (!) 167   Temp 97.5 °F (36.4 °C)   Resp 16   Ht 6' 1" (1.854 m)   Wt 92.5 kg (203 lb 14.8 oz)   SpO2 98%   BMI 26.90 kg/m²     24H Vital Sign Range:  Temp:  [97.5 °F (36.4 °C)-98.6 °F (37 °C)]   Pulse:  []   Resp:  [12-16]   BP: ()/(58-72)   SpO2:  [93 %-99 %]     Level of Consciousness (AVPU): alert    Recent Labs     03/02/23  0557 03/03/23  0337 03/04/23  0425   WBC 6.23 5.13 12.59   HGB 6.4* 7.8* 7.6*   HCT 20.9* 24.9* 24.5*   PLT 21* 15* 10*       Recent Labs     03/02/23  0557 03/03/23  0337 03/04/23  0425   * 128* 129*   K 3.7 4.7 4.6    100 97   CO2 24 23 23   CREATININE 0.6 0.7 0.7   * 230* 196*   PHOS 2.6* 3.7 3.6   MG 1.7 1.9 1.9        No results for input(s): PH, PCO2, PO2, HCO3, POCSATURATED, BE in the last 72 hours.     OXYGEN:             MEWS score: 5    bedside RNGhada  contacted for tachycardia, documented . States patient was up in bathroom at the time, HR currently 129. MD aware, patient asymptomatic. No additional concerns verbalized at this time. Instructed to call [phone] for further concerns or assistance.    OSMAN Knight RN       "

## 2023-03-05 NOTE — PLAN OF CARE
Problem: Adult Inpatient Plan of Care  Goal: Plan of Care Review  Outcome: Ongoing, Progressing  Goal: Patient-Specific Goal (Individualized)  Outcome: Ongoing, Progressing  Goal: Optimal Comfort and Wellbeing  Outcome: Ongoing, Progressing      Pt's pain controlled with PCA. Pt with BM overnight, reports mild relief in abdominal discomfort. IV metoprolol given x2. HR between afib and sinus tach; asymptotic. Increased amiodarone infusion.

## 2023-03-05 NOTE — SUBJECTIVE & OBJECTIVE
Subjective:     Interval History: NAEON. Rate improved on amio gtt and po lopressor. Will DC amio today as unable to anticoagulate given thrombocytopenia. Planning for bx tomorrow with IR.     Objective:     Vital Signs (Most Recent):  Temp: 97.7 °F (36.5 °C) (03/05/23 1231)  Pulse: (!) 124 (03/05/23 1240)  Resp: 15 (03/05/23 1231)  BP: 93/69 (03/05/23 1231)  SpO2: 97 % (03/05/23 1231)   Vital Signs (24h Range):  Temp:  [97.3 °F (36.3 °C)-97.8 °F (36.6 °C)] 97.7 °F (36.5 °C)  Pulse:  [] 124  Resp:  [12-16] 15  SpO2:  [94 %-98 %] 97 %  BP: ()/(60-76) 93/69     Weight: 92.5 kg (203 lb 14.8 oz)  Body mass index is 26.9 kg/m².  Body surface area is 2.18 meters squared.    ECOG SCORE           [unfilled]    Intake/Output - Last 3 Shifts         03/03 0700  03/04 0659 03/04 0700  03/05 0659 03/05 0700  03/06 0659    P.O. 593 480     I.V. (mL/kg)  459.2 (5)     Blood   249.8    Total Intake(mL/kg) 593 (6.4) 939.2 (10.2) 249.8 (2.7)    Urine (mL/kg/hr) 750 (0.3) 1100 (0.5) 625 (1)    Stool       Total Output 750 1100 625    Net -157 -160.8 -375.3           Urine Occurrence 1 x              Physical Exam  Constitutional:       General: He is not in acute distress.     Appearance: He is well-developed. He is ill-appearing. He is not diaphoretic.   HENT:      Head: Normocephalic and atraumatic.   Eyes:      General: No scleral icterus.     Conjunctiva/sclera: Conjunctivae normal.   Cardiovascular:      Rate and Rhythm: Tachycardia present. Rhythm irregular.      Heart sounds: Normal heart sounds. No murmur heard.    No friction rub. No gallop.   Pulmonary:      Effort: Pulmonary effort is normal. No respiratory distress.      Breath sounds: Normal breath sounds. No wheezing or rales.   Abdominal:      General: Bowel sounds are normal. There is distension.      Tenderness: There is abdominal tenderness.   Musculoskeletal:         General: Normal range of motion.      Cervical back: Normal range of motion and neck  supple.   Lymphadenopathy:      Cervical: No cervical adenopathy.   Skin:     General: Skin is warm and dry.      Findings: No rash.   Neurological:      Mental Status: He is alert and oriented to person, place, and time.   Psychiatric:         Behavior: Behavior normal.       Significant Labs:   CBC:   Recent Labs   Lab 03/04/23 0425 03/05/23 0513   WBC 12.59 12.15   HGB 7.6* 8.2*   HCT 24.5* 26.1*   PLT 10* 12*      and CMP:   Recent Labs   Lab 03/04/23 0425 03/05/23 0513   * 127*   K 4.6 4.5   CL 97 99   CO2 23 21*   * 183*   BUN 22* 19   CREATININE 0.7 0.6   CALCIUM 8.3* 8.2*   PROT 4.5* 4.7*   ALBUMIN 1.9* 2.0*   BILITOT 1.2* 1.5*   ALKPHOS 80 90   AST 27 25   ALT 19 18   ANIONGAP 9 7*         Diagnostic Results:  I have reviewed all pertinent imaging results/findings within the past 24 hours.

## 2023-03-06 NOTE — CONSULTS
Josh Rosas - Oncology ([facility])  Gastroenterology  Consult Note    Patient Name: Dwight Hoffmann  MRN: [id number]  Admission Date: 2/25/2023  Hospital Length of Stay: 9 days  Code Status: Full Code   Attending Provider: Lan Kurtz MD   Consulting Provider: Dain Mooney MD  Primary Care Physician: Primary Doctor No  Principal Problem:DLBCL (diffuse large B cell lymphoma)    Inpatient consult to Gastroenterology  Consult performed by: Dain Mooney MD  Consult ordered by: Mina Nolasco MD        Subjective:     HPI:  Mr. Hoffmann is a 52 M with PMH of DLBCL, T2DM, anemia, HLD, thrombocytopenia who was admitted to [facility] on 2/22/23 and transferred to [facility] on 2/25/23 for evaluation of current treatment (R-CHOP) with potential progression of ileocecal mass.       GI was consulted regarding his DLBCL and potential biopsy of abdominal mass to guide further management of chemotherapy management.       Patient initially presented to [facility] c/o nausea, vomiting, and generalized weakness.  Pt had a CT scan showing partial compression of the IVC and likely worsening lymphoma with ileal cecal/mesenteric mass enlargement and probable new peritoneal implant.  Transferred to [facility] for further evaluation given uncertainty in responsiveness to R-CHOP.  Of note, pt had a partial small bowel and omentum resection approx. 5 months ago consistent with DLBCL.  Bone marrow biopsy done 5 months ago showed normal cellular marrow for age with no increase in blast.  PET scan on 1/11/23 showed hypermetabolic loops of small bowel consistent with previous known disease, thought to be improving on R-CHOP.  Patient amenable to endoscopic biopsy.  Denied any n/v/f/c or changes in bowels. Endorses abdominal pain. Last colonoscopy (9/22) and sigmoid (11/22) showed NB internal hemorrhoids, diverticulosis in sigmoid and descending colon, and polyp removal in ascending colon and cecum. Otherwise unremarkable.        Past  Medical History:   Diagnosis Date    Cancer     Diabetes mellitus     Digestive disorder     Prediabetes        Past Surgical History:   Procedure Laterality Date    APPENDECTOMY  07/15/2014    COLONOSCOPY      COLONOSCOPY N/A 02/09/2022    ERROR.   He did not have a colonoscopy with Dr. Sanders.    COLONOSCOPY N/A 09/01/2022    Procedure: COLONOSCOPY;  Surgeon: Andrea Clemens MD;  Location: [facility];  Service: Endoscopy;  Laterality: N/A;    FLEXIBLE SIGMOIDOSCOPY N/A 11/7/2022    Procedure: SIGMOIDOSCOPY, FLEXIBLE;  Surgeon: Manuel Sanders MD;  Location[facility];  Service: Endoscopy;  Laterality: N/A;    INCISION AND DRAINAGE OF ABDOMEN N/A 09/14/2022    Procedure: INCISION AND DRAINAGE, ABDOMEN;  Surgeon: Jan Oliveira Jr., MD;  Location: [facility];  Service: General;  Laterality: N/A;    INSERTION OF TUNNELED CENTRAL VENOUS CATHETER (CVC) WITH SUBCUTANEOUS PORT N/A 10/31/2022    Procedure: [id number];  Surgeon: Jan Oliveira Jr., MD;  Location: [facility] OR;  Service: General;  Laterality: N/A;    LAPAROSCOPIC DRAINAGE OF ABDOMEN N/A 09/23/2022    Procedure: DRAINAGE, ABDOMEN, LAPAROSCOPIC;  Surgeon: Jan Oliveira Jr., MD;  Location: [facility];  Service: General;  Laterality: N/A;       Review of patient's allergies indicates:   Allergen Reactions    Albuterol (bulk) Palpitations     Family History       Problem Relation (Age of Onset)    Cancer Mother    Diabetes Mother    Heart murmur Sister    Hypertension Mother    Seizures Father, Sister          Tobacco Use    Smoking status: Never    Smokeless tobacco: Never   Substance and Sexual Activity    Alcohol use: Not Currently     Comment: occasional    Drug use: No    Sexual activity: Yes     Partners: Female     Review of Systems   Constitutional:  Negative for activity change, appetite change and fever.   Respiratory:  Negative for cough, shortness of breath and wheezing.    Cardiovascular:  Negative for  chest pain and leg swelling.   Gastrointestinal:  Positive for abdominal pain (improved compared to prior). Negative for constipation, diarrhea, nausea and vomiting.   Skin:  Negative for rash.   Neurological:  Negative for headaches.   Objective:     Vital Signs (Most Recent):  Temp: 97.7 °F (36.5 °C) (03/06/23 0823)  Pulse: (!) 142 (03/06/23 1110)  Resp: 18 (03/06/23 0929)  BP: 101/60 (03/06/23 0847)  SpO2: 96 % (03/06/23 0929)   Vital Signs (24h Range):  Temp:  [97.3 °F (36.3 °C)-98.1 °F (36.7 °C)] 97.7 °F (36.5 °C)  Pulse:  [105-157] 142  Resp:  [12-18] 18  SpO2:  [95 %-100 %] 96 %  BP: ()/(60-80) 101/60     Weight: 92.5 kg (203 lb 14.8 oz) (02/25/23 0428)  Body mass index is 26.9 kg/m².      Intake/Output Summary (Last 24 hours) at 3/6/2023 1137  Last data filed at 3/6/2023 0947  Gross per 24 hour   Intake 1445.86 ml   Output 2100 ml   Net -654.14 ml       Lines/Drains/Airways       Peripheral Intravenous Line  Duration                  Midline Catheter Insertion/Assessment  - Single Lumen 02/26/23 1257 Right basilic vein (medial side of arm)  7 days         Peripheral IV - Single Lumen 03/02/23 1812 20 G;1 3/4 in Anterior;Left Forearm 3 days                    Physical Exam  Vitals and nursing note reviewed.   HENT:      Head: Normocephalic and atraumatic.   Eyes:      Extraocular Movements: Extraocular movements intact.      Conjunctiva/sclera: Conjunctivae normal.   Cardiovascular:      Rate and Rhythm: Normal rate and regular rhythm.      Pulses: Normal pulses.   Pulmonary:      Effort: Pulmonary effort is normal. No respiratory distress.      Breath sounds: No wheezing or rales.   Abdominal:      Palpations: Abdomen is soft.      Tenderness: There is no abdominal tenderness. There is no guarding.   Neurological:      General: No focal deficit present.      Mental Status: He is alert and oriented to person, place, and time.   Psychiatric:         Mood and Affect: Mood normal.       Significant  Labs:  All pertinent lab results from the last 24 hours have been reviewed.    Significant Imaging:  Imaging results within the past 24 hours have been reviewed.    Assessment/Plan:     GI  Small bowel mass  Patient with enlarged ileocecal mass with necrotic core detected on recent PET scan and confirmed on CT with concern for progression of DLBCL on R-CHOP vs. malignancy of novel etiology. General surgery and IR unable to find a safe window for mass sample and IR would ideally defer inpatient lymph node biopsy to outpatient setting unless patient to start inpatient chemo.     - GI discussing case with [facility] for needle/core biopsy. Given anatomy and to avoid multiple colonoscopies, AES could facilitate more direct sampling approach.        Thank you for your consult. I will follow-up with patient. Please contact us if you have any additional questions.    Dain Mooney MD  Gastroenterology  [facility] - Oncology ([facility]

## 2023-03-06 NOTE — ASSESSMENT & PLAN NOTE
-[facility]ADS-[facility] of 2  -Patient with AF RVR on 3/4/23. Was on IV Amiodarone gtt x2 days, stopped on 3/5/23  -Telemetry reviewed: AF with HR's 130-150 bpm  -Given his thrombocytopenia requiring transfusions on this admission, he would not be a good candidate for AC especially since he still has a lymph node biopsy pending  -Recommend continuing metoprolol tartrate 50 mg q6 hrs.   -Start IV Digoxin 0.5 mg x1 then 0.25 mg every 8 hrs for 3 doses  -EP will continue to follow

## 2023-03-06 NOTE — CONSULTS
[facility] - Oncology ([facility])  Cardiac Electrophysiology  Consult Note    Admission Date: 2/25/2023  Code Status: Full Code   Attending Provider: Lan Kurtz MD  Consulting Provider: Dick Sierra MD  Principal Problem:Atrial fibrillation with RVR    Inpatient consult to Electrophysiology  Consult performed by: Dick Sierra MD  Consult ordered by: Mina Nolasco MD        Subjective:     Chief Complaint:  AF with RVR    HPI:   Dwight Hoffmann is a 52 year-old male with history of:  -DLBCL  -Type II DM  -Thrombocytopenia    Who presented as a transfer from [facility] for refractory lymphoma and compression of IVC. Patient apparently has progressed on R-CHOP therapy but per Heme/onc note at OSH patient was not consistently getting chemotherapy treatment 2/2 to thrombocytopenia, anemia and hospital admissions. Patient was transferred to [facility] for biopsy. He was noted to go into AF with RVR on 3/4/23 at which time he was started on amiodarone gtt which was stopped on 3/5/23. He is currently on PO Metoprolol tartrate 50 mg q6 hrs for rate control. He received 1 unit of platelets on this admission for his thrombocytopenia.         Past Medical History:   Diagnosis Date    Cancer     Diabetes mellitus     Digestive disorder     Prediabetes        Past Surgical History:   Procedure Laterality Date    APPENDECTOMY  07/15/2014    COLONOSCOPY      COLONOSCOPY N/A 02/09/2022    ERROR.   He did not have a colonoscopy with Dr. Horta.    COLONOSCOPY N/A 09/01/2022    Procedure: COLONOSCOPY;  Surgeon: Andrea Clemens MD;  Location: [facility];  Service: Endoscopy;  Laterality: N/A;    FLEXIBLE SIGMOIDOSCOPY N/A 11/7/2022    Procedure: SIGMOIDOSCOPY, FLEXIBLE;  Surgeon: Manuel Horta MD;  Location: [facility];  Service: Endoscopy;  Laterality: N/A;    INCISION AND DRAINAGE OF ABDOMEN N/A 09/14/2022    Procedure: INCISION AND DRAINAGE, ABDOMEN;  Surgeon: Jan Oliveira Jr., MD;   Location: [facility] OR;  Service: General;  Laterality: N/A;    INSERTION OF TUNNELED CENTRAL VENOUS CATHETER (CVC) WITH SUBCUTANEOUS PORT N/A 10/31/2022    Procedure: [id number];  Surgeon: Jan Oliveira Jr., MD;  Location: [facility] OR;  Service: General;  Laterality: N/A;    LAPAROSCOPIC DRAINAGE OF ABDOMEN N/A 09/23/2022    Procedure: DRAINAGE, ABDOMEN, LAPAROSCOPIC;  Surgeon: Jan Oliveira Jr., MD;  Location: [facility] OR;  Service: General;  Laterality: N/A;       Review of patient's allergies indicates:   Allergen Reactions    Albuterol (bulk) Palpitations       No current facility-administered medications on file prior to encounter.     Current Outpatient Medications on File Prior to Encounter   Medication Sig    acyclovir (ZOVIRAX) 400 MG tablet Take 1 tablet (400 mg total) by mouth 2 (two) times daily.    allopurinoL (ZYLOPRIM) 100 MG tablet Take 1 tablet (100 mg total) by mouth once daily.    ALPRAZolam (XANAX) 0.5 MG tablet Take 1 tablet (0.5 mg total) by mouth nightly as needed for Anxiety.    ascorbic acid, vitamin C, (VITAMIN C) 1000 MG tablet Take 1,000 mg by mouth once daily.    atorvastatin (LIPITOR) 10 MG tablet Take 10 mg by mouth once daily.    bisacodyL (DULCOLAX, BISACODYL,) 10 mg Supp Place 1 suppository (10 mg total) rectally daily as needed (refractory constipation).    blood sugar diagnostic Strp To check BG 2 times daily, to use with insurance preferred meter    blood-glucose meter kit To check BG 2 times daily, to use with insurance preferred meter    butenafine 1 % cream Apply 1 application topically 2 (two) times daily.    clotrimazole (LOTRIMIN) 1 % cream Apply topically 2 (two) times daily.    HYDROcodone-acetaminophen (NORCO) 5-325 mg per tablet 1 tablet EVERY 4 HOURS (route: oral) (Patient taking differently: Take 1 tablet by mouth every 4 (four) hours as needed.)    metFORMIN (GLUCOPHAGE) 1000 MG tablet Take 1,000 mg by mouth 2 (two) times daily.     morphine (MS CONTIN) 15 MG 12 hr tablet Take 1 tablet (15 mg total) by mouth 2 (two) times daily.    multivitamin Tab Take 1 tablet by mouth once daily.    ondansetron (ZOFRAN-ODT) 4 MG TbDL Take 1 tablet by mouth once daily.    pantoprazole (PROTONIX) 40 MG tablet Take 1 tablet (40 mg total) by mouth 2 (two) times daily.    polyethylene glycol (GLYCOLAX) 17 gram PwPk Take 17 g by mouth 2 (two) times daily as needed (constipation).    predniSONE (DELTASONE) 50 MG Tab Take 1 tablet (50 mg total) by mouth once daily.    promethazine (PHENERGAN) 12.5 MG Tab Take 1 tablet by mouth once daily.    sulfamethoxazole-trimethoprim 800-160mg (BACTRIM DS) 800-160 mg Tab Take 1 tablet by mouth 3 (three) times a week.    tamsulosin (FLOMAX) 0.4 mg Cap Take 1 capsule (0.4 mg total) by mouth once daily.     Family History       Problem Relation (Age of Onset)    Cancer Mother    Diabetes Mother    Heart murmur Sister    Hypertension Mother    Seizures Father, Sister          Tobacco Use    Smoking status: Never    Smokeless tobacco: Never   Substance and Sexual Activity    Alcohol use: Not Currently     Comment: occasional    Drug use: No    Sexual activity: Yes     Partners: Female     Review of Systems   All other systems reviewed and are negative.  Objective:     Vital Signs (Most Recent):  Temp: 97.7 °F (36.5 °C) (03/06/23 1149)  Pulse: (!) 156 (03/06/23 1300)  Resp: 15 (03/06/23 1300)  BP: 103/78 (03/06/23 1300)  SpO2: 99 % (03/06/23 1300)   Vital Signs (24h Range):  Temp:  [97.5 °F (36.4 °C)-98.1 °F (36.7 °C)] 97.7 °F (36.5 °C)  Pulse:  [107-157] 156  Resp:  [12-18] 15  SpO2:  [96 %-100 %] 99 %  BP: ()/(60-80) 103/78       Weight: 92.5 kg (203 lb 14.8 oz)  Body mass index is 26.9 kg/m².    SpO2: 99 %       Physical Exam  Constitutional:       Appearance: Normal appearance.   HENT:      Head: Normocephalic and atraumatic.      Nose: Nose normal.   Eyes:      Extraocular Movements: Extraocular movements  intact.      Pupils: Pupils are equal, round, and reactive to light.   Cardiovascular:      Rate and Rhythm: Normal rate and regular rhythm.   Pulmonary:      Effort: Pulmonary effort is normal.      Breath sounds: Normal breath sounds.   Abdominal:      General: Abdomen is flat.      Palpations: Abdomen is soft.   Musculoskeletal:         General: Normal range of motion.      Cervical back: Normal range of motion and neck supple.   Skin:     General: Skin is warm and dry.   Neurological:      General: No focal deficit present.      Mental Status: He is alert and oriented to person, place, and time.              CHADS2 for A-FIB Stroke Risk  Age?: < 65 years old  CHF history: No  HTN history: Yes  Previous Stroke Sx or TIA: No  Vascular Disease History?: No  Diabetes Mellitus History: Yes  Female?: No  [id number] Total Score: 2      Assessment and Plan:     * Atrial fibrillation with RVR  -CHADS-VaSC of 2  -Patient with AF RVR on 3/4/23. Was on IV Amiodarone gtt x2 days, stopped on 3/5/23  -Telemetry reviewed: AF with HR's 130-150 bpm  -Given his thrombocytopenia requiring transfusions on this admission, he would not be a good candidate for AC especially since he still has a lymph node biopsy pending  -Recommend continuing metoprolol tartrate 50 mg q6 hrs.   -Start IV Digoxin 0.5 mg x1 then 0.25 mg every 8 hrs for 3 doses  -EP will continue to follow         Thank you for your consult. I will follow-up with patient. Please contact us if you have any additional questions. Case staffed with Dr. Pyle.    Dick Sierar MD  Cardiac Electrophysiology  [facility] - Oncology ([facility])

## 2023-03-06 NOTE — SUBJECTIVE & OBJECTIVE
Subjective:     Interval History: Pt entered AFRVR again this morning. Cards consulted for assistance. Planned for IR bx today however cancelled d/t tachycardia. Will work on rate control with plan for bx with IR vs GI tomorrow.     Objective:     Vital Signs (Most Recent):  Temp: 97.7 °F (36.5 °C) (03/06/23 1149)  Pulse: (!) 156 (03/06/23 1300)  Resp: 15 (03/06/23 1300)  BP: 103/78 (03/06/23 1300)  SpO2: 99 % (03/06/23 1300)   Vital Signs (24h Range):  Temp:  [97.5 °F (36.4 °C)-98.1 °F (36.7 °C)] 97.7 °F (36.5 °C)  Pulse:  [107-157] 156  Resp:  [12-18] 15  SpO2:  [96 %-100 %] 99 %  BP: ()/(60-80) 103/78     Weight: 92.5 kg (203 lb 14.8 oz)  Body mass index is 26.9 kg/m².  Body surface area is 2.18 meters squared.    ECOG SCORE           [unfilled]    Intake/Output - Last 3 Shifts         03/04 0700  03/05 0659 03/05 0700  03/06 0659 03/06 0700  03/07 0659    P.O. 480  50    I.V. (mL/kg) 459.2 (5) 158.1 (1.7)     Blood  249.8     IV Piggyback  988     Total Intake(mL/kg) 939.2 (10.2) 1395.9 (15.1) 50 (0.5)    Urine (mL/kg/hr) 1100 (0.5) 2725 (1.2)     Total Output 1100 2725     Net -160.8 -1329.1 +50                   Physical Exam  Constitutional:       General: He is not in acute distress.     Appearance: He is well-developed. He is ill-appearing. He is not diaphoretic.   HENT:      Head: Normocephalic and atraumatic.   Eyes:      General: No scleral icterus.     Conjunctiva/sclera: Conjunctivae normal.   Cardiovascular:      Rate and Rhythm: Tachycardia present. Rhythm irregular.      Heart sounds: Normal heart sounds. No murmur heard.    No friction rub. No gallop.   Pulmonary:      Effort: Pulmonary effort is normal. No respiratory distress.      Breath sounds: Normal breath sounds. No wheezing or rales.   Abdominal:      General: Bowel sounds are normal. There is distension.      Tenderness: There is abdominal tenderness.   Musculoskeletal:         General: Normal range of motion.      Cervical back:  Normal range of motion and neck supple.   Lymphadenopathy:      Cervical: No cervical adenopathy.   Skin:     General: Skin is warm and dry.      Findings: No rash.   Neurological:      Mental Status: He is alert and oriented to person, place, and time.   Psychiatric:         Behavior: Behavior normal.       Significant Labs:   CBC:   Recent Labs   Lab 03/05/23 0513 03/05/23 2056 03/06/23  0448   WBC 12.15 12.91* 10.89   HGB 8.2* 8.9* 8.8*   HCT 26.1* 28.0* 28.8*   PLT 12* 47* 63*      and CMP:   Recent Labs   Lab 03/05/23 0513 03/06/23  0448   * 129*   K 4.5 4.6   CL 99 97   CO2 21* 23   * 200*   BUN 19 18   CREATININE 0.6 0.7   CALCIUM 8.2* 8.9   PROT 4.7* 5.7*   ALBUMIN 2.0* 2.5*   BILITOT 1.5* 2.2*   ALKPHOS 90 102   AST 25 30   ALT 18 19   ANIONGAP 7* 9         Diagnostic Results:  I have reviewed all pertinent imaging results/findings within the past 24 hours.

## 2023-03-06 NOTE — ASSESSMENT & PLAN NOTE
Improved during admission, though still mildly hyponatremic. Serum osm and urine studies ordered. Normal serum osm.

## 2023-03-06 NOTE — PROGRESS NOTES
03/06/23 0427   Vital Signs   Pulse (!) 153   /80     Pt's HR increasing to 140-150s. MD ok to give medication prior to scheduled time are recheck in one hour

## 2023-03-06 NOTE — NURSING
Dr. Lino notified of blood pressure 91/67 zj=864 left arm and b/p=117/77 hr=87 left leg.  Dr. Lino said to give digoxin.

## 2023-03-06 NOTE — ASSESSMENT & PLAN NOTE
Patient noted to be in AF RVR rates up to 180s on 3/4. Case complicated by severe thrombocytopenia precluding anticoagulation.     AF History: None, though pSVT noted 2/2023 admission  Onset: <48hrs  Symptoms: Asymptomatic  PVI/cryo: No  DCCV: Never  Meds: None   EF: TTE pending, previous normal/no diastolic dysfunction  SAE: likely      Plan:  - EP consulted for possible MEJIA/Cardioversion  - Rate control with Lopressor 50 q6hr   - Formal TTE pending  - Anticoagulation not possible 2/2 severe thrombocytopenia  - Continuous telemetry  - Maintain K > 4, Mg > 2, Ca wnl

## 2023-03-06 NOTE — PROGRESS NOTES
[facility] - Oncology ([facility])  Hematology  Bone Marrow Transplant  Progress Note    Patient Name: Dwight Hoffmann  Admission Date: 2/25/2023  Hospital Length of Stay: 9 days  Code Status: Full Code    Subjective:     Interval History: Pt entered AFRVR again this morning. Cards consulted for assistance. Planned for IR bx today however cancelled d/t tachycardia. Will work on rate control with plan for bx with IR vs GI tomorrow.     Objective:     Vital Signs (Most Recent):  Temp: 97.7 °F (36.5 °C) (03/06/23 1149)  Pulse: (!) 156 (03/06/23 1300)  Resp: 15 (03/06/23 1300)  BP: 103/78 (03/06/23 1300)  SpO2: 99 % (03/06/23 1300)   Vital Signs (24h Range):  Temp:  [97.5 °F (36.4 °C)-98.1 °F (36.7 °C)] 97.7 °F (36.5 °C)  Pulse:  [107-157] 156  Resp:  [12-18] 15  SpO2:  [96 %-100 %] 99 %  BP: ()/(60-80) 103/78     Weight: 92.5 kg (203 lb 14.8 oz)  Body mass index is 26.9 kg/m².  Body surface area is 2.18 meters squared.    ECOG SCORE           [unfilled]    Intake/Output - Last 3 Shifts         03/04 0700  03/05 0659 03/05 0700  03/06 0659 03/06 0700  03/07 0659    P.O. 480  50    I.V. (mL/kg) 459.2 (5) 158.1 (1.7)     Blood  249.8     IV Piggyback  988     Total Intake(mL/kg) 939.2 (10.2) 1395.9 (15.1) 50 (0.5)    Urine (mL/kg/hr) 1100 (0.5) 2725 (1.2)     Total Output 1100 2725     Net -160.8 -1329.1 +50                   Physical Exam  Constitutional:       General: He is not in acute distress.     Appearance: He is well-developed. He is ill-appearing. He is not diaphoretic.   HENT:      Head: Normocephalic and atraumatic.   Eyes:      General: No scleral icterus.     Conjunctiva/sclera: Conjunctivae normal.   Cardiovascular:      Rate and Rhythm: Tachycardia present. Rhythm irregular.      Heart sounds: Normal heart sounds. No murmur heard.    No friction rub. No gallop.   Pulmonary:      Effort: Pulmonary effort is normal. No respiratory distress.      Breath sounds: Normal breath sounds. No wheezing or rales.    Abdominal:      General: Bowel sounds are normal. There is distension.      Tenderness: There is abdominal tenderness.   Musculoskeletal:         General: Normal range of motion.      Cervical back: Normal range of motion and neck supple.   Lymphadenopathy:      Cervical: No cervical adenopathy.   Skin:     General: Skin is warm and dry.      Findings: No rash.   Neurological:      Mental Status: He is alert and oriented to person, place, and time.   Psychiatric:         Behavior: Behavior normal.       Significant Labs:   CBC:   Recent Labs   Lab 03/05/23 0513 03/05/23 2056 03/06/23 0448   WBC 12.15 12.91* 10.89   HGB 8.2* 8.9* 8.8*   HCT 26.1* 28.0* 28.8*   PLT 12* 47* 63*      and CMP:   Recent Labs   Lab 03/05/23 0513 03/06/23 0448   * 129*   K 4.5 4.6   CL 99 97   CO2 21* 23   * 200*   BUN 19 18   CREATININE 0.6 0.7   CALCIUM 8.2* 8.9   PROT 4.7* 5.7*   ALBUMIN 2.0* 2.5*   BILITOT 1.5* 2.2*   ALKPHOS 90 102   AST 25 30   ALT 18 19   ANIONGAP 7* 9         Diagnostic Results:  I have reviewed all pertinent imaging results/findings within the past 24 hours.    Assessment/Plan:     * DLBCL (diffuse large B cell lymphoma)  Patient with small bowel resection approximately 5 months ago consistent with Diffuse large-B cell lymphoma, non germinal center origin. Additionally had omentum resection also showing DLBCL. Had bone marrow biopsy approximately 5 months ago that showed normo cellular marrow for age, no increase in blasts. Patient had PET scan from 1/11/23 showing hypermetabolic loops of small bowel most consistent with previous known disease and was thought to have improved (partial response to therapy noted from chart review) and was recommended he continue with R-CHOP at that time. Patient hospitalized at [facility] found to have likely worsening of lymphoma based on CT scan of abdomen showing partial compression of IVC. [place] [facility] [place] given uncertainty in  responsiveness to first-line chemo therapy.    --consider alternative chemo regimen given progression (although appears patient has had delay in receiving chemo 2/2 to thrombocytopenia and hospital admissions)  --patient with lower abdominal pain: controlled with PO Norco and IV dilaudid  --continue acyclovir, allopurinol  --[facility] with [facility] was last on vancomycin, cefepime and IV metronidazole will continue those here for now. Low threshold to de-escalate antibiotics. On arrival thet patient is normotensives and HDS.  --Per last CT abdomen: Interval increase in size of the centrally necrotic ileocecal lymphomatous mass. Superimposed infection within the necrotic mass cannot be excluded.  - changed steroid to pulse dose dexamethasone on 3/2  - No safe window for Bx per IR or CRS. Discussed case with GI, however patient declining bx on 2/28.   - Rad onc consulted for radiation therapy. Pt underwent sim on 3/2, however holding off on radiation at this time.  - working on getting IR Lymph node bx vs GI primary tumor bx on 3/7. IR bx cancelled 3/6 d/t AFRVR. Will decide chemo regimen following path results.              Atrial fibrillation with RVR  On 3/4 patient with sudden onset afib with RVR rate to the 200s. Patient asymptomatic, BP at baseline. Pt given Lopressor IV 5mg x3 with moderate response in HR to 150s. Loaded with amio and placed on gtt. No prior hx of a-fib. No LAE on recent echo. Discontinued amio gtt after 24 hours. Will not transition to PO amio given concern for conversion to SR as patient unable to be anticoagulated given severe thrombocytopenia. Rate controlled with PO lopressor.    On 3/6 pt with repeated RVR. Cardiology consulted for assistance with management, given contraindication to anticoagulation and soft BP. EP on board as well per cardiology.     -- scheduled PO lopressor 25mg q6h   -- prn IV lopressor for a-fib with HR sustaining >130s  -- anticipate  transition to Toprol if continued adequate rate control    -- f/u cardiology and EP recs     Large cell lymphoma  See DLBCL above    Fever  Patient febrile to 101.9 on 2/28. Likely multifactorial as patient with known malignancy with possible superimposed infection of intra-abdominal mass. Placed on vanc, cefepime, flagyl. Blood cx NGTD. No respiratory or urinary sxs.   -- off all abx as of 3/3    Leukocytosis  No evidence of active infection, pulling off abx as appropriate. DC vanc on 2/27 however patient subsequently fevered the following evening so placed back on vanc. Discontinued vanc again 3/1. Discontinued cefepime and flagyl on 3/3. Will continue to monitor closely.     Compression of vena cava  CRS: this is due to dehydration because the IVC is flat throughout its course rather than just at one level.  Treating with IVF. And encouraging PO intake     Hyponatremia  Improved during admission, though still mildly hyponatremic. Serum osm and urine studies ordered. Normal serum osm.     COVID-19 virus infection  Noted to be positive on 2/6/2023    Thrombocytopenia  Due to lymphoma    -- Continue to monitor with CBC  -- will transfuse to increase Plt level to >50-70k given upcoming bx. Plt count 63 on 3/6.     Hyperlipidemia, unspecified  Not currently taking a statin    Diabetes mellitus type 2, noninsulin dependent  Hemoglobin A1c from 1 week ago was 7.7  Goal glucose while inpatient: 140-180  Consider basal-bolus + SSI as needed  Hold home anti-diabetic medications  Patient takes daily steroids  -- increasing daily insulin regimen as appropriate starting 3/3 given increasing hyperglycemia while on steroids. BGL much better controlled.     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   -- stat CTAP ordered on 2/28 given worsening ab pain to r/o perf, no evidence of perf however interval enlargement of mass    -- see DLBCL for full plan         VTE Risk Mitigation (From admission, onward)         Ordered     Reason for No Pharmacological VTE Prophylaxis  Once        Question:  Reasons:  Answer:  Thrombocytopenia    02/25/23 0402     IP VTE HIGH RISK PATIENT  Once         02/25/23 0402     Place sequential compression device  Until discontinued         02/25/23 0402                Disposition: Pending treatment     Mina Nolasco MD  Bone Marrow Transplant  [facility] - Oncology ([facility])

## 2023-03-06 NOTE — PLAN OF CARE
Pt arrived to IR room 173 for Rt mesenteric lymph node biopsy. Pt oriented to unit and staff. Plan of care reviewed with patient, patient verbalizes understanding. Comfort measures utilized. Pt safely transferred from stretcher to procedural table. Fall risk reviewed with patient, fall risk interventions maintained. Safety strap applied, positioner pillows utilized to minimize pressure points. Blankets applied. Pt prepped and draped utilizing standard sterile technique. Patient placed on continuous monitoring, as required by sedation policy. Timeouts completed utilizing standard universal time-out, per department and facility policy. RN to remain at bedside, continuous monitoring maintained. Pt resting comfortably. Denies pain/discomfort. Will continue to monitor. See flow sheets for monitoring, medication administration, and updates.

## 2023-03-06 NOTE — PLAN OF CARE
Notified Dr Montenegro pt current status; per MD to reschedule lymph node biopsy until HR is treated.     Notified Dr Mina Nolasco; per MD to bring patient back to room 828 and will treat.   Pt to be transfer to room 828 escorted by IR team. Report called to LESLIE Jackson.

## 2023-03-06 NOTE — SUBJECTIVE & OBJECTIVE
Past Medical History:   Diagnosis Date    Cancer     Diabetes mellitus     Digestive disorder     Prediabetes        Past Surgical History:   Procedure Laterality Date    APPENDECTOMY  07/15/2014    COLONOSCOPY      COLONOSCOPY N/A 02/09/2022    ERROR.   He did not have a colonoscopy with Dr. Sanders.    COLONOSCOPY N/A 09/01/2022    Procedure: COLONOSCOPY;  Surgeon: Andrea Clemens MD;  Location: [facility] ENDO;  Service: Endoscopy;  Laterality: N/A;    FLEXIBLE SIGMOIDOSCOPY N/A 11/7/2022    Procedure: SIGMOIDOSCOPY, FLEXIBLE;  Surgeon: Manuel Sanders MD;  Location: [facility] ENDO;  Service: Endoscopy;  Laterality: N/A;    INCISION AND DRAINAGE OF ABDOMEN N/A 09/14/2022    Procedure: INCISION AND DRAINAGE, ABDOMEN;  Surgeon: Jan Oliveira Jr., MD;  Location: [facility], [facility];  Service: General;  Laterality: N/A;    INSERTION OF TUNNELED CENTRAL VENOUS CATHETER (CVC) WITH SUBCUTANEOUS PORT N/A 10/31/2022    Procedure: [id number];  Surgeon: Jan Oliveira Jr., MD;  Location: [facility] OR;  Service: General;  Laterality: N/A;    LAPAROSCOPIC DRAINAGE OF ABDOMEN N/A 09/23/2022    Procedure: DRAINAGE, ABDOMEN, LAPAROSCOPIC;  Surgeon: Jan Oliveira Jr., MD;  Location: [facility], [facility];  Service: General;  Laterality: N/A;       Review of patient's allergies indicates:   Allergen Reactions    Albuterol (bulk) Palpitations       No current facility-administered medications on file prior to encounter.     Current Outpatient Medications on File Prior to Encounter   Medication Sig    acyclovir (ZOVIRAX) 400 MG tablet Take 1 tablet (400 mg total) by mouth 2 (two) times daily.    allopurinoL (ZYLOPRIM) 100 MG tablet Take 1 tablet (100 mg total) by mouth once daily.    ALPRAZolam (XANAX) 0.5 MG tablet Take 1 tablet (0.5 mg total) by mouth nightly as needed for Anxiety.    ascorbic acid, vitamin C, (VITAMIN C) 1000 MG tablet Take 1,000 mg by mouth once daily.    atorvastatin (LIPITOR) 10 MG tablet Take 10 mg by mouth once  daily.    bisacodyL (DULCOLAX, BISACODYL,) 10 mg Supp Place 1 suppository (10 mg total) rectally daily as needed (refractory constipation).    blood sugar diagnostic Strp To check BG 2 times daily, to use with insurance preferred meter    blood-glucose meter kit To check BG 2 times daily, to use with insurance preferred meter    butenafine 1 % cream Apply 1 application topically 2 (two) times daily.    clotrimazole (LOTRIMIN) 1 % cream Apply topically 2 (two) times daily.    HYDROcodone-acetaminophen (NORCO) 5-325 mg per tablet 1 tablet EVERY 4 HOURS (route: oral) (Patient taking differently: Take 1 tablet by mouth every 4 (four) hours as needed.)    metFORMIN (GLUCOPHAGE) 1000 MG tablet Take 1,000 mg by mouth 2 (two) times daily.    morphine (MS CONTIN) 15 MG 12 hr tablet Take 1 tablet (15 mg total) by mouth 2 (two) times daily.    multivitamin Tab Take 1 tablet by mouth once daily.    ondansetron (ZOFRAN-ODT) 4 MG TbDL Take 1 tablet by mouth once daily.    pantoprazole (PROTONIX) 40 MG tablet Take 1 tablet (40 mg total) by mouth 2 (two) times daily.    polyethylene glycol (GLYCOLAX) 17 gram PwPk Take 17 g by mouth 2 (two) times daily as needed (constipation).    predniSONE (DELTASONE) 50 MG Tab Take 1 tablet (50 mg total) by mouth once daily.    promethazine (PHENERGAN) 12.5 MG Tab Take 1 tablet by mouth once daily.    sulfamethoxazole-trimethoprim 800-160mg (BACTRIM DS) 800-160 mg Tab Take 1 tablet by mouth 3 (three) times a week.    tamsulosin (FLOMAX) 0.4 mg Cap Take 1 capsule (0.4 mg total) by mouth once daily.     Family History       Problem Relation (Age of Onset)    Cancer Mother    Diabetes Mother    Heart murmur Sister    Hypertension Mother    Seizures Father, Sister          Tobacco Use    Smoking status: Never    Smokeless tobacco: Never   Substance and Sexual Activity    Alcohol use: Not Currently     Comment: occasional    Drug use: No    Sexual activity: Yes     Partners: Female     Review of  Systems   All other systems reviewed and are negative.  Objective:     Vital Signs (Most Recent):  Temp: 97.7 °F (36.5 °C) (03/06/23 1149)  Pulse: (!) 156 (03/06/23 1300)  Resp: 15 (03/06/23 1300)  BP: 103/78 (03/06/23 1300)  SpO2: 99 % (03/06/23 1300)   Vital Signs (24h Range):  Temp:  [97.5 °F (36.4 °C)-98.1 °F (36.7 °C)] 97.7 °F (36.5 °C)  Pulse:  [107-157] 156  Resp:  [12-18] 15  SpO2:  [96 %-100 %] 99 %  BP: ()/(60-80) 103/78       Weight: 92.5 kg (203 lb 14.8 oz)  Body mass index is 26.9 kg/m².    SpO2: 99 %       Physical Exam  Constitutional:       Appearance: Normal appearance.   HENT:      Head: Normocephalic and atraumatic.      Nose: Nose normal.   Eyes:      Extraocular Movements: Extraocular movements intact.      Pupils: Pupils are equal, round, and reactive to light.   Cardiovascular:      Rate and Rhythm: Normal rate and regular rhythm.   Pulmonary:      Effort: Pulmonary effort is normal.      Breath sounds: Normal breath sounds.   Abdominal:      General: Abdomen is flat.      Palpations: Abdomen is soft.   Musculoskeletal:         General: Normal range of motion.      Cervical back: Normal range of motion and neck supple.   Skin:     General: Skin is warm and dry.   Neurological:      General: No focal deficit present.      Mental Status: He is alert and oriented to person, place, and time.

## 2023-03-06 NOTE — SUBJECTIVE & OBJECTIVE
Past Medical History:   Diagnosis Date    Cancer     Diabetes mellitus     Digestive disorder     Prediabetes        Past Surgical History:   Procedure Laterality Date    APPENDECTOMY  07/15/2014    COLONOSCOPY      COLONOSCOPY N/A 02/09/2022    ERROR.   He did not have a colonoscopy with Dr. Sanders.    COLONOSCOPY N/A 09/01/2022    Procedure: COLONOSCOPY;  Surgeon: Andrea Clemens MD;  Location: [facility] ENDO;  Service: Endoscopy;  Laterality: N/A;    FLEXIBLE SIGMOIDOSCOPY N/A 11/7/2022    Procedure: SIGMOIDOSCOPY, FLEXIBLE;  Surgeon: Manuel Sanders MD;  Location: [facility];  Service: Endoscopy;  Laterality: N/A;    INCISION AND DRAINAGE OF ABDOMEN N/A 09/14/2022    Procedure: INCISION AND DRAINAGE, ABDOMEN;  Surgeon: Jan Oliveira Jr., MD;  Location: [facility];  Service: General;  Laterality: N/A;    INSERTION OF TUNNELED CENTRAL VENOUS CATHETER (CVC) WITH SUBCUTANEOUS PORT N/A 10/31/2022    Procedure: [id number];  Surgeon: Jan Oliveira Jr., MD;  Location: [facility] OR;  Service: General;  Laterality: N/A;    LAPAROSCOPIC DRAINAGE OF ABDOMEN N/A 09/23/2022    Procedure: DRAINAGE, ABDOMEN, LAPAROSCOPIC;  Surgeon: Jan Oliveira Jr., MD;  Location: [facility];  Service: General;  Laterality: N/A;       Review of patient's allergies indicates:   Allergen Reactions    Albuterol (bulk) Palpitations     Family History       Problem Relation (Age of Onset)    Cancer Mother    Diabetes Mother    Heart murmur Sister    Hypertension Mother    Seizures Father, Sister          Tobacco Use    Smoking status: Never    Smokeless tobacco: Never   Substance and Sexual Activity    Alcohol use: Not Currently     Comment: occasional    Drug use: No    Sexual activity: Yes     Partners: Female     Review of Systems   Constitutional:  Negative for activity change, appetite change and fever.   Respiratory:  Negative for cough, shortness of breath and wheezing.    Cardiovascular:  Negative for chest pain and  leg swelling.   Gastrointestinal:  Positive for abdominal pain (improved compared to prior). Negative for constipation, diarrhea, nausea and vomiting.   Skin:  Negative for rash.   Neurological:  Negative for headaches.   Objective:     Vital Signs (Most Recent):  Temp: 97.7 °F (36.5 °C) (03/06/23 0823)  Pulse: (!) 142 (03/06/23 1110)  Resp: 18 (03/06/23 0929)  BP: 101/60 (03/06/23 0847)  SpO2: 96 % (03/06/23 0929)   Vital Signs (24h Range):  Temp:  [97.3 °F (36.3 °C)-98.1 °F (36.7 °C)] 97.7 °F (36.5 °C)  Pulse:  [105-157] 142  Resp:  [12-18] 18  SpO2:  [95 %-100 %] 96 %  BP: ()/(60-80) 101/60     Weight: 92.5 kg (203 lb 14.8 oz) (02/25/23 0428)  Body mass index is 26.9 kg/m².      Intake/Output Summary (Last 24 hours) at 3/6/2023 1137  Last data filed at 3/6/2023 0947  Gross per 24 hour   Intake 1445.86 ml   Output 2100 ml   Net -654.14 ml       Lines/Drains/Airways       Peripheral Intravenous Line  Duration                  Midline Catheter Insertion/Assessment  - Single Lumen 02/26/23 1257 Right basilic vein (medial side of arm)  7 days         Peripheral IV - Single Lumen 03/02/23 1812 20 G;1 3/4 in Anterior;Left Forearm 3 days                    Physical Exam  Vitals and nursing note reviewed.   HENT:      Head: Normocephalic and atraumatic.   Eyes:      Extraocular Movements: Extraocular movements intact.      Conjunctiva/sclera: Conjunctivae normal.   Cardiovascular:      Rate and Rhythm: Normal rate and regular rhythm.      Pulses: Normal pulses.   Pulmonary:      Effort: Pulmonary effort is normal. No respiratory distress.      Breath sounds: No wheezing or rales.   Abdominal:      Palpations: Abdomen is soft.      Tenderness: There is no abdominal tenderness. There is no guarding.   Neurological:      General: No focal deficit present.      Mental Status: He is alert and oriented to person, place, and time.   Psychiatric:         Mood and Affect: Mood normal.       Significant Labs:  All pertinent  lab results from the last 24 hours have been reviewed.    Significant Imaging:  Imaging results within the past 24 hours have been reviewed.

## 2023-03-06 NOTE — ASSESSMENT & PLAN NOTE
On 3/4 patient with sudden onset afib with RVR rate to the 200s. Patient asymptomatic, BP at baseline. Pt given Lopressor IV 5mg x3 with moderate response in HR to 150s. Loaded with amio and placed on gtt. No prior hx of a-fib. No LAE on recent echo. Discontinued amio gtt after 24 hours. Will not transition to PO amio given concern for conversion to SR as patient unable to be anticoagulated given severe thrombocytopenia. Rate controlled with PO lopressor.    On 3/6 pt with repeated RVR. Cardiology consulted for assistance with management, given contraindication to anticoagulation and soft BP. EP on board as well per cardiology.     -- scheduled PO lopressor 25mg q6h   -- prn IV lopressor for a-fib with HR sustaining >130s  -- anticipate transition to Toprol if continued adequate rate control    -- f/u cardiology and EP recs

## 2023-03-06 NOTE — ASSESSMENT & PLAN NOTE
Patient noted to be in AF RVR rates up to 180s on 3/4. Case complicated by severe thrombocytopenia precluding anticoagulation. Briefly on amiodarone and Lopressor IV with moderate rate control. No refractory RVR on Max PO Lopressor    AF History: None, though pSVT noted 2/2023 admission  Onset: >48hrs  Symptoms: Asymptomatic  PVI/cryo: No  DCCV: Never  Meds: None   EF: TTE pending, previous normal/no diastolic dysfunction  SAE: likely      Plan:  - EP consulted for possible MEJIA/Cardioversion  - Rate control with Lopressor 50 q6hr   - Formal TTE pending  - Anticoagulation not possible 2/2 severe thrombocytopenia  - Continuous telemetry  - Maintain K > 4, Mg > 2, Ca wnl

## 2023-03-06 NOTE — HOSPITAL COURSE
52 year-old male with history of T2DM, anemia, DLBCL, HLD, thrombocytopenia presenting as transfer from [facility] presenting with refractory lymphoma, compression of IVC. While admitted on 3/4 the patient was noted to being AF RVR to rates of 180s. He was initially given Lopressor IV and loaded with amio IV for 24 hours with some control to the 150s. Amiodarone was discontinued as patient was thrombocytopenic and was unable to be anticoagulated. Patient denies any current chest pain or discomfort as well as no CP during peak HR in the 180s. Of note he was admitted in 2/2023 for COVID and RSV pneumonia and transiently was found to be in SVT rate of 180 but converted to sinus tach without intervention. He has not other episodes of pSVT, AF or other tachyarrhythmias in recent memory or recorded and denies any CP, palpitation, syncope, orthostasis, dizziness.       TTE 2/6/23  · The left ventricle is normal in size with normal systolic function. The estimated ejection fraction is 65%.  · Normal right ventricular size with normal right ventricular systolic function.  · Normal left ventricular diastolic function.

## 2023-03-06 NOTE — RESPIRATORY THERAPY
RAPID RESPONSE RESPIRATORY THERAPY ETCO2 CHECK         Time of visit: 1019     Code Status: Full Code   : 1970  Bed: 828/828 A:   MRN: [id number]  Time spent at the bedside: < 15 min    SITUATION    Evaluated patient for: ETCo2 compliance    BACKGROUND    Why is the patient in the hospital?: DLBCL (diffuse large B cell lymphoma)    Patient has a past medical history of Cancer, Diabetes mellitus, Digestive disorder, and Prediabetes.    24 Hours Vitals Range:  Temp:  [97.5 °F (36.4 °C)-98.1 °F (36.7 °C)]   Pulse:  [107-157]   Resp:  [12-18]   BP: ()/(60-80)   SpO2:  [96 %-100 %]     Labs:    Recent Labs     23  0425 23  0513 23  0448   * 127* 129*   K 4.6 4.5 4.6   CL 97 99 97   CO2 23 21* 23   CREATININE 0.7 0.6 0.7   * 183* 200*   PHOS 3.6 3.3 3.4   MG 1.9 1.8 2.0        No results for input(s): PH, PCO2, PO2, HCO3, POCSATURATED, BE in the last 72 hours.    ASSESSMENT/INTERVENTIONS      Last VS   Temp: 97.7 °F (36.5 °C) ( 1149)  Pulse: 156 ( 1300)  Resp: 15 ( 1300)  BP: 103/78 ( 1300)  SpO2: 99 % ( 1300)    Level of Consciousness: Level of Consciousness (AVPU): alert  Respiratory Effort: Respiratory Effort: Unlabored Expansion/Accessory Muscle Usage: Expansion/Accessory Muscles/Retractions: expansion symmetric  All Lung Field Breath Sounds: All Lung Fields Breath Sounds: Anterior:, Posterior:, Lateral:, diminished  Is the ETCO2 monitor on? Yes  Is the patient wearing a cannula? Yes  Are ETCO2 orders placed? Yes  Is the patient on a PCA pump? Yes  ETCO2 monitored: ETCO2 (mmHg): 28 mmHg  Ambu at bedside: Ambu bag with the patient?: Yes, Adult Ambu    Active Orders   Respiratory Care    END TIDAL CO2 MONITOR Q12H     Frequency: Q12H     Number of Occurrences: Until Specified    Pulse Oximetry Q Shift     Frequency: Q Shift     Number of Occurrences: Until Specified       RECOMMENDATIONS    We recommend: RRT Recs: Continue POC per primary  team.      FOLLOW-UP    Please call back the Rapid Response RT, Carmen Dc, RRT at x [phone] for any questions or concerns.

## 2023-03-06 NOTE — ASSESSMENT & PLAN NOTE
Patient with small bowel resection approximately 5 months ago consistent with Diffuse large-B cell lymphoma, non germinal center origin. Additionally had omentum resection also showing DLBCL. Had bone marrow biopsy approximately 5 months ago that showed normo cellular marrow for age, no increase in blasts. Patient had PET scan from 1/11/23 showing hypermetabolic loops of small bowel most consistent with previous known disease and was thought to have improved (partial response to therapy noted from chart review) and was recommended he continue with R-CHOP at that time. Patient hospitalized at [facility] found to have likely worsening of lymphoma based on CT scan of abdomen showing partial compression of IVC. Transferred to [facility] for further evaluation given uncertainty in responsiveness to first-line chemo therapy.    --consider alternative chemo regimen given progression (although appears patient has had delay in receiving chemo 2/2 to thrombocytopenia and hospital admissions)  --patient with lower abdominal pain: controlled with PO Norco and IV dilaudid  --continue acyclovir, allopurinol  --patient with concern for sepsis at [facility] was last on vancomycin, cefepime and IV metronidazole will continue those here for now. Low threshold to de-escalate antibiotics. On arrival thet patient is normotensives and HDS.  --Per last CT abdomen: Interval increase in size of the centrally necrotic ileocecal lymphomatous mass. Superimposed infection within the necrotic mass cannot be excluded.  - changed steroid to pulse dose dexamethasone on 3/2  - No safe window for Bx per IR or CRS. Discussed case with GI, however patient declining bx on 2/28.   - Rad onc consulted for radiation therapy. Pt underwent sim on 3/2, however holding off on radiation at this time.  - working on getting IR Lymph node bx vs GI primary tumor bx on 3/7. IR bx cancelled 3/6 d/t AFRVR. Will decide chemo regimen following path results.

## 2023-03-06 NOTE — CARE UPDATE
"RAPID RESPONSE NURSE CHART REVIEW        Chart Reviewed: 03/06/2023, 12:05 AM    MRN: [id number]  Bed: 828/828 A    Dx: DLBCL (diffuse large B cell lymphoma)    Dwight Hoffmann has a past medical history of Cancer, Diabetes mellitus, Digestive disorder, and Prediabetes.    Last VS: /69   Pulse (!) 136   Temp 97.8 °F (36.6 °C)   Resp 12   Ht 6' 1" (1.854 m)   Wt 92.5 kg (203 lb 14.8 oz)   SpO2 100%   BMI 26.90 kg/m²     24H Vital Sign Range:  Temp:  [97.3 °F (36.3 °C)-97.8 °F (36.6 °C)]   Pulse:  [105-141]   Resp:  [12-18]   BP: ()/(60-76)   SpO2:  [94 %-100 %]     Level of Consciousness (AVPU): alert    Recent Labs     03/04/23  0425 03/05/23  0513 03/05/23 2056   WBC 12.59 12.15 12.91*   HGB 7.6* 8.2* 8.9*   HCT 24.5* 26.1* 28.0*   PLT 10* 12* 47*       Recent Labs     03/03/23  0337 03/04/23  0425 03/05/23  0513   * 129* 127*   K 4.7 4.6 4.5    97 99   CO2 23 23 21*   CREATININE 0.7 0.7 0.6   * 196* 183*   PHOS 3.7 3.6 3.3   MG 1.9 1.9 1.8        No results for input(s): PH, PCO2, PO2, HCO3, POCSATURATED, BE in the last 72 hours.     OXYGEN:             MEWS score: 3    Charge Dianne NELSON  contacted for tachycardia. No additional concerns verbalized at this time. Instructed to call [phone] for further concerns or assistance.    Kristin Vila RN       "

## 2023-03-06 NOTE — CONSULTS
Josh Rosas - Oncology ([facility])  Cardiology  Consult Note    Patient Name: Dwight Hoffmann  MRN: [id number]  Admission Date: 2/25/2023  Hospital Length of Stay: 9 days  Code Status: Full Code   Attending Provider: Lan Kurtz MD   Consulting Provider: Manuel Funes DO  Primary Care Physician: Primary Doctor No  Principal Problem:DLBCL (diffuse large B cell lymphoma)    Patient information was obtained from patient, past medical records and ER records.     Inpatient consult to Cardiology  Consult performed by: Manuel Funes DO  Consult ordered by: Mina Nolasco MD      Subjective:     Chief Complaint:  AF/RVR    HPI:   No notes on file    Past Medical History:   Diagnosis Date    Cancer     Diabetes mellitus     Digestive disorder     Prediabetes        Past Surgical History:   Procedure Laterality Date    APPENDECTOMY  07/15/2014    COLONOSCOPY      COLONOSCOPY N/A 02/09/2022    ERROR.   He did not have a colonoscopy with Dr. Horta.    COLONOSCOPY N/A 09/01/2022    Procedure: COLONOSCOPY;  Surgeon: Andrea Clemens MD;  Location: [facility];  Service: Endoscopy;  Laterality: N/A;    FLEXIBLE SIGMOIDOSCOPY N/A 11/7/2022    Procedure: SIGMOIDOSCOPY, FLEXIBLE;  Surgeon: Manuel Horta MD;  Location: [facility];  Service: Endoscopy;  Laterality: N/A;    INCISION AND DRAINAGE OF ABDOMEN N/A 09/14/2022    Procedure: INCISION AND DRAINAGE, ABDOMEN;  Surgeon: Jan Oliveira Jr., MD;  Location: [facility];  Service: General;  Laterality: N/A;    INSERTION OF TUNNELED CENTRAL VENOUS CATHETER (CVC) WITH SUBCUTANEOUS PORT N/A 10/31/2022    Procedure: [id number]-[id number];  Surgeon: Jan Oliveira Jr., MD;  Location: [facility] OR;  Service: General;  Laterality: N/A;    LAPAROSCOPIC DRAINAGE OF ABDOMEN N/A 09/23/2022    Procedure: DRAINAGE, ABDOMEN, LAPAROSCOPIC;  Surgeon: Jan Oliveira Jr., MD;  Location: [facility] OR;  Service: General;  Laterality: N/A;       Review of patient's allergies  indicates:   Allergen Reactions    Albuterol (bulk) Palpitations       No current facility-administered medications on file prior to encounter.     Current Outpatient Medications on File Prior to Encounter   Medication Sig    acyclovir (ZOVIRAX) 400 MG tablet Take 1 tablet (400 mg total) by mouth 2 (two) times daily.    allopurinoL (ZYLOPRIM) 100 MG tablet Take 1 tablet (100 mg total) by mouth once daily.    ALPRAZolam (XANAX) 0.5 MG tablet Take 1 tablet (0.5 mg total) by mouth nightly as needed for Anxiety.    ascorbic acid, vitamin C, (VITAMIN C) 1000 MG tablet Take 1,000 mg by mouth once daily.    atorvastatin (LIPITOR) 10 MG tablet Take 10 mg by mouth once daily.    bisacodyL (DULCOLAX, BISACODYL,) 10 mg Supp Place 1 suppository (10 mg total) rectally daily as needed (refractory constipation).    blood sugar diagnostic Strp To check BG 2 times daily, to use with insurance preferred meter    blood-glucose meter kit To check BG 2 times daily, to use with insurance preferred meter    butenafine 1 % cream Apply 1 application topically 2 (two) times daily.    clotrimazole (LOTRIMIN) 1 % cream Apply topically 2 (two) times daily.    HYDROcodone-acetaminophen (NORCO) 5-325 mg per tablet 1 tablet EVERY 4 HOURS (route: oral) (Patient taking differently: Take 1 tablet by mouth every 4 (four) hours as needed.)    metFORMIN (GLUCOPHAGE) 1000 MG tablet Take 1,000 mg by mouth 2 (two) times daily.    morphine (MS CONTIN) 15 MG 12 hr tablet Take 1 tablet (15 mg total) by mouth 2 (two) times daily.    multivitamin Tab Take 1 tablet by mouth once daily.    ondansetron (ZOFRAN-ODT) 4 MG TbDL Take 1 tablet by mouth once daily.    pantoprazole (PROTONIX) 40 MG tablet Take 1 tablet (40 mg total) by mouth 2 (two) times daily.    polyethylene glycol (GLYCOLAX) 17 gram PwPk Take 17 g by mouth 2 (two) times daily as needed (constipation).    predniSONE (DELTASONE) 50 MG Tab Take 1 tablet (50 mg total) by mouth once daily.    promethazine  (PHENERGAN) 12.5 MG Tab Take 1 tablet by mouth once daily.    sulfamethoxazole-trimethoprim 800-160mg (BACTRIM DS) 800-160 mg Tab Take 1 tablet by mouth 3 (three) times a week.    tamsulosin (FLOMAX) 0.4 mg Cap Take 1 capsule (0.4 mg total) by mouth once daily.     Family History       Problem Relation (Age of Onset)    Cancer Mother    Diabetes Mother    Heart murmur Sister    Hypertension Mother    Seizures Father, Sister          Tobacco Use    Smoking status: Never    Smokeless tobacco: Never   Substance and Sexual Activity    Alcohol use: Not Currently     Comment: occasional    Drug use: No    Sexual activity: Yes     Partners: Female     Review of Systems   Constitutional: Negative for chills, diaphoresis, fever, weight gain and weight loss.   HENT:  Negative for congestion, hearing loss, nosebleeds, sore throat and tinnitus.    Eyes:  Negative for visual disturbance.   Cardiovascular:  Positive for leg swelling. Negative for chest pain, dyspnea on exertion, irregular heartbeat, near-syncope, orthopnea, palpitations, paroxysmal nocturnal dyspnea and syncope.   Respiratory:  Negative for cough, hemoptysis, sputum production and wheezing.    Endocrine: Negative for cold intolerance, heat intolerance and polyuria.   Skin:  Negative for nail changes and rash.   Musculoskeletal:  Negative for back pain, falls, joint pain, muscle cramps and muscle weakness.   Gastrointestinal:  Negative for abdominal pain, constipation, diarrhea, heartburn, hematemesis, hematochezia, melena, nausea and vomiting.   Genitourinary:  Negative for dysuria and hematuria.   Neurological:  Negative for excessive daytime sleepiness, dizziness and light-headedness.   Objective:     Vital Signs (Most Recent):  Temp: 97.7 °F (36.5 °C) (03/06/23 1149)  Pulse: (!) 156 (03/06/23 1300)  Resp: 15 (03/06/23 1300)  BP: 103/78 (03/06/23 1300)  SpO2: 99 % (03/06/23 1300)   Vital Signs (24h Range):  Temp:  [97.5 °F (36.4 °C)-98.1 °F (36.7 °C)] 97.7 °F  (36.5 °C)  Pulse:  [107-157] 156  Resp:  [12-18] 15  SpO2:  [96 %-100 %] 99 %  BP: ()/(60-80) 103/78     Weight: 92.5 kg (203 lb 14.8 oz)  Body mass index is 26.9 kg/m².    SpO2: 99 %         Intake/Output Summary (Last 24 hours) at 3/6/2023 1338  Last data filed at 3/6/2023 0947  Gross per 24 hour   Intake 1196.11 ml   Output 2100 ml   Net -903.89 ml       Lines/Drains/Airways       Peripheral Intravenous Line  Duration                  Midline Catheter Insertion/Assessment  - Single Lumen 02/26/23 1257 Right basilic vein (medial side of arm)  8 days         Peripheral IV - Single Lumen 03/02/23 1812 20 G;1 3/4 in Anterior;Left Forearm 3 days                    Physical Exam  Constitutional:       General: He is not in acute distress.     Appearance: Normal appearance. He is not ill-appearing.   HENT:      Head: Normocephalic and atraumatic.      Right Ear: External ear normal.      Left Ear: External ear normal.      Nose: Nose normal. No congestion or rhinorrhea.      Mouth/Throat:      Mouth: Mucous membranes are moist.      Pharynx: Oropharynx is clear.   Eyes:      General: No scleral icterus.     Extraocular Movements: Extraocular movements intact.   Cardiovascular:      Rate and Rhythm: Tachycardia present. Rhythm irregular.      Heart sounds: No murmur heard.    No friction rub. No gallop.   Pulmonary:      Effort: Pulmonary effort is normal. No respiratory distress.      Breath sounds: Normal breath sounds. No wheezing or rales.   Abdominal:      General: Abdomen is flat. There is no distension.      Palpations: Abdomen is soft.      Tenderness: There is no abdominal tenderness.   Musculoskeletal:         General: No tenderness. Normal range of motion.      Cervical back: Normal range of motion and neck supple. No tenderness.      Right lower leg: Edema present.      Left lower leg: No edema.      Comments: RUE edema   Skin:     General: Skin is warm and dry.      Capillary Refill: Capillary refill  takes less than 2 seconds.      Coloration: Skin is not jaundiced.      Findings: No erythema or rash.   Neurological:      General: No focal deficit present.      Mental Status: He is alert and oriented to person, place, and time. Mental status is at baseline.       Significant Labs:   Recent Lab Results  (Last 5 results in the past 24 hours)        03/06/23  1154   03/06/23  0822   03/06/23  0448   03/05/23  2210   03/05/23  2056        Albumin     2.5           Alkaline Phosphatase     102           ALT     19           Anion Gap     9           AST     30           Baso #     0.01     0.01       Basophil %     0.1     0.1       BILIRUBIN TOTAL     2.2  Comment: For infants and newborns, interpretation of results should be based  on gestational age, weight and in agreement with clinical  observations.    Premature Infant recommended reference ranges:  Up to 24 hours.............<8.0 mg/dL  Up to 48 hours............<12.0 mg/dL  3-5 days..................<15.0 mg/dL  6-29 days.................<15.0 mg/dL             BUN     18           Calcium     8.9           Chloride     97           CO2     23           Creatinine     0.7           Differential Method     Automated     Automated       eGFR     >60.0           Eos #     0.0     0.0       Eosinophil %     0.0     0.0       Glucose     200           Gran # (ANC)     9.8     11.5       Gran %     89.7     89.2       Hematocrit     28.8     28.0       Hemoglobin     8.8     8.9       Immature Grans (Abs)     0.09  Comment: Mild elevation in immature granulocytes is non specific and   can be seen in a variety of conditions including stress response,   acute inflammation, trauma and pregnancy. Correlation with other   laboratory and clinical findings is essential.       0.15  Comment: Mild elevation in immature granulocytes is non specific and   can be seen in a variety of conditions including stress response,   acute inflammation, trauma and pregnancy. Correlation  with other   laboratory and clinical findings is essential.         Immature Granulocytes     0.8     1.2       LD     927  Comment: Results are increased in hemolyzed samples.           Lymph #     0.5     0.5       Lymph %     4.6     4.2       Magnesium     2.0           MCH     28.9     29.9       MCHC     30.6     31.8       MCV     94     94       Mono #     0.5     0.7       Mono %     4.8     5.3       MPV     12.0     12.0       nRBC     0     0       Phosphorus     3.4           Platelets     63     47       POCT Glucose 214   213     231         Potassium     4.6           PROTEIN TOTAL     5.7           RBC     3.05     2.98       RDW     20.0     19.9       Sodium     129           Uric Acid     3.2           WBC     10.89     12.91                              Significant Imaging: EKG: Afib rate of  144 with no STT changes compared to previous    Assessment and Plan:     Atrial fibrillation with RVR  Patient noted to be in AF RVR rates up to 180s on 3/4. Case complicated by severe thrombocytopenia precluding anticoagulation. Briefly on amiodarone and Lopressor IV with moderate rate control. No refractory RVR on Max PO Lopressor    AF History: None, though pSVT noted 2/2023 admission  Onset: >48hrs  Symptoms: Asymptomatic  PVI/cryo: No  DCCV: Never  Meds: None   EF: TTE pending, previous normal/no diastolic dysfunction  SAE: likely      Plan:  - EP consulted for possible MEJIA/Cardioversion  - Rate control with Lopressor 50 q6hr   - Formal TTE pending  - Anticoagulation not possible 2/2 severe thrombocytopenia  - Continuous telemetry  - Maintain K > 4, Mg > 2, Ca wnl            VTE Risk Mitigation (From admission, onward)           Ordered     Reason for No Pharmacological VTE Prophylaxis  Once        Question:  Reasons:  Answer:  Thrombocytopenia    02/25/23 0402     IP VTE HIGH RISK PATIENT  Once         02/25/23 0402     Place sequential compression device  Until discontinued         02/25/23 0402                     Thank you for your consult. I will follow-up with patient. Please contact us if you have any additional questions.    Manuel Funes,   Cardiology   Josh Rosas - Oncology ([facility])

## 2023-03-06 NOTE — ASSESSMENT & PLAN NOTE
Patient with enlarged ileocecal mass with necrotic core detected on recent PET scan and confirmed on CT with concern for progression of DLBCL on R-CHOP vs. malignancy of novel etiology. General surgery and IR unable to find a safe window for mass sample and IR would ideally defer inpatient lymph node biopsy to outpatient setting unless patient to start inpatient chemo.     - GI discussing case with [facility] for needle/core biopsy. Given anatomy and to avoid multiple colonoscopies, AES could facilitate more direct sampling approach.

## 2023-03-06 NOTE — ASSESSMENT & PLAN NOTE
Due to lymphoma    -- Continue to monitor with CBC  -- will transfuse to increase Plt level to >50-70k given upcoming bx. Plt count 63 on 3/6.

## 2023-03-06 NOTE — SUBJECTIVE & OBJECTIVE
Past Medical History:   Diagnosis Date    Cancer     Diabetes mellitus     Digestive disorder     Prediabetes        Past Surgical History:   Procedure Laterality Date    APPENDECTOMY  07/15/2014    COLONOSCOPY      COLONOSCOPY N/A 02/09/2022    ERROR.   He did not have a colonoscopy with Dr. Sanders.    COLONOSCOPY N/A 09/01/2022    Procedure: COLONOSCOPY;  Surgeon: Andrea Clemens MD;  Location: [facility] ENDO;  Service: Endoscopy;  Laterality: N/A;    FLEXIBLE SIGMOIDOSCOPY N/A 11/7/2022    Procedure: SIGMOIDOSCOPY, FLEXIBLE;  Surgeon: Manuel Sanders MD;  Location: [facility] ENDO;  Service: Endoscopy;  Laterality: N/A;    INCISION AND DRAINAGE OF ABDOMEN N/A 09/14/2022    Procedure: INCISION AND DRAINAGE, ABDOMEN;  Surgeon: Jan Oliveira Jr., MD;  Location: [facility];  Service: General;  Laterality: N/A;    INSERTION OF TUNNELED CENTRAL VENOUS CATHETER (CVC) WITH SUBCUTANEOUS PORT N/A 10/31/2022    Procedure: [id number];  Surgeon: Jan Oliveira Jr., MD;  Location: [facility] OR;  Service: General;  Laterality: N/A;    LAPAROSCOPIC DRAINAGE OF ABDOMEN N/A 09/23/2022    Procedure: DRAINAGE, ABDOMEN, LAPAROSCOPIC;  Surgeon: Jan Oliveira Jr., MD;  Location: [facility];  Service: General;  Laterality: N/A;       Review of patient's allergies indicates:   Allergen Reactions    Albuterol (bulk) Palpitations       No current facility-administered medications on file prior to encounter.     Current Outpatient Medications on File Prior to Encounter   Medication Sig    acyclovir (ZOVIRAX) 400 MG tablet Take 1 tablet (400 mg total) by mouth 2 (two) times daily.    allopurinoL (ZYLOPRIM) 100 MG tablet Take 1 tablet (100 mg total) by mouth once daily.    ALPRAZolam (XANAX) 0.5 MG tablet Take 1 tablet (0.5 mg total) by mouth nightly as needed for Anxiety.    ascorbic acid, vitamin C, (VITAMIN C) 1000 MG tablet Take 1,000 mg by mouth once daily.    atorvastatin (LIPITOR) 10 MG tablet Take 10 mg by mouth once  daily.    bisacodyL (DULCOLAX, BISACODYL,) 10 mg Supp Place 1 suppository (10 mg total) rectally daily as needed (refractory constipation).    blood sugar diagnostic Strp To check BG 2 times daily, to use with insurance preferred meter    blood-glucose meter kit To check BG 2 times daily, to use with insurance preferred meter    butenafine 1 % cream Apply 1 application topically 2 (two) times daily.    clotrimazole (LOTRIMIN) 1 % cream Apply topically 2 (two) times daily.    HYDROcodone-acetaminophen (NORCO) 5-325 mg per tablet 1 tablet EVERY 4 HOURS (route: oral) (Patient taking differently: Take 1 tablet by mouth every 4 (four) hours as needed.)    metFORMIN (GLUCOPHAGE) 1000 MG tablet Take 1,000 mg by mouth 2 (two) times daily.    morphine (MS CONTIN) 15 MG 12 hr tablet Take 1 tablet (15 mg total) by mouth 2 (two) times daily.    multivitamin Tab Take 1 tablet by mouth once daily.    ondansetron (ZOFRAN-ODT) 4 MG TbDL Take 1 tablet by mouth once daily.    pantoprazole (PROTONIX) 40 MG tablet Take 1 tablet (40 mg total) by mouth 2 (two) times daily.    polyethylene glycol (GLYCOLAX) 17 gram PwPk Take 17 g by mouth 2 (two) times daily as needed (constipation).    predniSONE (DELTASONE) 50 MG Tab Take 1 tablet (50 mg total) by mouth once daily.    promethazine (PHENERGAN) 12.5 MG Tab Take 1 tablet by mouth once daily.    sulfamethoxazole-trimethoprim 800-160mg (BACTRIM DS) 800-160 mg Tab Take 1 tablet by mouth 3 (three) times a week.    tamsulosin (FLOMAX) 0.4 mg Cap Take 1 capsule (0.4 mg total) by mouth once daily.     Family History       Problem Relation (Age of Onset)    Cancer Mother    Diabetes Mother    Heart murmur Sister    Hypertension Mother    Seizures Father, Sister          Tobacco Use    Smoking status: Never    Smokeless tobacco: Never   Substance and Sexual Activity    Alcohol use: Not Currently     Comment: occasional    Drug use: No    Sexual activity: Yes     Partners: Female     Review of  Systems   Constitutional: Negative for chills, diaphoresis, fever, weight gain and weight loss.   HENT:  Negative for congestion, hearing loss, nosebleeds, sore throat and tinnitus.    Eyes:  Negative for visual disturbance.   Cardiovascular:  Positive for leg swelling. Negative for chest pain, dyspnea on exertion, irregular heartbeat, near-syncope, orthopnea, palpitations, paroxysmal nocturnal dyspnea and syncope.   Respiratory:  Negative for cough, hemoptysis, sputum production and wheezing.    Endocrine: Negative for cold intolerance, heat intolerance and polyuria.   Skin:  Negative for nail changes and rash.   Musculoskeletal:  Negative for back pain, falls, joint pain, muscle cramps and muscle weakness.   Gastrointestinal:  Negative for abdominal pain, constipation, diarrhea, heartburn, hematemesis, hematochezia, melena, nausea and vomiting.   Genitourinary:  Negative for dysuria and hematuria.   Neurological:  Negative for excessive daytime sleepiness, dizziness and light-headedness.   Objective:     Vital Signs (Most Recent):  Temp: 97.7 °F (36.5 °C) (03/06/23 1149)  Pulse: (!) 156 (03/06/23 1300)  Resp: 15 (03/06/23 1300)  BP: 103/78 (03/06/23 1300)  SpO2: 99 % (03/06/23 1300)   Vital Signs (24h Range):  Temp:  [97.5 °F (36.4 °C)-98.1 °F (36.7 °C)] 97.7 °F (36.5 °C)  Pulse:  [107-157] 156  Resp:  [12-18] 15  SpO2:  [96 %-100 %] 99 %  BP: ()/(60-80) 103/78     Weight: 92.5 kg (203 lb 14.8 oz)  Body mass index is 26.9 kg/m².    SpO2: 99 %         Intake/Output Summary (Last 24 hours) at 3/6/2023 1338  Last data filed at 3/6/2023 0947  Gross per 24 hour   Intake 1196.11 ml   Output 2100 ml   Net -903.89 ml       Lines/Drains/Airways       Peripheral Intravenous Line  Duration                  Midline Catheter Insertion/Assessment  - Single Lumen 02/26/23 1257 Right basilic vein (medial side of arm)  8 days         Peripheral IV - Single Lumen 03/02/23 1812 20 G;1 3/4 in Anterior;Left Forearm 3 days                     Physical Exam  Constitutional:       General: He is not in acute distress.     Appearance: Normal appearance. He is not ill-appearing.   HENT:      Head: Normocephalic and atraumatic.      Right Ear: External ear normal.      Left Ear: External ear normal.      Nose: Nose normal. No congestion or rhinorrhea.      Mouth/Throat:      Mouth: Mucous membranes are moist.      Pharynx: Oropharynx is clear.   Eyes:      General: No scleral icterus.     Extraocular Movements: Extraocular movements intact.   Cardiovascular:      Rate and Rhythm: Tachycardia present. Rhythm irregular.      Heart sounds: No murmur heard.    No friction rub. No gallop.   Pulmonary:      Effort: Pulmonary effort is normal. No respiratory distress.      Breath sounds: Normal breath sounds. No wheezing or rales.   Abdominal:      General: Abdomen is flat. There is no distension.      Palpations: Abdomen is soft.      Tenderness: There is no abdominal tenderness.   Musculoskeletal:         General: No tenderness. Normal range of motion.      Cervical back: Normal range of motion and neck supple. No tenderness.      Right lower leg: Edema present.      Left lower leg: No edema.      Comments: RUE edema   Skin:     General: Skin is warm and dry.      Capillary Refill: Capillary refill takes less than 2 seconds.      Coloration: Skin is not jaundiced.      Findings: No erythema or rash.   Neurological:      General: No focal deficit present.      Mental Status: He is alert and oriented to person, place, and time. Mental status is at baseline.       Significant Labs:   Recent Lab Results  (Last 5 results in the past 24 hours)        03/06/23  1154   03/06/23  0822   03/06/23  0448   03/05/23  2210   03/05/23  2056        Albumin     2.5           Alkaline Phosphatase     102           ALT     19           Anion Gap     9           AST     30           Baso #     0.01     0.01       Basophil %     0.1     0.1       BILIRUBIN TOTAL      2.2  Comment: For infants and newborns, interpretation of results should be based  on gestational age, weight and in agreement with clinical  observations.    Premature Infant recommended reference ranges:  Up to 24 hours.............<8.0 mg/dL  Up to 48 hours............<12.0 mg/dL  3-5 days..................<15.0 mg/dL  6-29 days.................<15.0 mg/dL             BUN     18           Calcium     8.9           Chloride     97           CO2     23           Creatinine     0.7           Differential Method     Automated     Automated       eGFR     >60.0           Eos #     0.0     0.0       Eosinophil %     0.0     0.0       Glucose     200           Gran # (ANC)     9.8     11.5       Gran %     89.7     89.2       Hematocrit     28.8     28.0       Hemoglobin     8.8     8.9       Immature Grans (Abs)     0.09  Comment: Mild elevation in immature granulocytes is non specific and   can be seen in a variety of conditions including stress response,   acute inflammation, trauma and pregnancy. Correlation with other   laboratory and clinical findings is essential.       0.15  Comment: Mild elevation in immature granulocytes is non specific and   can be seen in a variety of conditions including stress response,   acute inflammation, trauma and pregnancy. Correlation with other   laboratory and clinical findings is essential.         Immature Granulocytes     0.8     1.2       LD     927  Comment: Results are increased in hemolyzed samples.           Lymph #     0.5     0.5       Lymph %     4.6     4.2       Magnesium     2.0           MCH     28.9     29.9       MCHC     30.6     31.8       MCV     94     94       Mono #     0.5     0.7       Mono %     4.8     5.3       MPV     12.0     12.0       nRBC     0     0       Phosphorus     3.4           Platelets     63     47       POCT Glucose 214   213     231         Potassium     4.6           PROTEIN TOTAL     5.7           RBC     3.05     2.98       RDW      20.0     19.9       Sodium     129           Uric Acid     3.2           WBC     10.89     12.91                              Significant Imaging: EKG: Afib rate of  144 with no STT changes compared to previous

## 2023-03-06 NOTE — HPI
Mr. Hoffmann is a 52 M with PMH of DLBCL, T2DM, anemia, HLD, thrombocytopenia who was admitted to [facility] on 2/22/23 and transferred to [facility] on 2/25/23 for evaluation of current treatment (R-CHOP) with potential progression of ileocecal mass.       GI was consulted regarding his DLBCL and potential biopsy of abdominal mass to guide further management of chemotherapy management.       Patient initially presented to [facility] c/o nausea, vomiting, and generalized weakness.  Pt had a CT scan showing partial compression of the IVC and likely worsening lymphoma with ileal cecal/mesenteric mass enlargement and probable new peritoneal implant.  Transferred to [facility] for further evaluation given uncertainty in responsiveness to R-CHOP.  Of note, pt had a partial small bowel and omentum resection approx. 5 months ago consistent with DLBCL.  Bone marrow biopsy done 5 months ago showed normal cellular marrow for age with no increase in blast.  PET scan on 1/11/23 showed hypermetabolic loops of small bowel consistent with previous known disease, thought to be improving on R-CHOP.  Patient amenable to endoscopic biopsy.  Denied any n/v/f/c or changes in bowels. Endorses abdominal pain. Last colonoscopy (9/22) and sigmoid (11/22) showed NB internal hemorrhoids, diverticulosis in sigmoid and descending colon, and polyp removal in ascending colon and cecum. Otherwise unremarkable.

## 2023-03-06 NOTE — PROGRESS NOTES
03/06/23 0532   Vital Signs   Pulse (!) 157   /72     HR still elevated. Dr. Hyatt notified. New orders to be placed

## 2023-03-06 NOTE — PLAN OF CARE
Patient continues on room air. PCA diluadid continued. Amio gtt d/c'd today. 1L of NS bolus infused. 1 unit of plt infused. Patient stable at this time, no acute changes.     Problem: Adult Inpatient Plan of Care  Goal: Plan of Care Review  Outcome: Ongoing, Progressing  Goal: Patient-Specific Goal (Individualized)  Outcome: Ongoing, Progressing  Goal: Absence of Hospital-Acquired Illness or Injury  Outcome: Ongoing, Progressing  Goal: Optimal Comfort and Wellbeing  Outcome: Ongoing, Progressing  Goal: Readiness for Transition of Care  Outcome: Ongoing, Progressing     Problem: Diabetes Comorbidity  Goal: Blood Glucose Level Within Targeted Range  Outcome: Ongoing, Progressing     Problem: Adjustment to Illness (Sepsis/Septic Shock)  Goal: Optimal Coping  Outcome: Ongoing, Progressing     Problem: Bleeding (Sepsis/Septic Shock)  Goal: Absence of Bleeding  Outcome: Ongoing, Progressing     Problem: Glycemic Control Impaired (Sepsis/Septic Shock)  Goal: Blood Glucose Level Within Desired Range  Outcome: Ongoing, Progressing     Problem: Infection Progression (Sepsis/Septic Shock)  Goal: Absence of Infection Signs and Symptoms  Outcome: Ongoing, Progressing     Problem: Nutrition Impaired (Sepsis/Septic Shock)  Goal: Optimal Nutrition Intake  Outcome: Ongoing, Progressing     Problem: Infection  Goal: Absence of Infection Signs and Symptoms  Outcome: Ongoing, Progressing     Problem: Fall Injury Risk  Goal: Absence of Fall and Fall-Related Injury  Outcome: Ongoing, Progressing     Problem: Skin Injury Risk Increased  Goal: Skin Health and Integrity  Outcome: Ongoing, Progressing

## 2023-03-06 NOTE — H&P
Vascular and Interventional Radiology History & Physical    Date:  3/6/2023    Chief Complaint:   DLBCL    History of Present Illness:  Dwight Hoffmann is a 52 y.o. male with a past medical history of DLBCL, T2DM, anemia, HLD, thrombocytopenia who was admitted on 2/22/23 at [facility] and [place] [facility] [place] for progression of ileocecal mass on R-CHOP  who was [place] [facility] [place]23 for progression of ileocecal mass on R-CHOP and further mgmt of chemotherapy regimen.      Interventional Radiology has been consulted for image guided targeted R sided mesenteric lymph node biopsy.      Pt has had recent imaging including a CT a/p on 2/28/23 which revealed: The ileal cecal/mesenteric mass appears enlarged compared to prior though adjacent bowel is difficult to separate on this noncontrast study.  There appears to be involvement of a loop of small bowel with significant wall thickening as well.  Probable new peritoneal implant as above.      The pt has not undergone biopsy in the past. IR was previously consulted during this admission for possible bx of large intra abd mass. Due to concern for increased risk of bowel perforation and infection with CT guidance, IR staff recommended biopsy to be done endoscopically. Per primary team, patient declined GI biopsy. However, upon interview, patient amenable to GI biopsy.     Past Medical History:  Past Medical History:   Diagnosis Date    Cancer     Diabetes mellitus     Digestive disorder     Prediabetes        Past Surgical History:  Past Surgical History:   Procedure Laterality Date    APPENDECTOMY  07/15/2014    COLONOSCOPY      COLONOSCOPY N/A 02/09/2022    ERROR.   He did not have a colonoscopy with Dr. Horta.    COLONOSCOPY N/A 09/01/2022    Procedure: COLONOSCOPY;  Surgeon: Andrea Clemens MD;  Location: [facility];  Service: Endoscopy;  Laterality: N/A;    FLEXIBLE SIGMOIDOSCOPY N/A 11/7/2022    Procedure: SIGMOIDOSCOPY, FLEXIBLE;  Surgeon:  Manuel Sanders MD;  Location: [facility] ENDO;  Service: Endoscopy;  Laterality: N/A;    INCISION AND DRAINAGE OF ABDOMEN N/A 09/14/2022    Procedure: INCISION AND DRAINAGE, ABDOMEN;  Surgeon: Jan Oliveira Jr., MD;  Location: [facility] OR;  Service: General;  Laterality: N/A;    INSERTION OF TUNNELED CENTRAL VENOUS CATHETER (CVC) WITH SUBCUTANEOUS PORT N/A 10/31/2022    Procedure: [id number];  Surgeon: Jan Oliveira Jr., MD;  Location: [facility] OR;  Service: General;  Laterality: N/A;    LAPAROSCOPIC DRAINAGE OF ABDOMEN N/A 09/23/2022    Procedure: DRAINAGE, ABDOMEN, LAPAROSCOPIC;  Surgeon: Jan Oliveira Jr., MD;  Location: [facility] OR;  Service: General;  Laterality: N/A;        Sedation History:    Denies any adverse reactions.  Denies problems laying flat.     Social History:  Social History     Tobacco Use    Smoking status: Never    Smokeless tobacco: Never   Substance Use Topics    Alcohol use: Not Currently     Comment: occasional    Drug use: No        Home Medications:   Prior to Admission medications    Medication Sig Start Date End Date Taking? Authorizing Provider   acyclovir (ZOVIRAX) 400 MG tablet Take 1 tablet (400 mg total) by mouth 2 (two) times daily. 2/8/23   Annalisa Segura MD   allopurinoL (ZYLOPRIM) 100 MG tablet Take 1 tablet (100 mg total) by mouth once daily. 11/1/22   Josee Reid MD   ALPRAZolam (XANAX) 0.5 MG tablet Take 1 tablet (0.5 mg total) by mouth nightly as needed for Anxiety. 2/6/23 3/8/23  Josee Reid MD   ascorbic acid, vitamin C, (VITAMIN C) 1000 MG tablet Take 1,000 mg by mouth once daily.    Historical Provider   atorvastatin (LIPITOR) 10 MG tablet Take 10 mg by mouth once daily. 1/7/23   Historical Provider   bisacodyL (DULCOLAX, BISACODYL,) 10 mg Supp Place 1 suppository (10 mg total) rectally daily as needed (refractory constipation). 2/15/23   Ced Hendrix MD   blood sugar diagnostic Strp To check BG 2 times daily, to use with insurance  preferred meter 1/10/22   BOSTON Oswald   blood-glucose meter kit To check BG 2 times daily, to use with insurance preferred meter 1/10/22 1/10/23  BOSTON Oswald   butenafine 1 % cream Apply 1 application topically 2 (two) times daily. 9/27/22   Historical Provider   clotrimazole (LOTRIMIN) 1 % cream Apply topically 2 (two) times daily. 10/20/22 2/22/23  Lisa Kendrick MD   HYDROcodone-acetaminophen (NORCO) 5-325 mg per tablet 1 tablet EVERY 4 HOURS (route: oral)  Patient taking differently: Take 1 tablet by mouth every 4 (four) hours as needed. 2/6/23   Josee Reid MD   metFORMIN (GLUCOPHAGE) 1000 MG tablet Take 1,000 mg by mouth 2 (two) times daily. 2/6/23   Historical Provider   morphine (MS CONTIN) 15 MG 12 hr tablet Take 1 tablet (15 mg total) by mouth 2 (two) times daily. 2/20/23   Elba Chua NP   multivitamin Tab Take 1 tablet by mouth once daily. 2/15/23   Ced Hendrix MD   ondansetron (ZOFRAN-ODT) 4 MG TbDL Take 1 tablet by mouth once daily. 11/4/22   Historical Provider   pantoprazole (PROTONIX) 40 MG tablet Take 1 tablet (40 mg total) by mouth 2 (two) times daily. 2/15/23   Ced Hendrix MD   polyethylene glycol (GLYCOLAX) 17 gram PwPk Take 17 g by mouth 2 (two) times daily as needed (constipation). 2/15/23   Ced Hendrix MD   predniSONE (DELTASONE) 50 MG Tab Take 1 tablet (50 mg total) by mouth once daily. 1/10/23 1/10/24  Josee Reid MD   promethazine (PHENERGAN) 12.5 MG Tab Take 1 tablet by mouth once daily. 11/4/22   Historical Provider   sulfamethoxazole-trimethoprim 800-160mg (BACTRIM DS) 800-160 mg Tab Take 1 tablet by mouth 3 (three) times a week. 2/10/23 4/11/23  Annalisa Segura MD   tamsulosin (FLOMAX) 0.4 mg Cap Take 1 capsule (0.4 mg total) by mouth once daily. 2/9/23 2/9/24  Annalisa Segura MD       Inpatient Medications:    Current Facility-Administered Medications:     0.9%  NaCl infusion (for blood administration), , Intravenous, Q24H PRN, Mina Nolasco MD     0.9%  NaCl infusion (for blood administration), , Intravenous, Q24H PRN, Lakeisha STYLES Penfold, DO    acetaminophen tablet 650 mg, 650 mg, Oral, Q6H PRN, Je Roa MD, 650 mg at 03/02/23 0142    acyclovir capsule 400 mg, 400 mg, Oral, BID, Amarjit Alvarado MD, 400 mg at 03/06/23 0827    allopurinoL tablet 300 mg, 300 mg, Oral, Daily, Lan Kurtz MD, 300 mg at 03/06/23 0827    bisacodyL suppository 10 mg, 10 mg, Rectal, Once, Mina Nolasco MD    dextrose 10% bolus 125 mL 125 mL, 12.5 g, Intravenous, PRN, Amarjit Alvarado MD    dextrose 10% bolus 250 mL 250 mL, 25 g, Intravenous, PRN, Amarjit Alvarado MD    dextrose 40 % gel 15,000 mg, 15 g, Oral, PRN, Amarjit Alvarado MD    dextrose 40 % gel 30,000 mg, 30 g, Oral, PRN, Amarjit Alvarado MD    docusate sodium capsule 50 mg, 50 mg, Oral, Daily, Tra Costello MD, 50 mg at 03/06/23 0827    glucagon (human recombinant) injection 1 mg, 1 mg, Intramuscular, PRN, Amarjit Alvarado MD    HYDROmorphone injection 0.5 mg, 0.5 mg, Intravenous, Q15 Min PRN, Mary Grant DO, 0.5 mg at 03/05/23 2358    HYDROmorphone PCA syringe 6 mg/30 mL (0.2 mg/mL) NS, , Intravenous, Continuous, Mary Grant DO, New Syringe/Bag at 03/06/23 0655    hydrOXYzine HCL tablet 50 mg, 50 mg, Oral, TID PRN, Je Roa MD, 50 mg at 03/05/23 2301    insulin aspart U-100 pen 1-10 Units, 1-10 Units, Subcutaneous, QID (AC + HS) PRN, Amarjit Alvarado MD, 2 Units at 03/05/23 2211    insulin detemir U-100 pen 10 Units, 10 Units, Subcutaneous, Daily, Mary Grant DO, 10 Units at 03/06/23 0837    lactulose 20 gram/30 mL solution Soln 20 g, 20 g, Oral, TID, Tra Costello MD, 20 g at 03/05/23 1551    metoprolol tartrate (LOPRESSOR) tablet 50 mg, 50 mg, Oral, Q6H, Mina Nolasco MD, 50 mg at 03/06/23 0847    multivitamin tablet, 1 tablet, Oral, Daily, Tra Costello MD, 1 tablet at 03/06/23 0827    naloxone 0.4 mg/mL injection 0.02 mg,  0.02 mg, Intravenous, PRN, Mary Grant DO    ondansetron injection 4 mg, 4 mg, Intravenous, Q8H PRN, Amarjit Alvarado MD, 4 mg at 03/04/23 0936    pantoprazole EC tablet 40 mg, 40 mg, Oral, BID, Amarjit Alvarado MD, 40 mg at 03/06/23 0827    polyethylene glycol packet 17 g, 17 g, Oral, Daily, Tra Costello MD, 17 g at 03/05/23 0815    sodium chloride 0.9% flush 10 mL, 10 mL, Intravenous, Q12H PRN, Amarjit Alvarado MD    sulfamethoxazole-trimethoprim 800-160mg per tablet 1 tablet, 1 tablet, Oral, Once per day on Mon Wed Fri, Amarjit Alvarado MD, 1 tablet at 03/06/23 0827    tamsulosin 24 hr capsule 0.4 mg, 0.4 mg, Oral, Daily, Amarjit Alvarado MD, 0.4 mg at 03/06/23 0831    traZODone tablet 50 mg, 50 mg, Oral, Nightly PRN, Je Roa MD, 50 mg at 03/01/23 2128     Anticoagulants/Antiplatelets:   no anticoagulation    Allergies:   Review of patient's allergies indicates:   Allergen Reactions    Albuterol (bulk) Palpitations       Review of Systems:   As documented in primary provider H&P.    Vital Signs (Most Recent):  Temp: 97.7 °F (36.5 °C) (03/06/23 1149)  Pulse: 107 (03/06/23 1149)  Resp: 18 (03/06/23 1149)  BP: 108/74 (03/06/23 1149)  SpO2: 96 % (03/06/23 1149)    Physical Exam:  Constitutional:       Appearance: He is ill-appearing.   HENT:      Head: Normocephalic.      Nose:      Comments: NC in place  Cardiovascular:      Rate and Rhythm: Normal rate.   Pulmonary:      Effort: Pulmonary effort is normal.   Abdominal:      General: There is distension.      Palpations: Abdomen is soft.   Musculoskeletal:         General: Normal range of motion.   Skin:     General: Skin is warm.   Neurological:      General: No focal deficit present.      Mental Status: He is alert.   Psychiatric:         Mood and Affect: Mood normal.        Sedation Exam:  ASA: III - Patient appears to have severe systemic disease not posing a constant threat to life   Mallampati: II (hard and  soft palate, upper portion of tonsils anduvula visible)     Laboratory:  Lab Results   Component Value Date    INR 1.1 02/23/2023       Lab Results   Component Value Date    WBC 10.89 03/06/2023    HGB 8.8 (L) 03/06/2023    HCT 28.8 (L) 03/06/2023    MCV 94 03/06/2023    PLT 63 (L) 03/06/2023      Lab Results   Component Value Date     (H) 03/06/2023     (L) 03/06/2023    K 4.6 03/06/2023    CL 97 03/06/2023    CO2 23 03/06/2023    BUN 18 03/06/2023    CREATININE 0.7 03/06/2023    CALCIUM 8.9 03/06/2023    MG 2.0 03/06/2023    ALT 19 03/06/2023    AST 30 03/06/2023    ALBUMIN 2.5 (L) 03/06/2023    BILITOT 2.2 (H) 03/06/2023    BILIDIR 0.9 (H) 02/25/2023       Imaging:  Reviewed.      ASSESSMENT/PLAN:                      Sedation Plan: Moderate  Patient will undergo: Image guided non targeted of R sided mesenteric lymph node biopsy under moderate sedation.     Vamsi Hayward MD  Radiology PGY-5

## 2023-03-06 NOTE — HPI
Dwight Hoffmann is a 52 year-old male with history of:  -DLBCL  -Type II DM  -Thrombocytopenia    Who presented as a transfer from [facility] for refractory lymphoma and compression of IVC. Patient apparently has progressed on R-CHOP therapy but per Heme/onc note at OSH patient was not consistently getting chemotherapy treatment 2/2 to thrombocytopenia, anemia and hospital admissions. [place] [facility] [place] He was noted to go into AF with RVR on 3/4/23 at which time he was started on amiodarone gtt which was stopped on 3/5/23. He is currently on PO Metoprolol tartrate 50 mg q6 hrs for rate control. He received 1 unit of platelets on this admission for his thrombocytopenia.

## 2023-03-07 NOTE — ASSESSMENT & PLAN NOTE
52 year old with DLBCL with associated anemia and thrombocytopenia. EP consulted for uncontrolled AF with RVR  Telemetry reviewed: AF with HR's 130-150 bpm  Was on IV Amiodarone gtt x2 days, stopped on 3/5/23  CHADS-VaSC of 2    - converted to NSR on night of 3/6  - start amiodarone 200mg BID  - continue lopressor 50mg q6hr  - loading with digoxin  - Given his thrombocytopenia requiring transfusions on this admission, he would not be a good candidate for AC especially since he still has a lymph node biopsy pending

## 2023-03-07 NOTE — PROGRESS NOTES
[facility] - Oncology ([facility])  Hematology  Bone Marrow Transplant  Progress Note    Patient Name: Dwight Hoffmann  Admission Date: 2/25/2023  Hospital Length of Stay: 10 days  Code Status: Full Code    Subjective:     Interval History: Placed on dig overnight with improvement in HR. Transitioned to PO amio this am. GI unable to scope due to inadequate prep. IR will try to add-on patient later today.     Objective:     Vital Signs (Most Recent):  Temp: 99.6 °F (37.6 °C) (03/07/23 0700)  Pulse: 97 (03/07/23 0857)  Resp: 18 (03/07/23 0700)  BP: 107/66 (03/07/23 0700)  SpO2: 97 % (03/07/23 0700)   Vital Signs (24h Range):  Temp:  [97.4 °F (36.3 °C)-99.6 °F (37.6 °C)] 99.6 °F (37.6 °C)  Pulse:  [] 97  Resp:  [15-20] 18  SpO2:  [96 %-99 %] 97 %  BP: ()/(52-78) 107/66     Weight: 92.5 kg (203 lb 14.8 oz)  Body mass index is 26.9 kg/m².  Body surface area is 2.18 meters squared.    ECOG SCORE           [unfilled]    Intake/Output - Last 3 Shifts         03/05 0700  03/06 0659 03/06 0700 03/07 0659 03/07 0700  03/08 0659    P.O.  950     I.V. (mL/kg) 158.1 (1.7) 12.5 (0.1)     Blood 249.8      IV Piggyback 988      Total Intake(mL/kg) 1395.9 (15.1) 962.5 (10.4)     Urine (mL/kg/hr) 2725 (1.2) 1700 (0.8) 600 (3.2)    Total Output 2725 1700 600    Net -1329.1 -737.5 -600                   Physical Exam  Constitutional:       General: He is not in acute distress.     Appearance: He is well-developed. He is ill-appearing. He is not diaphoretic.   HENT:      Head: Normocephalic and atraumatic.   Eyes:      General: No scleral icterus.     Conjunctiva/sclera: Conjunctivae normal.   Cardiovascular:      Rate and Rhythm: Normal rate. Rhythm irregular.      Heart sounds: Normal heart sounds. No murmur heard.    No friction rub. No gallop.   Pulmonary:      Effort: Pulmonary effort is normal. No respiratory distress.      Breath sounds: Normal breath sounds. No wheezing or rales.   Abdominal:      General: Bowel sounds  are normal. There is distension.      Tenderness: There is abdominal tenderness.   Musculoskeletal:         General: Normal range of motion.      Cervical back: Normal range of motion and neck supple.   Lymphadenopathy:      Cervical: No cervical adenopathy.   Skin:     General: Skin is warm and dry.      Findings: No rash.   Neurological:      Mental Status: He is alert and oriented to person, place, and time.   Psychiatric:         Behavior: Behavior normal.       Significant Labs:   CBC:   Recent Labs   Lab 03/05/23 2056 03/06/23 0448 03/07/23  0305   WBC 12.91* 10.89 9.22   HGB 8.9* 8.8* 8.0*   HCT 28.0* 28.8* 25.0*   PLT 47* 63* 18*      and CMP:   Recent Labs   Lab 03/06/23 0448 03/07/23  0305   * 132*   K 4.6 4.7   CL 97 101   CO2 23 23   * 185*   BUN 18 21*   CREATININE 0.7 0.6   CALCIUM 8.9 8.1*   PROT 5.7* 4.5*   ALBUMIN 2.5* 2.1*   BILITOT 2.2* 2.5*   ALKPHOS 102 83   AST 30 23   ALT 19 17   ANIONGAP 9 8         Diagnostic Results:  I have reviewed all pertinent imaging results/findings within the past 24 hours.    Assessment/Plan:     * Atrial fibrillation with RVR  On 3/4 patient with sudden onset afib with RVR rate to the 200s. Patient asymptomatic, BP at baseline. Pt given Lopressor IV 5mg x3 with moderate response in HR to 150s. Loaded with amio and placed on gtt. No prior hx of a-fib. No LAE on recent echo. Discontinued amio gtt after 24 hours. Will not transition to PO amio given concern for conversion to SR as patient unable to be anticoagulated given severe thrombocytopenia. Rate controlled with PO lopressor.    On 3/6 pt with repeated RVR. Cardiology consulted for assistance with management, given contraindication to anticoagulation and soft BP. EP on board as well per cardiology. Started dig load, transutioned 3/7 to PO amiodarone per EP.     -- scheduled PO lopressor 50mg q6h, PO amiodarone 200mg BID  -- prn IV lopressor for a-fib with HR sustaining >130s  -- anticipate  transition to Toprol if continued adequate rate control    -- f/u EP recs     Large cell lymphoma  See DLBCL above    Fever  Patient febrile to 101.9 on 2/28. Likely multifactorial as patient with known malignancy with possible superimposed infection of intra-abdominal mass. Placed on vanc, cefepime, flagyl. Blood cx NGTD. No respiratory or urinary sxs.   -- off all abx as of 3/3    Leukocytosis  No evidence of active infection, pulling off abx as appropriate. DC vanc on 2/27 however patient subsequently fevered the following evening so placed back on vanc. Discontinued vanc again 3/1. Discontinued cefepime and flagyl on 3/3. Will continue to monitor closely.     Compression of vena cava  CRS: this is due to dehydration because the IVC is flat throughout its course rather than just at one level.  Treating with IVF. And encouraging PO intake     Hyponatremia  Improved during admission, though still mildly hyponatremic. Serum osm and urine studies ordered. Normal serum osm.     COVID-19 virus infection  Noted to be positive on 2/6/2023    Thrombocytopenia  Due to lymphoma    -- Continue to monitor with CBC  -- will transfuse to increase Plt level to >50 given upcoming procedures    Hyperlipidemia, unspecified  Not currently taking a statin    DLBCL (diffuse large B cell lymphoma)  Patient with small bowel resection approximately 5 months ago consistent with Diffuse large-B cell lymphoma, non germinal center origin. Additionally had omentum resection also showing DLBCL. Had bone marrow biopsy approximately 5 months ago that showed normo cellular marrow for age, no increase in blasts. Patient had PET scan from 1/11/23 showing hypermetabolic loops of small bowel most consistent with previous known disease and was thought to have improved (partial response to therapy noted from chart review) and was recommended he continue with R-CHOP at that time. Patient hospitalized at [facility] found to have likely worsening of  lymphoma based on CT scan of abdomen showing partial compression of IVC. [place] [facility] [place] given uncertainty in responsiveness to first-line chemo therapy.    --consider alternative chemo regimen given progression (although appears patient has had delay in receiving chemo 2/2 to thrombocytopenia and hospital admissions)  --patient with lower abdominal pain: controlled with PCA pump  --continue acyclovir, allopurinol  --Per last CT abdomen: Interval increase in size of the centrally necrotic ileocecal lymphomatous mass. Superimposed infection within the necrotic mass cannot be excluded.  - changed steroid to pulse dose dexamethasone on 3/2  - No safe window for Bx per IR or CRS. Discussed case with GI, however patient declining bx on 2/28.   - Rad onc consulted for radiation therapy. Pt underwent sim on 3/2, however holding off on radiation at this time.  - attempted LN bx with IR on 3/6 however cancelled due to patient entering AFRVR. Attempted bx with GI on 3/7 however patient did not complete prep so unable to scope. IR will attempt to add-on patient 3/7 for LN bx.              Diabetes mellitus type 2, noninsulin dependent  Hemoglobin A1c from 1 week ago was 7.7  Goal glucose while inpatient: 140-180  Consider basal-bolus + SSI as needed  Hold home anti-diabetic medications  Patient takes daily steroids  -- increasing daily insulin regimen as appropriate starting 3/3 given increasing hyperglycemia while on steroids. BGL much better controlled.     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   -- stat CTAP ordered on 2/28 given worsening ab pain to r/o perf, no evidence of perf however interval enlargement of mass   -- see DLBCL for full plan         VTE Risk Mitigation (From admission, onward)           Ordered     Reason for No Pharmacological VTE Prophylaxis  Once        Question:   Reasons:  Answer:  Thrombocytopenia    02/25/23 0402     IP VTE HIGH RISK PATIENT  Once         02/25/23 0402     Place sequential compression device  Until discontinued         02/25/23 0402                    Disposition: Pending treatment     Mina Nolasco MD  Bone Marrow Transplant  [facility] - Oncology ([facility])        ATTENDING ATTESTATION:  I have personally seen, interviewed, and examined the patient. I have reviewed and agree with the assessment/plan. In addition:    Biopsy of mesenteric lymph node today. Will administer lasix given hypervolemia.       Bo Kurtz MD  Hematology, Oncology, and Stem Cell Transplantation  [facility]

## 2023-03-07 NOTE — PLAN OF CARE
Recommendations    1. ADAT to Regular diet. Add Diabetic modifiers, if warranted.   2. Continue Boost Glucose Control TID.   3. Add Beneprotein BID to optimize calorie/protein intake.   4. RD to monitor and follow-up.    Goals: Meet % EEN.  Nutrition Goal Status: progressing towards goal, new  Communication of RD Recs: other (comment) (POC)

## 2023-03-07 NOTE — CARE UPDATE
RAPID RESPONSE NURSE PROACTIVE ROUNDING NOTE       Time of Visit:     Admit Date: 2023  LOS: 9  Code Status: Full Code   Date of Visit: 2023  : 1970  Age: 52 y.o.  Sex: male  Race: Black or   Bed: 828/828 A:   MRN: [id number]  Was the patient discharged from an ICU this admission? No   Was the patient discharged from a PACU within last 24 hours? No   Did the patient receive conscious sedation/general anesthesia in last 24 hours? No  Was the patient in the ED within the past 24 hours? No  Was the patient on NIPPV within the past 24 hours? No   Attending Physician: Lan Kurtz MD  Primary Service: Hematology and Oncology   Time spent at the bedside: < 15 min    SITUATION    Notified by charge RN via phone call.  Reason for alert: Tachycardia  Called to evaluate the patient for Dysrythmia    BACKGROUND     Why is the patient in the hospital?: Atrial fibrillation with RVR    Patient has a past medical history of Cancer, Diabetes mellitus, Digestive disorder, and Prediabetes.    Last Vitals:  Temp: 97.4 °F (36.3 °C) ( 1608)  Pulse: 129 ( 1804)  Resp: 18 ( 180)  BP: 80/54 ( 1804)  SpO2: 97 % ( 180)    24 Hours Vitals Range:  Temp:  [97.4 °F (36.3 °C)-98.1 °F (36.7 °C)]   Pulse:  []   Resp:  [12-18]   BP: ()/(52-80)   SpO2:  [96 %-100 %]     Labs:  Recent Labs     238   WBC 12.15 12.91* 10.89   HGB 8.2* 8.9* 8.8*   HCT 26.1* 28.0* 28.8*   PLT 12* 47* 63*       Recent Labs     23  04223  0523  0448   * 127* 129*   K 4.6 4.5 4.6   CL 97 99 97   CO2 23 21* 23   CREATININE 0.7 0.6 0.7   * 183* 200*   PHOS 3.6 3.3 3.4   MG 1.9 1.8 2.0        No results for input(s): PH, PCO2, PO2, HCO3, POCSATURATED, BE in the last 72 hours.     ASSESSMENT    Called to bedside for pt tachycardia and hypotension. Upon our arrival, BP 80/54, , 1L NS bolus infusing per primary team. Pt  AOX4, no complaints at this time. Cardiology contacted regarding digoxin and metoprolol orders. Dr. Evans to bedside, orders to hold dig and metoprolol until completion of bolus at which time need could be reassessed. Recheck /63, HR 150s, 200mL into bolus.     Physical Exam  Cardiovascular:      Rate and Rhythm: Regular rhythm. Tachycardia present.   Pulmonary:      Effort: Pulmonary effort is normal.   Abdominal:      General: Abdomen is flat.      Palpations: Abdomen is soft.      Tenderness: There is abdominal tenderness.   Skin:     General: Skin is warm and dry.      Capillary Refill: Capillary refill takes less than 2 seconds.      Coloration: Skin is pale.   Neurological:      Mental Status: He is alert and oriented to person, place, and time.      GCS: GCS eye subscore is 4. GCS verbal subscore is 5. GCS motor subscore is 6.       INTERVENTIONS    The patient was seen for Cardiac problem. Staff concerns included tachycardia. The following interventions were performed: continuous cardiac monitoring continued, normal saline 1000 ml IV bolus , and metoprolol and digoxin held.    RECOMMENDATIONS    F/u VS post bolus. Reassess metoprolol and dig necessity post bolus per cards recs. Maintain tele and IV access. Strict fall precautions.     PROVIDER ESCALATION    Yes/No  yes    Orders received and case discussed with Dr. Evans .    Disposition: Remain in room 828.    FOLLOW-UP    charge RNCarlos  updated on plan of care. Instructed to call the Rapid Response Nurse, Mio Lomeli RN at [phone] for additional questions or concerns.

## 2023-03-07 NOTE — PLAN OF CARE
Pt arrived to  for lymph node biopsy. Pt oriented to unit and staff. Plan of care reviewed with patient, patient verbalizes understanding. Comfort measures utilized. Pt safely transferred from stretcher to procedural table. Fall risk reviewed with patient, fall risk interventions maintained. Safety strap applied, positioner pillows utilized to minimize pressure points. Blankets applied. Pt prepped and draped utilizing standard sterile technique. Patient placed on continuous monitoring, as required by sedation policy. Timeouts completed utilizing standard universal time-out, per department and facility policy. RN to remain at bedside, continuous monitoring maintained. Pt resting comfortably. Denies pain/discomfort. Will continue to monitor. See flow sheets for monitoring, medication administration, and updates.

## 2023-03-07 NOTE — ASSESSMENT & PLAN NOTE
Due to lymphoma    -- Continue to monitor with CBC  -- will transfuse to increase Plt level to >50 given upcoming procedures

## 2023-03-07 NOTE — SUBJECTIVE & OBJECTIVE
Subjective:     Interval History: Placed on dig overnight with improvement in HR. Transitioned to PO amio this am. GI unable to scope due to inadequate prep. IR will try to add-on patient later today.     Objective:     Vital Signs (Most Recent):  Temp: 99.6 °F (37.6 °C) (03/07/23 0700)  Pulse: 97 (03/07/23 0857)  Resp: 18 (03/07/23 0700)  BP: 107/66 (03/07/23 0700)  SpO2: 97 % (03/07/23 0700)   Vital Signs (24h Range):  Temp:  [97.4 °F (36.3 °C)-99.6 °F (37.6 °C)] 99.6 °F (37.6 °C)  Pulse:  [] 97  Resp:  [15-20] 18  SpO2:  [96 %-99 %] 97 %  BP: ()/(52-78) 107/66     Weight: 92.5 kg (203 lb 14.8 oz)  Body mass index is 26.9 kg/m².  Body surface area is 2.18 meters squared.    ECOG SCORE           [unfilled]    Intake/Output - Last 3 Shifts         03/05 0700  03/06 0659 03/06 0700 03/07 0659 03/07 0700 03/08 0659    P.O.  950     I.V. (mL/kg) 158.1 (1.7) 12.5 (0.1)     Blood 249.8      IV Piggyback 988      Total Intake(mL/kg) 1395.9 (15.1) 962.5 (10.4)     Urine (mL/kg/hr) 2725 (1.2) 1700 (0.8) 600 (3.2)    Total Output 2725 1700 600    Net -1329.1 -737.5 -600                   Physical Exam  Constitutional:       General: He is not in acute distress.     Appearance: He is well-developed. He is ill-appearing. He is not diaphoretic.   HENT:      Head: Normocephalic and atraumatic.   Eyes:      General: No scleral icterus.     Conjunctiva/sclera: Conjunctivae normal.   Cardiovascular:      Rate and Rhythm: Normal rate. Rhythm irregular.      Heart sounds: Normal heart sounds. No murmur heard.    No friction rub. No gallop.   Pulmonary:      Effort: Pulmonary effort is normal. No respiratory distress.      Breath sounds: Normal breath sounds. No wheezing or rales.   Abdominal:      General: Bowel sounds are normal. There is distension.      Tenderness: There is abdominal tenderness.   Musculoskeletal:         General: Normal range of motion.      Cervical back: Normal range of motion and neck supple.    Lymphadenopathy:      Cervical: No cervical adenopathy.   Skin:     General: Skin is warm and dry.      Findings: No rash.   Neurological:      Mental Status: He is alert and oriented to person, place, and time.   Psychiatric:         Behavior: Behavior normal.       Significant Labs:   CBC:   Recent Labs   Lab 03/05/23 2056 03/06/23 0448 03/07/23  0305   WBC 12.91* 10.89 9.22   HGB 8.9* 8.8* 8.0*   HCT 28.0* 28.8* 25.0*   PLT 47* 63* 18*      and CMP:   Recent Labs   Lab 03/06/23 0448 03/07/23  0305   * 132*   K 4.6 4.7   CL 97 101   CO2 23 23   * 185*   BUN 18 21*   CREATININE 0.7 0.6   CALCIUM 8.9 8.1*   PROT 5.7* 4.5*   ALBUMIN 2.5* 2.1*   BILITOT 2.2* 2.5*   ALKPHOS 102 83   AST 30 23   ALT 19 17   ANIONGAP 9 8         Diagnostic Results:  I have reviewed all pertinent imaging results/findings within the past 24 hours.

## 2023-03-07 NOTE — SUBJECTIVE & OBJECTIVE
Interval History: loaded with digoxin yesterday. Spontaneously converted to NSR at 11PM last night. Feeling better this morning. Will start amiodarone today     Review of Systems   Constitutional: Positive for malaise/fatigue.   Cardiovascular:  Negative for palpitations.   All other systems reviewed and are negative.  Objective:     Vital Signs (Most Recent):  Temp: 99.6 °F (37.6 °C) (03/07/23 0700)  Pulse: 97 (03/07/23 0857)  Resp: 18 (03/07/23 0700)  BP: 107/66 (03/07/23 0700)  SpO2: 97 % (03/07/23 0700) Vital Signs (24h Range):  Temp:  [97.4 °F (36.3 °C)-99.6 °F (37.6 °C)] 99.6 °F (37.6 °C)  Pulse:  [] 97  Resp:  [15-20] 18  SpO2:  [96 %-99 %] 97 %  BP: ()/(52-78) 107/66     Weight: 92.5 kg (203 lb 14.8 oz)  Body mass index is 26.9 kg/m².     SpO2: 97 %       Physical Exam  HENT:      Head: Normocephalic and atraumatic.      Nose: Nose normal.   Eyes:      Conjunctiva/sclera: Conjunctivae normal.   Cardiovascular:      Rate and Rhythm: Normal rate and regular rhythm.   Pulmonary:      Effort: Pulmonary effort is normal.   Musculoskeletal:      Cervical back: Normal range of motion.      Right lower leg: No edema.      Left lower leg: No edema.   Skin:     General: Skin is warm and dry.      Capillary Refill: Capillary refill takes less than 2 seconds.   Neurological:      Mental Status: He is alert and oriented to person, place, and time.   Psychiatric:         Mood and Affect: Mood normal.       Significant Labs: All pertinent lab results from the last 24 hours have been reviewed.

## 2023-03-07 NOTE — PROGRESS NOTES
Pt reports feeling full and having difficulty finishing bowel prep. Refused placement of NGT. States he will try to finish bowel prep overnight. Understands procedure may need to be postponed if prep not completed

## 2023-03-07 NOTE — ASSESSMENT & PLAN NOTE
On 3/4 patient with sudden onset afib with RVR rate to the 200s. Patient asymptomatic, BP at baseline. Pt given Lopressor IV 5mg x3 with moderate response in HR to 150s. Loaded with amio and placed on gtt. No prior hx of a-fib. No LAE on recent echo. Discontinued amio gtt after 24 hours. Will not transition to PO amio given concern for conversion to SR as patient unable to be anticoagulated given severe thrombocytopenia. Rate controlled with PO lopressor.    On 3/6 pt with repeated RVR. Cardiology consulted for assistance with management, given contraindication to anticoagulation and soft BP. EP on board as well per cardiology. Started dig load, transutioned 3/7 to PO amiodarone per EP.     -- scheduled PO lopressor 50mg q6h, PO amiodarone 200mg BID  -- prn IV lopressor for a-fib with HR sustaining >130s  -- anticipate transition to Toprol if continued adequate rate control    -- f/u EP recs

## 2023-03-07 NOTE — NURSING
Patient digoxin 500mcg held and metoprolol po held due to b/p systolic was less than 100 and heart rate 133,   see vitals.  Shayla nurse practioner said to give, but blood pressure kept declining to 80's /52. 1 liter bolus given.   Rapid called and came to assess patient.  Said to watch patient and continue with bolus.  No complaints voiced.

## 2023-03-07 NOTE — PROGRESS NOTES
[facility] - Oncology ([facility])  Cardiac Electrophysiology  Progress Note    Admission Date: 2/25/2023  Code Status: Full Code   Attending Physician: Lan Kurtz MD   Expected Discharge Date: 3/9/2023  Principal Problem:Atrial fibrillation with RVR    Subjective:     Interval History: loaded with digoxin yesterday. Spontaneously converted to NSR at 11PM last night. Feeling better this morning. Will start amiodarone today     Review of Systems   Constitutional: Positive for malaise/fatigue.   Cardiovascular:  Negative for palpitations.   All other systems reviewed and are negative.  Objective:     Vital Signs (Most Recent):  Temp: 99.6 °F (37.6 °C) (03/07/23 0700)  Pulse: 97 (03/07/23 0857)  Resp: 18 (03/07/23 0700)  BP: 107/66 (03/07/23 0700)  SpO2: 97 % (03/07/23 0700) Vital Signs (24h Range):  Temp:  [97.4 °F (36.3 °C)-99.6 °F (37.6 °C)] 99.6 °F (37.6 °C)  Pulse:  [] 97  Resp:  [15-20] 18  SpO2:  [96 %-99 %] 97 %  BP: ()/(52-78) 107/66     Weight: 92.5 kg (203 lb 14.8 oz)  Body mass index is 26.9 kg/m².     SpO2: 97 %       Physical Exam  HENT:      Head: Normocephalic and atraumatic.      Nose: Nose normal.   Eyes:      Conjunctiva/sclera: Conjunctivae normal.   Cardiovascular:      Rate and Rhythm: Normal rate and regular rhythm.   Pulmonary:      Effort: Pulmonary effort is normal.   Musculoskeletal:      Cervical back: Normal range of motion.      Right lower leg: No edema.      Left lower leg: No edema.   Skin:     General: Skin is warm and dry.      Capillary Refill: Capillary refill takes less than 2 seconds.   Neurological:      Mental Status: He is alert and oriented to person, place, and time.   Psychiatric:         Mood and Affect: Mood normal.       Significant Labs: All pertinent lab results from the last 24 hours have been reviewed.        Assessment and Plan:     * Atrial fibrillation with RVR  52 year old with DLBCL with associated anemia and thrombocytopenia. EP consulted for  uncontrolled AF with RVR  Telemetry reviewed: AF with HR's 130-150 bpm  Was on IV Amiodarone gtt x2 days, stopped on 3/5/23  CHADS-VaSC of 2    - converted to NSR on night of 3/6  - start amiodarone 200mg BID  - continue lopressor 50mg q6hr  - loading with digoxin  - Given his thrombocytopenia requiring transfusions on this admission, he would not be a good candidate for AC especially since he still has a lymph node biopsy pending        Robinson Meneses MD  Cardiac Electrophysiology  [facility] - Oncology ([facility])

## 2023-03-07 NOTE — ASSESSMENT & PLAN NOTE
Patient with small bowel resection approximately 5 months ago consistent with Diffuse large-B cell lymphoma, non germinal center origin. Additionally had omentum resection also showing DLBCL. Had bone marrow biopsy approximately 5 months ago that showed normo cellular marrow for age, no increase in blasts. Patient had PET scan from 1/11/23 showing hypermetabolic loops of small bowel most consistent with previous known disease and was thought to have improved (partial response to therapy noted from chart review) and was recommended he continue with R-CHOP at that time. Patient hospitalized at [facility] found to have likely worsening of lymphoma based on CT scan of abdomen showing partial compression of IVC. Transferred to [facility] [place] further evaluation given uncertainty in responsiveness to first-line chemo therapy.    --consider alternative chemo regimen given progression (although appears patient has had delay in receiving chemo 2/2 to thrombocytopenia and hospital admissions)  --patient with lower abdominal pain: controlled with PCA pump  --continue acyclovir, allopurinol  --Per last CT abdomen: Interval increase in size of the centrally necrotic ileocecal lymphomatous mass. Superimposed infection within the necrotic mass cannot be excluded.  - changed steroid to pulse dose dexamethasone on 3/2  - No safe window for Bx per IR or CRS. Discussed case with GI, however patient declining bx on 2/28.   - Rad onc consulted for radiation therapy. Pt underwent sim on 3/2, however holding off on radiation at this time.  - attempted LN bx with IR on 3/6 however cancelled due to patient entering AFRVR. Attempted bx with GI on 3/7 however patient did not complete prep so unable to scope. IR will attempt to add-on patient 3/7 for LN bx.

## 2023-03-07 NOTE — PROGRESS NOTES
[facility] - Oncology ([facility])  Adult Nutrition  Progress Note    SUMMARY       Recommendations    1. ADAT to Regular diet. Add Diabetic modifiers, if warranted.   2. Continue Boost Glucose Control TID.   3. Add Beneprotein BID to optimize calorie/protein intake.   4. RD to monitor and follow-up.    Goals: Meet % EEN.  Nutrition Goal Status: progressing towards goal, new  Communication of RD Recs: other (comment) (POC)    Assessment and Plan    Nutrition Problem:  Moderate/Severe Protein-Calorie Malnutrition  Malnutrition in the context of Chronic Illness/Injury     Related to (etiology):  DLBCL (diffuse large B cell lymphoma)  S/P small bowel resection     Signs and Symptoms (as evidenced by):  Energy Intake: <75% of estimated energy requirement since admit  Body Fat Depletion: moderate depletion of orbitals and triceps   Muscle Mass Depletion: moderate depletion of temples, clavicle region, interosseous muscle, and lower extremities   Weight Loss: 13.6% x 6 months      Interventions(treatment strategy):  Collaboration of nutrition care with other providers  ONS        Malnutrition Assessment  Malnutrition Type: chronic illness              Orbital Region (Subcutaneous Fat Loss): mild depletion  Upper Arm Region (Subcutaneous Fat Loss): severe depletion   Episcopalian Region (Muscle Loss): mild depletion  Clavicle Bone Region (Muscle Loss): well nourished  Dorsal Hand (Muscle Loss): well nourished  Posterior Calf Region (Muscle Loss): severe depletion       Subcutaneous Fat Loss (Final Summary): moderate protein-calorie malnutrition  Muscle Loss Evaluation (Final Summary): moderate protein-calorie malnutrition         Reason for Assessment    Reason For Assessment: RD follow-up  Diagnosis: other (see comments) (DLBCL (diffuse large B cell lymphoma))  Relevant Medical History: T2DM, HLD, leukocytosis, moderate malnutrition, S/P smallbowel resection  Interdisciplinary Rounds: did not attend  General Information  "Comments: RD follow up. Patient RAJANI at RD visit for a lymph node biopsy. No c/o of N/V/D, constipation noted. 2+ edema noted. Per chart PO intale 0%. NFPE performed 2/27. Moderate subcutaneous fat loss, and moderate muscle loss. Pt reports  lbs. Per chart review, over the past year peak weight was 235 lbs on 8/10/22 (32 lbs weight loss since 8/10/22 (x 6 months)). Estimated 13.6% BW loss x 6 months. Pt continues to meet criteria for moderate malnutriton d/t suboptimal PO intake, weight loss reported, and altered GI fxn.  Nutrition Discharge Planning: Pending clinical course    Nutrition Risk Screen    Nutrition Risk Screen: no indicators present    Nutrition/Diet History    Spiritual, Cultural Beliefs, Quaker Practices, Values that Affect Care: no  Food Allergies: NKFA  Factors Affecting Nutritional Intake: NPO    Anthropometrics    Temp: 97.4 °F (36.3 °C)  Height: 6' 1" (185.4 cm)  Height (inches): 73 in  Weight Method: Bed Scale  Weight: 92.5 kg (203 lb 14.8 oz)  Weight (lb): 203.93 lb  Ideal Body Weight (IBW), Male: 184 lb  % Ideal Body Weight, Male (lb): 110.83 %  BMI (Calculated): 26.9  BMI Grade: 25 - 29.9 - overweight       Lab/Procedures/Meds    Pertinent Labs Reviewed: reviewed  Pertinent Labs Comments: H/H: 8.8/28.8, MCHC 30.6, Na 129, Gluc 200  Pertinent Medications Reviewed: reviewed  Pertinent Medications Comments: bisacodyl, insulin, metoprolol      Estimated/Assessed Needs    Weight Used For Calorie Calculations: 92.5 kg (203 lb 14.8 oz)  Energy Calorie Requirements (kcal): 2734-4472 kcal  Energy Need Method: Kcal/kg (20-25 kcal/kg)  Protein Requirements: 111-139 g protein (1.2-1.5 g/kg)  Weight Used For Protein Calculations: 92.5 kg (203 lb 14.8 oz)  Fluid Requirements (mL): 1 ml/kcal or per MD     RDA Method (mL): 1850  CHO Requirement: 231 g      Nutrition Prescription Ordered    Current Diet Order: Regular    Evaluation of Received Nutrient/Fluid Intake    I/O: +50 mL  Comments: LBM: " 3/4  Tolerance: tolerating  % Intake of Estimated Energy Needs: 0 - 25 %  % Meal Intake: 0 - 25 %    Nutrition Risk    Level of Risk/Frequency of Follow-up:  (1x/week)     Monitor and Evaluation    Food and Nutrient Intake: energy intake, food and beverage intake  Food and Nutrient Adminstration: diet order  Knowledge/Beliefs/Attitudes: food and nutrition knowledge/skill, beliefs and attitudes  Physical Activity and Function: factors affecting access to physical activity  Anthropometric Measurements: height/length, weight, weight change, body mass index  Biochemical Data, Medical Tests and Procedures: electrolyte and renal panel, gastrointestinal profile, glucose/endocrine profile, inflammatory profile, lipid profile  Nutrition-Focused Physical Findings: overall appearance, extremities, muscles and bones     Nutrition Follow-Up    RD Follow-up?: Yes    Ahmet Newell Registration Eligible, Provisional LDN

## 2023-03-07 NOTE — RESPIRATORY THERAPY
RAPID RESPONSE RESPIRATORY THERAPY ETCO2 CHECK         Time of visit: 920     Code Status: Full Code   : 1970  Bed: 828/828 A:   MRN: [id number]  Time spent at the bedside: < 15 min    SITUATION    Evaluated patient for: ETCo2 compliance    BACKGROUND    Why is the patient in the hospital?: Atrial fibrillation with RVR    Patient has a past medical history of Cancer, Diabetes mellitus, Digestive disorder, and Prediabetes.    24 Hours Vitals Range:  Temp:  [97.4 °F (36.3 °C)-99.6 °F (37.6 °C)]   Pulse:  []   Resp:  [15-20]   BP: ()/(52-78)   SpO2:  [96 %-99 %]     Labs:    Recent Labs     23  0513 23  0448 23  0305   * 129* 132*   K 4.5 4.6 4.7   CL 99 97 101   CO2 21* 23 23   CREATININE 0.6 0.7 0.6   * 200* 185*   PHOS 3.3 3.4 3.3   MG 1.8 2.0 1.8        No results for input(s): PH, PCO2, PO2, HCO3, POCSATURATED, BE in the last 72 hours.    ASSESSMENT/INTERVENTIONS      Last VS   Temp: 99.6 °F (37.6 °C) (700)  Pulse: 97 (857)  Resp: 18 (700)  BP: 107/66 (700)  SpO2: 97 % (700)    Level of Consciousness: Level of Consciousness (AVPU): alert  Respiratory Effort: Respiratory Effort: Unlabored Expansion/Accessory Muscle Usage: Expansion/Accessory Muscles/Retractions: expansion symmetric  All Lung Field Breath Sounds: All Lung Fields Breath Sounds: Anterior:, Posterior:, Lateral:, diminished  Is the ETCO2 monitor on? Yes  Is the patient wearing a cannula? Yes  Are ETCO2 orders placed? Yes  Is the patient on a PCA pump? Yes  ETCO2 monitored: ETCO2 (mmHg): 28 mmHg  Ambu at bedside: Ambu bag with the patient?: Yes, Adult Ambu    Active Orders   Respiratory Care    END TIDAL CO2 MONITOR Q12H     Frequency: Q12H     Number of Occurrences: Until Specified    Pulse Oximetry Q Shift     Frequency: Q Shift     Number of Occurrences: Until Specified       RECOMMENDATIONS    We recommend: RRT Recs: Continue POC per primary  team.      FOLLOW-UP    Please call back the Rapid Response RT, Cyndee Reid, RRT at x [phone] for any questions or concerns.

## 2023-03-07 NOTE — CARE UPDATE
RAPID RESPONSE NURSE FOLLOW-UP NOTE       Followed up with patient for proactive rounding.  No acute issues at this time. Reviewed plan of care with bedside RNGhada . Pt converted to NSR post Digoxin load earlier this shift.  Team will continue to follow.  Please call Rapid Response RN, Verónica Mckoy RN with any questions or concerns at [phone].

## 2023-03-07 NOTE — PLAN OF CARE
Plan of care reviewed with patient .  FAll precautions maintained, side rails up x2, call light in reach, bed in low position and bed alarm on.  Patient still dilaudid pca.  Has 1 liter bolus infusing at this time.  Tolerated a 1/2 of [employer] sandwich.  Voiding without difficulty.  Family has been present all day.  No complaints voiced.  Rapid saw patient today.  Patient has been getting metoprolol po today.  Digoxin and 6pm metoprolol held.

## 2023-03-07 NOTE — H&P
Please see IR consult note dated 3/3/23     ASA/Mallampati  ASA: 3  Mallampati: 2     Plan:  Will proceed with image guided R sided mesenteric lymph node biopsy under moderate sedation today 3/7/2023.       Nakita Smith PA-C  Interventional Radiology  3/7/2023

## 2023-03-07 NOTE — PLAN OF CARE
Lymph node biopsy complete. Pt had a hard time laying flat during this procedure and couldn't lay still for very long, despite moderate sedation. VSS. No signs or symptoms of distress noted.  Site CDI.  Pt will be transferred to MPU bed and report to be given at bedside.

## 2023-03-07 NOTE — PLAN OF CARE
Pt is on a continuous PCA pump, received iv lasix during shift and dose of dilaudid before going to IR.  Pt was NPO for biopsy procedure at IR.  POC reviewed with patient; understanding verbalized. Pt with nonskid footwear on, bed in lowest position, and locked with bed rails up x2.  Pt instructed to call prior to getting OOB.  Pt has call light and personal items within reach. Patient ambulates to bathroom with assist.  VSS and afebrile this shift. All questions and concerns addressed at this time. Will continue to monitor.

## 2023-03-08 PROBLEM — I95.9 HYPOTENSION: Status: ACTIVE | Noted: 2023-01-01

## 2023-03-08 NOTE — PROGRESS NOTES
Pt and family seen for follow up. Struggling with continued abdominal pain hopeful pending scan will clarify and improve treatment.  No other needs identified at this time. Will continue to follow and assist as needed.

## 2023-03-08 NOTE — ASSESSMENT & PLAN NOTE
Patient febrile to 101.9 on 2/28. Likely multifactorial as patient with known malignancy with possible superimposed infection of intra-abdominal mass. Placed on vanc, cefepime, flagyl. Blood cx NGTD. No respiratory or urinary sxs.   -- off all abx as of 3/3  RESOLVED

## 2023-03-08 NOTE — ASSESSMENT & PLAN NOTE
- bili uptrending  - bili 3.3 today (2.5 yesterday)  - no evidence of liver involvement from lymphoma per current scans. PET scan pending  - monitor closely, daily CMP

## 2023-03-08 NOTE — ASSESSMENT & PLAN NOTE
On 3/4 patient with sudden onset afib with RVR rate to the 200s. Patient asymptomatic, BP at baseline. Pt given Lopressor IV 5mg x3 with moderate response in HR to 150s. Loaded with amio and placed on gtt. No prior hx of a-fib. No LAE on recent echo. Discontinued amio gtt after 24 hours. Will not transition to PO amio given concern for conversion to SR as patient unable to be anticoagulated given severe thrombocytopenia. Rate controlled with PO lopressor.    On 3/6 pt with repeated RVR. Cardiology consulted for assistance with management, given contraindication to anticoagulation and soft BP. EP on board as well per cardiology. Started dig load, transutioned 3/7 to PO amiodarone per EP.     -- scheduled PO lopressor 50mg q6h, PO amiodarone 200mg BID  -- prn IV lopressor for a-fib with HR sustaining >130s  -- anticipate transition to Toprol if continued adequate rate control

## 2023-03-08 NOTE — ASSESSMENT & PLAN NOTE
- Due to lymphoma and late chemo effect  - planning for bone marrow aspiration and biopsy on 3/9  - daily CBC   --transfuse prn hemoglobin <7

## 2023-03-08 NOTE — PROGRESS NOTES
03/08/23 1327   Vital Signs   BP (!) 94/57   MAP (mmHg) 67     Dominga NP notified of soft BP. Ativan given while 1L NS fluid bolus running so that pt can tolerate PET scan. Pt transported to PET scan with bedside monitor to watch BP.

## 2023-03-08 NOTE — TELEPHONE ENCOUNTER
----- Message from Ena Willard sent at 3/8/2023  3:10 PM CST -----  VM 3:08    Lucrecia from [facility]. LMOR. I called Melvi back because he had a different DrKenzie By his name in the computer. Then she told me he saw Tim.  Melvi said the patients prescriptions were under [facility]. He saw Tim back in 10/2022  Refill on Lisinopril and atorvastatin  They have his right phone number. His address is wrong Pharmacy #  [id number]

## 2023-03-08 NOTE — ASSESSMENT & PLAN NOTE
Patient with small bowel resection approximately 5 months ago consistent with Diffuse large-B cell lymphoma, non germinal center origin. Additionally had omentum resection also showing DLBCL. Had bone marrow biopsy approximately 5 months ago that showed normo cellular marrow for age, no increase in blasts. Patient had PET scan from 1/11/23 showing hypermetabolic loops of small bowel most consistent with previous known disease and was thought to have improved (partial response to therapy noted from chart review) and was recommended he continue with R-CHOP at that time. Patient hospitalized at [facility] found to have likely worsening of lymphoma based on CT scan of abdomen showing partial compression of IVC. Transferred to [facility] for further evaluation given uncertainty in responsiveness to first-line chemo therapy.    --planning for alternative chemo regimen given progression   --patient with lower abdominal pain: controlled with PCA pump  --continue acyclovir, allopurinol  --Per last CT abdomen: Interval increase in size of the centrally necrotic ileocecal lymphomatous mass. Superimposed infection within the necrotic mass cannot be excluded.  - changed steroid to pulse dose dexamethasone on 3/2, now completed.   - Rad onc consulted for radiation therapy. Pt underwent sim on 3/2, however holding off on radiation at this time.  - attempted LN bx with IR on 3/6 however cancelled due to patient entering AFRVR. Attempted bx with GI on 3/7 however patient did not complete prep so unable to scope.  - IR completed mesenteric LN biopsy on 3/7.  - plan to start inpatient chemo, likely DHAP  - will obtain new baseline PET inpatient for treatment planning, disease response monitoring and pretransplant planning  - plan for re-staging bone marrow aspiration and biopsy 3/9

## 2023-03-08 NOTE — ASSESSMENT & PLAN NOTE
- due to marrow involvement of lymphoma?, last chemo was 1/12/2023  - plan for bone marrow aspiration and biopsy pre-chemo  -- Continue to monitor with CBC  -- transfuse to keep >plt >10 or >50 given pre-procedure/bleeding

## 2023-03-08 NOTE — ASSESSMENT & PLAN NOTE
- afebrile  - DC vanc on 2/27 however patient subsequently fevered the following evening so placed back on vanc. Discontinued vanc again 3/1. Discontinued cefepime and flagyl on 3/3.   RESOLVED

## 2023-03-08 NOTE — PLAN OF CARE
Pt is on a continuous PCA pump, received dose of lorazepam before going to for a PET scan.  PET scan was not successful because pt became combative during procedure.  Pt was NPO for PET scan.  POC reviewed with patient; understanding verbalized. Pt with nonskid footwear on, bed in lowest position, and locked with bed rails up x2.  Pt instructed to call prior to getting OOB.  Pt has call light and personal items within reach. Patient ambulates to bathroom with assist.  VSS and afebrile this shift. All questions and concerns addressed at this time. Will continue to monitor.

## 2023-03-08 NOTE — ASSESSMENT & PLAN NOTE
- intermittent hypotension  - given 500 cc bolus this am with improvement of BP  - additional 1 L bolus NS given this afternoon  - caution with Dilaudid PCA and PRN Ativan

## 2023-03-08 NOTE — PROGRESS NOTES
[facility] - Oncology ([facility])  Hematology  Bone Marrow Transplant  Progress Note    Patient Name: Dwight Hoffmann  Admission Date: 2/25/2023  Hospital Length of Stay: 11 days  Code Status: Full Code    Subjective:     Interval History: s/p IR biopsy of mesenteric adenopathy. Reports pain 6/10 this am on Dilaudid continuous PCA. Intermittent hypotension, given 500 cc bolus this am and 1L bolus this afternoon. HR stable on po amiodarone and lopressor. Will obtain PET scan today and plan for bone marrow aspiration and biopsy tomorrow. Given difficulty with positioning and laying flat will give Ativan pre-med. Denies nausea, constipation or diarrhea.     Objective:     Vital Signs (Most Recent):  Temp: 98.7 °F (37.1 °C) (03/08/23 0729)  Pulse: (Abnormal) 114 (03/08/23 1156)  Resp: 16 (03/08/23 1323)  BP: (Abnormal) 94/57 (03/08/23 1327)  SpO2: 95 % (03/08/23 1119)   Vital Signs (24h Range):  Temp:  [97.4 °F (36.3 °C)-98.7 °F (37.1 °C)] 98.7 °F (37.1 °C)  Pulse:  [] 114  Resp:  [12-27] 16  SpO2:  [93 %-100 %] 95 %  BP: ()/(55-80) 94/57     Weight: 92.1 kg (203 lb)  Body mass index is 26.78 kg/m².  Body surface area is 2.18 meters squared.    ECOG SCORE             Intake/Output - Last 3 Shifts       Intake/Output Category 03/06 0700 to 03/07 0659 03/07 0700 to 03/08 0659 03/08 0700 to 03/09 0659    P.O. 950      I.V. (mL/kg) 12.5 (0.1) 27.4 (0.3) 0 (0)    Blood  543     IV Piggyback   505.2    Total Intake(mL/kg) 962.5 (10.4) 570.4 (6.2) 505.2 (5.5)    Urine (mL/kg/hr) 1700 (0.8) 3550 (1.6)     Total Output 1700 3550     Net -737.5 -2979.6 +505.2                   Physical Exam  Constitutional:       General: He is not in acute distress.     Appearance: He is well-developed. He is ill-appearing. He is not diaphoretic.   HENT:      Head: Normocephalic and atraumatic.   Eyes:      General: No scleral icterus.     Conjunctiva/sclera: Conjunctivae normal.   Cardiovascular:      Rate and Rhythm: Normal rate and  regular rhythm.      Heart sounds: Normal heart sounds. No murmur heard.    No friction rub. No gallop.   Pulmonary:      Effort: Pulmonary effort is normal. No respiratory distress.      Breath sounds: Normal breath sounds. No wheezing or rales.   Abdominal:      General: Bowel sounds are normal. There is distension.      Tenderness: There is abdominal tenderness.   Musculoskeletal:         General: Normal range of motion.      Cervical back: Normal range of motion and neck supple.   Lymphadenopathy:      Cervical: No cervical adenopathy.   Skin:     General: Skin is warm and dry.      Findings: No rash.   Neurological:      Mental Status: He is alert and oriented to person, place, and time.   Psychiatric:         Behavior: Behavior normal.       Significant Labs:   CBC:   Recent Labs   Lab 03/07/23  0305 03/08/23  0339   WBC 9.22 3.61*   HGB 8.0* 8.3*   HCT 25.0* 25.6*   PLT 18* 7*    and CMP:   Recent Labs   Lab 03/07/23 0305 03/08/23  0339   * 132*   K 4.7 4.0    98   CO2 23 27   * 107   BUN 21* 20   CREATININE 0.6 0.6   CALCIUM 8.1* 7.9*   PROT 4.5* 4.4*   ALBUMIN 2.1* 2.1*   BILITOT 2.5* 3.3*   ALKPHOS 83 80   AST 23 38   ALT 17 15   ANIONGAP 8 7*       Diagnostic Results:  I have reviewed all pertinent imaging results/findings within the past 24 hours.    Assessment/Plan:     * DLBCL (diffuse large B cell lymphoma)  Patient with small bowel resection approximately 5 months ago consistent with Diffuse large-B cell lymphoma, non germinal center origin. Additionally had omentum resection also showing DLBCL. Had bone marrow biopsy approximately 5 months ago that showed normo cellular marrow for age, no increase in blasts. Patient had PET scan from 1/11/23 showing hypermetabolic loops of small bowel most consistent with previous known disease and was thought to have improved (partial response to therapy noted from chart review) and was recommended he continue with R-CHOP at that time. Patient  hospitalized at [facility] found to have likely worsening of lymphoma based on CT scan of abdomen showing partial compression of IVC. Transferred to [facility] for further evaluation given uncertainty in responsiveness to first-line chemo therapy.    --planning for alternative chemo regimen given progression   --patient with lower abdominal pain: controlled with PCA pump  --continue acyclovir, allopurinol  --Per last CT abdomen: Interval increase in size of the centrally necrotic ileocecal lymphomatous mass. Superimposed infection within the necrotic mass cannot be excluded.  - changed steroid to pulse dose dexamethasone on 3/2, now completed.   - Rad onc consulted for radiation therapy. Pt underwent sim on 3/2, however holding off on radiation at this time.  - attempted LN bx with IR on 3/6 however cancelled due to patient entering AFRVR. Attempted bx with GI on 3/7 however patient did not complete prep so unable to scope.  - IR completed mesenteric LN biopsy on 3/7.  - plan to start inpatient chemo, likely DHAP  - will obtain new baseline PET inpatient for treatment planning, disease response monitoring and pretransplant planning  - plan for re-staging bone marrow aspiration and biopsy 3/9              Large cell lymphoma  See DLBCL above    Small bowel mass  S/p  Resection September 2022 found to have DLBCL on pathology.   -- stat CTAP ordered on 2/28 given worsening ab pain to r/o perf, no evidence of perf however interval enlargement of mass   -- see DLBCL for full plan     Leukocytosis  - afebrile  - DC vanc on 2/27 however patient subsequently fevered the following evening so placed back on vanc. Discontinued vanc again 3/1. Discontinued cefepime and flagyl on 3/3.   RESOLVED     Thrombocytopenia  - due to marrow involvement of lymphoma?, last chemo was 1/12/2023  - plan for bone marrow aspiration and biopsy pre-chemo  -- Continue to monitor with CBC  -- transfuse to keep >plt >10 or >50 given  pre-procedure/bleeding    Anemia  - Due to lymphoma and late chemo effect  - planning for bone marrow aspiration and biopsy on 3/9  - daily CBC   --transfuse prn hemoglobin <7    Atrial fibrillation with RVR  On 3/4 patient with sudden onset afib with RVR rate to the 200s. Patient asymptomatic, BP at baseline. Pt given Lopressor IV 5mg x3 with moderate response in HR to 150s. Loaded with amio and placed on gtt. No prior hx of a-fib. No LAE on recent echo. Discontinued amio gtt after 24 hours. Will not transition to PO amio given concern for conversion to SR as patient unable to be anticoagulated given severe thrombocytopenia. Rate controlled with PO lopressor.    On 3/6 pt with repeated RVR. Cardiology consulted for assistance with management, given contraindication to anticoagulation and soft BP. EP on board as well per cardiology. Started dig load, transutioned 3/7 to PO amiodarone per EP.     -- scheduled PO lopressor 50mg q6h, PO amiodarone 200mg BID  -- prn IV lopressor for a-fib with HR sustaining >130s  -- anticipate transition to Toprol if continued adequate rate control        Hypotension  - intermittent hypotension  - given 500 cc bolus this am with improvement of BP  - additional 1 L bolus NS given this afternoon  - caution with Dilaudid PCA and PRN Ativan    Moderate malnutrition  Nutrition consulted. Most recent weight and BMI monitored-     Malnutrition Type and Energy Intake  Malnutrition Type: chronic illness         Final Summary  Subcutaneous Fat Loss (Final Summary): moderate protein-calorie malnutrition  Muscle Loss Evaluation (Final Summary): moderate protein-calorie malnutrition         Measurements:  Wt Readings from Last 1 Encounters:   03/07/23 92.1 kg (203 lb)   Body mass index is 26.78 kg/m².    Recommendations: Recommendation/Intervention: 1. ADAT to Regular diet. Add Diabetic modifier, if warranted. 2. Continue Boost Glucose Control TID. 3. Add Beneprotein to optimize calorie/protein  intake. 4. RD to monitor and follow-up.  Goals: Meet % EEN.    Patient has been screened and assessed by RD. RD will follow patient.      Hyponatremia  - Improving during admission, though still mildly hyponatremic.   - Serum osm and urine studies ordered. Normal serum osm.     Compression of vena cava  CRS: this is due to dehydration because the IVC is flat throughout its course rather than just at one level.  Treated with IVF. And encouraging PO intake     Diabetes mellitus type 2, noninsulin dependent  Hemoglobin A1c from 1 week ago was 7.7  Goal glucose while inpatient: 140-180  Consider basal-bolus + SSI as needed  Hold home anti-diabetic medications  Steroids now completed  Increased daily insulin regimen as appropriate starting 3/3 given increasing hyperglycemia while on steroids.   Glucose <130 for the past 24 hours, holding scheduled insulin today       Elevated bilirubin  - bili uptrending  - bili 3.3 today (2.5 yesterday)  - no evidence of liver involvement from lymphoma per current scans. PET scan pending  - monitor closely, daily CMP    Hyperlipidemia, unspecified  - Not currently taking a statin    Fever  Patient febrile to 101.9 on 2/28. Likely multifactorial as patient with known malignancy with possible superimposed infection of intra-abdominal mass. Placed on vanc, cefepime, flagyl. Blood cx NGTD. No respiratory or urinary sxs.   -- off all abx as of 3/3  RESOLVED    COVID-19 virus infection  - Noted to be positive on 2/6/2023  - No respiratory symptoms noted        VTE Risk Mitigation (From admission, onward)         Ordered     Reason for No Pharmacological VTE Prophylaxis  Once        Question:  Reasons:  Answer:  Thrombocytopenia    02/25/23 0402     IP VTE HIGH RISK PATIENT  Once         02/25/23 0402     Place sequential compression device  Until discontinued         02/25/23 0402                Disposition: inpatient     Dominga James NP  Bone Marrow Transplant  [facility] eddie - Oncology  ([facility])

## 2023-03-08 NOTE — PLAN OF CARE
Pt on continuous PCA pump. Telemetry monitored. POC reviewed with pt. Pt with nonskid footwear on with bed in lowest position and locked with bed rails up x2. Bed alarm on. Pt. instructed to call for assistance prior to getting OOB.  Pt with call light within reach and verbalized understanding. Will continue to monitor pt.

## 2023-03-08 NOTE — ASSESSMENT & PLAN NOTE
Hemoglobin A1c from 1 week ago was 7.7  Goal glucose while inpatient: 140-180  Consider basal-bolus + SSI as needed  Hold home anti-diabetic medications  Steroids now completed  Increased daily insulin regimen as appropriate starting 3/3 given increasing hyperglycemia while on steroids.   Glucose <130 for the past 24 hours, holding scheduled insulin today

## 2023-03-08 NOTE — RESPIRATORY THERAPY
RAPID RESPONSE RESPIRATORY THERAPY ETCO2 CHECK         Time of visit: 1005     Code Status: Full Code   : 1970  Bed: 828/828 A:   MRN: [id number]  Time spent at the bedside: < 15 min    SITUATION    Evaluated patient for: ETCo2 compliance    BACKGROUND    Why is the patient in the hospital?: Atrial fibrillation with RVR    Patient has a past medical history of Cancer, Diabetes mellitus, Digestive disorder, and Prediabetes.    24 Hours Vitals Range:  Temp:  [97.4 °F (36.3 °C)-98.7 °F (37.1 °C)]   Pulse:  []   Resp:  [12-27]   BP: ()/(55-80)   SpO2:  [93 %-100 %]     Labs:    Recent Labs     23  0448 23  0305 23  0339   * 132* 132*   K 4.6 4.7 4.0   CL 97 101 98   CO2    CREATININE 0.7 0.6 0.6   * 185* 107   PHOS 3.4 3.3 3.8   MG 2.0 1.8 1.7        No results for input(s): PH, PCO2, PO2, HCO3, POCSATURATED, BE in the last 72 hours.    ASSESSMENT/INTERVENTIONS      Last VS   Temp: 98.7 °F (37.1 °C) (729)  Pulse: 96 (741)  Resp: 17 (731)  BP: 98/57 (729)  SpO2: 96 % (731)    Level of Consciousness: Level of Consciousness (AVPU): alert  Respiratory Effort: Respiratory Effort: Unlabored Expansion/Accessory Muscle Usage: Expansion/Accessory Muscles/Retractions: expansion symmetric  All Lung Field Breath Sounds: All Lung Fields Breath Sounds: Lateral:, Posterior:, Anterior:, diminished  Is the ETCO2 monitor on? Yes  Is the patient wearing a cannula? Yes  Are ETCO2 orders placed? Yes  Is the patient on a PCA pump? Yes  ETCO2 monitored: ETCO2 (mmHg): 30 mmHg  Ambu at bedside: Ambu bag with the patient?: Yes, Adult Ambu    Active Orders   Respiratory Care    END TIDAL CO2 MONITOR Q12H     Frequency: Q12H     Number of Occurrences: Until Specified    Pulse Oximetry Q Shift     Frequency: Q Shift     Number of Occurrences: Until Specified       RECOMMENDATIONS    We recommend: RRT Recs: Continue POC per primary  team.      FOLLOW-UP    Please call back the Rapid Response RT, Cyndee Reid, RRT at x [phone] for any questions or concerns.

## 2023-03-08 NOTE — ASSESSMENT & PLAN NOTE
CRS: this is due to dehydration because the IVC is flat throughout its course rather than just at one level.  Treated with IVF. And encouraging PO intake

## 2023-03-08 NOTE — ASSESSMENT & PLAN NOTE
- Improving during admission, though still mildly hyponatremic.   - Serum osm and urine studies ordered. Normal serum osm.

## 2023-03-08 NOTE — SUBJECTIVE & OBJECTIVE
Subjective:     Interval History: s/p IR biopsy of mesenteric adenopathy. Reports pain 6/10 this am on Dilaudid continuous PCA. Intermittent hypotension, given 500 cc bolus this am and 1L bolus this afternoon. HR stable on po amiodarone and lopressor. Will obtain PET scan today and plan for bone marrow aspiration and biopsy tomorrow. Given difficulty with positioning and laying flat will give Ativan pre-med. Denies nausea, constipation or diarrhea.     Objective:     Vital Signs (Most Recent):  Temp: 98.7 °F (37.1 °C) (03/08/23 0729)  Pulse: (Abnormal) 114 (03/08/23 1156)  Resp: 16 (03/08/23 1323)  BP: (Abnormal) 94/57 (03/08/23 1327)  SpO2: 95 % (03/08/23 1119)   Vital Signs (24h Range):  Temp:  [97.4 °F (36.3 °C)-98.7 °F (37.1 °C)] 98.7 °F (37.1 °C)  Pulse:  [] 114  Resp:  [12-27] 16  SpO2:  [93 %-100 %] 95 %  BP: ()/(55-80) 94/57     Weight: 92.1 kg (203 lb)  Body mass index is 26.78 kg/m².  Body surface area is 2.18 meters squared.    ECOG SCORE             Intake/Output - Last 3 Shifts       Intake/Output Category 03/06 0700 to 03/07 0659 03/07 0700 to 03/08 0659 03/08 0700 to 03/09 0659    P.O. 950      I.V. (mL/kg) 12.5 (0.1) 27.4 (0.3) 0 (0)    Blood  543     IV Piggyback   505.2    Total Intake(mL/kg) 962.5 (10.4) 570.4 (6.2) 505.2 (5.5)    Urine (mL/kg/hr) 1700 (0.8) 3550 (1.6)     Total Output 1700 3550     Net -737.5 -2979.6 +505.2                   Physical Exam  Constitutional:       General: He is not in acute distress.     Appearance: He is well-developed. He is ill-appearing. He is not diaphoretic.   HENT:      Head: Normocephalic and atraumatic.   Eyes:      General: No scleral icterus.     Conjunctiva/sclera: Conjunctivae normal.   Cardiovascular:      Rate and Rhythm: Normal rate and regular rhythm.      Heart sounds: Normal heart sounds. No murmur heard.    No friction rub. No gallop.   Pulmonary:      Effort: Pulmonary effort is normal. No respiratory distress.      Breath sounds:  Normal breath sounds. No wheezing or rales.   Abdominal:      General: Bowel sounds are normal. There is distension.      Tenderness: There is abdominal tenderness.   Musculoskeletal:         General: Normal range of motion.      Cervical back: Normal range of motion and neck supple.   Lymphadenopathy:      Cervical: No cervical adenopathy.   Skin:     General: Skin is warm and dry.      Findings: No rash.   Neurological:      Mental Status: He is alert and oriented to person, place, and time.   Psychiatric:         Behavior: Behavior normal.       Significant Labs:   CBC:   Recent Labs   Lab 03/07/23  0305 03/08/23 0339   WBC 9.22 3.61*   HGB 8.0* 8.3*   HCT 25.0* 25.6*   PLT 18* 7*    and CMP:   Recent Labs   Lab 03/07/23 0305 03/08/23 0339   * 132*   K 4.7 4.0    98   CO2 23 27   * 107   BUN 21* 20   CREATININE 0.6 0.6   CALCIUM 8.1* 7.9*   PROT 4.5* 4.4*   ALBUMIN 2.1* 2.1*   BILITOT 2.5* 3.3*   ALKPHOS 83 80   AST 23 38   ALT 17 15   ANIONGAP 8 7*       Diagnostic Results:  I have reviewed all pertinent imaging results/findings within the past 24 hours.

## 2023-03-08 NOTE — ASSESSMENT & PLAN NOTE
Nutrition consulted. Most recent weight and BMI monitored-     Malnutrition Type and Energy Intake  Malnutrition Type: chronic illness         Final Summary  Subcutaneous Fat Loss (Final Summary): moderate protein-calorie malnutrition  Muscle Loss Evaluation (Final Summary): moderate protein-calorie malnutrition         Measurements:  Wt Readings from Last 1 Encounters:   03/07/23 92.1 kg (203 lb)   Body mass index is 26.78 kg/m².    Recommendations: Recommendation/Intervention: 1. ADAT to Regular diet. Add Diabetic modifier, if warranted. 2. Continue Boost Glucose Control TID. 3. Add Beneprotein to optimize calorie/protein intake. 4. RD to monitor and follow-up.  Goals: Meet % EEN.    Patient has been screened and assessed by RD. RD will follow patient.

## 2023-03-09 PROBLEM — R57.9 SHOCK, UNSPECIFIED: Status: ACTIVE | Noted: 2023-01-01

## 2023-03-09 NOTE — ASSESSMENT & PLAN NOTE
- bili uptrending  - bili 3.6 today  - no evidence of liver involvement from lymphoma per current scans. PET scan pending  - monitor closely, daily CMP

## 2023-03-09 NOTE — PROGRESS NOTES
Pharmacist Intervention IV to PO Note    Dwight Hoffmann is a 52 y.o. male being treated with IV medication metronidazole    Patient Data:    Vital Signs (Most Recent):  Temp: 99.4 °F (37.4 °C) (03/09/23 0732)  Pulse: 107 (03/09/23 0732)  Resp: (!) 25 (03/09/23 0732)  BP: 101/63 (03/09/23 0732)  SpO2: 98 % (03/09/23 0732)   Vital Signs (72h Range):  Temp:  [97.4 °F (36.3 °C)-102.9 °F (39.4 °C)]   Pulse:  []   Resp:  [12-27]   BP: ()/(51-80)   SpO2:  [93 %-100 %]      CBC:  Recent Labs   Lab 03/08/23  0339 03/09/23  0032 03/09/23  0410   WBC 3.61* 2.70* 2.40*   RBC 2.79* 2.46* 2.25*   HGB 8.3* 7.2* 6.7*   HCT 25.6* 22.5* 20.8*   PLT 7* 12* 10*   MCV 92 92 92   MCH 29.7 29.3 29.8   MCHC 32.4 32.0 32.2     CMP:     Recent Labs   Lab 03/07/23  0305 03/08/23  0339 03/09/23  0410   * 107 114*   CALCIUM 8.1* 7.9* 7.4*   ALBUMIN 2.1* 2.1* 1.7*   PROT 4.5* 4.4* 3.6*   * 132* 131*   K 4.7 4.0 4.0   CO2 23 27 26    98 99   BUN 21* 20 17   CREATININE 0.6 0.6 0.6   ALKPHOS 83 80 67   ALT 17 15 16   AST 23 38 50*   BILITOT 2.5* 3.3* 3.6*       Dietary Orders:  Diet Orders            Diet Cardiac: Cardiac starting at 03/07 1908            Based on the following criteria, this patient qualifies for intravenous to oral conversion:  [x] The patients gastrointestinal tract is functioning (tolerating medications via oral or enteral route for 24 hours and tolerating food or enteral feeds for 24 hours).  [x] The patient is hemodynamically stable for 24 hours (heart rate <100 beats per minute, systolic blood pressure >99 mm Hg, and respiratory rate <20 breaths per minute).  [x] The patient shows clinical improvement (afebrile for at least 24 hours and white blood cell count downtrending or normalized). Additionally, the patient must be non-neutropenic (absolute neutrophil count >500 cells/mm3).  [x] For antimicrobials, the patient has received IV therapy for at least 24 hours.    IV medication  metronidazole will be changed to oral medication metronidazole    Pharmacist's Name: Kristy Ryan  Pharmacist's Extension: [phone]

## 2023-03-09 NOTE — PROGRESS NOTES
Therapy with vancomycin complete and/or consult discontinued by provider.  Pharmacy will sign off, please re-consult as needed.    Deven BetancurD

## 2023-03-09 NOTE — ASSESSMENT & PLAN NOTE
- intermittent hypotension s/o poor PO and significant tumor burden and malignancy. Responsive to IVF and prn blood transfusions  - ctm closely, low threshold to d/w MICU   - abx for recurrent fevers

## 2023-03-09 NOTE — SUBJECTIVE & OBJECTIVE
Subjective:     Interval History: Unable to tolerate PET scan yesterday. Will hopefully attempt again tomorrow pending availability. BMB today. Will plan tx regimen following PET.     Objective:     Vital Signs (Most Recent):  Temp: 99.4 °F (37.4 °C) (03/09/23 0732)  Pulse: 107 (03/09/23 0732)  Resp: (!) 25 (03/09/23 0732)  BP: 101/63 (03/09/23 0732)  SpO2: (!) 94 % (03/09/23 0254)   Vital Signs (24h Range):  Temp:  [97.6 °F (36.4 °C)-102.9 °F (39.4 °C)] 99.4 °F (37.4 °C)  Pulse:  [] 107  Resp:  [15-25] 25  SpO2:  [93 %-98 %] 94 %  BP: ()/(51-65) 101/63     Weight: 92.1 kg (203 lb)  Body mass index is 26.78 kg/m².  Body surface area is 2.18 meters squared.    ECOG SCORE             Intake/Output - Last 3 Shifts         03/07 0700  03/08 0659 03/08 0700  03/09 0659 03/09 0700  03/10 0659    P.O.       I.V. (mL/kg) 27.4 (0.3) 0 (0) 17.3 (0.2)    Blood 543      IV Piggyback  3506.8     Total Intake(mL/kg) 570.4 (6.2) 3506.8 (38.1) 17.3 (0.2)    Urine (mL/kg/hr) 3550 (1.6) 600 (0.3)     Total Output 3550 600     Net -2979.6 +2906.8 +17.3           Urine Occurrence  3 x     Stool Occurrence  3 x             Physical Exam  Constitutional:       General: He is not in acute distress.     Appearance: He is well-developed. He is ill-appearing. He is not diaphoretic.   HENT:      Head: Normocephalic and atraumatic.   Eyes:      General: No scleral icterus.     Conjunctiva/sclera: Conjunctivae normal.   Cardiovascular:      Rate and Rhythm: Normal rate and regular rhythm.      Heart sounds: Normal heart sounds. No murmur heard.    No friction rub. No gallop.   Pulmonary:      Effort: Pulmonary effort is normal. No respiratory distress.      Breath sounds: Normal breath sounds. No wheezing or rales.   Abdominal:      General: Bowel sounds are normal. There is distension.      Tenderness: There is abdominal tenderness.   Musculoskeletal:         General: Normal range of motion.      Cervical back: Normal range of  motion and neck supple.   Lymphadenopathy:      Cervical: No cervical adenopathy.   Skin:     General: Skin is warm and dry.      Findings: No rash.   Neurological:      Mental Status: He is alert and oriented to person, place, and time.   Psychiatric:         Behavior: Behavior normal.       Significant Labs:   CBC:   Recent Labs   Lab 03/08/23 0339 03/09/23  0032 03/09/23  0410   WBC 3.61* 2.70* 2.40*   HGB 8.3* 7.2* 6.7*   HCT 25.6* 22.5* 20.8*   PLT 7* 12* 10*      and CMP:   Recent Labs   Lab 03/08/23 0339 03/09/23  0410   * 131*   K 4.0 4.0   CL 98 99   CO2 27 26    114*   BUN 20 17   CREATININE 0.6 0.6   CALCIUM 7.9* 7.4*   PROT 4.4* 3.6*   ALBUMIN 2.1* 1.7*   BILITOT 3.3* 3.6*   ALKPHOS 80 67   AST 38 50*   ALT 15 16   ANIONGAP 7* 6*         Diagnostic Results:  I have reviewed all pertinent imaging results/findings within the past 24 hours.

## 2023-03-09 NOTE — CARE UPDATE
"RAPID RESPONSE NURSE CHART REVIEW        Chart Reviewed: 03/09/2023, 12:03 AM    MRN: [id number]  Bed: 828/828 A    Dx: DLBCL (diffuse large B cell lymphoma)    Dwight Hoffmann has a past medical history of Cancer, Diabetes mellitus, Digestive disorder, and Prediabetes.    Last VS: BP (!) 97/52 (BP Location: Left arm, Patient Position: Lying)   Pulse (!) 114   Temp (!) 100.5 °F (38.1 °C) (Oral)   Resp (!) 24   Ht 6' 1" (1.854 m)   Wt 92.1 kg (203 lb)   SpO2 96%   BMI 26.78 kg/m²     24H Vital Sign Range:  Temp:  [97.6 °F (36.4 °C)-102.9 °F (39.4 °C)]   Pulse:  []   Resp:  [15-24]   BP: ()/(52-66)   SpO2:  [93 %-98 %]     Level of Consciousness (AVPU): alert    Recent Labs     03/06/23 0448 03/07/23  0305 03/08/23  0339   WBC 10.89 9.22 3.61*   HGB 8.8* 8.0* 8.3*   HCT 28.8* 25.0* 25.6*   PLT 63* 18* 7*       Recent Labs     03/06/23 0448 03/07/23 0305 03/08/23  0339   * 132* 132*   K 4.6 4.7 4.0   CL 97 101 98   CO2 23 23 27   CREATININE 0.7 0.6 0.6   * 185* 107   PHOS 3.4 3.3 3.8   MG 2.0 1.8 1.7        No results for input(s): PH, PCO2, PO2, HCO3, POCSATURATED, BE in the last 72 hours.     OXYGEN:  Flow (L/min): 2          MEWS score: 3    Bedside RNAlbina  contacted for elevated mews. Cultures ordered by MD, fever improved with tylenol. No additional concerns verbalized at this time. Instructed to call [phone] for further concerns or assistance.    Jennifer Mitchell RN        "

## 2023-03-09 NOTE — PLAN OF CARE
POC reviewed with pt. Pt on continuous PCA pump. Telemetry monitored. Febrile with max temp of 102. Managed with tylenol. 2 LR fluid boluses given due to low BP. IV vanc, cefepime, and flagyl given. Pt with nonskid footwear on with bed in lowest position and locked with bed rails up x2. Bed alarm on. Pt. instructed to call for assistance prior to getting OOB.  Pt with call light within reach and verbalized understanding. Will continue to monitor pt.

## 2023-03-09 NOTE — PROGRESS NOTES
PROCEDURE NOTE:  Date of Procedure: 03/09/2023   Bone Marrow Biopsy and Aspiration  Indication: B cell lymphoma  Consent: Informed consent was obtained from patient.  Timeout: Done and documented.  Position: Right lateral  Site: Left posterior illiac crest.  Prep: Betadine.  Needle used: 11 gauge Jamshidi needle.  Anesthetic: 2% lidocaine 8 cc.  Biopsy: The biopsy needle was introduced into the marrow cavity and an aspirate was obtained without complications and sent for flow cytometry and cytogenetics. Core biopsy obtained without difficulty and sent for routine histologic examination.  Complications: None.  Disposition: The patient was discharged home per anesthesia protocol.  Blood loss: Minimal.     Gail Pinto, FNP  Hematology/Oncology/Bone Marrow Transplant

## 2023-03-09 NOTE — PROGRESS NOTES
Pharmacist Intervention IV to PO Note    Dwight Hoffmann is a 52 y.o. male being treated with IV medication pantoprazole    Patient Data:    Vital Signs (Most Recent):  Temp: 99.4 °F (37.4 °C) (03/09/23 0732)  Pulse: 107 (03/09/23 0732)  Resp: (!) 25 (03/09/23 0732)  BP: 101/63 (03/09/23 0732)  SpO2: 98 % (03/09/23 0732)   Vital Signs (72h Range):  Temp:  [97.4 °F (36.3 °C)-102.9 °F (39.4 °C)]   Pulse:  []   Resp:  [12-27]   BP: ()/(51-80)   SpO2:  [93 %-100 %]      CBC:  Recent Labs   Lab 03/08/23  0339 03/09/23  0032 03/09/23  0410   WBC 3.61* 2.70* 2.40*   RBC 2.79* 2.46* 2.25*   HGB 8.3* 7.2* 6.7*   HCT 25.6* 22.5* 20.8*   PLT 7* 12* 10*   MCV 92 92 92   MCH 29.7 29.3 29.8   MCHC 32.4 32.0 32.2     CMP:     Recent Labs   Lab 03/07/23  0305 03/08/23  0339 03/09/23  0410   * 107 114*   CALCIUM 8.1* 7.9* 7.4*   ALBUMIN 2.1* 2.1* 1.7*   PROT 4.5* 4.4* 3.6*   * 132* 131*   K 4.7 4.0 4.0   CO2 23 27 26    98 99   BUN 21* 20 17   CREATININE 0.6 0.6 0.6   ALKPHOS 83 80 67   ALT 17 15 16   AST 23 38 50*   BILITOT 2.5* 3.3* 3.6*       Dietary Orders:  Diet Orders            Diet Cardiac: Cardiac starting at 03/07 1908            Based on the following criteria, this patient qualifies for intravenous to oral conversion:  [x] The patients gastrointestinal tract is functioning (tolerating medications via oral or enteral route for 24 hours and tolerating food or enteral feeds for 24 hours).  [x] The patient is hemodynamically stable for 24 hours (heart rate <100 beats per minute, systolic blood pressure >99 mm Hg, and respiratory rate <20 breaths per minute).  [x] The patient shows clinical improvement (afebrile for at least 24 hours and white blood cell count downtrending or normalized). Additionally, the patient must be non-neutropenic (absolute neutrophil count >500 cells/mm3).  [x] For antimicrobials, the patient has received IV therapy for at least 24 hours.    IV medication pantoprazole  will be changed to oral medication pantoprazole    Pharmacist's Name: Kristy Ryan  Pharmacist's Extension: [phone]

## 2023-03-09 NOTE — NURSING
Dr Webb notified that patient is refusing medications and respirations in the 30's and heart rate in the 130's. Dr Webb to bedside. Pt took amiodarone. Will monitor heart rate. Lopressor to be given at midnight per Dr Webb.

## 2023-03-09 NOTE — RESPIRATORY THERAPY
RAPID RESPONSE RESPIRATORY THERAPY ETCO2 CHECK         Time of visit: 101     Code Status: Full Code   : 1970  Bed: 828/828 A:   MRN: [id number]  Time spent at the bedside: < 15 min    SITUATION    Evaluated patient for: ETCo2 compliance    BACKGROUND    Why is the patient in the hospital?: DLBCL (diffuse large B cell lymphoma)    Patient has a past medical history of Cancer, Diabetes mellitus, Digestive disorder, and Prediabetes.    24 Hours Vitals Range:  Temp:  [97.6 °F (36.4 °C)-102.9 °F (39.4 °C)]   Pulse:  []   Resp:  [15-26]   BP: ()/(51-65)   SpO2:  [88 %-100 %]     Labs:    Recent Labs     23  0305 23  0339 23  0410   * 132* 131*   K 4.7 4.0 4.0    98 99   CO2 23 27 26   CREATININE 0.6 0.6 0.6   * 107 114*   PHOS 3.3 3.8 3.2   MG 1.8 1.7 1.6        No results for input(s): PH, PCO2, PO2, HCO3, POCSATURATED, BE in the last 72 hours.    ASSESSMENT/INTERVENTIONS      Last VS   Temp: 99.2 °F (37.3 °C) (1124)  Pulse: 127 (1124)  Resp: 26 (1124)  BP: 103/57 (1124)  SpO2: 100 % (1124)    Level of Consciousness: Level of Consciousness (AVPU): responds to voice  Respiratory Effort: Respiratory Effort: Unlabored Expansion/Accessory Muscle Usage: Expansion/Accessory Muscles/Retractions: expansion symmetric, no retractions, no use of accessory muscles  All Lung Field Breath Sounds: All Lung Fields Breath Sounds: Anterior:, Lateral:, crackles, fine  Is the ETCO2 monitor on? Yes  Is the patient wearing a cannula? Yes  Are ETCO2 orders placed? Yes  Is the patient on a PCA pump? Yes  ETCO2 monitored: ETCO2 (mmHg): 29 mmHg  Ambu at bedside: Ambu bag with the patient?: Yes, Adult Ambu    Active Orders   Respiratory Care    END TIDAL CO2 MONITOR Q12H     Frequency: Q12H     Number of Occurrences: Until Specified    Pulse Oximetry Q Shift     Frequency: Q Shift     Number of Occurrences: Until Specified       RECOMMENDATIONS    We recommend:  RRT Recs: Continue POC per primary team.      FOLLOW-UP    Please call back the Rapid Response RT, Ben Fernando, RRT at x [phone] for any questions or concerns.

## 2023-03-09 NOTE — ASSESSMENT & PLAN NOTE
Patient with small bowel resection approximately 5 months ago consistent with Diffuse large-B cell lymphoma, non germinal center origin. Additionally had omentum resection also showing DLBCL. Had bone marrow biopsy approximately 5 months ago that showed normo cellular marrow for age, no increase in blasts. Patient had PET scan from 1/11/23 showing hypermetabolic loops of small bowel most consistent with previous known disease and was thought to have improved (partial response to therapy noted from chart review) and was recommended he continue with R-CHOP at that time. Patient hospitalized at [facility] found to have likely worsening of lymphoma based on CT scan of abdomen showing partial compression of IVC. Transferred to [facility] for further evaluation given uncertainty in responsiveness to first-line chemo therapy.    --planning for alternative chemo regimen given progression   --patient with lower abdominal pain: controlled with PCA pump  --continue acyclovir, allopurinol  --Per last CT abdomen: Interval increase in size of the centrally necrotic ileocecal lymphomatous mass. Superimposed infection within the necrotic mass cannot be excluded.  - changed steroid to pulse dose dexamethasone on 3/2, now completed.   - Rad onc consulted for radiation therapy. Pt underwent sim on 3/2, however holding off on radiation at this time.  - attempted LN bx with IR on 3/6 however cancelled due to patient entering AFRVR. Attempted bx with GI on 3/7 however patient did not complete prep so unable to scope.  - IR completed mesenteric LN biopsy on 3/7.  - plan to start inpatient chemo, likely DHAP  - will obtain new baseline PET inpatient for treatment planning, disease response monitoring and pretransplant planning, likely 3/10  - plan for re-staging bone marrow aspiration and biopsy 3/9

## 2023-03-09 NOTE — AI DETERIORATION ALERT
"RAPID RESPONSE NURSE AI ALERT       AI alert received.    Chart Reviewed: 03/09/2023, 1:55 AM    MRN: [id number]  Bed: 828/828 A    Dx: DLBCL (diffuse large B cell lymphoma)    Dwight Hoffmann has a past medical history of Cancer, Diabetes mellitus, Digestive disorder, and Prediabetes.    Last VS: BP (!) 90/51 (BP Location: Left arm, Patient Position: Lying)   Pulse (!) 115   Temp 98.7 °F (37.1 °C) (Oral)   Resp (!) 25   Ht 6' 1" (1.854 m)   Wt 92.1 kg (203 lb)   SpO2 95%   BMI 26.78 kg/m²     24H Vital Sign Range:  Temp:  [97.6 °F (36.4 °C)-102.9 °F (39.4 °C)]   Pulse:  []   Resp:  [15-25]   BP: ()/(51-66)   SpO2:  [93 %-98 %]     Level of Consciousness (AVPU): alert    Recent Labs     03/07/23  0305 03/08/23  0339 03/09/23  0032   WBC 9.22 3.61* 2.70*   HGB 8.0* 8.3* 7.2*   HCT 25.0* 25.6* 22.5*   PLT 18* 7* 12*       Recent Labs     03/06/23  0448 03/07/23  0305 03/08/23  0339   * 132* 132*   K 4.6 4.7 4.0   CL 97 101 98   CO2 23 23 27   CREATININE 0.7 0.6 0.6   * 185* 107   PHOS 3.4 3.3 3.8   MG 2.0 1.8 1.7        No results for input(s): PH, PCO2, PO2, HCO3, POCSATURATED, BE in the last 72 hours.     OXYGEN:  Flow (L/min): 2          MEWS score: 6    Bedside RNAlbina  contacted. No concerns verbalized at this time. Instructed to call [phone] for further concerns or assistance.    Vinh Daly RN        "

## 2023-03-09 NOTE — ASSESSMENT & PLAN NOTE
Hemoglobin A1c from 1 week ago was 7.7  Goal glucose while inpatient: 140-180  Consider basal-bolus + SSI as needed  Hold home anti-diabetic medications  Steroids now completed  Increased daily insulin regimen as appropriate starting 3/3 given increasing hyperglycemia while on steroids.   Now holding insulin as needed given decreased BGL off steroids

## 2023-03-09 NOTE — PROGRESS NOTES
Pharmacokinetic Initial Assessment: IV Vancomycin    Assessment/Plan:    Initiate intravenous vancomycin with loading dose of 2000 mg once followed by a maintenance dose of vancomycin 1000 mg IV every 12 hours  Desired empiric serum trough concentration is 15 to 20 mcg/mL  Draw vancomycin trough level 60 min prior to fourth dose on 3/10 at approximately 1400  Pharmacy will continue to follow and monitor vancomycin.      Please contact pharmacy at extension [phone] with any questions regarding this assessment.     Thank you for the consult,   Shayla Pratt       Patient brief summary:  Dwight Hoffmann is a 52 y.o. male initiated on antimicrobial therapy with IV Vancomycin for treatment of suspected sepsis    Drug Allergies:   Review of patient's allergies indicates:   Allergen Reactions    Albuterol (bulk) Palpitations       Actual Body Weight:   92.1 kg    Renal Function:   Estimated Creatinine Clearance: 159.1 mL/min (based on SCr of 0.6 mg/dL).    Dialysis Method (if applicable):  N/A    CBC (last 72 hours):  Recent Labs   Lab Result Units 03/06/23 0448 03/07/23 0305 03/08/23 0339 03/09/23  0032   WBC K/uL 10.89 9.22 3.61* 2.70*   Hemoglobin g/dL 8.8* 8.0* 8.3* 7.2*   Hematocrit % 28.8* 25.0* 25.6* 22.5*   Platelets K/uL 63* 18* 7* 12*   Gran % % 89.7* 90.0* 80.6* 69.0   Lymph % % 4.6* 4.9* 13.3* 8.0*   Mono % % 4.8 4.4 5.0 7.0   Eosinophil % % 0.0 0.0 0.3 0.0   Basophil % % 0.1 0.0 0.0 0.0   Differential Method  Automated Automated Automated Manual       Metabolic Panel (last 72 hours):  Recent Labs   Lab Result Units 03/06/23 0448 03/07/23 0305 03/08/23 0339 03/08/23  2346   Sodium mmol/L 129* 132* 132*  --    Potassium mmol/L 4.6 4.7 4.0  --    Chloride mmol/L 97 101 98  --    CO2 mmol/L 23 23 27  --    Glucose mg/dL 200* 185* 107  --    Glucose, UA   --   --   --  Negative   BUN mg/dL 18 21* 20  --    Creatinine mg/dL 0.7 0.6 0.6  --    Albumin g/dL 2.5* 2.1* 2.1*  --    Total Bilirubin mg/dL 2.2* 2.5*  3.3*  --    Alkaline Phosphatase U/L 102 83 80  --    AST U/L 30 23 38  --    ALT U/L 19 17 15  --    Magnesium mg/dL 2.0 1.8 1.7  --    Phosphorus mg/dL 3.4 3.3 3.8  --        Drug levels (last 3 results):  No results for input(s): VANCOMYCINRA, VANCORANDOM, VANCOMYCINPE, VANCOPEAK, VANCOMYCINTR, VANCOTROUGH in the last 72 hours.    Microbiologic Results:  Microbiology Results (last 7 days)       Procedure Component Value Units Date/Time    Blood culture [[id number]] Collected: 03/09/23 0016    Order Status: Sent Specimen: Blood Updated: 03/09/23 0027    Narrative:      Collection has been rescheduled by [email or web address] at 03/08/2023 23:07 Reason:   Nurse daneelle wants to reschedule  Collection has been rescheduled by [email or web address] at 03/08/2023 23:07 Reason:   Nurse jasmyneeelle wants to reschedule    Blood culture [[id number]] Collected: 03/09/23 0016    Order Status: Sent Specimen: Blood Updated: 03/09/23 0027    Narrative:      Collection has been rescheduled by [email or web address] at 03/08/2023 23:07 Reason:   Nurse daneelle wants to reschedule  Collection has been rescheduled by BM8 at 03/08/2023 23:07 Reason:   Nurse jasmyneeelle wants to reschedule    Culture, Respiratory with Gram Stain [[id number]]     Order Status: No result Specimen: Respiratory     Blood culture [[id number]] Collected: 02/28/23 0609    Order Status: Completed Specimen: Blood from Peripheral, Left Hand Updated: 03/05/23 0812     Blood Culture, Routine No growth after 5 days.    Narrative:      Collection has been rescheduled by CAR1 at 02/28/2023 05:40 Reason:   Unable to collect. Nurse Nelly notified  Collection has been rescheduled by CAR1 at 02/28/2023 05:40 Reason:   Unable to collect. Nurse Nelly notified    Blood culture [[id number]] Collected: 02/28/23 0607    Order Status: Completed Specimen: Blood from Antecubital, Left Arm Updated: 03/05/23 0812     Blood Culture, Routine No growth after 5 days.    Narrative:      Collection has been rescheduled by CAR1 at 02/28/2023 05:40  Reason:   Unable to collect. Nurse Nelly notified  Collection has been rescheduled by CAR1 at 02/28/2023 05:40 Reason:   Unable to collect. Nurse Nelly notified

## 2023-03-09 NOTE — PROGRESS NOTES
Josh Rosas - Oncology ([facility])  Hematology  Bone Marrow Transplant  Progress Note    Patient Name: Dwight Hoffmann  Admission Date: 2/25/2023  Hospital Length of Stay: 12 days  Code Status: Full Code    Subjective:     Interval History: Unable to tolerate PET scan yesterday. Will hopefully attempt again tomorrow pending availability. BMB today. Will plan tx regimen following PET.     Objective:     Vital Signs (Most Recent):  Temp: 99.4 °F (37.4 °C) (03/09/23 0732)  Pulse: 107 (03/09/23 0732)  Resp: (!) 25 (03/09/23 0732)  BP: 101/63 (03/09/23 0732)  SpO2: (!) 94 % (03/09/23 0254)   Vital Signs (24h Range):  Temp:  [97.6 °F (36.4 °C)-102.9 °F (39.4 °C)] 99.4 °F (37.4 °C)  Pulse:  [] 107  Resp:  [15-25] 25  SpO2:  [93 %-98 %] 94 %  BP: ()/(51-65) 101/63     Weight: 92.1 kg (203 lb)  Body mass index is 26.78 kg/m².  Body surface area is 2.18 meters squared.    ECOG SCORE             Intake/Output - Last 3 Shifts         03/07 0700  03/08 0659 03/08 0700  03/09 0659 03/09 0700  03/10 0659    P.O.       I.V. (mL/kg) 27.4 (0.3) 0 (0) 17.3 (0.2)    Blood 543      IV Piggyback  3506.8     Total Intake(mL/kg) 570.4 (6.2) 3506.8 (38.1) 17.3 (0.2)    Urine (mL/kg/hr) 3550 (1.6) 600 (0.3)     Total Output 3550 600     Net -2979.6 +2906.8 +17.3           Urine Occurrence  3 x     Stool Occurrence  3 x             Physical Exam  Constitutional:       General: He is not in acute distress.     Appearance: He is well-developed. He is ill-appearing. He is not diaphoretic.   HENT:      Head: Normocephalic and atraumatic.   Eyes:      General: No scleral icterus.     Conjunctiva/sclera: Conjunctivae normal.   Cardiovascular:      Rate and Rhythm: Normal rate and regular rhythm.      Heart sounds: Normal heart sounds. No murmur heard.    No friction rub. No gallop.   Pulmonary:      Effort: Pulmonary effort is normal. No respiratory distress.      Breath sounds: Normal breath sounds. No wheezing or rales.   Abdominal:       General: Bowel sounds are normal. There is distension.      Tenderness: There is abdominal tenderness.   Musculoskeletal:         General: Normal range of motion.      Cervical back: Normal range of motion and neck supple.   Lymphadenopathy:      Cervical: No cervical adenopathy.   Skin:     General: Skin is warm and dry.      Findings: No rash.   Neurological:      Mental Status: He is alert and oriented to person, place, and time.   Psychiatric:         Behavior: Behavior normal.       Significant Labs:   CBC:   Recent Labs   Lab 03/08/23  0339 03/09/23  0032 03/09/23 0410   WBC 3.61* 2.70* 2.40*   HGB 8.3* 7.2* 6.7*   HCT 25.6* 22.5* 20.8*   PLT 7* 12* 10*      and CMP:   Recent Labs   Lab 03/08/23 0339 03/09/23 0410   * 131*   K 4.0 4.0   CL 98 99   CO2 27 26    114*   BUN 20 17   CREATININE 0.6 0.6   CALCIUM 7.9* 7.4*   PROT 4.4* 3.6*   ALBUMIN 2.1* 1.7*   BILITOT 3.3* 3.6*   ALKPHOS 80 67   AST 38 50*   ALT 15 16   ANIONGAP 7* 6*         Diagnostic Results:  I have reviewed all pertinent imaging results/findings within the past 24 hours.    Assessment/Plan:     * DLBCL (diffuse large B cell lymphoma)  Patient with small bowel resection approximately 5 months ago consistent with Diffuse large-B cell lymphoma, non germinal center origin. Additionally had omentum resection also showing DLBCL. Had bone marrow biopsy approximately 5 months ago that showed normo cellular marrow for age, no increase in blasts. Patient had PET scan from 1/11/23 showing hypermetabolic loops of small bowel most consistent with previous known disease and was thought to have improved (partial response to therapy noted from chart review) and was recommended he continue with R-CHOP at that time. Patient hospitalized at [facility] found to have likely worsening of lymphoma based on CT scan of abdomen showing partial compression of IVC. Transferred to [facility] for further evaluation given uncertainty in responsiveness to  first-line chemo therapy.    --planning for alternative chemo regimen given progression   --patient with lower abdominal pain: controlled with PCA pump  --continue acyclovir, allopurinol  --Per last CT abdomen: Interval increase in size of the centrally necrotic ileocecal lymphomatous mass. Superimposed infection within the necrotic mass cannot be excluded.  - changed steroid to pulse dose dexamethasone on 3/2, now completed.   - Rad onc consulted for radiation therapy. Pt underwent sim on 3/2, however holding off on radiation at this time.  - attempted LN bx with IR on 3/6 however cancelled due to patient entering AFRVR. Attempted bx with GI on 3/7 however patient did not complete prep so unable to scope.  - IR completed mesenteric LN biopsy on 3/7.  - plan to start inpatient chemo, likely DHAP  - will obtain new baseline PET inpatient for treatment planning, disease response monitoring and pretransplant planning, likely 3/10  - plan for re-staging bone marrow aspiration and biopsy 3/9              Hypotension  - intermittent hypotension s/o poor PO and significant tumor burden and malignancy. Responsive to IVF and prn blood transfusions  - ctm closely, low threshold to d/w MICU   - abx for recurrent fevers     Atrial fibrillation with RVR  On 3/4 patient with sudden onset afib with RVR rate to the 200s. Patient asymptomatic, BP at baseline. Pt given Lopressor IV 5mg x3 with moderate response in HR to 150s. Loaded with amio and placed on gtt. No prior hx of a-fib. No LAE on recent echo. Discontinued amio gtt after 24 hours. Will not transition to PO amio given concern for conversion to SR as patient unable to be anticoagulated given severe thrombocytopenia. Rate controlled with PO lopressor.    On 3/6 pt with repeated RVR. Cardiology consulted for assistance with management, given contraindication to anticoagulation and soft BP. EP on board as well per cardiology. Started dig load, transutioned 3/7 to PO amiodarone  per EP.     -- scheduled PO lopressor 50mg q6h, PO amiodarone 200mg BID  -- prn IV lopressor for a-fib with HR sustaining >130s  -- anticipate transition to Toprol if continued adequate rate control        Large cell lymphoma  See DLBCL above    Fever  Patient febrile to 101.9 on 2/28. Likely multifactorial as patient with known malignancy with possible superimposed infection of intra-abdominal mass. Placed on vanc, cefepime, flagyl. Blood cx NGTD. No respiratory or urinary sxs.   -- off all abx as of 3/3  -- patient fevered overnight 3/8 so placed on vanc/cefepime    Leukocytosis  - afebrile  - DC vanc on 2/27 however patient subsequently fevered the following evening so placed back on vanc. Discontinued vanc again 3/1. Discontinued cefepime and flagyl on 3/3.   RESOLVED     Compression of vena cava  CRS: this is due to dehydration because the IVC is flat throughout its course rather than just at one level.  Treated with IVF. And encouraging PO intake     Elevated bilirubin  - bili uptrending  - bili 3.6 today  - no evidence of liver involvement from lymphoma per current scans. PET scan pending  - monitor closely, daily CMP    Hyponatremia  - Improving during admission, though still mildly hyponatremic.   - Serum osm and urine studies ordered. Normal serum osm.     COVID-19 virus infection  - Noted to be positive on 2/6/2023  - No respiratory symptoms noted    Thrombocytopenia  - due to marrow involvement of lymphoma?, last chemo was 1/12/2023  - plan for bone marrow aspiration and biopsy pre-chemo  -- Continue to monitor with CBC  -- transfuse to keep >plt >10 or >50 given pre-procedure/bleeding    Hyperlipidemia, unspecified  - Not currently taking a statin    Moderate malnutrition  Nutrition consulted. Most recent weight and BMI monitored-     Malnutrition Type and Energy Intake  Malnutrition Type: chronic illness         Final Summary  Subcutaneous Fat Loss (Final Summary): moderate protein-calorie  malnutrition  Muscle Loss Evaluation (Final Summary): moderate protein-calorie malnutrition         Measurements:  Wt Readings from Last 1 Encounters:   03/07/23 92.1 kg (203 lb)   Body mass index is 26.78 kg/m².    Recommendations: Recommendation/Intervention: 1. ADAT to Regular diet. Add Diabetic modifier, if warranted. 2. Continue Boost Glucose Control TID. 3. Add Beneprotein to optimize calorie/protein intake. 4. RD to monitor and follow-up.  Goals: Meet % EEN.    Patient has been screened and assessed by RD. RD will follow patient.      Diabetes mellitus type 2, noninsulin dependent  Hemoglobin A1c from 1 week ago was 7.7  Goal glucose while inpatient: 140-180  Consider basal-bolus + SSI as needed  Hold home anti-diabetic medications  Steroids now completed  Increased daily insulin regimen as appropriate starting 3/3 given increasing hyperglycemia while on steroids.   Now holding insulin as needed given decreased BGL off steroids      Anemia  - Due to lymphoma and late chemo effect  - planning for bone marrow aspiration and biopsy on 3/9  - daily CBC   --transfuse prn hemoglobin <7    Small bowel mass  S/p  Resection September 2022 found to have DLBCL on pathology.   -- stat CTAP ordered on 2/28 given worsening ab pain to r/o perf, no evidence of perf however interval enlargement of mass   -- see DLBCL for full plan         VTE Risk Mitigation (From admission, onward)         Ordered     Reason for No Pharmacological VTE Prophylaxis  Once        Question:  Reasons:  Answer:  Thrombocytopenia    02/25/23 0402     IP VTE HIGH RISK PATIENT  Once         02/25/23 0402     Place sequential compression device  Until discontinued         02/25/23 0402                Disposition: pending treatment     Mina Nolasco MD  Bone Marrow Transplant  [facility] - Oncology ([facility])

## 2023-03-09 NOTE — ASSESSMENT & PLAN NOTE
Patient febrile to 101.9 on 2/28. Likely multifactorial as patient with known malignancy with possible superimposed infection of intra-abdominal mass. Placed on vanc, cefepime, flagyl. Blood cx NGTD. No respiratory or urinary sxs.   -- off all abx as of 3/3  -- patient fevered overnight 3/8 so placed on vanc/cefepime

## 2023-03-09 NOTE — AI DETERIORATION ALERT
RAPID RESPONSE NURSE PROACTIVE ROUNDING NOTE       Time of Visit: 1012    Admit Date: 2023  LOS: 12  Code Status: Full Code   Date of Visit: 2023  : 1970  Age: 52 y.o.  Sex: male  Race: Black or   Bed: 828/828 A:   MRN: [id number]  Was the patient discharged from an ICU this admission? No   Was the patient discharged from a PACU within last 24 hours? No   Did the patient receive conscious sedation/general anesthesia in last 24 hours? No  Was the patient in the ED within the past 24 hours? No  Was the patient on NIPPV within the past 24 hours? No   Attending Physician: Lan Kurtz MD  Primary Service: Hematology and Oncology   Time spent at the bedside: < 15 min    SITUATION    Notified by charge RN via phone call.  Reason for alert: HypoTN  Called to evaluate the patient for Circulatory    BACKGROUND     Why is the patient in the hospital?: DLBCL (diffuse large B cell lymphoma)    Patient has a past medical history of Cancer, Diabetes mellitus, Digestive disorder, and Prediabetes.    Last Vitals:  Temp: 99.2 °F (37.3 °C) (1124)  Pulse: 113 ( 1216)  Resp: 26 (1124)  BP: 103/57 (1124)  SpO2: 100 % (1124)    24 Hours Vitals Range:  Temp:  [97.6 °F (36.4 °C)-102.9 °F (39.4 °C)]   Pulse:  []   Resp:  [15-26]   BP: ()/(51-65)   SpO2:  [88 %-100 %]     Labs:  Recent Labs     23  0339 23  0032 23  0410   WBC 3.61* 2.70* 2.40*   HGB 8.3* 7.2* 6.7*   HCT 25.6* 22.5* 20.8*   PLT 7* 12* 10*       Recent Labs     23  0305 23  0339 23  0410   * 132* 131*   K 4.7 4.0 4.0    98 99   CO2 23 27 26   CREATININE 0.6 0.6 0.6   * 107 114*   PHOS 3.3 3.8 3.2   MG 1.8 1.7 1.6        No results for input(s): PH, PCO2, PO2, HCO3, POCSATURATED, BE in the last 72 hours.     ASSESSMENT    Pt laying in bed resting with PRBC infusing, pt noted to be on 2L NC with no complaint of SOB or increased on WOB. Pt awake  and alert.   Physical Exam  Vitals and nursing note reviewed.   Cardiovascular:      Rate and Rhythm: Regular rhythm. Tachycardia present.   Pulmonary:      Effort: Pulmonary effort is normal.   Skin:     General: Skin is cool and dry.   Neurological:      General: No focal deficit present.      Mental Status: He is alert and oriented to person, place, and time.      GCS: GCS eye subscore is 4. GCS verbal subscore is 5. GCS motor subscore is 6.       INTERVENTIONS    The patient was seen for Cardiac problem. Staff concerns included tachycardia and hypotension. The following interventions were performed: continuous cardiac monitoring continued and continuous pulse ox monitoring continued.    RECOMMENDATIONS    Transfuse ordered PRBC, recheck H&H 2H post transfusion,  Monitor Hemo dynamics Q2.     PROVIDER ESCALATION    Yes/No  no    Orders received and case discussed with NA.    Disposition: Remain in room 828.    FOLLOW-UP    Charge Kay NELSON  updated on plan of care. Instructed to call the Rapid Response Nurse, Galdino Johnson RN at [phone] for additional questions or concerns.

## 2023-03-10 NOTE — PLAN OF CARE
Problem: Adult Inpatient Plan of Care  Goal: Plan of Care Review  Outcome: Ongoing, Progressing  Flowsheets (Taken 3/10/2023 1744)  Plan of Care Reviewed With:   patient   daughter     Problem: Adjustment to Illness (Sepsis/Septic Shock)  Goal: Optimal Coping  Outcome: Ongoing, Progressing  Intervention: Optimize Psychosocial Adjustment to Illness  Flowsheets (Taken 3/10/2023 1744)  Supportive Measures:   active listening utilized   decision-making supported   goal-setting facilitated   positive reinforcement provided   problem-solving facilitated   relaxation techniques promoted   self-care encouraged   self-responsibility promoted   self-reflection promoted   verbalization of feelings encouraged  Family/Support System Care: support provided     Problem: Bleeding (Sepsis/Septic Shock)  Goal: Absence of Bleeding  Outcome: Ongoing, Progressing  Intervention: Monitor and Manage Bleeding  Flowsheets (Taken 3/10/2023 1744)  Bleeding Precautions:   blood pressure closely monitored   coagulation study results reviewed   foot protection facilitated   gentle oral care promoted   monitored for signs of bleeding  Bleeding Management: blood products administered     Problem: Glycemic Control Impaired (Sepsis/Septic Shock)  Goal: Blood Glucose Level Within Desired Range  Outcome: Ongoing, Progressing  Intervention: Optimize Glycemic Control  Flowsheets (Taken 3/10/2023 1744)  Glycemic Management: blood glucose monitored     Problem: Infection  Goal: Absence of Infection Signs and Symptoms  Outcome: Ongoing, Progressing  Intervention: Prevent or Manage Infection  Flowsheets (Taken 3/10/2023 1744)  Fever Reduction/Comfort Measures:   lightweight clothing   lightweight bedding  Infection Management: aseptic technique maintained  Isolation Precautions:   precautions initiated   precautions maintained   precautions discontinued     Problem: Skin Injury Risk Increased  Goal: Skin Health and Integrity  Outcome: Ongoing,  Progressing  Intervention: Optimize Skin Protection  Flowsheets (Taken 3/10/2023 5965)  Pressure Reduction Techniques: frequent weight shift encouraged  Pressure Reduction Devices:   specialty bed utilized   pressure-redistributing mattress utilized  Skin Protection:   transparent dressing maintained   tubing/devices free from skin contact  Head of Bed (HOB) Positioning: HOB at 30-45 degrees

## 2023-03-10 NOTE — CONSULTS
Patient seen and examined. Will admit to ICU for persistent hypotension despite volume resuscitation. Will continue broad spectrum antibiotics, transfuse platelets and initiate Norepinephrine for MAP<60. Reportedly chemo was planned for tomorrow. Team aware of transfer to ICU

## 2023-03-10 NOTE — ASSESSMENT & PLAN NOTE
Patient with small bowel resection approximately 5 months ago consistent with Diffuse large-B cell lymphoma, non germinal center origin. Additionally had omentum resection also showing DLBCL. Had bone marrow biopsy approximately 5 months ago that showed normo cellular marrow for age, no increase in blasts. Patient had PET scan from 1/11/23 showing hypermetabolic loops of small bowel most consistent with previous known disease and was thought to have improved (partial response to therapy noted from chart review) and was recommended he continue with R-CHOP at that time. Patient hospitalized at [facility] found to have likely worsening of lymphoma based on CT scan of abdomen showing partial compression of IVC. Transferred to [facility] for further evaluation given uncertainty in responsiveness to first-line chemo therapy.    --planning for alternative chemo regimen given progression   --patient with lower abdominal pain: controlled with PCA pump  --continue acyclovir, allopurinol  --Per last CT abdomen: Interval increase in size of the centrally necrotic ileocecal lymphomatous mass. Superimposed infection within the necrotic mass cannot be excluded.  - changed steroid to pulse dose dexamethasone on 3/2, now completed.   - Rad onc consulted for radiation therapy. Pt underwent sim on 3/2, however holding off on radiation at this time.  - attempted LN bx with IR on 3/6 however cancelled due to patient entering AFRVR. Attempted bx with GI on 3/7 however patient did not complete prep so unable to scope.  - IR completed mesenteric LN biopsy on 3/7.  - s/p BMBx on 3/9  - obtained new baseline PET inpatient for treatment planning, disease response monitoring and pretransplant planning on 3/10  - starting DHAP IP on 3/10  - q12h TLS labs

## 2023-03-10 NOTE — PLAN OF CARE
Problem: Adult Inpatient Plan of Care  Goal: Plan of Care Review  Outcome: Ongoing, Progressing  Goal: Patient-Specific Goal (Individualized)  Outcome: Ongoing, Progressing  Goal: Absence of Hospital-Acquired Illness or Injury  Outcome: Ongoing, Progressing  Goal: Optimal Comfort and Wellbeing  Outcome: Ongoing, Progressing  Goal: Readiness for Transition of Care  Outcome: Ongoing, Progressing     Problem: Diabetes Comorbidity  Goal: Blood Glucose Level Within Targeted Range  Outcome: Ongoing, Progressing     Problem: Adjustment to Illness (Sepsis/Septic Shock)  Goal: Optimal Coping  Outcome: Ongoing, Progressing     Problem: Bleeding (Sepsis/Septic Shock)  Goal: Absence of Bleeding  Outcome: Ongoing, Progressing     Problem: Glycemic Control Impaired (Sepsis/Septic Shock)  Goal: Blood Glucose Level Within Desired Range  Outcome: Ongoing, Progressing     Problem: Infection Progression (Sepsis/Septic Shock)  Goal: Absence of Infection Signs and Symptoms  Outcome: Ongoing, Progressing     Problem: Nutrition Impaired (Sepsis/Septic Shock)  Goal: Optimal Nutrition Intake  Outcome: Ongoing, Progressing     Problem: Infection  Goal: Absence of Infection Signs and Symptoms  Outcome: Ongoing, Progressing     Problem: Fall Injury Risk  Goal: Absence of Fall and Fall-Related Injury  Outcome: Ongoing, Progressing     Problem: Skin Injury Risk Increased  Goal: Skin Health and Integrity  Outcome: Ongoing, Progressing     Safety measures in place. Pt NPO since midnight. Plan for PET at 10:00 today.

## 2023-03-10 NOTE — PROGRESS NOTES
[facility] - Cardiac Medical ICU  Hematology  Bone Marrow Transplant  Progress Note    Patient Name: Dwight Hoffmann  Admission Date: 2/25/2023  Hospital Length of Stay: 13 days  Code Status: Full Code    Subjective:     Interval History: Transferred to MICU overnight for ongoing hypotension. Will start DHAP following PET today.     Objective:     Vital Signs (Most Recent):  Temp: 98.8 °F (37.1 °C) (03/10/23 0400)  Pulse: (!) 120 (03/10/23 1400)  Resp: (!) 21 (03/10/23 1400)  BP: 100/65 (03/10/23 1400)  SpO2: 100 % (03/10/23 1400)   Vital Signs (24h Range):  Temp:  [98 °F (36.7 °C)-99.2 °F (37.3 °C)] 98.8 °F (37.1 °C)  Pulse:  [115-144] 120  Resp:  [12-32] 21  SpO2:  [93 %-100 %] 100 %  BP: ()/(50-68) 100/65     Weight: 93.4 kg (206 lb)  Body mass index is 27.18 kg/m².  Body surface area is 2.19 meters squared.    ECOG SCORE             Intake/Output - Last 3 Shifts         03/08 0700  03/09 0659 03/09 0700  03/10 0659 03/10 0700  03/11 0659    P.O.  200     I.V. (mL/kg) 0 (0) 17.3 (0.2)     Blood  339.5     IV Piggyback 3506.8 396.7     Total Intake(mL/kg) 3506.8 (38.1) 953.5 (10.4)     Urine (mL/kg/hr) 600 (0.3) 300 (0.1)     Total Output 600 300     Net +2906.8 +653.5            Urine Occurrence 3 x 2 x     Stool Occurrence 3 x 0 x             Physical Exam  Constitutional:       General: He is not in acute distress.     Appearance: He is well-developed. He is ill-appearing. He is not diaphoretic.   HENT:      Head: Normocephalic and atraumatic.   Eyes:      General: No scleral icterus.     Conjunctiva/sclera: Conjunctivae normal.   Cardiovascular:      Rate and Rhythm: Normal rate and regular rhythm.      Heart sounds: Normal heart sounds. No murmur heard.    No friction rub. No gallop.   Pulmonary:      Effort: Pulmonary effort is normal. No respiratory distress.      Breath sounds: Normal breath sounds. No wheezing or rales.   Abdominal:      General: Bowel sounds are normal. There is distension.       Tenderness: There is abdominal tenderness.   Musculoskeletal:         General: Normal range of motion.      Cervical back: Normal range of motion and neck supple.   Lymphadenopathy:      Cervical: No cervical adenopathy.   Skin:     General: Skin is warm and dry.      Findings: No rash.   Neurological:      Mental Status: He is alert and oriented to person, place, and time.   Psychiatric:         Behavior: Behavior normal.       Significant Labs:   CBC:   Recent Labs   Lab 03/09/23  0410 03/09/23  1743 03/10/23  0409   WBC 2.40* 2.72* 3.28*   HGB 6.7* 8.3* 8.3*   HCT 20.8* 24.6* 25.6*   PLT 10* 8* 13*      and CMP:   Recent Labs   Lab 03/09/23  0410 03/10/23  0409   * 129*   K 4.0 4.0   CL 99 100   CO2 26 24   * 98   BUN 17 17   CREATININE 0.6 0.6   CALCIUM 7.4* 7.9*   PROT 3.6* 4.0*   ALBUMIN 1.7* 1.7*   BILITOT 3.6* 2.6*   ALKPHOS 67 70   AST 50* 69*   ALT 16 23   ANIONGAP 6* 5*         Diagnostic Results:  I have reviewed all pertinent imaging results/findings within the past 24 hours.    Assessment/Plan:     * DLBCL (diffuse large B cell lymphoma)  Patient with small bowel resection approximately 5 months ago consistent with Diffuse large-B cell lymphoma, non germinal center origin. Additionally had omentum resection also showing DLBCL. Had bone marrow biopsy approximately 5 months ago that showed normo cellular marrow for age, no increase in blasts. Patient had PET scan from 1/11/23 showing hypermetabolic loops of small bowel most consistent with previous known disease and was thought to have improved (partial response to therapy noted from chart review) and was recommended he continue with R-CHOP at that time. Patient hospitalized at [facility] found to have likely worsening of lymphoma based on CT scan of abdomen showing partial compression of IVC. Transferred to [facility] [place] for further evaluation given uncertainty in responsiveness to first-line chemo therapy.    --planning for alternative chemo  regimen given progression   --patient with lower abdominal pain: controlled with PCA pump  --continue acyclovir, allopurinol  --Per last CT abdomen: Interval increase in size of the centrally necrotic ileocecal lymphomatous mass. Superimposed infection within the necrotic mass cannot be excluded.  - changed steroid to pulse dose dexamethasone on 3/2, now completed.   - Rad onc consulted for radiation therapy. Pt underwent sim on 3/2, however holding off on radiation at this time.  - attempted LN bx with IR on 3/6 however cancelled due to patient entering AFRVR. Attempted bx with GI on 3/7 however patient did not complete prep so unable to scope.  - IR completed mesenteric LN biopsy on 3/7.  - s/p BMBx on 3/9  - obtained new baseline PET inpatient for treatment planning, disease response monitoring and pretransplant planning on 3/10  - starting DHAP IP on 3/10  - q12h TLS labs                Shock, unspecified  - admitted to MICU 3/10  - intermittent hypotension s/o poor PO and significant tumor burden and malignancy. Responsive to IVF and prn blood transfusions  - abx for recurrent fevers     Atrial fibrillation with RVR  On 3/4 patient with sudden onset afib with RVR rate to the 200s. Patient asymptomatic, BP at baseline. Pt given Lopressor IV 5mg x3 with moderate response in HR to 150s. Loaded with amio and placed on gtt. No prior hx of a-fib. No LAE on recent echo. Discontinued amio gtt after 24 hours. Will not transition to PO amio given concern for conversion to SR as patient unable to be anticoagulated given severe thrombocytopenia. Rate controlled with PO lopressor.    On 3/6 pt with repeated RVR. Cardiology consulted for assistance with management, given contraindication to anticoagulation and soft BP. EP on board as well per cardiology. Started dig load, transutioned 3/7 to PO amiodarone per EP.     -- scheduled PO lopressor 50mg q6h, PO amiodarone 200mg BID  -- prn IV lopressor for a-fib with HR sustaining  >130s  -- anticipate transition to Toprol if continued adequate rate control    -- currently holding scheduled lopressor given shock       Large cell lymphoma  See DLBCL above    Fever  Patient febrile to 101.9 on 2/28. Likely multifactorial as patient with known malignancy with possible superimposed infection of intra-abdominal mass. Placed on vanc, cefepime, flagyl. Blood cx NGTD. No respiratory or urinary sxs.   -- off all abx as of 3/3  -- patient fevered overnight 3/8 so placed on vanc/cefepime    Leukocytosis  - afebrile  - DC vanc on 2/27 however patient subsequently fevered the following evening so placed back on vanc. Discontinued vanc again 3/1. Discontinued cefepime and flagyl on 3/3.   RESOLVED     Compression of vena cava  CRS: this is due to dehydration because the IVC is flat throughout its course rather than just at one level.  Treated with IVF. And encouraging PO intake     Elevated bilirubin  - bili uptrending  - bili 3.6 today  - no evidence of liver involvement from lymphoma per current scans. PET scan pending  - monitor closely, daily CMP    Hyponatremia  - Improving during admission, though still mildly hyponatremic.   - Serum osm and urine studies ordered. Normal serum osm.     COVID-19 virus infection  - Noted to be positive on 2/6/2023  - No respiratory symptoms noted    Thrombocytopenia  - due to marrow involvement of lymphoma?, last chemo was 1/12/2023  - plan for bone marrow aspiration and biopsy pre-chemo  -- Continue to monitor with CBC  -- transfuse to keep >plt >10 or >50 given pre-procedure/bleeding    Hyperlipidemia, unspecified  - Not currently taking a statin    Moderate malnutrition  Nutrition consulted. Most recent weight and BMI monitored-     Malnutrition Type and Energy Intake  Malnutrition Type: chronic illness         Final Summary  Subcutaneous Fat Loss (Final Summary): moderate protein-calorie malnutrition  Muscle Loss Evaluation (Final Summary): moderate protein-calorie  malnutrition         Measurements:  Wt Readings from Last 1 Encounters:   03/10/23 93.4 kg (206 lb)   Body mass index is 27.18 kg/m².    Recommendations: Recommendation/Intervention: 1. ADAT to Regular diet. Add Diabetic modifier, if warranted. 2. Continue Boost Glucose Control TID. 3. Add Beneprotein to optimize calorie/protein intake. 4. RD to monitor and follow-up.  Goals: Meet % EEN.    Patient has been screened and assessed by RD. RD will follow patient.      Diabetes mellitus type 2, noninsulin dependent  Hemoglobin A1c from 1 week ago was 7.7  Goal glucose while inpatient: 140-180  Consider basal-bolus + SSI as needed  Hold home anti-diabetic medications  Steroids now completed  Increased daily insulin regimen as appropriate starting 3/3 given increasing hyperglycemia while on steroids.   Now holding insulin as needed given decreased BGL off steroids      Anemia  - Due to lymphoma and late chemo effect  - planning for bone marrow aspiration and biopsy on 3/9  - daily CBC   --transfuse prn hemoglobin <7    Small bowel mass  S/p  Resection September 2022 found to have DLBCL on pathology.   -- stat CTAP ordered on 2/28 given worsening ab pain to r/o perf, no evidence of perf however interval enlargement of mass   -- see DLBCL for full plan         VTE Risk Mitigation (From admission, onward)         Ordered     heparin, porcine (PF) 100 unit/mL injection flush 300 Units  As needed (PRN)         03/10/23 1331     Reason for No Pharmacological VTE Prophylaxis  Once        Question:  Reasons:  Answer:  Thrombocytopenia    02/25/23 0402     IP VTE HIGH RISK PATIENT  Once         02/25/23 0402     Place sequential compression device  Until discontinued         02/25/23 0402                Disposition: Pending treatment    Mina Nolasco MD  Bone Marrow Transplant  [facility] - [facility]

## 2023-03-10 NOTE — H&P
[facility] - Oncology ([facility])  Critical Care Medicine  History & Physical    Patient Name: Dwight Hoffmann  MRN: [id number]  Admission Date: 2/25/2023  Hospital Length of Stay: 12 days  Code Status: Full Code  Attending Physician: Lan Kurtz MD   Primary Care Provider: Primary Doctor No   Principal Problem: DLBCL (diffuse large B cell lymphoma)    Subjective:     HPI:  Mr. Dwight Hoffmann is a 52 y.o. man with a history of DM, anemia, DLBCL, HLD, who was admitted as a transfer from [facility] presenting with refractory lymphoma and compression of IVC.  [place] [facility] [place] and to assess for any transfortmation or disease process givn lack of response to chemo and consideration of second line chemo.  Patient with CT from outside hospital concerning for compression of vena cava from lymphadenopathy.  Cardiology consulted at OSH for brief episode of SVT prior to transfer, and noted last echo from 2/8/23 was within normal. General surgery saw patient at OSH as well for concern of obstruction from lymphadenopathy but noted there was not complete obstruction. General surgery did not feel at the time the lymphadenopathy would be able to be safely removed or debulked from vena cava. There was some discussion about possible ileocecal shunt placement, but did not have capability at OSH. Admitted to BMT and was planning for alternative chemo regimen given progression, likely DHAP as inpatient.  Bone marrow aspiration and biopsy done 3/9 for restaging and patient had been scheduled for new baseline PET scan.     On 3/9 patient seen by rapid response for hypotension.  Received ~2L IVF and 1 unit PRBC with no improvement in BP and MAP consistently <65.  Critical Care Medicine consulted and patient transferred to MICU.        Hospital/ICU Course:  No notes on file     Past Medical History:   Diagnosis Date    Cancer     Diabetes mellitus     Digestive disorder     Prediabetes        Past  Surgical History:   Procedure Laterality Date    APPENDECTOMY  07/15/2014    COLONOSCOPY      COLONOSCOPY N/A 02/09/2022    ERROR.   He did not have a colonoscopy with Dr. Sanders.    COLONOSCOPY N/A 09/01/2022    Procedure: COLONOSCOPY;  Surgeon: Andrea Clemens MD;  Location: [facility] ENDO;  Service: Endoscopy;  Laterality: N/A;    FLEXIBLE SIGMOIDOSCOPY N/A 11/7/2022    Procedure: SIGMOIDOSCOPY, FLEXIBLE;  Surgeon: Manuel Sanders MD;  Location: [facility] ENDO;  Service: Endoscopy;  Laterality: N/A;    INCISION AND DRAINAGE OF ABDOMEN N/A 09/14/2022    Procedure: INCISION AND DRAINAGE, ABDOMEN;  Surgeon: Jan Oliveira Jr., MD;  Location: [facility] OR;  Service: General;  Laterality: N/A;    INSERTION OF TUNNELED CENTRAL VENOUS CATHETER (CVC) WITH SUBCUTANEOUS PORT N/A 10/31/2022    Procedure: GABYBMUUS-MECX-S-CATH;  Surgeon: Jan Oliveira Jr., MD;  Location: [facility] OR;  Service: General;  Laterality: N/A;    LAPAROSCOPIC DRAINAGE OF ABDOMEN N/A 09/23/2022    Procedure: DRAINAGE, ABDOMEN, LAPAROSCOPIC;  Surgeon: Jan Oliveira Jr., MD;  Location: [facility] OR;  Service: General;  Laterality: N/A;       Review of patient's allergies indicates:   Allergen Reactions    Albuterol (bulk) Palpitations       Family History       Problem Relation (Age of Onset)    Cancer Mother    Diabetes Mother    Heart murmur Sister    Hypertension Mother    Seizures Father, Sister          Tobacco Use    Smoking status: Never    Smokeless tobacco: Never   Substance and Sexual Activity    Alcohol use: Not Currently     Comment: occasional    Drug use: No    Sexual activity: Yes     Partners: Female      Review of Systems   Unable to perform ROS: Other   Objective:     Vital Signs (Most Recent):  Temp: 99.2 °F (37.3 °C) (03/09/23 1739)  Pulse: (!) 127 (03/09/23 1816)  Resp: (!) 22 (03/09/23 1739)  BP: (!) 84/51 (03/09/23 1816)  SpO2: 100 % (03/09/23 1739)   Vital Signs (24h Range):  Temp:  [98.2 °F (36.8 °C)-102.9 °F  (39.4 °C)] 99.2 °F (37.3 °C)  Pulse:  [] 127  Resp:  [16-27] 22  SpO2:  [88 %-100 %] 100 %  BP: ()/(50-65) 84/51   Weight: 92.1 kg (203 lb)  Body mass index is 26.78 kg/m².      Intake/Output Summary (Last 24 hours) at 3/9/2023 1843  Last data filed at 3/9/2023 1328  Gross per 24 hour   Intake 3246.45 ml   Output --   Net 3246.45 ml       Physical Exam  Vitals and nursing note reviewed.   Constitutional:       Appearance: He is well-developed. He is ill-appearing.   HENT:      Head: Normocephalic.      Mouth/Throat:      Mouth: Mucous membranes are moist.   Cardiovascular:      Rate and Rhythm: Regular rhythm. Tachycardia present.      Pulses: Normal pulses.      Comments: Slight edema to hands and feet  Pulmonary:      Effort: Pulmonary effort is normal. Tachypnea present.      Breath sounds: Normal breath sounds and air entry.   Abdominal:      General: Bowel sounds are normal. There is no distension.      Palpations: Abdomen is soft.      Tenderness: There is no abdominal tenderness.   Musculoskeletal:         General: Swelling present.      Right lower leg: Edema present.      Left lower leg: Edema present.   Skin:     General: Skin is warm and dry.      Comments: Multiple tattoos   Neurological:      Mental Status: He is lethargic.       Vents:     Lines/Drains/Airways       Central Venous Catheter Line  Duration                  PowerPort A Cath Single Lumen Subclavian Right -- days              Drain  Duration             Male External Urinary Catheter 03/08/23 2216 <1 day              Peripheral Intravenous Line  Duration                  Midline Catheter Insertion/Assessment  - Single Lumen 02/26/23 1257 Right basilic vein (medial side of arm)  11 days                  Significant Labs:    CBC/Anemia Profile:  Recent Labs   Lab 03/09/23  0032 03/09/23  0410 03/09/23  1743   WBC 2.70* 2.40* 2.72*   HGB 7.2* 6.7* 8.3*   HCT 22.5* 20.8* 24.6*   PLT 12* 10* 8*   MCV 92 92 91   RDW 20.3* 20.4* 18.6*         Chemistries:  Recent Labs   Lab 03/08/23  0339 03/09/23  0410   * 131*   K 4.0 4.0   CL 98 99   CO2 27 26   BUN 20 17   CREATININE 0.6 0.6   CALCIUM 7.9* 7.4*   ALBUMIN 2.1* 1.7*   PROT 4.4* 3.6*   BILITOT 3.3* 3.6*   ALKPHOS 80 67   ALT 15 16   AST 38 50*   MG 1.7 1.6   PHOS 3.8 3.2       All pertinent labs within the past 24 hours have been reviewed.    Significant Imaging: I have reviewed all pertinent imaging results/findings within the past 24 hours.    Assessment/Plan:     Cardiac/Vascular  Shock, unspecified  Hypotensive and seen by rapid response on 3/9, received IVF resuscitation and 1 unit PRBC.  MAP remained <65 so decision made to transfer to MICU.  Febrile up to 102 on 3/8, no leukocytosis.  LA 2.4.  Suspect septic shock in the setting of immunosuppression.  Recurrent fevers throughout hospitalization which could also be secondary to known malignancy.  CXR with atelectasis.  Infectious workup repeated.      -- Follow cultures  -- Continue broad spectrum abx  -- Will start vasopressors to maintain MAP >65  -- Trend LA    Atrial fibrillation with RVR  On 3/4 patient with sudden onset afib with RVR rate to the 200s. Patient asymptomatic, BP at baseline. Pt given Lopressor IV 5mg x3 with moderate response in HR to 150s. Loaded with amio and placed on gtt. No prior hx of a-fib. No LAE on recent echo. Discontinued amio gtt after 24 hours. Will not transition to PO amio given concern for conversion to SR as patient unable to be anticoagulated given severe thrombocytopenia. Rate controlled with PO lopressor.    On 3/6 pt with repeated RVR. Cardiology consulted for assistance with management, given contraindication to anticoagulation and soft BP. EP on board as well per cardiology. Started dig load, transutioned 3/7 to PO amiodarone per EP.     -- Scheduled PO lopressor stopped with hypotension requiring pressors  -- Continue PO amio  -- Continuous cardiac monitoring  -- Trend BMP, replete lytes  PRN    Compression of vena cava  Per CRS: this is due to dehydration because the IVC is flat throughout its course rather than just at one level.  Treated with IVF. And encouraging PO intake.  No obstruction.  No plans for surgical intervention.     Renal/  Hyponatremia  -- Trend BMP    Hematology  Thrombocytopenia  Suspected due to marrow involvment of lymphoma.  Last chemo 1/12/23.      -- Trend CBC  -- Transfuse to keep >plt >10 or >50 given pre-procedure/bleeding  -- Maintain type and screen    Oncology  * DLBCL (diffuse large B cell lymphoma)  Patient with small bowel resection approximately 5 months ago consistent with Diffuse large-B cell lymphoma, non germinal center origin. Additionally had omentum resection also showing DLBCL. Had bone marrow biopsy approximately 5 months ago that showed normo cellular marrow for age, no increase in blasts. Patient had PET scan from 1/11/23 showing hypermetabolic loops of small bowel most consistent with previous known disease and was thought to have improved (partial response to therapy noted from chart review) and was recommended he continue with R-CHOP at that time. Patient hospitalized at [facility] found to have likely worsening of lymphoma based on CT scan of abdomen showing partial compression of IVC. Transferred to [facility] [place] for further evaluation given uncertainty in responsiveness to first-line chemo therapy.    Per BMT:     --planning for alternative chemo regimen given progression   --patient with lower abdominal pain: controlled with PCA pump  --continue acyclovir, allopurinol  --Per last CT abdomen: Interval increase in size of the centrally necrotic ileocecal lymphomatous mass. Superimposed infection within the necrotic mass cannot be excluded.  - changed steroid to pulse dose dexamethasone on 3/2, now completed.   - Rad onc consulted for radiation therapy. Pt underwent sim on 3/2, however holding off on radiation at this time.  - attempted LN bx with IR  on 3/6 however cancelled due to patient entering AFRVR. Attempted bx with GI on 3/7 however patient did not complete prep so unable to scope.  - IR completed mesenteric LN biopsy on 3/7.  - plan to start inpatient chemo, likely DHAP  - will obtain new baseline PET inpatient for treatment planning, disease response monitoring and pretransplant planning, likely 3/10  - plan for re-staging bone marrow aspiration and biopsy 3/9     Large cell lymphoma  See DLBCL.      Anemia  -- Trend CBC  -- Maintain active type and screen  -- Transfuse for Hgb <7    Endocrine  Moderate malnutrition  Nutrition consulted. Most recent weight and BMI monitored-     Malnutrition Type and Energy Intake  Malnutrition Type: chronic illness         Final Summary  Subcutaneous Fat Loss (Final Summary): moderate protein-calorie malnutrition  Muscle Loss Evaluation (Final Summary): moderate protein-calorie malnutrition         Measurements:  Wt Readings from Last 1 Encounters:   03/07/23 92.1 kg (203 lb)   Body mass index is 26.78 kg/m².    Recommendations: Recommendation/Intervention: 1. ADAT to Regular diet. Add Diabetic modifier, if warranted. 2. Continue Boost Glucose Control TID. 3. Add Beneprotein to optimize calorie/protein intake. 4. RD to monitor and follow-up.  Goals: Meet % EEN.    Patient has been screened and assessed by RD. RD will follow patient.    -- Encourage PO intake    Diabetes mellitus type 2, noninsulin dependent  -- Accuchecks  -- Hypoglycemic protocol in place  -- SSI    GI  Small bowel mass  S/p  Resection September 2022 found to have DLBCL on pathology.     -- stat CTAP ordered on 2/28 given worsening ab pain to r/o perf, no evidence of perf however interval enlargement of mass   -- see DLBCL for full plan     Other  Fever  See shock.          Critical Care Daily Checklist:    A: Awake: RASS Goal/Actual Goal:    Actual:     B: Spontaneous Breathing Trial Performed?     C: SAT & SBT Coordinated?  n/a                       D: Delirium: CAM-ICU     E: Early Mobility Performed? No   F: Feeding Goal: Goals: Meet % EEN.  Status: Nutrition Goal Status: progressing towards goal, new   Current Diet Order   Procedures    Diet NPO      AS: Analgesia/Sedation PCA   T: Thromboembolic Prophylaxis No, bleeding   H: HOB > 300 Yes   U: Stress Ulcer Prophylaxis (if needed)    G: Glucose Control bg goal 140-180   B: Bowel Function Stool Occurrence: 0   I: Indwelling Catheter (Lines & Weir) Necessity PICC   D: De-escalation of Antimicrobials/Pharmacotherapies Continue abx    Plan for the day/ETD Admit to MICU    Code Status:  Family/Goals of Care: Full Code       Critical Care Time: 40 minutes  Critical secondary to Patient has a condition that poses threat to life and bodily function: Shock requiring vasopressors    Plan discussed with PCCM fellow Dr. Jimenez.       Critical care was time spent personally by me on the following activities: development of treatment plan with patient or surrogate and bedside caregivers, discussions with consultants, evaluation of patient's response to treatment, examination of patient, ordering and performing treatments and interventions, ordering and review of laboratory studies, ordering and review of radiographic studies, pulse oximetry, re-evaluation of patient's condition. This critical care time did not overlap with that of any other provider or involve time for any procedures.     Janelle Tovar NP  Critical Care Medicine  [facility] - Oncology [facility])

## 2023-03-10 NOTE — ASSESSMENT & PLAN NOTE
Patient with small bowel resection approximately 5 months ago consistent with Diffuse large-B cell lymphoma, non germinal center origin. Additionally had omentum resection also showing DLBCL. Had bone marrow biopsy approximately 5 months ago that showed normo cellular marrow for age, no increase in blasts. Patient had PET scan from 1/11/23 showing hypermetabolic loops of small bowel most consistent with previous known disease and was thought to have improved (partial response to therapy noted from chart review) and was recommended he continue with R-CHOP at that time. Patient hospitalized at [facility] found to have likely worsening of lymphoma based on CT scan of abdomen showing partial compression of IVC. Transferred to [facility] for further evaluation given uncertainty in responsiveness to first-line chemo therapy.    Per BMT:     --planning for alternative chemo regimen given progression   --patient with lower abdominal pain: controlled with PCA pump  --continue acyclovir, allopurinol  --Per last CT abdomen: Interval increase in size of the centrally necrotic ileocecal lymphomatous mass. Superimposed infection within the necrotic mass cannot be excluded.  - changed steroid to pulse dose dexamethasone on 3/2, now completed.   - Rad onc consulted for radiation therapy. Pt underwent sim on 3/2, however holding off on radiation at this time.  - attempted LN bx with IR on 3/6 however cancelled due to patient entering AFRVR. Attempted bx with GI on 3/7 however patient did not complete prep so unable to scope.  - IR completed mesenteric LN biopsy on 3/7.  - plan to start inpatient chemo, likely DHAP  - will obtain new baseline PET inpatient for treatment planning, disease response monitoring and pretransplant planning, likely 3/10  - plan for re-staging bone marrow aspiration and biopsy 3/9

## 2023-03-10 NOTE — PLAN OF CARE
[facility] - Cardiac Medical ICU  Initial Discharge Assessment       Primary Care Provider: Primary Doctor No    Admission Diagnosis: Large cell lymphoma [C85.80]    Admission Date: 2/25/2023  Expected Discharge Date: 3/13/2023         Payor: MEDICAID / Plan: [place] [email or web address] / Product Type: Managed Medicaid /     Extended Emergency Contact Information  Primary Emergency Contact: Sundar Echevarria   [place]  Mobile Phone: [phone]  Relation: Spouse    Discharge Plan A: Home with family  Discharge Plan B: Home      [facility] - [place], [place] - [address]  [address]  [place] [address]  Phone: [phone] Fax: [phone]    [facility] - [place], [place] - [address]  [address] [place] [address]  Phone: [phone] Fax: [phone]    CM spoke with Sundar Echevarria (spouse) [phone] via phone for Discharge Planning Assessment. Patient was unable to answer questions due to being out of room in scan. Per Sundar, patient and she live in a 2-story home with 1st floor bedroom, and 0 steps to enter residence. Per Sundar, patient was independent with ADLs and used no DME for ambulation. Per Sundar, patient is not on dialysis and does not take Coumadin. Patient's spouse Sundar had to cut assessment short, as she was at work and had to stop assessment. CM was able to obtain necessary information needed for assessment. Patient will have help from Sundar Echevarria (spouse) [phone]. Discharge Planning Booklet was left in patient's room by CM. Discharge Planning was discussed with Sundar Echevarria (spouse) [phone] via phone. All questions addressed. CM to follow for further needs.    Initial Assessment (most recent)       Adult Discharge Assessment - 03/10/23 1200          Discharge Assessment    Assessment Type Discharge Planning Assessment     Confirmed/corrected address, phone number and insurance Yes     Confirmed Demographics Correct on Facesheet     Source of  Information family     When was your last doctors appointment? 02/13/23     Communicated CATRACHO with patient/caregiver Date not available/Unable to determine     Reason For Admission DLBCL (diffuse large B cell lymphoma)     People in Home spouse     Facility Arrived From: [facility]     Do you expect to return to your current living situation? Yes     Do you have help at home or someone to help you manage your care at home? Yes     Who are your caregiver(s) and their phone number(s)? Gaby Echevarria (spouse) [phone]     Prior to hospitilization cognitive status: Alert/Oriented     Current cognitive status: Unable to Assess   patient off floor in scan.    Equipment Currently Used at Home none     Readmission within 30 days? Yes   Discharged from [facility] 2/25/2023    Patient currently being followed by outpatient case management? No     Do you currently have service(s) that help you manage your care at home? No     Do you take prescription medications? Yes     Do you have prescription coverage? Yes     Coverage MEDICAID - [place] [facility]     Do you have any problems affording any of your prescribed medications? No     Is the patient taking medications as prescribed? yes     Who is going to help you get home at discharge? Gaby Echevarria (spouse) [phone]     How do you get to doctors appointments? car, drives self     Are you on dialysis? No     Do you take coumadin? No     Discharge Plan A Home with family     Discharge Plan B Home     DME Needed Upon Discharge  other (see comments)   TBD    Discharge Plan discussed with: Spouse/sig other     Name(s) and Number(s) Gaby Echevarria (spouse) [phone]        Physical Activity    On average, how many days per week do you engage in moderate to strenuous exercise (like a brisk walk)? --   unable to assess patient out of room for scan, spouse at work and could not finish answering quetions.    On average, how many minutes do you engage in  exercise at this level? --   unable to assess patient out of room for scan, spouse at work and could not finish answering quetions.       Financial Resource Strain    How hard is it for you to pay for the very basics like food, housing, medical care, and heating? --   unable to assess patient out of room for scan, spouse at work and could not finish answering quetions.       Housing Stability    In the last 12 months, was there a time when you were not able to pay the mortgage or rent on time? --   unable to assess patient out of room for scan, spouse at work and could not finish answering quetions.    In the last 12 months, how many places have you lived? --   unable to assess patient out of room for scan, spouse at work and could not finish answering quetions.    In the last 12 months, was there a time when you did not have a steady place to sleep or slept in a shelter (including now)? --   unable to assess patient out of room for scan, spouse at work and could not finish answering quetions.       Transportation Needs    In the past 12 months, has lack of transportation kept you from medical appointments or from getting medications? --   unable to assess patient out of room for scan, spouse at work and could not finish answering quetions.    In the past 12 months, has lack of transportation kept you from meetings, work, or from getting things needed for daily living? --   unable to assess patient out of room for scan, spouse at work and could not finish answering quetions.       Food Insecurity    Within the past 12 months, you worried that your food would run out before you got the money to buy more. --   unable to assess patient out of room for scan, spouse at work and could not finish answering quetions.    Within the past 12 months, the food you bought just didn't last and you didn't have money to get more. --   unable to assess patient out of room for scan, spouse at work and could not finish answering  quetions.       Stress    Do you feel stress - tense, restless, nervous, or anxious, or unable to sleep at night because your mind is troubled all the time - these days? --   unable to assess patient out of room for scan, spouse at work and could not finish answering quetions.       Social Connections    In a typical week, how many times do you talk on the phone with family, friends, or neighbors? --   unable to assess patient out of room for scan, spouse at work and could not finish answering quetions.    How often do you get together with friends or relatives? --   unable to assess patient out of room for scan, spouse at work and could not finish answering quetions.    How often do you attend Protestant or Muslim services? --   unable to assess patient out of room for scan, spouse at work and could not finish answering quetions.    Do you belong to any clubs or organizations such as Protestant groups, unions, fraternal or athletic groups, or school groups? --   unable to assess patient out of room for scan, spouse at work and could not finish answering quetions.    How often do you attend meetings of the clubs or organizations you belong to? --   unable to assess patient out of room for scan, spouse at work and could not finish answering quetions.    Are you , , , , never , or living with a partner?         Alcohol Use    Q1: How often do you have a drink containing alcohol? --   unable to assess patient out of room for scan, spouse at work and could not finish answering quetions.    Q2: How many drinks containing alcohol do you have on a typical day when you are drinking? --   unable to assess patient out of room for scan, spouse at work and could not finish answering quetions.    Q3: How often do you have six or more drinks on one occasion? --   unable to assess patient out of room for scan, spouse at work and could not finish answering quetions.       OTHER    Name(s) of  People in Home Gaby Echevarria (spouse) [phone]                          PCP:  Primary Doctor No  None        Pharmacy:    [facility] - [place] - [address]  [address] [place] [address]  Phone: [phone] Fax: [phone]    [facility] - [place] - [address]  [id number]  [place] [address]  Phone: [phone] Fax: [phone]        Emergency Contacts:  Extended Emergency Contact Information  Primary Emergency Contact: Sundar Echevarria   [place]  Mobile Phone: [phone]  Relation: Spouse      Insurance:    Payor: MEDICAID / Plan: [facility] CONNECT / Product Type: Managed Medicaid /     Juana Pat RN     [phone]      03/10/2023  12:15 PM

## 2023-03-10 NOTE — PLAN OF CARE
Problem: Adult Inpatient Plan of Care  Goal: Plan of Care Review  Outcome: Ongoing, Progressing  Flowsheets (Taken 3/10/2023 1744)  Plan of Care Reviewed With:   patient   daughter     Problem: Adjustment to Illness (Sepsis/Septic Shock)  Goal: Optimal Coping  Outcome: Ongoing, Progressing  Intervention: Optimize Psychosocial Adjustment to Illness  Flowsheets (Taken 3/10/2023 1744)  Supportive Measures:   active listening utilized   decision-making supported   goal-setting facilitated   positive reinforcement provided   problem-solving facilitated   relaxation techniques promoted   self-care encouraged   self-responsibility promoted   self-reflection promoted   verbalization of feelings encouraged  Family/Support System Care: support provided     Problem: Bleeding (Sepsis/Septic Shock)  Goal: Absence of Bleeding  Outcome: Ongoing, Progressing  Intervention: Monitor and Manage Bleeding  Flowsheets (Taken 3/10/2023 1744)  Bleeding Precautions:   blood pressure closely monitored   coagulation study results reviewed   foot protection facilitated   gentle oral care promoted   monitored for signs of bleeding  Bleeding Management: blood products administered     Problem: Glycemic Control Impaired (Sepsis/Septic Shock)  Goal: Blood Glucose Level Within Desired Range  Outcome: Ongoing, Progressing  Intervention: Optimize Glycemic Control  Flowsheets (Taken 3/10/2023 1744)  Glycemic Management: blood glucose monitored     Problem: Infection  Goal: Absence of Infection Signs and Symptoms  Outcome: Ongoing, Progressing  Intervention: Prevent or Manage Infection  Flowsheets (Taken 3/10/2023 1744)  Fever Reduction/Comfort Measures:   lightweight clothing   lightweight bedding  Infection Management: aseptic technique maintained  Isolation Precautions:   precautions initiated   precautions maintained   precautions discontinued     Problem: Skin Injury Risk Increased  Goal: Skin Health and Integrity  Outcome: Ongoing,  Progressing  Intervention: Optimize Skin Protection  Flowsheets (Taken 3/10/2023 3759)  Pressure Reduction Techniques: frequent weight shift encouraged  Pressure Reduction Devices:   specialty bed utilized   pressure-redistributing mattress utilized  Skin Protection:   transparent dressing maintained   tubing/devices free from skin contact  Head of Bed (HOB) Positioning: HOB at 30-45 degrees

## 2023-03-10 NOTE — ASSESSMENT & PLAN NOTE
- admitted to MICU 3/10  - intermittent hypotension s/o poor PO and significant tumor burden and malignancy. Responsive to IVF and prn blood transfusions  - abx for recurrent fevers

## 2023-03-10 NOTE — SUBJECTIVE & OBJECTIVE
Subjective:     Interval History: Transferred to MICU overnight for ongoing hypotension. Will start DHAP following PET today.     Objective:     Vital Signs (Most Recent):  Temp: 98.8 °F (37.1 °C) (03/10/23 0400)  Pulse: (!) 120 (03/10/23 1400)  Resp: (!) 21 (03/10/23 1400)  BP: 100/65 (03/10/23 1400)  SpO2: 100 % (03/10/23 1400)   Vital Signs (24h Range):  Temp:  [98 °F (36.7 °C)-99.2 °F (37.3 °C)] 98.8 °F (37.1 °C)  Pulse:  [115-144] 120  Resp:  [12-32] 21  SpO2:  [93 %-100 %] 100 %  BP: ()/(50-68) 100/65     Weight: 93.4 kg (206 lb)  Body mass index is 27.18 kg/m².  Body surface area is 2.19 meters squared.    ECOG SCORE             Intake/Output - Last 3 Shifts         03/08 0700  03/09 0659 03/09 0700  03/10 0659 03/10 0700  03/11 0659    P.O.  200     I.V. (mL/kg) 0 (0) 17.3 (0.2)     Blood  339.5     IV Piggyback 3506.8 396.7     Total Intake(mL/kg) 3506.8 (38.1) 953.5 (10.4)     Urine (mL/kg/hr) 600 (0.3) 300 (0.1)     Total Output 600 300     Net +2906.8 +653.5            Urine Occurrence 3 x 2 x     Stool Occurrence 3 x 0 x             Physical Exam  Constitutional:       General: He is not in acute distress.     Appearance: He is well-developed. He is ill-appearing. He is not diaphoretic.   HENT:      Head: Normocephalic and atraumatic.   Eyes:      General: No scleral icterus.     Conjunctiva/sclera: Conjunctivae normal.   Cardiovascular:      Rate and Rhythm: Normal rate and regular rhythm.      Heart sounds: Normal heart sounds. No murmur heard.    No friction rub. No gallop.   Pulmonary:      Effort: Pulmonary effort is normal. No respiratory distress.      Breath sounds: Normal breath sounds. No wheezing or rales.   Abdominal:      General: Bowel sounds are normal. There is distension.      Tenderness: There is abdominal tenderness.   Musculoskeletal:         General: Normal range of motion.      Cervical back: Normal range of motion and neck supple.   Lymphadenopathy:      Cervical: No  cervical adenopathy.   Skin:     General: Skin is warm and dry.      Findings: No rash.   Neurological:      Mental Status: He is alert and oriented to person, place, and time.   Psychiatric:         Behavior: Behavior normal.       Significant Labs:   CBC:   Recent Labs   Lab 03/09/23  0410 03/09/23  1743 03/10/23  0409   WBC 2.40* 2.72* 3.28*   HGB 6.7* 8.3* 8.3*   HCT 20.8* 24.6* 25.6*   PLT 10* 8* 13*      and CMP:   Recent Labs   Lab 03/09/23  0410 03/10/23  0409   * 129*   K 4.0 4.0   CL 99 100   CO2 26 24   * 98   BUN 17 17   CREATININE 0.6 0.6   CALCIUM 7.4* 7.9*   PROT 3.6* 4.0*   ALBUMIN 1.7* 1.7*   BILITOT 3.6* 2.6*   ALKPHOS 67 70   AST 50* 69*   ALT 16 23   ANIONGAP 6* 5*         Diagnostic Results:  I have reviewed all pertinent imaging results/findings within the past 24 hours.

## 2023-03-10 NOTE — ASSESSMENT & PLAN NOTE
Nutrition consulted. Most recent weight and BMI monitored-     Malnutrition Type and Energy Intake  Malnutrition Type: chronic illness         Final Summary  Subcutaneous Fat Loss (Final Summary): moderate protein-calorie malnutrition  Muscle Loss Evaluation (Final Summary): moderate protein-calorie malnutrition         Measurements:  Wt Readings from Last 1 Encounters:   03/10/23 93.4 kg (206 lb)   Body mass index is 27.18 kg/m².    Recommendations: Recommendation/Intervention: 1. ADAT to Regular diet. Add Diabetic modifier, if warranted. 2. Continue Boost Glucose Control TID. 3. Add Beneprotein to optimize calorie/protein intake. 4. RD to monitor and follow-up.  Goals: Meet % EEN.    Patient has been screened and assessed by RD. RD will follow patient.

## 2023-03-10 NOTE — ASSESSMENT & PLAN NOTE
Nutrition consulted. Most recent weight and BMI monitored-     Malnutrition Type and Energy Intake  Malnutrition Type: chronic illness         Final Summary  Subcutaneous Fat Loss (Final Summary): moderate protein-calorie malnutrition  Muscle Loss Evaluation (Final Summary): moderate protein-calorie malnutrition         Measurements:  Wt Readings from Last 1 Encounters:   03/07/23 92.1 kg (203 lb)   Body mass index is 26.78 kg/m².    Recommendations: Recommendation/Intervention: 1. ADAT to Regular diet. Add Diabetic modifier, if warranted. 2. Continue Boost Glucose Control TID. 3. Add Beneprotein to optimize calorie/protein intake. 4. RD to monitor and follow-up.  Goals: Meet % EEN.    Patient has been screened and assessed by RD. RD will follow patient.    -- Encourage PO intake

## 2023-03-10 NOTE — ASSESSMENT & PLAN NOTE
Hypotensive and seen by rapid response on 3/9, received IVF resuscitation and 1 unit PRBC.  MAP remained <65 so decision made to transfer to MICU.  Febrile up to 102 on 3/8, no leukocytosis.  LA 2.4.  Suspect septic shock in the setting of immunosuppression.  Recurrent fevers throughout hospitalization which could also be secondary to known malignancy.  CXR with atelectasis.  Infectious workup repeated.      -- Follow cultures  -- Continue broad spectrum abx  -- Will start vasopressors to maintain MAP >65  -- Trend [place]

## 2023-03-10 NOTE — ASSESSMENT & PLAN NOTE
Per CRS: this is due to dehydration because the IVC is flat throughout its course rather than just at one level.  Treated with IVF. And encouraging PO intake.  No obstruction.  No plans for surgical intervention.

## 2023-03-10 NOTE — ASSESSMENT & PLAN NOTE
Suspected due to marrow involvment of lymphoma.  Last chemo 1/12/23.      -- Trend CBC  -- Transfuse to keep >plt >10 or >50 given pre-procedure/bleeding  -- Maintain type and screen

## 2023-03-10 NOTE — CARE UPDATE
RAPID RESPONSE NURSE FOLLOW-UP NOTE       Followed up with patient for proactive rounding.  Patient continues to be hypotensive, consult placed to critical care medicine. Patient to transfer to ICU bed 6090 Reviewed plan of care with Charge RNKay .   Team will continue to follow.  Please call Rapid Response RN, Anna Walters RN with any questions or concerns at [phone].

## 2023-03-10 NOTE — ASSESSMENT & PLAN NOTE
On 3/4 patient with sudden onset afib with RVR rate to the 200s. Patient asymptomatic, BP at baseline. Pt given Lopressor IV 5mg x3 with moderate response in HR to 150s. Loaded with amio and placed on gtt. No prior hx of a-fib. No LAE on recent echo. Discontinued amio gtt after 24 hours. Will not transition to PO amio given concern for conversion to SR as patient unable to be anticoagulated given severe thrombocytopenia. Rate controlled with PO lopressor.    On 3/6 pt with repeated RVR. Cardiology consulted for assistance with management, given contraindication to anticoagulation and soft BP. EP on board as well per cardiology. Started dig load, transutioned 3/7 to PO amiodarone per EP.     -- Scheduled PO lopressor stopped with hypotension requiring pressors  -- Continue PO amio  -- Continuous cardiac monitoring  -- Trend BMP, replete lytes PRN

## 2023-03-10 NOTE — PROGRESS NOTES
Pharmacist Patient Education Note    R-DHAP chemotherapy regimen was discussed with the patient and his family member. Medication handouts for each agent were provided to the patient.    Administration instructions were discussed including:  Dexamethasone taken by mouth on days 1-4 (patient will leave the hospital with a prescription to complete)  Cisplatin given over 24 hours on day 1   Cytarabine given twice 12 hours apart and administered over 3 hours on day 2  Rituximab given in clinic on day 4  Neulasta injection given in clinic on day 4    The following side effects were discussed:  -     Prevention and treatment of nausea/vomiting  -     Myelosuppression and prophylaxis against infection while counts are low (including administration of growth factor)   -     Diarrhea/Constipation  -     Nephrotoxicity with cisplatin   -     Ototoxicity with cisplatin  -     Neurotoxicity from high dose cytarabine   -     Chemical conjunctivitis with cytarabine and use of steroid eye drops  -     Fatigue  -     Mucositis  -     Changes in blood pressure and blood glucose, mood swings, increased appetite, and trouble sleeping from dexamethasone  -     Infusion reactions with rituximab (including use of premedications); it was also discussed that if patient tolerates the first infusion of rituximab without issues that the second infusion can be converted to the subcutaneous hycela formulation  -     Monitoring and potential changes of hepatic and renal function while receiving chemotherapy     Drug-drug interactions: None identified.     All questions were answered.    Audrey Kessler, PharmD  PGY2 Oncology Pharmacy Resident  Ext. [phone]

## 2023-03-10 NOTE — ASSESSMENT & PLAN NOTE
On 3/4 patient with sudden onset afib with RVR rate to the 200s. Patient asymptomatic, BP at baseline. Pt given Lopressor IV 5mg x3 with moderate response in HR to 150s. Loaded with amio and placed on gtt. No prior hx of a-fib. No LAE on recent echo. Discontinued amio gtt after 24 hours. Will not transition to PO amio given concern for conversion to SR as patient unable to be anticoagulated given severe thrombocytopenia. Rate controlled with PO lopressor.    On 3/6 pt with repeated RVR. Cardiology consulted for assistance with management, given contraindication to anticoagulation and soft BP. EP on board as well per cardiology. Started dig load, transutioned 3/7 to PO amiodarone per EP.     -- scheduled PO lopressor 50mg q6h, PO amiodarone 200mg BID  -- prn IV lopressor for a-fib with HR sustaining >130s  -- anticipate transition to Toprol if continued adequate rate control    -- currently holding scheduled lopressor given shock

## 2023-03-10 NOTE — SUBJECTIVE & OBJECTIVE
Past Medical History:   Diagnosis Date    Cancer     Diabetes mellitus     Digestive disorder     Prediabetes        Past Surgical History:   Procedure Laterality Date    APPENDECTOMY  07/15/2014    COLONOSCOPY      COLONOSCOPY N/A 02/09/2022    ERROR.   He did not have a colonoscopy with Dr. Sanders.    COLONOSCOPY N/A 09/01/2022    Procedure: COLONOSCOPY;  Surgeon: Andrea Clemens MD;  Location: [facility] ENDO;  Service: Endoscopy;  Laterality: N/A;    FLEXIBLE SIGMOIDOSCOPY N/A 11/7/2022    Procedure: SIGMOIDOSCOPY, FLEXIBLE;  Surgeon: Manuel Sanders MD;  Location: [facility] ENDO;  Service: Endoscopy;  Laterality: N/A;    INCISION AND DRAINAGE OF ABDOMEN N/A 09/14/2022    Procedure: INCISION AND DRAINAGE, ABDOMEN;  Surgeon: Jan Oliveira Jr., MD;  Location: [facility];  Service: General;  Laterality: N/A;    INSERTION OF TUNNELED CENTRAL VENOUS CATHETER (CVC) WITH SUBCUTANEOUS PORT N/A 10/31/2022    Procedure: [id number];  Surgeon: Jan Oliveira Jr., MD;  Location: [facility] OR;  Service: General;  Laterality: N/A;    LAPAROSCOPIC DRAINAGE OF ABDOMEN N/A 09/23/2022    Procedure: DRAINAGE, ABDOMEN, LAPAROSCOPIC;  Surgeon: Jan Oliveira Jr., MD;  Location: [facility];  Service: General;  Laterality: N/A;       Review of patient's allergies indicates:   Allergen Reactions    Albuterol (bulk) Palpitations       Family History       Problem Relation (Age of Onset)    Cancer Mother    Diabetes Mother    Heart murmur Sister    Hypertension Mother    Seizures Father, Sister          Tobacco Use    Smoking status: Never    Smokeless tobacco: Never   Substance and Sexual Activity    Alcohol use: Not Currently     Comment: occasional    Drug use: No    Sexual activity: Yes     Partners: Female      Review of Systems   Unable to perform ROS: Other   Objective:     Vital Signs (Most Recent):  Temp: 99.2 °F (37.3 °C) (03/09/23 1739)  Pulse: (!) 127 (03/09/23 1816)  Resp: (!) 22 (03/09/23 1739)  BP: (!) 84/51  (03/09/23 1816)  SpO2: 100 % (03/09/23 1739)   Vital Signs (24h Range):  Temp:  [98.2 °F (36.8 °C)-102.9 °F (39.4 °C)] 99.2 °F (37.3 °C)  Pulse:  [] 127  Resp:  [16-27] 22  SpO2:  [88 %-100 %] 100 %  BP: ()/(50-65) 84/51   Weight: 92.1 kg (203 lb)  Body mass index is 26.78 kg/m².      Intake/Output Summary (Last 24 hours) at 3/9/2023 1843  Last data filed at 3/9/2023 1328  Gross per 24 hour   Intake 3246.45 ml   Output --   Net 3246.45 ml       Physical Exam  Vitals and nursing note reviewed.   Constitutional:       Appearance: He is well-developed. He is ill-appearing.   HENT:      Head: Normocephalic.      Mouth/Throat:      Mouth: Mucous membranes are moist.   Cardiovascular:      Rate and Rhythm: Regular rhythm. Tachycardia present.      Pulses: Normal pulses.      Comments: Slight edema to hands and feet  Pulmonary:      Effort: Pulmonary effort is normal. Tachypnea present.      Breath sounds: Normal breath sounds and air entry.   Abdominal:      General: Bowel sounds are normal. There is no distension.      Palpations: Abdomen is soft.      Tenderness: There is no abdominal tenderness.   Musculoskeletal:         General: Swelling present.      Right lower leg: Edema present.      Left lower leg: Edema present.   Skin:     General: Skin is warm and dry.      Comments: Multiple tattoos   Neurological:      Mental Status: He is lethargic.       Vents:     Lines/Drains/Airways       Central Venous Catheter Line  Duration                  PowerPort A Cath Single Lumen Subclavian Right -- days              Drain  Duration             Male External Urinary Catheter 03/08/23 2216 <1 day              Peripheral Intravenous Line  Duration                  Midline Catheter Insertion/Assessment  - Single Lumen 02/26/23 1257 Right basilic vein (medial side of arm)  11 days                  Significant Labs:    CBC/Anemia Profile:  Recent Labs   Lab 03/09/23  0032 03/09/23  0410 03/09/23  1743   WBC 2.70* 2.40*  2.72*   HGB 7.2* 6.7* 8.3*   HCT 22.5* 20.8* 24.6*   PLT 12* 10* 8*   MCV 92 92 91   RDW 20.3* 20.4* 18.6*        Chemistries:  Recent Labs   Lab 03/08/23  0339 03/09/23  0410   * 131*   K 4.0 4.0   CL 98 99   CO2 27 26   BUN 20 17   CREATININE 0.6 0.6   CALCIUM 7.9* 7.4*   ALBUMIN 2.1* 1.7*   PROT 4.4* 3.6*   BILITOT 3.3* 3.6*   ALKPHOS 80 67   ALT 15 16   AST 38 50*   MG 1.7 1.6   PHOS 3.8 3.2       All pertinent labs within the past 24 hours have been reviewed.    Significant Imaging: I have reviewed all pertinent imaging results/findings within the past 24 hours.

## 2023-03-11 NOTE — ASSESSMENT & PLAN NOTE
Patient with small bowel resection approximately 5 months ago consistent with Diffuse large-B cell lymphoma, non germinal center origin. Additionally had omentum resection also showing DLBCL. Had bone marrow biopsy approximately 5 months ago that showed normo cellular marrow for age, no increase in blasts. Patient had PET scan from 1/11/23 showing hypermetabolic loops of small bowel most consistent with previous known disease and was thought to have improved (partial response to therapy noted from chart review) and was recommended he continue with R-CHOP at that time. Patient hospitalized at [facility] found to have likely worsening of lymphoma based on CT scan of abdomen showing partial compression of IVC. Transferred to [facility] for further evaluation given uncertainty in responsiveness to first-line chemo therapy.    --planning for alternative chemo regimen given progression   --patient with lower abdominal pain: controlled with PCA pump  --continue acyclovir, allopurinol  --Per last CT abdomen: Interval increase in size of the centrally necrotic ileocecal lymphomatous mass. Superimposed infection within the necrotic mass cannot be excluded.  - changed steroid to pulse dose dexamethasone on 3/2, now completed.   - Rad onc consulted for radiation therapy. Pt underwent sim on 3/2, however holding off on radiation at this time.  - attempted LN bx with IR on 3/6 however cancelled due to patient entering AFRVR. Attempted bx with GI on 3/7 however patient did not complete prep so unable to scope.  - IR completed mesenteric LN biopsy on 3/7.  - s/p BMBx on 3/9  - obtained new baseline PET inpatient for treatment planning, disease response monitoring and pretransplant planning on 3/10  - starting DHAP IP on 3/10  - q12h TLS labs

## 2023-03-11 NOTE — PROGRESS NOTES
[facility] - Cardiac Medical ICU  Critical Care Medicine  Progress Note    Patient Name: Dwight Hoffmann  MRN: [id number]  Admission Date: 2/25/2023  Hospital Length of Stay: 14 days  Code Status: Full Code  Attending Provider: Terrance Solis MD  Primary Care Provider: Margarita Pacheco MD   Principal Problem: DLBCL (diffuse large B cell lymphoma)    Subjective:     HPI:  Mr. Dwight Hoffmann is a 52 y.o. man with a history of DM, anemia, DLBCL, HLD, who was admitted as a transfer from [facility] presenting with refractory lymphoma and compression of IVC.  Transferred to [facility] for biopsy and to assess for any transfortmation or disease process givn lack of response to chemo and consideration of second line chemo.  Patient with CT from outside hospital concerning for compression of vena cava from lymphadenopathy.  Cardiology consulted at OSH for brief episode of SVT prior to transfer, and noted last echo from 2/8/23 was within normal. General surgery saw patient at OSH as well for concern of obstruction from lymphadenopathy but noted there was not complete obstruction. General surgery did not feel at the time the lymphadenopathy would be able to be safely removed or debulked from vena cava. There was some discussion about possible ileocecal shunt placement, but did not have capability at OSH. Admitted to BMT and was planning for alternative chemo regimen given progression, likely DHAP as inpatient.  Bone marrow aspiration and biopsy done 3/9 for restaging and patient had been scheduled for new baseline PET scan.     On 3/9 patient seen by rapid response for hypotension.  Received ~2L IVF and 1 unit PRBC with no improvement in BP and MAP consistently <65.  Critical Care Medicine consulted and patient transferred to MICU.        Hospital/ICU Course:  Admitted to MICU with hypotension requiring vasopressors.  Pressors on briefly 3/10 able to be weaned off.  Went for PET scan 3/10 and receiving first dose of  chemo. Stable to return to BMT.      Interval History/Significant Events: Off pressors since yesterday; stable    Review of Systems   Constitutional:  Negative for fever.   Respiratory:  Negative for cough and shortness of breath.    Gastrointestinal:  Positive for abdominal pain and constipation. Negative for nausea and vomiting.   Musculoskeletal:  Negative for myalgias.   Objective:     Vital Signs (Most Recent):  Temp: 97.7 °F (36.5 °C) (03/11/23 0700)  Pulse: 96 (03/11/23 0900)  Resp: (!) 26 (03/11/23 1113)  BP: 102/61 (03/11/23 0900)  SpO2: 98 % (03/11/23 0900)   Vital Signs (24h Range):  Temp:  [97.7 °F (36.5 °C)-98.3 °F (36.8 °C)] 97.7 °F (36.5 °C)  Pulse:  [] 96  Resp:  [12-33] 26  SpO2:  [97 %-100 %] 98 %  BP: ()/(51-69) 102/61   Weight: 93.4 kg (206 lb)  Body mass index is 27.18 kg/m².      Intake/Output Summary (Last 24 hours) at 3/11/2023 1147  Last data filed at 3/11/2023 0655  Gross per 24 hour   Intake 2579.7 ml   Output 450 ml   Net 2129.7 ml       Physical Exam  Constitutional:       General: He is sleeping.      Appearance: He is ill-appearing.   HENT:      Head: Normocephalic and atraumatic.   Cardiovascular:      Rate and Rhythm: Normal rate.      Heart sounds: S1 normal and S2 normal. No murmur heard.  Pulmonary:      Effort: Pulmonary effort is normal. No tachypnea or respiratory distress.      Breath sounds: Normal breath sounds. No decreased breath sounds, wheezing, rhonchi or rales.   Abdominal:      General: There is distension.      Palpations: Abdomen is soft.      Tenderness: There is generalized abdominal tenderness. There is no guarding or rebound.   Musculoskeletal:      Cervical back: Neck supple.   Neurological:      Mental Status: He is easily aroused.      GCS: GCS eye subscore is 4. GCS verbal subscore is 5. GCS motor subscore is 6.       Vents:  Oxygen Concentration (%): 24 (03/11/23 0841)  Lines/Drains/Airways       Central Venous Catheter Line  Duration                   PowerPort A Cath Single Lumen Subclavian Right -- days              Peripheral Intravenous Line  Duration                  Midline Catheter Insertion/Assessment  - Single Lumen 02/26/23 1257 Right basilic vein (medial side of arm)  12 days         Peripheral IV - Single Lumen 03/11/23 0700 18 G Anterior;Right Upper Arm <1 day                  Significant Labs:    CBC/Anemia Profile:  Recent Labs   Lab 03/10/23  0409 03/10/23  1732 03/11/23  0622   WBC 3.28* 2.10* 1.89*   HGB 8.3* 7.0* 7.5*   HCT 25.6* 22.3* 23.8*   PLT 13* 9* 35*   MCV 92 94 93   RDW 18.7* 18.8* 18.6*        Chemistries:  Recent Labs   Lab 03/10/23  0409 03/10/23  1732 03/11/23  0622   * 125* 127*   K 4.0 4.2 4.4    97 98   CO2 24 21* 21*   BUN 17 22* 20   CREATININE 0.6 0.7 0.6   CALCIUM 7.9* 7.6* 8.3*   ALBUMIN 1.7* 1.4* 1.7*   PROT 4.0* 3.5* 4.4*   BILITOT 2.6* 2.3* 2.4*   ALKPHOS 70 67 79   ALT 23 23 25   AST 69* 58* 58*   MG 1.8 1.7 1.8   PHOS 3.1 3.5 3.3       All pertinent labs within the past 24 hours have been reviewed.    Significant Imaging:  I have reviewed all pertinent imaging results/findings within the past 24 hours.      ABG  No results for input(s): PH, PO2, PCO2, HCO3, BE in the last 168 hours.  Assessment/Plan:     Cardiac/Vascular  Shock, unspecified  Hypotensive and seen by rapid response on 3/9, received IVF resuscitation and 1 unit PRBC.  MAP remained <65 so decision made to transfer to MICU.  Febrile up to 102 on 3/8, no leukocytosis.  LA 2.4.  Suspect septic shock in the setting of immunosuppression.  Recurrent fevers throughout hospitalization which could also be secondary to known malignancy.  CXR with atelectasis.  Infectious workup repeated.      -- Follow cultures  -- Continue broad spectrum abx  -- Receiving IVF with chemo, appears dry follow for additional fluid needs    Atrial fibrillation with RVR  On 3/4 patient with sudden onset afib with RVR rate to the 200s. Patient asymptomatic, BP at  baseline. Pt given Lopressor IV 5mg x3 with moderate response in HR to 150s. Loaded with amio and placed on gtt. No prior hx of a-fib. No LAE on recent echo. Discontinued amio gtt after 24 hours. Will not transition to PO amio given concern for conversion to SR as patient unable to be anticoagulated given severe thrombocytopenia. Rate controlled with PO lopressor.    On 3/6 pt with repeated RVR. Cardiology consulted for assistance with management, given contraindication to anticoagulation and soft BP. EP on board as well per cardiology. Started dig load, transutioned 3/7 to PO amiodarone per EP.     -- Scheduled PO lopressor stopped with hypotension requiring pressors  -- Continue PO amio  -- Continuous cardiac monitoring  -- Trend BMP, replete lytes PRN    Compression of vena cava  Per CRS: this is due to dehydration because the IVC is flat throughout its course rather than just at one level.  Treated with IVF. And encouraging PO intake.  No obstruction.  No plans for surgical intervention.     -- Received IVF on 3/10, appears dry on exam    Renal/  Hyponatremia  -- Trend BMP    Hematology  Thrombocytopenia  Suspected due to marrow involvment of lymphoma.  Last chemo 1/12/23.      -- Trend CBC  -- Transfuse to keep >plt >10 or >50 given pre-procedure/bleeding  -- Maintain type and screen    Oncology  * DLBCL (diffuse large B cell lymphoma)  Patient with small bowel resection approximately 5 months ago consistent with Diffuse large-B cell lymphoma, non germinal center origin. Additionally had omentum resection also showing DLBCL. Had bone marrow biopsy approximately 5 months ago that showed normo cellular marrow for age, no increase in blasts. Patient had PET scan from 1/11/23 showing hypermetabolic loops of small bowel most consistent with previous known disease and was thought to have improved (partial response to therapy noted from chart review) and was recommended he continue with R-CHOP at that time. Patient  hospitalized at [facility] found to have likely worsening of lymphoma based on CT scan of abdomen showing partial compression of IVC. [place] [facility] [place] given uncertainty in responsiveness to first-line chemo therapy.    Per BMT:     --planning for alternative chemo regimen given progression   --patient with lower abdominal pain: controlled with PCA pump  --continue acyclovir, allopurinol  --Per last CT abdomen: Interval increase in size of the centrally necrotic ileocecal lymphomatous mass. Superimposed infection within the necrotic mass cannot be excluded.  - changed steroid to pulse dose dexamethasone on 3/2, now completed.   - Rad onc consulted for radiation therapy. Pt underwent sim on 3/2, however holding off on radiation at this time.  - attempted LN bx with IR on 3/6 however cancelled due to patient entering AFRVR. Attempted bx with GI on 3/7 however patient did not complete prep so unable to scope.  - IR completed mesenteric LN biopsy on 3/7.  - started DHAP IP on 3/10  - PET scan completed on 3/10  - plan for re-staging bone marrow aspiration and biopsy done on 3/9     Large cell lymphoma  See DLBCL.      Anemia  -- Trend CBC  -- Maintain active type and screen  -- Transfuse for Hgb <7 or active bleeding    Endocrine  Moderate malnutrition  Nutrition consulted. Most recent weight and BMI monitored-     Malnutrition Type and Energy Intake  Malnutrition Type: chronic illness         Final Summary  Subcutaneous Fat Loss (Final Summary): moderate protein-calorie malnutrition  Muscle Loss Evaluation (Final Summary): moderate protein-calorie malnutrition         Measurements:  Wt Readings from Last 1 Encounters:   03/10/23 93.4 kg (206 lb)   Body mass index is 27.18 kg/m².    Recommendations: Recommendation/Intervention: 1. ADAT to Regular diet. Add Diabetic modifier, if warranted. 2. Continue Boost Glucose Control TID. 3. Add Beneprotein to optimize calorie/protein intake. 4. RD to  monitor and follow-up.  Goals: Meet % EEN.    Patient has been screened and assessed by RD. RD will follow patient.    -- Encourage PO intake    Diabetes mellitus type 2, noninsulin dependent  -- Accuchecks  -- Hypoglycemic protocol in place  -- SSI    GI  Small bowel mass  S/p  Resection September 2022 found to have DLBCL on pathology.     -- stat CTAP ordered on 2/28 given worsening ab pain to r/o perf, no evidence of perf however interval enlargement of mass   -- see DLBCL for full plan     Other  Fever  See shock.      I have spent 35 min with this patient, with over 50% of this time spent coordinating care and speaking with the family.       Fatuma Sunshine NP  Critical Care Medicine  [facility] - Cardiac Medical ICU

## 2023-03-11 NOTE — ASSESSMENT & PLAN NOTE
Hypotensive and seen by rapid response on 3/9, received IVF resuscitation and 1 unit PRBC.  MAP remained <65 so decision made to transfer to MICU.  Febrile up to 102 on 3/8, no leukocytosis.  LA 2.4.  Suspect septic shock in the setting of immunosuppression.  Recurrent fevers throughout hospitalization which could also be secondary to known malignancy.  CXR with atelectasis.  Infectious workup repeated.      -- Follow cultures  -- Continue broad spectrum abx  -- Receiving IVF with chemo, appears dry follow for additional fluid needs

## 2023-03-11 NOTE — ASSESSMENT & PLAN NOTE
Hypotensive and seen by rapid response on 3/9, received IVF resuscitation and 1 unit PRBC.  MAP remained <65 so decision made to transfer to MICU.  Febrile up to 102 on 3/8, no leukocytosis.  LA 2.4.  Suspect septic shock in the setting of immunosuppression.  Recurrent fevers throughout hospitalization which could also be secondary to known malignancy.  CXR with atelectasis.  Infectious workup repeated.      -- Follow cultures  -- Continue broad spectrum abx  -- Will start vasopressors to maintain MAP >65  -- Trend LA  -- Receiving IVF with chemo, appears dry follow for additional fluid needs

## 2023-03-11 NOTE — SUBJECTIVE & OBJECTIVE
Interval History/Significant Events: No acute events overnight.  Briefly on pressors this morning, now off.  PET scan done, first dose of chemo today.      Review of Systems   Constitutional:  Positive for fatigue. Negative for fever.   Respiratory:  Negative for shortness of breath.    Cardiovascular:  Negative for chest pain.   Gastrointestinal:  Positive for abdominal pain.   Neurological:  Positive for weakness.   Psychiatric/Behavioral:  Negative for agitation and confusion.    Objective:     Vital Signs (Most Recent):  Temp: 97.9 °F (36.6 °C) (03/10/23 1500)  Pulse: (!) 111 (03/10/23 1530)  Resp: 14 (03/10/23 1530)  BP: (!) 82/56 (03/10/23 1530)  SpO2: 100 % (03/10/23 1643)   Vital Signs (24h Range):  Temp:  [97.9 °F (36.6 °C)-98.9 °F (37.2 °C)] 97.9 °F (36.6 °C)  Pulse:  [111-144] 111  Resp:  [12-33] 14  SpO2:  [100 %] 100 %  BP: ()/(51-68) 82/56   Weight: 93.4 kg (206 lb)  Body mass index is 27.18 kg/m².      Intake/Output Summary (Last 24 hours) at 3/10/2023 2033  Last data filed at 3/10/2023 1746  Gross per 24 hour   Intake 1708.74 ml   Output 425 ml   Net 1283.74 ml       Physical Exam  Vitals and nursing note reviewed.   Constitutional:       General: He is not in acute distress.     Appearance: He is well-developed. He is ill-appearing.   HENT:      Head: Normocephalic.      Mouth/Throat:      Mouth: Mucous membranes are moist.   Eyes:      Pupils: Pupils are equal, round, and reactive to light.   Cardiovascular:      Rate and Rhythm: Regular rhythm. Tachycardia present.      Pulses: Normal pulses.      Comments: Slight edema to hands and feet  Pulmonary:      Effort: Pulmonary effort is normal. Tachypnea present. No respiratory distress.      Breath sounds: Normal breath sounds and air entry. No wheezing or rales.   Abdominal:      General: Bowel sounds are normal. There is no distension.      Palpations: Abdomen is soft.      Tenderness: There is no abdominal tenderness.   Musculoskeletal:          General: Swelling present.      Cervical back: Normal range of motion.      Right lower leg: Edema present.      Left lower leg: Edema present.   Skin:     General: Skin is warm and dry.      Capillary Refill: Capillary refill takes less than 2 seconds.      Comments: Multiple tattoos   Neurological:      General: No focal deficit present.      Mental Status: He is alert. Mental status is at baseline.   Psychiatric:         Mood and Affect: Mood normal.         Behavior: Behavior normal.       Vents:  Oxygen Concentration (%): 32 (03/10/23 1309)  Lines/Drains/Airways       Central Venous Catheter Line  Duration                  PowerPort A Cath Single Lumen Subclavian Right -- days              Peripheral Intravenous Line  Duration                  Midline Catheter Insertion/Assessment  - Single Lumen 02/26/23 1257 Right basilic vein (medial side of arm)  12 days                  Significant Labs:    CBC/Anemia Profile:  Recent Labs   Lab 03/09/23  1743 03/10/23  0409 03/10/23  1732   WBC 2.72* 3.28* 2.10*   HGB 8.3* 8.3* 7.0*   HCT 24.6* 25.6* 22.3*   PLT 8* 13* 9*   MCV 91 92 94   RDW 18.6* 18.7* 18.8*        Chemistries:  Recent Labs   Lab 03/09/23  0410 03/10/23  0409 03/10/23  1732   * 129* 125*   K 4.0 4.0 4.2   CL 99 100 97   CO2 26 24 21*   BUN 17 17 22*   CREATININE 0.6 0.6 0.7   CALCIUM 7.4* 7.9* 7.6*   ALBUMIN 1.7* 1.7* 1.4*   PROT 3.6* 4.0* 3.5*   BILITOT 3.6* 2.6* 2.3*   ALKPHOS 67 70 67   ALT 16 23 23   AST 50* 69* 58*   MG 1.6 1.8 1.7   PHOS 3.2 3.1 3.5       All pertinent labs within the past 24 hours have been reviewed.    Significant Imaging:  I have reviewed all pertinent imaging results/findings within the past 24 hours.

## 2023-03-11 NOTE — PROGRESS NOTES
[facility] - Cardiac Medical ICU  Critical Care Medicine  Progress Note    Patient Name: Dwight Hoffmann  MRN: [id number]  Admission Date: 2/25/2023  Hospital Length of Stay: 13 days  Code Status: Full Code  Attending Provider: Terrance Solis MD  Primary Care Provider: Margarita Pacheco MD   Principal Problem: DLBCL (diffuse large B cell lymphoma)    Subjective:     HPI:  Mr. Dwight Hoffmann is a 52 y.o. man with a history of DM, anemia, DLBCL, HLD, who was admitted as a transfer [facility] presenting with refractory lymphoma and compression of IVC.  Transferred to [facility] [place] for biopsy and to assess for any transfortmation or disease process givn lack of response to chemo and consideration of second line chemo.  Patient with CT from outside hospital concerning for compression of vena cava from lymphadenopathy.  Cardiology consulted at OSH for brief episode of SVT prior to transfer, and noted last echo from 2/8/23 was within normal. General surgery saw patient at OSH as well for concern of obstruction from lymphadenopathy but noted there was not complete obstruction. General surgery did not feel at the time the lymphadenopathy would be able to be safely removed or debulked from vena cava. There was some discussion about possible ileocecal shunt placement, but did not have capability at OSH. Admitted to BMT and was planning for alternative chemo regimen given progression, likely DHAP as inpatient.  Bone marrow aspiration and biopsy done 3/9 for restaging and patient had been scheduled for new baseline PET scan.     On 3/9 patient seen by rapid response for hypotension.  Received ~2L IVF and 1 unit PRBC with no improvement in BP and MAP consistently <65.  Critical Care Medicine consulted and patient transferred to MICU.        Hospital/ICU Course:  Admitted to MICU with hypotension requiring vasopressors.  Pressors on briefly 3/10 able to be weaned off.  Went for PET scan 3/10 and receiving first dose of  chemo.        Interval History/Significant Events: No acute events overnight.  Briefly on pressors this morning, now off.  PET scan done, first dose of chemo today.      Review of Systems   Constitutional:  Positive for fatigue. Negative for fever.   Respiratory:  Negative for shortness of breath.    Cardiovascular:  Negative for chest pain.   Gastrointestinal:  Positive for abdominal pain.   Neurological:  Positive for weakness.   Psychiatric/Behavioral:  Negative for agitation and confusion.    Objective:     Vital Signs (Most Recent):  Temp: 97.9 °F (36.6 °C) (03/10/23 1500)  Pulse: (!) 111 (03/10/23 1530)  Resp: 14 (03/10/23 1530)  BP: (!) 82/56 (03/10/23 1530)  SpO2: 100 % (03/10/23 1643)   Vital Signs (24h Range):  Temp:  [97.9 °F (36.6 °C)-98.9 °F (37.2 °C)] 97.9 °F (36.6 °C)  Pulse:  [111-144] 111  Resp:  [12-33] 14  SpO2:  [100 %] 100 %  BP: ()/(51-68) 82/56   Weight: 93.4 kg (206 lb)  Body mass index is 27.18 kg/m².      Intake/Output Summary (Last 24 hours) at 3/10/2023 2033  Last data filed at 3/10/2023 1746  Gross per 24 hour   Intake 1708.74 ml   Output 425 ml   Net 1283.74 ml       Physical Exam  Vitals and nursing note reviewed.   Constitutional:       General: He is not in acute distress.     Appearance: He is well-developed. He is ill-appearing.   HENT:      Head: Normocephalic.      Mouth/Throat:      Mouth: Mucous membranes are moist.   Eyes:      Pupils: Pupils are equal, round, and reactive to light.   Cardiovascular:      Rate and Rhythm: Regular rhythm. Tachycardia present.      Pulses: Normal pulses.      Comments: Slight edema to hands and feet  Pulmonary:      Effort: Pulmonary effort is normal. Tachypnea present. No respiratory distress.      Breath sounds: Normal breath sounds and air entry. No wheezing or rales.   Abdominal:      General: Bowel sounds are normal. There is no distension.      Palpations: Abdomen is soft.      Tenderness: There is no abdominal tenderness.    Musculoskeletal:         General: Swelling present.      Cervical back: Normal range of motion.      Right lower leg: Edema present.      Left lower leg: Edema present.   Skin:     General: Skin is warm and dry.      Capillary Refill: Capillary refill takes less than 2 seconds.      Comments: Multiple tattoos   Neurological:      General: No focal deficit present.      Mental Status: He is alert. Mental status is at baseline.   Psychiatric:         Mood and Affect: Mood normal.         Behavior: Behavior normal.       Vents:  Oxygen Concentration (%): 32 (03/10/23 1309)  Lines/Drains/Airways       Central Venous Catheter Line  Duration                  PowerPort A Cath Single Lumen Subclavian Right -- days              Peripheral Intravenous Line  Duration                  Midline Catheter Insertion/Assessment  - Single Lumen 02/26/23 1257 Right basilic vein (medial side of arm)  12 days                  Significant Labs:    CBC/Anemia Profile:  Recent Labs   Lab 03/09/23  1743 03/10/23  0409 03/10/23  1732   WBC 2.72* 3.28* 2.10*   HGB 8.3* 8.3* 7.0*   HCT 24.6* 25.6* 22.3*   PLT 8* 13* 9*   MCV 91 92 94   RDW 18.6* 18.7* 18.8*        Chemistries:  Recent Labs   Lab 03/09/23  0410 03/10/23  0409 03/10/23  1732   * 129* 125*   K 4.0 4.0 4.2   CL 99 100 97   CO2 26 24 21*   BUN 17 17 22*   CREATININE 0.6 0.6 0.7   CALCIUM 7.4* 7.9* 7.6*   ALBUMIN 1.7* 1.7* 1.4*   PROT 3.6* 4.0* 3.5*   BILITOT 3.6* 2.6* 2.3*   ALKPHOS 67 70 67   ALT 16 23 23   AST 50* 69* 58*   MG 1.6 1.8 1.7   PHOS 3.2 3.1 3.5       All pertinent labs within the past 24 hours have been reviewed.    Significant Imaging:  I have reviewed all pertinent imaging results/findings within the past 24 hours.      ABG  No results for input(s): PH, PO2, PCO2, HCO3, BE in the last 168 hours.  Assessment/Plan:     Cardiac/Vascular  Shock, unspecified  Hypotensive and seen by rapid response on 3/9, received IVF resuscitation and 1 unit PRBC.  MAP remained  <65 so decision made to transfer to MICU.  Febrile up to 102 on 3/8, no leukocytosis.  LA 2.4.  Suspect septic shock in the setting of immunosuppression.  Recurrent fevers throughout hospitalization which could also be secondary to known malignancy.  CXR with atelectasis.  Infectious workup repeated.      -- Follow cultures  -- Continue broad spectrum abx  -- Will start vasopressors to maintain MAP >65  -- Trend LA  -- Receiving IVF with chemo, appears dry follow for additional fluid needs    Atrial fibrillation with RVR  On 3/4 patient with sudden onset afib with RVR rate to the 200s. Patient asymptomatic, BP at baseline. Pt given Lopressor IV 5mg x3 with moderate response in HR to 150s. Loaded with amio and placed on gtt. No prior hx of a-fib. No LAE on recent echo. Discontinued amio gtt after 24 hours. Will not transition to PO amio given concern for conversion to SR as patient unable to be anticoagulated given severe thrombocytopenia. Rate controlled with PO lopressor.    On 3/6 pt with repeated RVR. Cardiology consulted for assistance with management, given contraindication to anticoagulation and soft BP. EP on board as well per cardiology. Started dig load, transutioned 3/7 to PO amiodarone per EP.     -- Scheduled PO lopressor stopped with hypotension requiring pressors  -- Continue PO amio  -- Continuous cardiac monitoring  -- Trend BMP, replete lytes PRN    Compression of vena cava  Per CRS: this is due to dehydration because the IVC is flat throughout its course rather than just at one level.  Treated with IVF. And encouraging PO intake.  No obstruction.  No plans for surgical intervention.     -- Receiving IVF on 3/10, appears dry on exam    Renal/  Hyponatremia  -- Trend BMP    Hematology  Thrombocytopenia  Suspected due to marrow involvment of lymphoma.  Last chemo 1/12/23.      -- Trend CBC  -- Transfuse to keep >plt >10 or >50 given pre-procedure/bleeding  -- Maintain type and screen    Oncology  *  DLBCL (diffuse large B cell lymphoma)  Patient with small bowel resection approximately 5 months ago consistent with Diffuse large-B cell lymphoma, non germinal center origin. Additionally had omentum resection also showing DLBCL. Had bone marrow biopsy approximately 5 months ago that showed normo cellular marrow for age, no increase in blasts. Patient had PET scan from 1/11/23 showing hypermetabolic loops of small bowel most consistent with previous known disease and was thought to have improved (partial response to therapy noted from chart review) and was recommended he continue with R-CHOP at that time. Patient hospitalized at [facility] found to have likely worsening of lymphoma based on CT scan of abdomen showing partial compression of IVC. Transferred to [facility] [place] for further evaluation given uncertainty in responsiveness to first-line chemo therapy.    Per BMT:     --planning for alternative chemo regimen given progression   --patient with lower abdominal pain: controlled with PCA pump  --continue acyclovir, allopurinol  --Per last CT abdomen: Interval increase in size of the centrally necrotic ileocecal lymphomatous mass. Superimposed infection within the necrotic mass cannot be excluded.  - changed steroid to pulse dose dexamethasone on 3/2, now completed.   - Rad onc consulted for radiation therapy. Pt underwent sim on 3/2, however holding off on radiation at this time.  - attempted LN bx with IR on 3/6 however cancelled due to patient entering AFRVR. Attempted bx with GI on 3/7 however patient did not complete prep so unable to scope.  - IR completed mesenteric LN biopsy on 3/7.  - started DHAP IP on 3/10  - PET scan completed on 3/10  - plan for re-staging bone marrow aspiration and biopsy done on 3/9     Large cell lymphoma  See DLBCL.      Anemia  -- Trend CBC  -- Maintain active type and screen  -- Transfuse for Hgb <7 or active bleeding    Endocrine  Moderate malnutrition  Nutrition consulted. Most  recent weight and BMI monitored-     Malnutrition Type and Energy Intake  Malnutrition Type: chronic illness         Final Summary  Subcutaneous Fat Loss (Final Summary): moderate protein-calorie malnutrition  Muscle Loss Evaluation (Final Summary): moderate protein-calorie malnutrition         Measurements:  Wt Readings from Last 1 Encounters:   03/10/23 93.4 kg (206 lb)   Body mass index is 27.18 kg/m².    Recommendations: Recommendation/Intervention: 1. ADAT to Regular diet. Add Diabetic modifier, if warranted. 2. Continue Boost Glucose Control TID. 3. Add Beneprotein to optimize calorie/protein intake. 4. RD to monitor and follow-up.  Goals: Meet % EEN.    Patient has been screened and assessed by RD. RD will follow patient.    -- Encourage PO intake    Diabetes mellitus type 2, noninsulin dependent  -- Accuchecks  -- Hypoglycemic protocol in place  -- SSI    GI  Small bowel mass  S/p  Resection September 2022 found to have DLBCL on pathology.     -- stat CTAP ordered on 2/28 given worsening ab pain to r/o perf, no evidence of perf however interval enlargement of mass   -- see DLBCL for full plan     Other  Fever  See shock.         Critical Care Daily Checklist:    A: Awake: RASS Goal/Actual Goal:    Actual:     B: Spontaneous Breathing Trial Performed?     C: SAT & SBT Coordinated?  n/a                      D: Delirium: CAM-ICU     E: Early Mobility Performed? Yes   F: Feeding Goal: Goals: Meet % EEN.  Status: Nutrition Goal Status: progressing towards goal, new   Current Diet Order   Procedures    Diet Adult Regular (IDDSI Level 7)      AS: Analgesia/Sedation PCA   T: Thromboembolic Prophylaxis No, low plt   H: HOB > 300 Yes   U: Stress Ulcer Prophylaxis (if needed) yes   G: Glucose Control bg goal 140-180   B: Bowel Function Stool Occurrence: 0   I: Indwelling Catheter (Lines & Weir) Necessity PIV, PICC   D: De-escalation of Antimicrobials/Pharmacotherapies Continue abx    Plan for the day/ETD  PET done, chemo today    Code Status:  Family/Goals of Care: Full Code       Critical Care Time: 35 minutes  Critical secondary to Patient has a condition that poses threat to life and bodily function: Shock    Plan discussed with Dr. Solis.        Critical care was time spent personally by me on the following activities: development of treatment plan with patient or surrogate and bedside caregivers, discussions with consultants, evaluation of patient's response to treatment, examination of patient, ordering and performing treatments and interventions, ordering and review of laboratory studies, ordering and review of radiographic studies, pulse oximetry, re-evaluation of patient's condition. This critical care time did not overlap with that of any other provider or involve time for any procedures.    Spoke to patient's daughter at bedside.  Updated on plan of care, addressed questions and concerns.       Janelle Tovar NP  Critical Care Medicine  [facility] - Cardiac Medical ICU

## 2023-03-11 NOTE — SUBJECTIVE & OBJECTIVE
Subjective:     Interval History: Started DHAP yesterday. BP stable today off pressors. Will likely stepdown today.     Objective:     Vital Signs (Most Recent):  Temp: 97.7 °F (36.5 °C) (03/11/23 0700)  Pulse: 96 (03/11/23 0900)  Resp: (!) 26 (03/11/23 1113)  BP: 102/61 (03/11/23 0900)  SpO2: 98 % (03/11/23 0900)   Vital Signs (24h Range):  Temp:  [97.7 °F (36.5 °C)-98 °F (36.7 °C)] 97.7 °F (36.5 °C)  Pulse:  [] 96  Resp:  [12-33] 26  SpO2:  [97 %-100 %] 98 %  BP: ()/(52-69) 102/61     Weight: 93.4 kg (206 lb)  Body mass index is 27.18 kg/m².  Body surface area is 2.19 meters squared.    ECOG SCORE             Intake/Output - Last 3 Shifts         03/09 0700  03/10 0659 03/10 0700 03/11 0659 03/11 0700  03/12 0659    P.O. 200 1200     I.V. (mL/kg) 17.3 (0.2) 1031.7 (11)     Blood 339.5 199.8     IV Piggyback 396.7 148.2     Total Intake(mL/kg) 953.5 (10.4) 2579.7 (27.6)     Urine (mL/kg/hr) 300 (0.1) 450 (0.2)     Total Output 300 450     Net +653.5 +2129.7            Urine Occurrence 2 x      Stool Occurrence 0 x              Physical Exam  Constitutional:       General: He is not in acute distress.     Appearance: He is well-developed. He is ill-appearing. He is not diaphoretic.   HENT:      Head: Normocephalic and atraumatic.   Eyes:      General: No scleral icterus.     Conjunctiva/sclera: Conjunctivae normal.   Cardiovascular:      Rate and Rhythm: Normal rate and regular rhythm.      Heart sounds: Normal heart sounds. No murmur heard.    No friction rub. No gallop.   Pulmonary:      Effort: Pulmonary effort is normal. No respiratory distress.      Breath sounds: Normal breath sounds. No wheezing or rales.   Abdominal:      General: Bowel sounds are normal. There is distension.      Tenderness: There is abdominal tenderness.   Musculoskeletal:         General: Normal range of motion.      Cervical back: Normal range of motion and neck supple.   Lymphadenopathy:      Cervical: No cervical  adenopathy.   Skin:     General: Skin is warm and dry.      Findings: No rash.   Neurological:      Mental Status: He is alert and oriented to person, place, and time.   Psychiatric:         Behavior: Behavior normal.       Significant Labs:   CBC:   Recent Labs   Lab 03/10/23  0409 03/10/23  1732 03/11/23  0622   WBC 3.28* 2.10* 1.89*   HGB 8.3* 7.0* 7.5*   HCT 25.6* 22.3* 23.8*   PLT 13* 9* 35*      and CMP:   Recent Labs   Lab 03/10/23  0409 03/10/23  1732 03/11/23  0622   * 125* 127*   K 4.0 4.2 4.4    97 98   CO2 24 21* 21*   GLU 98 212* 204*   BUN 17 22* 20   CREATININE 0.6 0.7 0.6   CALCIUM 7.9* 7.6* 8.3*   PROT 4.0* 3.5* 4.4*   ALBUMIN 1.7* 1.4* 1.7*   BILITOT 2.6* 2.3* 2.4*   ALKPHOS 70 67 79   AST 69* 58* 58*   ALT 23 23 25   ANIONGAP 5* 7* 8         Diagnostic Results:  I have reviewed all pertinent imaging results/findings within the past 24 hours.

## 2023-03-11 NOTE — HOSPITAL COURSE
Hospital Course:  Mr. Hoffmann was transferred to [facility] [place] [place] from [facility] on 23 for refractory lymphoma with IVC compression. He was admitted to BMT with plans for alternative chemo regimen. Bone marrow biopsy and repeat PET scan completed showing disease progression.  On 3/9 patient seen by rapid response for hypotension.  Received ~2L IVF and 1 unit PRBC with no improvement in BP and MAP consistently <65.  Critical Care Medicine consulted and patient transferred to MICU.  He required vasopressor support for a brief period and returned to BMT service on 3/11 after receiving chemo and repeat PET scan. The following day, rapid response was called again for hypotension, tachypnea; concern for volume overload. Returned to MICU for vasopressor support and diuresis. That evening he was intubated for respiratory distress and continued to decline overnight- requiring max dosing of 3 vasopressors. Blood cultures grew gram-negative rods. Family called in and code status changed to DNR. Mr. Hoffmann  peacefully with his family at his bedside on 3/13/23.

## 2023-03-11 NOTE — ASSESSMENT & PLAN NOTE
Per CRS: this is due to dehydration because the IVC is flat throughout its course rather than just at one level.  Treated with IVF. And encouraging PO intake.  No obstruction.  No plans for surgical intervention.     -- Received IVF on 3/10, appears dry on exam

## 2023-03-11 NOTE — SUBJECTIVE & OBJECTIVE
Interval History/Significant Events: Off pressors since yesterday; stable    Review of Systems   Constitutional:  Negative for fever.   Respiratory:  Negative for cough and shortness of breath.    Gastrointestinal:  Positive for abdominal pain and constipation. Negative for nausea and vomiting.   Musculoskeletal:  Negative for myalgias.   Objective:     Vital Signs (Most Recent):  Temp: 97.7 °F (36.5 °C) (03/11/23 0700)  Pulse: 96 (03/11/23 0900)  Resp: (!) 26 (03/11/23 1113)  BP: 102/61 (03/11/23 0900)  SpO2: 98 % (03/11/23 0900)   Vital Signs (24h Range):  Temp:  [97.7 °F (36.5 °C)-98.3 °F (36.8 °C)] 97.7 °F (36.5 °C)  Pulse:  [] 96  Resp:  [12-33] 26  SpO2:  [97 %-100 %] 98 %  BP: ()/(51-69) 102/61   Weight: 93.4 kg (206 lb)  Body mass index is 27.18 kg/m².      Intake/Output Summary (Last 24 hours) at 3/11/2023 1147  Last data filed at 3/11/2023 0655  Gross per 24 hour   Intake 2579.7 ml   Output 450 ml   Net 2129.7 ml       Physical Exam  Constitutional:       General: He is sleeping.      Appearance: He is ill-appearing.   HENT:      Head: Normocephalic and atraumatic.   Cardiovascular:      Rate and Rhythm: Normal rate.      Heart sounds: S1 normal and S2 normal. No murmur heard.  Pulmonary:      Effort: Pulmonary effort is normal. No tachypnea or respiratory distress.      Breath sounds: Normal breath sounds. No decreased breath sounds, wheezing, rhonchi or rales.   Abdominal:      General: There is distension.      Palpations: Abdomen is soft.      Tenderness: There is generalized abdominal tenderness. There is no guarding or rebound.   Musculoskeletal:      Cervical back: Neck supple.   Neurological:      Mental Status: He is easily aroused.      GCS: GCS eye subscore is 4. GCS verbal subscore is 5. GCS motor subscore is 6.       Vents:  Oxygen Concentration (%): 24 (03/11/23 0841)  Lines/Drains/Airways       Central Venous Catheter Line  Duration                  PowerPort A Cath Single Lumen  Subclavian Right -- days              Peripheral Intravenous Line  Duration                  Midline Catheter Insertion/Assessment  - Single Lumen 02/26/23 1257 Right basilic vein (medial side of arm)  12 days         Peripheral IV - Single Lumen 03/11/23 0700 18 G Anterior;Right Upper Arm <1 day                  Significant Labs:    CBC/Anemia Profile:  Recent Labs   Lab 03/10/23  0409 03/10/23  1732 03/11/23  0622   WBC 3.28* 2.10* 1.89*   HGB 8.3* 7.0* 7.5*   HCT 25.6* 22.3* 23.8*   PLT 13* 9* 35*   MCV 92 94 93   RDW 18.7* 18.8* 18.6*        Chemistries:  Recent Labs   Lab 03/10/23  0409 03/10/23  1732 03/11/23  0622   * 125* 127*   K 4.0 4.2 4.4    97 98   CO2 24 21* 21*   BUN 17 22* 20   CREATININE 0.6 0.7 0.6   CALCIUM 7.9* 7.6* 8.3*   ALBUMIN 1.7* 1.4* 1.7*   PROT 4.0* 3.5* 4.4*   BILITOT 2.6* 2.3* 2.4*   ALKPHOS 70 67 79   ALT 23 23 25   AST 69* 58* 58*   MG 1.8 1.7 1.8   PHOS 3.1 3.5 3.3       All pertinent labs within the past 24 hours have been reviewed.    Significant Imaging:  I have reviewed all pertinent imaging results/findings within the past 24 hours.

## 2023-03-11 NOTE — PROGRESS NOTES
[facility] - Cardiac Medical ICU  Hematology  Bone Marrow Transplant  Progress Note    Patient Name: Dwight Hoffmann  Admission Date: 2/25/2023  Hospital Length of Stay: 14 days  Code Status: Full Code    Subjective:     Interval History: Started DHAP yesterday. BP stable today off pressors. Will likely stepdown today.     Objective:     Vital Signs (Most Recent):  Temp: 97.7 °F (36.5 °C) (03/11/23 0700)  Pulse: 96 (03/11/23 0900)  Resp: (!) 26 (03/11/23 1113)  BP: 102/61 (03/11/23 0900)  SpO2: 98 % (03/11/23 0900)   Vital Signs (24h Range):  Temp:  [97.7 °F (36.5 °C)-98 °F (36.7 °C)] 97.7 °F (36.5 °C)  Pulse:  [] 96  Resp:  [12-33] 26  SpO2:  [97 %-100 %] 98 %  BP: ()/(52-69) 102/61     Weight: 93.4 kg (206 lb)  Body mass index is 27.18 kg/m².  Body surface area is 2.19 meters squared.    ECOG SCORE             Intake/Output - Last 3 Shifts         03/09 0700  03/10 0659 03/10 0700 03/11 0659 03/11 0700  03/12 0659    P.O. 200 1200     I.V. (mL/kg) 17.3 (0.2) 1031.7 (11)     Blood 339.5 199.8     IV Piggyback 396.7 148.2     Total Intake(mL/kg) 953.5 (10.4) 2579.7 (27.6)     Urine (mL/kg/hr) 300 (0.1) 450 (0.2)     Total Output 300 450     Net +653.5 +2129.7            Urine Occurrence 2 x      Stool Occurrence 0 x              Physical Exam  Constitutional:       General: He is not in acute distress.     Appearance: He is well-developed. He is ill-appearing. He is not diaphoretic.   HENT:      Head: Normocephalic and atraumatic.   Eyes:      General: No scleral icterus.     Conjunctiva/sclera: Conjunctivae normal.   Cardiovascular:      Rate and Rhythm: Normal rate and regular rhythm.      Heart sounds: Normal heart sounds. No murmur heard.    No friction rub. No gallop.   Pulmonary:      Effort: Pulmonary effort is normal. No respiratory distress.      Breath sounds: Normal breath sounds. No wheezing or rales.   Abdominal:      General: Bowel sounds are normal. There is distension.      Tenderness:  There is abdominal tenderness.   Musculoskeletal:         General: Normal range of motion.      Cervical back: Normal range of motion and neck supple.   Lymphadenopathy:      Cervical: No cervical adenopathy.   Skin:     General: Skin is warm and dry.      Findings: No rash.   Neurological:      Mental Status: He is alert and oriented to person, place, and time.   Psychiatric:         Behavior: Behavior normal.       Significant Labs:   CBC:   Recent Labs   Lab 03/10/23  0409 03/10/23  1732 03/11/23  0622   WBC 3.28* 2.10* 1.89*   HGB 8.3* 7.0* 7.5*   HCT 25.6* 22.3* 23.8*   PLT 13* 9* 35*      and CMP:   Recent Labs   Lab 03/10/23  0409 03/10/23  1732 03/11/23  0622   * 125* 127*   K 4.0 4.2 4.4    97 98   CO2 24 21* 21*   GLU 98 212* 204*   BUN 17 22* 20   CREATININE 0.6 0.7 0.6   CALCIUM 7.9* 7.6* 8.3*   PROT 4.0* 3.5* 4.4*   ALBUMIN 1.7* 1.4* 1.7*   BILITOT 2.6* 2.3* 2.4*   ALKPHOS 70 67 79   AST 69* 58* 58*   ALT 23 23 25   ANIONGAP 5* 7* 8         Diagnostic Results:  I have reviewed all pertinent imaging results/findings within the past 24 hours.    Assessment/Plan:     * DLBCL (diffuse large B cell lymphoma)  Patient with small bowel resection approximately 5 months ago consistent with Diffuse large-B cell lymphoma, non germinal center origin. Additionally had omentum resection also showing DLBCL. Had bone marrow biopsy approximately 5 months ago that showed normo cellular marrow for age, no increase in blasts. Patient had PET scan from 1/11/23 showing hypermetabolic loops of small bowel most consistent with previous known disease and was thought to have improved (partial response to therapy noted from chart review) and was recommended he continue with R-CHOP at that time. Patient hospitalized at [facility] found to have likely worsening of lymphoma based on CT scan of abdomen showing partial compression of IVC. Transferred to [facility] for further evaluation given uncertainty in responsiveness  to first-line chemo therapy.    --planning for alternative chemo regimen given progression   --patient with lower abdominal pain: controlled with PCA pump  --continue acyclovir, allopurinol  --Per last CT abdomen: Interval increase in size of the centrally necrotic ileocecal lymphomatous mass. Superimposed infection within the necrotic mass cannot be excluded.  - changed steroid to pulse dose dexamethasone on 3/2, now completed.   - Rad onc consulted for radiation therapy. Pt underwent sim on 3/2, however holding off on radiation at this time.  - attempted LN bx with IR on 3/6 however cancelled due to patient entering AFRVR. Attempted bx with GI on 3/7 however patient did not complete prep so unable to scope.  - IR completed mesenteric LN biopsy on 3/7.  - s/p BMBx on 3/9  - obtained new baseline PET inpatient for treatment planning, disease response monitoring and pretransplant planning on 3/10  - starting DHAP IP on 3/10  - q12h TLS labs                Shock, unspecified  - admitted to MICU 3/10  - intermittent hypotension s/o poor PO and significant tumor burden and malignancy. Responsive to IVF and prn blood transfusions  - abx for recurrent fevers     Atrial fibrillation with RVR  On 3/4 patient with sudden onset afib with RVR rate to the 200s. Patient asymptomatic, BP at baseline. Pt given Lopressor IV 5mg x3 with moderate response in HR to 150s. Loaded with amio and placed on gtt. No prior hx of a-fib. No LAE on recent echo. Discontinued amio gtt after 24 hours. Will not transition to PO amio given concern for conversion to SR as patient unable to be anticoagulated given severe thrombocytopenia. Rate controlled with PO lopressor.    On 3/6 pt with repeated RVR. Cardiology consulted for assistance with management, given contraindication to anticoagulation and soft BP. EP on board as well per cardiology. Started dig load, transutioned 3/7 to PO amiodarone per EP.     -- scheduled PO lopressor 50mg q6h, PO  amiodarone 200mg BID  -- prn IV lopressor for a-fib with HR sustaining >130s  -- anticipate transition to Toprol if continued adequate rate control    -- currently holding scheduled lopressor given shock       Large cell lymphoma  See DLBCL above    Fever  Patient febrile to 101.9 on 2/28. Likely multifactorial as patient with known malignancy with possible superimposed infection of intra-abdominal mass. Placed on vanc, cefepime, flagyl. Blood cx NGTD. No respiratory or urinary sxs.   -- off all abx as of 3/3  -- patient fevered overnight 3/8 so placed on vanc/cefepime given concurrent shock.  -- DC vanc on 3/10     Leukocytosis  - afebrile  - DC vanc on 2/27 however patient subsequently fevered the following evening so placed back on vanc. Discontinued vanc again 3/1. Discontinued cefepime and flagyl on 3/3.   RESOLVED     Compression of vena cava  CRS: this is due to dehydration because the IVC is flat throughout its course rather than just at one level.  Treated with IVF. And encouraging PO intake     Elevated bilirubin  - bili uptrending  - bili 3.6 today  - no evidence of liver involvement from lymphoma per current scans. PET scan pending  - monitor closely, daily CMP    Hyponatremia  - Improving during admission, though still mildly hyponatremic.   - Serum osm and urine studies ordered. Normal serum osm.     COVID-19 virus infection  - Noted to be positive on 2/6/2023  - No respiratory symptoms noted    Thrombocytopenia  - due to marrow involvement of lymphoma?, last chemo was 1/12/2023  - plan for bone marrow aspiration and biopsy pre-chemo  -- Continue to monitor with CBC  -- transfuse to keep >plt >10 or >50 given pre-procedure/bleeding    Hyperlipidemia, unspecified  - Not currently taking a statin    Moderate malnutrition  Nutrition consulted. Most recent weight and BMI monitored-     Malnutrition Type and Energy Intake  Malnutrition Type: chronic illness         Final Summary  Subcutaneous Fat Loss  (Final Summary): moderate protein-calorie malnutrition  Muscle Loss Evaluation (Final Summary): moderate protein-calorie malnutrition         Measurements:  Wt Readings from Last 1 Encounters:   03/10/23 93.4 kg (206 lb)   Body mass index is 27.18 kg/m².    Recommendations: Recommendation/Intervention: 1. ADAT to Regular diet. Add Diabetic modifier, if warranted. 2. Continue Boost Glucose Control TID. 3. Add Beneprotein to optimize calorie/protein intake. 4. RD to monitor and follow-up.  Goals: Meet % EEN.    Patient has been screened and assessed by RD. RD will follow patient.      Diabetes mellitus type 2, noninsulin dependent  Hemoglobin A1c from 1 week ago was 7.7  Goal glucose while inpatient: 140-180  Consider basal-bolus + SSI as needed  Hold home anti-diabetic medications  Steroids now completed  Increased daily insulin regimen as appropriate starting 3/3 given increasing hyperglycemia while on steroids.   Now holding insulin as needed given decreased BGL off steroids      Anemia  - Due to lymphoma and late chemo effect  - planning for bone marrow aspiration and biopsy on 3/9  - daily CBC   --transfuse prn hemoglobin <7    Small bowel mass  S/p  Resection September 2022 found to have DLBCL on pathology.   -- stat CTAP ordered on 2/28 given worsening ab pain to r/o perf, no evidence of perf however interval enlargement of mass   -- see DLBCL for full plan         VTE Risk Mitigation (From admission, onward)         Ordered     heparin, porcine (PF) 100 unit/mL injection flush 300 Units  As needed (PRN)         03/10/23 1331     Reason for No Pharmacological VTE Prophylaxis  Once        Question:  Reasons:  Answer:  Thrombocytopenia    02/25/23 0402     IP VTE HIGH RISK PATIENT  Once         02/25/23 0402                Disposition: Pending treatment     Mina Nolasco MD  Bone Marrow Transplant  [facility] - Cardiac Medical ICU

## 2023-03-11 NOTE — ASSESSMENT & PLAN NOTE
Patient with small bowel resection approximately 5 months ago consistent with Diffuse large-B cell lymphoma, non germinal center origin. Additionally had omentum resection also showing DLBCL. Had bone marrow biopsy approximately 5 months ago that showed normo cellular marrow for age, no increase in blasts. Patient had PET scan from 1/11/23 showing hypermetabolic loops of small bowel most consistent with previous known disease and was thought to have improved (partial response to therapy noted from chart review) and was recommended he continue with R-CHOP at that time. Patient hospitalized at [facility] found to have likely worsening of lymphoma based on CT scan of abdomen showing partial compression of IVC. Transferred to [facility] [place] for further evaluation given uncertainty in responsiveness to first-line chemo therapy.    Per BMT:     --planning for alternative chemo regimen given progression   --patient with lower abdominal pain: controlled with PCA pump  --continue acyclovir, allopurinol  --Per last CT abdomen: Interval increase in size of the centrally necrotic ileocecal lymphomatous mass. Superimposed infection within the necrotic mass cannot be excluded.  - changed steroid to pulse dose dexamethasone on 3/2, now completed.   - Rad onc consulted for radiation therapy. Pt underwent sim on 3/2, however holding off on radiation at this time.  - attempted LN bx with IR on 3/6 however cancelled due to patient entering AFRVR. Attempted bx with GI on 3/7 however patient did not complete prep so unable to scope.  - IR completed mesenteric LN biopsy on 3/7.  - started DHAP IP on 3/10  - PET scan completed on 3/10  - plan for re-staging bone marrow aspiration and biopsy done on 3/9

## 2023-03-11 NOTE — ASSESSMENT & PLAN NOTE
Per CRS: this is due to dehydration because the IVC is flat throughout its course rather than just at one level.  Treated with IVF. And encouraging PO intake.  No obstruction.  No plans for surgical intervention.     -- Receiving IVF on 3/10, appears dry on exam

## 2023-03-11 NOTE — ASSESSMENT & PLAN NOTE
Patient febrile to 101.9 on 2/28. Likely multifactorial as patient with known malignancy with possible superimposed infection of intra-abdominal mass. Placed on vanc, cefepime, flagyl. Blood cx NGTD. No respiratory or urinary sxs.   -- off all abx as of 3/3  -- patient fevered overnight 3/8 so placed on vanc/cefepime given concurrent shock.  -- DC vanc on 3/10

## 2023-03-11 NOTE — ASSESSMENT & PLAN NOTE
Nutrition consulted. Most recent weight and BMI monitored-     Malnutrition Type and Energy Intake  Malnutrition Type: chronic illness         Final Summary  Subcutaneous Fat Loss (Final Summary): moderate protein-calorie malnutrition  Muscle Loss Evaluation (Final Summary): moderate protein-calorie malnutrition         Measurements:  Wt Readings from Last 1 Encounters:   03/10/23 93.4 kg (206 lb)   Body mass index is 27.18 kg/m².    Recommendations: Recommendation/Intervention: 1. ADAT to Regular diet. Add Diabetic modifier, if warranted. 2. Continue Boost Glucose Control TID. 3. Add Beneprotein to optimize calorie/protein intake. 4. RD to monitor and follow-up.  Goals: Meet % EEN.    Patient has been screened and assessed by RD. RD will follow patient.    -- Encourage PO intake

## 2023-03-11 NOTE — NURSING
Pt admitted to room 805. Pt complaining of back pain. Pt has PCA pump to relieve pain. VSS. Will continue to monitor.

## 2023-03-12 PROBLEM — J96.01 ACUTE HYPOXEMIC RESPIRATORY FAILURE: Status: ACTIVE | Noted: 2023-01-01

## 2023-03-12 NOTE — CONSULTS
Patient to be admitted to MICU. H&P to follow.     JESUS Jimenez MD  [employer] Pulmonary & Critical Care Fellow

## 2023-03-12 NOTE — CARE UPDATE
RAPID RESPONSE NURSE ROUND       Rounding completed with charge RN, Kay for tachycardia.  No acute concerns verbalized at this time. Instructed to call [phone] for further concerns or assistance.

## 2023-03-12 NOTE — ASSESSMENT & PLAN NOTE
Patient with small bowel resection approximately 5 months ago consistent with Diffuse large-B cell lymphoma, non germinal center origin. Additionally had omentum resection also showing DLBCL. Had bone marrow biopsy approximately 5 months ago that showed normo cellular marrow for age, no increase in blasts. Patient had PET scan from 1/11/23 showing hypermetabolic loops of small bowel most consistent with previous known disease and was thought to have improved (partial response to therapy noted from chart review) and was recommended he continue with R-CHOP at that time. Patient hospitalized at [facility] found to have likely worsening of lymphoma based on CT scan of abdomen showing partial compression of IVC. Transferred to [facility] for further evaluation given uncertainty in responsiveness to first-line chemo therapy.    --planning for alternative chemo regimen given progression   --patient with lower abdominal pain: controlled with PCA pump  --continue acyclovir, allopurinol  --Per last CT abdomen: Interval increase in size of the centrally necrotic ileocecal lymphomatous mass. Superimposed infection within the necrotic mass cannot be excluded.  - changed steroid to pulse dose dexamethasone on 3/2, now completed.   - Rad onc consulted for radiation therapy. Pt underwent sim on 3/2, however holding off on radiation at this time.  - attempted LN bx with IR on 3/6 however cancelled due to patient entering AFRVR. Attempted bx with GI on 3/7 however patient did not complete prep so unable to scope.  - IR completed mesenteric LN biopsy on 3/7, positive for B-cell lymphoma with extensive necrosis.   - s/p BMBx on 3/9, path pending  - obtained new baseline PET inpatient for treatment planning, disease response monitoring and pretransplant planning on 3/10  - started DHAP IP on 3/10  - q12h TLS labs

## 2023-03-12 NOTE — ASSESSMENT & PLAN NOTE
Patient febrile to 101.9 on 2/28. Likely multifactorial as patient with known malignancy with possible superimposed infection of intra-abdominal mass. Placed on vanc, cefepime, flagyl. Blood cx NGTD. No respiratory or urinary sxs.   -- off all abx as of 3/3  -- patient fevered overnight 3/8 so placed on vanc/cefepime given concurrent shock.  -- DC vanc on 3/10   -- DC cefepime/flagyl on 3/12 as no current infectious source

## 2023-03-12 NOTE — PLAN OF CARE
Patient asleep in bed with family at bedside. POC discussed with patient and family. Tele and 2L nc in place.Dilaudid PCA infusing as ordered. Afebrile and blood pressures WNL overnight. One loose BM overnight. Call light and urinal within reach. Safety maintained.

## 2023-03-12 NOTE — ASSESSMENT & PLAN NOTE
- due to marrow involvement of lymphoma?, last chemo was 1/12/2023  - plan for bone marrow aspiration and biopsy pre-chemo  -- Continue to monitor with CBC  -- transfuse to keep >plt >10 or >50 given pre-procedure/bleeding  -- plt count 3/12: 9

## 2023-03-12 NOTE — RESPIRATORY THERAPY
RAPID RESPONSE RESPIRATORY THERAPY NOTE             Code Status: Full Code   : 1970  Bed: 6068/6068 A:   MRN: [id number]  Time page Received: 66406  Time Rapid Response RT at Bedside: 1647  Time Rapid Response RT left Bedside: 1704    SITUATION    Evaluated patient for: hypotensive/increased WOB    BACKGROUND    Why is the patient in the hospital?: DLBCL (diffuse large B cell lymphoma)    Patient has a past medical history of Cancer, Diabetes mellitus, Digestive disorder, and Prediabetes.    24 Hours Vitals Range:  Temp:  [97.2 °F (36.2 °C)-98.4 °F (36.9 °C)]   Pulse:  [105-136]   Resp:  [12-40]   BP: ()/(51-86)   SpO2:  [91 %-99 %]     Labs:    Recent Labs     23  0622 23  1806 23  0741   * 130* 129*   K 4.4 4.0 4.0   CL 98 102 105   CO2 21* 18* 16*   CREATININE 0.6 0.7 0.7   * 99 88   PHOS 3.3 2.8 3.9   MG 1.8 1.6 1.7        No results for input(s): PH, PCO2, PO2, HCO3, POCSATURATED, BE in the last 72 hours.    ASSESSMENT/INTERVENTIONS  Pt in bed c/o pain and SOB    Last Vitals: Temp: 98.2 °F (36.8 °C) ( 1212)  Pulse: 118 ( 1559)  Resp: 24 ( 1555)  BP: 79/54 ( 1559)  SpO2: 91 % ( 1559)  Level of Consciousness: Level of Consciousness (AVPU): alert  Respiratory Effort: Respiratory Effort: Moderate, Shallow, Short of breath  Expansion/Accessory Muscle Usage: Expansion/Accessory Muscles/Retractions: accessory muscle use  All Lung Field Breath Sounds: All Lung Fields Breath Sounds: Anterior:, Lateral:, coarse, crackles, diminished  O2 Device/Concentration: NRB 15LPM  NIPPV: No Surgical airway: No Vent: No  ETCO2 monitored: ETCO2 (mmHg): 21 mmHg  Ambu at bedside: Ambu bag with the patient?: Yes, Adult Ambu    Active Orders   Respiratory Care    END TIDAL CO2 MONITOR Q12H     Frequency: Q12H     Number of Occurrences: Until Specified    Oxygen Continuous     Frequency: Continuous     Number of Occurrences: Until Specified     Order Questions:      Device  type: Low flow      Device: Nasal Cannula (1- 5 Liters)      LPM: 2      Titrate O2 per Oxygen Titration Protocol: Yes      To maintain SpO2 goal of: >= 90%      Notify MD of: Inability to achieve desired SpO2; Sudden change in patient status and requires 20% increase in FiO2; Patient requires >60% FiO2    Pulse Oximetry Q Shift     Frequency: Q Shift     Number of Occurrences: Until Specified       RECOMMENDATIONS  ?  We recommend: RRT Recs: pt to be transported to ICU for higher level care    ESCALATION        FOLLOW-UP    Disposition: Tx in ICU bed 6068.    Please call back the Rapid Response RT, Rudy Montague RRT at x [phone] for any questions or concerns.

## 2023-03-12 NOTE — PROGRESS NOTES
03/12/23 1212   Vital Signs   Temp 98.2 °F (36.8 °C)   Temp Source Oral   Pulse (!) 133   Resp (!) 40   SpO2 96 %   Pulse Oximetry Type Intermittent   Device (Oxygen Therapy) nasal cannula   BP (!) 104/56     Dr. Nolasco notified of HR sustaining in the 130s and and increased RR. EKG ordered. Pt encouraged to push PCA button when he is in pain.

## 2023-03-12 NOTE — PROGRESS NOTES
Report called to LESLIE Burris. Pt transferred to room 6068 with all pts belongings. On 4L nonrebreather for transfer. Bedside monitor in place. 8ml dilaudid left in PCA pump transferred with pt to ICU.

## 2023-03-12 NOTE — SUBJECTIVE & OBJECTIVE
Past Medical History:   Diagnosis Date    Cancer     Diabetes mellitus     Digestive disorder     Prediabetes        Past Surgical History:   Procedure Laterality Date    APPENDECTOMY  07/15/2014    COLONOSCOPY      COLONOSCOPY N/A 02/09/2022    ERROR.   He did not have a colonoscopy with Dr. Sanders.    COLONOSCOPY N/A 09/01/2022    Procedure: COLONOSCOPY;  Surgeon: Andrea Clemens MD;  Location: [facility] ENDO;  Service: Endoscopy;  Laterality: N/A;    FLEXIBLE SIGMOIDOSCOPY N/A 11/7/2022    Procedure: SIGMOIDOSCOPY, FLEXIBLE;  Surgeon: Manuel Sanders MD;  Location: [facility];  Service: Endoscopy;  Laterality: N/A;    INCISION AND DRAINAGE OF ABDOMEN N/A 09/14/2022    Procedure: INCISION AND DRAINAGE, ABDOMEN;  Surgeon: Jan Oliveira Jr., MD;  Location: [facility];  Service: General;  Laterality: N/A;    INSERTION OF TUNNELED CENTRAL VENOUS CATHETER (CVC) WITH SUBCUTANEOUS PORT N/A 10/31/2022    Procedure: [id number];  Surgeon: Jan Oliveira Jr., MD;  Location: [facility] OR;  Service: General;  Laterality: N/A;    LAPAROSCOPIC DRAINAGE OF ABDOMEN N/A 09/23/2022    Procedure: DRAINAGE, ABDOMEN, LAPAROSCOPIC;  Surgeon: Jan Oliveira Jr., MD;  Location: [facility];  Service: General;  Laterality: N/A;       Review of patient's allergies indicates:   Allergen Reactions    Albuterol (bulk) Palpitations    Ativan [lorazepam] Hallucinations             Family History       Problem Relation (Age of Onset)    Cancer Mother    Diabetes Mother    Heart murmur Sister    Hypertension Mother    Seizures Father, Sister          Tobacco Use    Smoking status: Never    Smokeless tobacco: Never   Substance and Sexual Activity    Alcohol use: Not Currently     Comment: occasional    Drug use: No    Sexual activity: Yes     Partners: Female      Review of Systems   Constitutional:  Negative for fever.   Respiratory:  Positive for shortness of breath and wheezing. Negative for cough.    Gastrointestinal:   Positive for abdominal pain and constipation. Negative for diarrhea, nausea and vomiting.   Musculoskeletal:  Positive for myalgias.   Objective:     Vital Signs (Most Recent):  Temp: 98.2 °F (36.8 °C) (23 1212)  Pulse: (!) 118 (23 1559)  Resp: (!) 24 (23 1555)  BP: (!) 79/54 (23 1559)  SpO2: (!) 91 % (23 1559)   Vital Signs (24h Range):  Temp:  [97.2 °F (36.2 °C)-98.4 °F (36.9 °C)] 98.2 °F (36.8 °C)  Pulse:  [105-136] 118  Resp:  [12-40] 24  SpO2:  [91 %-99 %] 91 %  BP: ()/(51-86) 79/54   Weight: 93.4 kg (206 lb)  Body mass index is 27.18 kg/m².      Intake/Output Summary (Last 24 hours) at 3/12/2023 1704  Last data filed at 3/12/2023 1654  Gross per 24 hour   Intake 492.66 ml   Output 390 ml   Net 102.66 ml       Physical Exam  Constitutional:       General: He is awake.      Appearance: He is ill-appearing.   HENT:      Head: Normocephalic and atraumatic.   Cardiovascular:      Rate and Rhythm: Tachycardia present.      Heart sounds: S1 normal and S2 normal. No murmur heard.  Pulmonary:      Effort: Tachypnea present.      Breath sounds: Rales present. No decreased breath sounds, wheezing or rhonchi.   Abdominal:      General: There is distension.      Palpations: Abdomen is soft.      Tenderness: There is generalized abdominal tenderness. There is no guarding or rebound.   Musculoskeletal:      Cervical back: Neck supple.      Right lower le+ Pitting Edema present.      Left lower le+ Pitting Edema present.   Neurological:      Mental Status: He is alert.      GCS: GCS eye subscore is 4. GCS verbal subscore is 5. GCS motor subscore is 6.       Vents:  Oxygen Concentration (%): 24 (23 1615)  Lines/Drains/Airways       Central Venous Catheter Line  Duration                  PowerPort A Cath Single Lumen Subclavian Right -- days              Peripheral Intravenous Line  Duration                  Midline Catheter Insertion/Assessment  - Single Lumen 23 1257  Right basilic vein (medial side of arm)  14 days         Peripheral IV - Single Lumen 03/11/23 0700 18 G Anterior;Right Upper Arm 1 day                  Significant Labs:    CBC/Anemia Profile:  Recent Labs   Lab 03/11/23  0622 03/11/23  1806 03/12/23  0741   WBC 1.89* 0.71* 0.74*   HGB 7.5* 7.6* 7.2*   HCT 23.8* 23.0* 21.9*   PLT 35* 17* 9*   MCV 93 92 92   RDW 18.6* 18.6* 18.9*        Chemistries:  Recent Labs   Lab 03/11/23  0622 03/11/23  1806 03/12/23  0741   * 130* 129*   K 4.4 4.0 4.0   CL 98 102 105   CO2 21* 18* 16*   BUN 20 23* 27*   CREATININE 0.6 0.7 0.7   CALCIUM 8.3* 8.4* 8.0*   ALBUMIN 1.7* 1.6* 1.3*   PROT 4.4* 4.1* 3.7*   BILITOT 2.4* 2.1* 1.7*   ALKPHOS 79 69 61   ALT 25 21 19   AST 58* 44* 51*   MG 1.8 1.6 1.7   PHOS 3.3 2.8 3.9       All pertinent labs within the past 24 hours have been reviewed.    Significant Imaging: I have reviewed all pertinent imaging results/findings within the past 24 hours.

## 2023-03-12 NOTE — ASSESSMENT & PLAN NOTE
Per CRS: this is due to dehydration because the IVC is flat throughout its course rather than just at one level.  Treated with IVF. And encouraging PO intake.  No obstruction.  No plans for surgical intervention.     3/12: Now volume overloaded post chemo; likely contributing to his shock

## 2023-03-12 NOTE — PROGRESS NOTES
03/12/23 1559   Vital Signs   Pulse (!) 118   SpO2 (!) 91 %   Flow (L/min) 2   Device (Oxygen Therapy) nasal cannula   BP (!) 79/54   MAP (mmHg) 61     Dr. Nolasco and Rapid @ bedside. Increased RR with increased WOB. Pt is weeping with 3+ edema in BLE. Basal rate on PCA decreased.

## 2023-03-12 NOTE — SUBJECTIVE & OBJECTIVE
Subjective:     Interval History: Stepped down from MICU overnight. Patient tolerating chemo. Reports significant pain this morning, states he isn't pushing PCA pump. Coached on utilizing the PCA pump to help with the pain.     Objective:     Vital Signs (Most Recent):  Temp: 98 °F (36.7 °C) (03/12/23 0700)  Pulse: (!) 122 (03/12/23 0700)  Resp: 19 (03/12/23 0700)  BP: (!) 91/54 (03/12/23 0700)  SpO2: 99 % (03/12/23 0700)   Vital Signs (24h Range):  Temp:  [97.2 °F (36.2 °C)-98.4 °F (36.9 °C)] 98 °F (36.7 °C)  Pulse:  [100-123] 122  Resp:  [12-41] 19  SpO2:  [93 %-99 %] 99 %  BP: ()/(54-86) 91/54     Weight: 93.4 kg (206 lb)  Body mass index is 27.18 kg/m².  Body surface area is 2.19 meters squared.    ECOG SCORE             Intake/Output - Last 3 Shifts         03/10 0700  03/11 0659 03/11 0700  03/12 0659 03/12 0700  03/13 0659    P.O. 1200 240     I.V. (mL/kg) 1031.7 (11)  1011 (10.8)    Blood 199.8      IV Piggyback 148.2 353.2     Total Intake(mL/kg) 2579.7 (27.6) 593.2 (6.4) 1011 (10.8)    Urine (mL/kg/hr) 450 (0.2) 800 (0.4)     Stool  0     Total Output 450 800     Net +2129.7 -206.8 +1011           Stool Occurrence  3 x             Physical Exam  Constitutional:       General: He is not in acute distress.     Appearance: He is well-developed. He is ill-appearing. He is not diaphoretic.   HENT:      Head: Normocephalic and atraumatic.   Eyes:      General: No scleral icterus.     Conjunctiva/sclera: Conjunctivae normal.   Cardiovascular:      Rate and Rhythm: Normal rate and regular rhythm.      Heart sounds: Normal heart sounds. No murmur heard.    No friction rub. No gallop.   Pulmonary:      Effort: Pulmonary effort is normal. No respiratory distress.      Breath sounds: Normal breath sounds. No wheezing or rales.   Abdominal:      General: Bowel sounds are normal. There is distension.      Tenderness: There is abdominal tenderness (diffuse). There is guarding. There is no rebound.    Musculoskeletal:         General: Normal range of motion.      Cervical back: Normal range of motion and neck supple.   Lymphadenopathy:      Cervical: No cervical adenopathy.   Skin:     General: Skin is warm and dry.      Findings: No rash.   Neurological:      Mental Status: He is alert and oriented to person, place, and time.   Psychiatric:         Behavior: Behavior normal.       Significant Labs:   CBC:   Recent Labs   Lab 03/10/23  1732 03/11/23  0622 03/11/23  1806 03/12/23  0741   WBC 2.10* 1.89* 0.71* 0.74*   HGB 7.0* 7.5* 7.6* 7.2*   HCT 22.3* 23.8* 23.0* 21.9*   PLT 9* 35* 17*  --       and CMP:   Recent Labs   Lab 03/11/23  0622 03/11/23  1806 03/12/23  0741   * 130* 129*   K 4.4 4.0 4.0   CL 98 102 105   CO2 21* 18* 16*   * 99 88   BUN 20 23* 27*   CREATININE 0.6 0.7 0.7   CALCIUM 8.3* 8.4* 8.0*   PROT 4.4* 4.1* 3.7*   ALBUMIN 1.7* 1.6* 1.3*   BILITOT 2.4* 2.1* 1.7*   ALKPHOS 79 69 61   AST 58* 44* 51*   ALT 25 21 19   ANIONGAP 8 10 8         Diagnostic Results:  I have reviewed all pertinent imaging results/findings within the past 24 hours.

## 2023-03-12 NOTE — ASSESSMENT & PLAN NOTE
On 3/4 patient with sudden onset afib with RVR rate to the 200s. Patient asymptomatic, BP at baseline. Pt given Lopressor IV 5mg x3 with moderate response in HR to 150s. Loaded with amio and placed on gtt. No prior hx of a-fib. No LAE on recent echo. Discontinued amio gtt after 24 hours. Will not transition to PO amio given concern for conversion to SR as patient unable to be anticoagulated given severe thrombocytopenia. Rate controlled with PO lopressor.    On 3/6 pt with repeated RVR. Cardiology consulted for assistance with management, given contraindication to anticoagulation and soft BP. EP on board as well per cardiology. Started dig load, transutioned 3/7 to PO amiodarone per EP.     -- scheduled PO lopressor 50mg q6h, PO amiodarone 200mg BID  -- prn IV lopressor for a-fib with HR sustaining >130s  -- anticipate transition to Toprol if continued adequate rate control    -- currently holding scheduled lopressor given hypotension

## 2023-03-12 NOTE — CARE UPDATE
RAPID RESPONSE NURSE NOTE        Admit Date: 2023  LOS: 15  Code Status: Full Code   Date of Consult: 2023  : 1970  Age: 52 y.o.  Weight:   Wt Readings from Last 1 Encounters:   03/10/23 93.4 kg (206 lb)     Sex: male  Race: Black or    Bed: [address] [place] [address] A:   MRN: [id number]  Time Rapid Response Team called: 1540  Time Rapid Response Team at Bedside: 1600  Time Rapid Response Team left Bedside: 1650  Was the patient discharged from an ICU this admission? Yes   Was the patient discharged from a PACU within last 24 hours? No   Did the patient receive conscious sedation/general anesthesia in last 24 hours? No  Was the patient in the ED within the past 24 hours? No  Was the patient on NIPPV within the past 24 hours? No   Did this progress into an ARC or CPA: no  Attending Physician: Terrance Solis MD  Primary Service: Pulmonary Disease,Critical Care Medicine,Sleep Medicine       SITUATION    Notified by charge RN via phone call.  Reason for alert: hypotension  Called to evaluate the patient for Circulatory    BACKGROUND     Why is the patient in the hospital?: DLBCL (diffuse large B cell lymphoma)    Patient has a past medical history of Cancer, Diabetes mellitus, Digestive disorder, and Prediabetes.    Last Vitals:  Temp: 98.2 °F (36.8 °C) ( 1212)  Pulse: 118 ( 1559)  Resp: 24 ( 1555)  BP: 79/54 ( 1559)  SpO2: 91 % ( 1559)    24 Hours Vitals Range:  Temp:  [97.2 °F (36.2 °C)-98.4 °F (36.9 °C)]   Pulse:  [105-136]   Resp:  [12-40]   BP: ()/(51-86)   SpO2:  [91 %-99 %]     Labs:  Recent Labs     23  0623  0741   WBC 1.89* 0.71* 0.74*   HGB 7.5* 7.6* 7.2*   HCT 23.8* 23.0* 21.9*   PLT 35* 17* 9*       Recent Labs     23  0623  0741   * 130* 129*   K 4.4 4.0 4.0   CL 98 102 105   CO2 21* 18* 16*   CREATININE 0.6 0.7 0.7   * 99 88   PHOS 3.3 2.8 3.9   MG 1.8 1.6 1.7        No results for input(s):  PH, PCO2, PO2, HCO3, POCSATURATED, BE in the last 72 hours.     ASSESSMENT    Physical Exam  Vitals and nursing note reviewed.   Constitutional:       General: He is in acute distress.      Appearance: He is ill-appearing and diaphoretic.   Cardiovascular:      Rate and Rhythm: Regular rhythm. Tachycardia present.   Pulmonary:      Effort: Tachypnea and accessory muscle usage present.   Abdominal:      General: There is distension.      Tenderness: There is abdominal tenderness. There is guarding.   Musculoskeletal:         General: Swelling present.      Right lower leg: Edema present.      Left lower leg: Edema present.   Skin:     General: Skin is warm.      Capillary Refill: Capillary refill takes less than 2 seconds.   Neurological:      Mental Status: He is oriented to person, place, and time. He is lethargic.      GCS: GCS eye subscore is 3. GCS verbal subscore is 5. GCS motor subscore is 6.       INTERVENTIONS    The patient was seen for Cardiac problem. Staff concerns included hypotension. The following interventions were performed: continuous cardiac monitoring continued, continuous pulse ox monitoring continued, critical care consult, and transported to MICU.    RECOMMENDATIONS    We recommend: Continue care per critical care team.     PROVIDER ESCALATION    Orders received and case discussed with Dr. Nolasco and CARMEN Sunshine NP .    Primary team arrival time: Primary team at bedside upon arrival    Disposition: Tx in ICU bed MICU 6003.    FOLLOW UP    Charge Lu NELSON updated on plan of care. Instructed to call the Rapid Response Nurse, Lorraine Barkley RN at [phone] for additional questions or concerns.

## 2023-03-12 NOTE — RESPIRATORY THERAPY
RAPID RESPONSE RESPIRATORY THERAPY FOLLOW-UP NOTE       Followed up with patient for proactive rounding. Called to bedside by nursing to reassess pt for hypotension/increased RR/increased WOB/ LE edema.  RN adjusted PCA will continue to monitor.  Please call Rapid Response RT, Rudy Montague RRT at [phone] with any questions or concerns.

## 2023-03-12 NOTE — EICU
18:32 Called for timeout Intubation:   18:33 Etomidate 60 mg IVP given  18:34 Rocuronium 90 mg IVP given  18:33 Red Bump given IVP 0.1 mg  18:36 Red bump given IVP 0.1 mg  18:38 Red bump given IVP 0.1 mg and levo drip started   18:39 Red bump given IVP 0.1 mg

## 2023-03-12 NOTE — H&P
[facility] - Cardiac Medical ICU  Critical Care Medicine  History & Physical    Patient Name: Dwight Hoffmann  MRN: [id number]  Admission Date: 2/25/2023  Hospital Length of Stay: 15 days  Code Status: Full Code  Attending Physician: Terrance Solis MD   Primary Care Provider: Margarita Pacheco MD   Principal Problem: DLBCL (diffuse large B cell lymphoma)    Subjective:     HPI:  Mr. Dwight Hoffmann is a 52 y.o. man with a history of DM, anemia, DLBCL, HLD, who was admitted as a transfer from [facility] presenting with refractory lymphoma and compression of IVC.  Transferred to [facility] for biopsy and to assess for any transfortmation or disease process givn lack of response to chemo and consideration of second line chemo.  Patient with CT from outside hospital concerning for compression of vena cava from lymphadenopathy.  Cardiology consulted at OSH for brief episode of SVT prior to transfer, and noted last echo from 2/8/23 was within normal. General surgery saw patient at OSH as well for concern of obstruction from lymphadenopathy but noted there was not complete obstruction. General surgery did not feel at the time the lymphadenopathy would be able to be safely removed or debulked from vena cava. There was some discussion about possible ileocecal shunt placement, but did not have capability at OSH. Admitted to BMT and was planning for alternative chemo regimen given progression, likely DHAP as inpatient.  Bone marrow aspiration and biopsy done 3/9 for restaging and patient had been scheduled for new baseline PET scan.         Hospital Course:  On 3/9 patient seen by rapid response for hypotension.  Received ~2L IVF and 1 unit PRBC with no improvement in BP and MAP consistently <65.  Critical Care Medicine consulted and patient transferred to MICU.  He required vasopressor support for a brief period and returned to BMT service on 3/11 after receiving chemo and repeat PET scan. The following day, rapid  response was called again for hypotension, tachypnea; concern for volume overload. Returned to MICU for vasopressor support and diuresis. Palliative care consulted.      Hospital/ICU Course:  Admitted to MICU with hypotension requiring vasopressors.  Pressors on briefly 3/10 able to be weaned off.  Went for PET scan 3/10 and receiving first dose of chemo. Stable to return to BMT.       Past Medical History:   Diagnosis Date    Cancer     Diabetes mellitus     Digestive disorder     Prediabetes        Past Surgical History:   Procedure Laterality Date    APPENDECTOMY  07/15/2014    COLONOSCOPY      COLONOSCOPY N/A 02/09/2022    ERROR.   He did not have a colonoscopy with Dr. Sanders.    COLONOSCOPY N/A 09/01/2022    Procedure: COLONOSCOPY;  Surgeon: Andrea Clemens MD;  Location: [facility] ENDO;  Service: Endoscopy;  Laterality: N/A;    FLEXIBLE SIGMOIDOSCOPY N/A 11/7/2022    Procedure: SIGMOIDOSCOPY, FLEXIBLE;  Surgeon: Manuel Sanders MD;  Location: [facility] ENDO;  Service: Endoscopy;  Laterality: N/A;    INCISION AND DRAINAGE OF ABDOMEN N/A 09/14/2022    Procedure: INCISION AND DRAINAGE, ABDOMEN;  Surgeon: Jan Oliveira Jr., MD;  Location: [facility] OR;  Service: General;  Laterality: N/A;    INSERTION OF TUNNELED CENTRAL VENOUS CATHETER (CVC) WITH SUBCUTANEOUS PORT N/A 10/31/2022    Procedure: [id number];  Surgeon: Jan Oliveira Jr., MD;  Location: [facility] OR;  Service: General;  Laterality: N/A;    LAPAROSCOPIC DRAINAGE OF ABDOMEN N/A 09/23/2022    Procedure: DRAINAGE, ABDOMEN, LAPAROSCOPIC;  Surgeon: Jan Oliveira Jr., MD;  Location: [facility] OR;  Service: General;  Laterality: N/A;       Review of patient's allergies indicates:   Allergen Reactions    Albuterol (bulk) Palpitations    Ativan [lorazepam] Hallucinations             Family History       Problem Relation (Age of Onset)    Cancer Mother    Diabetes Mother    Heart murmur Sister    Hypertension Mother    Seizures  Father, Sister          Tobacco Use    Smoking status: Never    Smokeless tobacco: Never   Substance and Sexual Activity    Alcohol use: Not Currently     Comment: occasional    Drug use: No    Sexual activity: Yes     Partners: Female      Review of Systems   Constitutional:  Negative for fever.   Respiratory:  Positive for shortness of breath and wheezing. Negative for cough.    Gastrointestinal:  Positive for abdominal pain and constipation. Negative for diarrhea, nausea and vomiting.   Musculoskeletal:  Positive for myalgias.   Objective:     Vital Signs (Most Recent):  Temp: 98.2 °F (36.8 °C) (23 1212)  Pulse: (!) 118 (23 1559)  Resp: (!) 24 (23 1555)  BP: (!) 79/54 (23 1559)  SpO2: (!) 91 % (23 1559)   Vital Signs (24h Range):  Temp:  [97.2 °F (36.2 °C)-98.4 °F (36.9 °C)] 98.2 °F (36.8 °C)  Pulse:  [105-136] 118  Resp:  [12-40] 24  SpO2:  [91 %-99 %] 91 %  BP: ()/(51-86) 79/54   Weight: 93.4 kg (206 lb)  Body mass index is 27.18 kg/m².      Intake/Output Summary (Last 24 hours) at 3/12/2023 1704  Last data filed at 3/12/2023 1654  Gross per 24 hour   Intake 492.66 ml   Output 390 ml   Net 102.66 ml       Physical Exam  Constitutional:       General: He is awake.      Appearance: He is ill-appearing.   HENT:      Head: Normocephalic and atraumatic.   Cardiovascular:      Rate and Rhythm: Tachycardia present.      Heart sounds: S1 normal and S2 normal. No murmur heard.  Pulmonary:      Effort: Tachypnea present.      Breath sounds: Rales present. No decreased breath sounds, wheezing or rhonchi.   Abdominal:      General: There is distension.      Palpations: Abdomen is soft.      Tenderness: There is generalized abdominal tenderness. There is no guarding or rebound.   Musculoskeletal:      Cervical back: Neck supple.      Right lower le+ Pitting Edema present.      Left lower le+ Pitting Edema present.   Neurological:      Mental Status: He is alert.      GCS: GCS  eye subscore is 4. GCS verbal subscore is 5. GCS motor subscore is 6.       Vents:  Oxygen Concentration (%): 24 (03/11/23 1615)  Lines/Drains/Airways       Central Venous Catheter Line  Duration                  PowerPort A Cath Single Lumen Subclavian Right -- days              Peripheral Intravenous Line  Duration                  Midline Catheter Insertion/Assessment  - Single Lumen 02/26/23 1257 Right basilic vein (medial side of arm)  14 days         Peripheral IV - Single Lumen 03/11/23 0700 18 G Anterior;Right Upper Arm 1 day                  Significant Labs:    CBC/Anemia Profile:  Recent Labs   Lab 03/11/23  0622 03/11/23 1806 03/12/23  0741   WBC 1.89* 0.71* 0.74*   HGB 7.5* 7.6* 7.2*   HCT 23.8* 23.0* 21.9*   PLT 35* 17* 9*   MCV 93 92 92   RDW 18.6* 18.6* 18.9*        Chemistries:  Recent Labs   Lab 03/11/23  0622 03/11/23 1806 03/12/23  0741   * 130* 129*   K 4.4 4.0 4.0   CL 98 102 105   CO2 21* 18* 16*   BUN 20 23* 27*   CREATININE 0.6 0.7 0.7   CALCIUM 8.3* 8.4* 8.0*   ALBUMIN 1.7* 1.6* 1.3*   PROT 4.4* 4.1* 3.7*   BILITOT 2.4* 2.1* 1.7*   ALKPHOS 79 69 61   ALT 25 21 19   AST 58* 44* 51*   MG 1.8 1.6 1.7   PHOS 3.3 2.8 3.9       All pertinent labs within the past 24 hours have been reviewed.    Significant Imaging: I have reviewed all pertinent imaging results/findings within the past 24 hours.    Assessment/Plan:     Cardiac/Vascular  Shock, unspecified  Re-admitted to MICU 3/12 for continued hypotension- possibly pain medication related; no new fevers or leukocytosis. All previous cultures have been negative. Notably more tachypnic; appears volume overloaded.    -- Blood cultures  -- Chest xray  --40mg Lasix IV x1 now  --Levo for MAP>65  --Seek long-acting pain mgmt options; palliative care    Atrial fibrillation with RVR  On 3/4 patient with sudden onset afib with RVR rate to the 200s. Patient asymptomatic, BP at baseline. Pt given Lopressor IV 5mg x3 with moderate response in HR to  150s. Loaded with amio and placed on gtt. No prior hx of a-fib. No LAE on recent echo. Discontinued amio gtt after 24 hours. Will not transition to PO amio given concern for conversion to SR as patient unable to be anticoagulated given severe thrombocytopenia. Rate controlled with PO lopressor.    On 3/6 pt with repeated RVR. Cardiology consulted for assistance with management, given contraindication to anticoagulation and soft BP. EP on board as well per cardiology. Started dig load, transutioned 3/7 to PO amiodarone per EP.     -- Scheduled PO lopressor stopped with hypotension requiring pressors  -- Continue PO amio  -- Continuous cardiac monitoring  -- Trend BMP, replete lytes PRN    Compression of vena cava  Per CRS: this is due to dehydration because the IVC is flat throughout its course rather than just at one level.  Treated with IVF. And encouraging PO intake.  No obstruction.  No plans for surgical intervention.     3/12: Now volume overloaded post chemo; likely contributing to his shock    Renal/  Hyponatremia  -- Trend BMP    Hematology  Thrombocytopenia  Suspected due to marrow involvment of lymphoma.  Last chemo 1/12/23.      -- Trend CBC  -- Transfuse to keep >plt >10 or >50 given pre-procedure/bleeding  -- Maintain type and screen    Oncology  * DLBCL (diffuse large B cell lymphoma)  Patient with small bowel resection approximately 5 months ago consistent with Diffuse large-B cell lymphoma, non germinal center origin. Additionally had omentum resection also showing DLBCL. Had bone marrow biopsy approximately 5 months ago that showed normo cellular marrow for age, no increase in blasts. Patient had PET scan from 1/11/23 showing hypermetabolic loops of small bowel most consistent with previous known disease and was thought to have improved (partial response to therapy noted from chart review) and was recommended he continue with R-CHOP at that time. Patient hospitalized at [facility] found to  have likely worsening of lymphoma based on CT scan of abdomen showing partial compression of IVC. [place] [facility] [place] given uncertainty in responsiveness to first-line chemo therapy.    Per BMT:     --planning for alternative chemo regimen given progression   --patient with lower abdominal pain: controlled with PCA pump  --continue acyclovir, allopurinol  --Per last CT abdomen: Interval increase in size of the centrally necrotic ileocecal lymphomatous mass. Superimposed infection within the necrotic mass cannot be excluded.  - changed steroid to pulse dose dexamethasone on 3/2, now completed.   - Rad onc consulted for radiation therapy. Pt underwent sim on 3/2, however holding off on radiation at this time.  - attempted LN bx with IR on 3/6 however cancelled due to patient entering AFRVR. Attempted bx with GI on 3/7 however patient did not complete prep so unable to scope.  - IR completed mesenteric LN biopsy on 3/7, positive for B-cell lymphoma with extensive necrosis  - started DHAP IP on 3/10  - PET scan completed on 3/10  - plan for re-staging bone marrow aspiration and biopsy done on 3/9     --Consult palliative care for symptom/pain management and goals of care discussions    Anemia  -- Trend CBC  -- Maintain active type and screen  -- Transfuse for Hgb <7 or active bleeding    Endocrine  Moderate malnutrition  Nutrition consulted. Most recent weight and BMI monitored-     Malnutrition Type and Energy Intake  Malnutrition Type: chronic illness         Final Summary  Subcutaneous Fat Loss (Final Summary): moderate protein-calorie malnutrition  Muscle Loss Evaluation (Final Summary): moderate protein-calorie malnutrition         Measurements:  Wt Readings from Last 1 Encounters:   03/10/23 93.4 kg (206 lb)   Body mass index is 27.18 kg/m².    Recommendations: Recommendation/Intervention: 1. ADAT to Regular diet. Add Diabetic modifier, if warranted. 2. Continue Boost Glucose Control TID. 3.  Add Beneprotein to optimize calorie/protein intake. 4. RD to monitor and follow-up.  Goals: Meet % EEN.    Patient has been screened and assessed by RD. RD will follow patient.    -- Encourage PO intake    Diabetes mellitus type 2, noninsulin dependent  -- Accuchecks  -- Hypoglycemic protocol in place  -- SSI    GI  Small bowel mass  S/p  Resection September 2022 found to have DLBCL on pathology.     -- stat CTAP ordered on 2/28 given worsening ab pain to r/o perf, no evidence of perf however interval enlargement of mass   -- see DLBCL for full plan        Critical Care Time: 45 minutes  Critical secondary to Patient has a condition that poses threat to life and bodily function: shock.     Critical care was time spent personally by me on the following activities: development of treatment plan with patient or surrogate and bedside caregivers, discussions with consultants, evaluation of patient's response to treatment, examination of patient, ordering and performing treatments and interventions, ordering and review of laboratory studies, ordering and review of radiographic studies, pulse oximetry, re-evaluation of patient's condition. This critical care time did not overlap with that of any other provider or involve time for any procedures.     Fatuma Sunshine, NP  Critical Care Medicine  [facility] - Cardiac Medical ICU

## 2023-03-12 NOTE — RESPIRATORY THERAPY
RAPID RESPONSE RESPIRATORY THERAPY ETCO2 CHECK         Time of visit: 1446     Code Status: Full Code   : 1970  Bed: 805/805 A:   MRN: [id number]  Time spent at the bedside: < 15 min    SITUATION    Evaluated patient for: ETCo2 compliance    BACKGROUND    Why is the patient in the hospital?: DLBCL (diffuse large B cell lymphoma)    Patient has a past medical history of Cancer, Diabetes mellitus, Digestive disorder, and Prediabetes.    24 Hours Vitals Range:  Temp:  [97.2 °F (36.2 °C)-98.4 °F (36.9 °C)]   Pulse:  [105-136]   Resp:  [12-41]   BP: ()/(51-86)   SpO2:  [91 %-99 %]     Labs:    Recent Labs     23  0622 23  1806 23  0741   * 130* 129*   K 4.4 4.0 4.0   CL 98 102 105   CO2 21* 18* 16*   CREATININE 0.6 0.7 0.7   * 99 88   PHOS 3.3 2.8 3.9   MG 1.8 1.6 1.7        No results for input(s): PH, PCO2, PO2, HCO3, POCSATURATED, BE in the last 72 hours.    ASSESSMENT/INTERVENTIONS      Last VS   Temp: 98.2 °F (36.8 °C) ( 1212)  Pulse: 118 ( 1559)  Resp: 24 ( 1555)  BP: 79/54 ( 1559)  SpO2: 91 % ( 1559)    Level of Consciousness: Level of Consciousness (AVPU): alert  Respiratory Effort: Respiratory Effort: Moderate, Shallow, Short of breath Expansion/Accessory Muscle Usage: Expansion/Accessory Muscles/Retractions: accessory muscle use  All Lung Field Breath Sounds: All Lung Fields Breath Sounds: Anterior:, Lateral:, coarse, crackles, diminished  Is the ETCO2 monitor on? Yes  Is the patient wearing a cannula? Yes  Are ETCO2 orders placed? Yes  Is the patient on a PCA pump? Yes  ETCO2 monitored: ETCO2 (mmHg): 21 mmHg  Ambu at bedside: Ambu bag with the patient?: Yes, Adult Ambu    Active Orders   Respiratory Care    END TIDAL CO2 MONITOR Q12H     Frequency: Q12H     Number of Occurrences: Until Specified    Oxygen Continuous     Frequency: Continuous     Number of Occurrences: Until Specified     Order Questions:      Device type: Low flow      Device:  Nasal Cannula (1- 5 Liters)      LPM: 2      Titrate O2 per Oxygen Titration Protocol: Yes      To maintain SpO2 goal of: >= 90%      Notify MD of: Inability to achieve desired SpO2; Sudden change in patient status and requires 20% increase in FiO2; Patient requires >60% FiO2    Pulse Oximetry Q Shift     Frequency: Q Shift     Number of Occurrences: Until Specified       RECOMMENDATIONS    We recommend: RRT Recs: pt increased RR increased WOB noted to prefer laying right side down d/t pain.      FOLLOW-UP    Please call back the Rapid Response RT, Rudy Montague RRT at x [phone] for any questions or concerns.

## 2023-03-12 NOTE — CARE UPDATE
RAPID RESPONSE NURSE PROACTIVE ROUNDING NOTE       Time of Visit: 1237    Admit Date: 2023  LOS: 15  Code Status: Full Code   Date of Visit: 2023  : 1970  Age: 52 y.o.  Sex: male  Race: Black or   Bed: 805/805 A:   MRN: [id number]  Was the patient discharged from an ICU this admission? Yes   Was the patient discharged from a PACU within last 24 hours? No   Did the patient receive conscious sedation/general anesthesia in last 24 hours? No  Was the patient in the ED within the past 24 hours? No  Was the patient on NIPPV within the past 24 hours? No   Attending Physician: Manuel Hunter MD  Primary Service: Hematology and Oncology   Time spent at the bedside: < 15 min    SITUATION    Notified by bedside RN via phone call.  Reason for alert: tachycardia  Called to evaluate the patient for Dysrythmia    BACKGROUND     Why is the patient in the hospital?: DLBCL (diffuse large B cell lymphoma)    Patient has a past medical history of Cancer, Diabetes mellitus, Digestive disorder, and Prediabetes.    Last Vitals:  Temp: 98.2 °F (36.8 °C) (1212)  Pulse: 133 (1212)  Resp: 40 (1212)  BP: 104/56 (1212)  SpO2: 96 % (1212)    24 Hours Vitals Range:  Temp:  [97.2 °F (36.2 °C)-98.4 °F (36.9 °C)]   Pulse:  [104-136]   Resp:  [12-41]   BP: ()/(53-86)   SpO2:  [94 %-99 %]     Labs:  Recent Labs     23  0622 23  1806 23  0741   WBC 1.89* 0.71* 0.74*   HGB 7.5* 7.6* 7.2*   HCT 23.8* 23.0* 21.9*   PLT 35* 17* 9*       Recent Labs     23  0622 23  1806 23  0741   * 130* 129*   K 4.4 4.0 4.0   CL 98 102 105   CO2 21* 18* 16*   CREATININE 0.6 0.7 0.7   * 99 88   PHOS 3.3 2.8 3.9   MG 1.8 1.6 1.7        No results for input(s): PH, PCO2, PO2, HCO3, POCSATURATED, BE in the last 72 hours.     ASSESSMENT    Physical Exam  Vitals and nursing note reviewed.   Constitutional:       Appearance: He is ill-appearing.    Cardiovascular:      Rate and Rhythm: Tachycardia present. Rhythm irregular.   Pulmonary:      Effort: Pulmonary effort is normal.   Skin:     General: Skin is warm and dry.      Capillary Refill: Capillary refill takes less than 2 seconds.   Neurological:      Mental Status: He is oriented to person, place, and time. He is lethargic.       INTERVENTIONS    The patient was seen for Cardiac problem. Staff concerns included tachycardia. The following interventions were performed: EKG, continuous cardiac monitoring continued, and continuous pulse ox monitoring continued.    RECOMMENDATIONS  -Ekg  -pain control  -restart metoprolol po     PROVIDER ESCALATION    Yes/No  no    Orders received and case discussed with NA.    Disposition: Remain in room 905.    FOLLOW-UP    bedside Marti NELSON  updated on plan of care. Instructed to call the Rapid Response Nurse, Amarjit Guillory RN at [phone] for additional questions or concerns.

## 2023-03-12 NOTE — ASSESSMENT & PLAN NOTE
- admitted to MICU 3/10  - intermittent hypotension s/o poor PO and significant tumor burden and malignancy. Responsive to IVF and prn blood transfusions  - abx for recurrent fevers, now discontinued.   - stepped down from MICU on 3/11 given improvement in BP

## 2023-03-12 NOTE — PROGRESS NOTES
[facility] - Oncology ([facility])  Hematology  Bone Marrow Transplant  Progress Note    Patient Name: Dwight Hoffmann  Admission Date: 2/25/2023  Hospital Length of Stay: 15 days  Code Status: Full Code    Subjective:     Interval History: Stepped down from MICU overnight. Patient tolerating chemo. Reports significant pain this morning, states he isn't pushing PCA pump. Coached on utilizing the PCA pump to help with the pain.     Objective:     Vital Signs (Most Recent):  Temp: 98 °F (36.7 °C) (03/12/23 0700)  Pulse: (!) 122 (03/12/23 0700)  Resp: 19 (03/12/23 0700)  BP: (!) 91/54 (03/12/23 0700)  SpO2: 99 % (03/12/23 0700)   Vital Signs (24h Range):  Temp:  [97.2 °F (36.2 °C)-98.4 °F (36.9 °C)] 98 °F (36.7 °C)  Pulse:  [100-123] 122  Resp:  [12-41] 19  SpO2:  [93 %-99 %] 99 %  BP: ()/(54-86) 91/54     Weight: 93.4 kg (206 lb)  Body mass index is 27.18 kg/m².  Body surface area is 2.19 meters squared.    ECOG SCORE             Intake/Output - Last 3 Shifts         03/10 0700  03/11 0659 03/11 0700  03/12 0659 03/12 0700  03/13 0659    P.O. 1200 240     I.V. (mL/kg) 1031.7 (11)  1011 (10.8)    Blood 199.8      IV Piggyback 148.2 353.2     Total Intake(mL/kg) 2579.7 (27.6) 593.2 (6.4) 1011 (10.8)    Urine (mL/kg/hr) 450 (0.2) 800 (0.4)     Stool  0     Total Output 450 800     Net +2129.7 -206.8 +1011           Stool Occurrence  3 x             Physical Exam  Constitutional:       General: He is not in acute distress.     Appearance: He is well-developed. He is ill-appearing. He is not diaphoretic.   HENT:      Head: Normocephalic and atraumatic.   Eyes:      General: No scleral icterus.     Conjunctiva/sclera: Conjunctivae normal.   Cardiovascular:      Rate and Rhythm: Normal rate and regular rhythm.      Heart sounds: Normal heart sounds. No murmur heard.    No friction rub. No gallop.   Pulmonary:      Effort: Pulmonary effort is normal. No respiratory distress.      Breath sounds: Normal breath sounds. No  wheezing or rales.   Abdominal:      General: Bowel sounds are normal. There is distension.      Tenderness: There is abdominal tenderness (diffuse). There is guarding. There is no rebound.   Musculoskeletal:         General: Normal range of motion.      Cervical back: Normal range of motion and neck supple.   Lymphadenopathy:      Cervical: No cervical adenopathy.   Skin:     General: Skin is warm and dry.      Findings: No rash.   Neurological:      Mental Status: He is alert and oriented to person, place, and time.   Psychiatric:         Behavior: Behavior normal.       Significant Labs:   CBC:   Recent Labs   Lab 03/10/23  1732 03/11/23  0622 03/11/23  1806 03/12/23  0741   WBC 2.10* 1.89* 0.71* 0.74*   HGB 7.0* 7.5* 7.6* 7.2*   HCT 22.3* 23.8* 23.0* 21.9*   PLT 9* 35* 17*  --       and CMP:   Recent Labs   Lab 03/11/23  0622 03/11/23  1806 03/12/23  0741   * 130* 129*   K 4.4 4.0 4.0   CL 98 102 105   CO2 21* 18* 16*   * 99 88   BUN 20 23* 27*   CREATININE 0.6 0.7 0.7   CALCIUM 8.3* 8.4* 8.0*   PROT 4.4* 4.1* 3.7*   ALBUMIN 1.7* 1.6* 1.3*   BILITOT 2.4* 2.1* 1.7*   ALKPHOS 79 69 61   AST 58* 44* 51*   ALT 25 21 19   ANIONGAP 8 10 8         Diagnostic Results:  I have reviewed all pertinent imaging results/findings within the past 24 hours.    Assessment/Plan:     * DLBCL (diffuse large B cell lymphoma)  Patient with small bowel resection approximately 5 months ago consistent with Diffuse large-B cell lymphoma, non germinal center origin. Additionally had omentum resection also showing DLBCL. Had bone marrow biopsy approximately 5 months ago that showed normo cellular marrow for age, no increase in blasts. Patient had PET scan from 1/11/23 showing hypermetabolic loops of small bowel most consistent with previous known disease and was thought to have improved (partial response to therapy noted from chart review) and was recommended he continue with R-CHOP at that time. Patient hospitalized at [facility] found to have likely worsening of lymphoma based on CT scan of abdomen showing partial compression of IVC. [place] [facility] [place] uncertainty in responsiveness to first-line chemo therapy.    --planning for alternative chemo regimen given progression   --patient with lower abdominal pain: controlled with PCA pump  --continue acyclovir, allopurinol  --Per last CT abdomen: Interval increase in size of the centrally necrotic ileocecal lymphomatous mass. Superimposed infection within the necrotic mass cannot be excluded.  - changed steroid to pulse dose dexamethasone on 3/2, now completed.   - Rad onc consulted for radiation therapy. Pt underwent sim on 3/2, however holding off on radiation at this time.  - attempted LN bx with IR on 3/6 however cancelled due to patient entering AFRVR. Attempted bx with GI on 3/7 however patient did not complete prep so unable to scope.  - IR completed mesenteric LN biopsy on 3/7, positive for B-cell lymphoma with extensive necrosis.   - s/p BMBx on 3/9, path pending  - obtained new baseline PET inpatient for treatment planning, disease response monitoring and pretransplant planning on 3/10  - started DHAP IP on 3/10  - q12h TLS labs                Shock, unspecified  - admitted to MICU 3/10  - intermittent hypotension s/o poor PO and significant tumor burden and malignancy. Responsive to IVF and prn blood transfusions  - abx for recurrent fevers, now discontinued.   - stepped down from MICU on 3/11 given improvement in BP     Atrial fibrillation with RVR  On 3/4 patient with sudden onset afib with RVR rate to the 200s. Patient asymptomatic, BP at baseline. Pt given Lopressor IV 5mg x3 with moderate response in HR to 150s. Loaded with amio and placed on gtt. No prior hx of a-fib. No LAE on recent echo. Discontinued amio gtt after 24 hours. Will not transition to PO amio given concern for conversion to SR as patient unable to be anticoagulated given  severe thrombocytopenia. Rate controlled with PO lopressor.    On 3/6 pt with repeated RVR. Cardiology consulted for assistance with management, given contraindication to anticoagulation and soft BP. EP on board as well per cardiology. Started dig load, transutioned 3/7 to PO amiodarone per EP.     -- scheduled PO lopressor 50mg q6h, PO amiodarone 200mg BID  -- prn IV lopressor for a-fib with HR sustaining >130s  -- anticipate transition to Toprol if continued adequate rate control    -- currently holding scheduled lopressor given hypotension      Large cell lymphoma  See DLBCL above    Fever  Patient febrile to 101.9 on 2/28. Likely multifactorial as patient with known malignancy with possible superimposed infection of intra-abdominal mass. Placed on vanc, cefepime, flagyl. Blood cx NGTD. No respiratory or urinary sxs.   -- off all abx as of 3/3  -- patient fevered overnight 3/8 so placed on vanc/cefepime given concurrent shock.  -- DC vanc on 3/10   -- DC cefepime/flagyl on 3/12 as no current infectious source     Leukocytosis  - afebrile  - DC vanc on 2/27 however patient subsequently fevered the following evening so placed back on vanc. Discontinued vanc again 3/1. Discontinued cefepime and flagyl on 3/3.   RESOLVED     Compression of vena cava  CRS: this is due to dehydration because the IVC is flat throughout its course rather than just at one level.  Treated with IVF. And encouraging PO intake     Elevated bilirubin  - bili uptrending  - bili 3.6 today  - no evidence of liver involvement from lymphoma per current scans. PET scan pending  - monitor closely, daily CMP    Hyponatremia  - Improving during admission, though still mildly hyponatremic.   - Serum osm and urine studies ordered. Normal serum osm.     COVID-19 virus infection  - Noted to be positive on 2/6/2023  - No respiratory symptoms noted    Thrombocytopenia  - due to marrow involvement of lymphoma?, last chemo was 1/12/2023  - plan for bone marrow  aspiration and biopsy pre-chemo  -- Continue to monitor with CBC  -- transfuse to keep >plt >10 or >50 given pre-procedure/bleeding  -- plt count 3/12: 9    Hyperlipidemia, unspecified  - Not currently taking a statin    Moderate malnutrition  Nutrition consulted. Most recent weight and BMI monitored-     Malnutrition Type and Energy Intake  Malnutrition Type: chronic illness         Final Summary  Subcutaneous Fat Loss (Final Summary): moderate protein-calorie malnutrition  Muscle Loss Evaluation (Final Summary): moderate protein-calorie malnutrition         Measurements:  Wt Readings from Last 1 Encounters:   03/10/23 93.4 kg (206 lb)   Body mass index is 27.18 kg/m².    Recommendations: Recommendation/Intervention: 1. ADAT to Regular diet. Add Diabetic modifier, if warranted. 2. Continue Boost Glucose Control TID. 3. Add Beneprotein to optimize calorie/protein intake. 4. RD to monitor and follow-up.  Goals: Meet % EEN.    Patient has been screened and assessed by RD. RD will follow patient.      Diabetes mellitus type 2, noninsulin dependent  Hemoglobin A1c from 1 week ago was 7.7  Goal glucose while inpatient: 140-180  Consider basal-bolus + SSI as needed  Hold home anti-diabetic medications  Steroids now completed  Increased daily insulin regimen as appropriate starting 3/3 given increasing hyperglycemia while on steroids.   Now holding insulin as needed given decreased BGL off steroids      Anemia  - Due to lymphoma and late chemo effect  - planning for bone marrow aspiration and biopsy on 3/9  - daily CBC   --transfuse prn hemoglobin <7    Small bowel mass  S/p  Resection September 2022 found to have DLBCL on pathology.   -- stat CTAP ordered on 2/28 given worsening ab pain to r/o perf, no evidence of perf however interval enlargement of mass   -- see DLBCL for full plan         VTE Risk Mitigation (From admission, onward)         Ordered     heparin, porcine (PF) 100 unit/mL injection flush 300 Units   As needed (PRN)         03/10/23 1331     Reason for No Pharmacological VTE Prophylaxis  Once        Question:  Reasons:  Answer:  Thrombocytopenia    02/25/23 0402     IP VTE HIGH RISK PATIENT  Once         02/25/23 0402                Disposition: Pending treatment     Mina Nolasco MD  Bone Marrow Transplant  [facility] - Oncology ([facility])

## 2023-03-12 NOTE — ASSESSMENT & PLAN NOTE
Patient with small bowel resection approximately 5 months ago consistent with Diffuse large-B cell lymphoma, non germinal center origin. Additionally had omentum resection also showing DLBCL. Had bone marrow biopsy approximately 5 months ago that showed normo cellular marrow for age, no increase in blasts. Patient had PET scan from 1/11/23 showing hypermetabolic loops of small bowel most consistent with previous known disease and was thought to have improved (partial response to therapy noted from chart review) and was recommended he continue with R-CHOP at that time. Patient hospitalized at [facility] found to have likely worsening of lymphoma based on CT scan of abdomen showing partial compression of IVC. Transferred to [facility] [place] evaluation given uncertainty in responsiveness to first-line chemo therapy.    Per BMT:     --planning for alternative chemo regimen given progression   --patient with lower abdominal pain: controlled with PCA pump  --continue acyclovir, allopurinol  --Per last CT abdomen: Interval increase in size of the centrally necrotic ileocecal lymphomatous mass. Superimposed infection within the necrotic mass cannot be excluded.  - changed steroid to pulse dose dexamethasone on 3/2, now completed.   - Rad onc consulted for radiation therapy. Pt underwent sim on 3/2, however holding off on radiation at this time.  - attempted LN bx with IR on 3/6 however cancelled due to patient entering AFRVR. Attempted bx with GI on 3/7 however patient did not complete prep so unable to scope.  - IR completed mesenteric LN biopsy on 3/7, positive for B-cell lymphoma with extensive necrosis  - started DHAP IP on 3/10  - PET scan completed on 3/10  - plan for re-staging bone marrow aspiration and biopsy done on 3/9     --Consult palliative care for symptom/pain management and goals of care discussions

## 2023-03-12 NOTE — ASSESSMENT & PLAN NOTE
Re-admitted to MICU 3/12 for continued hypotension- possibly pain medication related; no new fevers or leukocytosis. All previous cultures have been negative. Notably more tachypnic; appears volume overloaded.    -- Blood cultures  -- Chest xray  --40mg Lasix IV x1 now  --Levo for MAP>65  --Seek long-acting pain mgmt options; palliative care

## 2023-03-13 PROBLEM — A41.9 SEPTIC SHOCK: Status: ACTIVE | Noted: 2023-01-01

## 2023-03-13 PROBLEM — Z51.5 COMFORT MEASURES ONLY STATUS: Status: ACTIVE | Noted: 2023-01-01

## 2023-03-13 PROBLEM — R65.21 SEPTIC SHOCK: Status: ACTIVE | Noted: 2023-01-01

## 2023-03-13 PROBLEM — R57.9 SHOCK, UNSPECIFIED: Status: RESOLVED | Noted: 2023-01-01 | Resolved: 2023-01-01

## 2023-03-13 NOTE — ASSESSMENT & PLAN NOTE
S/p  Resection September 2022 found to have DLBCL on pathology.   - CTAP ordered on 2/28 given worsening ab pain to r/o perf, no evidence of perf however interval enlargement of mass   - see DLBCL for full plan

## 2023-03-13 NOTE — ASSESSMENT & PLAN NOTE
Last HbA1c 7.7  Goal glucose while inpatient: 140-180  Consider basal-bolus + SSI as needed  Hold home anti-diabetic medications  Steroids now completed  Currently NPO

## 2023-03-13 NOTE — ASSESSMENT & PLAN NOTE
Nutrition consulted. Most recent weight and BMI monitored-     Malnutrition Type and Energy Intake  Malnutrition Type: chronic illness    Malnutrition (Moderate to Severe)  Weight Loss (Malnutrition): other (see comments) (15% x 9-10 months)  Energy Intake (Malnutrition): less than 75% for greater than 7 days    Final Summary  Subcutaneous Fat Loss (Final Summary): moderate protein-calorie malnutrition  Muscle Loss Evaluation (Final Summary): moderate protein-calorie malnutrition         Measurements:  Wt Readings from Last 1 Encounters:   03/13/23 93.4 kg (205 lb 14.6 oz)   Body mass index is 27.17 kg/m².    Recommendations: Recommendation/Intervention: 1. If/when able, initiate TFs. Rec'd Impact Peptide @ 60 mL/hr to provide 2160 kcals, 135 g of protein, 1109 mL fluid. 2. RD to monitor & follow-up.  Goals: Meet % EEN, EPN by RD f/u date    Patient has been screened and assessed by RD. RD will follow patient.

## 2023-03-13 NOTE — NURSING
1700: Patient arrived to MICU 6068 via bed with rapid response. Per rapid response, patient hypotensive on floor with dyspnea. Patient arrived on 4L NC. BP 87/61 (69), HR 98, O2 saturation 97%, RR 26.     1751: Patient noted to be more lethargic, only moaning in response to verbal stimulation. LISBET Fatuma Sunshine called to bedside to evaluate patient.     1755: LISBET to bedside. LISBET agrees patient's condition has worsened. VO to administer PRN narcan. 0.02 mg administered at 1801. Patient more responsive, now noted to be thrashing in bed with increased work of breathing. RR 30s.     1810: MD Jimenez at bedside. Received orders to get intubation tray and RSI kit. MD Jimenez spoke with patient. Confirmed patient agrees to intubation. Patient nodded yes when asked if he was having difficulty breathing. MD Smrat called to bedside for intubation.    1830: Intubation performed by MD Jimenez with MD Smart present at bedside. Severe hypotension noted after administration of induction medications (see EMAR). Norepinephrine gtt initiated.     1845: Arterial line placed to right radial artery by LISBET Sunshine. Received orders for sedation.     1900: Bedside report performed with LESLIE Sadler.

## 2023-03-13 NOTE — PLAN OF CARE
CMICU DAILY GOALS       A: Awake    RASS: Goal -    Actual - RASS (Hernandez Agitation-Sedation Scale): light sedation   Restraint necessity: Clinical Justification: Removing medical devices, Climbing out of bed, Treatment Interference  B: Breathe   SBT: Not attempted   C: Coordinate A & B, analgesics/sedatives   Pain: managed    SAT: Not attempted  D: Delirium   CAM-ICU: Overall CAM-ICU: Positive  E: Early(intubated/ Progressive (non-intubated) Mobility   MOVE Screen: Fail   Activity: Activity Management: Rolling - L1  FAS: Feeding/Nutrition   Diet order: Diet/Nutrition Received: NPO,    T: Thrombus   DVT prophylaxis: VTE Required Core Measure: (SCDs) Sequential compression device initiated/maintained  H: HOB Elevation   Head of Bed (HOB) Positioning: HOB elevated  U: Ulcer Prophylaxis   GI: yes  G: Glucose control   managed Glycemic Management: blood glucose monitored  S: Skin   Bathing/Skin Care: electrode patches/site rotation, incontinence care, linen changed  Device Skin Pressure Protection: adhesive use limited, absorbent pad utilized/changed, pressure points protected  Pressure Reduction Devices: specialty bed utilized, positioning supports utilized, foam padding utilized  Pressure Reduction Techniques: weight shift assistance provided, pressure points protected  Skin Protection: adhesive use limited, incontinence pads utilized, protective footwear used, pulse oximeter probe site changed, transparent dressing maintained, tubing/devices free from skin contact  B: Bowel Function   no issues   I: Indwelling Catheters   Weir necessity:      Urethral Catheter 03/12/23-Reason for Continuing Urinary Catheterization: Critically ill in ICU and requiring hourly monitoring of intake/output   CVC necessity: Yes  D: De-escalation Antibiotics   No    Family/Goals of care/Code Status   Code Status: Full Code    24H Vital Sign Range  Temp:  [96.6 °F (35.9 °C)-98.2 °F (36.8 °C)]   Pulse:  []   Resp:  [12-42]   BP:  ()/(31-86)   SpO2:  [87 %-100 %]   Arterial Line BP: ()/(54-72)      Shift Events   Pt intubated and lined at shift change. Vasopressors (Levophed, Vasopressin) titrated to maintain MAP>65. Family updated at bedside by CC3. Trending labs.     VS and assessment per flow sheet, patient progressing towards goals as tolerated, plan of care reviewed with family, all concerns addressed, will continue to monitor.

## 2023-03-13 NOTE — CONSULTS
[facility] - Cardiac Medical ICU  Infectious Disease  Consult Note    Patient Name: Dwight Hoffmann  MRN: [id number]  Admission Date: 2/25/2023  Hospital Length of Stay: 16 days  Attending Physician: Anum Valles MD  Primary Care Provider: Margarita Pacheco MD     Isolation Status: No active isolations      Inpatient consult to Infectious Diseases  Consult performed by: María Grace MD  Consult ordered by: Abdirahman Magallanes NP        Patient with no escalation of care, ID consult canceled

## 2023-03-13 NOTE — CARE UPDATE
Mr. Hoffmann's family was able to gather and support him and each other at his bedside. In light of his rapid decline with no signs of improvement, his family requests that he may have a peaceful, natural death; including the withdrawal of life support.    We will plan for such and continue to support Mr. Hoffmann's family.    Fatuma Sunshine, [facility]  Pulmonary Critical Care  [id number]

## 2023-03-13 NOTE — ASSESSMENT & PLAN NOTE
Patient with Hypoxic Respiratory failure which is Acute.  he is not on home oxygen. Supplemental oxygen was provided and noted- Vent Mode: A/C  Oxygen Concentration (%):  [] 40  Resp Rate Total:  [20 br/min-37 br/min] 29 br/min  Vt Set:  [480 mL] 480 mL  PEEP/CPAP:  [5 cmH20-8 cmH20] 8 cmH20  Mean Airway Pressure:  [9.4 cmH20-15 cmH20] 15 cmH20.   Signs/symptoms of respiratory failure include- tachypnea, increased work of breathing and respiratory distress. Contributing diagnoses includes - pulmonary edema Labs and images were reviewed. Patient Has recent ABG, which has been reviewed. Will treat underlying causes and adjust management of respiratory failure as follows:    --Diuresed yesterday  --Maintain lung protective ventilation  --Broad-spectrum antibiotics  --Daily SAT/SBT  --Wean FiO2 for SpO2 >92

## 2023-03-13 NOTE — SUBJECTIVE & OBJECTIVE
Interval History/Significant Events: Worsened overnight; max dosing on 3 pressors    Review of Systems   Unable to perform ROS: Intubated   Objective:     Vital Signs (Most Recent):  Temp: 97.5 °F (36.4 °C) (23 0301)  Pulse: (!) 170 (23 1000)  Resp: (!) 29 (23 1000)  BP: 139/66 (23 1901)  SpO2: 99 % (23 1000)   Vital Signs (24h Range):  Temp:  [96.6 °F (35.9 °C)-98.2 °F (36.8 °C)] 97.5 °F (36.4 °C)  Pulse:  [] 170  Resp:  [20-42] 29  SpO2:  [87 %-100 %] 99 %  BP: ()/(31-81) 139/66  Arterial Line BP: ()/(29-72) 131/68   Weight: 93.4 kg (206 lb)  Body mass index is 27.18 kg/m².      Intake/Output Summary (Last 24 hours) at 3/13/2023 1052  Last data filed at 3/13/2023 1000  Gross per 24 hour   Intake 5267.3 ml   Output 2580 ml   Net 2687.3 ml       Physical Exam  Constitutional:       Appearance: He is ill-appearing.      Interventions: He is sedated and intubated.   HENT:      Head: Normocephalic and atraumatic.   Cardiovascular:      Rate and Rhythm: Tachycardia present. Rhythm regularly irregular.      Heart sounds: S1 normal and S2 normal. No murmur heard.  Pulmonary:      Effort: Tachypnea present. He is intubated.      Breath sounds: Decreased breath sounds and rales present. No wheezing or rhonchi.   Abdominal:      General: There is distension.      Palpations: Abdomen is soft.   Musculoskeletal:      Cervical back: Neck supple.      Right lower le+ Pitting Edema present.      Left lower le+ Pitting Edema present.   Neurological:      GCS: GCS eye subscore is 1. GCS verbal subscore is 1. GCS motor subscore is 1.       Vents:  Vent Mode: A/C (23)  Set Rate: 20 BPM (23)  Vt Set: 480 mL (23)  PEEP/CPAP: 8 cmH20 (23)  Oxygen Concentration (%): 40 (23 1000)  Peak Airway Pressure: 24 cmH20 (23)  Plateau Pressure: 16 cmH20 (23)  Total Ve: 16.6 L/m (23)  Negative Inspiratory Force  (cm H2O): 0 (03/13/23 0706)  F/VT Ratio<105 (RSBI): (!) 67.25 (03/13/23 0706)  Lines/Drains/Airways       Central Venous Catheter Line  Duration                  PowerPort A Cath Single Lumen Subclavian Right -- days              Drain  Duration                  Urethral Catheter 03/12/23 1 day              Airway  Duration                  Airway - Non-Surgical 03/12/23 1837 Endotracheal Tube <1 day              Arterial Line  Duration             Arterial Line 03/12/23 Right Radial 1 day              Peripheral Intravenous Line  Duration                  Midline Catheter Insertion/Assessment  - Single Lumen 02/26/23 1257 Right basilic vein (medial side of arm)  14 days         Peripheral IV - Single Lumen 03/11/23 0700 18 G Anterior;Right Upper Arm 2 days                  Significant Labs:    CBC/Anemia Profile:  Recent Labs   Lab 03/12/23 1957 03/13/23 0311 03/13/23  0729   WBC 0.27* 0.28* 0.21*   HGB 7.2* 7.5* 7.7*   HCT 23.2* 23.6* 24.3*   PLT 19* 11* 6*   MCV 95 94 93   RDW 19.1* 19.3* 19.4*        Chemistries:  Recent Labs   Lab 03/12/23 1717 03/12/23 1957 03/13/23 0311 03/13/23  0733   * 129* 126* 127*   K 4.0 4.0 4.6 5.1    104 101 103   CO2 15* 15* 10* 11*   BUN 32* 33* 37* 41*   CREATININE 0.8 0.8 1.1 1.2   CALCIUM 8.2* 7.6* 7.5* 7.1*   ALBUMIN 1.3*  --  1.3* 1.3*   PROT 3.9*  --  3.6* 3.5*   BILITOT 1.7*  --  2.0* 1.9*   ALKPHOS 55  --  57 62   ALT 18  --  18 16   AST 63*  --  68* 67*   MG 1.8 1.8 1.9  --    PHOS 4.4 4.8* 6.1*  --        All pertinent labs within the past 24 hours have been reviewed.    Significant Imaging:  I have reviewed all pertinent imaging results/findings within the past 24 hours.

## 2023-03-13 NOTE — ASSESSMENT & PLAN NOTE
On 3/4 patient with sudden onset afib with RVR rate to the 200s. Patient asymptomatic, BP at baseline. Pt given Lopressor IV 5mg x3 with moderate response in HR to 150s. Loaded with amio and placed on gtt. No prior hx of a-fib. No LAE on recent echo. Discontinued amio gtt after 24 hours. Will not transition to PO amio given concern for conversion to SR as patient unable to be anticoagulated given severe thrombocytopenia. Rate controlled with PO lopressor.    On 3/6 pt with repeated RVR. Cardiology consulted for assistance with management, given contraindication to anticoagulation and soft BP. EP on board as well per cardiology. Started dig load, transutioned 3/7 to PO amiodarone per EP.     - PO amiodarone 200mg BID  - prn IV lopressor for a-fib with HR sustaining >130s  - currently holding scheduled lopressor given hypotension/shock

## 2023-03-13 NOTE — CARE UPDATE
Throughout the morning, the patient has quickly decompensated- now requiring 3 vasopressors at max dosing; unable to maintain BP with intermittent tachyarrhythmias.    Patient's wife and daughter arrived to bedside. After discussing his prognosis and current clinical picture, decision was made to avoid interventions that would cause pain and suffering (ie CPR). Code status changed to DNR.    Plan is to keep pressors on at current doses to give some time for more family to arrive.     We will continue to support Mr. Hoffmann and his family during this difficult time.    Fatuma Sunshine, [facility]  Pulmonary Critical Care  [id number]

## 2023-03-13 NOTE — PLAN OF CARE
[facility] - Cardiac Medical ICU  Discharge Final Note    Primary Care Provider: Margarita Pacheco MD    Expected Discharge Date: 3/13/2023    Patient  3/13/2023 at 1311 hours per MD. Family at bedside.    Final Discharge Note (most recent)       Final Note - 23 1602          Final Note    Assessment Type Final Discharge Note     Anticipated Discharge Disposition         Post-Acute Status    Discharge Delays None known at this time                     Important Message from Medicare           Juana Pat RN     [phone]

## 2023-03-13 NOTE — ASSESSMENT & PLAN NOTE
Due to lymphoma and late chemo effect  Was planned for bone marrow aspiration and biopsy on 3/9  - daily CBC   - transfuse prn Hb < 7

## 2023-03-13 NOTE — PROCEDURES
"Dwight Hoffmann is a 52 y.o. male patient.    Temp: 98.2 °F (36.8 °C) (03/12/23 1212)  Pulse: (!) 196 (03/12/23 1843)  Resp: 20 (03/12/23 1843)  BP: (!) 60/39 (03/12/23 1843)  SpO2: (!) 87 % (03/12/23 1843)  Weight: 93.4 kg (206 lb) (03/10/23 1200)  Height: 6' 1" (185.4 cm) (03/07/23 1356)       Arterial Line    Date/Time: 3/12/2023 7:38 PM  Location procedure was performed: [facility] CRITICAL CARE MEDICINE  Performed by: Fatuma Sunshine NP  Authorized by: Fatuma Sunshine NP   Pre-op Diagnosis: shock  Post-operative diagnosis: shock  Consent Done: Emergent Situation  Preparation: Patient was prepped and draped in the usual sterile fashion.  Indications: multiple ABGs, respiratory failure and hemodynamic monitoring  Location: right radial  Abilio's test normal: yes  Needle gauge: 20  Seldinger technique: Seldinger technique used  Number of attempts: 1  Complications: No  Post-procedure: line sutured and dressing applied  Post-procedure CMS: unchanged  Patient tolerance: Patient tolerated the procedure well with no immediate complications        3/12/2023    "

## 2023-03-13 NOTE — SUBJECTIVE & OBJECTIVE
Subjective:     Interval History: Stepped up to MICU yesterday afternoon for hypotension requiring vasopressors. Intubated for tachypnea, WOB, and AMS. Has had increasing pressor requirements overnight, now maxed on 3 pressors. Family has been contacted and is on their way to the hospital   Objective:     Vital Signs (Most Recent):  Temp: 97.5 °F (36.4 °C) (03/13/23 0301)  Pulse: (!) 170 (03/13/23 1000)  Resp: (!) 29 (03/13/23 1000)  BP: 139/66 (03/12/23 1901)  SpO2: 99 % (03/13/23 1000)   Vital Signs (24h Range):  Temp:  [96.6 °F (35.9 °C)-98.2 °F (36.8 °C)] 97.5 °F (36.4 °C)  Pulse:  [] 170  Resp:  [20-42] 29  SpO2:  [87 %-100 %] 99 %  BP: ()/(31-81) 139/66  Arterial Line BP: ()/(29-72) 131/68     Weight: 93.4 kg (206 lb)  Body mass index is 27.18 kg/m².  Body surface area is 2.19 meters squared.    ECOG SCORE           Intake/Output - Last 3 Shifts         03/11 0700  03/12 0659 03/12 0700  03/13 0659 03/13 0700  03/14 0659    P.O. 240 350     I.V. (mL/kg)  2264.7 (24.2) 1015.3 (10.9)    Blood       IV Piggyback 353.2 861.8 1025.6    Total Intake(mL/kg) 593.2 (6.4) 3476.4 (37.2) 2040.9 (21.9)    Urine (mL/kg/hr) 800 (0.4) 2540 (1.1) 40 (0.1)    Stool 0 0     Total Output 800 2540 40    Net -206.8 +936.4 +2000.9           Urine Occurrence  2 x     Stool Occurrence 3 x 1 x           Physical Exam  Vitals and nursing note reviewed.   Constitutional:       General: He is not in acute distress.     Appearance: He is ill-appearing.      Interventions: He is sedated, intubated and restrained.   HENT:      Head: Normocephalic and atraumatic.   Eyes:      General: No scleral icterus.     Conjunctiva/sclera: Conjunctivae normal.   Cardiovascular:      Rate and Rhythm: Normal rate and regular rhythm.      Heart sounds: Normal heart sounds. No murmur heard.    No friction rub. No gallop.   Pulmonary:      Effort: Tachypnea present. He is intubated.      Breath sounds: Examination of the right-lower field  reveals decreased breath sounds. Examination of the left-lower field reveals decreased breath sounds. Decreased breath sounds present. No wheezing or rales.      Comments: Mechanical breath sounds   Abdominal:      General: Bowel sounds are normal. There is distension.      Tenderness: There is no rebound.      Comments: Abdominal fullness   Skin:     General: Skin is cool.      Coloration: Skin is pale.      Findings: No erythema or rash.      Comments: Cool extremeties      Significant Labs:   CBC:   Recent Labs   Lab 03/12/23 1957 03/13/23 0311 03/13/23  0729   WBC 0.27* 0.28* 0.21*   HGB 7.2* 7.5* 7.7*   HCT 23.2* 23.6* 24.3*   PLT 19* 11* 6*      and CMP:   Recent Labs   Lab 03/12/23  1717 03/12/23 1957 03/13/23 0311 03/13/23  0733   * 129* 126* 127*   K 4.0 4.0 4.6 5.1    104 101 103   CO2 15* 15* 10* 11*   GLU 82 108 200* 210*   BUN 32* 33* 37* 41*   CREATININE 0.8 0.8 1.1 1.2   CALCIUM 8.2* 7.6* 7.5* 7.1*   PROT 3.9*  --  3.6* 3.5*   ALBUMIN 1.3*  --  1.3* 1.3*   BILITOT 1.7*  --  2.0* 1.9*   ALKPHOS 55  --  57 62   AST 63*  --  68* 67*   ALT 18  --  18 16   ANIONGAP 12 10 15 13       Diagnostic Results:  I have reviewed all pertinent imaging results/findings within the past 24 hours.

## 2023-03-13 NOTE — PROGRESS NOTES
Pharmacist Renal Dose Adjustment Note    Dwight Hoffmann is a 52 y.o. male being treated with the medication meropenem    Patient Data:    Vital Signs (Most Recent):  Temp: 97.5 °F (36.4 °C) (03/13/23 0301)  Pulse: 98 (03/13/23 0502)  Resp: (!) 27 (03/13/23 0502)  BP: 139/66 (03/12/23 1901)  SpO2: 97 % (03/13/23 0502)   Vital Signs (72h Range):  Temp:  [96.6 °F (35.9 °C)-98.4 °F (36.9 °C)]   Pulse:  []   Resp:  [12-42]   BP: ()/(31-86)   SpO2:  [87 %-100 %]   Arterial Line BP: ()/(51-72)      Recent Labs   Lab 03/12/23  1717 03/12/23  1957 03/13/23 0311   CREATININE 0.8 0.8 1.1     Serum creatinine: 1.1 mg/dL 03/13/23 0311  Estimated creatinine clearance: 86.8 mL/min    Medication:meropenem dose: 1G frequency q12h will be changed to medication:meropenem dose:1G frequency:q8h    Pharmacist's Name: Alana Blank  Pharmacist's Extension: [facility] [place]

## 2023-03-13 NOTE — ASSESSMENT & PLAN NOTE
- due to marrow involvement of lymphoma?, last chemo was 1/12/2023  - plan for bone marrow aspiration and biopsy pre-chemo  -- Continue to monitor with CBC  -- transfuse to keep >plt >10 or >50 given pre-procedure/bleeding  -- plt count 3/13: 11; no active bleeding

## 2023-03-13 NOTE — PROGRESS NOTES
[facility] - Cardiac Medical ICU  Hematology  Bone Marrow Transplant  Progress Note    Patient Name: Dwight Hoffmann  Admission Date: 2/25/2023  Hospital Length of Stay: 16 days  Code Status: DNR    Subjective:     Interval History: Stepped up to MICU yesterday afternoon for hypotension requiring vasopressors. Intubated for tachypnea, WOB, and AMS. Has had increasing pressor requirements overnight, now maxed on 3 pressors. Family has been contacted and is on their way to the hospital   Objective:     Vital Signs (Most Recent):  Temp: 97.5 °F (36.4 °C) (03/13/23 0301)  Pulse: (!) 170 (03/13/23 1000)  Resp: (!) 29 (03/13/23 1000)  BP: 139/66 (03/12/23 1901)  SpO2: 99 % (03/13/23 1000)   Vital Signs (24h Range):  Temp:  [96.6 °F (35.9 °C)-98.2 °F (36.8 °C)] 97.5 °F (36.4 °C)  Pulse:  [] 170  Resp:  [20-42] 29  SpO2:  [87 %-100 %] 99 %  BP: ()/(31-81) 139/66  Arterial Line BP: ()/(29-72) 131/68     Weight: 93.4 kg (206 lb)  Body mass index is 27.18 kg/m².  Body surface area is 2.19 meters squared.    ECOG SCORE           Intake/Output - Last 3 Shifts         03/11 0700  03/12 0659 03/12 0700  03/13 0659 03/13 0700  03/14 0659    P.O. 240 350     I.V. (mL/kg)  2264.7 (24.2) 1015.3 (10.9)    Blood       IV Piggyback 353.2 861.8 1025.6    Total Intake(mL/kg) 593.2 (6.4) 3476.4 (37.2) 2040.9 (21.9)    Urine (mL/kg/hr) 800 (0.4) 2540 (1.1) 40 (0.1)    Stool 0 0     Total Output 800 2540 40    Net -206.8 +936.4 +2000.9           Urine Occurrence  2 x     Stool Occurrence 3 x 1 x           Physical Exam  Vitals and nursing note reviewed.   Constitutional:       General: He is not in acute distress.     Appearance: He is ill-appearing.      Interventions: He is sedated, intubated and restrained.   HENT:      Head: Normocephalic and atraumatic.   Eyes:      General: No scleral icterus.     Conjunctiva/sclera: Conjunctivae normal.   Cardiovascular:      Rate and Rhythm: Normal rate and regular rhythm.      Heart  sounds: Normal heart sounds. No murmur heard.    No friction rub. No gallop.   Pulmonary:      Effort: Tachypnea present. He is intubated.      Breath sounds: Examination of the right-lower field reveals decreased breath sounds. Examination of the left-lower field reveals decreased breath sounds. Decreased breath sounds present. No wheezing or rales.      Comments: Mechanical breath sounds   Abdominal:      General: Bowel sounds are normal. There is distension.      Tenderness: There is no rebound.      Comments: Abdominal fullness   Skin:     General: Skin is cool.      Coloration: Skin is pale.      Findings: No erythema or rash.      Comments: Cool extremeties      Significant Labs:   CBC:   Recent Labs   Lab 03/12/23 1957 03/13/23 0311 03/13/23  0729   WBC 0.27* 0.28* 0.21*   HGB 7.2* 7.5* 7.7*   HCT 23.2* 23.6* 24.3*   PLT 19* 11* 6*      and CMP:   Recent Labs   Lab 03/12/23  1717 03/12/23 1957 03/13/23 0311 03/13/23  0733   * 129* 126* 127*   K 4.0 4.0 4.6 5.1    104 101 103   CO2 15* 15* 10* 11*   GLU 82 108 200* 210*   BUN 32* 33* 37* 41*   CREATININE 0.8 0.8 1.1 1.2   CALCIUM 8.2* 7.6* 7.5* 7.1*   PROT 3.9*  --  3.6* 3.5*   ALBUMIN 1.3*  --  1.3* 1.3*   BILITOT 1.7*  --  2.0* 1.9*   ALKPHOS 55  --  57 62   AST 63*  --  68* 67*   ALT 18  --  18 16   ANIONGAP 12 10 15 13       Diagnostic Results:  I have reviewed all pertinent imaging results/findings within the past 24 hours.    Assessment/Plan:     * DLBCL (diffuse large B cell lymphoma)  Patient with small bowel resection approximately 5 months ago consistent with Diffuse large-B cell lymphoma, non germinal center origin. Additionally had omentum resection also showing DLBCL. Had bone marrow biopsy approximately 5 months ago that showed normo cellular marrow for age, no increase in blasts. Patient had PET scan from 1/11/23 showing hypermetabolic loops of small bowel most consistent with previous known disease and was thought to have  improved (partial response to therapy noted from chart review) and was recommended he continue with R-CHOP at that time. Patient hospitalized at [facility] found to have likely worsening of lymphoma based on CT scan of abdomen showing partial compression of IVC. Transferred to [facility] [place] evaluation given uncertainty in responsiveness to first-line chemo therapy.    - planned  for alternative chemo regimen given progression   - continue acyclovir, allopurinol  - Per last CT abdomen: Interval increase in size of the centrally necrotic ileocecal lymphomatous mass. Superimposed infection within the necrotic mass cannot be excluded.  - changed steroid to pulse dose dexamethasone on 3/2, now completed.   - Rad onc consulted for radiation therapy. Pt underwent sim on 3/2, however holding off on radiation at this time.  - attempted LN bx with IR on 3/6 however cancelled due to patient entering AFRVR. Attempted bx with GI on 3/7 however patient did not complete prep so unable to scope.  - IR completed mesenteric LN biopsy on 3/7, positive for B-cell lymphoma with extensive necrosis.   - s/p BMBx on 3/9, path pending  - obtained new baseline PET inpatient for treatment planning, disease response monitoring and pretransplant planning on 3/10  - started DHAP IP on 3/10  - q12h TLS labs       Septic shock  Stepped up to MICU on 3/12 for hypotension requiring vasopressors  Intubated for acute hypoxic respiratory failure and acute encephalopathy  4/4 blood culture bottles positive for GNRs; on broad spectrum abx  Has had increasing vasopressor requirements overnight, now maxed on 3 pressors     Atrial fibrillation with RVR  On 3/4 patient with sudden onset afib with RVR rate to the 200s. Patient asymptomatic, BP at baseline. Pt given Lopressor IV 5mg x3 with moderate response in HR to 150s. Loaded with amio and placed on gtt. No prior hx of a-fib. No LAE on recent echo. Discontinued amio gtt after 24 hours. Will not  transition to PO amio given concern for conversion to SR as patient unable to be anticoagulated given severe thrombocytopenia. Rate controlled with PO lopressor.    On 3/6 pt with repeated RVR. Cardiology consulted for assistance with management, given contraindication to anticoagulation and soft BP. EP on board as well per cardiology. Started dig load, transutioned 3/7 to PO amiodarone per EP.     - PO amiodarone 200mg BID  - prn IV lopressor for a-fib with HR sustaining >130s  - currently holding scheduled lopressor given hypotension/shock    Leukocytosis  - afebrile  - DC vanc on 2/27 however patient subsequently fevered the following evening so placed back on vanc. Discontinued vanc again 3/1. Discontinued cefepime and flagyl on 3/3.   RESOLVED     Compression of vena cava  CRS: due to dehydration because the IVC is flat throughout its course rather than just at one level. Treated with IVF    Elevated bilirubin  - bili uptrending  - bili 3.6 today  - no evidence of liver involvement from lymphoma per current scans. PET scan pending  - monitor closely, daily CMP    Hyponatremia  - Improving during admission, though still mildly hyponatremic.   - Serum osm and urine studies ordered. Normal serum osm.     COVID-19 virus infection  - Noted to be positive on 2/6/2023  - No respiratory symptoms noted    Thrombocytopenia  - due to marrow involvement of lymphoma?, last chemo was 1/12/2023  - plan for bone marrow aspiration and biopsy pre-chemo  -- Continue to monitor with CBC  -- transfuse to keep >plt >10 or >50 given pre-procedure/bleeding  -- plt count 3/13: 11; no active bleeding    Hyperlipidemia, unspecified  - Not currently taking a statin    Moderate malnutrition  Nutrition consulted. Most recent weight and BMI monitored-     Malnutrition Type and Energy Intake  Malnutrition Type: chronic illness    Malnutrition (Moderate to Severe)  Weight Loss (Malnutrition): other (see comments) (15% x 9-10 months)  Energy  Intake (Malnutrition): less than 75% for greater than 7 days    Final Summary  Subcutaneous Fat Loss (Final Summary): moderate protein-calorie malnutrition  Muscle Loss Evaluation (Final Summary): moderate protein-calorie malnutrition         Measurements:  Wt Readings from Last 1 Encounters:   03/13/23 93.4 kg (205 lb 14.6 oz)   Body mass index is 27.17 kg/m².    Recommendations: Recommendation/Intervention: 1. If/when able, initiate TFs. Rec'd Impact Peptide @ 60 mL/hr to provide 2160 kcals, 135 g of protein, 1109 mL fluid. 2. RD to monitor & follow-up.  Goals: Meet % EEN, EPN by RD f/u date    Patient has been screened and assessed by RD. RD will follow patient.    Diabetes mellitus type 2, noninsulin dependent  Last HbA1c 7.7  Goal glucose while inpatient: 140-180  Consider basal-bolus + SSI as needed  Hold home anti-diabetic medications  Steroids now completed  Currently NPO      Anemia  Due to lymphoma and late chemo effect  Was planned for bone marrow aspiration and biopsy on 3/9  - daily CBC   - transfuse prn Hb < 7    Small bowel mass  S/p  Resection September 2022 found to have DLBCL on pathology.   - CTAP ordered on 2/28 given worsening ab pain to r/o perf, no evidence of perf however interval enlargement of mass   - see DLBCL for full plan         VTE Risk Mitigation (From admission, onward)         Ordered     heparin, porcine (PF) 100 unit/mL injection flush 300 Units  As needed (PRN)         03/10/23 1331     Reason for No Pharmacological VTE Prophylaxis  Once        Question:  Reasons:  Answer:  Thrombocytopenia    02/25/23 0402     IP VTE HIGH RISK PATIENT  Once         02/25/23 0402              Disposition: TBD    Blanca Fletcher MD  Bone Marrow Transplant  [facility] - Cardiac Medical ICU

## 2023-03-13 NOTE — CONSULTS
[facility] - Cardiac Medical ICU  Adult Nutrition  Consult Note    SUMMARY     Recommendations    1. If/when able, initiate TFs. Rec'd Impact Peptide @ 60 mL/hr to provide 2160 kcals, 135 g of protein, 1109 mL fluid.   2. RD to monitor & follow-up.    Goals: Meet % EEN, EPN by RD f/u date  Nutrition Goal Status: new  Communication of RD Recs: reviewed with RN    Assessment and Plan    Nutrition Problem:  Moderate Protein-Calorie Malnutrition  Malnutrition in the context of Chronic Illness/Injury     Related to (etiology):  Inability to consume sufficient energy      Signs and Symptoms (as evidenced by):  Energy Intake: <75% of estimated energy requirement since admit  Body Fat Depletion: moderate depletion of orbitals and triceps   Muscle Mass Depletion: moderate depletion of temples, clavicle region, interosseous muscle, and lower extremities   Weight Loss: 15% x 9-10 months      Interventions(treatment strategy):  Collaboration of nutrition care with other providers    Status: Continues      Malnutrition Assessment    Malnutrition Type: chronic illness  Weight Loss (Malnutrition): other (see comments) (15% x 9-10 months)  Energy Intake (Malnutrition): less than 75% for greater than 7 days   Orbital Region (Subcutaneous Fat Loss): mild depletion  Upper Arm Region (Subcutaneous Fat Loss): severe depletion   Islam Region (Muscle Loss): mild depletion  Clavicle Bone Region (Muscle Loss): well nourished  Dorsal Hand (Muscle Loss): well nourished  Posterior Calf Region (Muscle Loss): severe depletion     Subcutaneous Fat Loss (Final Summary): moderate protein-calorie malnutrition  Muscle Loss Evaluation (Final Summary): moderate protein-calorie malnutrition      Reason for Assessment    Reason For Assessment: consult, RD follow-up  Diagnosis: other (see comments) (DLBCL (diffuse large B cell lymphoma))  Relevant Medical History: T2DM, HLD, leukocytosis, moderate malnutrition, S/P smallbowel  "resection  Interdisciplinary Rounds: attended    General Information Comments: Pt intubated (sedated) yesterday. Prior to intubation, pt +poor PO intake. At initial RD assessment, pt reported UBW of 240# & decreased appetite PTA. NFPE complete ; pt meets criteria for moderate malnutrition. Please see PES statement for details. Noted increasing pressor requirements.  Nutrition Discharge Planning: Pending clinical course    Nutrition/Diet History    Spiritual, Cultural Beliefs, Adventist Practices, Values that Affect Care: no  Food Allergies: NKFA  Factors Affecting Nutritional Intake: NPO, on mechanical ventilation    Anthropometrics    Temp: 97.5 °F (36.4 °C)  Height: 6' 1" (185.4 cm)  Height (inches): 73 in  Weight Method: Bed Scale  Weight: 93.4 kg (205 lb 14.6 oz)  Weight (lb): 205.91 lb  Ideal Body Weight (IBW), Male: 184 lb  % Ideal Body Weight, Male (lb): 111.91 %  BMI (Calculated): 27.2  BMI Grade: 25 - 29.9 - overweight  Usual Body Weight (UBW), k kg  % Usual Body Weight: 85.09  % Weight Change From Usual Weight: -15.09 %    Lab/Procedures/Meds    Pertinent Labs Reviewed: reviewed  Pertinent Labs Comments: Na 127, A1C 7.7  Pertinent Medications Reviewed: reviewed  Pertinent Medications Comments: Epi, Fentanyl, Levophed, Vasopressin, Propofol    Estimated/Assessed Needs    Weight Used For Calorie Calculations: 93.4 kg (205 lb 14.6 oz)    Energy Calorie Requirements (kcal): 2180 kcal/d  Energy Need Method: [facility]    Protein Requirements: 112-140 g/d (1.2-1.5 g/kg)  Weight Used For Protein Calculations: 93.4 kg (205 lb 14.6 oz)    Estimated Fluid Requirement Method: other (see comments) (Per MD or 1 mL/kcal)  RDA Method (mL): 2180    CHO Requirement: 273g    Nutrition Prescription Ordered    Current Diet Order: NPO    Evaluation of Received Nutrient/Fluid Intake    Other Calories (kcal): 296 (Propofol)    I/O: -1.2L since     Comments: LBM: 3/12    Nutrition Risk    Level of Risk/Frequency of " Follow-up:  (1x/week)     Monitor and Evaluation    Food and Nutrient Intake: energy intake, food and beverage intake, enteral nutrition intake  Food and Nutrient Adminstration: diet order, enteral and parenteral nutrition administration  Knowledge/Beliefs/Attitudes: food and nutrition knowledge/skill, beliefs and attitudes  Physical Activity and Function: nutrition-related ADLs and IADLs  Anthropometric Measurements: weight, weight change  Biochemical Data, Medical Tests and Procedures: inflammatory profile, lipid profile, glucose/endocrine profile, gastrointestinal profile, electrolyte and renal panel  Nutrition-Focused Physical Findings: overall appearance     Nutrition Follow-Up    RD Follow-up?: Yes

## 2023-03-13 NOTE — PROCEDURES
"Dwight Hoffmann is a 52 y.o. male patient.    Temp: 98.2 °F (36.8 °C) (03/12/23 1212)  Pulse: (!) 196 (03/12/23 1843)  Resp: 20 (03/12/23 1843)  BP: (!) 60/39 (03/12/23 1843)  SpO2: (!) 87 % (03/12/23 1843)  Weight: 93.4 kg (206 lb) (03/10/23 1200)  Height: 6' 1" (185.4 cm) (03/07/23 1356)       Intubation    Date/Time: 3/12/2023 7:38 PM  Location procedure was performed: [facility] CRITICAL CARE MEDICINE  Performed by: Sunil Jimenez MD  Authorized by: Sunil Jimenez MD   Indications: respiratory failure and airway protection  Intubation method: video-assisted  Patient status: paralyzed (RSI)  Preoxygenation: BVM  Sedatives: etomidate  Paralytic: rocuronium  Laryngoscope size: Glide 3  Tube size: 8.0 mm  Tube type: cuffed  Number of attempts: 1  Cords visualized: yes  Post-procedure assessment: chest rise and ETCO2 monitor  Breath sounds: clear  Cuff inflated: yes  ETT to lip: 24 cm  Tube secured with: ETT rodriguez  Estimated blood loss (mL): 0  Comments: Patient was hypotensive post intubation which required vasopressors to be administered.       JESUS Jimenez MD  [employer] Pulmonary [facility] Critical Care Fellow    3/12/2023    "

## 2023-03-13 NOTE — ASSESSMENT & PLAN NOTE
Stepped up to MICU on 3/12 for hypotension requiring vasopressors  Intubated for acute hypoxic respiratory failure and acute encephalopathy  4/4 blood culture bottles positive for GNRs; on broad spectrum abx  Has had increasing vasopressor requirements overnight, now maxed on 3 pressors

## 2023-03-13 NOTE — PROGRESS NOTES
Pharmacokinetic Initial Assessment: IV Vancomycin    Assessment/Plan:    Initiate intravenous vancomycin with loading dose of 1750 mg once followed by a maintenance dose of vancomycin 1250 mg IV every 12 hours  Desired empiric serum trough concentration is 15 to 20 mcg/mL  Draw vancomycin trough level 60 min prior to fourth dose on 3/14 at approximately 0800  or sooner if renal function declines   Pharmacy will continue to follow and monitor vancomycin.      Please contact pharmacy at extension [phone] with any questions regarding this assessment.     Thank you for the consult,   Rachna Michaud       Patient brief summary:  Dwight Hoffmann is a 52 y.o. male initiated on antimicrobial therapy with IV Vancomycin for treatment of suspected sepsis    Drug Allergies:   Review of patient's allergies indicates:   Allergen Reactions    Albuterol (bulk) Palpitations    Ativan [lorazepam] Hallucinations             Actual Body Weight:   93.4 kg    Renal Function:   Estimated Creatinine Clearance: 119.3 mL/min (based on SCr of 0.8 mg/dL).,     Dialysis Method (if applicable):  N/A    CBC (last 72 hours):  Recent Labs   Lab Result Units 03/10/23  0409 03/10/23  1732 03/11/23  0622 03/11/23  1806 03/12/23  0741 03/12/23  1717   WBC K/uL 3.28* 2.10* 1.89* 0.71* 0.74* 0.23*   Hemoglobin g/dL 8.3* 7.0* 7.5* 7.6* 7.2* 6.7*   Hematocrit % 25.6* 22.3* 23.8* 23.0* 21.9* 20.9*   Platelets K/uL 13* 9* 35* 17* 9* 22*   Gran % % 80.5* 80.5* 71.0 78.9* 80.6* 78.3*   Lymph % % 13.7* 13.3* 12.0* 14.1* 9.7* 8.7*   Mono % % 4.0 5.2 7.0 4.2 3.2* 8.7   Eosinophil % % 0.6 0.5 0.0 0.0 0.0 0.0   Basophil % % 0.3 0.0 0.0 0.0 0.0 0.0   Differential Method  Automated Automated Manual Automated Manual Automated       Metabolic Panel (last 72 hours):  Recent Labs   Lab Result Units 03/10/23  0409 03/10/23  1541 03/10/23  1732 03/11/23  0622 03/11/23  1806 03/12/23  0741 03/12/23  1717   Sodium mmol/L 129*  --  125* 127* 130* 129* 130*   Sodium, Urine  mmol/L  --  45  --   --   --   --   --    Potassium mmol/L 4.0  --  4.2 4.4 4.0 4.0 4.0   Chloride mmol/L 100  --  97 98 102 105 103   CO2 mmol/L 24  --  21* 21* 18* 16* 15*   Glucose mg/dL 98  --  212* 204* 99 88 82   BUN mg/dL 17  --  22* 20 23* 27* 32*   Creatinine mg/dL 0.6  --  0.7 0.6 0.7 0.7 0.8   Albumin g/dL 1.7*  --  1.4* 1.7* 1.6* 1.3* 1.3*   Total Bilirubin mg/dL 2.6*  --  2.3* 2.4* 2.1* 1.7* 1.7*   Alkaline Phosphatase U/L 70  --  67 79 69 61 55   AST U/L 69*  --  58* 58* 44* 51* 63*   ALT U/L 23  --  23 25 21 19 18   Magnesium mg/dL 1.8  --  1.7 1.8 1.6 1.7 1.8   Phosphorus mg/dL 3.1  --  3.5 3.3 2.8 3.9 4.4       Drug levels (last 3 results):  No results for input(s): VANCOMYCINRA, VANCORANDOM, VANCOMYCINPE, VANCOPEAK, VANCOMYCINTR, VANCOTROUGH in the last 72 hours.    Microbiologic Results:  Microbiology Results (last 7 days)       Procedure Component Value Units Date/Time    Urine Culture High Risk [[id number]] Collected: 03/12/23 1923    Order Status: Sent Specimen: Urine, Catheterized Updated: 03/12/23 1923    Blood culture [[id number]] Collected: 03/12/23 1739    Order Status: Sent Specimen: Blood from Other (Specify) Updated: 03/12/23 1747    Blood culture [[id number]] Collected: 03/12/23 1739    Order Status: Sent Specimen: Blood from Other (Specify) Updated: 03/12/23 1747    Blood culture [[id number]] Collected: 03/09/23 0016    Order Status: Completed Specimen: Blood Updated: 03/12/23 0612     Blood Culture, Routine No Growth to date      No Growth to date      No Growth to date      No Growth to date    Narrative:      Collection has been rescheduled by BM8 at 03/08/2023 23:07 Reason:   Nurse rose wants to reschedule  Collection has been rescheduled by BM8 at 03/08/2023 23:07 Reason:   Nurse rose wants to reschedule    Blood culture [[id number]] Collected: 03/09/23 0016    Order Status: Completed Specimen: Blood Updated: 03/12/23 0612     Blood Culture, Routine No Growth to date      No  Growth to date      No Growth to date      No Growth to date    Narrative:      Collection has been rescheduled by BM8 at 03/08/2023 23:07 Reason:   Nurse rose wants to reschedule  Collection has been rescheduled by BM8 at 03/08/2023 23:07 Reason:   Nurse rose wants to reschedule    Culture, Respiratory with Gram Stain [[id number]]     Order Status: No result Specimen: Respiratory

## 2023-03-13 NOTE — CONSULTS
Patient seen and evaluated by critical care medicine. To be admitted to ICU for further management. Full H&P to follow.     ADARSH Melendez, [facility]  Pulmonary Critical Care Medicine   03/12/2023

## 2023-03-13 NOTE — ASSESSMENT & PLAN NOTE
Re-admitted to MICU 3/12 for continued hypotension- initially thought to be pain medication related; no new fevers or leukocytosis. However, new blood cultures with GNRs and solares decompensation overnight resulting in intubation and max dosing of 3 pressors.    --Follow up culture especiation/sensitivities  --Meropenem/Vanc; add Tobramycin  --Slowly switch to Phenylephrine given tachycardia with Epi (HR up to 200s)  --Stress dose steroids

## 2023-03-13 NOTE — DISCHARGE SUMMARY
[facility] - Cardiac Medical ICU  Critical Care Medicine  Discharge Summary      Patient Name: Dwight Hoffmann  MRN: [id number]  Admission Date: 2/25/2023  Hospital Length of Stay: 16 days  Discharge Date and Time:  03/13/2023 1:38 PM  Attending Physician: Anum Valles MD   Discharging Provider: Fatuma Sunshine NP  Primary Care Provider: Margarita Pacheco MD  Reason for Admission: B cell lymphoma    HPI:   Mr. Dwight Hoffmann is a 52 y.o. man with a history of DM, anemia, DLBCL, HLD, who was admitted as a transfer from [facility] presenting with refractory lymphoma and compression of IVC.  [place] [facility] [place] and to assess for any transfortmation or disease process givn lack of response to chemo and consideration of second line chemo.  Patient with CT from outside hospital concerning for compression of vena cava from lymphadenopathy.  Cardiology consulted at OSH for brief episode of SVT prior to transfer, and noted last echo from 2/8/23 was within normal. General surgery saw patient at OSH as well for concern of obstruction from lymphadenopathy but noted there was not complete obstruction. General surgery did not feel at the time the lymphadenopathy would be able to be safely removed or debulked from vena cava. There was some discussion about possible ileocecal shunt placement, but did not have capability at OSH. Admitted to BMT and was planning for alternative chemo regimen given progression, likely DHAP as inpatient.  Bone marrow aspiration and biopsy done 3/9 for restaging and patient had been scheduled for new baseline PET scan.       Procedure(s) (LRB):  COLONOSCOPY (N/A)    Indwelling Lines/Drains at Time of Discharge:   Lines/Drains/Airways       Central Venous Catheter Line  Duration                  PowerPort A Cath Single Lumen Subclavian Right -- days              Drain  Duration                  Urethral Catheter 03/12/23 1 day              Airway  Duration                   Airway - Non-Surgical 23 1837 Endotracheal Tube <1 day              Arterial Line  Duration             Arterial Line 23 Right Radial 1 day                  Hospital Course:   Hospital Course:  [place]. Hoffmann [place] [facility] [place] [facility] [place] for refractory lymphoma with IVC compression. He was admitted to BMT with plans for alternative chemo regimen. Bone marrow biopsy and repeat PET scan completed showing disease progression.  On 3/9 patient seen by rapid response for hypotension.  Received ~2L IVF and 1 unit PRBC with no improvement in BP and MAP consistently <65.  Critical Care Medicine consulted and patient transferred to MICU.  He required vasopressor support for a brief period and returned to BMT service on 3/11 after receiving chemo and repeat PET scan. The following day, rapid response was called again for hypotension, tachypnea; concern for volume overload. Returned to MICU for vasopressor support and diuresis. That evening he was intubated for respiratory distress and continued to decline overnight- requiring max dosing of 3 vasopressors. Blood cultures grew gram-negative rods. Family called in and code status changed to DNR. Mr. Hoffmann  peacefully with his family at his bedside on 3/13/23.    Physical Exam  Constitutional:       Appearance: He is ill-appearing.      Interventions: He is sedated and intubated.   HENT:      Head: Normocephalic and atraumatic.   Cardiovascular:      Rate and Rhythm: Tachycardia present. Rhythm regularly irregular.      Heart sounds: S1 normal and S2 normal. Murmur heard.   Pulmonary:      Effort: He is intubated.      Breath sounds: Decreased breath sounds and rales present. No wheezing or rhonchi.   Abdominal:      General: There is distension.      Palpations: Abdomen is soft.   Musculoskeletal:      Cervical back: Neck supple.   Neurological:      GCS: GCS eye subscore is 1. GCS verbal subscore is 1. GCS motor subscore is 1.         Consults (From admission, onward)          Status Ordering Provider     Pharmacy to dose Aminoglycosides consult  Once        Provider:  (Not yet assigned)   See Hyperspace for full Linked Orders Report.    Acknowledged FEDERICO JOHNSON     Inpatient consult to Spiritual Care  Once        Provider:  (Not yet assigned)    Completed FEDERICO JOHNSON     Inpatient consult to Infectious Diseases  Once        Provider:  (Not yet assigned)    Completed REGINA RAMOS     Pharmacy to dose Vancomycin consult  Once        Provider:  (Not yet assigned)   See Hyperspace for full Linked Orders Report.    Acknowledged FEDERICO JOHNSON     Inpatient consult to Registered Dietitian/Nutritionist  Once        Provider:  (Not yet assigned)    Completed FEDERICO JOHNSON     Inpatient consult to Critical Care Medicine  Once        Provider:  (Not yet assigned)    Completed ALTA DAS     Inpatient consult to Critical Care Medicine  Once        Provider:  (Not yet assigned)    Completed TATA ALONZO     Inpatient consult to Electrophysiology  Once        Provider:  (Not yet assigned)    Completed ALTA DAS     Inpatient consult to Electrophysiology  Once        Provider:  (Not yet assigned)    Completed AUBREE RENEE     Inpatient consult to Cardiology  Once        Provider:  (Not yet assigned)    Completed ALTA DAS     Inpatient consult to Gastroenterology  Once        Provider:  (Not yet assigned)    Completed ALTA DAS     Inpatient consult to Interventional Radiology  Once        Provider:  (Not yet assigned)    Completed ALTA DAS     Inpatient consult to Midline team  Once        Provider:  (Not yet assigned)    Completed CARITO PINEDA     Inpatient consult to Radiation Oncology  Once        Provider:  (Not yet assigned)    Completed ALTA DAS     Inpatient consult to Interventional Radiology  Once        Provider:  (Not yet assigned)    Completed TASHIA  DESTINI     Inpatient consult to Midline team  Once        Provider:  (Not yet assigned)    Completed DESTINI LAO     Inpatient consult to Registered Dietitian/Nutritionist  Once        Provider:  (Not yet assigned)    Completed NBA HILARIO     Inpatient consult to Colorectal Surgery  Once        Provider:  (Not yet assigned)    Completed NBA HILARIO          Significant Labs:  All pertinent labs within the past 24 hours have been reviewed.    Significant Imaging:  I have reviewed all pertinent imaging results/findings within the past 24 hours.    Pending Diagnostic Studies:       Procedure Component Value Units Date/Time    Comprehensive metabolic panel [[id number]] Collected: 03/13/23 0729    Order Status: Sent Lab Status: In process Updated: 03/13/23 0729    Specimen: Blood     IR Biopsy Lymph Node [[id number]]     Order Status: Sent Lab Status: No result     Specimen to Pathology, Bone Marrow Aspiration/Biopsy [[id number]] Collected: 03/09/23 1208    Order Status: Sent Lab Status: In process Updated: 03/09/23 1443    Specimen: Bone Marrow     Troponin I [[id number]] Collected: 03/13/23 0729    Order Status: Sent Lab Status: In process Updated: 03/13/23 0729    Specimen: Blood           Final Active Diagnoses:    Diagnosis Date Noted POA    PRINCIPAL PROBLEM:  DLBCL (diffuse large B cell lymphoma) [C83.30] 09/28/2022 Yes    Comfort measures only status [Z51.5] 03/13/2023 Not Applicable    Septic shock [A41.9, R65.21] 03/13/2023 No    Acute hypoxemic respiratory failure [J96.01] 03/12/2023 No    Atrial fibrillation with RVR [I48.91] 03/04/2023 No    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  he is not on home oxygen. Supplemental oxygen was provided and noted- Vent Mode: A/C  Oxygen Concentration (%):  [] 40  Resp Rate Total:  [20 br/min-37 br/min] 29 br/min  Vt Set:  [480 mL] 480 mL  PEEP/CPAP:  [5 cmH20-8 cmH20] 8 cmH20  Mean Airway Pressure:  [9.4 cmH20-15 cmH20] 15 cmH20.   Signs/symptoms of respiratory failure include- tachypnea, increased work of breathing and respiratory distress. Contributing diagnoses includes - pulmonary edema Labs and images were reviewed. Patient Has recent ABG, which has been reviewed. Will treat underlying causes and adjust management of respiratory failure as follows:    --Diuresed yesterday  --Maintain lung protective ventilation  --Broad-spectrum antibiotics  --Daily SAT/SBT  --Wean FiO2 for SpO2 >92    ID  Septic shock  Re-admitted to MICU 3/12 for continued hypotension- initially thought to be pain medication related; no new fevers or leukocytosis. However, new blood cultures with GNRs and solares decompensation overnight resulting in intubation and max dosing of 3 pressors.    --Follow up culture especiation/sensitivities  --Meropenem/Vanc; add Tobramycin  --Slowly switch to Phenylephrine given tachycardia with Epi (HR up to 200s)  --Stress dose steroids      Discharged Condition:     Disposition:       Patient Instructions:   No discharge procedures on file.  Medications:  None     Fatuma Sunshine NP  Critical Care Medicine  [facility] - Cardiac Medical ICU

## 2023-03-13 NOTE — ASSESSMENT & PLAN NOTE
Patient with small bowel resection approximately 5 months ago consistent with Diffuse large-B cell lymphoma, non germinal center origin. Additionally had omentum resection also showing DLBCL. Had bone marrow biopsy approximately 5 months ago that showed normo cellular marrow for age, no increase in blasts. Patient had PET scan from 1/11/23 showing hypermetabolic loops of small bowel most consistent with previous known disease and was thought to have improved (partial response to therapy noted from chart review) and was recommended he continue with R-CHOP at that time. Patient hospitalized at [facility] found to have likely worsening of lymphoma based on CT scan of abdomen showing partial compression of IVC. [place] [facility] [place] given uncertainty in responsiveness to first-line chemo therapy.    - planned  for alternative chemo regimen given progression   - continue acyclovir, allopurinol  - Per last CT abdomen: Interval increase in size of the centrally necrotic ileocecal lymphomatous mass. Superimposed infection within the necrotic mass cannot be excluded.  - changed steroid to pulse dose dexamethasone on 3/2, now completed.   - Rad onc consulted for radiation therapy. Pt underwent sim on 3/2, however holding off on radiation at this time.  - attempted LN bx with IR on 3/6 however cancelled due to patient entering AFRVR. Attempted bx with GI on 3/7 however patient did not complete prep so unable to scope.  - IR completed mesenteric LN biopsy on 3/7, positive for B-cell lymphoma with extensive necrosis.   - s/p BMBx on 3/9, path pending  - obtained new baseline PET inpatient for treatment planning, disease response monitoring and pretransplant planning on 3/10  - started DHAP IP on 3/10  - q12h TLS labs

## 2023-03-14 LAB
BACTERIA BLD CULT: NORMAL
BACTERIA BLD CULT: NORMAL
BACTERIA UR CULT: NO GROWTH
BODY SITE - BONE MARROW: NORMAL
CLINICAL DIAGNOSIS - BONE MARROW: NORMAL
COMMENT: NORMAL
FINAL PATHOLOGIC DIAGNOSIS: NORMAL
FLOW CYTOMETRY ANTIBODIES ANALYZED - BONE MARROW: NORMAL
FLOW CYTOMETRY COMMENT - BONE MARROW: NORMAL
FLOW CYTOMETRY INTERPRETATION - BONE MARROW: NORMAL
GROSS: NORMAL
Lab: NORMAL
MICROSCOPIC EXAM: NORMAL

## 2023-03-14 NOTE — PLAN OF CARE
Called to bedside to pronounce patient - no audible breath or cardiac sounds. Time of death 1311hrs.     Ashley Mario MD  Pulmonary/Critical Care Fellow

## 2023-03-16 LAB
BACTERIA BLD CULT: ABNORMAL
CHROM BANDING METHOD: NORMAL
CHROMOSOME ANALYSIS BM ADDITIONAL INFORMATION: NORMAL
CHROMOSOME ANALYSIS BM RELEASED BY: NORMAL
CHROMOSOME ANALYSIS BM RESULT SUMMARY: NORMAL
CLINICAL CYTOGENETICIST REVIEW: NORMAL
KARYOTYP MAR: NORMAL
REASON FOR REFERRAL (NARRATIVE): NORMAL
REF LAB TEST METHOD: NORMAL
SPECIMEN SOURCE: NORMAL
SPECIMEN: NORMAL

## 2023-03-18 ENCOUNTER — DOCUMENT SCAN (OUTPATIENT)
Dept: HOME HEALTH SERVICES | Facility: HOSPITAL | Age: 53
End: 2023-03-18
Payer: MEDICAID

## 2024-05-31 NOTE — ASSESSMENT & PLAN NOTE
CRS: due to dehydration because the IVC is flat throughout its course rather than just at one level. Treated with IVF   right/basilic vein

## (undated) DEVICE — ELECTRODE REM PLYHSV RETURN 9

## (undated) DEVICE — IRRIGATOR SUCTION W/TIP

## (undated) DEVICE — CANNULA LAP SEAL Z THRD 5X100

## (undated) DEVICE — SUTURE MONOCRYL 4-0 PS-2 27 MCP426H

## (undated) DEVICE — SUTURE VICRYL 3-0 SH 27 VCP416H

## (undated) DEVICE — ADHESIVE DERMABOND SKIN DNX12

## (undated) DEVICE — DRESSING ABSRBNT ISLAND 3.6X8

## (undated) DEVICE — GOWN POLY REINF BRTH SLV XL

## (undated) DEVICE — SYR 30CC LUER LOCK

## (undated) DEVICE — STAPLER SKIN ROTATING HEAD

## (undated) DEVICE — STRAP OR TABLE 5IN X 72IN

## (undated) DEVICE — EVACUATOR WOUND BULB 100CC

## (undated) DEVICE — TOWEL OR DISP STRL BLUE 4/PK

## (undated) DEVICE — SPONGE IV DRAIN 4X4 STERILE

## (undated) DEVICE — SOL IRR NACL .9% 3000ML

## (undated) DEVICE — SET TUBE PNEUMOCLEAR SE HI FLO

## (undated) DEVICE — GLOVE SURGEONS ULTRA TOUCH 6.5

## (undated) DEVICE — GOWN POLY REINF BRTH SLV LG

## (undated) DEVICE — NDL INSUF ULTRA VERESS 120MM

## (undated) DEVICE — LINER SUCTION 3000CC

## (undated) DEVICE — PACK CUSTOM UNIV BASIN SLI

## (undated) DEVICE — GLOVE BIOGEL PI MICRO INDIC 7

## (undated) DEVICE — PACK BASIC

## (undated) DEVICE — BAG DECANTER 10-102

## (undated) DEVICE — ADHESIVE DERMABOND ADVANCED

## (undated) DEVICE — SYR LUER LOCK STERILE 10ML

## (undated) DEVICE — BLADE SURG CARBON STEEL SZ11

## (undated) DEVICE — PAD BOVIE ADULT

## (undated) DEVICE — TROCAR ENDO Z THREAD KII 5X100

## (undated) DEVICE — SUT MONOCRYL 4-0 PS-2

## (undated) DEVICE — NEEDLE SAFETY ECLIPSE 25G 1-1/2IN 305767

## (undated) DEVICE — BLADE SCALPEL #11 371111

## (undated) DEVICE — SOLUTION IRRI NS BOTTLE 1000ML R5200-01

## (undated) DEVICE — SUT PDS II 0 CT-1 VIL MONO

## (undated) DEVICE — NDL SAFETY 21G X 1 1/2 ECLPSE

## (undated) DEVICE — SYRINGE 5ML 309646

## (undated) DEVICE — DRAPE CORETEMP FLD WRM 56X62IN

## (undated) DEVICE — GLOVE BIOGEL PIMICRO INDIC 6.5

## (undated) DEVICE — KIT ANTIFOG W/SPONG & FLUID

## (undated) DEVICE — PACK CUSTOM ENDO CHOLO SLI

## (undated) DEVICE — HEMOSTAT SURGICEL 4X8IN

## (undated) DEVICE — SUT SILK 0 STRANDS 30IN BLK

## (undated) DEVICE — SEALER LIGASURE IMPACT 18CM

## (undated) DEVICE — RELOAD PROXIMATE CUT BLUE 75MM

## (undated) DEVICE — KIT COLLECTION E SWAB REGULAR

## (undated) DEVICE — DRAPE NAVIGATOR GAMMA PROBE 098004

## (undated) DEVICE — CUTTER PROXIMATE BLUE 75MM

## (undated) DEVICE — Device

## (undated) DEVICE — PREP CHLORA 10.5ML ORANGE

## (undated) DEVICE — DRAPE T THYROID W/ARMBOARD COVER

## (undated) DEVICE — GLOVE SURG ULTRA TOUCH 6

## (undated) DEVICE — SUT SILK 3-0 SH 18IN BLACK

## (undated) DEVICE — GLOVE SURG ULTRA TOUCH 7

## (undated) DEVICE — DRAPE C-ARM (FITS NEW C-ARM) 07-CA108

## (undated) DEVICE — ADHESIVE TISSUE EXOFIN

## (undated) DEVICE — DRAIN SINGLE ROUND 1/4 19FR

## (undated) DEVICE — SUT 0 VICRYL / UR6 (J603)

## (undated) DEVICE — SET BASIN O R 31451126

## (undated) DEVICE — SLEEVE SCD EXPRESS KNEE MEDIUM

## (undated) DEVICE — DRAPE ABDOMINAL TIBURON 14X11

## (undated) DEVICE — TIP BOVIE TEFLON E1450X

## (undated) DEVICE — SUT 2-0 12-18IN SILK

## (undated) DEVICE — INSTRUMENT POOLE ST

## (undated) DEVICE — SOL 9P NACL IRR PIC IL

## (undated) DEVICE — GLOVE BIOGEL PI  GOLD SZ 7

## (undated) DEVICE — UNDERGLOVES BIOGEL PI SZ 7 LF

## (undated) DEVICE — APPLICATOR CHLORAPREP ORN 26ML